# Patient Record
Sex: FEMALE | Race: WHITE | Employment: OTHER | URBAN - METROPOLITAN AREA
[De-identification: names, ages, dates, MRNs, and addresses within clinical notes are randomized per-mention and may not be internally consistent; named-entity substitution may affect disease eponyms.]

---

## 2017-01-10 ENCOUNTER — OFFICE VISIT (OUTPATIENT)
Dept: FAMILY MEDICINE CLINIC | Age: 82
End: 2017-01-10

## 2017-01-10 VITALS
WEIGHT: 152 LBS | SYSTOLIC BLOOD PRESSURE: 112 MMHG | DIASTOLIC BLOOD PRESSURE: 58 MMHG | BODY MASS INDEX: 28.7 KG/M2 | RESPIRATION RATE: 16 BRPM | HEIGHT: 61 IN | HEART RATE: 68 BPM

## 2017-01-10 DIAGNOSIS — E78.00 PURE HYPERCHOLESTEROLEMIA: ICD-10-CM

## 2017-01-10 DIAGNOSIS — I25.10 CORONARY ARTERY DISEASE INVOLVING NATIVE CORONARY ARTERY OF NATIVE HEART WITHOUT ANGINA PECTORIS: ICD-10-CM

## 2017-01-10 DIAGNOSIS — J01.20 ACUTE NON-RECURRENT ETHMOIDAL SINUSITIS: Primary | ICD-10-CM

## 2017-01-10 DIAGNOSIS — I10 ESSENTIAL HYPERTENSION: ICD-10-CM

## 2017-01-10 DIAGNOSIS — J44.9 CHRONIC OBSTRUCTIVE PULMONARY DISEASE, UNSPECIFIED COPD TYPE (HCC): ICD-10-CM

## 2017-01-10 PROCEDURE — 99213 OFFICE O/P EST LOW 20 MIN: CPT | Performed by: FAMILY MEDICINE

## 2017-01-10 RX ORDER — CEPHALEXIN 500 MG/1
500 CAPSULE ORAL 3 TIMES DAILY
Qty: 30 CAPSULE | Refills: 0 | Status: SHIPPED | OUTPATIENT
Start: 2017-01-10 | End: 2017-03-15

## 2017-01-10 RX ORDER — IPRATROPIUM BROMIDE 42 UG/1
2 SPRAY, METERED NASAL 3 TIMES DAILY
Qty: 1 BOTTLE | Refills: 3 | Status: SHIPPED | OUTPATIENT
Start: 2017-01-10 | End: 2017-09-20 | Stop reason: ALTCHOICE

## 2017-01-10 ASSESSMENT — ENCOUNTER SYMPTOMS
SHORTNESS OF BREATH: 0
ABDOMINAL PAIN: 0
SORE THROAT: 1
NAUSEA: 0
CHEST TIGHTNESS: 0
WHEEZING: 0
SINUS PRESSURE: 1
CONSTIPATION: 0
EYE PAIN: 0
COUGH: 1
HOARSE VOICE: 1

## 2017-01-13 RX ORDER — POTASSIUM CHLORIDE 20 MEQ/1
20 TABLET, EXTENDED RELEASE ORAL 3 TIMES DAILY
Qty: 270 TABLET | Refills: 1 | Status: SHIPPED | OUTPATIENT
Start: 2017-01-13 | End: 2017-07-18 | Stop reason: SDUPTHER

## 2017-01-18 DIAGNOSIS — I10 ESSENTIAL HYPERTENSION: ICD-10-CM

## 2017-01-18 DIAGNOSIS — E78.00 PURE HYPERCHOLESTEROLEMIA: ICD-10-CM

## 2017-01-18 DIAGNOSIS — M54.41 CHRONIC MIDLINE LOW BACK PAIN WITH RIGHT-SIDED SCIATICA: ICD-10-CM

## 2017-01-18 DIAGNOSIS — I25.10 CORONARY ARTERY DISEASE INVOLVING NATIVE CORONARY ARTERY OF NATIVE HEART WITHOUT ANGINA PECTORIS: ICD-10-CM

## 2017-01-18 DIAGNOSIS — G89.29 CHRONIC MIDLINE LOW BACK PAIN WITH RIGHT-SIDED SCIATICA: ICD-10-CM

## 2017-01-19 RX ORDER — SIMVASTATIN 40 MG
40 TABLET ORAL NIGHTLY
Qty: 90 TABLET | Refills: 1 | Status: SHIPPED | OUTPATIENT
Start: 2017-01-19 | End: 2017-07-18 | Stop reason: SDUPTHER

## 2017-01-19 RX ORDER — ISOSORBIDE MONONITRATE 30 MG/1
30 TABLET, EXTENDED RELEASE ORAL DAILY
Qty: 90 TABLET | Refills: 1 | Status: SHIPPED | OUTPATIENT
Start: 2017-01-19 | End: 2017-07-18 | Stop reason: SDUPTHER

## 2017-01-19 RX ORDER — NORTRIPTYLINE HYDROCHLORIDE 50 MG/1
50 CAPSULE ORAL NIGHTLY
Qty: 90 CAPSULE | Refills: 1 | Status: SHIPPED | OUTPATIENT
Start: 2017-01-19 | End: 2017-07-18 | Stop reason: SDUPTHER

## 2017-01-19 RX ORDER — HYDROCHLOROTHIAZIDE 25 MG/1
25 TABLET ORAL DAILY
Qty: 90 TABLET | Refills: 1 | Status: SHIPPED | OUTPATIENT
Start: 2017-01-19 | End: 2017-07-18 | Stop reason: SDUPTHER

## 2017-02-03 DIAGNOSIS — G89.29 CHRONIC BACK PAIN, UNSPECIFIED BACK LOCATION, UNSPECIFIED BACK PAIN LATERALITY: ICD-10-CM

## 2017-02-03 DIAGNOSIS — M54.9 CHRONIC BACK PAIN, UNSPECIFIED BACK LOCATION, UNSPECIFIED BACK PAIN LATERALITY: ICD-10-CM

## 2017-02-04 RX ORDER — ACETAMINOPHEN AND CODEINE PHOSPHATE 300; 30 MG/1; MG/1
TABLET ORAL
Qty: 120 TABLET | Refills: 1 | Status: SHIPPED | OUTPATIENT
Start: 2017-02-04 | End: 2017-03-27 | Stop reason: SDUPTHER

## 2017-03-15 ENCOUNTER — OFFICE VISIT (OUTPATIENT)
Dept: FAMILY MEDICINE CLINIC | Age: 82
End: 2017-03-15

## 2017-03-15 VITALS
HEIGHT: 61 IN | DIASTOLIC BLOOD PRESSURE: 74 MMHG | BODY MASS INDEX: 28.51 KG/M2 | SYSTOLIC BLOOD PRESSURE: 124 MMHG | HEART RATE: 74 BPM | WEIGHT: 151 LBS | RESPIRATION RATE: 14 BRPM

## 2017-03-15 DIAGNOSIS — G89.29 CHRONIC MIDLINE LOW BACK PAIN WITH RIGHT-SIDED SCIATICA: ICD-10-CM

## 2017-03-15 DIAGNOSIS — J44.9 CHRONIC OBSTRUCTIVE PULMONARY DISEASE, UNSPECIFIED COPD TYPE (HCC): ICD-10-CM

## 2017-03-15 DIAGNOSIS — K14.9 TONGUE IRRITATION: ICD-10-CM

## 2017-03-15 DIAGNOSIS — M15.9 PRIMARY OSTEOARTHRITIS INVOLVING MULTIPLE JOINTS: ICD-10-CM

## 2017-03-15 DIAGNOSIS — E78.00 PURE HYPERCHOLESTEROLEMIA: ICD-10-CM

## 2017-03-15 DIAGNOSIS — M54.41 CHRONIC MIDLINE LOW BACK PAIN WITH RIGHT-SIDED SCIATICA: ICD-10-CM

## 2017-03-15 DIAGNOSIS — I25.10 CORONARY ARTERY DISEASE INVOLVING NATIVE CORONARY ARTERY OF NATIVE HEART WITHOUT ANGINA PECTORIS: ICD-10-CM

## 2017-03-15 DIAGNOSIS — K21.9 GASTROESOPHAGEAL REFLUX DISEASE WITHOUT ESOPHAGITIS: ICD-10-CM

## 2017-03-15 DIAGNOSIS — I10 ESSENTIAL HYPERTENSION: Primary | ICD-10-CM

## 2017-03-15 PROCEDURE — 99213 OFFICE O/P EST LOW 20 MIN: CPT | Performed by: FAMILY MEDICINE

## 2017-03-15 RX ORDER — TIZANIDINE 4 MG/1
4 TABLET ORAL EVERY 6 HOURS PRN
Qty: 100 TABLET | Refills: 2 | Status: SHIPPED | OUTPATIENT
Start: 2017-03-15 | End: 2017-07-18 | Stop reason: SDUPTHER

## 2017-03-15 RX ORDER — TRIAMCINOLONE ACETONIDE 0.1 %
PASTE (GRAM) DENTAL
Qty: 1 TUBE | Refills: 1 | Status: SHIPPED | OUTPATIENT
Start: 2017-03-15 | End: 2017-03-22

## 2017-03-15 ASSESSMENT — PATIENT HEALTH QUESTIONNAIRE - PHQ9
SUM OF ALL RESPONSES TO PHQ QUESTIONS 1-9: 0
SUM OF ALL RESPONSES TO PHQ9 QUESTIONS 1 & 2: 0
1. LITTLE INTEREST OR PLEASURE IN DOING THINGS: 0
2. FEELING DOWN, DEPRESSED OR HOPELESS: 0

## 2017-03-15 ASSESSMENT — ENCOUNTER SYMPTOMS
COUGH: 0
SORE THROAT: 0
ABDOMINAL PAIN: 0
CONSTIPATION: 0
EYE PAIN: 0
NAUSEA: 0
SHORTNESS OF BREATH: 0
WHEEZING: 0
HOARSE VOICE: 0
ORTHOPNEA: 0
CHEST TIGHTNESS: 0

## 2017-03-15 ASSESSMENT — COPD QUESTIONNAIRES: COPD: 1

## 2017-03-27 DIAGNOSIS — M54.9 CHRONIC BACK PAIN, UNSPECIFIED BACK LOCATION, UNSPECIFIED BACK PAIN LATERALITY: ICD-10-CM

## 2017-03-27 DIAGNOSIS — G89.29 CHRONIC BACK PAIN, UNSPECIFIED BACK LOCATION, UNSPECIFIED BACK PAIN LATERALITY: ICD-10-CM

## 2017-03-28 RX ORDER — ACETAMINOPHEN AND CODEINE PHOSPHATE 300; 30 MG/1; MG/1
TABLET ORAL
Qty: 120 TABLET | Refills: 1 | Status: SHIPPED | OUTPATIENT
Start: 2017-03-28 | End: 2017-05-19 | Stop reason: SDUPTHER

## 2017-05-19 DIAGNOSIS — G89.29 CHRONIC BACK PAIN, UNSPECIFIED BACK LOCATION, UNSPECIFIED BACK PAIN LATERALITY: ICD-10-CM

## 2017-05-19 DIAGNOSIS — M54.9 CHRONIC BACK PAIN, UNSPECIFIED BACK LOCATION, UNSPECIFIED BACK PAIN LATERALITY: ICD-10-CM

## 2017-05-22 RX ORDER — ACETAMINOPHEN AND CODEINE PHOSPHATE 300; 30 MG/1; MG/1
TABLET ORAL
Qty: 120 TABLET | Refills: 1 | Status: SHIPPED | OUTPATIENT
Start: 2017-05-22 | End: 2017-07-18 | Stop reason: SDUPTHER

## 2017-06-21 ENCOUNTER — OFFICE VISIT (OUTPATIENT)
Dept: FAMILY MEDICINE CLINIC | Age: 82
End: 2017-06-21

## 2017-06-21 VITALS
SYSTOLIC BLOOD PRESSURE: 120 MMHG | RESPIRATION RATE: 12 BRPM | DIASTOLIC BLOOD PRESSURE: 66 MMHG | HEIGHT: 61 IN | BODY MASS INDEX: 28.75 KG/M2 | WEIGHT: 152.25 LBS | HEART RATE: 64 BPM

## 2017-06-21 DIAGNOSIS — E78.00 PURE HYPERCHOLESTEROLEMIA: ICD-10-CM

## 2017-06-21 DIAGNOSIS — I25.10 CORONARY ARTERY DISEASE INVOLVING NATIVE CORONARY ARTERY OF NATIVE HEART WITHOUT ANGINA PECTORIS: ICD-10-CM

## 2017-06-21 DIAGNOSIS — K21.9 GASTROESOPHAGEAL REFLUX DISEASE WITHOUT ESOPHAGITIS: ICD-10-CM

## 2017-06-21 DIAGNOSIS — G89.29 CHRONIC MIDLINE LOW BACK PAIN WITH RIGHT-SIDED SCIATICA: Primary | ICD-10-CM

## 2017-06-21 DIAGNOSIS — J44.9 CHRONIC OBSTRUCTIVE PULMONARY DISEASE, UNSPECIFIED COPD TYPE (HCC): ICD-10-CM

## 2017-06-21 DIAGNOSIS — I10 ESSENTIAL HYPERTENSION: ICD-10-CM

## 2017-06-21 DIAGNOSIS — M54.41 CHRONIC MIDLINE LOW BACK PAIN WITH RIGHT-SIDED SCIATICA: Primary | ICD-10-CM

## 2017-06-21 PROCEDURE — 99213 OFFICE O/P EST LOW 20 MIN: CPT | Performed by: FAMILY MEDICINE

## 2017-06-21 ASSESSMENT — ENCOUNTER SYMPTOMS
FREQUENT THROAT CLEARING: 0
COUGH: 0
CHEST TIGHTNESS: 0
WHEEZING: 0
ABDOMINAL PAIN: 0
NAUSEA: 0
ORTHOPNEA: 0
SHORTNESS OF BREATH: 0
HOARSE VOICE: 0
CONSTIPATION: 0
SORE THROAT: 0
BLURRED VISION: 0
EYE PAIN: 0

## 2017-06-21 ASSESSMENT — PATIENT HEALTH QUESTIONNAIRE - PHQ9
SUM OF ALL RESPONSES TO PHQ9 QUESTIONS 1 & 2: 0
1. LITTLE INTEREST OR PLEASURE IN DOING THINGS: 0
SUM OF ALL RESPONSES TO PHQ QUESTIONS 1-9: 0
2. FEELING DOWN, DEPRESSED OR HOPELESS: 0

## 2017-06-21 ASSESSMENT — COPD QUESTIONNAIRES: COPD: 1

## 2017-07-18 DIAGNOSIS — I25.10 CORONARY ARTERY DISEASE INVOLVING NATIVE CORONARY ARTERY OF NATIVE HEART WITHOUT ANGINA PECTORIS: ICD-10-CM

## 2017-07-18 DIAGNOSIS — G89.29 CHRONIC BACK PAIN, UNSPECIFIED BACK LOCATION, UNSPECIFIED BACK PAIN LATERALITY: ICD-10-CM

## 2017-07-18 DIAGNOSIS — I10 ESSENTIAL HYPERTENSION: ICD-10-CM

## 2017-07-18 DIAGNOSIS — E78.00 PURE HYPERCHOLESTEROLEMIA: ICD-10-CM

## 2017-07-18 DIAGNOSIS — M54.9 CHRONIC BACK PAIN, UNSPECIFIED BACK LOCATION, UNSPECIFIED BACK PAIN LATERALITY: ICD-10-CM

## 2017-07-18 DIAGNOSIS — M54.41 CHRONIC MIDLINE LOW BACK PAIN WITH RIGHT-SIDED SCIATICA: ICD-10-CM

## 2017-07-18 DIAGNOSIS — G89.29 CHRONIC MIDLINE LOW BACK PAIN WITH RIGHT-SIDED SCIATICA: ICD-10-CM

## 2017-07-19 RX ORDER — NORTRIPTYLINE HYDROCHLORIDE 50 MG/1
50 CAPSULE ORAL NIGHTLY
Qty: 90 CAPSULE | Refills: 1 | Status: SHIPPED | OUTPATIENT
Start: 2017-07-19 | End: 2018-01-15 | Stop reason: SDUPTHER

## 2017-07-19 RX ORDER — TIZANIDINE 4 MG/1
4 TABLET ORAL EVERY 6 HOURS PRN
Qty: 100 TABLET | Refills: 2 | Status: SHIPPED | OUTPATIENT
Start: 2017-07-19 | End: 2017-12-04 | Stop reason: SDUPTHER

## 2017-07-19 RX ORDER — SIMVASTATIN 40 MG
40 TABLET ORAL NIGHTLY
Qty: 90 TABLET | Refills: 1 | Status: SHIPPED | OUTPATIENT
Start: 2017-07-19 | End: 2018-01-15 | Stop reason: SDUPTHER

## 2017-07-19 RX ORDER — HYDROCHLOROTHIAZIDE 25 MG/1
25 TABLET ORAL DAILY
Qty: 90 TABLET | Refills: 1 | Status: SHIPPED | OUTPATIENT
Start: 2017-07-19 | End: 2018-01-15 | Stop reason: SDUPTHER

## 2017-07-19 RX ORDER — ISOSORBIDE MONONITRATE 30 MG/1
30 TABLET, EXTENDED RELEASE ORAL DAILY
Qty: 90 TABLET | Refills: 1 | Status: SHIPPED | OUTPATIENT
Start: 2017-07-19 | End: 2018-01-15 | Stop reason: SDUPTHER

## 2017-07-19 RX ORDER — ACETAMINOPHEN AND CODEINE PHOSPHATE 300; 30 MG/1; MG/1
TABLET ORAL
Qty: 120 TABLET | Refills: 1 | Status: SHIPPED | OUTPATIENT
Start: 2017-07-19 | End: 2017-09-20 | Stop reason: SDUPTHER

## 2017-07-19 RX ORDER — POTASSIUM CHLORIDE 20 MEQ/1
20 TABLET, EXTENDED RELEASE ORAL 3 TIMES DAILY
Qty: 270 TABLET | Refills: 1 | Status: SHIPPED | OUTPATIENT
Start: 2017-07-19 | End: 2018-01-15 | Stop reason: SDUPTHER

## 2017-08-02 ENCOUNTER — HOSPITAL ENCOUNTER (OUTPATIENT)
Age: 82
Discharge: HOME | End: 2017-08-02
Payer: MEDICARE

## 2017-08-02 ENCOUNTER — OFFICE VISIT (OUTPATIENT)
Dept: FAMILY MEDICINE CLINIC | Age: 82
End: 2017-08-02

## 2017-08-02 ENCOUNTER — HOSPITAL ENCOUNTER (OUTPATIENT)
Dept: GENERAL RADIOLOGY | Age: 82
Discharge: HOME | End: 2017-08-02
Payer: MEDICARE

## 2017-08-02 VITALS
RESPIRATION RATE: 10 BRPM | WEIGHT: 152.5 LBS | HEIGHT: 61 IN | SYSTOLIC BLOOD PRESSURE: 128 MMHG | HEART RATE: 76 BPM | DIASTOLIC BLOOD PRESSURE: 76 MMHG | BODY MASS INDEX: 28.79 KG/M2

## 2017-08-02 DIAGNOSIS — K21.9 GASTROESOPHAGEAL REFLUX DISEASE WITHOUT ESOPHAGITIS: ICD-10-CM

## 2017-08-02 DIAGNOSIS — J44.9 CHRONIC OBSTRUCTIVE PULMONARY DISEASE, UNSPECIFIED COPD TYPE (HCC): ICD-10-CM

## 2017-08-02 DIAGNOSIS — M54.42 ACUTE LEFT-SIDED LOW BACK PAIN WITH LEFT-SIDED SCIATICA: Primary | ICD-10-CM

## 2017-08-02 DIAGNOSIS — M15.9 PRIMARY OSTEOARTHRITIS INVOLVING MULTIPLE JOINTS: ICD-10-CM

## 2017-08-02 DIAGNOSIS — I25.10 CORONARY ARTERY DISEASE INVOLVING NATIVE CORONARY ARTERY OF NATIVE HEART WITHOUT ANGINA PECTORIS: ICD-10-CM

## 2017-08-02 DIAGNOSIS — M54.42 ACUTE LEFT-SIDED LOW BACK PAIN WITH LEFT-SIDED SCIATICA: ICD-10-CM

## 2017-08-02 DIAGNOSIS — I10 ESSENTIAL HYPERTENSION: ICD-10-CM

## 2017-08-02 PROCEDURE — 72100 X-RAY EXAM L-S SPINE 2/3 VWS: CPT

## 2017-08-02 PROCEDURE — 99213 OFFICE O/P EST LOW 20 MIN: CPT | Performed by: FAMILY MEDICINE

## 2017-08-02 RX ORDER — GABAPENTIN 300 MG/1
CAPSULE ORAL
Qty: 90 CAPSULE | Refills: 3 | Status: SHIPPED | OUTPATIENT
Start: 2017-08-02 | End: 2017-09-20 | Stop reason: ALTCHOICE

## 2017-08-02 RX ORDER — OMEPRAZOLE 20 MG/1
20 CAPSULE, DELAYED RELEASE ORAL 2 TIMES DAILY
Qty: 180 CAPSULE | Refills: 1 | Status: SHIPPED | OUTPATIENT
Start: 2017-08-02 | End: 2018-02-20 | Stop reason: SDUPTHER

## 2017-08-02 ASSESSMENT — ENCOUNTER SYMPTOMS
SHORTNESS OF BREATH: 0
ORTHOPNEA: 0
BACK PAIN: 1
EYE PAIN: 0
CONSTIPATION: 0
COUGH: 0
NAUSEA: 0
SORE THROAT: 0
WHEEZING: 0
ABDOMINAL PAIN: 0
CHEST TIGHTNESS: 0

## 2017-08-03 ENCOUNTER — TELEPHONE (OUTPATIENT)
Dept: FAMILY MEDICINE CLINIC | Age: 82
End: 2017-08-03

## 2017-08-24 ENCOUNTER — TELEPHONE (OUTPATIENT)
Dept: FAMILY MEDICINE CLINIC | Age: 82
End: 2017-08-24

## 2017-08-24 DIAGNOSIS — M54.41 CHRONIC MIDLINE LOW BACK PAIN WITH RIGHT-SIDED SCIATICA: Primary | ICD-10-CM

## 2017-08-24 DIAGNOSIS — G89.29 CHRONIC MIDLINE LOW BACK PAIN WITH RIGHT-SIDED SCIATICA: Primary | ICD-10-CM

## 2017-08-25 RX ORDER — PREGABALIN 50 MG/1
50 CAPSULE ORAL 2 TIMES DAILY
Qty: 60 CAPSULE | Refills: 3 | Status: SHIPPED | OUTPATIENT
Start: 2017-08-25 | End: 2017-09-20 | Stop reason: ALTCHOICE

## 2017-09-12 LAB
A/G RATIO: 0.9 (ref 1.5–2.5)
ABSOLUTE BASO #: 0 /CMM (ref 0–200)
ABSOLUTE EOS #: 200 /CMM (ref 0–500)
ABSOLUTE LYMPH #: 1500 /CMM (ref 1000–4800)
ABSOLUTE MONO #: 700 /CMM (ref 0–800)
ABSOLUTE NEUT #: 3300 /CMM (ref 1800–7700)
ALBUMIN SERPL-MCNC: 3.7 GM/DL (ref 3.5–5)
ALP BLD-CCNC: 99 IU/L (ref 39–118)
ALT SERPL-CCNC: 20 IU/L (ref 10–40)
ANION GAP SERPL CALCULATED.3IONS-SCNC: 7 MMOL/L (ref 4–12)
AST SERPL-CCNC: 28 IU/L (ref 15–41)
BASOPHILS RELATIVE PERCENT: 0.3 % (ref 0–2)
BILIRUB SERPL-MCNC: 0.8 MG/DL (ref 0.2–1)
BUN BLDV-MCNC: 30 MG/DL (ref 7–20)
CALCIUM SERPL-MCNC: 9.5 MG/DL (ref 8.8–10.5)
CHLORIDE BLD-SCNC: 105 MEQ/L (ref 101–111)
CHOLESTEROL/HDL RELATIVE RISK: 1.9 (ref 4–4.4)
CHOLESTEROL: 143 MG/DL
CO2: 19 MEQ/L (ref 21–32)
CREAT SERPL-MCNC: 1.46 MG/DL (ref 0.6–1.3)
CREATININE CLEARANCE: 34
DIRECT-LDL / HDL RISK: 0.8
EOSINOPHILS RELATIVE PERCENT: 2.9 % (ref 0–6)
GLUCOSE: 112 MG/DL (ref 70–110)
HCT VFR BLD CALC: 39.1 % (ref 35–44)
HDLC SERPL-MCNC: 74 MG/DL
HEMOGLOBIN: 12.8 GM/DL (ref 12–15)
LDL CHOLESTEROL DIRECT: 65 MG/DL
LYMPHOCYTES RELATIVE PERCENT: 27 % (ref 15–45)
MCH RBC QN AUTO: 30.4 PG (ref 27.5–33)
MCHC RBC AUTO-ENTMCNC: 32.6 GM/DL (ref 33–36)
MCV RBC AUTO: 93.2 CU MIC (ref 80–97)
MONOCYTES RELATIVE PERCENT: 11.7 % (ref 2–10)
NEUTROPHILS RELATIVE PERCENT: 58.1 % (ref 40–70)
NUCLEATED RBCS: 0.1 /100 WBC
PDW BLD-RTO: 14.2 % (ref 12–16)
PLATELET # BLD: 273 TH/CMM (ref 150–400)
POTASSIUM SERPL-SCNC: 4.3 MEQ/L (ref 3.6–5)
RBC # BLD: 4.2 MIL/CMM (ref 4–5.1)
SODIUM BLD-SCNC: 131 MEQ/L (ref 135–145)
TOTAL PROTEIN: 8 G/DL (ref 6.2–8)
TRIGL SERPL-MCNC: 136 MG/DL
TSH REFLEX: 1.47 MCIU/ML (ref 0.49–4.67)
VLDLC SERPL CALC-MCNC: 27 MG/DL
WBC # BLD: 5.7 TH/CMM (ref 4.4–10.5)

## 2017-09-13 ENCOUNTER — TELEPHONE (OUTPATIENT)
Dept: FAMILY MEDICINE CLINIC | Age: 82
End: 2017-09-13

## 2017-09-20 ENCOUNTER — OFFICE VISIT (OUTPATIENT)
Dept: FAMILY MEDICINE CLINIC | Age: 82
End: 2017-09-20

## 2017-09-20 VITALS
SYSTOLIC BLOOD PRESSURE: 122 MMHG | BODY MASS INDEX: 28.75 KG/M2 | HEART RATE: 76 BPM | WEIGHT: 152.25 LBS | HEIGHT: 61 IN | DIASTOLIC BLOOD PRESSURE: 66 MMHG | RESPIRATION RATE: 14 BRPM

## 2017-09-20 DIAGNOSIS — I10 ESSENTIAL HYPERTENSION: Primary | ICD-10-CM

## 2017-09-20 DIAGNOSIS — G89.29 CHRONIC BACK PAIN, UNSPECIFIED BACK LOCATION, UNSPECIFIED BACK PAIN LATERALITY: ICD-10-CM

## 2017-09-20 DIAGNOSIS — M15.9 PRIMARY OSTEOARTHRITIS INVOLVING MULTIPLE JOINTS: ICD-10-CM

## 2017-09-20 DIAGNOSIS — I25.10 CORONARY ARTERY DISEASE INVOLVING NATIVE CORONARY ARTERY OF NATIVE HEART WITHOUT ANGINA PECTORIS: ICD-10-CM

## 2017-09-20 DIAGNOSIS — M54.9 CHRONIC BACK PAIN, UNSPECIFIED BACK LOCATION, UNSPECIFIED BACK PAIN LATERALITY: ICD-10-CM

## 2017-09-20 DIAGNOSIS — K21.9 GASTROESOPHAGEAL REFLUX DISEASE WITHOUT ESOPHAGITIS: ICD-10-CM

## 2017-09-20 DIAGNOSIS — Z12.39 BREAST SCREENING: ICD-10-CM

## 2017-09-20 DIAGNOSIS — E78.00 PURE HYPERCHOLESTEROLEMIA: ICD-10-CM

## 2017-09-20 DIAGNOSIS — Z13.820 OSTEOPOROSIS SCREENING: ICD-10-CM

## 2017-09-20 DIAGNOSIS — J44.9 CHRONIC OBSTRUCTIVE PULMONARY DISEASE, UNSPECIFIED COPD TYPE (HCC): ICD-10-CM

## 2017-09-20 PROCEDURE — 99213 OFFICE O/P EST LOW 20 MIN: CPT | Performed by: FAMILY MEDICINE

## 2017-09-20 RX ORDER — ACETAMINOPHEN AND CODEINE PHOSPHATE 300; 30 MG/1; MG/1
TABLET ORAL
Qty: 120 TABLET | Refills: 0 | Status: SHIPPED | OUTPATIENT
Start: 2017-09-20 | End: 2017-09-25

## 2017-09-20 ASSESSMENT — ENCOUNTER SYMPTOMS
CHEST TIGHTNESS: 0
CONSTIPATION: 0
ABDOMINAL PAIN: 0
SORE THROAT: 0
EYE PAIN: 0
SHORTNESS OF BREATH: 0
HOARSE VOICE: 0
COUGH: 0
NAUSEA: 0
WHEEZING: 0
BOWEL INCONTINENCE: 0
BACK PAIN: 1

## 2017-09-25 ENCOUNTER — HOSPITAL ENCOUNTER (EMERGENCY)
Age: 82
Discharge: HOME | End: 2017-09-25
Attending: EMERGENCY MEDICINE
Payer: MEDICARE

## 2017-09-25 ENCOUNTER — APPOINTMENT (OUTPATIENT)
Dept: CT IMAGING | Age: 82
End: 2017-09-25
Payer: MEDICARE

## 2017-09-25 VITALS
BODY MASS INDEX: 28.72 KG/M2 | RESPIRATION RATE: 14 BRPM | OXYGEN SATURATION: 98 % | DIASTOLIC BLOOD PRESSURE: 64 MMHG | TEMPERATURE: 97.9 F | HEART RATE: 64 BPM | WEIGHT: 152 LBS | SYSTOLIC BLOOD PRESSURE: 114 MMHG

## 2017-09-25 DIAGNOSIS — R07.89 ATYPICAL CHEST PAIN: ICD-10-CM

## 2017-09-25 DIAGNOSIS — S22.000A COMPRESSION FRACTURE OF BODY OF THORACIC VERTEBRA (HCC): Primary | ICD-10-CM

## 2017-09-25 LAB
ALBUMIN SERPL-MCNC: 3.9 G/DL (ref 3.5–5.1)
ALP BLD-CCNC: 97 U/L (ref 38–126)
ALT SERPL-CCNC: 16 U/L (ref 11–66)
ANION GAP SERPL CALCULATED.3IONS-SCNC: 16 MEQ/L (ref 8–16)
ANISOCYTOSIS: ABNORMAL
AST SERPL-CCNC: 23 U/L (ref 5–40)
BASOPHILS # BLD: 0.4 %
BASOPHILS ABSOLUTE: 0 THOU/MM3 (ref 0–0.1)
BILIRUB SERPL-MCNC: 0.3 MG/DL (ref 0.3–1.2)
BUN BLDV-MCNC: 20 MG/DL (ref 7–22)
CALCIUM SERPL-MCNC: 9.2 MG/DL (ref 8.5–10.5)
CHLORIDE BLD-SCNC: 98 MEQ/L (ref 98–111)
CO2: 18 MEQ/L (ref 23–33)
CREAT SERPL-MCNC: 1.3 MG/DL (ref 0.4–1.2)
D-DIMER QUANTITATIVE: 927 NG/ML FEU (ref 0–500)
EOSINOPHIL # BLD: 2.2 %
EOSINOPHILS ABSOLUTE: 0.1 THOU/MM3 (ref 0–0.4)
GFR SERPL CREATININE-BSD FRML MDRD: 39 ML/MIN/1.73M2
GLUCOSE BLD-MCNC: 124 MG/DL (ref 70–108)
HCT VFR BLD CALC: 34.7 % (ref 37–47)
HEMOGLOBIN: 11.6 GM/DL (ref 12–16)
INR BLD: 0.97 (ref 0.85–1.13)
LIPASE: 19.2 U/L (ref 5.6–51.3)
LYMPHOCYTES # BLD: 22.7 %
LYMPHOCYTES ABSOLUTE: 1.2 THOU/MM3 (ref 1–4.8)
MCH RBC QN AUTO: 30.9 PG (ref 27–31)
MCHC RBC AUTO-ENTMCNC: 33.5 GM/DL (ref 33–37)
MCV RBC AUTO: 92.1 FL (ref 81–99)
MONOCYTES # BLD: 12.6 %
MONOCYTES ABSOLUTE: 0.7 THOU/MM3 (ref 0.4–1.3)
NUCLEATED RED BLOOD CELLS: 0 /100 WBC
OSMOLALITY CALCULATION: 268.6 MOSMOL/KG (ref 275–300)
PDW BLD-RTO: 15 % (ref 11.5–14.5)
PLATELET # BLD: 249 THOU/MM3 (ref 130–400)
PMV BLD AUTO: 7.9 MCM (ref 7.4–10.4)
POTASSIUM SERPL-SCNC: 4.3 MEQ/L (ref 3.5–5.2)
RBC # BLD: 3.77 MILL/MM3 (ref 4.2–5.4)
RBC # BLD: NORMAL 10*6/UL
SEG NEUTROPHILS: 62.1 %
SEGMENTED NEUTROPHILS ABSOLUTE COUNT: 3.4 THOU/MM3 (ref 1.8–7.7)
SODIUM BLD-SCNC: 132 MEQ/L (ref 135–145)
TOTAL PROTEIN: 7 G/DL (ref 6.1–8)
TROPONIN T: < 0.01 NG/ML
WBC # BLD: 5.4 THOU/MM3 (ref 4.8–10.8)

## 2017-09-25 PROCEDURE — 93005 ELECTROCARDIOGRAM TRACING: CPT | Performed by: EMERGENCY MEDICINE

## 2017-09-25 PROCEDURE — 80053 COMPREHEN METABOLIC PANEL: CPT

## 2017-09-25 PROCEDURE — 85610 PROTHROMBIN TIME: CPT

## 2017-09-25 PROCEDURE — 84484 ASSAY OF TROPONIN QUANT: CPT

## 2017-09-25 PROCEDURE — 36415 COLL VENOUS BLD VENIPUNCTURE: CPT

## 2017-09-25 PROCEDURE — 76376 3D RENDER W/INTRP POSTPROCES: CPT

## 2017-09-25 PROCEDURE — 85379 FIBRIN DEGRADATION QUANT: CPT

## 2017-09-25 PROCEDURE — 83690 ASSAY OF LIPASE: CPT

## 2017-09-25 PROCEDURE — 99285 EMERGENCY DEPT VISIT HI MDM: CPT

## 2017-09-25 PROCEDURE — 6360000004 HC RX CONTRAST MEDICATION: Performed by: EMERGENCY MEDICINE

## 2017-09-25 PROCEDURE — 71275 CT ANGIOGRAPHY CHEST: CPT

## 2017-09-25 PROCEDURE — 2580000003 HC RX 258: Performed by: EMERGENCY MEDICINE

## 2017-09-25 PROCEDURE — 85025 COMPLETE CBC W/AUTO DIFF WBC: CPT

## 2017-09-25 RX ORDER — SODIUM CHLORIDE 9 MG/ML
INJECTION, SOLUTION INTRAVENOUS ONCE
Status: COMPLETED | OUTPATIENT
Start: 2017-09-25 | End: 2017-09-25

## 2017-09-25 RX ORDER — HYDROCODONE BITARTRATE AND ACETAMINOPHEN 5; 325 MG/1; MG/1
1 TABLET ORAL EVERY 6 HOURS PRN
Qty: 12 TABLET | Refills: 0 | Status: SHIPPED | OUTPATIENT
Start: 2017-09-25 | End: 2017-10-02 | Stop reason: ALTCHOICE

## 2017-09-25 RX ADMIN — SODIUM CHLORIDE: 9 INJECTION, SOLUTION INTRAVENOUS at 12:14

## 2017-09-25 RX ADMIN — IOPAMIDOL 80 ML: 755 INJECTION, SOLUTION INTRAVENOUS at 12:01

## 2017-09-25 ASSESSMENT — PAIN DESCRIPTION - PAIN TYPE: TYPE: ACUTE PAIN

## 2017-09-25 ASSESSMENT — PAIN DESCRIPTION - FREQUENCY: FREQUENCY: CONTINUOUS

## 2017-09-25 ASSESSMENT — PAIN DESCRIPTION - ORIENTATION: ORIENTATION: MID

## 2017-09-25 ASSESSMENT — PAIN DESCRIPTION - ONSET: ONSET: ON-GOING

## 2017-09-25 ASSESSMENT — PAIN SCALES - GENERAL: PAINLEVEL_OUTOF10: 6

## 2017-09-25 ASSESSMENT — PAIN DESCRIPTION - DESCRIPTORS: DESCRIPTORS: STABBING

## 2017-09-25 ASSESSMENT — PAIN DESCRIPTION - LOCATION: LOCATION: CHEST

## 2017-09-25 NOTE — ED PROVIDER NOTES
eMERGENCY dEPARTMENT eNCOUnter      [address] [facility]    Chief Complaint   Patient presents with    Chest Pain       HPI    Gabi Pierre is a 81 y.o. female who presents  Above-noted complaint. Patient actually is more short of breath than anything else but does have some pain. She describes it from mid back through to her chest. States she had some type of stress test this past week. Has not had the results. Denies other symptoms such as nausea vomiting or diaphoresis. She does feel short of breath on exertion which is new for her. She saw her cardiologist this week. PAST MEDICAL HISTORY    Past Medical History:   Diagnosis Date    CAD (coronary artery disease)      cath  49    Cardiac valve prolapse     Chronic back pain     COPD [facility]ch[facility] obstructive [facility] disease) ([facility])     Ex-smoker     GERD (gastroesophageal reflux disease) 2/07    EGD    Glaucoma     H/O cardiac catheterization 2002    40-50% LAD   katharine    Hyperlipidemia     Hypertension     Osteoarthritis 1999    right shoulder and back    Pericarditis 1996       SURGICAL HISTORY    Past Surgical History:   Procedure Laterality Date    APPENDECTOMY      at age 13years   [address]  2004    right foot   [address]  2007??  &   2012? ?    normal results    COLONOSCOPY      last one 2000???    EYE SURGERY  2009??    right eye   [address]    still has ovaries    ROTATOR CUFF REPAIR  left    SPINE SURGERY  7/02    L5 cyst    UPPER GASTROINTESTINAL ENDOSCOPY  2007    sheik       CURRENT MEDICATIONS    Current Outpatient Rx   Medication Sig Dispense Refill    HYDROcodone-acetaminophen (NORCO) 5-325 MG per tablet Take 1 tablet by mouth every 6 hours as needed for Pain .  12 tablet 0    omeprazole (PRILOSEC) 20 MG delayed release capsule Take 1 capsule by mouth 2 times daily 180 capsule 1    hydrochlorothiazide (HYDRODIURIL) 25 MG tablet Take 1 tablet by mouth daily 90 tablet 1    metoprolol tartrate (LOPRESSOR) 25 MG tablet 1/2 tab po bid 90 tablet 1    simvastatin (ZOCOR) 40 MG tablet Take 1 tablet by mouth nightly 90 tablet 1    isosorbide mononitrate (IMDUR) 30 MG extended release tablet Take 1 tablet by mouth daily 90 tablet 1    nortriptyline (PAMELOR) 50 MG capsule Take 1 capsule by mouth nightly 90 capsule 1    potassium chloride (KLOR-CON M) 20 MEQ extended release tablet Take 1 tablet by mouth 3 times daily One tablet  daily 270 tablet 1    tiZANidine (ZANAFLEX) 4 MG tablet Take 1 tablet by mouth every 6 hours as needed (muscle spasm) 100 tablet 2    aspirin 81 MG tablet Take 81 mg by mouth daily      latanoprost (XALATAN) 0.005 % ophthalmic solution 1 drop nightly. ALLERGIES    No Known Allergies    FAMILY HISTORY    Family History   Problem Relation Age of Onset    Tuberculosis Mother     Tuberculosis Father        SOCIAL HISTORY    Social History     Social History    Marital status:      Spouse name: N/A    Number of children: N/A    Years of education: N/A     Social History Main Topics    Smoking status: Former Smoker     Years: 30.00     Types: Cigarettes     Quit date: 6/11/1989    Smokeless tobacco: Never Used    Alcohol use No    Drug use: No    Sexual activity: Not Currently     Other Topics Concern    Not on file     Social History Narrative       REVIEW OF SYSTEMS    Positive for chest pain. Some dyspnea on exertion. No nausea vomiting or diaphoresis. No abdominal pain. Chronic back pain. Also chest pain and back pain today. All systems negative except as marked.      PHYSICAL EXAM    VITAL SIGNS: /64  Pulse 64  Temp 97.9 °F (36.6 °C) (Oral)   Resp 14  Wt 152 lb (68.9 kg)  SpO2 98%  BMI 28.72 kg/m2   Constitutional:  Alert not toxtic or ill, pleasant and able to give coherent history  HENT:  Normocephalic, Atraumatic, Bilateral external ears normal, Oropharynx moist, No oral exudates, Nose normal.  Cervical Spine: Normal range of motion,  No stridor. No tenderness, Supple,  Eyes:  No discharge or  Swelling,Conjunctiva normal, PERRL, EOMI,  Respiratory: No respiratory distress, Normal breath sounds,  No wheezing, No chest tenderness. Cardiovascular:  Normal heart rate, Normal rhythm, No murmurs, No rubs, No gallops. GI:  No reproducible pain, Bowel sounds normal, Soft, No masses, No pulsatile masses. No tenderness  Musculoskeletal:  Intact distal pulses, No edema, No tenderness, No cyanosis, No clubbing. Good range of motion in all major joints. No tenderness to palpation or major deformities noted. Back:No tenderness. Integument:  Warm, Dry, No erythema, No rash (on exposed areas)   Lymphatic:  No lymphadenopathy noted. Neurologic:  Alert & oriented x 3, Normal motor function, Normal sensory function, No focal deficits noted. Psychiatric:  Affect normal, Judgment normal, Mood normal.     EKG    Rate of 66,  no ischemia or infarction                  RADIOLOGY    CT Thoracic Reconstruction WO Post Process   Final Result   1. Interval slight increase in the depression of the superior and posterior T8. This is age indeterminate. If clinically indicated MRI of the thoracic spinal be helpful. 2.  Neural foraminal stenosis on the right at the lower thoracic spine. **This report has been created using voice recognition software. It may contain minor errors which are inherent in voice recognition technology. **      Final report electronically signed by Dr. Shawna Guerra on 9/25/2017 12:37 PM      CTA Chest W WO Contrast   Final Result   1. No pulmonary emboli or pulmonary infiltrates. 2.  Slight further increase in amount of depression of the superior endplate of T8 for a total of 10 percent height loss. This is age-indeterminate. If clinically indicated MRI of the thoracic spine will evaluate the bone marrow of the thoracic spine for    fractures.                **This needed for Pain ., Disp-12 tablet, R-0Print                Jolanta Abrams MD  09/28/17 [facility] Edith Enamorado MD  09/28/17 [id number]

## 2017-09-25 NOTE — ED TRIAGE NOTES
Patient presents to the ED with chest pain over the last week but has come and goes over the last year. Patient states that she had a cardiac work up with Dr. China Stein last Thursday. EKG shows NSR. Patient denies SOB but was a little SOB when walking earlier. Patient is not diaphoretic.

## 2017-09-25 NOTE — ED AVS SNAPSHOT
your visit. Please take this sheet with you when you visit your doctor or other health care provider in the future. It will help determine the best possible medical care for you at that time. If you have any questions once you leave the hospital, please call the department phone number listed below. Diagnoses this visit     Your diagnoses were COMPRESSION FRACTURE OF BODY OF THORACIC VERTEBRA ([address] and ATYPICAL CHEST PAIN. Visit Information     Date of Visit Department Dept Phone    9/25/2017 [facility] EMERGENCY DEPT [phone]      You were seen by     You were seen by Talat Webb MD.       Follow-up Appointments    Below is a list of your follow-up and future appointments. This may not be a complete list as you may have made appointments directly with providers that we are not aware of or your providers may have made some for you. Please call your providers to confirm appointments. It is important to keep your appointments. Please bring your current insurance card, photo ID, co-pay, and all medication bottles to your appointment. If self-pay, payment is expected at the time of service. Follow-up Information     Call Elenita Schwartz MD.    Specialty:  Family Medicine    Why:  For evaluation    Contact information:    [address] [address]  [phone]        Future Appointments     12/20/2017 9:40 AM     Appointment with Elenita Schwartz MD at [facility] ([phone])   Please arrive 15 minutes prior to appointment, bring photo ID and insurance card. [address] [facility] [address]         Preventive Care        Date Due    Tetanus Combination Vaccine (1 - Tdap) 8/17/1955    Zoster Vaccine 8/17/1996    Osteoporosis screening or a bone density scan (Dexa) is recommended once at age 72. Based upon the results and risk factors for bone loss, your provider will recommend whether this needs to be repeated.  8/17/2001 Yearly Flu Vaccine (1) 9/1/2017                 Care Plan Once You Return Home    This section includes instructions you will need to follow once you leave the hospital.  Your care team will discuss these with you, so you and those caring for you know how to best care for your health needs at home. This section may also include educational information about certain health topics that may be of help to you. Important Information if you smoke or are exposed to smoking       SMOKING: QUIT SMOKING. THIS IS THE MOST IMPORTANT ACTION YOU CAN TAKE TO IMPROVE YOUR CURRENT AND FUTURE HEALTH. Call the [facility] at [facility] NOW ([phone])    Smoking harms nonsmokers. When nonsmokers are around people who smoke, they absorb nicotine, carbon monoxide, and other ingredients of tobacco smoke. DO NOT SMOKE AROUND CHILDREN     Children exposed to secondhand smoke are at an increased risk of:  Sudden Infant Death Syndrome (SIDS), acute respiratory infections, inflammation of the middle ear, and severe asthma. Over a longer time, it causes heart disease and lung cancer. There is no safe level of exposure to secondhand smoke. Important information for a smoker       SMOKING: QUIT SMOKING. THIS IS THE MOST IMPORTANT ACTION YOU CAN TAKE TO IMPROVE YOUR CURRENT AND FUTURE HEALTH. Call the [facility] NOW ([phone])    Smoking harms nonsmokers. When nonsmokers are around people who smoke, they absorb nicotine, carbon monoxide, and other ingredients of tobacco smoke. DO NOT SMOKE AROUND CHILDREN     Children exposed to secondhand smoke are at an increased risk of:  Sudden Infant Death Syndrome (SIDS), acute respiratory infections, inflammation of the middle ear, and severe asthma. Over a longer time, it causes heart disease and lung cancer. There is no safe level of exposure to secondhand smoke. MyChart Signup     Our records indicate that you have declined MyChart signup. View your information online  ? Review your current list of  medications, immunization, and allergies. ? Review your future test results online . ? Review your discharge instructions provided by your caregivers at discharge    Certain functionality such as prescription refills, scheduling appointments or sending messages to your provider are not activated if your provider does not use [employer] in his/her office    For questions regarding your MyChart account call [phone]. If you have a clinical question, please call your doctor's office. The information on all pages of the After Visit Summary has been reviewed with me, the patient and/or responsible adult, by my health care provider(s). I had the opportunity to ask questions regarding this information. I understand I should dispose of my armband safely at home to protect my health information. A complete copy of the After Visit Summary has been given to me, the patient and/or responsible adult. Patient Signature/Responsible Adult: ___________________________________    Nurse Signature: ___________________________________  Resident/MLP Signature: ___________________________________  Attending Signature: ___________________________________    Date:____________Time:____________              Discharge Instructions       Stop taking Tylenol with Codeine. Take Norco for severe pain. follow-up with primary care    More Information           Compression Fracture of the Spine: Care Instructions  Your Care Instructions    A compression fracture happens when the front part of a spinal bone breaks and collapses. A fall or other accident can cause it. A minor injury or moving the wrong way can cause a break if you have thin or brittle bones (osteoporosis). These types of breaks will heal in 8 to 10 weeks.  You will need rest and If you do not have an account, please click on the \"Sign Up Now\" link. Current as of: March 21, 2017  Content Version: 11.3  © 9368-9763 [employer]. Care instructions adapted under license by [facility] ([facility]). If you have questions about a medical condition or this instruction, always ask your healthcare professional. [address] any warranty or liability for your use of this information.

## 2017-09-26 LAB
EKG ATRIAL RATE: 66 BPM
EKG P AXIS: 55 DEGREES
EKG P-R INTERVAL: 180 MS
EKG Q-T INTERVAL: 394 MS
EKG QRS DURATION: 84 MS
EKG QTC CALCULATION (BAZETT): 413 MS
EKG R AXIS: 28 DEGREES
EKG T AXIS: 63 DEGREES
EKG VENTRICULAR RATE: 66 BPM

## 2017-09-27 ENCOUNTER — CARE COORDINATION (OUTPATIENT)
Dept: FAMILY MEDICINE CLINIC | Age: 82
End: 2017-09-27

## 2017-10-02 ENCOUNTER — OFFICE VISIT (OUTPATIENT)
Dept: FAMILY MEDICINE CLINIC | Age: 82
End: 2017-10-02

## 2017-10-02 VITALS
DIASTOLIC BLOOD PRESSURE: 66 MMHG | WEIGHT: 150.13 LBS | RESPIRATION RATE: 12 BRPM | HEART RATE: 76 BPM | HEIGHT: 61 IN | SYSTOLIC BLOOD PRESSURE: 108 MMHG | BODY MASS INDEX: 28.35 KG/M2

## 2017-10-02 DIAGNOSIS — M15.9 PRIMARY OSTEOARTHRITIS INVOLVING MULTIPLE JOINTS: ICD-10-CM

## 2017-10-02 DIAGNOSIS — I10 ESSENTIAL HYPERTENSION: ICD-10-CM

## 2017-10-02 DIAGNOSIS — S22.000S CLOSED COMPRESSION FRACTURE OF THORACIC VERTEBRA, SEQUELA: Primary | ICD-10-CM

## 2017-10-02 DIAGNOSIS — I25.10 CORONARY ARTERY DISEASE INVOLVING NATIVE CORONARY ARTERY OF NATIVE HEART WITHOUT ANGINA PECTORIS: ICD-10-CM

## 2017-10-02 PROCEDURE — 99213 OFFICE O/P EST LOW 20 MIN: CPT | Performed by: FAMILY MEDICINE

## 2017-10-02 RX ORDER — HYDROCODONE BITARTRATE AND ACETAMINOPHEN 5; 325 MG/1; MG/1
1 TABLET ORAL EVERY 4 HOURS PRN
Qty: 40 TABLET | Refills: 0 | Status: SHIPPED | OUTPATIENT
Start: 2017-10-02 | End: 2017-11-15 | Stop reason: ALTCHOICE

## 2017-10-02 ASSESSMENT — ENCOUNTER SYMPTOMS
CHEST TIGHTNESS: 0
EYE PAIN: 0
WHEEZING: 0
SHORTNESS OF BREATH: 0
NAUSEA: 0
COUGH: 0
ABDOMINAL PAIN: 0
CONSTIPATION: 0
SORE THROAT: 0
BACK PAIN: 1

## 2017-10-02 NOTE — PROGRESS NOTES
Subjective:      Patient ID: Agatha Arias is a 81 y.o. female. HPI Comments:   Compression  Of  fx  Of  t9  Areas    norco  1/2   Bid        ashd  Stable       htn  Stable       gered   Stable     Past Medical History:   Diagnosis Date    CAD (coronary artery disease)      cath  49    Cardiac valve prolapse     Chronic back pain     [facility]     Ex-smoker     GERD (gastroesophageal reflux disease) 2/07    EGD    Glaucoma     H/O cardiac catheterization 2002    40-50% LAD   katharine    Hyperlipidemia     Hypertension     Osteoarthritis 1999    right shoulder and back    Pericarditis 1996     Review of Systems   Constitutional: Negative for appetite change, fatigue and fever. HENT: Negative for congestion, ear pain, postnasal drip and sore throat. Eyes: Negative for pain and visual disturbance. Respiratory: Negative for cough, chest tightness, shortness of breath and wheezing. Cardiovascular: Negative for chest pain, palpitations and leg swelling. Gastrointestinal: Negative for abdominal pain, constipation and nausea. Genitourinary: Negative for dysuria and frequency. Musculoskeletal: Positive for back pain. Negative for arthralgias, joint swelling, neck pain and neck stiffness. Skin: Negative for rash. Neurological: Negative for dizziness, weakness, numbness and headaches. Hematological: Negative for adenopathy. Does not bruise/bleed easily. Psychiatric/Behavioral: Negative for behavioral problems and sleep disturbance. The patient is not nervous/anxious. /66 (Site: Right Arm, Position: Sitting, Cuff Size: Medium Adult)  Pulse 76  Resp 12  Ht 5' 1\" (1.549 m)  Wt 150 lb 2 oz (68.1 kg)  Breastfeeding? No  BMI 28.37 kg/m2  Objective:   Physical Exam   Constitutional: She is oriented to person, place, and time. She appears well-developed and well-nourished. HENT:   Head: Normocephalic and atraumatic.    Right Ear:

## 2017-10-02 NOTE — MR AVS SNAPSHOT
estimate of body fat, calculated from your height and weight. The higher your BMI, the greater your risk of heart disease, high blood pressure, type 2 diabetes, stroke, gallstones, arthritis, sleep apnea, and certain cancers. BMI is not perfect. It may overestimate body fat in athletes and people who are more muscular. If your body fat is high you can improve your BMI by decreasing your calorie intake and becoming more physically active. Learn more at: [email or web address]             Today's Medication Changes          These changes are accurate as of: 10/2/17  1:16 PM.  If you have any questions, ask your nurse or doctor. CHANGE how you take these medications           HYDROcodone-acetaminophen 5-325 MG per tablet   Commonly known as:  NORCO   Instructions: Take 1 tablet by mouth every 4 hours as needed for Pain . Quantity:  40 tablet   Refills:  0   What changed:  when to take this   Changed by:  Val Meyer MD            Where to Get Your Medications      These medications were sent to [address] [place] [address], [address] [facility]     Phone:  [phone]     HYDROcodone-acetaminophen 5-325 MG per tablet               Your Current Medications Are              HYDROcodone-acetaminophen (NORCO) 5-325 MG per tablet Take 1 tablet by mouth every 4 hours as needed for Pain .     omeprazole (PRILOSEC) 20 MG delayed release capsule Take 1 capsule by mouth 2 times daily    hydrochlorothiazide (HYDRODIURIL) 25 MG tablet Take 1 tablet by mouth daily    metoprolol tartrate (LOPRESSOR) 25 MG tablet 1/2 tab po bid    simvastatin (ZOCOR) 40 MG tablet Take 1 tablet by mouth nightly    isosorbide mononitrate (IMDUR) 30 MG extended release tablet Take 1 tablet by mouth daily    nortriptyline (PAMELOR) 50 MG capsule Take 1 capsule by mouth nightly potassium chloride (KLOR-CON M) 20 MEQ extended release tablet Take 1 tablet by mouth 3 times daily One tablet  daily    tiZANidine (ZANAFLEX) 4 MG tablet Take 1 tablet by mouth every 6 hours as needed (muscle spasm)    aspirin 81 MG tablet Take 81 mg by mouth daily    latanoprost (XALATAN) 0.005 % ophthalmic solution 1 drop nightly. Allergies           No Known Allergies         Additional Information        Basic Information     Date Of Birth Sex Race Ethnicity Preferred Language Preferred Written Language    1935 Female White Non-/Non  English English      Problem List as of 10/2/2017  Date Reviewed: 9/20/2017                Chronic back pain    CAD (coronary artery disease)    Hypertension    Hyperlipidemia    Osteoarthritis    GERD (gastroesophageal reflux disease)    COPD (chronic obstructive pulmonary disease) ([facility] [address]      Your Goals as of 10/2/2017 at 1:14 PM              Today    9/25/17 9/25/17       Blood Pressure    Blood Pressure < 140/90   108/66  114/64  124/63       Result Component    LDL CALC < 130             Immunizations as of 10/2/2017     Name Date    Pneumococcal 13-valent Conjugate ([id number]) 9/22/2015    Pneumococcal Conjugate 7-valent 1/1/2003    Pneumococcal Polysaccharide ([id number]) 3/21/2010      Preventive Care        Date Due    Tetanus Combination Vaccine (1 - Tdap) 8/17/1955    Zoster Vaccine 8/17/1996    Osteoporosis screening or a bone density scan (Dexa) is recommended once at age 72. Based upon the results and risk factors for bone loss, your provider will recommend whether this needs to be repeated. 8/17/2001    Yearly Flu Vaccine (1) 9/1/2017            MyChart Signup           Our records indicate that you have declined MyChart signup.

## 2017-10-31 ENCOUNTER — HOSPITAL ENCOUNTER (OUTPATIENT)
Dept: GENERAL RADIOLOGY | Age: 82
Discharge: HOME | End: 2017-10-31
Payer: MEDICARE

## 2017-10-31 ENCOUNTER — HOSPITAL ENCOUNTER (OUTPATIENT)
Age: 82
Discharge: HOME | End: 2017-10-31
Payer: MEDICARE

## 2017-10-31 DIAGNOSIS — S22.000S CLOSED COMPRESSION FRACTURE OF THORACIC VERTEBRA, SEQUELA: ICD-10-CM

## 2017-10-31 PROCEDURE — 72072 X-RAY EXAM THORAC SPINE 3VWS: CPT

## 2017-11-01 ENCOUNTER — TELEPHONE (OUTPATIENT)
Dept: FAMILY MEDICINE CLINIC | Age: 82
End: 2017-11-01

## 2017-11-07 ENCOUNTER — OFFICE VISIT (OUTPATIENT)
Dept: FAMILY MEDICINE CLINIC | Age: 82
End: 2017-11-07

## 2017-11-07 VITALS
SYSTOLIC BLOOD PRESSURE: 110 MMHG | RESPIRATION RATE: 14 BRPM | HEART RATE: 68 BPM | HEIGHT: 61 IN | DIASTOLIC BLOOD PRESSURE: 66 MMHG | BODY MASS INDEX: 28.89 KG/M2 | WEIGHT: 153 LBS

## 2017-11-07 DIAGNOSIS — I25.10 CORONARY ARTERY DISEASE INVOLVING NATIVE CORONARY ARTERY OF NATIVE HEART WITHOUT ANGINA PECTORIS: ICD-10-CM

## 2017-11-07 DIAGNOSIS — G89.29 CHRONIC MIDLINE LOW BACK PAIN WITH RIGHT-SIDED SCIATICA: ICD-10-CM

## 2017-11-07 DIAGNOSIS — M54.41 CHRONIC MIDLINE LOW BACK PAIN WITH RIGHT-SIDED SCIATICA: ICD-10-CM

## 2017-11-07 DIAGNOSIS — E78.00 PURE HYPERCHOLESTEROLEMIA: ICD-10-CM

## 2017-11-07 DIAGNOSIS — I10 ESSENTIAL HYPERTENSION: Primary | ICD-10-CM

## 2017-11-07 DIAGNOSIS — K21.9 GASTROESOPHAGEAL REFLUX DISEASE WITHOUT ESOPHAGITIS: ICD-10-CM

## 2017-11-07 PROCEDURE — 99213 OFFICE O/P EST LOW 20 MIN: CPT | Performed by: FAMILY MEDICINE

## 2017-11-07 ASSESSMENT — ENCOUNTER SYMPTOMS
CHEST TIGHTNESS: 0
NAUSEA: 0
CONSTIPATION: 0
ORTHOPNEA: 0
WHEEZING: 0
SHORTNESS OF BREATH: 0
EYE PAIN: 0
COUGH: 0
ABDOMINAL PAIN: 0
BACK PAIN: 1
SORE THROAT: 0

## 2017-11-07 ASSESSMENT — PATIENT HEALTH QUESTIONNAIRE - PHQ9
1. LITTLE INTEREST OR PLEASURE IN DOING THINGS: 0
2. FEELING DOWN, DEPRESSED OR HOPELESS: 0
SUM OF ALL RESPONSES TO PHQ QUESTIONS 1-9: 0
SUM OF ALL RESPONSES TO PHQ9 QUESTIONS 1 & 2: 0

## 2017-11-07 NOTE — PROGRESS NOTES
Genitourinary: Negative for dysuria and frequency. Musculoskeletal: Positive for back pain ( better). Negative for arthralgias, joint swelling, neck pain and neck stiffness. Skin: Negative for rash. Neurological: Negative for dizziness, focal weakness, weakness, numbness and headaches. Hematological: Negative for adenopathy. Does not bruise/bleed easily. Psychiatric/Behavioral: Negative for behavioral problems and sleep disturbance. The patient is not nervous/anxious. /66 (Site: Right Arm, Position: Sitting, Cuff Size: Medium Adult)   Pulse 68   Resp 14   Ht 5' 1\" (1.549 m)   Wt 153 lb (69.4 kg)   Breastfeeding? No   BMI 28.91 kg/m²   Objective:   Physical Exam   Constitutional: She is oriented to person, place, and time. She appears well-developed and well-nourished. HENT:   Head: Normocephalic and atraumatic. Right Ear: External ear normal.   Left Ear: External ear normal.   Nose: Nose normal.   Mouth/Throat: Oropharynx is clear and moist.   Eyes: Conjunctivae and EOM are normal. Pupils are equal, round, and reactive to light. No scleral icterus. Neck: Normal range of motion. Neck supple. No JVD present. No thyromegaly present. Cardiovascular: Normal rate, regular rhythm, normal heart sounds and intact distal pulses. Pulmonary/Chest: Effort normal and breath sounds normal. She has no wheezes. She has no rales. Abdominal: Soft. Bowel sounds are normal. She exhibits no distension and no mass. There is no tenderness. Musculoskeletal: Normal range of motion. She exhibits no tenderness. Thoracic back: She exhibits pain and spasm. Back:    Lymphadenopathy:     She has no cervical adenopathy. Neurological: She is alert and oriented to person, place, and time. She has normal reflexes. No cranial nerve deficit. Skin: Skin is warm and dry. No rash noted. Psychiatric: She has a normal mood and affect. Nursing note and vitals reviewed. Assessment:      1.

## 2017-11-13 DIAGNOSIS — G89.29 CHRONIC BACK PAIN, UNSPECIFIED BACK LOCATION, UNSPECIFIED BACK PAIN LATERALITY: ICD-10-CM

## 2017-11-13 DIAGNOSIS — M54.9 CHRONIC BACK PAIN, UNSPECIFIED BACK LOCATION, UNSPECIFIED BACK PAIN LATERALITY: ICD-10-CM

## 2017-11-13 DIAGNOSIS — S22.000S CLOSED COMPRESSION FRACTURE OF THORACIC VERTEBRA, SEQUELA: ICD-10-CM

## 2017-11-14 RX ORDER — HYDROCODONE BITARTRATE AND ACETAMINOPHEN 5; 325 MG/1; MG/1
1 TABLET ORAL EVERY 4 HOURS PRN
Qty: 40 TABLET | Refills: 0 | OUTPATIENT
Start: 2017-11-14

## 2017-11-14 NOTE — TELEPHONE ENCOUNTER
The pt told me Tylenol #3 but I did not see it on her med list so I thought she meant Norco.  Please fill the Tylenol #3.

## 2017-11-15 RX ORDER — ACETAMINOPHEN AND CODEINE PHOSPHATE 300; 30 MG/1; MG/1
TABLET ORAL
Qty: 120 TABLET | Refills: 0 | Status: SHIPPED | OUTPATIENT
Start: 2017-11-15 | End: 2017-12-18 | Stop reason: SDUPTHER

## 2017-12-04 DIAGNOSIS — G89.29 CHRONIC MIDLINE LOW BACK PAIN WITH RIGHT-SIDED SCIATICA: ICD-10-CM

## 2017-12-04 DIAGNOSIS — M54.41 CHRONIC MIDLINE LOW BACK PAIN WITH RIGHT-SIDED SCIATICA: ICD-10-CM

## 2017-12-05 RX ORDER — TIZANIDINE 4 MG/1
4 TABLET ORAL EVERY 6 HOURS PRN
Qty: 100 TABLET | Refills: 2 | Status: SHIPPED | OUTPATIENT
Start: 2017-12-05 | End: 2018-05-21 | Stop reason: SDUPTHER

## 2017-12-18 DIAGNOSIS — S22.000S CLOSED COMPRESSION FRACTURE OF THORACIC VERTEBRA, SEQUELA: ICD-10-CM

## 2017-12-18 DIAGNOSIS — G89.29 CHRONIC BACK PAIN, UNSPECIFIED BACK LOCATION, UNSPECIFIED BACK PAIN LATERALITY: ICD-10-CM

## 2017-12-18 DIAGNOSIS — M54.9 CHRONIC BACK PAIN, UNSPECIFIED BACK LOCATION, UNSPECIFIED BACK PAIN LATERALITY: ICD-10-CM

## 2017-12-19 RX ORDER — ACETAMINOPHEN AND CODEINE PHOSPHATE 300; 30 MG/1; MG/1
TABLET ORAL
Qty: 120 TABLET | Refills: 0 | Status: SHIPPED | OUTPATIENT
Start: 2017-12-19 | End: 2018-01-15 | Stop reason: SDUPTHER

## 2018-01-15 DIAGNOSIS — I10 ESSENTIAL HYPERTENSION: ICD-10-CM

## 2018-01-15 DIAGNOSIS — G89.29 CHRONIC MIDLINE LOW BACK PAIN WITH RIGHT-SIDED SCIATICA: ICD-10-CM

## 2018-01-15 DIAGNOSIS — E78.00 PURE HYPERCHOLESTEROLEMIA: ICD-10-CM

## 2018-01-15 DIAGNOSIS — M54.9 CHRONIC BACK PAIN, UNSPECIFIED BACK LOCATION, UNSPECIFIED BACK PAIN LATERALITY: ICD-10-CM

## 2018-01-15 DIAGNOSIS — I25.10 CORONARY ARTERY DISEASE INVOLVING NATIVE CORONARY ARTERY OF NATIVE HEART WITHOUT ANGINA PECTORIS: ICD-10-CM

## 2018-01-15 DIAGNOSIS — M54.41 CHRONIC MIDLINE LOW BACK PAIN WITH RIGHT-SIDED SCIATICA: ICD-10-CM

## 2018-01-15 DIAGNOSIS — S22.000S CLOSED COMPRESSION FRACTURE OF THORACIC VERTEBRA, SEQUELA: ICD-10-CM

## 2018-01-15 DIAGNOSIS — G89.29 CHRONIC BACK PAIN, UNSPECIFIED BACK LOCATION, UNSPECIFIED BACK PAIN LATERALITY: ICD-10-CM

## 2018-01-17 RX ORDER — NORTRIPTYLINE HYDROCHLORIDE 50 MG/1
50 CAPSULE ORAL NIGHTLY
Qty: 90 CAPSULE | Refills: 1 | Status: SHIPPED | OUTPATIENT
Start: 2018-01-17 | End: 2018-08-06 | Stop reason: SDUPTHER

## 2018-01-17 RX ORDER — ISOSORBIDE MONONITRATE 30 MG/1
30 TABLET, EXTENDED RELEASE ORAL DAILY
Qty: 90 TABLET | Refills: 1 | Status: SHIPPED | OUTPATIENT
Start: 2018-01-17 | End: 2018-08-15 | Stop reason: SDUPTHER

## 2018-01-17 RX ORDER — SIMVASTATIN 40 MG
40 TABLET ORAL NIGHTLY
Qty: 90 TABLET | Refills: 1 | Status: SHIPPED | OUTPATIENT
Start: 2018-01-17 | End: 2018-08-06 | Stop reason: SDUPTHER

## 2018-01-17 RX ORDER — HYDROCHLOROTHIAZIDE 25 MG/1
25 TABLET ORAL DAILY
Qty: 90 TABLET | Refills: 1 | Status: ON HOLD | OUTPATIENT
Start: 2018-01-17 | End: 2018-04-03 | Stop reason: HOSPADM

## 2018-01-17 RX ORDER — POTASSIUM CHLORIDE 20 MEQ/1
20 TABLET, EXTENDED RELEASE ORAL 3 TIMES DAILY
Qty: 270 TABLET | Refills: 1 | Status: ON HOLD | OUTPATIENT
Start: 2018-01-17 | End: 2018-04-03 | Stop reason: HOSPADM

## 2018-01-17 RX ORDER — ACETAMINOPHEN AND CODEINE PHOSPHATE 300; 30 MG/1; MG/1
TABLET ORAL
Qty: 120 TABLET | Refills: 0 | Status: SHIPPED | OUTPATIENT
Start: 2018-01-17 | End: 2018-02-22 | Stop reason: SDUPTHER

## 2018-02-01 ENCOUNTER — APPOINTMENT (OUTPATIENT)
Dept: CT IMAGING | Age: 83
End: 2018-02-01
Payer: MEDICARE

## 2018-02-01 ENCOUNTER — APPOINTMENT (OUTPATIENT)
Dept: MRI IMAGING | Age: 83
End: 2018-02-01
Payer: MEDICARE

## 2018-02-01 ENCOUNTER — HOSPITAL ENCOUNTER (EMERGENCY)
Age: 83
Discharge: HOME | End: 2018-02-01
Attending: EMERGENCY MEDICINE
Payer: MEDICARE

## 2018-02-01 VITALS
OXYGEN SATURATION: 97 % | TEMPERATURE: 98.2 F | DIASTOLIC BLOOD PRESSURE: 71 MMHG | SYSTOLIC BLOOD PRESSURE: 128 MMHG | WEIGHT: 150 LBS | BODY MASS INDEX: 27.6 KG/M2 | HEART RATE: 80 BPM | RESPIRATION RATE: 16 BRPM | HEIGHT: 62 IN

## 2018-02-01 DIAGNOSIS — M48.061 SPINAL STENOSIS AT L4-L5 LEVEL: Primary | ICD-10-CM

## 2018-02-01 PROCEDURE — 72148 MRI LUMBAR SPINE W/O DYE: CPT

## 2018-02-01 PROCEDURE — 99284 EMERGENCY DEPT VISIT MOD MDM: CPT

## 2018-02-01 PROCEDURE — 70450 CT HEAD/BRAIN W/O DYE: CPT

## 2018-02-01 ASSESSMENT — PAIN DESCRIPTION - FREQUENCY: FREQUENCY: CONTINUOUS

## 2018-02-01 ASSESSMENT — PAIN SCALES - GENERAL: PAINLEVEL_OUTOF10: 5

## 2018-02-01 ASSESSMENT — PAIN DESCRIPTION - LOCATION: LOCATION: LEG

## 2018-02-01 ASSESSMENT — PAIN DESCRIPTION - DESCRIPTORS: DESCRIPTORS: TINGLING;NUMBNESS

## 2018-02-01 ASSESSMENT — PAIN DESCRIPTION - ORIENTATION: ORIENTATION: RIGHT

## 2018-02-01 ASSESSMENT — PAIN DESCRIPTION - PAIN TYPE: TYPE: ACUTE PAIN

## 2018-02-01 NOTE — ED PROVIDER NOTES
[facility] EMERGENCY DEPT      CHIEF COMPLAINT       Chief Complaint   Patient presents with    Numbness       Nurses Notes reviewed and I agree except as noted in the HPI. HISTORY OF PRESENT ILLNESS    Bettye Lindsey is a 81 y.o. female who presents With complaint of numbness to right leg that got worse yesterday while at home. Patient stated that she can walk, but has decreased sensation to the right leg. She reports past history of back pain with stabilization surgery of lumbar spine. Patient denies any recent trauma, she has no numbness tingling, weakness on any other part of her body, she has no history of TIA/CVA. Onset: acute  Duration: chronic, more noticeable in the past month and half  Timing: constant  Location of Pain: no pain  Intesity/severity: moderate  Modifying Factors: none  Relieved by;  Previous Episodes; Tx Before arrival: none  REVIEW OF SYSTEMS      Review of Systems   Constitutional: Negative for fever, chills, diaphoresis and fatigue. HENT: Negative for congestion, drooling, facial swelling and sore throat. Eyes: Negative for photophobia, pain and discharge. Respiratory: Negative for cough, shortness of breath, wheezing and stridor. Cardiovascular: Negative for chest pain, palpitations and leg swelling. Gastrointestinal: Negative for abdominal pain, blood in stool and abdominal distention. Genitourinary: Negative for dysuria, urgency, hematuria and difficulty urinating. Musculoskeletal: Negative for gait problem, neck pain and neck stiffness. Skin; No rash, No itching  Neurological: Negative for seizures, weakness ; positive for numbness right leg. Psychiatric/Behavioral: Negative for hallucinations, confusion and agitation. PAST MEDICAL HISTORY    has a past medical history of CAD (coronary artery disease); Cardiac valve prolapse; Chronic back pain; COPD (chronic obstructive pulmonary disease) ([address]  Ex-smoker; GERD (gastroesophageal reflux disease); mother. SOCIAL HISTORY      reports that she quit smoking about 28 years ago. Her smoking use included Cigarettes. She quit after 30.00 years of use. She has never used smokeless tobacco. She reports that she does not drink alcohol or use drugs. PHYSICAL EXAM     INITIAL VITALS:  height is 5' 2\" (1.575 m) and weight is 150 lb (68 kg). Her oral temperature is 98.2 °F (36.8 °C). Her blood pressure is 128/71 and her pulse is 80. Her respiration is 16 and oxygen saturation is 97%. Physical Exam   Constitutional:  well-developed and well-nourished. HENT: Head: Normocephalic, atraumatic, Bilateral external ears normal, Oropharynx mosit, No oral exudates, Nose normal.   Eyes: PERRL, EOMI, Conjunctiva normal, No discharge. No scleral icterus  Neck: Normal range of motion, No tenderness, Supple  Lympatics: No lymphadenopathy. Cardiovascular: Normal rate, regular rhythm, S1 normal and S2 normal.  Exam reveals no gallop. Pulmonary/Chest: Effort normal and breath sounds normal. No accessory muscle usage or stridor. No respiratory distress. no wheezes. has no rales. exhibits no tenderness. Abdominal: Soft. Bowel sounds are normal.  exhibits no distension. There is no tenderness. There is no rebound and no guarding. Extremities: No edema, no tenderness, no cyanosis, no clubbing. Musculoskeletal: Good range of motion in major joints is observed. No major deformities noted. Neurological: Alert and oriented ×3, normal motor function, no focal deficits. GCS 15 . She has decreased sensation to right leg, pressure sensation is intact, light touch is mostly affected. Skin: Skin is warm, dry and intact. No rash noted. No erythema.    Psychiatric: Affect normal, judgment normal, mood normal.  DIFFERENTIAL DIAGNOSIS:       DIAGNOSTIC RESULTS     EKG: All EKG's are interpreted by the Emergency Department Physician who either signs or Co-signs this chart in the absence of a cardiologist.    RADIOLOGY: non-plain

## 2018-02-01 NOTE — ED NOTES
[facility]  Son driving home      [facility]  02/01/18 7045

## 2018-02-01 NOTE — ED NOTES
Patient reports chronic numbness to R leg for the past 6 weeks intermittently. States in the past, the leg numbness has always resolved, although states yesterday the numbness began again to R leg and L arm at approximately 10am without relief.       Alice Watson RN  02/01/18 1111

## 2018-02-02 ENCOUNTER — CARE COORDINATION (OUTPATIENT)
Dept: CASE MANAGEMENT | Age: 83
End: 2018-02-02

## 2018-02-05 ENCOUNTER — OFFICE VISIT (OUTPATIENT)
Dept: FAMILY MEDICINE CLINIC | Age: 83
End: 2018-02-05

## 2018-02-05 VITALS
BODY MASS INDEX: 27.65 KG/M2 | RESPIRATION RATE: 14 BRPM | HEART RATE: 68 BPM | SYSTOLIC BLOOD PRESSURE: 104 MMHG | DIASTOLIC BLOOD PRESSURE: 66 MMHG | WEIGHT: 150.25 LBS | HEIGHT: 62 IN

## 2018-02-05 DIAGNOSIS — M48.062 SPINAL STENOSIS OF LUMBAR REGION WITH NEUROGENIC CLAUDICATION: Primary | ICD-10-CM

## 2018-02-05 DIAGNOSIS — J44.9 CHRONIC OBSTRUCTIVE PULMONARY DISEASE, UNSPECIFIED COPD TYPE (HCC): ICD-10-CM

## 2018-02-05 DIAGNOSIS — I25.10 CORONARY ARTERY DISEASE INVOLVING NATIVE CORONARY ARTERY OF NATIVE HEART WITHOUT ANGINA PECTORIS: ICD-10-CM

## 2018-02-05 DIAGNOSIS — I10 ESSENTIAL HYPERTENSION: ICD-10-CM

## 2018-02-05 PROCEDURE — 99213 OFFICE O/P EST LOW 20 MIN: CPT | Performed by: FAMILY MEDICINE

## 2018-02-05 ASSESSMENT — ENCOUNTER SYMPTOMS
SORE THROAT: 0
CHEST TIGHTNESS: 0
NAUSEA: 0
WHEEZING: 0
ABDOMINAL PAIN: 0
EYE PAIN: 0
COUGH: 0
CONSTIPATION: 0
SHORTNESS OF BREATH: 0

## 2018-02-05 NOTE — PROGRESS NOTES
extended release tablet Take 1 tablet by mouth 3 times daily One tablet  daily 270 tablet 1    acetaminophen-codeine (TYLENOL #3) 300-30 MG per tablet Take 1-2 tabs q 6hrs prn. 120 tablet 0    tiZANidine (ZANAFLEX) 4 MG tablet Take 1 tablet by mouth every 6 hours as needed (muscle spasm) 100 tablet 2    omeprazole (PRILOSEC) 20 MG delayed release capsule Take 1 capsule by mouth 2 times daily 180 capsule 1    aspirin 81 MG tablet Take 81 mg by mouth daily      latanoprost (XALATAN) 0.005 % ophthalmic solution 1 drop nightly. No current facility-administered medications for this visit. Orders Placed This Encounter   Procedures   [address] of Talita Ortiz MD     Referral Priority:   Routine     Referral Type:   Consult for Advice and Opinion     Referral Reason:   Specialty Services Required     Referred to Provider:   Monica Jones MD     Requested Specialty:   Orthopedic Surgery     Number of Visits Requested:   1     No results found for this visit on 02/05/18.         See     Dr Jadon Machado and   evaluation

## 2018-02-15 ENCOUNTER — OFFICE VISIT (OUTPATIENT)
Dept: FAMILY MEDICINE CLINIC | Age: 83
End: 2018-02-15

## 2018-02-15 VITALS
HEART RATE: 68 BPM | BODY MASS INDEX: 27.88 KG/M2 | RESPIRATION RATE: 14 BRPM | SYSTOLIC BLOOD PRESSURE: 112 MMHG | DIASTOLIC BLOOD PRESSURE: 68 MMHG | HEIGHT: 62 IN | WEIGHT: 151.5 LBS

## 2018-02-15 DIAGNOSIS — I25.10 CORONARY ARTERY DISEASE INVOLVING NATIVE CORONARY ARTERY OF NATIVE HEART WITHOUT ANGINA PECTORIS: ICD-10-CM

## 2018-02-15 DIAGNOSIS — E78.00 PURE HYPERCHOLESTEROLEMIA: ICD-10-CM

## 2018-02-15 DIAGNOSIS — K04.7 TOOTH INFECTION: ICD-10-CM

## 2018-02-15 DIAGNOSIS — I10 ESSENTIAL HYPERTENSION: Primary | ICD-10-CM

## 2018-02-15 DIAGNOSIS — M54.10 BACK PAIN WITH RIGHT-SIDED RADICULOPATHY: ICD-10-CM

## 2018-02-15 DIAGNOSIS — J44.9 CHRONIC OBSTRUCTIVE PULMONARY DISEASE, UNSPECIFIED COPD TYPE (HCC): ICD-10-CM

## 2018-02-15 PROCEDURE — 99214 OFFICE O/P EST MOD 30 MIN: CPT | Performed by: FAMILY MEDICINE

## 2018-02-15 RX ORDER — PENICILLIN V POTASSIUM 500 MG/1
500 TABLET ORAL 4 TIMES DAILY
Qty: 40 TABLET | Refills: 0 | Status: SHIPPED | OUTPATIENT
Start: 2018-02-15 | End: 2018-02-25

## 2018-02-15 ASSESSMENT — ENCOUNTER SYMPTOMS
EYE PAIN: 0
CONSTIPATION: 0
ABDOMINAL PAIN: 0
COUGH: 0
WHEEZING: 0
SHORTNESS OF BREATH: 0
CHEST TIGHTNESS: 0
SORE THROAT: 0
NAUSEA: 0

## 2018-02-15 NOTE — PROGRESS NOTES
Subjective:      Patient ID: Jeannette Yepez is a 81 y.o. female. Back  Surgery   For  3-7-18  Right leg radiculopathy noted at times. Currently with no activity just has constant numbness down the leg,. Pain occurs with more activity       teeth  bad in   4 to 5  Days      mild  crf  stable        ashd    Stable      March 2014   Stress   test  wnl         Dental Injury    This is a new problem. The current episode started in the past 7 days. The problem occurs daily. The problem has been gradually worsening. The pain is mild. Pertinent negatives include no fever. The treatment provided mild relief. Past Medical History:   Diagnosis Date    CAD (coronary artery disease)      cath  48    Cardiac valve prolapse     Chronic back pain     COPD [facility]ch[facility] obstructive pulmonary disease) ([facility]     Ex-smoker     GERD (gastroesophageal reflux disease) 2/07    EGD    Glaucoma     H/O cardiac catheterization 2002    40-50% LAD   katharine    Hyperlipidemia     Hypertension     Osteoarthritis 1999    right shoulder and back    Pericarditis 1996     Past Surgical History:   Procedure Laterality Date    APPENDECTOMY      at age 13years   [address]  2004    right foot   [address]  2007??  &   2012? ?    normal results    COLONOSCOPY      last one 2000???    EYE SURGERY  2009??    right eye    HYSTERECTOMY  1980    still has ovaries    ROTATOR CUFF REPAIR  left    SPINE SURGERY  7/02    L5 cyst    UPPER GASTROINTESTINAL ENDOSCOPY  2007    [facility]     Social History     Social History    Marital status:      Spouse name: N/A    Number of children: N/A    Years of education: N/A     Occupational History    Not on file.      Social History Main Topics    Smoking status: Former Smoker     Years: 30.00     Types: Cigarettes     Quit date: 6/11/1989    Smokeless tobacco: Never Used    Alcohol use No    Drug use: No    Sexual activity: Not Currently Other Topics Concern    Not on file     Social History Narrative    No narrative on file     Family History   Problem Relation Age of Onset    Tuberculosis Mother     Tuberculosis Father      Review of Systems   Constitutional: Negative for appetite change, fatigue and fever. HENT: Negative for congestion, ear pain, postnasal drip and sore throat. Eyes: Negative for pain and visual disturbance. Respiratory: Negative for cough, chest tightness, shortness of breath and wheezing. Cardiovascular: Negative for chest pain, palpitations and leg swelling. Gastrointestinal: Negative for abdominal pain, constipation and nausea. Genitourinary: Negative for dysuria and frequency. Musculoskeletal: Negative for arthralgias, joint swelling, neck pain and neck stiffness. Skin: Negative for rash. Neurological: Negative for dizziness, weakness, numbness and headaches. Hematological: Negative for adenopathy. Does not bruise/bleed easily. Psychiatric/Behavioral: Negative for behavioral problems and sleep disturbance. The patient is not nervous/anxious. \/68 (Site: Right Arm, Position: Sitting, Cuff Size: Medium Adult)   Pulse 68   Resp 14   Ht 5' 2\" (1.575 m)   Wt 151 lb 8 oz (68.7 kg)   Breastfeeding? No   BMI 27.71 kg/m²   Objective:   Physical Exam   Constitutional: She is oriented to person, place, and time. She appears well-developed and well-nourished. HENT:   Head: Normocephalic and atraumatic. Right Ear: External ear normal.   Left Ear: External ear normal.   Nose: Nose normal.   Mouth/Throat: Oropharynx is clear and moist.   Left lower second bicuspid  Is  Where  some tooth infection present not significant gumline swelling   Eyes: Conjunctivae and EOM are normal. Pupils are equal, round, and reactive to light. No scleral icterus. Neck: Normal range of motion. Neck supple. No JVD present. No thyromegaly present.    Cardiovascular: Normal rate, regular rhythm, normal heart sounds and intact distal pulses. Pulmonary/Chest: Effort normal and breath sounds normal. She has no wheezes. She has no rales. Abdominal: Soft. Bowel sounds are normal. She exhibits no distension and no mass. There is no tenderness. Musculoskeletal: Normal range of motion. She exhibits no tenderness. Lymphadenopathy:     She has no cervical adenopathy. Neurological: She is alert and oriented to person, place, and time. She has normal reflexes. No cranial nerve deficit. Skin: Skin is warm and dry. No rash noted. Psychiatric: She has a normal mood and affect. Nursing note and vitals reviewed. Assessment:      1. Essential hypertension     2. Coronary artery disease involving native coronary artery of native heart without angina pectoris     3. Pure hypercholesterolemia     4. Chronic obstructive pulmonary disease, unspecified COPD type ([facility] [address]     5. Tooth infection  penicillin v potassium (VEETID) 500 MG tablet   6.  Back pain with right-sided radiculopathy           Plan:      Current Outpatient Prescriptions   Medication Sig Dispense Refill    penicillin v potassium (VEETID) 500 MG tablet Take 1 tablet by mouth 4 times daily for 10 days 40 tablet 0    isosorbide mononitrate (IMDUR) 30 MG extended release tablet Take 1 tablet by mouth daily 90 tablet 1    hydrochlorothiazide (HYDRODIURIL) 25 MG tablet Take 1 tablet by mouth daily 90 tablet 1    metoprolol tartrate (LOPRESSOR) 25 MG tablet 1/2 tab po bid 90 tablet 1    simvastatin (ZOCOR) 40 MG tablet Take 1 tablet by mouth nightly 90 tablet 1    nortriptyline (PAMELOR) 50 MG capsule Take 1 capsule by mouth nightly 90 capsule 1    potassium chloride (KLOR-CON M) 20 MEQ extended release tablet Take 1 tablet by mouth 3 times daily One tablet  daily 270 tablet 1    acetaminophen-codeine (TYLENOL #3) 300-30 MG per tablet Take 1-2 tabs q 6hrs prn. 120 tablet 0    tiZANidine (ZANAFLEX) 4 MG tablet Take 1 tablet by mouth every 6 hours as needed (muscle spasm) 100 tablet 2    omeprazole (PRILOSEC) 20 MG delayed release capsule Take 1 capsule by mouth 2 times daily 180 capsule 1    aspirin 81 MG tablet Take 81 mg by mouth daily      latanoprost (XALATAN) 0.005 % ophthalmic solution 1 drop nightly. No current facility-administered medications for this visit. No orders of the defined types were placed in this encounter. And needs clearance cardiac becker per Dr. Nasario Cockayne for back surgery. Tooth infection treat with penicillin. Otherwise medically minus the cardiac issues cleared for upcoming surgery.  One diagnosis was COPD but this is minimal and really needs to be cleared from cardiac standpoint  See in  2 mths

## 2018-02-20 DIAGNOSIS — S22.000S CLOSED COMPRESSION FRACTURE OF THORACIC VERTEBRA, SEQUELA: ICD-10-CM

## 2018-02-20 DIAGNOSIS — M54.9 CHRONIC BACK PAIN, UNSPECIFIED BACK LOCATION, UNSPECIFIED BACK PAIN LATERALITY: ICD-10-CM

## 2018-02-20 DIAGNOSIS — G89.29 CHRONIC BACK PAIN, UNSPECIFIED BACK LOCATION, UNSPECIFIED BACK PAIN LATERALITY: ICD-10-CM

## 2018-02-20 DIAGNOSIS — K21.9 GASTROESOPHAGEAL REFLUX DISEASE WITHOUT ESOPHAGITIS: ICD-10-CM

## 2018-02-20 NOTE — TELEPHONE ENCOUNTER
Pt called and req 90 day refill:    Tylenol 3  Omeprazole 20 mg I po bid      Please send to:    [facility]    Did not see Tylenol 3 in med list since last year.

## 2018-02-21 RX ORDER — OMEPRAZOLE 20 MG/1
20 CAPSULE, DELAYED RELEASE ORAL 2 TIMES DAILY
Qty: 180 CAPSULE | Refills: 1 | Status: SHIPPED | OUTPATIENT
Start: 2018-02-21 | End: 2018-09-26 | Stop reason: SDUPTHER

## 2018-02-22 RX ORDER — ACETAMINOPHEN AND CODEINE PHOSPHATE 300; 30 MG/1; MG/1
TABLET ORAL
Qty: 120 TABLET | Refills: 0 | Status: ON HOLD | OUTPATIENT
Start: 2018-02-22 | End: 2018-03-23

## 2018-03-05 ENCOUNTER — TELEPHONE (OUTPATIENT)
Dept: FAMILY MEDICINE CLINIC | Age: 83
End: 2018-03-05

## 2018-03-06 NOTE — H&P
[address] [place] [address]                         PREOPERATIVE HISTORY AND PHYSICAL    PATIENT NAME: Miky Delcid                 :        1935  MED REC NO:   [id number]                           ROOM:       014  ACCOUNT NO:   [de-identified]                           ADMIT DATE: 2018  PROVIDER:     Eddie Golden Pa-C    PLANNED DATE OF PROCEDURE: 2018    CHIEF COMPLAINT:  Back pain with right leg radiculopathy. HISTORY OF PRESENT ILLNESS:  The patient is 81years of age and presents  for history and physical before undergoing surgical management on  2018. The patient has a 3 week history of her right leg going to  sleep. She described this as going to sleep, feeling that to where she has  minimal sensation towards this leg. Giving her the inability to walk due  to the lack of healing and proprioception. She is not experiencing any  pain  throughout her back or lower extremities bilaterally. There is some,  as stated, tingling on the right-hand side. She reports this numbness  effects her entire right leg from the thigh down to the calf, ankle and  foot. She has a history of two X-Stop devices placed by Dr. Rebeka Wu 6 to 7  years ago. Again, her right leg is the only leg effected. She is able to  stand and walk for only 15 minute at a time. Standing and walking  aggravates her symptoms. She has found no relieving factors for her  symptoms. Her symptoms have remained the same since the time of onset. There has been no change in the quality of the symptoms. She does have a  cardiac history with what she described as a leaky valve. She also has a  history of hepatitis. The patient has failed conservative management and  is in search of surgical management.     The patient will be undergoing removal of X-Stop, L3-S1 laminectomy,  possible L4-L5 fusion with instrumentation if needed and she will light touch. She is unable to  discriminate any light touch sensation throughout this area. She begins to  regain feeling throughout the right anterior thigh. Left leg dermatomes  are intact and sensate. X-RAY IMAGING STUDIES:  X-ray of the lumbar spine shows the X-Stop device  that is placed at L3-L4 and L4-L5 with a grade 1 spondylolisthesis seen at  L4-L5 and L5-S1. The two views of the lumbar spine in flexion and  extension showed resisted anterolisthesis of 3 mm at L4 over L5 and L5 over  S1 not reduced in extension. MRI IMAGING STUDIES:  Dated 02/01/2018,  1. Post-surgical and multilevel degenerative changes are present within  the lumbar spine and further discussed level of the findings. Anterolisthesis is noted at L4 over L5 and L5 over S1, which measures less  than 1 cm. Degenerative changes are most significant at L4-L5 with  moderate-to-severe spinal canal narrowing and severe spinal canal narrowing  at L5-S1.  2.  There is fullness of the bilateral renal pelvis, which is only  partially visualized. This was also partially imaged with prior CTA of the  thoracic spine dated 09/25/2017. Exophytic hyperintense T2 structures are  also noted involving the bilateral kidneys and likely represents cyst;  however, based on that further evaluated on the addition of sequences. If  there is a clinical concern, further evaluation with dedicated renal  ultrasound and CT may be considered. CONCLUSION:  The patient will be undergoing surgery because she had failed  conservative therapy and she is in search of surgical management at this  time. She continues to have a back pain with radiculopathy into the right  leg. Presurgical testing and clearance were obtained from Dr. Tan Casanova  and Dr. Leonard Russell and we have reviewed this presurgical testing and clearance  that the patient has been cleared to undergo the surgical management.   The  informed consent was also discussed with the patient, which

## 2018-03-06 NOTE — H&P
includes risks  and benefits of surgery such as paralysis, hematoma formation, bleeding,  postoperative pain, and postoperative infection. All the questions that  the patient has have been answered and the patient is ready to proceed with  surgery. MRSA screen and RT-PCR is negative.       Dictated By:  Marilu Heard M.D.    D: 03/06/2018 9:03:25       T: 03/06/2018 11:53:35     [email or web address]  Job#: [id number]     Doc#: [id number]    CC:

## 2018-03-07 ENCOUNTER — ANESTHESIA (OUTPATIENT)
Dept: OPERATING ROOM | Age: 83
DRG: 519 | End: 2018-03-07
Payer: MEDICARE

## 2018-03-07 ENCOUNTER — APPOINTMENT (OUTPATIENT)
Dept: GENERAL RADIOLOGY | Age: 83
DRG: 519 | End: 2018-03-07
Attending: ORTHOPAEDIC SURGERY
Payer: MEDICARE

## 2018-03-07 ENCOUNTER — ANESTHESIA EVENT (OUTPATIENT)
Dept: OPERATING ROOM | Age: 83
DRG: 519 | End: 2018-03-07
Payer: MEDICARE

## 2018-03-07 ENCOUNTER — HOSPITAL ENCOUNTER (INPATIENT)
Age: 83
LOS: 15 days | Discharge: NURSING, REHAB OR LONG-TERM CARE | DRG: 519 | End: 2018-03-23
Attending: ORTHOPAEDIC SURGERY | Admitting: ORTHOPAEDIC SURGERY
Payer: MEDICARE

## 2018-03-07 VITALS
TEMPERATURE: 94.5 F | DIASTOLIC BLOOD PRESSURE: 61 MMHG | SYSTOLIC BLOOD PRESSURE: 117 MMHG | RESPIRATION RATE: 6 BRPM | OXYGEN SATURATION: 100 %

## 2018-03-07 PROBLEM — M48.062 SPINAL STENOSIS OF LUMBAR REGION WITH NEUROGENIC CLAUDICATION: Status: ACTIVE | Noted: 2018-03-07

## 2018-03-07 PROBLEM — M54.50 LUMBAR BACK PAIN: Status: ACTIVE | Noted: 2018-03-07

## 2018-03-07 LAB
ABO: NORMAL
ANTIBODY SCREEN: NORMAL
POTASSIUM SERPL-SCNC: 4.4 MEQ/L (ref 3.5–5.2)
RH FACTOR: NORMAL

## 2018-03-07 PROCEDURE — 3700000000 HC ANESTHESIA ATTENDED CARE: Performed by: ORTHOPAEDIC SURGERY

## 2018-03-07 PROCEDURE — 00QT0ZZ REPAIR SPINAL MENINGES, OPEN APPROACH: ICD-10-PCS | Performed by: ORTHOPAEDIC SURGERY

## 2018-03-07 PROCEDURE — 3600000004 HC SURGERY LEVEL 4 BASE: Performed by: ORTHOPAEDIC SURGERY

## 2018-03-07 PROCEDURE — 7100000000 HC PACU RECOVERY - FIRST 15 MIN: Performed by: ORTHOPAEDIC SURGERY

## 2018-03-07 PROCEDURE — 2500000003 HC RX 250 WO HCPCS: Performed by: NURSE ANESTHETIST, CERTIFIED REGISTERED

## 2018-03-07 PROCEDURE — 3700000001 HC ADD 15 MINUTES (ANESTHESIA): Performed by: ORTHOPAEDIC SURGERY

## 2018-03-07 PROCEDURE — 2580000003 HC RX 258: Performed by: ORTHOPAEDIC SURGERY

## 2018-03-07 PROCEDURE — 0SP004Z REMOVAL OF INTERNAL FIXATION DEVICE FROM LUMBAR VERTEBRAL JOINT, OPEN APPROACH: ICD-10-PCS | Performed by: ORTHOPAEDIC SURGERY

## 2018-03-07 PROCEDURE — 6360000002 HC RX W HCPCS: Performed by: ANESTHESIOLOGY

## 2018-03-07 PROCEDURE — 6370000000 HC RX 637 (ALT 250 FOR IP): Performed by: ORTHOPAEDIC SURGERY

## 2018-03-07 PROCEDURE — 86901 BLOOD TYPING SEROLOGIC RH(D): CPT

## 2018-03-07 PROCEDURE — 7100000001 HC PACU RECOVERY - ADDTL 15 MIN: Performed by: ORTHOPAEDIC SURGERY

## 2018-03-07 PROCEDURE — 2700000000 HC OXYGEN THERAPY PER DAY

## 2018-03-07 PROCEDURE — 00UT0JZ SUPPLEMENT SPINAL MENINGES WITH SYNTHETIC SUBSTITUTE, OPEN APPROACH: ICD-10-PCS | Performed by: ORTHOPAEDIC SURGERY

## 2018-03-07 PROCEDURE — 2580000003 HC RX 258: Performed by: PHYSICIAN ASSISTANT

## 2018-03-07 PROCEDURE — 6360000002 HC RX W HCPCS: Performed by: ORTHOPAEDIC SURGERY

## 2018-03-07 PROCEDURE — 84132 ASSAY OF SERUM POTASSIUM: CPT

## 2018-03-07 PROCEDURE — 6360000002 HC RX W HCPCS: Performed by: PHYSICIAN ASSISTANT

## 2018-03-07 PROCEDURE — 6360000002 HC RX W HCPCS: Performed by: NURSE ANESTHETIST, CERTIFIED REGISTERED

## 2018-03-07 PROCEDURE — 96374 THER/PROPH/DIAG INJ IV PUSH: CPT

## 2018-03-07 PROCEDURE — 96360 HYDRATION IV INFUSION INIT: CPT

## 2018-03-07 PROCEDURE — 72020 X-RAY EXAM OF SPINE 1 VIEW: CPT

## 2018-03-07 PROCEDURE — 86850 RBC ANTIBODY SCREEN: CPT

## 2018-03-07 PROCEDURE — 86923 COMPATIBILITY TEST ELECTRIC: CPT

## 2018-03-07 PROCEDURE — 2720000010 HC SURG SUPPLY STERILE: Performed by: ORTHOPAEDIC SURGERY

## 2018-03-07 PROCEDURE — 01NB0ZZ RELEASE LUMBAR NERVE, OPEN APPROACH: ICD-10-PCS | Performed by: ORTHOPAEDIC SURGERY

## 2018-03-07 PROCEDURE — 96375 TX/PRO/DX INJ NEW DRUG ADDON: CPT

## 2018-03-07 PROCEDURE — 2500000003 HC RX 250 WO HCPCS: Performed by: ORTHOPAEDIC SURGERY

## 2018-03-07 PROCEDURE — 86900 BLOOD TYPING SEROLOGIC ABO: CPT

## 2018-03-07 PROCEDURE — 96361 HYDRATE IV INFUSION ADD-ON: CPT

## 2018-03-07 PROCEDURE — 99232 SBSQ HOSP IP/OBS MODERATE 35: CPT | Performed by: FAMILY MEDICINE

## 2018-03-07 PROCEDURE — 3600000014 HC SURGERY LEVEL 4 ADDTL 15MIN: Performed by: ORTHOPAEDIC SURGERY

## 2018-03-07 PROCEDURE — 36415 COLL VENOUS BLD VENIPUNCTURE: CPT

## 2018-03-07 PROCEDURE — 96365 THER/PROPH/DIAG IV INF INIT: CPT

## 2018-03-07 DEVICE — IMPLANTABLE DEVICE: Type: IMPLANTABLE DEVICE | Site: SPINE LUMBAR | Status: FUNCTIONAL

## 2018-03-07 RX ORDER — LABETALOL HYDROCHLORIDE 5 MG/ML
10 INJECTION, SOLUTION INTRAVENOUS EVERY 10 MIN PRN
Status: DISCONTINUED | OUTPATIENT
Start: 2018-03-07 | End: 2018-03-07 | Stop reason: HOSPADM

## 2018-03-07 RX ORDER — ROCURONIUM BROMIDE 10 MG/ML
INJECTION, SOLUTION INTRAVENOUS PRN
Status: DISCONTINUED | OUTPATIENT
Start: 2018-03-07 | End: 2018-03-07 | Stop reason: SDUPTHER

## 2018-03-07 RX ORDER — FENTANYL CITRATE 50 UG/ML
25 INJECTION, SOLUTION INTRAMUSCULAR; INTRAVENOUS EVERY 5 MIN PRN
Status: DISCONTINUED | OUTPATIENT
Start: 2018-03-07 | End: 2018-03-07 | Stop reason: HOSPADM

## 2018-03-07 RX ORDER — ACETAMINOPHEN 325 MG/1
650 TABLET ORAL EVERY 4 HOURS PRN
Status: DISCONTINUED | OUTPATIENT
Start: 2018-03-07 | End: 2018-03-12

## 2018-03-07 RX ORDER — SIMVASTATIN 40 MG
40 TABLET ORAL NIGHTLY
Status: DISCONTINUED | OUTPATIENT
Start: 2018-03-07 | End: 2018-03-23 | Stop reason: HOSPADM

## 2018-03-07 RX ORDER — DOCUSATE SODIUM 100 MG/1
100 CAPSULE, LIQUID FILLED ORAL 2 TIMES DAILY
Status: DISCONTINUED | OUTPATIENT
Start: 2018-03-07 | End: 2018-03-12

## 2018-03-07 RX ORDER — SODIUM CHLORIDE 0.9 % (FLUSH) 0.9 %
10 SYRINGE (ML) INJECTION PRN
Status: DISCONTINUED | OUTPATIENT
Start: 2018-03-07 | End: 2018-03-12

## 2018-03-07 RX ORDER — HYDROCHLOROTHIAZIDE 25 MG/1
25 TABLET ORAL DAILY
Status: DISCONTINUED | OUTPATIENT
Start: 2018-03-08 | End: 2018-03-12

## 2018-03-07 RX ORDER — NORTRIPTYLINE HYDROCHLORIDE 25 MG/1
50 CAPSULE ORAL NIGHTLY
Status: DISCONTINUED | OUTPATIENT
Start: 2018-03-07 | End: 2018-03-23 | Stop reason: HOSPADM

## 2018-03-07 RX ORDER — SODIUM CHLORIDE 9 MG/ML
INJECTION, SOLUTION INTRAVENOUS CONTINUOUS
Status: DISCONTINUED | OUTPATIENT
Start: 2018-03-07 | End: 2018-03-07

## 2018-03-07 RX ORDER — ISOSORBIDE MONONITRATE 30 MG/1
30 TABLET, EXTENDED RELEASE ORAL DAILY
Status: DISCONTINUED | OUTPATIENT
Start: 2018-03-07 | End: 2018-03-23 | Stop reason: HOSPADM

## 2018-03-07 RX ORDER — NEOSTIGMINE METHYLSULFATE 1 MG/ML
INJECTION, SOLUTION INTRAVENOUS PRN
Status: DISCONTINUED | OUTPATIENT
Start: 2018-03-07 | End: 2018-03-07 | Stop reason: SDUPTHER

## 2018-03-07 RX ORDER — ONDANSETRON 2 MG/ML
4 INJECTION INTRAMUSCULAR; INTRAVENOUS EVERY 6 HOURS PRN
Status: DISCONTINUED | OUTPATIENT
Start: 2018-03-07 | End: 2018-03-12 | Stop reason: SDUPTHER

## 2018-03-07 RX ORDER — OMEPRAZOLE 20 MG/1
20 CAPSULE, DELAYED RELEASE ORAL 2 TIMES DAILY
Status: DISCONTINUED | OUTPATIENT
Start: 2018-03-07 | End: 2018-03-07 | Stop reason: CLARIF

## 2018-03-07 RX ORDER — CYCLOBENZAPRINE HCL 10 MG
10 TABLET ORAL 3 TIMES DAILY PRN
Status: DISCONTINUED | OUTPATIENT
Start: 2018-03-07 | End: 2018-03-12

## 2018-03-07 RX ORDER — PROMETHAZINE HYDROCHLORIDE 25 MG/ML
12.5 INJECTION, SOLUTION INTRAMUSCULAR; INTRAVENOUS
Status: DISCONTINUED | OUTPATIENT
Start: 2018-03-07 | End: 2018-03-07 | Stop reason: HOSPADM

## 2018-03-07 RX ORDER — ACETAMINOPHEN 650 MG/1
650 SUPPOSITORY RECTAL EVERY 4 HOURS PRN
Status: DISCONTINUED | OUTPATIENT
Start: 2018-03-07 | End: 2018-03-12

## 2018-03-07 RX ORDER — LIDOCAINE HYDROCHLORIDE 20 MG/ML
INJECTION, SOLUTION EPIDURAL; INFILTRATION; INTRACAUDAL; PERINEURAL PRN
Status: DISCONTINUED | OUTPATIENT
Start: 2018-03-07 | End: 2018-03-07 | Stop reason: SDUPTHER

## 2018-03-07 RX ORDER — POTASSIUM CHLORIDE 20 MEQ/1
20 TABLET, EXTENDED RELEASE ORAL 3 TIMES DAILY
Status: DISCONTINUED | OUTPATIENT
Start: 2018-03-07 | End: 2018-03-20

## 2018-03-07 RX ORDER — OXYCODONE HYDROCHLORIDE AND ACETAMINOPHEN 5; 325 MG/1; MG/1
1 TABLET ORAL EVERY 4 HOURS PRN
Status: DISCONTINUED | OUTPATIENT
Start: 2018-03-07 | End: 2018-03-12

## 2018-03-07 RX ORDER — ONDANSETRON 2 MG/ML
INJECTION INTRAMUSCULAR; INTRAVENOUS PRN
Status: DISCONTINUED | OUTPATIENT
Start: 2018-03-07 | End: 2018-03-07 | Stop reason: SDUPTHER

## 2018-03-07 RX ORDER — 0.9 % SODIUM CHLORIDE 0.9 %
250 INTRAVENOUS SOLUTION INTRAVENOUS ONCE
Status: DISCONTINUED | OUTPATIENT
Start: 2018-03-07 | End: 2018-03-07

## 2018-03-07 RX ORDER — GLYCOPYRROLATE 1 MG/5 ML
SYRINGE (ML) INTRAVENOUS PRN
Status: DISCONTINUED | OUTPATIENT
Start: 2018-03-07 | End: 2018-03-07 | Stop reason: SDUPTHER

## 2018-03-07 RX ORDER — ONDANSETRON 2 MG/ML
INJECTION INTRAMUSCULAR; INTRAVENOUS
Status: DISPENSED
Start: 2018-03-07 | End: 2018-03-08

## 2018-03-07 RX ORDER — FENTANYL CITRATE 50 UG/ML
INJECTION, SOLUTION INTRAMUSCULAR; INTRAVENOUS PRN
Status: DISCONTINUED | OUTPATIENT
Start: 2018-03-07 | End: 2018-03-07 | Stop reason: SDUPTHER

## 2018-03-07 RX ORDER — LATANOPROST 50 UG/ML
1 SOLUTION/ DROPS OPHTHALMIC NIGHTLY
Status: DISCONTINUED | OUTPATIENT
Start: 2018-03-07 | End: 2018-03-23 | Stop reason: HOSPADM

## 2018-03-07 RX ORDER — PROPOFOL 10 MG/ML
INJECTION, EMULSION INTRAVENOUS PRN
Status: DISCONTINUED | OUTPATIENT
Start: 2018-03-07 | End: 2018-03-07 | Stop reason: SDUPTHER

## 2018-03-07 RX ORDER — LIDOCAINE HYDROCHLORIDE AND EPINEPHRINE 10; 10 MG/ML; UG/ML
INJECTION, SOLUTION INFILTRATION; PERINEURAL PRN
Status: DISCONTINUED | OUTPATIENT
Start: 2018-03-07 | End: 2018-03-07 | Stop reason: HOSPADM

## 2018-03-07 RX ORDER — TIZANIDINE 4 MG/1
4 TABLET ORAL EVERY 6 HOURS PRN
Status: DISCONTINUED | OUTPATIENT
Start: 2018-03-07 | End: 2018-03-23 | Stop reason: HOSPADM

## 2018-03-07 RX ORDER — HYDROMORPHONE HCL 110MG/55ML
PATIENT CONTROLLED ANALGESIA SYRINGE INTRAVENOUS PRN
Status: DISCONTINUED | OUTPATIENT
Start: 2018-03-07 | End: 2018-03-07 | Stop reason: SDUPTHER

## 2018-03-07 RX ORDER — SODIUM CHLORIDE 9 MG/ML
INJECTION, SOLUTION INTRAVENOUS CONTINUOUS
Status: DISCONTINUED | OUTPATIENT
Start: 2018-03-07 | End: 2018-03-12

## 2018-03-07 RX ORDER — OXYCODONE HYDROCHLORIDE AND ACETAMINOPHEN 5; 325 MG/1; MG/1
2 TABLET ORAL EVERY 4 HOURS PRN
Status: DISCONTINUED | OUTPATIENT
Start: 2018-03-07 | End: 2018-03-12

## 2018-03-07 RX ORDER — PANTOPRAZOLE SODIUM 40 MG/1
40 TABLET, DELAYED RELEASE ORAL
Status: DISCONTINUED | OUTPATIENT
Start: 2018-03-08 | End: 2018-03-23 | Stop reason: HOSPADM

## 2018-03-07 RX ORDER — SODIUM CHLORIDE 0.9 % (FLUSH) 0.9 %
10 SYRINGE (ML) INJECTION EVERY 12 HOURS SCHEDULED
Status: DISCONTINUED | OUTPATIENT
Start: 2018-03-07 | End: 2018-03-12

## 2018-03-07 RX ADMIN — PHENYLEPHRINE HYDROCHLORIDE 100 MCG: 10 INJECTION INTRAMUSCULAR; INTRAVENOUS; SUBCUTANEOUS at 16:05

## 2018-03-07 RX ADMIN — HYDROMORPHONE HYDROCHLORIDE 0.5 MG: 2 INJECTION INTRAMUSCULAR; INTRAVENOUS; SUBCUTANEOUS at 14:40

## 2018-03-07 RX ADMIN — SIMVASTATIN 40 MG: 40 TABLET, FILM COATED ORAL at 21:41

## 2018-03-07 RX ADMIN — Medication 0.8 MG: at 16:28

## 2018-03-07 RX ADMIN — FENTANYL CITRATE 25 MCG: 50 INJECTION, SOLUTION INTRAMUSCULAR; INTRAVENOUS at 17:42

## 2018-03-07 RX ADMIN — METOPROLOL TARTRATE 12.5 MG: 25 TABLET ORAL at 21:41

## 2018-03-07 RX ADMIN — DOCUSATE SODIUM 100 MG: 100 CAPSULE ORAL at 21:37

## 2018-03-07 RX ADMIN — HYDROMORPHONE HYDROCHLORIDE 0.25 MG: 2 INJECTION INTRAMUSCULAR; INTRAVENOUS; SUBCUTANEOUS at 16:36

## 2018-03-07 RX ADMIN — Medication 50 MG: at 14:05

## 2018-03-07 RX ADMIN — CEFAZOLIN SODIUM 2 G: 2 SOLUTION INTRAVENOUS at 22:17

## 2018-03-07 RX ADMIN — PHENYLEPHRINE HYDROCHLORIDE 50 MCG: 10 INJECTION INTRAMUSCULAR; INTRAVENOUS; SUBCUTANEOUS at 15:36

## 2018-03-07 RX ADMIN — HYDROMORPHONE HYDROCHLORIDE 0.25 MG: 2 INJECTION INTRAMUSCULAR; INTRAVENOUS; SUBCUTANEOUS at 17:08

## 2018-03-07 RX ADMIN — SODIUM CHLORIDE: 9 INJECTION, SOLUTION INTRAVENOUS at 16:05

## 2018-03-07 RX ADMIN — Medication 10 MG: at 16:00

## 2018-03-07 RX ADMIN — NORTRIPTYLINE HYDROCHLORIDE 50 MG: 25 CAPSULE ORAL at 21:41

## 2018-03-07 RX ADMIN — SODIUM CHLORIDE: 9 INJECTION, SOLUTION INTRAVENOUS at 13:19

## 2018-03-07 RX ADMIN — NEOSTIGMINE METHYLSULFATE 4 MG: 1 INJECTION, SOLUTION INTRAVENOUS at 16:28

## 2018-03-07 RX ADMIN — LIDOCAINE HYDROCHLORIDE 100 MG: 20 INJECTION, SOLUTION EPIDURAL; INFILTRATION; INTRACAUDAL; PERINEURAL at 14:04

## 2018-03-07 RX ADMIN — ONDANSETRON 4 MG: 2 INJECTION INTRAMUSCULAR; INTRAVENOUS at 19:20

## 2018-03-07 RX ADMIN — CEFAZOLIN SODIUM 2 G: 2 SOLUTION INTRAVENOUS at 14:40

## 2018-03-07 RX ADMIN — HYDROMORPHONE HYDROCHLORIDE 0.5 MG: 2 INJECTION INTRAMUSCULAR; INTRAVENOUS; SUBCUTANEOUS at 14:54

## 2018-03-07 RX ADMIN — PHENYLEPHRINE HYDROCHLORIDE 100 MCG: 10 INJECTION INTRAMUSCULAR; INTRAVENOUS; SUBCUTANEOUS at 15:45

## 2018-03-07 RX ADMIN — FENTANYL CITRATE 25 MCG: 50 INJECTION, SOLUTION INTRAMUSCULAR; INTRAVENOUS at 18:02

## 2018-03-07 RX ADMIN — PROPOFOL 150 MG: 10 INJECTION, EMULSION INTRAVENOUS at 14:04

## 2018-03-07 RX ADMIN — HYDROMORPHONE HYDROCHLORIDE 0.5 MG: 1 INJECTION, SOLUTION INTRAMUSCULAR; INTRAVENOUS; SUBCUTANEOUS at 20:32

## 2018-03-07 RX ADMIN — PHENYLEPHRINE HYDROCHLORIDE 100 MCG: 10 INJECTION INTRAMUSCULAR; INTRAVENOUS; SUBCUTANEOUS at 15:57

## 2018-03-07 RX ADMIN — Medication 10 MG: at 15:11

## 2018-03-07 RX ADMIN — SODIUM CHLORIDE: 9 INJECTION, SOLUTION INTRAVENOUS at 20:34

## 2018-03-07 RX ADMIN — LATANOPROST 1 DROP: 50 SOLUTION OPHTHALMIC at 22:17

## 2018-03-07 RX ADMIN — FENTANYL CITRATE 100 MCG: 50 INJECTION INTRAMUSCULAR; INTRAVENOUS at 14:04

## 2018-03-07 RX ADMIN — PHENYLEPHRINE HYDROCHLORIDE 100 MCG: 10 INJECTION INTRAMUSCULAR; INTRAVENOUS; SUBCUTANEOUS at 16:10

## 2018-03-07 RX ADMIN — OXYCODONE HYDROCHLORIDE AND ACETAMINOPHEN 1 TABLET: 5; 325 TABLET ORAL at 22:24

## 2018-03-07 RX ADMIN — SODIUM CHLORIDE: 9 INJECTION, SOLUTION INTRAVENOUS at 14:52

## 2018-03-07 RX ADMIN — ONDANSETRON 4 MG: 2 INJECTION INTRAMUSCULAR; INTRAVENOUS at 16:22

## 2018-03-07 RX ADMIN — HYDROMORPHONE HYDROCHLORIDE 0.25 MG: 1 INJECTION, SOLUTION INTRAMUSCULAR; INTRAVENOUS; SUBCUTANEOUS at 23:37

## 2018-03-07 RX ADMIN — CYCLOBENZAPRINE 10 MG: 10 TABLET, FILM COATED ORAL at 17:39

## 2018-03-07 RX ADMIN — FENTANYL CITRATE 25 MCG: 50 INJECTION, SOLUTION INTRAMUSCULAR; INTRAVENOUS at 17:27

## 2018-03-07 ASSESSMENT — PULMONARY FUNCTION TESTS
PIF_VALUE: 14
PIF_VALUE: 16
PIF_VALUE: 16
PIF_VALUE: 3
PIF_VALUE: 14
PIF_VALUE: 19
PIF_VALUE: 18
PIF_VALUE: 14
PIF_VALUE: 17
PIF_VALUE: 16
PIF_VALUE: 18
PIF_VALUE: 17
PIF_VALUE: 32
PIF_VALUE: 18
PIF_VALUE: 17
PIF_VALUE: 14
PIF_VALUE: 14
PIF_VALUE: 20
PIF_VALUE: 14
PIF_VALUE: 18
PIF_VALUE: 1
PIF_VALUE: 18
PIF_VALUE: 19
PIF_VALUE: 17
PIF_VALUE: 20
PIF_VALUE: 18
PIF_VALUE: 18
PIF_VALUE: 2
PIF_VALUE: 15
PIF_VALUE: 18
PIF_VALUE: 12
PIF_VALUE: 7
PIF_VALUE: 18
PIF_VALUE: 14
PIF_VALUE: 19
PIF_VALUE: 17
PIF_VALUE: 25
PIF_VALUE: 14
PIF_VALUE: 18
PIF_VALUE: 18
PIF_VALUE: 16
PIF_VALUE: 17
PIF_VALUE: 26
PIF_VALUE: 19
PIF_VALUE: 20
PIF_VALUE: 16
PIF_VALUE: 20
PIF_VALUE: 18
PIF_VALUE: 19
PIF_VALUE: 18
PIF_VALUE: 18
PIF_VALUE: 17
PIF_VALUE: 21
PIF_VALUE: 14
PIF_VALUE: 14
PIF_VALUE: 20
PIF_VALUE: 1
PIF_VALUE: 17
PIF_VALUE: 15
PIF_VALUE: 17
PIF_VALUE: 14
PIF_VALUE: 18
PIF_VALUE: 17
PIF_VALUE: 19
PIF_VALUE: 12
PIF_VALUE: 5
PIF_VALUE: 17
PIF_VALUE: 20
PIF_VALUE: 16
PIF_VALUE: 20
PIF_VALUE: 17
PIF_VALUE: 18
PIF_VALUE: 17
PIF_VALUE: 17
PIF_VALUE: 14
PIF_VALUE: 17
PIF_VALUE: 20
PIF_VALUE: 20
PIF_VALUE: 18
PIF_VALUE: 18
PIF_VALUE: 20
PIF_VALUE: 18
PIF_VALUE: 18
PIF_VALUE: 25
PIF_VALUE: 18
PIF_VALUE: 14
PIF_VALUE: 17
PIF_VALUE: 14
PIF_VALUE: 8
PIF_VALUE: 15
PIF_VALUE: 19
PIF_VALUE: 16
PIF_VALUE: 14
PIF_VALUE: 20
PIF_VALUE: 19
PIF_VALUE: 18
PIF_VALUE: 14
PIF_VALUE: 17
PIF_VALUE: 18
PIF_VALUE: 19
PIF_VALUE: 2
PIF_VALUE: 16
PIF_VALUE: 1
PIF_VALUE: 18
PIF_VALUE: 14
PIF_VALUE: 1
PIF_VALUE: 18
PIF_VALUE: 0
PIF_VALUE: 1
PIF_VALUE: 17
PIF_VALUE: 29
PIF_VALUE: 10
PIF_VALUE: 19
PIF_VALUE: 19
PIF_VALUE: 20
PIF_VALUE: 20
PIF_VALUE: 1
PIF_VALUE: 20
PIF_VALUE: 14
PIF_VALUE: 14
PIF_VALUE: 17
PIF_VALUE: 16
PIF_VALUE: 17
PIF_VALUE: 6
PIF_VALUE: 17
PIF_VALUE: 0
PIF_VALUE: 14
PIF_VALUE: 18
PIF_VALUE: 18
PIF_VALUE: 16
PIF_VALUE: 18
PIF_VALUE: 18
PIF_VALUE: 20
PIF_VALUE: 17
PIF_VALUE: 15
PIF_VALUE: 17
PIF_VALUE: 18
PIF_VALUE: 20
PIF_VALUE: 0
PIF_VALUE: 1
PIF_VALUE: 15
PIF_VALUE: 18
PIF_VALUE: 15
PIF_VALUE: 19
PIF_VALUE: 15
PIF_VALUE: 21
PIF_VALUE: 15
PIF_VALUE: 20
PIF_VALUE: 14
PIF_VALUE: 17
PIF_VALUE: 14
PIF_VALUE: 19
PIF_VALUE: 17
PIF_VALUE: 20
PIF_VALUE: 17
PIF_VALUE: 14
PIF_VALUE: 17
PIF_VALUE: 1
PIF_VALUE: 14
PIF_VALUE: 18
PIF_VALUE: 18
PIF_VALUE: 20
PIF_VALUE: 16
PIF_VALUE: 2
PIF_VALUE: 18
PIF_VALUE: 14
PIF_VALUE: 18
PIF_VALUE: 16
PIF_VALUE: 14
PIF_VALUE: 14
PIF_VALUE: 17
PIF_VALUE: 14
PIF_VALUE: 19
PIF_VALUE: 1
PIF_VALUE: 19
PIF_VALUE: 14
PIF_VALUE: 1
PIF_VALUE: 20
PIF_VALUE: 18
PIF_VALUE: 17
PIF_VALUE: 14
PIF_VALUE: 23
PIF_VALUE: 14
PIF_VALUE: 17
PIF_VALUE: 15

## 2018-03-07 ASSESSMENT — PAIN DESCRIPTION - PROGRESSION
CLINICAL_PROGRESSION: GRADUALLY WORSENING
CLINICAL_PROGRESSION: GRADUALLY WORSENING

## 2018-03-07 ASSESSMENT — PAIN SCALES - GENERAL
PAINLEVEL_OUTOF10: 8
PAINLEVEL_OUTOF10: 8
PAINLEVEL_OUTOF10: 9
PAINLEVEL_OUTOF10: 6
PAINLEVEL_OUTOF10: 7
PAINLEVEL_OUTOF10: 6
PAINLEVEL_OUTOF10: 5

## 2018-03-07 ASSESSMENT — PAIN DESCRIPTION - DESCRIPTORS
DESCRIPTORS: ACHING

## 2018-03-07 ASSESSMENT — PAIN DESCRIPTION - PAIN TYPE
TYPE: SURGICAL PAIN

## 2018-03-07 ASSESSMENT — PAIN DESCRIPTION - FREQUENCY
FREQUENCY: CONTINUOUS
FREQUENCY: INTERMITTENT

## 2018-03-07 ASSESSMENT — PAIN DESCRIPTION - LOCATION
LOCATION: BACK

## 2018-03-07 ASSESSMENT — PAIN - FUNCTIONAL ASSESSMENT: PAIN_FUNCTIONAL_ASSESSMENT: 0-10

## 2018-03-07 ASSESSMENT — PAIN DESCRIPTION - ONSET
ONSET: ON-GOING
ONSET: ON-GOING

## 2018-03-07 ASSESSMENT — PAIN DESCRIPTION - ORIENTATION: ORIENTATION: LOWER

## 2018-03-07 NOTE — BRIEF OP NOTE
Brief Postoperative Note  ______________________________________________________________    Patient: Christine Townsend  YOB: 1935  MRN: [id number]  Date of Procedure: 3/7/2018    Pre-Op Diagnosis: SPINAL STENOSIS OF LUMBAR REGION WITH NEUROGENIC CLAUDICATION    Post-Op Diagnosis: Same       Procedure(s):  LUMBAR LAMINECTOMY L3-S1    Anesthesia: General    Surgeon(s):  Demetris Stoner MD    Staff:  First Assistant: Leanna Prieto MA  Scrub Person First: Tarik Shannon     Estimated Blood Loss: 750 (units unknown) mL    Complications: None    Specimens:   * No specimens in log *    Implants:    Implant Name Type Inv.  Item Serial No.  Lot No. LRB No. Used   DURAGEN Bone/Graft/Tissue GRAFT DURAGEN SUTURABLE [id number]   [employer] MCKAY [id number] N/A 1         Drains:   Urethral Catheter Straight-tip;Latex 16 fr (Active)       Findings: same    Demetris Stoner MD  Date: 3/7/2018  Time: 4:27 PM

## 2018-03-07 NOTE — ANESTHESIA PRE PROCEDURE
03/07/18 1319      ceFAZolin (ANCEF) 2 g in dextrose 3 % 50 mL IVPB (duplex)  2 g Intravenous On Call to 31 Kira Vega PA-C        0.9 % sodium chloride bolus  250 mL Intravenous Once Butch Rossi PA-C           Allergies:  No Known Allergies    Problem List:    Patient Active Problem List   Diagnosis Code    Hyperlipidemia E78.5    Osteoarthritis M19.90    GERD (gastroesophageal reflux disease) K21.9    COPD (chronic obstructive pulmonary disease) ([facility] [address] J44.9    Chronic back pain M54.9, G89.29    CAD (coronary artery disease) I25.10    Hypertension I10    Lumbar back pain M54.5       Past Medical History:        Diagnosis Date    CAD (coronary artery disease)      cath  48    Cardiac valve prolapse     Chronic back pain     COPD (chronic obstructive pulmonary disease) ([facility] [address]     Ex-smoker     GERD (gastroesophageal reflux disease) 2/07    EGD    Glaucoma     H/O cardiac catheterization 2002    40-50% LAD   katharine    Hyperlipidemia     Hypertension     Osteoarthritis 1999    right shoulder and back    Pericarditis 1996       Past Surgical History:        Procedure Laterality Date    APPENDECTOMY      at age 13years   [address]  2002    BUNIONECTOMY  2004    right foot    CARDIAC CATHETERIZATION  2007??  &   2012? ?    normal results    COLONOSCOPY      last one 2000???    EYE SURGERY  2009??    right eye    HYSTERECTOMY  1980    still has ovaries    ROTATOR CUFF REPAIR  left    SPINE SURGERY  7/02    L5 cyst    UPPER GASTROINTESTINAL ENDOSCOPY  2007    [facility]       Social History:    Social History   Substance Use Topics    Smoking status: Former Smoker     Years: 30.00     Types: Cigarettes     Quit date: 6/11/1989    Smokeless tobacco: Never Used    Alcohol use No                                Counseling given: Not Answered      Vital Signs (Current):   Vitals:    02/28/18 1532 03/07/18 1221   BP:  134/65   Pulse:  71   Resp:  16   Temp:  98.6 °F (37 °C)   TempSrc:  Temporal

## 2018-03-07 NOTE — PROGRESS NOTES
Patient arrived to 7K07 by bed from PACU. Respirations easy and unlabored, no signs of distress noted at this time. Patient oriented to room and call light. Bed alarm in place. Call light kept within reach. Family at bedside. VSS, please refer to flowsheets.

## 2018-03-08 LAB
ANISOCYTOSIS: ABNORMAL
BASOPHILS # BLD: 0.3 %
BASOPHILS ABSOLUTE: 0 THOU/MM3 (ref 0–0.1)
EOSINOPHIL # BLD: 0.6 %
EOSINOPHILS ABSOLUTE: 0 THOU/MM3 (ref 0–0.4)
HCT VFR BLD CALC: 26 % (ref 37–47)
HEMOGLOBIN: 8.5 GM/DL (ref 12–16)
LYMPHOCYTES # BLD: 17.8 %
LYMPHOCYTES ABSOLUTE: 1.3 THOU/MM3 (ref 1–4.8)
MCH RBC QN AUTO: 30.5 PG (ref 27–31)
MCHC RBC AUTO-ENTMCNC: 32.8 GM/DL (ref 33–37)
MCV RBC AUTO: 92.9 FL (ref 81–99)
MONOCYTES # BLD: 9.9 %
MONOCYTES ABSOLUTE: 0.7 THOU/MM3 (ref 0.4–1.3)
NUCLEATED RED BLOOD CELLS: 0 /100 WBC
PDW BLD-RTO: 16 % (ref 11.5–14.5)
PLATELET # BLD: 197 THOU/MM3 (ref 130–400)
PMV BLD AUTO: 7.4 FL (ref 7.4–10.4)
RBC # BLD: 2.8 MILL/MM3 (ref 4.2–5.4)
SEG NEUTROPHILS: 71.4 %
SEGMENTED NEUTROPHILS ABSOLUTE COUNT: 5.1 THOU/MM3 (ref 1.8–7.7)
WBC # BLD: 7.1 THOU/MM3 (ref 4.8–10.8)

## 2018-03-08 PROCEDURE — 85025 COMPLETE CBC W/AUTO DIFF WBC: CPT

## 2018-03-08 PROCEDURE — 6360000002 HC RX W HCPCS: Performed by: ORTHOPAEDIC SURGERY

## 2018-03-08 PROCEDURE — 96361 HYDRATE IV INFUSION ADD-ON: CPT

## 2018-03-08 PROCEDURE — 6370000000 HC RX 637 (ALT 250 FOR IP): Performed by: ORTHOPAEDIC SURGERY

## 2018-03-08 PROCEDURE — 1200000000 HC SEMI PRIVATE

## 2018-03-08 PROCEDURE — 96368 THER/DIAG CONCURRENT INF: CPT

## 2018-03-08 PROCEDURE — 36415 COLL VENOUS BLD VENIPUNCTURE: CPT

## 2018-03-08 PROCEDURE — 2580000003 HC RX 258: Performed by: ORTHOPAEDIC SURGERY

## 2018-03-08 RX ADMIN — OXYCODONE HYDROCHLORIDE AND ACETAMINOPHEN 2 TABLET: 5; 325 TABLET ORAL at 02:45

## 2018-03-08 RX ADMIN — POTASSIUM CHLORIDE 20 MEQ: 1500 TABLET, EXTENDED RELEASE ORAL at 19:54

## 2018-03-08 RX ADMIN — OXYCODONE HYDROCHLORIDE AND ACETAMINOPHEN 2 TABLET: 5; 325 TABLET ORAL at 16:45

## 2018-03-08 RX ADMIN — OXYCODONE HYDROCHLORIDE AND ACETAMINOPHEN 2 TABLET: 5; 325 TABLET ORAL at 20:52

## 2018-03-08 RX ADMIN — PANTOPRAZOLE SODIUM 40 MG: 40 TABLET, DELAYED RELEASE ORAL at 06:03

## 2018-03-08 RX ADMIN — SODIUM CHLORIDE: 9 INJECTION, SOLUTION INTRAVENOUS at 16:46

## 2018-03-08 RX ADMIN — LATANOPROST 1 DROP: 50 SOLUTION OPHTHALMIC at 19:56

## 2018-03-08 RX ADMIN — OXYCODONE HYDROCHLORIDE AND ACETAMINOPHEN 2 TABLET: 5; 325 TABLET ORAL at 11:06

## 2018-03-08 RX ADMIN — POTASSIUM CHLORIDE 20 MEQ: 1500 TABLET, EXTENDED RELEASE ORAL at 11:07

## 2018-03-08 RX ADMIN — TIZANIDINE 4 MG: 4 TABLET ORAL at 13:01

## 2018-03-08 RX ADMIN — DOCUSATE SODIUM 100 MG: 100 CAPSULE ORAL at 19:55

## 2018-03-08 RX ADMIN — METOPROLOL TARTRATE 12.5 MG: 25 TABLET ORAL at 19:54

## 2018-03-08 RX ADMIN — OXYCODONE HYDROCHLORIDE AND ACETAMINOPHEN 2 TABLET: 5; 325 TABLET ORAL at 06:46

## 2018-03-08 RX ADMIN — ONDANSETRON 4 MG: 2 INJECTION INTRAMUSCULAR; INTRAVENOUS at 16:56

## 2018-03-08 RX ADMIN — SIMVASTATIN 40 MG: 40 TABLET, FILM COATED ORAL at 19:54

## 2018-03-08 RX ADMIN — Medication 10 ML: at 19:55

## 2018-03-08 RX ADMIN — CEFAZOLIN SODIUM 2 G: 2 SOLUTION INTRAVENOUS at 06:03

## 2018-03-08 RX ADMIN — DOCUSATE SODIUM 100 MG: 100 CAPSULE ORAL at 11:07

## 2018-03-08 RX ADMIN — METOPROLOL TARTRATE 12.5 MG: 25 TABLET ORAL at 11:08

## 2018-03-08 RX ADMIN — SODIUM CHLORIDE: 9 INJECTION, SOLUTION INTRAVENOUS at 06:46

## 2018-03-08 RX ADMIN — NORTRIPTYLINE HYDROCHLORIDE 50 MG: 25 CAPSULE ORAL at 19:54

## 2018-03-08 RX ADMIN — POTASSIUM CHLORIDE 20 MEQ: 1500 TABLET, EXTENDED RELEASE ORAL at 16:45

## 2018-03-08 ASSESSMENT — PAIN DESCRIPTION - DESCRIPTORS
DESCRIPTORS: ACHING
DESCRIPTORS: ACHING

## 2018-03-08 ASSESSMENT — PAIN DESCRIPTION - PAIN TYPE
TYPE: SURGICAL PAIN
TYPE: SURGICAL PAIN

## 2018-03-08 ASSESSMENT — PAIN SCALES - GENERAL
PAINLEVEL_OUTOF10: 8
PAINLEVEL_OUTOF10: 10
PAINLEVEL_OUTOF10: 8
PAINLEVEL_OUTOF10: 7
PAINLEVEL_OUTOF10: 6
PAINLEVEL_OUTOF10: 8

## 2018-03-08 ASSESSMENT — PAIN DESCRIPTION - LOCATION
LOCATION: BACK
LOCATION: BACK

## 2018-03-08 ASSESSMENT — PAIN DESCRIPTION - ONSET: ONSET: ON-GOING

## 2018-03-08 ASSESSMENT — PAIN DESCRIPTION - FREQUENCY: FREQUENCY: INTERMITTENT

## 2018-03-08 ASSESSMENT — PAIN DESCRIPTION - PROGRESSION: CLINICAL_PROGRESSION: GRADUALLY WORSENING

## 2018-03-08 ASSESSMENT — PAIN DESCRIPTION - ORIENTATION
ORIENTATION: LOWER
ORIENTATION: LOWER

## 2018-03-08 NOTE — CONSULTS
Consult    Patient:  Todd Betts  YOB: 1935  Date of Service: 3/7/2018  MRN: [id number]   Acct:  [de-identified]   Primary Care Physician: Rosalina Shepherd MD      History of Present Illness:   History obtained from chart review and the patient. The patient is a 81 y.o. female with CAD, HTN, HLD, COPD whom I have been requested by Dr Keya Byrd to see for management of Hypertension. Patient is s/p lumbar laminectomy. She relates that pain control is adequate. She denies chest pain, shortness of breath, headache, nausea, vomiting or abdominal pain. She states she feels soreness in her throat post operatively but has been able to eat her jello without any difficulties. Past Medical History:        Diagnosis Date    CAD (coronary artery disease)      cath  48    Cardiac valve prolapse     Chronic back pain     [facility]     Ex-smoker     GERD (gastroesophageal reflux disease) 2/07    EGD    Glaucoma     H/O cardiac catheterization 2002    40-50% LAD   katharine    Hyperlipidemia     Hypertension     Osteoarthritis 1999    right shoulder and back    Pericarditis 1996       Past Surgical History:        Procedure Laterality Date    APPENDECTOMY      at age 13years   [address]  2004    right foot   [address]  2007??  &   2012? ?    normal results    COLONOSCOPY      last one 2000???    EYE SURGERY  2009??    right eye   [address]    still has ovaries    ROTATOR CUFF REPAIR  left    SPINE SURGERY  7/02    L5 cyst    UPPER GASTROINTESTINAL ENDOSCOPY  2007    [facility] Medications:   No current facility-administered medications on file prior to encounter.       Current Outpatient Prescriptions on File Prior to Encounter   Medication Sig Dispense Refill    isosorbide mononitrate (IMDUR) 30 MG extended release tablet Take 1 tablet by mouth daily 90 tablet 1    hydrochlorothiazide mild anterolisthesis of L4 on L5. Posterior surgical soft tissue disruption is present. Atherosclerotic vascular calcifications are noted. Surgical localization as detailed above. Final report electronically signed by Dr. Fouzia Dominguez on 3/7/2018 3:20 PM        EKG:     Assessment / Plan:    1. Spinal stenosis of lumbar region with neurogenic claudication, s/p Lumbar laminectomy- Neurosurg following  2. COPD (chronic obstructive pulmonary disease) ([facility] [address]- duonebs prn, pulse ox to keep 02 > 92%  3. CAD (coronary artery disease)- resume Imdur, BB, statin  4. Hypertension- resume home meds with parameters to hold  5.    Lumbar back pain, s/p lumbar laminectomy         Dispo: Patient seen and examined              Medications, Xrays and labs reviewed              Patient is stable              DVT / PE prophylaxis per Neurosurg     Thank you Dr Srinivas Morrow for allowing me to participate in this patient's care    DVT prophylaxis: [] Lovenox                                 [] SCDs                                 [] SQ Heparin                                 [] Encourage ambulation, low risk for DVT, no chemical or mechanical prophylaxis necessary              [] Already on Anticoagulation                Anticipated Disposition upon discharge: [] Home                                                                         [] Home with Home Health                                                                         [] [facility]                                                                         [] [address]          Electronically signed by Lachelle Nguyen DO on 3/7/2018 at 10:39 PM

## 2018-03-08 NOTE — OP NOTE
[address] [place] [address]                                 OPERATIVE REPORT    PATIENT NAME: Chang Moe                 :        1935  MED REC NO:   [id number]                           ROOM:       0007  ACCOUNT NO:   [de-identified]                           ADMIT DATE: 2018  PROVIDER:     Ramakrishna Fragoso M.D.    Alberto Canales OF PROCEDURE:  2018    PREOPERATIVE DIAGNOSES:    1. Lumbar spinal stenosis. 2.  Status post previous X-Stop placement at the levels of L3-L4 and L4-L5.  3.  Symptoms of neurogenic claudication and right leg radiculopathy. 4.  Grade 1 spondylolisthesis at the levels of L4-L5 and L5-S1. POSTOPERATIVE DIAGNOSES:    1. Lumbar spinal stenosis. 2.  Status post previous X-Stop placement at the levels of L3-L4 and L4-L5.  3.  Symptoms of neurogenic claudication and right leg radiculopathy. 4.  Grade 1 spondylolisthesis at the levels of L4-L5 and L5-S1. OPERATION PERFORMED:    1. Removal of the X-Stop device at the levels of L3-L4 and L4-L5.  2.  L3, L4, L5, and S1 laminectomy. SURGEON:  Ramakrishna Fragoso M.D.    [place] Monday:  Oswaldo Phan PA-C. ANESTHESIA:  General.    BLOOD LOSS:  750 mL with 150 mL of Cell Saver. DRAIN:  Hemovac x 1. COMPLICATIONS:    1. Durotomy of right side L5 nerve root sleeve. 2.  Dural blood at the levels of L3 without complete durotomy. INDICATIONS:  The patient presents, 81years of age with severe low back  and leg pain that is bilateral, having longstanding symptoms of back and  leg pain, unimproved, and now seeking definitive care. I have discussed  with the patient the risks and benefits of surgery. I have answered all of  her questions and she feels prepared to proceed, because the symptoms are  so significant. The patient understands the operation in detail.   In  preparation for this, Dr. Jessica Salazar has seen her as well for preoperative  medical

## 2018-03-08 NOTE — CONSULTS
present. She has had bunion surgery. She has had an  appendectomy in the past.  Prior EGD in 2007 had noted some gastritis  symptoms being present. HOSPITALIZATIONS:  Only for the above. No recent hospitalization being  present. ILLNESSESS:  She does have history of remote pericarditis back in 1996,  hypertension, hyperlipidemia, glaucoma, GERD, mild COPD, atherosclerotic  heart disease being present as stated. Last stress test in 2014, which was  noted to be normal.    FAMILY HISTORY:  Positive for underlying TB in mother and father. There is  some heart disease in the family. SOCIAL HISTORY:  Notes that she is a former smoker, stopping way back in  1989. She is . She does not use alcohol products. ALLERGIES:  She has no known drug allergies. REVIEW OF SYSTEMS:  CONSTITUTIONAL:  She has had no fevers or chills being  present at this time. HEENT:  She has no significant eye complaints. She  has had no ENT symptoms. She has had some increased amount of teeth pain  being present, which she has seen the dentist for this. Eyes without any  visual symptoms. RESPIRATORY:  Mild disease being present. She has some  mild COPD, but has not had any wheeze or significant shortness of breath. CARDIAC:  No chest pressure, discomfort, or palpitations. GI:  No  heartburn or indigestion present. RENAL:  Without voiding problems or  burning. NEUROLOGICAL:  No numbness or weakness present. HEMATOLOGICAL:   No abnormal bruising being present at this time. PHYSICAL EXAMINATION:  VITAL SIGNS:  Noted blood pressure 112/68, pulse 60, respiratory rate 14. HEENT:  Within normal limits. We should note the left lower second  bicuspid, there is some slight swelling being present along the gum line at  this time. No recent problems. This has improved. NECK:  Supple without adenopathy or increased thyroid. LUNGS:  Clear. No wheezes. No rhonchi. CARDIAC:  Regular rate and rhythm.   Normal S1 and S2 without murmur, gallop  or rub. No carotid bruits. Peripheral pulses 2+. ABDOMEN:  Soft. Positive bowel sounds. Nontender. No masses. EXTREMITIES:  Lower back area discomfort noted at this time. NEURO:  She is alert and oriented x3. Cranial nerves II through XII are  intact. Motor and sensory intact in addition at this time. LABORATORY DATA:  Notes that EKG had normal sinus rhythm, no abnormalities  being present. Normal ST and T-wave _segments and were_ stable. There are no NM  interval changes being present. QRS segments were noted to be within  normal limits in addition. Recent CT scan of the head was noted to be  normal in addition. Recent MRI of the lumbar spine noted severe canal  stenosis and change in L5, S1 area being present. Potassium was noted to  be normal at 4.4. Most recent labs have been stable in addition. IMPRESSION:  1. Right leg radiculopathy with recurrent lumbar disk pain. 2.  Hypertension. 3.  Hyperlipidemia. 4.  Atherosclerotic heart disease being cleared per Dr. Gertrudis Amos,  Cardiology. 5.  GERD. 6.  COPD. PLAN:  At this time, it was felt the patient is cleared for surgery. She  has been stable cardiac wise, but we are going to have the patient see Dr. Gertrudis Amos in addition. EKG is noted to be stable and completely clear. She  has had no chest pressure or discomfort, so we expect the patient to be at  moderate risk postop, where it can be followed on telemetry. As stated,  Dr. Gertrudis Amos will be clearing her for upcoming surgery at this time from a  cardiac standpoint. Otherwise, she is cleared for surgery from  hypertension standpoint and from COPD standpoint. Although her heart  disease is mild, we expect that she would do well at this time.         Elyssa Terry M.D.    D: 03/07/2018 18:34:58       T: 03/07/2018 22:10:20     [id number]  Job#: [id number]     Doc#: [id number]    CC:  Josefa Luis M.D.

## 2018-03-08 NOTE — CARE COORDINATION
3/8/18, 12:59 PM      Gus Pavon       Admitted from: PACU 3/7/2018/ [address] [place] [address] day: 0   Location: 7K-07/007-A Reason for admit: Lumbar back pain [M54.5]  Spinal stenosis of lumbar region with neurogenic claudication [M48.062]  Spinal stenosis of lumbar region with neurogenic claudication [M48.062] Status: outpatient in a bed to inpatient (Dural leak)  Admit order signed?: \yes  PMH:  has a past medical history of CAD (coronary artery disease); Cardiac valve prolapse; Chronic back pain; COPD (chronic obstructive pulmonary disease) ([address] Ex-smoker; GERD (gastroesophageal reflux disease); Glaucoma; H/O cardiac catheterization; Hyperlipidemia; Hypertension; Osteoarthritis; and Pericarditis. Procedure: 3/7/18 surgery by Dr Karl Dumont: 1. Removal of the X-Stop device at the levels of L3-L4 and L4-L5.  2.  L3, L4, L5, and S1 laminectomy  Pertinent abnormal Imaging:no  Medications:  Scheduled Meds:   latanoprost  1 drop Both Eyes Nightly    isosorbide mononitrate  30 mg Oral Daily    hydrochlorothiazide  25 mg Oral Daily    simvastatin  40 mg Oral Nightly    nortriptyline  50 mg Oral Nightly    potassium chloride  20 mEq Oral TID    sodium chloride flush  10 mL Intravenous 2 times per day    docusate sodium  100 mg Oral BID    pantoprazole  40 mg Oral QAM AC    metoprolol tartrate  12.5 mg Oral BID     Continuous Infusions:   sodium chloride 100 mL/hr at 03/08/18 0646      Pertinent Info/Orders/Treatment Plan: Bedrest today due to dural leak. PT/OT not ordered yet. N/V checks. Pain management. Ileus prevention measures. I&O. Daily weights. Diet: DIET CLEAR LIQUID;   DVT Prophylaxis: SCD's ordered and on  Smoking status:  reports that she quit smoking about 28 years ago. Her smoking use included Cigarettes. She quit after 30.00 years of use.  She has never used smokeless tobacco.   Influenza Vaccination Screening Completed: yes  Pneumonia Vaccination Screening Completed: yes  Core measures: vte  PCP:

## 2018-03-08 NOTE — ANESTHESIA POSTPROCEDURE EVALUATION
Department of Anesthesiology  Postprocedure Note    Patient: Jamari Estrada  MRN: [id number]  YOB: 1935  Date of evaluation: 3/7/2018  Time:  7:00 PM     Procedure Summary     Date:  03/07/18 Room / Location:  [address]    Anesthesia Start:  6153 Anesthesia Stop:  5057    Procedure:  LUMBAR LAMINECTOMY L3-S1 (N/A Spine Lumbar) Diagnosis:  (SPINAL STENOSIS OF LUMBAR REGION WITH NEUROGENIC CLAUDICATION)    Surgeon:  Benjamín Daily MD Responsible Provider:  Manjit Quiroga DO    Anesthesia Type:  general ASA Status:  3          Anesthesia Type: general    Gerardo Phase I: Gerardo Score: 9    Gerardo Phase II:      Last vitals: Reviewed and per EMR flowsheets.        Anesthesia Post Evaluation    Patient location during evaluation: PACU  Patient participation: complete - patient participated  Level of consciousness: awake and alert  Airway patency: patent  Nausea & Vomiting: no nausea  Complications: no  Cardiovascular status: blood pressure returned to baseline and hemodynamically stable  Respiratory status: acceptable and spontaneous ventilation  Hydration status: euvolemic

## 2018-03-08 NOTE — PLAN OF CARE
Problem: Pain:  Goal: Pain level will decrease  Pain level will decrease    Outcome: Ongoing  Pt report pain at 6 on scale. Pt states oral/iv/im medication helping to achieve pain goal of a 5 on scale. voices relief from pain     Problem: Falls - Risk of  Goal: Absence of falls  Outcome: Ongoing  Pt using call light appropriately to call for assistance with ambulation to the bathroom and to chair. Pt is also compliant with use of non-skid slippers. Pt reports understanding of fall prevention when discussed. no falls this shift, bedrest, bed alarm in place    Problem: Neurological  Goal: Maximum potential motor/sensory/cognitive function  Outcome: Ongoing  Hand grasps pedal push pull equal and moderate, pain controlled with pain meds, voices satisfaction see mar    Problem: GI  Goal: No bowel complications  Outcome: Ongoing  Active bowel sounds, passing flatus,     Problem:   Goal: Adequate urinary output  Outcome: Ongoing  Urine clear, alvarez in place, bedrest, need for accurate intake and output    Problem: DISCHARGE BARRIERS  Goal: Patient's continuum of care needs are met  Outcome: Ongoing  Patient and family voices understanding and acceptance in participation of discharge plans

## 2018-03-08 NOTE — CARE COORDINATION
DISCHARGE BARRIERS  3/8/18, 10:29 AM    Reason for Referral:  Discharge needs   Mental Status:  Alert and oriented   Decision Making:  Makes own decisions   Family/Social/Home Environment:  Maria Ines Mcneil lives at home with her sister. She has been able to manage her own care prior to admission. Her sister is not able to assist with care, but does share household tasks. Maria Ines Mcneil has a son in the area who is supportive. Current Services:  None   Current Equipment: none   Payment Source: medicare   Concerns or Barriers to Discharge:  Status is outpt in a bed, so does not have coverage at this time for ecf  Collabrative List of ECF/HH were provided: declined ecf list, states she plans to go home, may consider HH, list provided    Teach Back Method used with patient regarding care plan and discharge plan  Patient  verbalizes understanding of the plan of care and contributes to goal setting. Anticipated Needs/Discharge Plan:  Spoke with Maria Ines Mcneil about discharge plan. She states she plans home with her sister. She shares that she is not planning ecf and will go home at discharge. Discussed home health, and patient may agree to this. Discussed with RN. Will follow for changes in plan.      Electronically signed by JIM Roe on 3/8/2018 at 10:29 AM

## 2018-03-09 ENCOUNTER — APPOINTMENT (OUTPATIENT)
Dept: GENERAL RADIOLOGY | Age: 83
DRG: 519 | End: 2018-03-09
Attending: ORTHOPAEDIC SURGERY
Payer: MEDICARE

## 2018-03-09 LAB
ANION GAP SERPL CALCULATED.3IONS-SCNC: 10 MEQ/L (ref 8–16)
ANION GAP SERPL CALCULATED.3IONS-SCNC: 11 MEQ/L (ref 8–16)
ANISOCYTOSIS: ABNORMAL
BASOPHILS # BLD: 0.3 %
BASOPHILS ABSOLUTE: 0 THOU/MM3 (ref 0–0.1)
BUN BLDV-MCNC: 11 MG/DL (ref 7–22)
BUN BLDV-MCNC: 12 MG/DL (ref 7–22)
CALCIUM SERPL-MCNC: 7.8 MG/DL (ref 8.5–10.5)
CALCIUM SERPL-MCNC: 8 MG/DL (ref 8.5–10.5)
CHLORIDE BLD-SCNC: 110 MEQ/L (ref 98–111)
CHLORIDE BLD-SCNC: 111 MEQ/L (ref 98–111)
CO2: 16 MEQ/L (ref 23–33)
CO2: 18 MEQ/L (ref 23–33)
CREAT SERPL-MCNC: 0.9 MG/DL (ref 0.4–1.2)
CREAT SERPL-MCNC: 0.9 MG/DL (ref 0.4–1.2)
EOSINOPHIL # BLD: 1.1 %
EOSINOPHILS ABSOLUTE: 0.1 THOU/MM3 (ref 0–0.4)
GFR SERPL CREATININE-BSD FRML MDRD: 60 ML/MIN/1.73M2
GFR SERPL CREATININE-BSD FRML MDRD: 60 ML/MIN/1.73M2
GLUCOSE BLD-MCNC: 107 MG/DL (ref 70–108)
GLUCOSE BLD-MCNC: 120 MG/DL (ref 70–108)
HCT VFR BLD CALC: 23.7 % (ref 37–47)
HCT VFR BLD CALC: 26.9 % (ref 37–47)
HEMOGLOBIN: 7.8 GM/DL (ref 12–16)
HEMOGLOBIN: 8.8 GM/DL (ref 12–16)
LYMPHOCYTES # BLD: 15.1 %
LYMPHOCYTES ABSOLUTE: 1.1 THOU/MM3 (ref 1–4.8)
MCH RBC QN AUTO: 30.6 PG (ref 27–31)
MCH RBC QN AUTO: 30.8 PG (ref 27–31)
MCHC RBC AUTO-ENTMCNC: 32.7 GM/DL (ref 33–37)
MCHC RBC AUTO-ENTMCNC: 32.8 GM/DL (ref 33–37)
MCV RBC AUTO: 93.4 FL (ref 81–99)
MCV RBC AUTO: 94.4 FL (ref 81–99)
MONOCYTES # BLD: 14.1 %
MONOCYTES ABSOLUTE: 1 THOU/MM3 (ref 0.4–1.3)
NUCLEATED RED BLOOD CELLS: 0 /100 WBC
PDW BLD-RTO: 16.2 % (ref 11.5–14.5)
PDW BLD-RTO: 16.5 % (ref 11.5–14.5)
PLATELET # BLD: 164 THOU/MM3 (ref 130–400)
PLATELET # BLD: 169 THOU/MM3 (ref 130–400)
PMV BLD AUTO: 7.6 FL (ref 7.4–10.4)
PMV BLD AUTO: 7.7 FL (ref 7.4–10.4)
POTASSIUM SERPL-SCNC: 3.9 MEQ/L (ref 3.5–5.2)
POTASSIUM SERPL-SCNC: 4.2 MEQ/L (ref 3.5–5.2)
RBC # BLD: 2.54 MILL/MM3 (ref 4.2–5.4)
RBC # BLD: 2.85 MILL/MM3 (ref 4.2–5.4)
SEG NEUTROPHILS: 69.4 %
SEGMENTED NEUTROPHILS ABSOLUTE COUNT: 4.9 THOU/MM3 (ref 1.8–7.7)
SODIUM BLD-SCNC: 136 MEQ/L (ref 135–145)
SODIUM BLD-SCNC: 140 MEQ/L (ref 135–145)
TROPONIN T: < 0.01 NG/ML
WBC # BLD: 7.1 THOU/MM3 (ref 4.8–10.8)
WBC # BLD: 7.1 THOU/MM3 (ref 4.8–10.8)

## 2018-03-09 PROCEDURE — 84484 ASSAY OF TROPONIN QUANT: CPT

## 2018-03-09 PROCEDURE — 36415 COLL VENOUS BLD VENIPUNCTURE: CPT

## 2018-03-09 PROCEDURE — 6370000000 HC RX 637 (ALT 250 FOR IP): Performed by: ORTHOPAEDIC SURGERY

## 2018-03-09 PROCEDURE — 73564 X-RAY EXAM KNEE 4 OR MORE: CPT

## 2018-03-09 PROCEDURE — 2580000003 HC RX 258: Performed by: EMERGENCY MEDICINE

## 2018-03-09 PROCEDURE — 85027 COMPLETE CBC AUTOMATED: CPT

## 2018-03-09 PROCEDURE — 1200000003 HC TELEMETRY R&B

## 2018-03-09 PROCEDURE — 80048 BASIC METABOLIC PNL TOTAL CA: CPT

## 2018-03-09 PROCEDURE — 2580000003 HC RX 258: Performed by: ORTHOPAEDIC SURGERY

## 2018-03-09 PROCEDURE — 85025 COMPLETE CBC W/AUTO DIFF WBC: CPT

## 2018-03-09 PROCEDURE — 71045 X-RAY EXAM CHEST 1 VIEW: CPT

## 2018-03-09 RX ORDER — 0.9 % SODIUM CHLORIDE 0.9 %
250 INTRAVENOUS SOLUTION INTRAVENOUS ONCE
Status: COMPLETED | OUTPATIENT
Start: 2018-03-09 | End: 2018-03-09

## 2018-03-09 RX ADMIN — PANTOPRAZOLE SODIUM 40 MG: 40 TABLET, DELAYED RELEASE ORAL at 07:31

## 2018-03-09 RX ADMIN — LATANOPROST 1 DROP: 50 SOLUTION OPHTHALMIC at 21:00

## 2018-03-09 RX ADMIN — POTASSIUM CHLORIDE 20 MEQ: 1500 TABLET, EXTENDED RELEASE ORAL at 07:30

## 2018-03-09 RX ADMIN — TIZANIDINE 4 MG: 4 TABLET ORAL at 07:31

## 2018-03-09 RX ADMIN — ISOSORBIDE MONONITRATE 30 MG: 30 TABLET ORAL at 07:31

## 2018-03-09 RX ADMIN — OXYCODONE HYDROCHLORIDE AND ACETAMINOPHEN 1 TABLET: 5; 325 TABLET ORAL at 04:53

## 2018-03-09 RX ADMIN — CYCLOBENZAPRINE 10 MG: 10 TABLET, FILM COATED ORAL at 13:27

## 2018-03-09 RX ADMIN — SODIUM CHLORIDE 250 ML: 9 INJECTION, SOLUTION INTRAVENOUS at 21:07

## 2018-03-09 RX ADMIN — NORTRIPTYLINE HYDROCHLORIDE 50 MG: 25 CAPSULE ORAL at 20:59

## 2018-03-09 RX ADMIN — DOCUSATE SODIUM 100 MG: 100 CAPSULE ORAL at 20:59

## 2018-03-09 RX ADMIN — OXYCODONE HYDROCHLORIDE AND ACETAMINOPHEN 1 TABLET: 5; 325 TABLET ORAL at 00:51

## 2018-03-09 RX ADMIN — OXYCODONE HYDROCHLORIDE AND ACETAMINOPHEN 2 TABLET: 5; 325 TABLET ORAL at 13:27

## 2018-03-09 RX ADMIN — TIZANIDINE 4 MG: 4 TABLET ORAL at 17:23

## 2018-03-09 RX ADMIN — OXYCODONE HYDROCHLORIDE AND ACETAMINOPHEN 2 TABLET: 5; 325 TABLET ORAL at 08:52

## 2018-03-09 RX ADMIN — SODIUM CHLORIDE: 9 INJECTION, SOLUTION INTRAVENOUS at 04:03

## 2018-03-09 RX ADMIN — METOPROLOL TARTRATE 12.5 MG: 25 TABLET ORAL at 07:30

## 2018-03-09 RX ADMIN — POTASSIUM CHLORIDE 20 MEQ: 1500 TABLET, EXTENDED RELEASE ORAL at 20:59

## 2018-03-09 RX ADMIN — OXYCODONE HYDROCHLORIDE AND ACETAMINOPHEN 2 TABLET: 5; 325 TABLET ORAL at 17:27

## 2018-03-09 RX ADMIN — DOCUSATE SODIUM 100 MG: 100 CAPSULE ORAL at 07:30

## 2018-03-09 RX ADMIN — SIMVASTATIN 40 MG: 40 TABLET, FILM COATED ORAL at 20:59

## 2018-03-09 ASSESSMENT — PAIN SCALES - GENERAL
PAINLEVEL_OUTOF10: 9
PAINLEVEL_OUTOF10: 7
PAINLEVEL_OUTOF10: 8
PAINLEVEL_OUTOF10: 6
PAINLEVEL_OUTOF10: 8
PAINLEVEL_OUTOF10: 5

## 2018-03-09 ASSESSMENT — PAIN DESCRIPTION - DESCRIPTORS
DESCRIPTORS: ACHING
DESCRIPTORS: ACHING

## 2018-03-09 ASSESSMENT — PAIN DESCRIPTION - LOCATION
LOCATION: BACK
LOCATION: BACK

## 2018-03-09 ASSESSMENT — PAIN DESCRIPTION - ORIENTATION
ORIENTATION: LOWER
ORIENTATION: LOWER

## 2018-03-09 ASSESSMENT — PAIN DESCRIPTION - PAIN TYPE
TYPE: SURGICAL PAIN
TYPE: SURGICAL PAIN

## 2018-03-09 ASSESSMENT — PAIN DESCRIPTION - PROGRESSION
CLINICAL_PROGRESSION: GRADUALLY WORSENING
CLINICAL_PROGRESSION: GRADUALLY WORSENING

## 2018-03-09 NOTE — PROGRESS NOTES
Evan Jauregui is a 81 y.o. female patient.     Current Facility-Administered Medications   Medication Dose Route Frequency Provider Last Rate Last Dose    latanoprost (XALATAN) 0.005 % ophthalmic solution 1 drop  1 drop Both Eyes Nightly Eleni Mendez MD   1 drop at 03/08/18 1956    tiZANidine (ZANAFLEX) tablet 4 mg  4 mg Oral Q6H PRN Eleni Mendez MD   4 mg at 03/09/18 1723    isosorbide mononitrate (IMDUR) extended release tablet 30 mg  30 mg Oral Daily Eleni Mendez MD   30 mg at 03/09/18 0731    hydrochlorothiazide (HYDRODIURIL) tablet 25 mg  25 mg Oral Daily Eleni Mendez MD        simvastatin (ZOCOR) tablet 40 mg  40 mg Oral Nightly Eleni Mendez MD   40 mg at 03/08/18 1954    nortriptyline (PAMELOR) capsule 50 mg  50 mg Oral Nightly Eleni Mendez MD   50 mg at 03/08/18 1954    potassium chloride (KLOR-CON M) extended release tablet 20 mEq  20 mEq Oral TID Eleni Mendez MD   20 mEq at 03/09/18 0730    0.9 % sodium chloride infusion   Intravenous Continuous Eleni Mendez  mL/hr at 03/09/18 0403      sodium chloride flush 0.9 % injection 10 mL  10 mL Intravenous 2 times per day Eleni Mendez MD   10 mL at 03/08/18 1955    sodium chloride flush 0.9 % injection 10 mL  10 mL Intravenous PRN Eleni Mendez MD        acetaminophen (TYLENOL) tablet 650 mg  650 mg Oral Q4H PRN Eleni Mendez MD        acetaminophen (TYLENOL) suppository 650 mg  650 mg Rectal Q4H PRN Eleni Mendez MD        cyclobenzaprine (FLEXERIL) tablet 10 mg  10 mg Oral TID PRN Eleni Mendez MD   10 mg at 03/09/18 1327    docusate sodium (COLACE) capsule 100 mg  100 mg Oral BID Eleni Mendez MD   100 mg at 03/09/18 0730    magnesium hydroxide (MILK OF MAGNESIA) 400 MG/5ML suspension 30 mL  30 mL Oral Daily PRN Eleni Mendez MD        ondansetron [facility]) injection 4 mg  4 mg Intravenous Q6H PRN Eleni Mendez MD   4 mg at 03/08/18 0015    oxyCODONE-acetaminophen (PERCOCET) 5-325 MG per tablet

## 2018-03-09 NOTE — PLAN OF CARE
Problem: Pain:  Goal: Pain level will decrease  Pain level will decrease    Outcome: Ongoing  Pain goal is 4/10. Pain in back improved after pain medication given. Problem: Falls - Risk of  Goal: Absence of falls  Outcome: Ongoing  Patient is on bedrest, patient not attempting to get out of bed. Bed alarm on. Problem: Neurological  Goal: Maximum potential motor/sensory/cognitive function  Outcome: Ongoing  Alert and able to move all extremities. Problem: Cardiovascular  Goal: No DVT, peripheral vascular complications  Outcome: Ongoing  Denies chest pain, scds in place. Problem: GI  Goal: No bowel complications  Outcome: Ongoing  No bowel movement since surgery, denies nausea. Problem:   Goal: Adequate urinary output  Outcome: Ongoing  Garcia in place with adequate amount of output. Problem: Skin Integrity/Risk  Goal: Wound healing  Outcome: Ongoing  Back incision with surgical dressing in place. Problem: Musculor/Skeletal Functional Status  Goal: Highest potential functional level  Outcome: Ongoing  Patient is on bedrest at this time. Problem: Discharge Planning:  Goal: Discharged to appropriate level of care  Discharged to appropriate level of care   Outcome: Ongoing  Planning home at discharge, may need to go to rehab/ecf. Comments: Care plan reviewed with patient. Patient verbalize understanding of the plan of care and contribute to goal setting.

## 2018-03-09 NOTE — CARE COORDINATION
3/9/18, 12:08 PM      [facility] day: 1  Location: -07/007-A Reason for admit: Lumbar back pain [M54.5]  Spinal stenosis of lumbar region with neurogenic claudication [M48.062]  Spinal stenosis of lumbar region with neurogenic claudication [M48.062]   Procedure: 3/7/18 surgery by Dr Gricelda Chery: 1.  Removal of the X-Stop device at the levels of L3-L4 and L4-L5. 2.  L3, L4, L5, and S1 laminectomy  Dural repair  Treatment Plan of Care: Bedrest t  raj due to dural leak. PT/OT not ordered yet. N/V checks. Pain management. Ileus prevention measures. I&O. Daily weights.    Core Measures: vte  PCP: Frances Dahl MD  Discharge Plan: Thao Figueroa, TCU/Rehab admissions coordinator, will re-eval on Monday

## 2018-03-09 NOTE — PROGRESS NOTES
Jignesh Guo is a 81 y.o. female patient.     Current Facility-Administered Medications   Medication Dose Route Frequency Provider Last Rate Last Dose    latanoprost (XALATAN) 0.005 % ophthalmic solution 1 drop  1 drop Both Eyes Nightly Dayanna Cotter MD   1 drop at 03/08/18 1956    tiZANidine (ZANAFLEX) tablet 4 mg  4 mg Oral Q6H PRN Dayanna Cotter MD   4 mg at 03/08/18 1301    isosorbide mononitrate (IMDUR) extended release tablet 30 mg  30 mg Oral Daily Dayanna Cotter MD        hydrochlorothiazide (HYDRODIURIL) tablet 25 mg  25 mg Oral Daily Dayanna Cotter MD        simvastatin (ZOCOR) tablet 40 mg  40 mg Oral Nightly Dayanna Cotter MD   40 mg at 03/08/18 1954    nortriptyline (PAMELOR) capsule 50 mg  50 mg Oral Nightly Dayanna Cotter MD   50 mg at 03/08/18 1954    potassium chloride (KLOR-CON M) extended release tablet 20 mEq  20 mEq Oral TID Dayanna Cotter MD   20 mEq at 03/08/18 1954    0.9 % sodium chloride infusion   Intravenous Continuous Dayanna Cotter  mL/hr at 03/08/18 1646      sodium chloride flush 0.9 % injection 10 mL  10 mL Intravenous 2 times per day Dayanna Cotter MD   10 mL at 03/08/18 1955    sodium chloride flush 0.9 % injection 10 mL  10 mL Intravenous PRN Dayanna Cotter MD        acetaminophen (TYLENOL) tablet 650 mg  650 mg Oral Q4H PRN Dayanna Cotter MD        acetaminophen (TYLENOL) suppository 650 mg  650 mg Rectal Q4H PRN Dayanna Cotter MD        cyclobenzaprine (FLEXERIL) tablet 10 mg  10 mg Oral TID PRN Dayanna Cotter MD   10 mg at 03/07/18 1739    docusate sodium (COLACE) capsule 100 mg  100 mg Oral BID Dayanna Cotter MD   100 mg at 03/08/18 1955    magnesium hydroxide (MILK OF MAGNESIA) 400 MG/5ML suspension 30 mL  30 mL Oral Daily PRN Dayanna Cotter MD        ondansetron [facility]) injection 4 mg  4 mg Intravenous Q6H PRN Dayanna Cotter MD   4 mg at 03/08/18 8189    oxyCODONE-acetaminophen (PERCOCET) 5-325 MG per tablet 1 tablet  1 tablet muscle tone and normal coordination. Assessment:    Condition: In stable condition. Improving. (Ashd  Stable   But use telemetry     htn stable        gerd  Stable      lipid stable      lumbar  Stenosis  With  Right leg  Pain post  Op with dural leak doing  Well     Copd  Mild and stable         anemia post op  Bleeding  follow). Plan: Activity Plan: bedrest  with  dural leak. Consults: physical therapy and occupational therapy. Advance diet as tolerated. Administer medications as ordered. (  Stable        Repeat labs  In am     watch  hgb     telemetry      stable  Post  op    ).        Flori Miller MD  3/9/2018

## 2018-03-10 PROBLEM — D62 ACUTE BLOOD LOSS AS CAUSE OF POSTOPERATIVE ANEMIA: Status: ACTIVE | Noted: 2018-03-01

## 2018-03-10 LAB
ANISOCYTOSIS: ABNORMAL
BASOPHILS # BLD: 0.1 %
BASOPHILS ABSOLUTE: 0 THOU/MM3 (ref 0–0.1)
EOSINOPHIL # BLD: 0.2 %
EOSINOPHILS ABSOLUTE: 0 THOU/MM3 (ref 0–0.4)
HCT VFR BLD CALC: 27.6 % (ref 37–47)
HEMOGLOBIN: 7.8 GM/DL (ref 12–16)
HEMOGLOBIN: 8.9 GM/DL (ref 12–16)
HEMOGLOBIN: 9.2 GM/DL (ref 12–16)
LYMPHOCYTES # BLD: 7.6 %
LYMPHOCYTES ABSOLUTE: 0.6 THOU/MM3 (ref 1–4.8)
MCH RBC QN AUTO: 31.3 PG (ref 27–31)
MCHC RBC AUTO-ENTMCNC: 32.3 GM/DL (ref 33–37)
MCV RBC AUTO: 96.8 FL (ref 81–99)
MONOCYTES # BLD: 7 %
MONOCYTES ABSOLUTE: 0.6 THOU/MM3 (ref 0.4–1.3)
NUCLEATED RED BLOOD CELLS: 0 /100 WBC
PDW BLD-RTO: 15.4 % (ref 11.5–14.5)
PLATELET # BLD: 157 THOU/MM3 (ref 130–400)
PMV BLD AUTO: 8.1 FL (ref 7.4–10.4)
RBC # BLD: 2.85 MILL/MM3 (ref 4.2–5.4)
SEG NEUTROPHILS: 85.1 %
SEGMENTED NEUTROPHILS ABSOLUTE COUNT: 6.7 THOU/MM3 (ref 1.8–7.7)
WBC # BLD: 7.9 THOU/MM3 (ref 4.8–10.8)

## 2018-03-10 PROCEDURE — 85018 HEMOGLOBIN: CPT

## 2018-03-10 PROCEDURE — 6370000000 HC RX 637 (ALT 250 FOR IP): Performed by: ORTHOPAEDIC SURGERY

## 2018-03-10 PROCEDURE — 85025 COMPLETE CBC W/AUTO DIFF WBC: CPT

## 2018-03-10 PROCEDURE — 6370000000 HC RX 637 (ALT 250 FOR IP): Performed by: EMERGENCY MEDICINE

## 2018-03-10 PROCEDURE — 1200000003 HC TELEMETRY R&B

## 2018-03-10 PROCEDURE — 36415 COLL VENOUS BLD VENIPUNCTURE: CPT

## 2018-03-10 PROCEDURE — 2580000003 HC RX 258: Performed by: ORTHOPAEDIC SURGERY

## 2018-03-10 RX ADMIN — METOPROLOL TARTRATE 12.5 MG: 25 TABLET ORAL at 19:47

## 2018-03-10 RX ADMIN — NORTRIPTYLINE HYDROCHLORIDE 50 MG: 25 CAPSULE ORAL at 19:48

## 2018-03-10 RX ADMIN — DOCUSATE SODIUM 100 MG: 100 CAPSULE ORAL at 19:48

## 2018-03-10 RX ADMIN — POTASSIUM CHLORIDE 20 MEQ: 1500 TABLET, EXTENDED RELEASE ORAL at 08:46

## 2018-03-10 RX ADMIN — SODIUM CHLORIDE: 9 INJECTION, SOLUTION INTRAVENOUS at 08:57

## 2018-03-10 RX ADMIN — SIMVASTATIN 40 MG: 40 TABLET, FILM COATED ORAL at 19:48

## 2018-03-10 RX ADMIN — OXYCODONE HYDROCHLORIDE AND ACETAMINOPHEN 1 TABLET: 5; 325 TABLET ORAL at 21:49

## 2018-03-10 RX ADMIN — PANTOPRAZOLE SODIUM 40 MG: 40 TABLET, DELAYED RELEASE ORAL at 05:54

## 2018-03-10 RX ADMIN — OXYCODONE HYDROCHLORIDE AND ACETAMINOPHEN 2 TABLET: 5; 325 TABLET ORAL at 10:37

## 2018-03-10 RX ADMIN — OXYCODONE HYDROCHLORIDE AND ACETAMINOPHEN 1 TABLET: 5; 325 TABLET ORAL at 16:40

## 2018-03-10 RX ADMIN — CYCLOBENZAPRINE 10 MG: 10 TABLET, FILM COATED ORAL at 16:40

## 2018-03-10 RX ADMIN — DOCUSATE SODIUM 100 MG: 100 CAPSULE ORAL at 08:46

## 2018-03-10 RX ADMIN — LATANOPROST 1 DROP: 50 SOLUTION OPHTHALMIC at 19:47

## 2018-03-10 RX ADMIN — POTASSIUM CHLORIDE 20 MEQ: 1500 TABLET, EXTENDED RELEASE ORAL at 19:48

## 2018-03-10 RX ADMIN — HYDROCHLOROTHIAZIDE 25 MG: 25 TABLET ORAL at 08:47

## 2018-03-10 RX ADMIN — POTASSIUM CHLORIDE 20 MEQ: 1500 TABLET, EXTENDED RELEASE ORAL at 14:58

## 2018-03-10 RX ADMIN — OXYCODONE HYDROCHLORIDE AND ACETAMINOPHEN 2 TABLET: 5; 325 TABLET ORAL at 04:00

## 2018-03-10 ASSESSMENT — PAIN SCALES - GENERAL
PAINLEVEL_OUTOF10: 7
PAINLEVEL_OUTOF10: 5
PAINLEVEL_OUTOF10: 4
PAINLEVEL_OUTOF10: 5
PAINLEVEL_OUTOF10: 8
PAINLEVEL_OUTOF10: 6
PAINLEVEL_OUTOF10: 7
PAINLEVEL_OUTOF10: 5
PAINLEVEL_OUTOF10: 4

## 2018-03-10 ASSESSMENT — PAIN DESCRIPTION - ORIENTATION
ORIENTATION: RIGHT
ORIENTATION: LOWER

## 2018-03-10 ASSESSMENT — PAIN DESCRIPTION - FREQUENCY: FREQUENCY: INTERMITTENT

## 2018-03-10 ASSESSMENT — PAIN DESCRIPTION - PAIN TYPE
TYPE: ACUTE PAIN
TYPE: SURGICAL PAIN

## 2018-03-10 ASSESSMENT — PAIN DESCRIPTION - DESCRIPTORS: DESCRIPTORS: ACHING;DISCOMFORT

## 2018-03-10 ASSESSMENT — PAIN DESCRIPTION - LOCATION
LOCATION: KNEE
LOCATION: BACK

## 2018-03-10 NOTE — PROGRESS NOTES
Department of Orthopedic Surgery  Spine Service  Hitesh Carter PA-C Progress Note        Subjective:  Pt lying in bed resting. She has continued discomfort in the right knee with small suprapatellar effusion. She has been on strict bedrest s/p durotomy. Drains still with increased output. Continue bedrest. Will possibly drain knee if continually swelling and uncomfortable tomorrow. Vitals  VITALS:  /61   Pulse 93   Temp 98.5 °F (36.9 °C) (Oral)   Resp 16   Ht 5' 1\" (1.549 m)   Wt 146 lb (66.2 kg)   SpO2 98%   BMI 27.59 kg/m²   24HR INTAKE/OUTPUT:    Intake/Output Summary (Last 24 hours) at 03/10/18 0819  Last data filed at 03/10/18 0548   Gross per 24 hour   Intake          3680.41 ml   Output             2220 ml   Net          1460.41 ml     URINARY CATHETER OUTPUT (Garcia):  Urethral Catheter Straight-tip;Latex 16 fr-Output (mL): 800 mL  DRAIN/TUBE OUTPUT:  Closed/Suction Drain Midline Back Accordion 10 English-Output (ml): 150 ml      PHYSICAL EXAM:    Orientation:  alert and oriented to person, place and time    Incision:  dressing in place, clean, dry, intact    Lower Extremity Motor :  quadriceps, extensor hallucis longus, dorsiflexion, plantarflexion 5/5 bilaterally  Lower Extremity Sensory:  Intact L1-S1    Flatus:  positive    ABNORMAL EXAM FINDINGS:  Right knee swollen in suprapatellar region. Tender to palpation    LABS:    HgB:    Lab Results   Component Value Date    HGB 8.9 03/10/2018         ASSESSMENT AND PLAN:    Acute blood loss anemia in the setting of recent surgery being treated with continuous IVF, 150 ml returned per cell saver and H/H monitoring. Post operative day 3     1:  Monitor labs and drain output  2:  Activity Level:  Bedrest s/p durotomy and continued high drain output   3:  Pain Control:  Controlled. 4:  Discharge Planning:  Still awaiting to advance activity and drain decrease.    5:  Ice on R knee  6:  Possible arthrocentesis tomorrow if no improvement

## 2018-03-11 LAB
HCT VFR BLD CALC: 25.9 % (ref 37–47)
HEMOGLOBIN: 8.5 GM/DL (ref 12–16)

## 2018-03-11 PROCEDURE — 1200000003 HC TELEMETRY R&B

## 2018-03-11 PROCEDURE — 85018 HEMOGLOBIN: CPT

## 2018-03-11 PROCEDURE — 36415 COLL VENOUS BLD VENIPUNCTURE: CPT

## 2018-03-11 PROCEDURE — 2580000003 HC RX 258: Performed by: ORTHOPAEDIC SURGERY

## 2018-03-11 PROCEDURE — 85014 HEMATOCRIT: CPT

## 2018-03-11 PROCEDURE — 6370000000 HC RX 637 (ALT 250 FOR IP): Performed by: ORTHOPAEDIC SURGERY

## 2018-03-11 PROCEDURE — 6370000000 HC RX 637 (ALT 250 FOR IP): Performed by: EMERGENCY MEDICINE

## 2018-03-11 RX ADMIN — POTASSIUM CHLORIDE 20 MEQ: 1500 TABLET, EXTENDED RELEASE ORAL at 09:30

## 2018-03-11 RX ADMIN — DOCUSATE SODIUM 100 MG: 100 CAPSULE ORAL at 09:30

## 2018-03-11 RX ADMIN — SODIUM CHLORIDE: 9 INJECTION, SOLUTION INTRAVENOUS at 07:33

## 2018-03-11 RX ADMIN — OXYCODONE HYDROCHLORIDE AND ACETAMINOPHEN 2 TABLET: 5; 325 TABLET ORAL at 12:13

## 2018-03-11 RX ADMIN — POTASSIUM CHLORIDE 20 MEQ: 1500 TABLET, EXTENDED RELEASE ORAL at 15:52

## 2018-03-11 RX ADMIN — Medication 10 ML: at 09:30

## 2018-03-11 RX ADMIN — CYCLOBENZAPRINE 10 MG: 10 TABLET, FILM COATED ORAL at 04:17

## 2018-03-11 RX ADMIN — SIMVASTATIN 40 MG: 40 TABLET, FILM COATED ORAL at 22:28

## 2018-03-11 RX ADMIN — OXYCODONE HYDROCHLORIDE AND ACETAMINOPHEN 1 TABLET: 5; 325 TABLET ORAL at 04:17

## 2018-03-11 RX ADMIN — LATANOPROST 1 DROP: 50 SOLUTION OPHTHALMIC at 22:28

## 2018-03-11 RX ADMIN — POTASSIUM CHLORIDE 20 MEQ: 1500 TABLET, EXTENDED RELEASE ORAL at 22:28

## 2018-03-11 RX ADMIN — DOCUSATE SODIUM 100 MG: 100 CAPSULE ORAL at 22:28

## 2018-03-11 RX ADMIN — PANTOPRAZOLE SODIUM 40 MG: 40 TABLET, DELAYED RELEASE ORAL at 05:24

## 2018-03-11 RX ADMIN — NORTRIPTYLINE HYDROCHLORIDE 50 MG: 25 CAPSULE ORAL at 22:28

## 2018-03-11 RX ADMIN — METOPROLOL TARTRATE 12.5 MG: 25 TABLET ORAL at 22:28

## 2018-03-11 RX ADMIN — OXYCODONE HYDROCHLORIDE AND ACETAMINOPHEN 2 TABLET: 5; 325 TABLET ORAL at 20:14

## 2018-03-11 ASSESSMENT — PAIN SCALES - GENERAL
PAINLEVEL_OUTOF10: 8
PAINLEVEL_OUTOF10: 8
PAINLEVEL_OUTOF10: 5
PAINLEVEL_OUTOF10: 3
PAINLEVEL_OUTOF10: 9
PAINLEVEL_OUTOF10: 7

## 2018-03-11 ASSESSMENT — PAIN DESCRIPTION - ORIENTATION: ORIENTATION: LOWER

## 2018-03-11 ASSESSMENT — PAIN DESCRIPTION - PAIN TYPE: TYPE: CHRONIC PAIN

## 2018-03-11 ASSESSMENT — PAIN DESCRIPTION - LOCATION: LOCATION: BACK

## 2018-03-11 ASSESSMENT — PAIN DESCRIPTION - DESCRIPTORS: DESCRIPTORS: ACHING;DISCOMFORT

## 2018-03-11 NOTE — PLAN OF CARE
Problem: Pain:  Goal: Pain level will decrease  Pain level will decrease    Outcome: Ongoing  Patient states PRN pain meds are helping to reach her pain goal.     Problem: Falls - Risk of  Goal: Absence of falls  Outcome: Ongoing  PT USES CALL LIGHT APPROPRIATELY. PT ON BEDREST. NO FALLS THIS SHIFT.      Problem: Neurological  Goal: Maximum potential motor/sensory/cognitive function  Outcome: Ongoing  PT ON BEDREST. DENIES HAVING ANY NUMBNESS/TINGLING. ALERT AND ORIENTED X 4.       Problem: Cardiovascular  Goal: No DVT, peripheral vascular complications  Outcome: Ongoing  NO SIGNS/SYMPTOMS OF DVT THIS SHIFT. SCD'S ON.      Problem: GI  Goal: No bowel complications  Outcome: Ongoing  NO BM SO FARTHIS SHIFT. BOWEL SOUNDS ACTIVE. ABDOMEN SOFT. PT PASSING GAS.    Problem:   Goal: Adequate urinary output  Outcome: Ongoing  PT WITH WALDRON. URINARY OUTPUT ADEQUATE THIS SHIFT.      Problem: Skin Integrity/Risk  Goal: Wound healing  Outcome: Ongoing  DRESSING TO BACK IS CLEAN, DRY AND INTACT.      Problem: Musculor/Skeletal Functional Status  Goal: Highest potential functional level  Outcome: Ongoing  PT ON BEDREST.      Problem: Discharge Planning:  Goal: Discharged to appropriate level of care  Discharged to appropriate level of care   Outcome: Ongoing  PT PLANS  TCU/REHAB AT DISCHARGE.      Comments: Care plan reviewed with patient. Patient verbalizes understanding of the plan of care and contributes to goal setting.

## 2018-03-11 NOTE — PROGRESS NOTES
Meds:   latanoprost  1 drop Both Eyes Nightly    isosorbide mononitrate  30 mg Oral Daily    hydrochlorothiazide  25 mg Oral Daily    simvastatin  40 mg Oral Nightly    nortriptyline  50 mg Oral Nightly    potassium chloride  20 mEq Oral TID    sodium chloride flush  10 mL Intravenous 2 times per day    docusate sodium  100 mg Oral BID    pantoprazole  40 mg Oral QAM AC    metoprolol tartrate  12.5 mg Oral BID     Continuous Infusions:   sodium chloride 100 mL/hr at 03/11/18 0733     PRN Meds:tiZANidine, sodium chloride flush, acetaminophen, acetaminophen, cyclobenzaprine, magnesium hydroxide, ondansetron, oxyCODONE-acetaminophen **OR** oxyCODONE-acetaminophen    Imaging:    Lab Review :    Lab Results   Component Value Date    WBC 7.9 03/10/2018    HGB 8.5 (L) 03/11/2018    HCT 25.9 (L) 03/11/2018    MCV 96.8 03/10/2018     03/10/2018     Lab Results   Component Value Date    CREATININE 0.9 03/09/2018    BUN 12 03/09/2018     03/09/2018    K 4.2 03/09/2018     03/09/2018    CO2 16 (L) 03/09/2018     Lab Results   Component Value Date    TROPONINI <0.006 04/07/2013     Lab Results   Component Value Date    ALT 16 09/25/2017    AST 23 09/25/2017    ALKPHOS 97 09/25/2017    BILITOT 0.3 09/25/2017     Lab Results   Component Value Date    LIPASE 19.2 09/25/2017         Electronically signed by Del Ventura MD on 3/11/18 at 10:03 Gris Sagastume M.D.

## 2018-03-11 NOTE — PLAN OF CARE
Problem: Pain:  Goal: Pain level will decrease  Pain level will decrease    Outcome: Ongoing  PT COMPLAINING OF PAIN OF 4-7/10 THIS SHIFT. PRN PAIN MEDICATION GIVEN WHEN AVAILABLE. Problem: Falls - Risk of  Goal: Absence of falls  Outcome: Ongoing  PT USES CALL LIGHT APPROPRIATELY. PT ON BEDREST. NO FALLS THIS SHIFT. Problem: Neurological  Goal: Maximum potential motor/sensory/cognitive function  Outcome: Ongoing  PT ON BEDREST. DENIES HAVING ANY NUMBNESS/TINGLING. ALERT AND ORIENTED X 4. Problem: Cardiovascular  Goal: No DVT, peripheral vascular complications  Outcome: Ongoing  NO SIGNS/SYMPTOMS OF DVT THIS SHIFT. SCD'S ON. Problem: GI  Goal: No bowel complications  Outcome: Ongoing  NO BM THIS SHIFT. BOWEL SOUNDS ACTIVE. ABDOMEN SOFT. PT PASSING GAS. Problem:   Goal: Adequate urinary output  Outcome: Ongoing  PT WITH WALDRON. URINARY OUTPUT ADEQUATE THIS SHIFT. Problem: Skin Integrity/Risk  Goal: Wound healing  Outcome: Ongoing  DRESSING TO BACK IS CLEAN, DRY AND INTACT. Problem: Musculor/Skeletal Functional Status  Goal: Highest potential functional level  Outcome: Ongoing  PT ON BEDREST. Problem: Discharge Planning:  Goal: Discharged to appropriate level of care  Discharged to appropriate level of care   Outcome: Ongoing  PT [address]. Comments: Care plan reviewed with patient. Patient verbalizes understanding of the plan of care and contribute to goal setting.

## 2018-03-12 ENCOUNTER — ANESTHESIA EVENT (OUTPATIENT)
Dept: OPERATING ROOM | Age: 83
DRG: 519 | End: 2018-03-12
Payer: MEDICARE

## 2018-03-12 ENCOUNTER — ANESTHESIA (OUTPATIENT)
Dept: OPERATING ROOM | Age: 83
DRG: 519 | End: 2018-03-12
Payer: MEDICARE

## 2018-03-12 VITALS
SYSTOLIC BLOOD PRESSURE: 132 MMHG | OXYGEN SATURATION: 87 % | DIASTOLIC BLOOD PRESSURE: 63 MMHG | RESPIRATION RATE: 18 BRPM

## 2018-03-12 PROBLEM — G97.41 INADVERTENT DUROTOMY: Status: ACTIVE | Noted: 2018-03-12

## 2018-03-12 PROCEDURE — 2720000010 HC SURG SUPPLY STERILE: Performed by: ORTHOPAEDIC SURGERY

## 2018-03-12 PROCEDURE — 6360000002 HC RX W HCPCS: Performed by: ORTHOPAEDIC SURGERY

## 2018-03-12 PROCEDURE — 7100000001 HC PACU RECOVERY - ADDTL 15 MIN: Performed by: ORTHOPAEDIC SURGERY

## 2018-03-12 PROCEDURE — 009500Z DRAINAGE OF INTRACRANIAL SUBARACHNOID SPACE WITH DRAINAGE DEVICE, OPEN APPROACH: ICD-10-PCS | Performed by: ORTHOPAEDIC SURGERY

## 2018-03-12 PROCEDURE — 3600000004 HC SURGERY LEVEL 4 BASE: Performed by: ORTHOPAEDIC SURGERY

## 2018-03-12 PROCEDURE — 2580000003 HC RX 258: Performed by: ORTHOPAEDIC SURGERY

## 2018-03-12 PROCEDURE — 1200000003 HC TELEMETRY R&B

## 2018-03-12 PROCEDURE — 7100000000 HC PACU RECOVERY - FIRST 15 MIN: Performed by: ORTHOPAEDIC SURGERY

## 2018-03-12 PROCEDURE — 1200000000 HC SEMI PRIVATE

## 2018-03-12 PROCEDURE — 6370000000 HC RX 637 (ALT 250 FOR IP): Performed by: EMERGENCY MEDICINE

## 2018-03-12 PROCEDURE — 6360000002 HC RX W HCPCS: Performed by: ANESTHESIOLOGY

## 2018-03-12 PROCEDURE — 2580000003 HC RX 258: Performed by: ANESTHESIOLOGY

## 2018-03-12 PROCEDURE — 3700000000 HC ANESTHESIA ATTENDED CARE: Performed by: ORTHOPAEDIC SURGERY

## 2018-03-12 PROCEDURE — 6370000000 HC RX 637 (ALT 250 FOR IP): Performed by: ORTHOPAEDIC SURGERY

## 2018-03-12 PROCEDURE — 3600000014 HC SURGERY LEVEL 4 ADDTL 15MIN: Performed by: ORTHOPAEDIC SURGERY

## 2018-03-12 PROCEDURE — 3700000001 HC ADD 15 MINUTES (ANESTHESIA): Performed by: ORTHOPAEDIC SURGERY

## 2018-03-12 RX ORDER — ONDANSETRON 2 MG/ML
4 INJECTION INTRAMUSCULAR; INTRAVENOUS EVERY 6 HOURS PRN
Status: DISCONTINUED | OUTPATIENT
Start: 2018-03-12 | End: 2018-03-23 | Stop reason: HOSPADM

## 2018-03-12 RX ORDER — OXYCODONE HYDROCHLORIDE AND ACETAMINOPHEN 5; 325 MG/1; MG/1
2 TABLET ORAL EVERY 4 HOURS PRN
Status: DISCONTINUED | OUTPATIENT
Start: 2018-03-12 | End: 2018-03-23 | Stop reason: HOSPADM

## 2018-03-12 RX ORDER — ACETAMINOPHEN 325 MG/1
650 TABLET ORAL EVERY 4 HOURS PRN
Status: DISCONTINUED | OUTPATIENT
Start: 2018-03-12 | End: 2018-03-23 | Stop reason: HOSPADM

## 2018-03-12 RX ORDER — DOCUSATE SODIUM 100 MG/1
100 CAPSULE, LIQUID FILLED ORAL 2 TIMES DAILY
Status: DISCONTINUED | OUTPATIENT
Start: 2018-03-12 | End: 2018-03-23 | Stop reason: HOSPADM

## 2018-03-12 RX ORDER — FENTANYL CITRATE 50 UG/ML
INJECTION, SOLUTION INTRAMUSCULAR; INTRAVENOUS PRN
Status: DISCONTINUED | OUTPATIENT
Start: 2018-03-12 | End: 2018-03-12 | Stop reason: SDUPTHER

## 2018-03-12 RX ORDER — METOCLOPRAMIDE HYDROCHLORIDE 5 MG/ML
10 INJECTION INTRAMUSCULAR; INTRAVENOUS
Status: ACTIVE | OUTPATIENT
Start: 2018-03-12 | End: 2018-03-12

## 2018-03-12 RX ORDER — ACETAMINOPHEN 650 MG/1
650 SUPPOSITORY RECTAL EVERY 4 HOURS PRN
Status: DISCONTINUED | OUTPATIENT
Start: 2018-03-12 | End: 2018-03-23 | Stop reason: HOSPADM

## 2018-03-12 RX ORDER — SODIUM CHLORIDE 9 MG/ML
INJECTION, SOLUTION INTRAVENOUS CONTINUOUS
Status: DISCONTINUED | OUTPATIENT
Start: 2018-03-12 | End: 2018-03-20

## 2018-03-12 RX ORDER — FENTANYL CITRATE 50 UG/ML
50 INJECTION, SOLUTION INTRAMUSCULAR; INTRAVENOUS EVERY 5 MIN PRN
Status: DISCONTINUED | OUTPATIENT
Start: 2018-03-12 | End: 2018-03-12 | Stop reason: HOSPADM

## 2018-03-12 RX ORDER — LABETALOL HYDROCHLORIDE 5 MG/ML
5 INJECTION, SOLUTION INTRAVENOUS EVERY 10 MIN PRN
Status: DISCONTINUED | OUTPATIENT
Start: 2018-03-12 | End: 2018-03-23 | Stop reason: HOSPADM

## 2018-03-12 RX ORDER — ONDANSETRON 2 MG/ML
INJECTION INTRAMUSCULAR; INTRAVENOUS PRN
Status: DISCONTINUED | OUTPATIENT
Start: 2018-03-12 | End: 2018-03-12 | Stop reason: SDUPTHER

## 2018-03-12 RX ORDER — PROPOFOL 10 MG/ML
INJECTION, EMULSION INTRAVENOUS PRN
Status: DISCONTINUED | OUTPATIENT
Start: 2018-03-12 | End: 2018-03-12 | Stop reason: SDUPTHER

## 2018-03-12 RX ORDER — DEXAMETHASONE SODIUM PHOSPHATE 4 MG/ML
INJECTION, SOLUTION INTRA-ARTICULAR; INTRALESIONAL; INTRAMUSCULAR; INTRAVENOUS; SOFT TISSUE PRN
Status: DISCONTINUED | OUTPATIENT
Start: 2018-03-12 | End: 2018-03-12 | Stop reason: SDUPTHER

## 2018-03-12 RX ORDER — MEPERIDINE HYDROCHLORIDE 25 MG/ML
12.5 INJECTION INTRAMUSCULAR; INTRAVENOUS; SUBCUTANEOUS EVERY 5 MIN PRN
Status: DISCONTINUED | OUTPATIENT
Start: 2018-03-12 | End: 2018-03-23 | Stop reason: HOSPADM

## 2018-03-12 RX ORDER — SODIUM CHLORIDE 0.9 % (FLUSH) 0.9 %
10 SYRINGE (ML) INJECTION EVERY 12 HOURS SCHEDULED
Status: DISCONTINUED | OUTPATIENT
Start: 2018-03-12 | End: 2018-03-23 | Stop reason: HOSPADM

## 2018-03-12 RX ORDER — SODIUM CHLORIDE 9 MG/ML
INJECTION, SOLUTION INTRAVENOUS CONTINUOUS PRN
Status: DISCONTINUED | OUTPATIENT
Start: 2018-03-12 | End: 2018-03-12 | Stop reason: SDUPTHER

## 2018-03-12 RX ORDER — OXYCODONE HYDROCHLORIDE AND ACETAMINOPHEN 5; 325 MG/1; MG/1
1 TABLET ORAL EVERY 4 HOURS PRN
Status: DISCONTINUED | OUTPATIENT
Start: 2018-03-12 | End: 2018-03-23 | Stop reason: HOSPADM

## 2018-03-12 RX ORDER — PROMETHAZINE HYDROCHLORIDE 25 MG/ML
12.5 INJECTION, SOLUTION INTRAMUSCULAR; INTRAVENOUS
Status: DISPENSED | OUTPATIENT
Start: 2018-03-12 | End: 2018-03-12

## 2018-03-12 RX ORDER — SUCCINYLCHOLINE CHLORIDE 20 MG/ML
INJECTION INTRAMUSCULAR; INTRAVENOUS PRN
Status: DISCONTINUED | OUTPATIENT
Start: 2018-03-12 | End: 2018-03-12 | Stop reason: SDUPTHER

## 2018-03-12 RX ORDER — CYCLOBENZAPRINE HCL 10 MG
10 TABLET ORAL 3 TIMES DAILY PRN
Status: DISCONTINUED | OUTPATIENT
Start: 2018-03-12 | End: 2018-03-23 | Stop reason: HOSPADM

## 2018-03-12 RX ORDER — SODIUM CHLORIDE 0.9 % (FLUSH) 0.9 %
10 SYRINGE (ML) INJECTION PRN
Status: DISCONTINUED | OUTPATIENT
Start: 2018-03-12 | End: 2018-03-23 | Stop reason: HOSPADM

## 2018-03-12 RX ORDER — FENTANYL CITRATE 50 UG/ML
25 INJECTION, SOLUTION INTRAMUSCULAR; INTRAVENOUS EVERY 5 MIN PRN
Status: DISCONTINUED | OUTPATIENT
Start: 2018-03-12 | End: 2018-03-12 | Stop reason: HOSPADM

## 2018-03-12 RX ORDER — DIPHENHYDRAMINE HYDROCHLORIDE 50 MG/ML
12.5 INJECTION INTRAMUSCULAR; INTRAVENOUS
Status: DISCONTINUED | OUTPATIENT
Start: 2018-03-12 | End: 2018-03-12 | Stop reason: HOSPADM

## 2018-03-12 RX ADMIN — LIDOCAINE HYDROCHLORIDE 80 MG: 20 INJECTION, SOLUTION INTRAVENOUS at 10:07

## 2018-03-12 RX ADMIN — NORTRIPTYLINE HYDROCHLORIDE 50 MG: 25 CAPSULE ORAL at 20:59

## 2018-03-12 RX ADMIN — SODIUM CHLORIDE: 9 INJECTION, SOLUTION INTRAVENOUS at 10:02

## 2018-03-12 RX ADMIN — LATANOPROST 1 DROP: 50 SOLUTION OPHTHALMIC at 20:58

## 2018-03-12 RX ADMIN — HYDROMORPHONE HYDROCHLORIDE 0.5 MG: 1 INJECTION, SOLUTION INTRAMUSCULAR; INTRAVENOUS; SUBCUTANEOUS at 12:00

## 2018-03-12 RX ADMIN — SUCCINYLCHOLINE CHLORIDE 120 MG: 20 INJECTION, SOLUTION INTRAMUSCULAR; INTRAVENOUS at 10:07

## 2018-03-12 RX ADMIN — OXYCODONE HYDROCHLORIDE AND ACETAMINOPHEN 2 TABLET: 5; 325 TABLET ORAL at 17:32

## 2018-03-12 RX ADMIN — HYDROMORPHONE HYDROCHLORIDE 0.5 MG: 1 INJECTION, SOLUTION INTRAMUSCULAR; INTRAVENOUS; SUBCUTANEOUS at 12:10

## 2018-03-12 RX ADMIN — DOCUSATE SODIUM 100 MG: 100 CAPSULE ORAL at 20:58

## 2018-03-12 RX ADMIN — CEFAZOLIN SODIUM 2 G: 2 SOLUTION INTRAVENOUS at 15:55

## 2018-03-12 RX ADMIN — PANTOPRAZOLE SODIUM 40 MG: 40 TABLET, DELAYED RELEASE ORAL at 06:19

## 2018-03-12 RX ADMIN — OXYCODONE HYDROCHLORIDE AND ACETAMINOPHEN 2 TABLET: 5; 325 TABLET ORAL at 13:36

## 2018-03-12 RX ADMIN — POTASSIUM CHLORIDE 20 MEQ: 1500 TABLET, EXTENDED RELEASE ORAL at 20:58

## 2018-03-12 RX ADMIN — FENTANYL CITRATE 100 MCG: 50 INJECTION, SOLUTION INTRAMUSCULAR; INTRAVENOUS at 10:07

## 2018-03-12 RX ADMIN — HYDROMORPHONE HYDROCHLORIDE 0.5 MG: 1 INJECTION, SOLUTION INTRAMUSCULAR; INTRAVENOUS; SUBCUTANEOUS at 12:25

## 2018-03-12 RX ADMIN — SODIUM CHLORIDE: 9 INJECTION, SOLUTION INTRAVENOUS at 15:52

## 2018-03-12 RX ADMIN — FENTANYL CITRATE 50 MCG: 50 INJECTION, SOLUTION INTRAMUSCULAR; INTRAVENOUS at 10:25

## 2018-03-12 RX ADMIN — SIMVASTATIN 40 MG: 40 TABLET, FILM COATED ORAL at 20:59

## 2018-03-12 RX ADMIN — CEFAZOLIN SODIUM 2 G: 2 SOLUTION INTRAVENOUS at 23:05

## 2018-03-12 RX ADMIN — PROPOFOL 120 MG: 10 INJECTION, EMULSION INTRAVENOUS at 10:07

## 2018-03-12 RX ADMIN — METOPROLOL TARTRATE 12.5 MG: 25 TABLET ORAL at 20:59

## 2018-03-12 RX ADMIN — ONDANSETRON 4 MG: 2 INJECTION INTRAMUSCULAR; INTRAVENOUS at 11:05

## 2018-03-12 RX ADMIN — OXYCODONE HYDROCHLORIDE AND ACETAMINOPHEN 2 TABLET: 5; 325 TABLET ORAL at 06:20

## 2018-03-12 RX ADMIN — DEXAMETHASONE SODIUM PHOSPHATE 8 MG: 4 INJECTION, SOLUTION INTRAMUSCULAR; INTRAVENOUS at 10:07

## 2018-03-12 RX ADMIN — OXYCODONE HYDROCHLORIDE AND ACETAMINOPHEN 2 TABLET: 5; 325 TABLET ORAL at 21:32

## 2018-03-12 ASSESSMENT — PULMONARY FUNCTION TESTS
PIF_VALUE: 12
PIF_VALUE: 13
PIF_VALUE: 16
PIF_VALUE: 14
PIF_VALUE: 15
PIF_VALUE: 2
PIF_VALUE: 13
PIF_VALUE: 3
PIF_VALUE: 15
PIF_VALUE: 13
PIF_VALUE: 13
PIF_VALUE: 4
PIF_VALUE: 1
PIF_VALUE: 23
PIF_VALUE: 13
PIF_VALUE: 15
PIF_VALUE: 14
PIF_VALUE: 13
PIF_VALUE: 15
PIF_VALUE: 13
PIF_VALUE: 14
PIF_VALUE: 3
PIF_VALUE: 13
PIF_VALUE: 22
PIF_VALUE: 5
PIF_VALUE: 9
PIF_VALUE: 3
PIF_VALUE: 3
PIF_VALUE: 13
PIF_VALUE: 4
PIF_VALUE: 0
PIF_VALUE: 14
PIF_VALUE: 12
PIF_VALUE: 0
PIF_VALUE: 13
PIF_VALUE: 1
PIF_VALUE: 12
PIF_VALUE: 22
PIF_VALUE: 13
PIF_VALUE: 12
PIF_VALUE: 4
PIF_VALUE: 13
PIF_VALUE: 11
PIF_VALUE: 13
PIF_VALUE: 3
PIF_VALUE: 20
PIF_VALUE: 13
PIF_VALUE: 13
PIF_VALUE: 15
PIF_VALUE: 15
PIF_VALUE: 22
PIF_VALUE: 8
PIF_VALUE: 3
PIF_VALUE: 9
PIF_VALUE: 13
PIF_VALUE: 13
PIF_VALUE: 1
PIF_VALUE: 13
PIF_VALUE: 13
PIF_VALUE: 5
PIF_VALUE: 0
PIF_VALUE: 13
PIF_VALUE: 21
PIF_VALUE: 13
PIF_VALUE: 22
PIF_VALUE: 13
PIF_VALUE: 5
PIF_VALUE: 13
PIF_VALUE: 1
PIF_VALUE: 13
PIF_VALUE: 14
PIF_VALUE: 12
PIF_VALUE: 15
PIF_VALUE: 14
PIF_VALUE: 13
PIF_VALUE: 4
PIF_VALUE: 13
PIF_VALUE: 13
PIF_VALUE: 12
PIF_VALUE: 4
PIF_VALUE: 15
PIF_VALUE: 3
PIF_VALUE: 13
PIF_VALUE: 5
PIF_VALUE: 5
PIF_VALUE: 13
PIF_VALUE: 13
PIF_VALUE: 4
PIF_VALUE: 13
PIF_VALUE: 12
PIF_VALUE: 13
PIF_VALUE: 12
PIF_VALUE: 13
PIF_VALUE: 15

## 2018-03-12 ASSESSMENT — PAIN DESCRIPTION - PAIN TYPE
TYPE: SURGICAL PAIN
TYPE: SURGICAL PAIN

## 2018-03-12 ASSESSMENT — PAIN SCALES - GENERAL
PAINLEVEL_OUTOF10: 5
PAINLEVEL_OUTOF10: 8
PAINLEVEL_OUTOF10: 7
PAINLEVEL_OUTOF10: 8
PAINLEVEL_OUTOF10: 8
PAINLEVEL_OUTOF10: 0
PAINLEVEL_OUTOF10: 6
PAINLEVEL_OUTOF10: 7
PAINLEVEL_OUTOF10: 8
PAINLEVEL_OUTOF10: 5
PAINLEVEL_OUTOF10: 9
PAINLEVEL_OUTOF10: 5
PAINLEVEL_OUTOF10: 4

## 2018-03-12 ASSESSMENT — PAIN DESCRIPTION - LOCATION
LOCATION: BACK
LOCATION: BACK

## 2018-03-12 ASSESSMENT — PAIN DESCRIPTION - ORIENTATION: ORIENTATION: LOWER

## 2018-03-12 ASSESSMENT — PAIN DESCRIPTION - DESCRIPTORS: DESCRIPTORS: ACHING

## 2018-03-12 NOTE — BRIEF OP NOTE
Brief Postoperative Note  ______________________________________________________________    Patient: Jamari Estrada  YOB: 1935  MRN: [id number]  Date of Procedure: 3/12/2018    Pre-Op Diagnosis: SPINAL STENOSIS OF LUMBAR REGION    Post-Op Diagnosis: Same       Procedure(s):  REPAIR DUROTOMY PLACEMENT OF SUBARACHNOID DRAIN    Anesthesia: General    Surgeon(s):  Benjamín Daily MD    Staff:  First Assistant: Janell Avilez MA  Scrub Person First: Arlette Dacosta  Scrub Person Second: Cecilia Tom  Physician Assistant: Lois Garcia PA-C     Estimated Blood Loss: * No values recorded between 3/12/2018 10:03 AM and 3/12/2018 81:14 AM * mL    Complications: None    Specimens:   * No specimens in log *    Implants:    Implant Name Type Inv. Item Serial No.  Lot No. LRB No. Used   [facility] [id number] N/A 1         Drains:   Closed/Suction Drain Midline Back Accordion 10 Y[place] (Active)   Site Description Unable to view 3/12/2018  5:34 AM   Dressing Status Clean;Dry; Intact 3/12/2018  5:34 AM   Drainage Appearance Pink tinged 3/12/2018  5:34 AM   Status Compressed 3/12/2018  5:34 AM   Output (ml) 90 ml 3/12/2018  5:34 AM       Closed/Suction Drain Right Back Accordion 7 [place] (Active)       Closed/Suction Drain Right Back Other (Comment) (Active)       Urethral Catheter Straight-tip;Latex 16 fr (Active)   Site Assessment [place] 3/8/2018  1:19 PM   Urine Color Yellow 3/12/2018 11:05 AM   Urine Appearance Clear 3/12/2018  5:34 AM   Output (mL) 1400 mL 3/12/2018  5:34 AM       Findings: same    Benjamín Daily MD  Date: 3/12/2018  Time: 11:11 AM

## 2018-03-12 NOTE — PROGRESS NOTES
Patient back from par, alert oriented x3, skin warm and dry, sats 96% on room air, lungs clear, abdomen soft with active bowel sounds, back dressing dry and intact, hemovac drain compressed 50%, sub arachnoid drain has 20 ml of clear fluid. Hand grasps pedal push pull = strong, alvarez in place, scds on. IV infusing without difficulty. Patient visiting with family. See vitals documented, see mar for meds given. 1330 assessment unchanged.      1345 patient resting comfortably, denies needs

## 2018-03-12 NOTE — PLAN OF CARE
Problem: Pain:  Goal: Pain level will decrease  Pain level will decrease    Outcome: Ongoing  Patient able to identify pain and rate by number scale, able to ask for pain medications, educated on the use of opioid pain meds, able to reposition self to improve comfort, and voices relief from pain with pain meds    Problem: Falls - Risk of  Goal: Absence of falls  Outcome: Ongoing  Pt using call light appropriately to call for assistance with ambulation to the bathroom and to chair. Pt is also compliant with use of non-skid slippers. Pt reports understanding of fall prevention when discussed.  No falls    Problem: Neurological  Goal: Maximum potential motor/sensory/cognitive function  Outcome: Ongoing  Hand grasps pedal push and pull = moderate, no defecits    Problem: Cardiovascular  Goal: No DVT, peripheral vascular complications  Outcome: Ongoing  Vitals within normal limits, lungs clear t/o    Problem: GI  Goal: No bowel complications  Outcome: Ongoing  Active bowel sounds, passing flatus    Problem:   Goal: Adequate urinary output  Outcome: Ongoing  Garcia in place draining clear yellow urine    Problem: Discharge Planning:  Goal: Discharged to appropriate level of care  Discharged to appropriate level of care   Outcome: Ongoing  Patient and family voices understanding and acceptance in participation of discharge plans

## 2018-03-12 NOTE — PLAN OF CARE
Problem: Pain:  Goal: Pain level will decrease  Pain level will decrease    Outcome: Ongoing  Taking pain medications as needed for surgical pain. Pt able to rest after taking medications. Problem: Falls - Risk of  Goal: Absence of falls  Outcome: Ongoing  No falls this shift. Pt compliant with use of call light. Remains on bedrest.    Problem: Neurological  Goal: Maximum potential motor/sensory/cognitive function  Outcome: Ongoing  Alert and oriented x 4. Requires 1 assist to turn in the bed. Problem: Cardiovascular  Goal: No DVT, peripheral vascular complications  Outcome: Ongoing  No s/s of DVT. Pt wearing SCds while in bed. Problem: GI  Goal: No bowel complications  Outcome: Ongoing  No BM post op,taking bowel medications as ordered. States passing flatus    Problem:   Goal: Adequate urinary output  Outcome: Ongoing  Garcia cath in place,urine output adequate this shift. Problem: Skin Integrity/Risk  Goal: Wound healing  Outcome: Ongoing  Surgical incision to back with dry dressing in place. Problem: Musculor/Skeletal Functional Status  Goal: Highest potential functional level  Outcome: Ongoing  Pt remains on bedrest,1 assist to turn in the bed.     Problem: Discharge Planning:  Goal: Discharged to appropriate level of care  Discharged to appropriate level of care   Outcome: Ongoing

## 2018-03-12 NOTE — PROGRESS NOTES
Department of Orthopedic Surgery  Spine Service  Sheldon Morris PA-C Progress Note        Subjective:  Pt lying in bed continually frustrated about not being able to move. Drains are not cooperating draining increased clear fluids. No HA yesterday. Plan is for sub-arachnoid drain.      Vitals  VITALS:  /60   Pulse 86   Temp 97.7 °F (36.5 °C) (Oral)   Resp 20   Ht 5' 1\" (1.549 m)   Wt 146 lb (66.2 kg)   SpO2 97%   BMI 27.59 kg/m²   24HR INTAKE/OUTPUT:    Intake/Output Summary (Last 24 hours) at 03/12/18 2442  Last data filed at 03/12/18 0534   Gross per 24 hour   Intake             2907 ml   Output             2690 ml   Net              217 ml     URINARY CATHETER OUTPUT (Garcia):  Urethral Catheter Straight-tip;Latex 16 fr-Output (mL): 1400 mL  DRAIN/TUBE OUTPUT:  Closed/Suction Drain Midline Back Accordion 10 [place]-Output (ml): 90 ml      PHYSICAL EXAM:    Orientation:  alert and oriented to person, place and time    Incision:  dressing in place, clean, dry, intact    Lower Extremity Motor :  quadriceps, extensor hallucis longus, dorsiflexion, plantarflexion 5/5 bilaterally  Lower Extremity Sensory:  Intact L1-S1    Flatus:  positive    ABNORMAL EXAM FINDINGS:  none    LABS:    HgB:    Lab Results   Component Value Date    HGB 8.5 03/11/2018         ASSESSMENT AND PLAN:    Post operative day 5     1:  Monitor labs and drain output  2:  Activity Level:  Bed rest  3:  Pain Control:  Controlled with medication  4:  Discharge Planning:  Going to surgery today for sub-arachnoid drain  5:  NPO now

## 2018-03-12 NOTE — ANESTHESIA PRE PROCEDURE
Department of Anesthesiology  Preprocedure Note       Name:  Suri Ricci   Age:  81 y.o.  :  1935                                          MRN:  [id number]         Date:  3/12/2018      Surgeon: Ekaterina Browne):  Jacquie Cohen MD    Procedure: Procedure(s):  LUMBAR LAMINECTOMY L3-S1    Medications prior to admission:   Prior to Admission medications    Medication Sig Start Date End Date Taking? Authorizing Provider   acetaminophen-codeine (TYLENOL #3) 300-30 MG per tablet Take 1-2 tabs q 6hrs prn. 2/22/18 3/25/18  Luis Santillan MD   omeprazole (PRILOSEC) 20 MG delayed release capsule Take 1 capsule by mouth 2 times daily 18   Luis Santillan MD   isosorbide mononitrate (IMDUR) 30 MG extended release tablet Take 1 tablet by mouth daily 18   Luis Santillan MD   hydrochlorothiazide (HYDRODIURIL) 25 MG tablet Take 1 tablet by mouth daily 18   Luis Santillan MD   metoprolol tartrate (LOPRESSOR) 25 MG tablet 1/2 tab po bid 18   Luis Santillan MD   simvastatin (ZOCOR) 40 MG tablet Take 1 tablet by mouth nightly 18   Luis Santillan MD   nortriptyline (PAMELOR) 50 MG capsule Take 1 capsule by mouth nightly 18   Luis Santillan MD   potassium chloride (KLOR-CON M) 20 MEQ extended release tablet Take 1 tablet by mouth 3 times daily One tablet  daily 18   Luis Santillan MD   tiZANidine (ZANAFLEX) 4 MG tablet Take 1 tablet by mouth every 6 hours as needed (muscle spasm) 17   Luis Santillan MD   aspirin 81 MG tablet Take 81 mg by mouth daily    Historical Provider, MD   latanoprost (XALATAN) 0.005 % ophthalmic solution Place 1 drop into both eyes nightly     Historical Provider, MD       Current medications:    No current facility-administered medications for this visit. No current outpatient prescriptions on file.      Facility-Administered Medications Ordered in Other Visits   Medication Dose Route Frequency Provider Last Rate Last Dose    latanoprost (XALATAN) 0.005 % ophthalmic solution 1 drop  1 drop Both Eyes Nightly David Vogt MD   1 drop at 03/11/18 2228    tiZANidine (ZANAFLEX) tablet 4 mg  4 mg Oral Q6H PRN David Vogt MD   4 mg at 03/09/18 1723    isosorbide mononitrate (IMDUR) extended release tablet 30 mg  30 mg Oral Daily Alejandro Lux MD   30 mg at 03/09/18 0731    hydrochlorothiazide (HYDRODIURIL) tablet 25 mg  25 mg Oral Daily David Vogt MD   25 mg at 03/10/18 0847    simvastatin (ZOCOR) tablet 40 mg  40 mg Oral Nightly David Vogt MD   40 mg at 03/11/18 2228    nortriptyline (PAMELOR) capsule 50 mg  50 mg Oral Nightly David Vogt MD   50 mg at 03/11/18 2228    potassium chloride (KLOR-CON M) extended release tablet 20 mEq  20 mEq Oral TID David Vogt MD   20 mEq at 03/11/18 2228    0.9 % sodium chloride infusion   Intravenous Continuous David Vogt  mL/hr at 03/11/18 0733      sodium chloride flush 0.9 % injection 10 mL  10 mL Intravenous 2 times per day David Vogt MD   10 mL at 03/11/18 0930    sodium chloride flush 0.9 % injection 10 mL  10 mL Intravenous PRN David Vogt MD        acetaminophen (TYLENOL) tablet 650 mg  650 mg Oral Q4H PRN David Vogt MD        acetaminophen (TYLENOL) suppository 650 mg  650 mg Rectal Q4H PRN David Vogt MD        cyclobenzaprine (FLEXERIL) tablet 10 mg  10 mg Oral TID PRN David Vogt MD   10 mg at 03/11/18 0417    docusate sodium (COLACE) capsule 100 mg  100 mg Oral BID David Vogt MD   100 mg at 03/11/18 2228    magnesium hydroxide (MILK OF MAGNESIA) 400 MG/5ML suspension 30 mL  30 mL Oral Daily PRN David Vogt MD        ondansetron [facility]) injection 4 mg  4 mg Intravenous Q6H PRN David Vogt MD   4 mg at 03/08/18 1656    oxyCODONE-acetaminophen (PERCOCET) 5-325 MG per tablet 1 tablet  1 tablet Oral Q4H PRN David Vogt MD   1 tablet at 03/11/18 0417    Or    oxyCODONE-acetaminophen (PERCOCET) 5-325 MG per tablet 2 tablet  2 tablet Oral Q4H PRN Zeinab Diallo MD   2 tablet at 03/12/18 7820    pantoprazole (PROTONIX) tablet 40 mg  40 mg Oral QAM AC Zeinab Diallo MD   40 mg at 03/12/18 7141    metoprolol tartrate (LOPRESSOR) tablet 12.5 mg  12.5 mg Oral BID Cece Johnson MD   12.5 mg at 03/11/18 2228       Allergies:  No Known Allergies    Problem List:    Patient Active Problem List   Diagnosis Code    Hyperlipidemia E78.5    Osteoarthritis M19.90    GERD (gastroesophageal reflux disease) K21.9    COPD (chronic obstructive pulmonary disease) ([facility] [address] J44.9    Chronic back pain M54.9, G89.29    CAD (coronary artery disease) I25.10    Hypertension I10    Lumbar back pain M54.5    Spinal stenosis of lumbar region with neurogenic claudication M48.062    Acute blood loss as cause of postoperative anemia D62       Past Medical History:        Diagnosis Date    Acute blood loss as cause of postoperative anemia 03/2018    CAD (coronary artery disease)      cath  50    Cardiac valve prolapse     Chronic back pain     COPD (chronic obstructive pulmonary disease) ([facility] [address]     Ex-smoker     GERD (gastroesophageal reflux disease) 2/07    EGD    Glaucoma     H/O cardiac catheterization 2002    40-50% LAD   katharine    Hyperlipidemia     Hypertension     Osteoarthritis 1999    right shoulder and back    Pericarditis 1996       Past Surgical History:        Procedure Laterality Date    APPENDECTOMY      at age 13years   [address]  2002    BUNIONECTOMY  2004    right foot    CARDIAC CATHETERIZATION  2007??  &   2012? ?    normal results    COLONOSCOPY      last one 2000???    EYE SURGERY  2009??    right eye   [address]    still has ovaries    UT LAMINECTOMY,>2 SGMT,LUMBAR N/A 3/7/2018    LUMBAR LAMINECTOMY L3-S1 performed by Zeinab Diallo MD at [facility]  left    SPINE SURGERY  7/02    L5 cyst    UPPER GASTROINTESTINAL ENDOSCOPY  2007 distance: >3 FB   Neck ROM: full  Mouth opening: > = 3 FB Dental:          Pulmonary:   (+) COPD:                             Cardiovascular:    (+) hypertension:, CAD:,       ECG reviewed        Stress test reviewed  Cleared by cardiology              Neuro/Psych:               GI/Hepatic/Renal:   (+) GERD:,           Endo/Other:                     Abdominal:           Vascular:                                          Anesthesia Plan      general     ASA 3     (2nd PIV)  Induction: intravenous. MIPS: Postoperative opioids intended and Prophylactic antiemetics administered. Anesthetic plan and risks discussed with patient and child/children.                       Antoine Perry MD   3/12/2018

## 2018-03-12 NOTE — PROGRESS NOTES
Post op Note    Milagros Renee is lying in bed in the PACU resting comfortably. She has bairhugger on because she is feeling cold. She is neurologically in tact in all four extremities 5/5 pedal push/pull. Fully sensate in LE. No complaints at this time.

## 2018-03-13 LAB
ANION GAP SERPL CALCULATED.3IONS-SCNC: 12 MEQ/L (ref 8–16)
ANISOCYTOSIS: ABNORMAL
BASOPHILS # BLD: 0.3 %
BASOPHILS ABSOLUTE: 0 THOU/MM3 (ref 0–0.1)
BUN BLDV-MCNC: 13 MG/DL (ref 7–22)
CALCIUM SERPL-MCNC: 8.8 MG/DL (ref 8.5–10.5)
CHLORIDE BLD-SCNC: 107 MEQ/L (ref 98–111)
CO2: 19 MEQ/L (ref 23–33)
CREAT SERPL-MCNC: 0.8 MG/DL (ref 0.4–1.2)
EOSINOPHIL # BLD: 0 %
EOSINOPHILS ABSOLUTE: 0 THOU/MM3 (ref 0–0.4)
GFR SERPL CREATININE-BSD FRML MDRD: 69 ML/MIN/1.73M2
GLUCOSE BLD-MCNC: 146 MG/DL (ref 70–108)
HCT VFR BLD CALC: 26.1 % (ref 37–47)
HEMOGLOBIN: 8.3 GM/DL (ref 12–16)
LYMPHOCYTES # BLD: 12.4 %
LYMPHOCYTES ABSOLUTE: 0.7 THOU/MM3 (ref 1–4.8)
MCH RBC QN AUTO: 31 PG (ref 27–31)
MCHC RBC AUTO-ENTMCNC: 31.7 GM/DL (ref 33–37)
MCV RBC AUTO: 97.8 FL (ref 81–99)
MONOCYTES # BLD: 6.9 %
MONOCYTES ABSOLUTE: 0.4 THOU/MM3 (ref 0.4–1.3)
NUCLEATED RED BLOOD CELLS: 0 /100 WBC
PDW BLD-RTO: 15.1 % (ref 11.5–14.5)
PLATELET # BLD: 236 THOU/MM3 (ref 130–400)
PMV BLD AUTO: 7.1 FL (ref 7.4–10.4)
POTASSIUM SERPL-SCNC: 3.9 MEQ/L (ref 3.5–5.2)
RBC # BLD: 2.67 MILL/MM3 (ref 4.2–5.4)
SEG NEUTROPHILS: 80.4 %
SEGMENTED NEUTROPHILS ABSOLUTE COUNT: 4.5 THOU/MM3 (ref 1.8–7.7)
SODIUM BLD-SCNC: 138 MEQ/L (ref 135–145)
WBC # BLD: 5.6 THOU/MM3 (ref 4.8–10.8)

## 2018-03-13 PROCEDURE — 6370000000 HC RX 637 (ALT 250 FOR IP): Performed by: EMERGENCY MEDICINE

## 2018-03-13 PROCEDURE — 6370000000 HC RX 637 (ALT 250 FOR IP): Performed by: ORTHOPAEDIC SURGERY

## 2018-03-13 PROCEDURE — 1200000000 HC SEMI PRIVATE

## 2018-03-13 PROCEDURE — 36415 COLL VENOUS BLD VENIPUNCTURE: CPT

## 2018-03-13 PROCEDURE — 94761 N-INVAS EAR/PLS OXIMETRY MLT: CPT

## 2018-03-13 PROCEDURE — 80048 BASIC METABOLIC PNL TOTAL CA: CPT

## 2018-03-13 PROCEDURE — 85025 COMPLETE CBC W/AUTO DIFF WBC: CPT

## 2018-03-13 PROCEDURE — 2580000003 HC RX 258: Performed by: ORTHOPAEDIC SURGERY

## 2018-03-13 RX ADMIN — LATANOPROST 1 DROP: 50 SOLUTION OPHTHALMIC at 20:30

## 2018-03-13 RX ADMIN — PANTOPRAZOLE SODIUM 40 MG: 40 TABLET, DELAYED RELEASE ORAL at 06:26

## 2018-03-13 RX ADMIN — METOPROLOL TARTRATE 12.5 MG: 25 TABLET ORAL at 08:24

## 2018-03-13 RX ADMIN — OXYCODONE HYDROCHLORIDE AND ACETAMINOPHEN 2 TABLET: 5; 325 TABLET ORAL at 04:50

## 2018-03-13 RX ADMIN — Medication 10 ML: at 20:30

## 2018-03-13 RX ADMIN — OXYCODONE HYDROCHLORIDE AND ACETAMINOPHEN 2 TABLET: 5; 325 TABLET ORAL at 09:09

## 2018-03-13 RX ADMIN — POTASSIUM CHLORIDE 20 MEQ: 1500 TABLET, EXTENDED RELEASE ORAL at 20:30

## 2018-03-13 RX ADMIN — DOCUSATE SODIUM 100 MG: 100 CAPSULE ORAL at 08:23

## 2018-03-13 RX ADMIN — OXYCODONE HYDROCHLORIDE AND ACETAMINOPHEN 2 TABLET: 5; 325 TABLET ORAL at 17:05

## 2018-03-13 RX ADMIN — SIMVASTATIN 40 MG: 40 TABLET, FILM COATED ORAL at 20:30

## 2018-03-13 RX ADMIN — POTASSIUM CHLORIDE 20 MEQ: 1500 TABLET, EXTENDED RELEASE ORAL at 16:42

## 2018-03-13 RX ADMIN — NORTRIPTYLINE HYDROCHLORIDE 50 MG: 25 CAPSULE ORAL at 20:30

## 2018-03-13 RX ADMIN — SODIUM CHLORIDE: 9 INJECTION, SOLUTION INTRAVENOUS at 11:06

## 2018-03-13 RX ADMIN — OXYCODONE HYDROCHLORIDE AND ACETAMINOPHEN 2 TABLET: 5; 325 TABLET ORAL at 21:32

## 2018-03-13 RX ADMIN — POTASSIUM CHLORIDE 20 MEQ: 1500 TABLET, EXTENDED RELEASE ORAL at 08:25

## 2018-03-13 RX ADMIN — OXYCODONE HYDROCHLORIDE AND ACETAMINOPHEN 2 TABLET: 5; 325 TABLET ORAL at 12:47

## 2018-03-13 RX ADMIN — SODIUM CHLORIDE: 9 INJECTION, SOLUTION INTRAVENOUS at 02:31

## 2018-03-13 RX ADMIN — ISOSORBIDE MONONITRATE 30 MG: 30 TABLET ORAL at 08:25

## 2018-03-13 RX ADMIN — DOCUSATE SODIUM 100 MG: 100 CAPSULE ORAL at 20:30

## 2018-03-13 ASSESSMENT — PAIN SCALES - GENERAL
PAINLEVEL_OUTOF10: 9
PAINLEVEL_OUTOF10: 9
PAINLEVEL_OUTOF10: 8
PAINLEVEL_OUTOF10: 0
PAINLEVEL_OUTOF10: 8
PAINLEVEL_OUTOF10: 8
PAINLEVEL_OUTOF10: 0
PAINLEVEL_OUTOF10: 8

## 2018-03-13 ASSESSMENT — PAIN DESCRIPTION - LOCATION: LOCATION: BACK

## 2018-03-13 ASSESSMENT — PAIN DESCRIPTION - DESCRIPTORS: DESCRIPTORS: ACHING

## 2018-03-13 ASSESSMENT — PAIN DESCRIPTION - PAIN TYPE: TYPE: SURGICAL PAIN

## 2018-03-13 NOTE — PLAN OF CARE
Problem: Pain:  Goal: Pain level will decrease  Pain level will decrease    Outcome: Ongoing  Taking pain medications to reach pain goal of 4/10. Pt able to rest after taking medications. Problem: Falls - Risk of  Goal: Absence of falls  Outcome: Ongoing  Remains on bedrest this shift. Pt uses call light for needs. Pt verbalizes understanding of remaining flat in bed. Problem: Neurological  Goal: Maximum potential motor/sensory/cognitive function  Outcome: Ongoing  Pt on bedrest,requires 1 assist to reposition in bed. Alert and oriented x 4. Problem: Cardiovascular  Goal: No DVT, peripheral vascular complications  Outcome: Ongoing  No s/s of DVT. Pt wearing SCds . Problem: GI  Goal: No bowel complications  Outcome: Ongoing  Taking bowel medications as needed. States passing flatus. Problem:   Goal: Adequate urinary output  Outcome: Ongoing  Garcia cath in place. Urine output adequate this shift. Problem: Skin Integrity/Risk  Goal: Wound healing  Outcome: Ongoing  Surgical incision to back with dry dressing in place. Problem: Musculor/Skeletal Functional Status  Goal: Highest potential functional level  Outcome: Ongoing  Pt on bedrest,able to assist with turning in bed. Problem: Discharge Planning:  Goal: Discharged to appropriate level of care  Discharged to appropriate level of care   Outcome: Ongoing  Planning discharge to TCU/rehab when ready. Comments: Care plan reviewed with patient . Patient verbalizes understanding of the plan of care and contribute to goal setting.

## 2018-03-13 NOTE — CARE COORDINATION
3/13/18, 12:20 PM      [facility] day: 5  Location: --A Reason for admit: Lumbar back pain [M54.5]  Spinal stenosis of lumbar region with neurogenic claudication [M48.062]  Spinal stenosis of lumbar region with neurogenic claudication [M48.062]   Procedure:   3/12/18 surgery by Dr Del Valle Doin. Repair of durotomy. 2.  Placement of subarachnoid drain  3/7/18 surgery by Dr Del Valle Doin.  Removal of the X-Stop device at the levels of L3-L4 and L4-L5. 2.  L3, L4, L5, and S1 laminectomy  Durotomy repair  Treatment Plan of Care: Bedrest. Lying flat. Subarachnoid drain management. I&O. N/V checks. Pain management. Ileus prevention measures. Discontinue alvarez catheter. Monitor labs. Core Measures: vte  PCP: Ivelisse Miguel MD  Discharge Plan: She would like TCU/Rehab. Cassy admissions coordinator following.

## 2018-03-13 NOTE — OP NOTE
was  already in place, and she was turned prone to a Toni Nurse frame table. A  prep and drape of her lumbar spine would then be complete with the use of  soap scrub solutions, sterile toweling, and a Betadine. Sterile sheeting  was applied as well as Ioban. We removed the drain as well as the  Steri-Strips to open the wound and would expose the canal at the L3 to S1  levels. There was a Duragen graft in place as well as some dural sealant,  which were removed and deep to this found that the bleb of on the right  hand side of the L3 level had broke open and was the source of CSF leak. We evaluated the tear and performed a stitch to closure of this with the  use of 4-0 Nurolon suture. This was approximated well, but there was also  a contralateral left-hand side L3-L4 bleb as well that precluded a very  tight closure. For this reason, we then placed a subarachnoid drain going  into the thecal sac at the L3 level, going in an upward direction which  would be brought up through a separate opening on the right hand side of  the spinal soft tissue and skin and was sewn in as well. We then applied  the Duragen dural sealant with a DuraSeal and applied a Hemovac drain as  well. All drains are sewed in and with the drains in place and then  connected, they ran well and maintained good drain output from the CSF  drain. We then backed out the retractors and closed in layers with dry  sterile dressings and will take the patient back to recovery  post-extubation without complication or difficulty. I did attempt to the patient's son in the waiting area, but he was not  present for a review of the case. My first assistance was also JP Bach. Ana Mcwilliams M.D. JP Bach, assisted throughout the procedure with positioning,  draping, retraction, wound closure, dressing, and splint application.     D: 03/12/2018 11:32:04       T: 03/12/2018 13:01:11     [email or web address]  Job#: [id number]     Doc#: [id number]    CC:

## 2018-03-14 LAB
ABO: NORMAL
ANISOCYTOSIS: ABNORMAL
ANTIBODY SCREEN: NORMAL
BASOPHILS # BLD: 0.3 %
BASOPHILS ABSOLUTE: 0 THOU/MM3 (ref 0–0.1)
EOSINOPHIL # BLD: 3 %
EOSINOPHILS ABSOLUTE: 0.2 THOU/MM3 (ref 0–0.4)
HCT VFR BLD CALC: 22.5 % (ref 37–47)
HCT VFR BLD CALC: 30.8 % (ref 37–47)
HEMOGLOBIN: 10.2 GM/DL (ref 12–16)
HEMOGLOBIN: 7.3 GM/DL (ref 12–16)
LYMPHOCYTES # BLD: 28.4 %
LYMPHOCYTES ABSOLUTE: 1.6 THOU/MM3 (ref 1–4.8)
MCH RBC QN AUTO: 30.1 PG (ref 27–31)
MCHC RBC AUTO-ENTMCNC: 32.3 GM/DL (ref 33–37)
MCV RBC AUTO: 93.3 FL (ref 81–99)
MONOCYTES # BLD: 11.5 %
MONOCYTES ABSOLUTE: 0.6 THOU/MM3 (ref 0.4–1.3)
NUCLEATED RED BLOOD CELLS: 0 /100 WBC
PDW BLD-RTO: 15.3 % (ref 11.5–14.5)
PLATELET # BLD: 251 THOU/MM3 (ref 130–400)
PMV BLD AUTO: 7.2 FL (ref 7.4–10.4)
RBC # BLD: 2.41 MILL/MM3 (ref 4.2–5.4)
RH FACTOR: NORMAL
SEG NEUTROPHILS: 56.8 %
SEGMENTED NEUTROPHILS ABSOLUTE COUNT: 3.1 THOU/MM3 (ref 1.8–7.7)
WBC # BLD: 5.5 THOU/MM3 (ref 4.8–10.8)

## 2018-03-14 PROCEDURE — 36430 TRANSFUSION BLD/BLD COMPNT: CPT

## 2018-03-14 PROCEDURE — 6370000000 HC RX 637 (ALT 250 FOR IP): Performed by: EMERGENCY MEDICINE

## 2018-03-14 PROCEDURE — P9016 RBC LEUKOCYTES REDUCED: HCPCS

## 2018-03-14 PROCEDURE — 1200000000 HC SEMI PRIVATE

## 2018-03-14 PROCEDURE — 6370000000 HC RX 637 (ALT 250 FOR IP): Performed by: ORTHOPAEDIC SURGERY

## 2018-03-14 PROCEDURE — 2580000003 HC RX 258: Performed by: ORTHOPAEDIC SURGERY

## 2018-03-14 PROCEDURE — 2580000003 HC RX 258: Performed by: FAMILY MEDICINE

## 2018-03-14 PROCEDURE — 85014 HEMATOCRIT: CPT

## 2018-03-14 PROCEDURE — 85018 HEMOGLOBIN: CPT

## 2018-03-14 PROCEDURE — 86901 BLOOD TYPING SEROLOGIC RH(D): CPT

## 2018-03-14 PROCEDURE — 6360000002 HC RX W HCPCS: Performed by: PHYSICIAN ASSISTANT

## 2018-03-14 PROCEDURE — 36415 COLL VENOUS BLD VENIPUNCTURE: CPT

## 2018-03-14 PROCEDURE — 6360000002 HC RX W HCPCS: Performed by: FAMILY MEDICINE

## 2018-03-14 PROCEDURE — 85025 COMPLETE CBC W/AUTO DIFF WBC: CPT

## 2018-03-14 PROCEDURE — 86923 COMPATIBILITY TEST ELECTRIC: CPT

## 2018-03-14 PROCEDURE — 86850 RBC ANTIBODY SCREEN: CPT

## 2018-03-14 PROCEDURE — 86900 BLOOD TYPING SEROLOGIC ABO: CPT

## 2018-03-14 RX ORDER — 0.9 % SODIUM CHLORIDE 0.9 %
250 INTRAVENOUS SOLUTION INTRAVENOUS ONCE
Status: COMPLETED | OUTPATIENT
Start: 2018-03-14 | End: 2018-03-15

## 2018-03-14 RX ORDER — FUROSEMIDE 10 MG/ML
20 INJECTION INTRAMUSCULAR; INTRAVENOUS ONCE
Status: COMPLETED | OUTPATIENT
Start: 2018-03-14 | End: 2018-03-14

## 2018-03-14 RX ADMIN — ISOSORBIDE MONONITRATE 30 MG: 30 TABLET ORAL at 09:56

## 2018-03-14 RX ADMIN — METOPROLOL TARTRATE 12.5 MG: 25 TABLET ORAL at 20:50

## 2018-03-14 RX ADMIN — SIMVASTATIN 40 MG: 40 TABLET, FILM COATED ORAL at 20:50

## 2018-03-14 RX ADMIN — CEFAZOLIN SODIUM 1 G: 1 INJECTION, SOLUTION INTRAVENOUS at 15:59

## 2018-03-14 RX ADMIN — SODIUM CHLORIDE 250 ML: 9 INJECTION, SOLUTION INTRAVENOUS at 13:53

## 2018-03-14 RX ADMIN — SODIUM CHLORIDE: 9 INJECTION, SOLUTION INTRAVENOUS at 00:14

## 2018-03-14 RX ADMIN — DOCUSATE SODIUM 100 MG: 100 CAPSULE ORAL at 20:51

## 2018-03-14 RX ADMIN — OXYCODONE HYDROCHLORIDE AND ACETAMINOPHEN 2 TABLET: 5; 325 TABLET ORAL at 11:28

## 2018-03-14 RX ADMIN — OXYCODONE HYDROCHLORIDE AND ACETAMINOPHEN 2 TABLET: 5; 325 TABLET ORAL at 15:59

## 2018-03-14 RX ADMIN — CEFAZOLIN SODIUM 1 G: 1 INJECTION, SOLUTION INTRAVENOUS at 09:56

## 2018-03-14 RX ADMIN — POTASSIUM CHLORIDE 20 MEQ: 1500 TABLET, EXTENDED RELEASE ORAL at 20:50

## 2018-03-14 RX ADMIN — METOPROLOL TARTRATE 12.5 MG: 25 TABLET ORAL at 09:56

## 2018-03-14 RX ADMIN — FUROSEMIDE 20 MG: 10 INJECTION, SOLUTION INTRAMUSCULAR; INTRAVENOUS at 19:25

## 2018-03-14 RX ADMIN — OXYCODONE HYDROCHLORIDE AND ACETAMINOPHEN 2 TABLET: 5; 325 TABLET ORAL at 20:45

## 2018-03-14 RX ADMIN — NORTRIPTYLINE HYDROCHLORIDE 50 MG: 25 CAPSULE ORAL at 20:50

## 2018-03-14 RX ADMIN — POTASSIUM CHLORIDE 20 MEQ: 1500 TABLET, EXTENDED RELEASE ORAL at 09:56

## 2018-03-14 RX ADMIN — PANTOPRAZOLE SODIUM 40 MG: 40 TABLET, DELAYED RELEASE ORAL at 05:50

## 2018-03-14 RX ADMIN — POTASSIUM CHLORIDE 20 MEQ: 1500 TABLET, EXTENDED RELEASE ORAL at 15:01

## 2018-03-14 RX ADMIN — Medication 10 ML: at 20:52

## 2018-03-14 RX ADMIN — LATANOPROST 1 DROP: 50 SOLUTION OPHTHALMIC at 20:51

## 2018-03-14 RX ADMIN — SODIUM CHLORIDE: 9 INJECTION, SOLUTION INTRAVENOUS at 11:28

## 2018-03-14 RX ADMIN — DOCUSATE SODIUM 100 MG: 100 CAPSULE ORAL at 09:56

## 2018-03-14 RX ADMIN — OXYCODONE HYDROCHLORIDE AND ACETAMINOPHEN 2 TABLET: 5; 325 TABLET ORAL at 05:50

## 2018-03-14 ASSESSMENT — PAIN SCALES - GENERAL
PAINLEVEL_OUTOF10: 6
PAINLEVEL_OUTOF10: 0
PAINLEVEL_OUTOF10: 4
PAINLEVEL_OUTOF10: 0
PAINLEVEL_OUTOF10: 6
PAINLEVEL_OUTOF10: 4
PAINLEVEL_OUTOF10: 6
PAINLEVEL_OUTOF10: 8
PAINLEVEL_OUTOF10: 6
PAINLEVEL_OUTOF10: 8
PAINLEVEL_OUTOF10: 6
PAINLEVEL_OUTOF10: 4
PAINLEVEL_OUTOF10: 6
PAINLEVEL_OUTOF10: 5

## 2018-03-14 ASSESSMENT — PAIN DESCRIPTION - LOCATION
LOCATION: BACK
LOCATION: LEG
LOCATION: BACK
LOCATION: LEG
LOCATION: BACK

## 2018-03-14 ASSESSMENT — PAIN DESCRIPTION - PAIN TYPE
TYPE: SURGICAL PAIN
TYPE: ACUTE PAIN
TYPE: SURGICAL PAIN

## 2018-03-14 ASSESSMENT — PAIN DESCRIPTION - ORIENTATION
ORIENTATION: LOWER
ORIENTATION: LEFT
ORIENTATION: LEFT

## 2018-03-14 ASSESSMENT — PAIN DESCRIPTION - DESCRIPTORS
DESCRIPTORS: DISCOMFORT
DESCRIPTORS: DISCOMFORT

## 2018-03-14 NOTE — PROGRESS NOTES
Efren Parks is a 81 y.o. female patient.     Current Facility-Administered Medications   Medication Dose Route Frequency Provider Last Rate Last Dose    labetalol (NORMODYNE;TRANDATE) injection 5 mg  5 mg Intravenous Q10 Min PRN Rita Meneses MD        meperidine (DEMEROL) injection 12.5 mg  12.5 mg Intravenous Q5 Min PRN Rita Meneses MD        0.9 % sodium chloride infusion   Intravenous Continuous Chriss Stephenson  mL/hr at 03/14/18 0014      sodium chloride flush 0.9 % injection 10 mL  10 mL Intravenous 2 times per day Chriss Stephenson MD   10 mL at 03/13/18 2030    sodium chloride flush 0.9 % injection 10 mL  10 mL Intravenous PRN Chriss Stephenson MD        acetaminophen (TYLENOL) tablet 650 mg  650 mg Oral Q4H PRN Chriss Stephenson MD        acetaminophen (TYLENOL) suppository 650 mg  650 mg Rectal Q4H PRN Chriss Stephenson MD        cyclobenzaprine (FLEXERIL) tablet 10 mg  10 mg Oral TID PRN Chriss Stephenson MD        docusate sodium (COLACE) capsule 100 mg  100 mg Oral BID Chriss Stephenson MD   100 mg at 03/13/18 2030    magnesium hydroxide (MILK OF MAGNESIA) 400 MG/5ML suspension 30 mL  30 mL Oral Daily PRN Chriss Stephenson MD        ondansetron [facility]) injection 4 mg  4 mg Intravenous Q6H PRN Chriss Stephenson MD        oxyCODONE-acetaminophen (PERCOCET) 5-325 MG per tablet 1 tablet  1 tablet Oral Q4H PRN Chriss Stephenson MD        Or    oxyCODONE-acetaminophen (PERCOCET) 5-325 MG per tablet 2 tablet  2 tablet Oral Q4H PRN Chriss Stephenson MD   2 tablet at 03/13/18 2132    latanoprost (XALATAN) 0.005 % ophthalmic solution 1 drop  1 drop Both Eyes Nightly Chriss Stephenson MD   1 drop at 03/13/18 2030    tiZANidine (ZANAFLEX) tablet 4 mg  4 mg Oral Q6H PRN Chriss Stephenson MD   4 mg at 03/09/18 1723    isosorbide mononitrate (IMDUR) extended release tablet 30 mg  30 mg Oral Daily Makayla Desai MD   30 mg at 03/13/18 0825    simvastatin (ZOCOR) tablet 40 mg  40 mg Oral Nightly John Frame

## 2018-03-15 LAB
ANION GAP SERPL CALCULATED.3IONS-SCNC: 17 MEQ/L (ref 8–16)
ANISOCYTOSIS: ABNORMAL
BASOPHILS # BLD: 0.8 %
BASOPHILS ABSOLUTE: 0 THOU/MM3 (ref 0–0.1)
BUN BLDV-MCNC: 14 MG/DL (ref 7–22)
CALCIUM SERPL-MCNC: 8.7 MG/DL (ref 8.5–10.5)
CHLORIDE BLD-SCNC: 103 MEQ/L (ref 98–111)
CO2: 17 MEQ/L (ref 23–33)
CREAT SERPL-MCNC: 0.9 MG/DL (ref 0.4–1.2)
EOSINOPHIL # BLD: 5.5 %
EOSINOPHILS ABSOLUTE: 0.3 THOU/MM3 (ref 0–0.4)
GFR SERPL CREATININE-BSD FRML MDRD: 60 ML/MIN/1.73M2
GLUCOSE BLD-MCNC: 103 MG/DL (ref 70–108)
HCT VFR BLD CALC: 30.4 % (ref 37–47)
HEMOGLOBIN: 10 GM/DL (ref 12–16)
LYMPHOCYTES # BLD: 24.5 %
LYMPHOCYTES ABSOLUTE: 1.3 THOU/MM3 (ref 1–4.8)
MCH RBC QN AUTO: 29.7 PG (ref 27–31)
MCHC RBC AUTO-ENTMCNC: 32.9 GM/DL (ref 33–37)
MCV RBC AUTO: 90.4 FL (ref 81–99)
MONOCYTES # BLD: 13.1 %
MONOCYTES ABSOLUTE: 0.7 THOU/MM3 (ref 0.4–1.3)
NUCLEATED RED BLOOD CELLS: 0 /100 WBC
PDW BLD-RTO: 15.9 % (ref 11.5–14.5)
PLATELET # BLD: 254 THOU/MM3 (ref 130–400)
PMV BLD AUTO: 6.8 FL (ref 7.4–10.4)
POTASSIUM SERPL-SCNC: 4.2 MEQ/L (ref 3.5–5.2)
RBC # BLD: 3.37 MILL/MM3 (ref 4.2–5.4)
SEG NEUTROPHILS: 56.1 %
SEGMENTED NEUTROPHILS ABSOLUTE COUNT: 3 THOU/MM3 (ref 1.8–7.7)
SODIUM BLD-SCNC: 137 MEQ/L (ref 135–145)
WBC # BLD: 5.3 THOU/MM3 (ref 4.8–10.8)

## 2018-03-15 PROCEDURE — 1200000000 HC SEMI PRIVATE

## 2018-03-15 PROCEDURE — 36415 COLL VENOUS BLD VENIPUNCTURE: CPT

## 2018-03-15 PROCEDURE — 2580000003 HC RX 258: Performed by: FAMILY MEDICINE

## 2018-03-15 PROCEDURE — 6360000002 HC RX W HCPCS: Performed by: PHYSICIAN ASSISTANT

## 2018-03-15 PROCEDURE — 2580000003 HC RX 258: Performed by: ORTHOPAEDIC SURGERY

## 2018-03-15 PROCEDURE — 6370000000 HC RX 637 (ALT 250 FOR IP): Performed by: ORTHOPAEDIC SURGERY

## 2018-03-15 PROCEDURE — 80048 BASIC METABOLIC PNL TOTAL CA: CPT

## 2018-03-15 PROCEDURE — 6370000000 HC RX 637 (ALT 250 FOR IP): Performed by: EMERGENCY MEDICINE

## 2018-03-15 PROCEDURE — 85025 COMPLETE CBC W/AUTO DIFF WBC: CPT

## 2018-03-15 RX ADMIN — METOPROLOL TARTRATE 12.5 MG: 25 TABLET ORAL at 21:23

## 2018-03-15 RX ADMIN — METOPROLOL TARTRATE 12.5 MG: 25 TABLET ORAL at 08:13

## 2018-03-15 RX ADMIN — POTASSIUM CHLORIDE 20 MEQ: 1500 TABLET, EXTENDED RELEASE ORAL at 08:12

## 2018-03-15 RX ADMIN — OXYCODONE HYDROCHLORIDE AND ACETAMINOPHEN 2 TABLET: 5; 325 TABLET ORAL at 02:32

## 2018-03-15 RX ADMIN — SIMVASTATIN 40 MG: 40 TABLET, FILM COATED ORAL at 21:22

## 2018-03-15 RX ADMIN — OXYCODONE HYDROCHLORIDE AND ACETAMINOPHEN 2 TABLET: 5; 325 TABLET ORAL at 12:45

## 2018-03-15 RX ADMIN — PANTOPRAZOLE SODIUM 40 MG: 40 TABLET, DELAYED RELEASE ORAL at 08:12

## 2018-03-15 RX ADMIN — POTASSIUM CHLORIDE 20 MEQ: 1500 TABLET, EXTENDED RELEASE ORAL at 21:22

## 2018-03-15 RX ADMIN — SODIUM CHLORIDE: 9 INJECTION, SOLUTION INTRAVENOUS at 01:03

## 2018-03-15 RX ADMIN — OXYCODONE HYDROCHLORIDE AND ACETAMINOPHEN 2 TABLET: 5; 325 TABLET ORAL at 08:39

## 2018-03-15 RX ADMIN — OXYCODONE HYDROCHLORIDE AND ACETAMINOPHEN 2 TABLET: 5; 325 TABLET ORAL at 17:06

## 2018-03-15 RX ADMIN — LATANOPROST 1 DROP: 50 SOLUTION OPHTHALMIC at 21:24

## 2018-03-15 RX ADMIN — CEFAZOLIN SODIUM 1 G: 1 INJECTION, SOLUTION INTRAVENOUS at 00:45

## 2018-03-15 RX ADMIN — DOCUSATE SODIUM 100 MG: 100 CAPSULE ORAL at 21:23

## 2018-03-15 RX ADMIN — CEFAZOLIN SODIUM 1 G: 1 INJECTION, SOLUTION INTRAVENOUS at 15:48

## 2018-03-15 RX ADMIN — OXYCODONE HYDROCHLORIDE AND ACETAMINOPHEN 2 TABLET: 5; 325 TABLET ORAL at 21:23

## 2018-03-15 RX ADMIN — NORTRIPTYLINE HYDROCHLORIDE 50 MG: 25 CAPSULE ORAL at 21:22

## 2018-03-15 RX ADMIN — Medication 10 ML: at 21:29

## 2018-03-15 RX ADMIN — DOCUSATE SODIUM 100 MG: 100 CAPSULE ORAL at 08:13

## 2018-03-15 RX ADMIN — POTASSIUM CHLORIDE 20 MEQ: 1500 TABLET, EXTENDED RELEASE ORAL at 14:07

## 2018-03-15 RX ADMIN — SODIUM CHLORIDE: 9 INJECTION, SOLUTION INTRAVENOUS at 15:12

## 2018-03-15 RX ADMIN — ISOSORBIDE MONONITRATE 30 MG: 30 TABLET ORAL at 08:12

## 2018-03-15 RX ADMIN — CEFAZOLIN SODIUM 1 G: 1 INJECTION, SOLUTION INTRAVENOUS at 09:02

## 2018-03-15 ASSESSMENT — PAIN DESCRIPTION - LOCATION
LOCATION: BACK;HEAD
LOCATION: BACK
LOCATION: BACK;HEAD
LOCATION: BACK
LOCATION: BACK;HEAD
LOCATION: BACK
LOCATION: BACK;HEAD
LOCATION: BACK

## 2018-03-15 ASSESSMENT — PAIN DESCRIPTION - DESCRIPTORS
DESCRIPTORS: ACHING;STABBING
DESCRIPTORS: ACHING;STABBING
DESCRIPTORS: ACHING;BURNING
DESCRIPTORS: ACHING
DESCRIPTORS: STABBING
DESCRIPTORS: BURNING;CONSTANT
DESCRIPTORS: BURNING;DISCOMFORT
DESCRIPTORS: BURNING
DESCRIPTORS: STABBING;ACHING

## 2018-03-15 ASSESSMENT — PAIN DESCRIPTION - PAIN TYPE
TYPE: SURGICAL PAIN

## 2018-03-15 ASSESSMENT — PAIN SCALES - GENERAL
PAINLEVEL_OUTOF10: 5
PAINLEVEL_OUTOF10: 8
PAINLEVEL_OUTOF10: 5
PAINLEVEL_OUTOF10: 8
PAINLEVEL_OUTOF10: 7
PAINLEVEL_OUTOF10: 9
PAINLEVEL_OUTOF10: 8
PAINLEVEL_OUTOF10: 7
PAINLEVEL_OUTOF10: 7
PAINLEVEL_OUTOF10: 0
PAINLEVEL_OUTOF10: 8
PAINLEVEL_OUTOF10: 7
PAINLEVEL_OUTOF10: 9
PAINLEVEL_OUTOF10: 5
PAINLEVEL_OUTOF10: 6
PAINLEVEL_OUTOF10: 0

## 2018-03-15 ASSESSMENT — PAIN DESCRIPTION - PROGRESSION: CLINICAL_PROGRESSION: GRADUALLY IMPROVING

## 2018-03-15 NOTE — PROGRESS NOTES
Cyndee Robles is a 81 y.o. female patient.     Current Facility-Administered Medications   Medication Dose Route Frequency Provider Last Rate Last Dose    ceFAZolin (ANCEF) 1 g in dextrose 5 % 50 mL IVPB (premix)  1 g Intravenous Q8H Antolin Velarde PA-C   Stopped at 03/14/18 1630    0.9 % sodium chloride bolus  250 mL Intravenous Once Kai Waller MD 20 mL/hr at 03/14/18 1353 250 mL at 03/14/18 1353    labetalol (NORMODYNE;TRANDATE) injection 5 mg  5 mg Intravenous Q10 Min PRN Raphael Wu MD        meperidine (DEMEROL) injection 12.5 mg  12.5 mg Intravenous Q5 Min PRN Raphael Wu MD        0.9 % sodium chloride infusion   Intravenous Continuous Kai Waller MD 75 mL/hr at 03/14/18 1128      sodium chloride flush 0.9 % injection 10 mL  10 mL Intravenous 2 times per day Ana Latham MD   10 mL at 03/14/18 2052    sodium chloride flush 0.9 % injection 10 mL  10 mL Intravenous PRN Ana Latham MD        acetaminophen (TYLENOL) tablet 650 mg  650 mg Oral Q4H PRN Ana Latham MD        acetaminophen (TYLENOL) suppository 650 mg  650 mg Rectal Q4H PRN Ana Latham MD        cyclobenzaprine (FLEXERIL) tablet 10 mg  10 mg Oral TID PRN Ana Latham MD        docusate sodium (COLACE) capsule 100 mg  100 mg Oral BID Ana Latham MD   100 mg at 03/14/18 2051    magnesium hydroxide (MILK OF MAGNESIA) 400 MG/5ML suspension 30 mL  30 mL Oral Daily PRN Ana Latham MD        ondansetron [facility]) injection 4 mg  4 mg Intravenous Q6H PRN Ana Latham MD        oxyCODONE-acetaminophen (PERCOCET) 5-325 MG per tablet 1 tablet  1 tablet Oral Q4H PRN Ana Latham MD        Or    oxyCODONE-acetaminophen (PERCOCET) 5-325 MG per tablet 2 tablet  2 tablet Oral Q4H PRN Ana Latham MD   2 tablet at 03/14/18 2045    latanoprost (XALATAN) 0.005 % ophthalmic solution 1 drop  1 drop Both Eyes Nightly Ana Latham MD   1 drop at 03/14/18 2051    tiZANidine (ZANAFLEX) tablet 4 mg  4

## 2018-03-15 NOTE — PLAN OF CARE
Problem: Nutrition  Goal: Optimal nutrition therapy  Outcome: Ongoing  Nutrition Problem: Increased nutrient needs  Intervention: Food and/or Nutrient Delivery: Continue current diet, Start ONS  Nutritional Goals: Pt. to consume greater than 75% of meals during LOS

## 2018-03-15 NOTE — PROGRESS NOTES
Patient is awake and lying flat in bed. Patient given assistance with eating breakfast. Patient is alert and orientated x4. PERRL, Pupil size 4mm to 2mm. Upper extremities pink, warm and dry with sensation present. No numbness or tingling present. Capillary refill less than 3 seconds. Skin turgor less than 3 seconds. Right hand IV infusing normal saline. IV site is without redness, pain, edema, pallor, heat or coolness. Upper extremity movement present. Hand grasp is moderately strong and equal bilaterally. Patient displays a regular breathing pattern with slightly labored breaths. Lung sounds clear and equal bilaterally. Abdomen flat and without tenderness. Hypoactive bowel sounds heard every 10 seconds in all 4 quadrants. Patient's lower lumbar dressing is dry and intact. Hemovac is patent and draining sanguinous fluid. Subarachnoid drain is patent and draining yellow tinged, clear fluid. Garcia catheter is draining yellow clear urine. Patient is able to turn self to the right side but movement is restricted due to [facility] being ordered 30 degrees or lower. Patient repositioned to left side using pillow. Lower extremities pink, warm and dry with sensation present. No numbness or tingling reported. Edema not present. Pedal push weak but pedal pull moderately strong. Dorsalis pedis and posterior tibialis pulses present and equal bilaterally. Movement is restricted in lower extremities due to surgery and subarachnoid drain. Skin over bony prominences dry and intact. Patient's heels elevated with pillow and below the knee SCDs in use. Patient in bed, in lowest position, wheels locked and 2/4 side rails up. Bed alarm on. Patient's call light and belongings in reach. Ayan BARBOUR/VERONIKA.

## 2018-03-15 NOTE — PROGRESS NOTES
(g): 68-78 grams    Estimated Intake vs Estimated Needs: Intake Less Than Needs    Nutrition Risk Level: Moderate    Nutrition Interventions:   Continue current diet, Start ONS  Continued Inpatient Monitoring, Education Initiated (encouraged po at best effort and discussed ONS options for here and home and protein sources)    Nutrition Evaluation:   · Evaluation: Goals set   · Goals: Pt. to consume greater than 75% of meals during LOS    · Monitoring: Meal Intake, Supplement Intake, Diet Tolerance, Skin Integrity, Wound Healing, Weight, Pertinent Labs, Chewing/Swallowing, Constipation    See Adult Nutrition Doc Flowsheet for more detail.      Electronically signed by Vee Pierre RD, LD on 3/15/18 at 3:04 PM    Contact Number: [phone]

## 2018-03-16 PROCEDURE — 6370000000 HC RX 637 (ALT 250 FOR IP): Performed by: EMERGENCY MEDICINE

## 2018-03-16 PROCEDURE — 2580000003 HC RX 258: Performed by: FAMILY MEDICINE

## 2018-03-16 PROCEDURE — 1200000000 HC SEMI PRIVATE

## 2018-03-16 PROCEDURE — 6360000002 HC RX W HCPCS: Performed by: PHYSICIAN ASSISTANT

## 2018-03-16 PROCEDURE — 6370000000 HC RX 637 (ALT 250 FOR IP): Performed by: ORTHOPAEDIC SURGERY

## 2018-03-16 RX ADMIN — OXYCODONE HYDROCHLORIDE AND ACETAMINOPHEN 2 TABLET: 5; 325 TABLET ORAL at 17:28

## 2018-03-16 RX ADMIN — METOPROLOL TARTRATE 12.5 MG: 25 TABLET ORAL at 20:28

## 2018-03-16 RX ADMIN — CEFAZOLIN SODIUM 1 G: 1 INJECTION, SOLUTION INTRAVENOUS at 08:49

## 2018-03-16 RX ADMIN — POTASSIUM CHLORIDE 20 MEQ: 1500 TABLET, EXTENDED RELEASE ORAL at 08:34

## 2018-03-16 RX ADMIN — PANTOPRAZOLE SODIUM 40 MG: 40 TABLET, DELAYED RELEASE ORAL at 06:40

## 2018-03-16 RX ADMIN — NORTRIPTYLINE HYDROCHLORIDE 50 MG: 25 CAPSULE ORAL at 20:28

## 2018-03-16 RX ADMIN — OXYCODONE HYDROCHLORIDE AND ACETAMINOPHEN 2 TABLET: 5; 325 TABLET ORAL at 12:10

## 2018-03-16 RX ADMIN — POTASSIUM CHLORIDE 20 MEQ: 1500 TABLET, EXTENDED RELEASE ORAL at 12:12

## 2018-03-16 RX ADMIN — CEFAZOLIN SODIUM 1 G: 1 INJECTION, SOLUTION INTRAVENOUS at 15:43

## 2018-03-16 RX ADMIN — LATANOPROST 1 DROP: 50 SOLUTION OPHTHALMIC at 20:29

## 2018-03-16 RX ADMIN — OXYCODONE HYDROCHLORIDE AND ACETAMINOPHEN 2 TABLET: 5; 325 TABLET ORAL at 05:02

## 2018-03-16 RX ADMIN — CEFAZOLIN SODIUM 1 G: 1 INJECTION, SOLUTION INTRAVENOUS at 01:29

## 2018-03-16 RX ADMIN — SODIUM CHLORIDE: 9 INJECTION, SOLUTION INTRAVENOUS at 21:05

## 2018-03-16 RX ADMIN — DOCUSATE SODIUM 100 MG: 100 CAPSULE ORAL at 20:28

## 2018-03-16 RX ADMIN — DOCUSATE SODIUM 100 MG: 100 CAPSULE ORAL at 08:34

## 2018-03-16 RX ADMIN — OXYCODONE HYDROCHLORIDE AND ACETAMINOPHEN 2 TABLET: 5; 325 TABLET ORAL at 22:08

## 2018-03-16 RX ADMIN — POTASSIUM CHLORIDE 20 MEQ: 1500 TABLET, EXTENDED RELEASE ORAL at 20:29

## 2018-03-16 RX ADMIN — SIMVASTATIN 40 MG: 40 TABLET, FILM COATED ORAL at 20:28

## 2018-03-16 ASSESSMENT — PAIN DESCRIPTION - LOCATION
LOCATION: BACK

## 2018-03-16 ASSESSMENT — PAIN SCALES - GENERAL
PAINLEVEL_OUTOF10: 9
PAINLEVEL_OUTOF10: 9
PAINLEVEL_OUTOF10: 7
PAINLEVEL_OUTOF10: 9

## 2018-03-16 ASSESSMENT — PAIN DESCRIPTION - DESCRIPTORS
DESCRIPTORS: ACHING
DESCRIPTORS: ACHING
DESCRIPTORS: STABBING

## 2018-03-16 ASSESSMENT — PAIN DESCRIPTION - PAIN TYPE
TYPE: SURGICAL PAIN
TYPE: SURGICAL PAIN
TYPE: ACUTE PAIN;SURGICAL PAIN

## 2018-03-16 ASSESSMENT — PAIN - FUNCTIONAL ASSESSMENT: PAIN_FUNCTIONAL_ASSESSMENT: 0-10

## 2018-03-16 NOTE — PLAN OF CARE
Problem: Pain:  Goal: Pain level will decrease  Pain level will decrease    Outcome: Ongoing  Pt report pain at 3 on scale. Pt states oral medication helping to achieve pain goal of a 0 on scale. Problem: Falls - Risk of  Goal: Absence of falls  Outcome: Ongoing  Pt using call light appropriately to call for assistance with ambulation to the bathroom and to chair. Pt is also compliant with use of non-skid slippers. Pt reports understanding of fall prevention when discussed. Problem: Neurological  Goal: Maximum potential motor/sensory/cognitive function  Outcome: Ongoing  Pt remains with full sensation in BUE and BLE. Denies numbness and tingling. Problem: Cardiovascular  Goal: No DVT, peripheral vascular complications  Outcome: Ongoing  Ptwithout s/s of DVT. Pt able to take prescribed anticoagulants and SCD'S in place to help prevent development of DVT. Problem: GI  Goal: No bowel complications  Outcome: Ongoing  Pt with  bowel sounds, passing flatus, and without nausea. Taking prescribed medications to assist with BM. Problem:   Goal: Adequate urinary output  Outcome: Ongoing  Pt able to void adequate amounts of urine per alvarez catheter. Problem: Skin Integrity/Risk  Goal: Wound healing  Outcome: Ongoing  Pt has a incision on back from surgery, covered with clean, dry dressing. No issues noted with wound at this time. Problem: Musculor/Skeletal Functional Status  Goal: Highest potential functional level  Outcome: Ongoing  Pt remains bedfast due to surgery and drain. Problem: Discharge Planning:  Goal: Discharged to appropriate level of care  Discharged to appropriate level of care   Outcome: Ongoing  Pt planing home at discharge.  and  to help with discharge needs. Problem: Nutrition  Goal: Optimal nutrition therapy  Outcome: Ongoing  Pt needs assistance with feeding as pt is required not elevate head of bed >30 degree.  Pt tolerates well with assistance and

## 2018-03-16 NOTE — PLAN OF CARE
Problem: Pain:  Goal: Pain level will decrease  Pain level will decrease    Outcome: Ongoing  Patient complains of pain in the lower back with a rating of 7-8 on 0-10 scale. PRN pain medications given to help achieve patient's pain goal of 4. Problem: Falls - Risk of  Goal: Absence of falls  Outcome: Ongoing  Patient has remained free of falls during this shift. Fall prevention measures in place. Problem: Neurological  Goal: Maximum potential motor/sensory/cognitive function  Outcome: Ongoing  Patient is alert and oriented. Denies any numbness or tingling in the extremities. Currently on bedrest.     Problem: Cardiovascular  Goal: No DVT, peripheral vascular complications  Outcome: Ongoing  Patient has had no s/sx of DVT during this shift. SCD used for DVT prevention. Problem: GI  Goal: No bowel complications  Outcome: Ongoing  Bowel sounds active x 4. Patient states that she is passing gas. Bowel medications given as ordered. No BM during this shift. Problem:   Goal: Adequate urinary output  Outcome: Ongoing  Garcia catheter present draining adequate amounts of clear, yellow urine. Problem: Skin Integrity/Risk  Goal: Wound healing  Outcome: Ongoing  Patient has a surgical incision on the mid back. Dressing remains dry and intact. No other wounds noted during this shift. Problem: Musculor/Skeletal Functional Status  Goal: Highest potential functional level  Outcome: Ongoing  Patient currently on bedrest with HOB flat or up to 30 degrees as tolerated. Problem: Discharge Planning:  Goal: Discharged to appropriate level of care  Discharged to appropriate level of care   Outcome: Ongoing  Patient is planning to go to TCU/Rehab upon discharge. Problem: Nutrition  Goal: Optimal nutrition therapy  Outcome: Ongoing  Patient is currently on a dysphagia diet with advancement as tolerated. Comments: Care plan reviewed with patient.   Patient verbalize understanding of the plan of care and

## 2018-03-17 PROCEDURE — 6370000000 HC RX 637 (ALT 250 FOR IP): Performed by: EMERGENCY MEDICINE

## 2018-03-17 PROCEDURE — 1200000000 HC SEMI PRIVATE

## 2018-03-17 PROCEDURE — 2580000003 HC RX 258: Performed by: FAMILY MEDICINE

## 2018-03-17 PROCEDURE — 6370000000 HC RX 637 (ALT 250 FOR IP): Performed by: ORTHOPAEDIC SURGERY

## 2018-03-17 PROCEDURE — 6360000002 HC RX W HCPCS: Performed by: PHYSICIAN ASSISTANT

## 2018-03-17 RX ADMIN — OXYCODONE HYDROCHLORIDE AND ACETAMINOPHEN 2 TABLET: 5; 325 TABLET ORAL at 22:09

## 2018-03-17 RX ADMIN — SIMVASTATIN 40 MG: 40 TABLET, FILM COATED ORAL at 22:01

## 2018-03-17 RX ADMIN — NORTRIPTYLINE HYDROCHLORIDE 50 MG: 25 CAPSULE ORAL at 22:00

## 2018-03-17 RX ADMIN — OXYCODONE HYDROCHLORIDE AND ACETAMINOPHEN 2 TABLET: 5; 325 TABLET ORAL at 12:38

## 2018-03-17 RX ADMIN — CEFAZOLIN SODIUM 1 G: 1 INJECTION, SOLUTION INTRAVENOUS at 15:46

## 2018-03-17 RX ADMIN — CEFAZOLIN SODIUM 1 G: 1 INJECTION, SOLUTION INTRAVENOUS at 01:19

## 2018-03-17 RX ADMIN — POTASSIUM CHLORIDE 20 MEQ: 1500 TABLET, EXTENDED RELEASE ORAL at 08:14

## 2018-03-17 RX ADMIN — POTASSIUM CHLORIDE 20 MEQ: 1500 TABLET, EXTENDED RELEASE ORAL at 12:39

## 2018-03-17 RX ADMIN — METOPROLOL TARTRATE 12.5 MG: 25 TABLET ORAL at 08:14

## 2018-03-17 RX ADMIN — ENOXAPARIN SODIUM 30 MG: 30 INJECTION SUBCUTANEOUS at 22:47

## 2018-03-17 RX ADMIN — SODIUM CHLORIDE: 9 INJECTION, SOLUTION INTRAVENOUS at 11:24

## 2018-03-17 RX ADMIN — LATANOPROST 1 DROP: 50 SOLUTION OPHTHALMIC at 22:11

## 2018-03-17 RX ADMIN — CYCLOBENZAPRINE HYDROCHLORIDE 10 MG: 10 TABLET, FILM COATED ORAL at 10:58

## 2018-03-17 RX ADMIN — OXYCODONE HYDROCHLORIDE AND ACETAMINOPHEN 2 TABLET: 5; 325 TABLET ORAL at 03:04

## 2018-03-17 RX ADMIN — OXYCODONE HYDROCHLORIDE AND ACETAMINOPHEN 1 TABLET: 5; 325 TABLET ORAL at 08:15

## 2018-03-17 RX ADMIN — OXYCODONE HYDROCHLORIDE AND ACETAMINOPHEN 2 TABLET: 5; 325 TABLET ORAL at 16:43

## 2018-03-17 RX ADMIN — ENOXAPARIN SODIUM 30 MG: 30 INJECTION SUBCUTANEOUS at 11:23

## 2018-03-17 RX ADMIN — CEFAZOLIN SODIUM 1 G: 1 INJECTION, SOLUTION INTRAVENOUS at 23:46

## 2018-03-17 RX ADMIN — PANTOPRAZOLE SODIUM 40 MG: 40 TABLET, DELAYED RELEASE ORAL at 06:46

## 2018-03-17 RX ADMIN — POTASSIUM CHLORIDE 20 MEQ: 1500 TABLET, EXTENDED RELEASE ORAL at 22:00

## 2018-03-17 RX ADMIN — DOCUSATE SODIUM 100 MG: 100 CAPSULE ORAL at 08:15

## 2018-03-17 RX ADMIN — CEFAZOLIN SODIUM 1 G: 1 INJECTION, SOLUTION INTRAVENOUS at 08:15

## 2018-03-17 RX ADMIN — METOPROLOL TARTRATE 12.5 MG: 25 TABLET ORAL at 22:01

## 2018-03-17 RX ADMIN — ISOSORBIDE MONONITRATE 30 MG: 30 TABLET ORAL at 08:15

## 2018-03-17 ASSESSMENT — PAIN SCALES - GENERAL
PAINLEVEL_OUTOF10: 7
PAINLEVEL_OUTOF10: 5
PAINLEVEL_OUTOF10: 7
PAINLEVEL_OUTOF10: 6
PAINLEVEL_OUTOF10: 5
PAINLEVEL_OUTOF10: 7
PAINLEVEL_OUTOF10: 5
PAINLEVEL_OUTOF10: 6
PAINLEVEL_OUTOF10: 9
PAINLEVEL_OUTOF10: 7
PAINLEVEL_OUTOF10: 6
PAINLEVEL_OUTOF10: 7
PAINLEVEL_OUTOF10: 6

## 2018-03-17 ASSESSMENT — PAIN DESCRIPTION - LOCATION
LOCATION: BACK
LOCATION: BACK

## 2018-03-17 ASSESSMENT — PAIN DESCRIPTION - PAIN TYPE
TYPE: SURGICAL PAIN
TYPE: SURGICAL PAIN

## 2018-03-18 PROCEDURE — 6370000000 HC RX 637 (ALT 250 FOR IP): Performed by: EMERGENCY MEDICINE

## 2018-03-18 PROCEDURE — 1200000000 HC SEMI PRIVATE

## 2018-03-18 PROCEDURE — 2580000003 HC RX 258: Performed by: ORTHOPAEDIC SURGERY

## 2018-03-18 PROCEDURE — 6360000002 HC RX W HCPCS: Performed by: PHYSICIAN ASSISTANT

## 2018-03-18 PROCEDURE — 6370000000 HC RX 637 (ALT 250 FOR IP): Performed by: ORTHOPAEDIC SURGERY

## 2018-03-18 PROCEDURE — 2580000003 HC RX 258: Performed by: FAMILY MEDICINE

## 2018-03-18 RX ADMIN — CYCLOBENZAPRINE HYDROCHLORIDE 10 MG: 10 TABLET, FILM COATED ORAL at 13:04

## 2018-03-18 RX ADMIN — ENOXAPARIN SODIUM 30 MG: 30 INJECTION SUBCUTANEOUS at 13:04

## 2018-03-18 RX ADMIN — OXYCODONE HYDROCHLORIDE AND ACETAMINOPHEN 2 TABLET: 5; 325 TABLET ORAL at 23:27

## 2018-03-18 RX ADMIN — SODIUM CHLORIDE: 9 INJECTION, SOLUTION INTRAVENOUS at 16:01

## 2018-03-18 RX ADMIN — METOPROLOL TARTRATE 12.5 MG: 25 TABLET ORAL at 21:48

## 2018-03-18 RX ADMIN — METOPROLOL TARTRATE 12.5 MG: 25 TABLET ORAL at 09:19

## 2018-03-18 RX ADMIN — OXYCODONE HYDROCHLORIDE AND ACETAMINOPHEN 2 TABLET: 5; 325 TABLET ORAL at 13:00

## 2018-03-18 RX ADMIN — LATANOPROST 1 DROP: 50 SOLUTION OPHTHALMIC at 21:47

## 2018-03-18 RX ADMIN — OXYCODONE HYDROCHLORIDE AND ACETAMINOPHEN 2 TABLET: 5; 325 TABLET ORAL at 04:04

## 2018-03-18 RX ADMIN — CEFAZOLIN SODIUM 1 G: 1 INJECTION, SOLUTION INTRAVENOUS at 23:27

## 2018-03-18 RX ADMIN — SIMVASTATIN 40 MG: 40 TABLET, FILM COATED ORAL at 21:48

## 2018-03-18 RX ADMIN — CEFAZOLIN SODIUM 1 G: 1 INJECTION, SOLUTION INTRAVENOUS at 09:25

## 2018-03-18 RX ADMIN — DOCUSATE SODIUM 100 MG: 100 CAPSULE ORAL at 09:20

## 2018-03-18 RX ADMIN — OXYCODONE HYDROCHLORIDE AND ACETAMINOPHEN 2 TABLET: 5; 325 TABLET ORAL at 17:37

## 2018-03-18 RX ADMIN — ENOXAPARIN SODIUM 30 MG: 30 INJECTION SUBCUTANEOUS at 23:27

## 2018-03-18 RX ADMIN — POTASSIUM CHLORIDE 20 MEQ: 1500 TABLET, EXTENDED RELEASE ORAL at 16:01

## 2018-03-18 RX ADMIN — POTASSIUM CHLORIDE 20 MEQ: 1500 TABLET, EXTENDED RELEASE ORAL at 21:47

## 2018-03-18 RX ADMIN — PANTOPRAZOLE SODIUM 40 MG: 40 TABLET, DELAYED RELEASE ORAL at 05:04

## 2018-03-18 RX ADMIN — DOCUSATE SODIUM 100 MG: 100 CAPSULE ORAL at 21:47

## 2018-03-18 RX ADMIN — NORTRIPTYLINE HYDROCHLORIDE 50 MG: 25 CAPSULE ORAL at 21:47

## 2018-03-18 RX ADMIN — Medication 10 ML: at 09:21

## 2018-03-18 RX ADMIN — CEFAZOLIN SODIUM 1 G: 1 INJECTION, SOLUTION INTRAVENOUS at 16:01

## 2018-03-18 RX ADMIN — POTASSIUM CHLORIDE 20 MEQ: 1500 TABLET, EXTENDED RELEASE ORAL at 09:20

## 2018-03-18 RX ADMIN — ISOSORBIDE MONONITRATE 30 MG: 30 TABLET ORAL at 09:21

## 2018-03-18 RX ADMIN — OXYCODONE HYDROCHLORIDE AND ACETAMINOPHEN 2 TABLET: 5; 325 TABLET ORAL at 09:18

## 2018-03-18 RX ADMIN — SODIUM CHLORIDE: 9 INJECTION, SOLUTION INTRAVENOUS at 01:47

## 2018-03-18 ASSESSMENT — PAIN SCALES - GENERAL
PAINLEVEL_OUTOF10: 7
PAINLEVEL_OUTOF10: 8
PAINLEVEL_OUTOF10: 7
PAINLEVEL_OUTOF10: 6
PAINLEVEL_OUTOF10: 8
PAINLEVEL_OUTOF10: 5
PAINLEVEL_OUTOF10: 7
PAINLEVEL_OUTOF10: 5
PAINLEVEL_OUTOF10: 7

## 2018-03-18 ASSESSMENT — PAIN DESCRIPTION - LOCATION: LOCATION: BACK

## 2018-03-18 NOTE — PLAN OF CARE
Problem: Pain:  Goal: Pain level will decrease  Pain level will decrease    Outcome: Ongoing  Patient able to identify pain and rate by number scale, able to ask for pain medications, educated on the use of opioid pain meds, able to reposition self to improve comfort, and voices relief from pain with pain meds    Problem: Neurological  Goal: Maximum potential motor/sensory/cognitive function  Outcome: Ongoing  Alert oriented x3 hand grasps pedal pull = strong, denies headache, able to reposition self.      Problem: Cardiovascular  Goal: No DVT, peripheral vascular complications  Outcome: Ongoing  Vitals within normal limits, negative for dvt, patient on lovenox    Problem: GI  Goal: No bowel complications  Outcome: Ongoing  Tolerates diet well, active bowel sounds, passing flatus, denies nausea    Problem:   Goal: Adequate urinary output  Outcome: Ongoing  Garcia catheter intact draining clear yellow urine    Problem: Discharge Planning:  Goal: Discharged to appropriate level of care  Discharged to appropriate level of care   Outcome: Ongoing  Patient and family voices understanding and acceptance in participation of discharge plans

## 2018-03-18 NOTE — PROGRESS NOTES
Department of Orthopedic Surgery  Spine Service  Alfreda AJ Rosen Progress Note        Subjective:  Pt lying in bed feeling well. She has had another good day. No acute changes overnight. Will remove SA drain today and leave hemovac in place to assess how much fluid leaks. Will remain on bedrest.     Vitals  VITALS:  /65   Pulse 83   Temp 98.2 °F (36.8 °C) (Oral)   Resp 20   Ht 5' 1\" (1.549 m)   Wt 146 lb (66.2 kg)   SpO2 96%   BMI 27.59 kg/m²   24HR INTAKE/OUTPUT:    Intake/Output Summary (Last 24 hours) at 03/18/18 1008  Last data filed at 03/18/18 0700   Gross per 24 hour   Intake          2865.44 ml   Output             2120 ml   Net           745.44 ml     URINARY CATHETER OUTPUT (Garcia):  Urethral Catheter Straight-tip;Latex 16 fr-Output (mL): 900 mL  DRAIN/TUBE OUTPUT:  Closed/Suction Drain Right Back Accordion 7 Sami-Output (ml): 2 ml  Closed/Suction Drain Right Back Other (Comment)-Output (ml): 12 ml  [REMOVED] Closed/Suction Drain Midline Back Accordion 10 Sami-Output (ml): 90 ml      PHYSICAL EXAM:    Orientation:  alert and oriented to person, place and time    Incision:  dressing in place, clean, dry, intact    Lower Extremity Motor :  quadriceps, extensor hallucis longus, dorsiflexion, plantarflexion 5/5 bilaterally  Lower Extremity Sensory:  Intact L1-S1    Flatus:  positive    ABNORMAL EXAM FINDINGS:  none    LABS:    HgB:    Lab Results   Component Value Date    HGB 10.0 03/15/2018         ASSESSMENT AND PLAN:    Post operative day 6     1:  Monitor labs and drain output. Remove SA drain. 2:  Activity Level:  Lying bedrest.   3:  Pain Control:  Controlled. 4:  Discharge Planning: Will plan to advance activity tomorrow.

## 2018-03-18 NOTE — PROGRESS NOTES
Patient: Radha Brown  Unit/Bed: 7K-07/007-A  YOB: 1935  MRN: [id number] Acct: [de-identified]   Admitting Diagnosis: Lumbar back pain [M54.5]  Spinal stenosis of lumbar region with neurogenic claudication [M48.062]  Spinal stenosis of lumbar region with neurogenic claudication [M48.062]  Admit Date:  3/7/2018  Hospital Day: 10    Assessment:     Principal Problem:    Spinal stenosis of lumbar region with neurogenic claudication  Active Problems:    GERD (gastroesophageal reflux disease)    COPD (chronic obstructive pulmonary disease) (HCC)    Chronic back pain    CAD (coronary artery disease)    Hypertension    Lumbar back pain    Acute blood loss as cause of postoperative anemia    Inadvertent durotomy  Resolved Problems:    * No resolved hospital problems. *      Plan:     Continue to follow medically        Subjective:     Patient has no complaint of CP or SOB. .   Medication side effects: none    Scheduled Meds:   enoxaparin  30 mg Subcutaneous Q12H    ceFAZolin (ANCEF) IVPB  1 g Intravenous Q8H    sodium chloride flush  10 mL Intravenous 2 times per day    docusate sodium  100 mg Oral BID    latanoprost  1 drop Both Eyes Nightly    isosorbide mononitrate  30 mg Oral Daily    simvastatin  40 mg Oral Nightly    nortriptyline  50 mg Oral Nightly    potassium chloride  20 mEq Oral TID    pantoprazole  40 mg Oral QAM AC    metoprolol tartrate  12.5 mg Oral BID     Continuous Infusions:   sodium chloride 75 mL/hr at 03/18/18 0147     PRN Meds:labetalol, meperidine, sodium chloride flush, acetaminophen, acetaminophen, cyclobenzaprine, magnesium hydroxide, ondansetron, oxyCODONE-acetaminophen **OR** oxyCODONE-acetaminophen, tiZANidine    Review of Systems  Pertinent items are noted in HPI. Objective:     Patient Vitals for the past 8 hrs:   BP Temp Temp src Pulse Resp SpO2   03/18/18 0413 136/65 98.2 °F (36.8 °C) Oral 83 20 96 %     I/O last 3 completed shifts:   In: 2865.4 [P.O.:1220; I.V.:1645.4]  Out: 2156 [Urine:1850; Drains:306]  No intake/output data recorded.     /65   Pulse 83   Temp 98.2 °F (36.8 °C) (Oral)   Resp 20   Ht 5' 1\" (1.549 m)   Wt 146 lb (66.2 kg)   SpO2 96%   BMI 27.59 kg/m²     /65   Pulse 83   Temp 98.2 °F (36.8 °C) (Oral)   Resp 20   Ht 5' 1\" (1.549 m)   Wt 146 lb (66.2 kg)   SpO2 96%   BMI 27.59 kg/m²   General appearance: alert, appears stated age and cooperative  Lungs: clear to auscultation bilaterally  Heart: regular rate and rhythm, S1, S2 normal, no murmur, click, rub or gallop  Abdomen: soft, non-tender; bowel sounds normal; no masses,  no organomegaly  Extremities: extremities normal, atraumatic, no cyanosis or edema  Skin: Skin color, texture, turgor normal. No rashes or lesions  Neurologic: Grossly normal        Electronically signed by Estefani Torres MD on 3/18/2018 at 11:52 AM

## 2018-03-19 PROCEDURE — 2580000003 HC RX 258: Performed by: FAMILY MEDICINE

## 2018-03-19 PROCEDURE — 6360000002 HC RX W HCPCS: Performed by: PHYSICIAN ASSISTANT

## 2018-03-19 PROCEDURE — 6370000000 HC RX 637 (ALT 250 FOR IP): Performed by: ORTHOPAEDIC SURGERY

## 2018-03-19 PROCEDURE — 6370000000 HC RX 637 (ALT 250 FOR IP): Performed by: EMERGENCY MEDICINE

## 2018-03-19 PROCEDURE — 1200000000 HC SEMI PRIVATE

## 2018-03-19 PROCEDURE — 6360000002 HC RX W HCPCS: Performed by: ORTHOPAEDIC SURGERY

## 2018-03-19 RX ADMIN — SODIUM CHLORIDE: 9 INJECTION, SOLUTION INTRAVENOUS at 06:17

## 2018-03-19 RX ADMIN — SODIUM CHLORIDE: 9 INJECTION, SOLUTION INTRAVENOUS at 20:38

## 2018-03-19 RX ADMIN — OXYCODONE HYDROCHLORIDE AND ACETAMINOPHEN 1 TABLET: 5; 325 TABLET ORAL at 12:39

## 2018-03-19 RX ADMIN — NORTRIPTYLINE HYDROCHLORIDE 50 MG: 25 CAPSULE ORAL at 20:40

## 2018-03-19 RX ADMIN — POTASSIUM CHLORIDE 20 MEQ: 1500 TABLET, EXTENDED RELEASE ORAL at 12:38

## 2018-03-19 RX ADMIN — OXYCODONE HYDROCHLORIDE AND ACETAMINOPHEN 2 TABLET: 5; 325 TABLET ORAL at 07:32

## 2018-03-19 RX ADMIN — OXYCODONE HYDROCHLORIDE AND ACETAMINOPHEN 2 TABLET: 5; 325 TABLET ORAL at 17:04

## 2018-03-19 RX ADMIN — ONDANSETRON 4 MG: 2 INJECTION INTRAMUSCULAR; INTRAVENOUS at 06:17

## 2018-03-19 RX ADMIN — METOPROLOL TARTRATE 12.5 MG: 25 TABLET ORAL at 20:40

## 2018-03-19 RX ADMIN — POTASSIUM CHLORIDE 20 MEQ: 1500 TABLET, EXTENDED RELEASE ORAL at 20:40

## 2018-03-19 RX ADMIN — SIMVASTATIN 40 MG: 40 TABLET, FILM COATED ORAL at 20:40

## 2018-03-19 RX ADMIN — LATANOPROST 1 DROP: 50 SOLUTION OPHTHALMIC at 20:40

## 2018-03-19 RX ADMIN — ISOSORBIDE MONONITRATE 30 MG: 30 TABLET ORAL at 07:34

## 2018-03-19 RX ADMIN — OXYCODONE HYDROCHLORIDE AND ACETAMINOPHEN 2 TABLET: 5; 325 TABLET ORAL at 21:00

## 2018-03-19 RX ADMIN — DOCUSATE SODIUM 100 MG: 100 CAPSULE ORAL at 07:34

## 2018-03-19 RX ADMIN — POTASSIUM CHLORIDE 20 MEQ: 1500 TABLET, EXTENDED RELEASE ORAL at 07:35

## 2018-03-19 RX ADMIN — PANTOPRAZOLE SODIUM 40 MG: 40 TABLET, DELAYED RELEASE ORAL at 07:33

## 2018-03-19 RX ADMIN — METOPROLOL TARTRATE 12.5 MG: 25 TABLET ORAL at 07:34

## 2018-03-19 RX ADMIN — ENOXAPARIN SODIUM 30 MG: 30 INJECTION SUBCUTANEOUS at 10:49

## 2018-03-19 ASSESSMENT — PAIN SCALES - GENERAL
PAINLEVEL_OUTOF10: 7
PAINLEVEL_OUTOF10: 4
PAINLEVEL_OUTOF10: 6
PAINLEVEL_OUTOF10: 8

## 2018-03-19 ASSESSMENT — PAIN DESCRIPTION - LOCATION: LOCATION: BACK

## 2018-03-19 ASSESSMENT — PAIN DESCRIPTION - ORIENTATION: ORIENTATION: LOWER

## 2018-03-19 NOTE — CARE COORDINATION
3/19/18, 12:42 PM      [facility] day: 11  Location: -07/007-A Reason for admit: Lumbar back pain [M54.5]  Spinal stenosis of lumbar region with neurogenic claudication [M48.062]  Spinal stenosis of lumbar region with neurogenic claudication [M48.062]   Procedure:   3/12/18 surgery by Dr Karla Simpson:   Kavon Rivas of durotomy. 2.  Placement of subarachnoid drain  3/7/18 surgery by Dr Karla Simpson: 1.  Removal of the X-Stop device at the levels of L3-L4 and L4-L5. 2.  L3, L4, L5, and S1 laminectomy  Durotomy repair  Treatment Plan of Care: Has been on bedrest with bed flat. Raise HOB as tolerated today. Monitor for headache. N/V checks. Pain management. I&O. IS. Telemetry stopped. Core Measures: vte  PCP: Azalia Pierre MD  Discharge Plan: Planning TCU/Rehab. Still on bedrest. Cassy admissions coordinator, following. Physiatry will eval when PT/OT evals completed.

## 2018-03-19 NOTE — PROGRESS NOTES
Shift Summary:     0730 Attempted to raise hob of bed for patient for breakfast to 30 degrees patient unable to tolerate. Develop a headache and slight nausea. 1130 hob up 30 degrees, patient tolerated well, denies headache and nausea. 1530 Patient sitting up in bed 60 degrees and tolerated well, able to eat supper, denied headache or nausea.

## 2018-03-20 LAB
ANION GAP SERPL CALCULATED.3IONS-SCNC: 12 MEQ/L (ref 8–16)
ANISOCYTOSIS: ABNORMAL
BASOPHILS # BLD: 0.6 %
BASOPHILS ABSOLUTE: 0 THOU/MM3 (ref 0–0.1)
BUN BLDV-MCNC: 11 MG/DL (ref 7–22)
CALCIUM SERPL-MCNC: 8.7 MG/DL (ref 8.5–10.5)
CHLORIDE BLD-SCNC: 104 MEQ/L (ref 98–111)
CO2: 19 MEQ/L (ref 23–33)
CREAT SERPL-MCNC: 0.8 MG/DL (ref 0.4–1.2)
EOSINOPHIL # BLD: 6.1 %
EOSINOPHILS ABSOLUTE: 0.4 THOU/MM3 (ref 0–0.4)
GFR SERPL CREATININE-BSD FRML MDRD: 69 ML/MIN/1.73M2
GLUCOSE BLD-MCNC: 103 MG/DL (ref 70–108)
HCT VFR BLD CALC: 29.8 % (ref 37–47)
HEMOGLOBIN: 9.8 GM/DL (ref 12–16)
LYMPHOCYTES # BLD: 16.9 %
LYMPHOCYTES ABSOLUTE: 1 THOU/MM3 (ref 1–4.8)
MCH RBC QN AUTO: 30.2 PG (ref 27–31)
MCHC RBC AUTO-ENTMCNC: 32.8 GM/DL (ref 33–37)
MCV RBC AUTO: 92 FL (ref 81–99)
MONOCYTES # BLD: 16.7 %
MONOCYTES ABSOLUTE: 1 THOU/MM3 (ref 0.4–1.3)
NUCLEATED RED BLOOD CELLS: 0 /100 WBC
PDW BLD-RTO: 16 % (ref 11.5–14.5)
PLATELET # BLD: 306 THOU/MM3 (ref 130–400)
PMV BLD AUTO: 7.4 FL (ref 7.4–10.4)
POTASSIUM SERPL-SCNC: 5 MEQ/L (ref 3.5–5.2)
RBC # BLD: 3.24 MILL/MM3 (ref 4.2–5.4)
SEG NEUTROPHILS: 59.7 %
SEGMENTED NEUTROPHILS ABSOLUTE COUNT: 3.7 THOU/MM3 (ref 1.8–7.7)
SODIUM BLD-SCNC: 135 MEQ/L (ref 135–145)
WBC # BLD: 6.2 THOU/MM3 (ref 4.8–10.8)

## 2018-03-20 PROCEDURE — 6370000000 HC RX 637 (ALT 250 FOR IP): Performed by: FAMILY MEDICINE

## 2018-03-20 PROCEDURE — 2580000003 HC RX 258: Performed by: ORTHOPAEDIC SURGERY

## 2018-03-20 PROCEDURE — 80048 BASIC METABOLIC PNL TOTAL CA: CPT

## 2018-03-20 PROCEDURE — 36415 COLL VENOUS BLD VENIPUNCTURE: CPT

## 2018-03-20 PROCEDURE — 1200000000 HC SEMI PRIVATE

## 2018-03-20 PROCEDURE — 6370000000 HC RX 637 (ALT 250 FOR IP): Performed by: EMERGENCY MEDICINE

## 2018-03-20 PROCEDURE — 6360000002 HC RX W HCPCS: Performed by: PHYSICIAN ASSISTANT

## 2018-03-20 PROCEDURE — 6370000000 HC RX 637 (ALT 250 FOR IP): Performed by: ORTHOPAEDIC SURGERY

## 2018-03-20 PROCEDURE — 85025 COMPLETE CBC W/AUTO DIFF WBC: CPT

## 2018-03-20 RX ORDER — 0.9 % SODIUM CHLORIDE 0.9 %
500 INTRAVENOUS SOLUTION INTRAVENOUS ONCE
Status: COMPLETED | OUTPATIENT
Start: 2018-03-20 | End: 2018-03-20

## 2018-03-20 RX ORDER — POTASSIUM CHLORIDE 20 MEQ/1
20 TABLET, EXTENDED RELEASE ORAL
Status: DISCONTINUED | OUTPATIENT
Start: 2018-03-20 | End: 2018-03-23 | Stop reason: HOSPADM

## 2018-03-20 RX ORDER — SODIUM CHLORIDE 9 MG/ML
INJECTION, SOLUTION INTRAVENOUS CONTINUOUS
Status: DISCONTINUED | OUTPATIENT
Start: 2018-03-20 | End: 2018-03-23 | Stop reason: HOSPADM

## 2018-03-20 RX ADMIN — METOPROLOL TARTRATE 12.5 MG: 25 TABLET ORAL at 07:33

## 2018-03-20 RX ADMIN — ISOSORBIDE MONONITRATE 30 MG: 30 TABLET ORAL at 14:09

## 2018-03-20 RX ADMIN — OXYCODONE HYDROCHLORIDE AND ACETAMINOPHEN 2 TABLET: 5; 325 TABLET ORAL at 14:10

## 2018-03-20 RX ADMIN — SODIUM CHLORIDE: 9 INJECTION, SOLUTION INTRAVENOUS at 18:46

## 2018-03-20 RX ADMIN — POTASSIUM CHLORIDE 20 MEQ: 1500 TABLET, EXTENDED RELEASE ORAL at 07:33

## 2018-03-20 RX ADMIN — PANTOPRAZOLE SODIUM 40 MG: 40 TABLET, DELAYED RELEASE ORAL at 05:06

## 2018-03-20 RX ADMIN — ACETAMINOPHEN 650 MG: 325 TABLET ORAL at 20:28

## 2018-03-20 RX ADMIN — NORTRIPTYLINE HYDROCHLORIDE 50 MG: 25 CAPSULE ORAL at 20:29

## 2018-03-20 RX ADMIN — Medication 10 ML: at 09:11

## 2018-03-20 RX ADMIN — OXYCODONE HYDROCHLORIDE AND ACETAMINOPHEN 2 TABLET: 5; 325 TABLET ORAL at 05:06

## 2018-03-20 RX ADMIN — SIMVASTATIN 40 MG: 40 TABLET, FILM COATED ORAL at 20:28

## 2018-03-20 RX ADMIN — LATANOPROST 1 DROP: 50 SOLUTION OPHTHALMIC at 20:29

## 2018-03-20 RX ADMIN — ENOXAPARIN SODIUM 30 MG: 30 INJECTION SUBCUTANEOUS at 05:06

## 2018-03-20 RX ADMIN — ENOXAPARIN SODIUM 30 MG: 30 INJECTION SUBCUTANEOUS at 17:36

## 2018-03-20 RX ADMIN — SODIUM CHLORIDE 500 ML: 9 INJECTION, SOLUTION INTRAVENOUS at 18:40

## 2018-03-20 RX ADMIN — DOCUSATE SODIUM 100 MG: 100 CAPSULE ORAL at 20:28

## 2018-03-20 RX ADMIN — OXYCODONE HYDROCHLORIDE AND ACETAMINOPHEN 2 TABLET: 5; 325 TABLET ORAL at 09:08

## 2018-03-20 ASSESSMENT — PAIN DESCRIPTION - LOCATION: LOCATION: HEAD

## 2018-03-20 ASSESSMENT — PAIN SCALES - GENERAL
PAINLEVEL_OUTOF10: 8
PAINLEVEL_OUTOF10: 3
PAINLEVEL_OUTOF10: 8
PAINLEVEL_OUTOF10: 7
PAINLEVEL_OUTOF10: 7

## 2018-03-20 ASSESSMENT — PAIN DESCRIPTION - PAIN TYPE: TYPE: ACUTE PAIN

## 2018-03-20 ASSESSMENT — PAIN DESCRIPTION - DESCRIPTORS: DESCRIPTORS: DULL;ACHING

## 2018-03-20 ASSESSMENT — PAIN DESCRIPTION - FREQUENCY: FREQUENCY: INTERMITTENT

## 2018-03-20 NOTE — PLAN OF CARE
Problem: Discharge Planning:  Goal: Discharged to appropriate level of care  Discharged to appropriate level of care   Outcome: Ongoing  Patient to go to rehab at discharge for therapy after being bedrest for 12 days     Comments: Care plan reviewed with patient. Patient verbalize understanding of the plan of care and contribute to goal setting.

## 2018-03-20 NOTE — PLAN OF CARE
Problem: Pain:  Goal: Pain level will decrease  Pain level will decrease    Outcome: Ongoing  Patient taking oral pain medication to help her reach her pain goal of 5/10, patient verbalizes adequate pain relief after pain meds given    Problem: Falls - Risk of  Goal: Absence of falls  Outcome: Met This Shift  Bed low,call light in reach, patient still bedrest, patient calls out for assistance appropriately and does not try to ambulate her self     Problem: Neurological  Goal: Maximum potential motor/sensory/cognitive function  Outcome: Ongoing  Patient is alert and oriented, patient denies numbness or tingling, patient able to move all 4 extremities     Problem: Cardiovascular  Goal: No DVT, peripheral vascular complications  Outcome: Met This Shift  Patient receiving lovenox for dvt prophylaxis, negative homans sign bilaterally    Problem: GI  Goal: No bowel complications  Outcome: Met This Shift  No bowel complications, bowel movements have been regular     Problem:   Goal: Adequate urinary output  Outcome: Ongoing  Urinary output has been adequate per alvarez catheter     Problem: Skin Integrity/Risk  Goal: Wound healing  Outcome: Ongoing  Surgical dressing dry and intact, patient has been afebrile, patient

## 2018-03-20 NOTE — PROGRESS NOTES
1204 Attempted to have dangle at the bedside. Patient able to help herself out of bed with no assistance, but became light headed, denied nausea or headache, able to sit at bedside for a total of 1 minute then assisted patient back to bed. Patient tolerated this fair, but stated, \" if I sat up any longer, Im sure would think that I might pass out. \" Once patient back to bed those sensations and lightheadedness subsided. Patient denies headache or nausea and no increase in pain. 1210 Removed surgical dressing and removed hemovac drain, ends intact, large amount of sanguinous yellow thin drainage from dressing over hemovac site. Incision intact, no gaps, no drainage. 2 blisters next to right lower back 2 inches from incision, one intact, one opened and drained, skin is read. Cleaned and dried and epc applied with 2 4x4s folded over this area. Redressed the midline incision and wound drain site, covered occlusively with 2 inch tape. Patient tolerated well. Patient visiting with her son now. 1400 HOB up 45 degrees, tolerates well. Voices that she is feeling \"tired\". Denies other complaints of nausea and headache. 1630 patient does not have an appetite at this time. Denies nausea or headache. 1745 patient complains of feeling lightheaded, now complains of nausea, states \" I feel shaky\", BP checked by machine, 99/50, manual 89/56, pulse regular, 88, resp 16, patient states \"I feel sweaty\", forehead warm and moist, temp 98.6. Changed back dressing again, small spot of light yellow/light brown drainage noted over stab site for drain, incision intact, no gaps. Area where blisters were, now healing and less red, appear less irritated, EPC cream applied and 4x4 dressing applied. Patient repositioned on right side. Patient now complains of slight headache. Placing a call to Dr Denzel Tomlinson to update on patient condition.

## 2018-03-21 LAB
ANISOCYTOSIS: ABNORMAL
BASOPHILS # BLD: 0.6 %
BASOPHILS ABSOLUTE: 0 THOU/MM3 (ref 0–0.1)
EOSINOPHIL # BLD: 5.2 %
EOSINOPHILS ABSOLUTE: 0.3 THOU/MM3 (ref 0–0.4)
HCT VFR BLD CALC: 28.5 % (ref 37–47)
HEMOGLOBIN: 9.5 GM/DL (ref 12–16)
LYMPHOCYTES # BLD: 17.6 %
LYMPHOCYTES ABSOLUTE: 0.9 THOU/MM3 (ref 1–4.8)
MCH RBC QN AUTO: 30.4 PG (ref 27–31)
MCHC RBC AUTO-ENTMCNC: 33.3 GM/DL (ref 33–37)
MCV RBC AUTO: 91.3 FL (ref 81–99)
MONOCYTES # BLD: 9.9 %
MONOCYTES ABSOLUTE: 0.5 THOU/MM3 (ref 0.4–1.3)
NUCLEATED RED BLOOD CELLS: 0 /100 WBC
PDW BLD-RTO: 15.9 % (ref 11.5–14.5)
PLATELET # BLD: 336 THOU/MM3 (ref 130–400)
PMV BLD AUTO: 7 FL (ref 7.4–10.4)
RBC # BLD: 3.12 MILL/MM3 (ref 4.2–5.4)
SEG NEUTROPHILS: 66.7 %
SEGMENTED NEUTROPHILS ABSOLUTE COUNT: 3.5 THOU/MM3 (ref 1.8–7.7)
WBC # BLD: 5.2 THOU/MM3 (ref 4.8–10.8)

## 2018-03-21 PROCEDURE — 36415 COLL VENOUS BLD VENIPUNCTURE: CPT

## 2018-03-21 PROCEDURE — 1200000000 HC SEMI PRIVATE

## 2018-03-21 PROCEDURE — 85025 COMPLETE CBC W/AUTO DIFF WBC: CPT

## 2018-03-21 PROCEDURE — 97530 THERAPEUTIC ACTIVITIES: CPT

## 2018-03-21 PROCEDURE — 97166 OT EVAL MOD COMPLEX 45 MIN: CPT

## 2018-03-21 PROCEDURE — 97163 PT EVAL HIGH COMPLEX 45 MIN: CPT

## 2018-03-21 PROCEDURE — 6370000000 HC RX 637 (ALT 250 FOR IP): Performed by: FAMILY MEDICINE

## 2018-03-21 PROCEDURE — 97110 THERAPEUTIC EXERCISES: CPT

## 2018-03-21 PROCEDURE — G8979 MOBILITY GOAL STATUS: HCPCS

## 2018-03-21 PROCEDURE — G8978 MOBILITY CURRENT STATUS: HCPCS

## 2018-03-21 PROCEDURE — 2580000003 HC RX 258: Performed by: ORTHOPAEDIC SURGERY

## 2018-03-21 PROCEDURE — G8988 SELF CARE GOAL STATUS: HCPCS

## 2018-03-21 PROCEDURE — 6360000002 HC RX W HCPCS: Performed by: PHYSICIAN ASSISTANT

## 2018-03-21 PROCEDURE — G8987 SELF CARE CURRENT STATUS: HCPCS

## 2018-03-21 PROCEDURE — 97535 SELF CARE MNGMENT TRAINING: CPT

## 2018-03-21 PROCEDURE — 6370000000 HC RX 637 (ALT 250 FOR IP): Performed by: ORTHOPAEDIC SURGERY

## 2018-03-21 PROCEDURE — 6370000000 HC RX 637 (ALT 250 FOR IP): Performed by: EMERGENCY MEDICINE

## 2018-03-21 RX ADMIN — OXYCODONE HYDROCHLORIDE AND ACETAMINOPHEN 2 TABLET: 5; 325 TABLET ORAL at 17:59

## 2018-03-21 RX ADMIN — METOPROLOL TARTRATE 12.5 MG: 25 TABLET ORAL at 08:42

## 2018-03-21 RX ADMIN — ENOXAPARIN SODIUM 30 MG: 30 INJECTION SUBCUTANEOUS at 18:02

## 2018-03-21 RX ADMIN — DOCUSATE SODIUM 100 MG: 100 CAPSULE ORAL at 08:42

## 2018-03-21 RX ADMIN — ENOXAPARIN SODIUM 30 MG: 30 INJECTION SUBCUTANEOUS at 04:52

## 2018-03-21 RX ADMIN — POTASSIUM CHLORIDE 20 MEQ: 1500 TABLET, EXTENDED RELEASE ORAL at 08:42

## 2018-03-21 RX ADMIN — NORTRIPTYLINE HYDROCHLORIDE 50 MG: 25 CAPSULE ORAL at 21:04

## 2018-03-21 RX ADMIN — SODIUM CHLORIDE: 9 INJECTION, SOLUTION INTRAVENOUS at 10:49

## 2018-03-21 RX ADMIN — OXYCODONE HYDROCHLORIDE AND ACETAMINOPHEN 2 TABLET: 5; 325 TABLET ORAL at 04:53

## 2018-03-21 RX ADMIN — METOPROLOL TARTRATE 12.5 MG: 25 TABLET ORAL at 21:04

## 2018-03-21 RX ADMIN — ISOSORBIDE MONONITRATE 30 MG: 30 TABLET ORAL at 08:42

## 2018-03-21 RX ADMIN — OXYCODONE HYDROCHLORIDE AND ACETAMINOPHEN 1 TABLET: 5; 325 TABLET ORAL at 00:06

## 2018-03-21 RX ADMIN — OXYCODONE HYDROCHLORIDE AND ACETAMINOPHEN 2 TABLET: 5; 325 TABLET ORAL at 13:22

## 2018-03-21 RX ADMIN — SIMVASTATIN 40 MG: 40 TABLET, FILM COATED ORAL at 21:04

## 2018-03-21 RX ADMIN — OXYCODONE HYDROCHLORIDE AND ACETAMINOPHEN 2 TABLET: 5; 325 TABLET ORAL at 22:21

## 2018-03-21 RX ADMIN — PANTOPRAZOLE SODIUM 40 MG: 40 TABLET, DELAYED RELEASE ORAL at 08:42

## 2018-03-21 RX ADMIN — LATANOPROST 1 DROP: 50 SOLUTION OPHTHALMIC at 21:05

## 2018-03-21 RX ADMIN — DOCUSATE SODIUM 100 MG: 100 CAPSULE ORAL at 21:04

## 2018-03-21 ASSESSMENT — PAIN SCALES - GENERAL
PAINLEVEL_OUTOF10: 5
PAINLEVEL_OUTOF10: 8
PAINLEVEL_OUTOF10: 7
PAINLEVEL_OUTOF10: 8
PAINLEVEL_OUTOF10: 6
PAINLEVEL_OUTOF10: 7
PAINLEVEL_OUTOF10: 5
PAINLEVEL_OUTOF10: 7
PAINLEVEL_OUTOF10: 8
PAINLEVEL_OUTOF10: 1

## 2018-03-21 ASSESSMENT — PAIN DESCRIPTION - DESCRIPTORS
DESCRIPTORS: ACHING

## 2018-03-21 ASSESSMENT — PAIN DESCRIPTION - LOCATION
LOCATION: BACK;HEAD
LOCATION: BACK
LOCATION: BACK
LOCATION: BACK;HEAD

## 2018-03-21 ASSESSMENT — PAIN DESCRIPTION - FREQUENCY
FREQUENCY: INTERMITTENT

## 2018-03-21 ASSESSMENT — PAIN DESCRIPTION - ORIENTATION: ORIENTATION: LOWER

## 2018-03-21 ASSESSMENT — PAIN DESCRIPTION - PAIN TYPE
TYPE: ACUTE PAIN
TYPE: SURGICAL PAIN
TYPE: ACUTE PAIN;SURGICAL PAIN
TYPE: ACUTE PAIN

## 2018-03-21 ASSESSMENT — PAIN DESCRIPTION - PROGRESSION: CLINICAL_PROGRESSION: NOT CHANGED

## 2018-03-21 NOTE — PROGRESS NOTES
Nutrition Assessment    Type and Reason for Visit: Reassess (PO & supplement monitor)    Nutrition Recommendations:  Recommend regular wt checks. Diet per    Malnutrition Assessment:  · Malnutrition Status: At risk for malnutrition  Nutrition Diagnosis:   · Problem: Increased nutrient needs  · Etiology: related to Increased demand for energy/nutrients due to     Signs and symptoms:  as evidenced by Presence of wounds (surgeries)    Nutrition Assessment:  · Subjective Assessment: Pt s/p lumbar kohler 3/7 & then return to OR 3/12 for durotomy repair seen sleeping soundly. Did not wake pt. Pt with varied po %. Will continue Ehsure Enlive& nursing is encouraging. Pt + bm today. meds include bm meds. · Nutrition-Focused Physical Findings:    · Wound Type: Surgical Wound (3/7 lumbar laminectomy; 3/12 durotomy repair with subarachnoid drain)  · Current Nutrition Therapies:  · Oral Diet Orders: Dysphagia 3   · Oral Diet intake: 1-25%, 26-50%, 51-75%, % (varied)  · Oral Nutrition Supplement (ONS) Orders: Standard High Calorie Oral Supplement (Ensure Enlive TID (350 kcals, 20 grams protein/serving))  · ONS intake:  (will continue as varied po. Nursing encouraging)  · Anthropometric Measures:  · Ht: 5' 1\" (154.9 cm)   · Current Body Wt: 146 lb (66.2 kg) (3/7)  · Admission Body Wt: 146 lb (66.2 kg) (3/7)  · Usual Body Wt: 153 lb (69.4 kg) (11/7/17 per EMR)  · % Weight Change: 7# or 4.5%,  4 months  · Ideal Body Wt: 105 lb (47.6 kg), % Ideal Body 139%  · Adjusted Body Wt: 115 lb (52.2 kg),   · BMI Classification: BMI 25.0 - 29.9 Overweight (27.6)  · Comparative Standards (Estimated Nutrition Needs):  · Estimated Daily Total Kcal: 0250-3089  · Estimated Daily Protein (g): 68-78 grams    Estimated Intake vs Estimated Needs: Intake Less Than Needs    Nutrition Risk Level:  Moderate    Nutrition Interventions:   Continue current ONS  Continued Inpatient Monitoring, Education Initiated (encouraged po at best effort and discussed ONS options for here and home and protein sources)    Nutrition Evaluation:   · Evaluation: Goals set   · Goals: Pt. to consume greater than 75% of meals during LOS    · Monitoring: Meal Intake, Supplement Intake, Diet Tolerance, Skin Integrity, Wound Healing, Weight, Pertinent Labs, Chewing/Swallowing, Constipation    See Adult Nutrition Doc Flowsheet for more detail.      Electronically signed by Vignesh Painting RD, LD on 3/21/18 at 2:34 PM    Contact Number: [phone]

## 2018-03-21 NOTE — PROGRESS NOTES
in place, Nurse notified, Gait belt  Restraints  Initially in place: No    Plan:  Times per week: 7 X O  Times per day: Daily  Specific instructions for Next Treatment: ther ex, mobility, balance, gait, endurance   Current Treatment Recommendations: Strengthening, Balance Training, Functional Mobility Training, Transfer Training, Stair training, Gait Training, Endurance Training, Home Exercise Program    Goals:  Patient goals : Go to rehab/TCU  Short term goals  Time Frame for Short term goals: 1 week  Short term goal 1: supine to sit and return with SBA to get in and out of bed   Short term goal 2: sit to stand with CGA to egt on and off various surfaces  Short term goal 3: ambulate with RW 50 feet with CGA to walk household distances  Short term goal 4: ascend/descend 2 platform steps with AD and CGA to enter home  Long term goals  Time Frame for Long term goals : NA due to short ELOS    Evaluation Complexity: Based on the findings of patient history, examination, clinical presentation, and decision making during this evaluation, the evaluation of Jigar Daniel  is of high complexity. PT G-Codes  Functional Limitation: Mobility: Walking and moving around  Mobility: Walking and Moving Around Current Status (): At least 40 percent but less than 60 percent impaired, limited or restricted  Mobility: Walking and Moving Around Goal Status ():  At least 20 percent but less than 40 percent impaired, limited or restricted       AM-PAC Inpatient Mobility without Stair Climbing Raw Score : 14  AM-PAC Inpatient without Stair Climbing T-Scale Score : 40.85  Mobility Inpatient CMS 0-100% Score: 53.33  Mobility Inpatient without Stair CMS G-Code Modifier : CK

## 2018-03-21 NOTE — PROGRESS NOTES
Jignesh Guo is a 81 y.o. female patient.     Current Facility-Administered Medications   Medication Dose Route Frequency Provider Last Rate Last Dose    potassium chloride (KLOR-CON M) extended release tablet 20 mEq  20 mEq Oral Daily with breakfast Mick Abreu MD   20 mEq at 03/20/18 0733    0.9 % sodium chloride infusion   Intravenous Continuous Dayanna Cotter  mL/hr at 03/20/18 1846      enoxaparin (LOVENOX) injection 30 mg  30 mg Subcutaneous Q12H Varsha Burt PA-C   30 mg at 03/20/18 1736    labetalol (NORMODYNE;TRANDATE) injection 5 mg  5 mg Intravenous Q10 Min PRN Rowdy Ramírez MD        meperidine (DEMEROL) injection 12.5 mg  12.5 mg Intravenous Q5 Min PRN Rowdy Ramírez MD        sodium chloride flush 0.9 % injection 10 mL  10 mL Intravenous 2 times per day Dayanna Cotter MD   10 mL at 03/20/18 0911    sodium chloride flush 0.9 % injection 10 mL  10 mL Intravenous PRN Dayanna Cotter MD        acetaminophen (TYLENOL) tablet 650 mg  650 mg Oral Q4H PRN Dayanna Cotter MD   650 mg at 03/20/18 2028    acetaminophen (TYLENOL) suppository 650 mg  650 mg Rectal Q4H PRN Dayanna Cotter MD        cyclobenzaprine (FLEXERIL) tablet 10 mg  10 mg Oral TID PRN Dayanna Cotter MD   10 mg at 03/18/18 1304    docusate sodium (COLACE) capsule 100 mg  100 mg Oral BID Dayanna Cotter MD   100 mg at 03/20/18 2028    magnesium hydroxide (MILK OF MAGNESIA) 400 MG/5ML suspension 30 mL  30 mL Oral Daily PRN Dayanna Cotter MD        ondansetron [facility]) injection 4 mg  4 mg Intravenous Q6H PRN Dayanna Cotter MD   4 mg at 03/19/18 0617    oxyCODONE-acetaminophen (PERCOCET) 5-325 MG per tablet 1 tablet  1 tablet Oral Q4H PRN Dayanna Cotter MD   1 tablet at 03/19/18 1239    Or    oxyCODONE-acetaminophen (PERCOCET) 5-325 MG per tablet 2 tablet  2 tablet Oral Q4H PRN Dayanna Cotter MD   2 tablet at 03/20/18 1410    latanoprost (XALATAN) 0.005 % ophthalmic solution 1 drop  1 drop Both Eyes Nightly

## 2018-03-21 NOTE — PROGRESS NOTES
Carlos Manuelparrishajay Caraballo 61  INPATIENT OCCUPATIONAL THERAPY  [facility] ORTHOPEDICS 7K  EVALUATION    Time:  Time In: 3536  Time Out: 1010  Timed Code Treatment Minutes: 23 Minutes  Minutes: 38          Date: 3/21/2018  Patient Name: Jignesh Guo,   Gender: female      MRN: [id number]  : 1935  (81 y.o.)  Referring Practitioner: Varsha Burt PA-C  Diagnosis: lumbar back pain  Additional Pertinent Hx: Pt admitted 3-8 for back sx. Pt had sx 3-7 for removal  xstop and lumbar kohler.Pt had a dural leak, had been on bedrest.Had sx then again on 3-12 for repair durotomy and placement of subarachnoid drain. Restrictions/Precautions:  Fall Risk, Surgical Protocols, General Precautions                    Spinal Precautions: No Bending, No Lifting, No Twisting  Other position/activity restrictions: up with assist and brace,activity as tolerated ; dangle and advance as tolerated if no Headache. Spinal: Lumbar Corset    Past Medical History:   Diagnosis Date    Acute blood loss as cause of postoperative anemia 2018    CAD (coronary artery disease)      cath  50    Cardiac valve prolapse     Chronic back pain     [facility] ([facility])     Ex-smoker     GERD (gastroesophageal reflux disease)     EGD    Glaucoma     H/O cardiac catheterization     40-50% LAD   katharine    Hyperlipidemia     Hypertension     Osteoarthritis     right shoulder and back    Pericarditis      Past Surgical History:   Procedure Laterality Date    APPENDECTOMY      at age 13years   [address]      right foot   [address]  ??  &   2012? ?    normal results    COLONOSCOPY      last one ???    EYE SURGERY  ??    right eye   [address]    still has ovaries    [place] LAMINECTOMY,>2 SGMT,LUMBAR N/A 3/7/2018    LUMBAR LAMINECTOMY L3-S1 performed by Dayanna Cotter MD at [address] OFFICE/OUTPT VISIT,PROCEDURE ONLY N/A

## 2018-03-21 NOTE — PLAN OF CARE
Problem: Nutrition  Goal: Optimal nutrition therapy  Outcome: Ongoing  Nutrition Problem: Increased nutrient needs  Intervention: Food and/or Nutrient Delivery: Continue current ONS  Nutritional Goals: Pt. to consume greater than 75% of meals during LOS

## 2018-03-22 PROCEDURE — 6370000000 HC RX 637 (ALT 250 FOR IP): Performed by: FAMILY MEDICINE

## 2018-03-22 PROCEDURE — 97530 THERAPEUTIC ACTIVITIES: CPT

## 2018-03-22 PROCEDURE — 97110 THERAPEUTIC EXERCISES: CPT

## 2018-03-22 PROCEDURE — 6370000000 HC RX 637 (ALT 250 FOR IP): Performed by: ORTHOPAEDIC SURGERY

## 2018-03-22 PROCEDURE — 6370000000 HC RX 637 (ALT 250 FOR IP): Performed by: EMERGENCY MEDICINE

## 2018-03-22 PROCEDURE — 1200000000 HC SEMI PRIVATE

## 2018-03-22 PROCEDURE — 97535 SELF CARE MNGMENT TRAINING: CPT

## 2018-03-22 PROCEDURE — 2580000003 HC RX 258: Performed by: ORTHOPAEDIC SURGERY

## 2018-03-22 PROCEDURE — 6360000002 HC RX W HCPCS: Performed by: PHYSICIAN ASSISTANT

## 2018-03-22 RX ADMIN — Medication 10 ML: at 20:27

## 2018-03-22 RX ADMIN — OXYCODONE HYDROCHLORIDE AND ACETAMINOPHEN 2 TABLET: 5; 325 TABLET ORAL at 14:54

## 2018-03-22 RX ADMIN — POTASSIUM CHLORIDE 20 MEQ: 1500 TABLET, EXTENDED RELEASE ORAL at 08:38

## 2018-03-22 RX ADMIN — DOCUSATE SODIUM 100 MG: 100 CAPSULE ORAL at 20:27

## 2018-03-22 RX ADMIN — ENOXAPARIN SODIUM 30 MG: 30 INJECTION SUBCUTANEOUS at 18:01

## 2018-03-22 RX ADMIN — LATANOPROST 1 DROP: 50 SOLUTION OPHTHALMIC at 20:27

## 2018-03-22 RX ADMIN — Medication 10 ML: at 08:39

## 2018-03-22 RX ADMIN — PANTOPRAZOLE SODIUM 40 MG: 40 TABLET, DELAYED RELEASE ORAL at 06:42

## 2018-03-22 RX ADMIN — SIMVASTATIN 40 MG: 40 TABLET, FILM COATED ORAL at 20:27

## 2018-03-22 RX ADMIN — ISOSORBIDE MONONITRATE 30 MG: 30 TABLET ORAL at 08:39

## 2018-03-22 RX ADMIN — METOPROLOL TARTRATE 12.5 MG: 25 TABLET ORAL at 08:39

## 2018-03-22 RX ADMIN — NORTRIPTYLINE HYDROCHLORIDE 50 MG: 25 CAPSULE ORAL at 20:27

## 2018-03-22 RX ADMIN — DOCUSATE SODIUM 100 MG: 100 CAPSULE ORAL at 08:39

## 2018-03-22 RX ADMIN — ENOXAPARIN SODIUM 30 MG: 30 INJECTION SUBCUTANEOUS at 06:42

## 2018-03-22 RX ADMIN — OXYCODONE HYDROCHLORIDE AND ACETAMINOPHEN 2 TABLET: 5; 325 TABLET ORAL at 04:47

## 2018-03-22 ASSESSMENT — PAIN SCALES - GENERAL
PAINLEVEL_OUTOF10: 7
PAINLEVEL_OUTOF10: 5
PAINLEVEL_OUTOF10: 7
PAINLEVEL_OUTOF10: 5
PAINLEVEL_OUTOF10: 7

## 2018-03-22 ASSESSMENT — PAIN DESCRIPTION - LOCATION
LOCATION: BACK
LOCATION: HEAD

## 2018-03-22 ASSESSMENT — PAIN DESCRIPTION - PAIN TYPE
TYPE: ACUTE PAIN
TYPE: SURGICAL PAIN

## 2018-03-22 ASSESSMENT — PAIN DESCRIPTION - DESCRIPTORS: DESCRIPTORS: ACHING

## 2018-03-22 ASSESSMENT — PAIN DESCRIPTION - ONSET: ONSET: GRADUAL

## 2018-03-22 NOTE — PROGRESS NOTES
Physical Therapy   [address] [address]  INPATIENT PHYSICAL THERAPY  DAILY NOTE  [facility] ORTHOPEDICS 7K - 7K-07/007-A    Time In: 8928  Time Out: 1130  Timed Code Treatment Minutes: 25 Minutes  Minutes: 25          Date: 3/22/2018  Patient Name: Bettye Lindsey,  Gender:  female        MRN: [id number]  : 1935  (81 y.o.)     Referring Practitioner: Bernarda Hastings PA-C  Diagnosis: Lumbar back pain  Additional Pertinent Hx: Pt admitted 3-8 for back sx. Pt had sx 3- for removal  xstop and lumbar kohler.Pt had a dural leak, had been on bedrest.Had sx then again on 3-12 for repair durotomy and placement of subarachnoid drain. Orders received today to dangle and advance as tolerated if no Headache. Past Medical History:   Diagnosis Date    Acute blood loss as cause of postoperative anemia 2018    CAD (coronary artery disease)      cath  50    Cardiac valve prolapse     Chronic back pain     COPD [facility]chronic obstructive pulmonary disease) ([facility])     Ex-smoker     GERD (gastroesophageal reflux disease)     EGD    Glaucoma     H/O cardiac catheterization     40-50% LAD   katharine    Hyperlipidemia     Hypertension     Osteoarthritis     right shoulder and back    Pericarditis      Past Surgical History:   Procedure Laterality Date    APPENDECTOMY      at age 13years   [address]      right foot   [address]  ??  &   2012? ?    normal results    COLONOSCOPY      last one ???    EYE SURGERY  2009??    right eye   [address]    still has ovaries    [place] LAMINECTOMY,>2 SGMT,LUMBAR N/A 3/7/2018    LUMBAR LAMINECTOMY L3-S1 performed by Sincere Cantu MD at [facility] OFFICE/OUTPT [facility] N/A 3/12/2018    REPAIR DUROTOMY PLACEMENT OF SUBARACHNOID DRAIN performed by Sincere Cantu MD at [facility]  left    SPINE SURGERY      L5 cyst    UPPER GASTROINTESTINAL ENDOSCOPY       Restrictions/Precautions:  Fall Risk, Surgical Protocols, General Precautions        Spinal Precautions: No Bending, No Lifting, No Twisting  Other position/activity restrictions: up with assist and brace,activity as tolerated ; dangle and advance as tolerated if no Headache. Spinal: Lumbar Corset    Subjective:  Chart Reviewed: Yes  Response To Previous Treatment: Patient reporting fatigue but able to participate. Family / Caregiver Present: No  Subjective: RN approved session. Pt was resting in chair, agreeable to PT, however stated she did not feel up to walking or standing this date. Pain:  Yes. Pain Assessment  Pain Assessment: 0-10  Pain Level: 7  Pain Type: Surgical pain  Pain Location: Back  Pain Onset: Gradual  Pain Intervention(s): Rest;Repositioned       Social/Functional:  Lives With:  (sister)  Type of Home: House  Home Layout: One level  Home Access: Stairs to enter without rails  Entrance Stairs - Number of Steps: 2 platform steps   Home Equipment:  (none)     Objective:  Bridging: Unable to assess  Rolling to Left: Unable to assess  Rolling to Right: Unable to assess  Supine to Sit: Unable to assess  Sit to Supine: Unable to assess  Scooting: Unable to assess    Transfers  Sit to Stand: Unable to assess  Stand to sit: Unable to assess        Exercises:  Exercises  Straight Leg Raise: x10 KAYLYNN  Quad Sets: x10 KAYLYNN  Heelslides: x10 KAYLYNN  Gluteal Sets: x10   Hip Flexion: seated marches x10 KAYLYNN  Hip Abduction: x10 KAYLYNN  Knee Long Arc Quad: x10 KAYLYNN  Ankle Pumps: x20  Comments: Pt performed seated exs this date x10-20 reps ea, KAYLYNN. Activity Tolerance:  Activity Tolerance: Patient Tolerated treatment well;Patient limited by pain; Patient limited by fatigue  Activity Tolerance: Pt tolerated seated exs well, refused to stand or gait train. Assessment:   Body structures, Functions, Activity limitations: Decreased endurance, Decreased functional mobility , Decreased strength  Assessment: Pt

## 2018-03-22 NOTE — FLOWSHEET NOTE
03/21/18 2130   Encounter Summary   Services provided to: Patient   Referral/Consult From: [facility]   Continue Visiting Yes  (3/21 continue support)   Complexity of Encounter Low   Length of Encounter 15 minutes   Spiritual/Religion   Type Spiritual support   Assessment Approachable   Intervention Nurtured hope;Prayer   Outcome Coping     Provided words of encouragement. Patient was informed chaplains are available to address spiritual needs. Prayer with pt for comfort and strength. Care Plan:  Provide spiritual care support.

## 2018-03-23 ENCOUNTER — HOSPITAL ENCOUNTER (INPATIENT)
Age: 83
LOS: 11 days | Discharge: HOME HEALTH | DRG: 560 | End: 2018-04-03
Attending: PHYSICAL MEDICINE & REHABILITATION | Admitting: PHYSICAL MEDICINE & REHABILITATION
Payer: MEDICARE

## 2018-03-23 VITALS
RESPIRATION RATE: 16 BRPM | HEART RATE: 91 BPM | BODY MASS INDEX: 27.56 KG/M2 | TEMPERATURE: 98 F | HEIGHT: 61 IN | DIASTOLIC BLOOD PRESSURE: 58 MMHG | OXYGEN SATURATION: 98 % | SYSTOLIC BLOOD PRESSURE: 112 MMHG | WEIGHT: 146 LBS

## 2018-03-23 DIAGNOSIS — M54.50 ACUTE LOW BACK PAIN WITHOUT SCIATICA, UNSPECIFIED BACK PAIN LATERALITY: ICD-10-CM

## 2018-03-23 DIAGNOSIS — Z98.890 STATUS POST LUMBAR SURGERY: ICD-10-CM

## 2018-03-23 DIAGNOSIS — M48.062 SPINAL STENOSIS OF LUMBAR REGION WITH NEUROGENIC CLAUDICATION: Primary | ICD-10-CM

## 2018-03-23 PROCEDURE — 6370000000 HC RX 637 (ALT 250 FOR IP): Performed by: ORTHOPAEDIC SURGERY

## 2018-03-23 PROCEDURE — 6360000002 HC RX W HCPCS: Performed by: PHYSICIAN ASSISTANT

## 2018-03-23 PROCEDURE — 6370000000 HC RX 637 (ALT 250 FOR IP): Performed by: FAMILY MEDICINE

## 2018-03-23 PROCEDURE — 2580000003 HC RX 258: Performed by: ORTHOPAEDIC SURGERY

## 2018-03-23 PROCEDURE — 97116 GAIT TRAINING THERAPY: CPT

## 2018-03-23 PROCEDURE — 6370000000 HC RX 637 (ALT 250 FOR IP): Performed by: NURSE PRACTITIONER

## 2018-03-23 PROCEDURE — 97535 SELF CARE MNGMENT TRAINING: CPT

## 2018-03-23 PROCEDURE — 97530 THERAPEUTIC ACTIVITIES: CPT

## 2018-03-23 PROCEDURE — 99223 1ST HOSP IP/OBS HIGH 75: CPT | Performed by: PHYSICAL MEDICINE & REHABILITATION

## 2018-03-23 PROCEDURE — 6360000002 HC RX W HCPCS: Performed by: NURSE PRACTITIONER

## 2018-03-23 PROCEDURE — 1180000000 HC REHAB R&B

## 2018-03-23 RX ORDER — OXYCODONE HYDROCHLORIDE AND ACETAMINOPHEN 5; 325 MG/1; MG/1
2 TABLET ORAL EVERY 4 HOURS PRN
Status: CANCELLED | OUTPATIENT
Start: 2018-03-23

## 2018-03-23 RX ORDER — DOCUSATE SODIUM 100 MG/1
100 CAPSULE, LIQUID FILLED ORAL 2 TIMES DAILY
Status: DISCONTINUED | OUTPATIENT
Start: 2018-03-23 | End: 2018-04-03 | Stop reason: HOSPADM

## 2018-03-23 RX ORDER — SODIUM CHLORIDE 0.9 % (FLUSH) 0.9 %
10 SYRINGE (ML) INJECTION EVERY 12 HOURS SCHEDULED
Status: CANCELLED | OUTPATIENT
Start: 2018-03-23

## 2018-03-23 RX ORDER — NORTRIPTYLINE HYDROCHLORIDE 25 MG/1
50 CAPSULE ORAL NIGHTLY
Status: DISCONTINUED | OUTPATIENT
Start: 2018-03-23 | End: 2018-04-03 | Stop reason: HOSPADM

## 2018-03-23 RX ORDER — TIZANIDINE 4 MG/1
4 TABLET ORAL EVERY 6 HOURS PRN
Status: DISCONTINUED | OUTPATIENT
Start: 2018-03-23 | End: 2018-03-23

## 2018-03-23 RX ORDER — OXYCODONE HYDROCHLORIDE AND ACETAMINOPHEN 5; 325 MG/1; MG/1
1 TABLET ORAL EVERY 4 HOURS PRN
Status: CANCELLED | OUTPATIENT
Start: 2018-03-23

## 2018-03-23 RX ORDER — PANTOPRAZOLE SODIUM 40 MG/1
40 TABLET, DELAYED RELEASE ORAL
Status: DISCONTINUED | OUTPATIENT
Start: 2018-03-24 | End: 2018-04-03 | Stop reason: HOSPADM

## 2018-03-23 RX ORDER — ISOSORBIDE MONONITRATE 30 MG/1
30 TABLET, EXTENDED RELEASE ORAL DAILY
Status: DISCONTINUED | OUTPATIENT
Start: 2018-03-24 | End: 2018-03-27

## 2018-03-23 RX ORDER — ONDANSETRON 4 MG/1
4 TABLET, FILM COATED ORAL EVERY 8 HOURS PRN
Status: DISCONTINUED | OUTPATIENT
Start: 2018-03-23 | End: 2018-04-03 | Stop reason: HOSPADM

## 2018-03-23 RX ORDER — NORTRIPTYLINE HYDROCHLORIDE 25 MG/1
50 CAPSULE ORAL NIGHTLY
Status: CANCELLED | OUTPATIENT
Start: 2018-03-23

## 2018-03-23 RX ORDER — CYCLOBENZAPRINE HCL 10 MG
10 TABLET ORAL 3 TIMES DAILY PRN
Status: DISCONTINUED | OUTPATIENT
Start: 2018-03-23 | End: 2018-04-03 | Stop reason: HOSPADM

## 2018-03-23 RX ORDER — OXYCODONE HYDROCHLORIDE AND ACETAMINOPHEN 5; 325 MG/1; MG/1
2 TABLET ORAL EVERY 4 HOURS PRN
Status: DISCONTINUED | OUTPATIENT
Start: 2018-03-23 | End: 2018-04-03 | Stop reason: HOSPADM

## 2018-03-23 RX ORDER — SODIUM CHLORIDE 0.9 % (FLUSH) 0.9 %
10 SYRINGE (ML) INJECTION PRN
Status: CANCELLED | OUTPATIENT
Start: 2018-03-23

## 2018-03-23 RX ORDER — SIMVASTATIN 40 MG
40 TABLET ORAL NIGHTLY
Status: DISCONTINUED | OUTPATIENT
Start: 2018-03-23 | End: 2018-04-03 | Stop reason: HOSPADM

## 2018-03-23 RX ORDER — CYCLOBENZAPRINE HCL 10 MG
10 TABLET ORAL 3 TIMES DAILY PRN
Status: CANCELLED | OUTPATIENT
Start: 2018-03-23

## 2018-03-23 RX ORDER — LATANOPROST 50 UG/ML
1 SOLUTION/ DROPS OPHTHALMIC NIGHTLY
Status: CANCELLED | OUTPATIENT
Start: 2018-03-23

## 2018-03-23 RX ORDER — SIMVASTATIN 40 MG
40 TABLET ORAL NIGHTLY
Status: CANCELLED | OUTPATIENT
Start: 2018-03-23

## 2018-03-23 RX ORDER — SODIUM CHLORIDE 0.9 % (FLUSH) 0.9 %
10 SYRINGE (ML) INJECTION PRN
Status: DISCONTINUED | OUTPATIENT
Start: 2018-03-23 | End: 2018-03-23

## 2018-03-23 RX ORDER — PANTOPRAZOLE SODIUM 40 MG/1
40 TABLET, DELAYED RELEASE ORAL
Status: CANCELLED | OUTPATIENT
Start: 2018-03-24

## 2018-03-23 RX ORDER — SODIUM CHLORIDE 0.9 % (FLUSH) 0.9 %
10 SYRINGE (ML) INJECTION EVERY 12 HOURS SCHEDULED
Status: DISCONTINUED | OUTPATIENT
Start: 2018-03-23 | End: 2018-03-23

## 2018-03-23 RX ORDER — OXYCODONE HYDROCHLORIDE AND ACETAMINOPHEN 5; 325 MG/1; MG/1
1 TABLET ORAL EVERY 4 HOURS PRN
Status: DISCONTINUED | OUTPATIENT
Start: 2018-03-23 | End: 2018-04-03 | Stop reason: HOSPADM

## 2018-03-23 RX ORDER — POTASSIUM CHLORIDE 20 MEQ/1
20 TABLET, EXTENDED RELEASE ORAL
Status: CANCELLED | OUTPATIENT
Start: 2018-03-24

## 2018-03-23 RX ORDER — ONDANSETRON 4 MG/1
4 TABLET, FILM COATED ORAL EVERY 8 HOURS PRN
Status: CANCELLED | OUTPATIENT
Start: 2018-03-23

## 2018-03-23 RX ORDER — LATANOPROST 50 UG/ML
1 SOLUTION/ DROPS OPHTHALMIC NIGHTLY
Status: DISCONTINUED | OUTPATIENT
Start: 2018-03-23 | End: 2018-04-03 | Stop reason: HOSPADM

## 2018-03-23 RX ORDER — ACETAMINOPHEN 325 MG/1
650 TABLET ORAL EVERY 4 HOURS PRN
Status: DISCONTINUED | OUTPATIENT
Start: 2018-03-23 | End: 2018-04-03 | Stop reason: HOSPADM

## 2018-03-23 RX ORDER — DOCUSATE SODIUM 100 MG/1
100 CAPSULE, LIQUID FILLED ORAL 2 TIMES DAILY
Status: CANCELLED | OUTPATIENT
Start: 2018-03-23

## 2018-03-23 RX ORDER — ACETAMINOPHEN 325 MG/1
650 TABLET ORAL EVERY 4 HOURS PRN
Status: CANCELLED | OUTPATIENT
Start: 2018-03-23

## 2018-03-23 RX ORDER — ISOSORBIDE MONONITRATE 30 MG/1
30 TABLET, EXTENDED RELEASE ORAL DAILY
Status: CANCELLED | OUTPATIENT
Start: 2018-03-24

## 2018-03-23 RX ORDER — TIZANIDINE 4 MG/1
4 TABLET ORAL EVERY 6 HOURS PRN
Status: CANCELLED | OUTPATIENT
Start: 2018-03-23

## 2018-03-23 RX ORDER — POTASSIUM CHLORIDE 20 MEQ/1
20 TABLET, EXTENDED RELEASE ORAL
Status: DISCONTINUED | OUTPATIENT
Start: 2018-03-24 | End: 2018-03-26

## 2018-03-23 RX ADMIN — ENOXAPARIN SODIUM 30 MG: 30 INJECTION SUBCUTANEOUS at 18:08

## 2018-03-23 RX ADMIN — NORTRIPTYLINE HYDROCHLORIDE 50 MG: 25 CAPSULE ORAL at 19:40

## 2018-03-23 RX ADMIN — OXYCODONE HYDROCHLORIDE AND ACETAMINOPHEN 2 TABLET: 5; 325 TABLET ORAL at 18:08

## 2018-03-23 RX ADMIN — LATANOPROST 1 DROP: 50 SOLUTION OPHTHALMIC at 21:41

## 2018-03-23 RX ADMIN — CYCLOBENZAPRINE 10 MG: 10 TABLET, FILM COATED ORAL at 21:40

## 2018-03-23 RX ADMIN — ENOXAPARIN SODIUM 30 MG: 30 INJECTION SUBCUTANEOUS at 05:52

## 2018-03-23 RX ADMIN — OXYCODONE HYDROCHLORIDE AND ACETAMINOPHEN 1 TABLET: 5; 325 TABLET ORAL at 22:45

## 2018-03-23 RX ADMIN — OXYCODONE HYDROCHLORIDE AND ACETAMINOPHEN 2 TABLET: 5; 325 TABLET ORAL at 08:44

## 2018-03-23 RX ADMIN — OXYCODONE HYDROCHLORIDE AND ACETAMINOPHEN 2 TABLET: 5; 325 TABLET ORAL at 02:14

## 2018-03-23 RX ADMIN — PANTOPRAZOLE SODIUM 40 MG: 40 TABLET, DELAYED RELEASE ORAL at 05:52

## 2018-03-23 RX ADMIN — DOCUSATE SODIUM 100 MG: 100 CAPSULE ORAL at 08:44

## 2018-03-23 RX ADMIN — Medication 10 ML: at 08:45

## 2018-03-23 RX ADMIN — POTASSIUM CHLORIDE 20 MEQ: 1500 TABLET, EXTENDED RELEASE ORAL at 08:44

## 2018-03-23 RX ADMIN — DOCUSATE SODIUM 100 MG: 100 CAPSULE ORAL at 19:41

## 2018-03-23 RX ADMIN — OXYCODONE HYDROCHLORIDE AND ACETAMINOPHEN 2 TABLET: 5; 325 TABLET ORAL at 12:53

## 2018-03-23 RX ADMIN — METOPROLOL TARTRATE 12.5 MG: 25 TABLET ORAL at 19:40

## 2018-03-23 RX ADMIN — SIMVASTATIN 40 MG: 40 TABLET, FILM COATED ORAL at 19:41

## 2018-03-23 ASSESSMENT — PAIN SCALES - GENERAL
PAINLEVEL_OUTOF10: 7
PAINLEVEL_OUTOF10: 5
PAINLEVEL_OUTOF10: 9
PAINLEVEL_OUTOF10: 5
PAINLEVEL_OUTOF10: 8
PAINLEVEL_OUTOF10: 8
PAINLEVEL_OUTOF10: 4
PAINLEVEL_OUTOF10: 5
PAINLEVEL_OUTOF10: 7

## 2018-03-23 ASSESSMENT — PAIN DESCRIPTION - PAIN TYPE
TYPE: SURGICAL PAIN

## 2018-03-23 ASSESSMENT — PAIN DESCRIPTION - LOCATION
LOCATION: BACK

## 2018-03-23 ASSESSMENT — PAIN DESCRIPTION - ORIENTATION
ORIENTATION: LOWER

## 2018-03-23 ASSESSMENT — PAIN DESCRIPTION - ONSET: ONSET: ON-GOING

## 2018-03-23 ASSESSMENT — PAIN DESCRIPTION - FREQUENCY: FREQUENCY: INTERMITTENT

## 2018-03-23 ASSESSMENT — PAIN DESCRIPTION - DESCRIPTORS
DESCRIPTORS: ACHING
DESCRIPTORS: ACHING
DESCRIPTORS: ACHING;SORE
DESCRIPTORS: SORE

## 2018-03-23 ASSESSMENT — PAIN DESCRIPTION - PROGRESSION: CLINICAL_PROGRESSION: NOT CHANGED

## 2018-03-23 NOTE — PROGRESS NOTES
Malcolm Gotti is a 81 y.o. female patient. No current facility-administered medications for this encounter. No current outpatient prescriptions on file.      Facility-Administered Medications Ordered in Other Encounters   Medication Dose Route Frequency Provider Last Rate Last Dose    acetaminophen (TYLENOL) tablet 650 mg  650 mg Oral Q4H PRN Tabatha Mendieta NP        cyclobenzaprine (FLEXERIL) tablet 10 mg  10 mg Oral TID PRN Tabatha Mendieta NP        docusate sodium (COLACE) capsule 100 mg  100 mg Oral BID Tabatha Mendieta NP        magnesium hydroxide (MILK OF MAGNESIA) 400 MG/5ML suspension 30 mL  30 mL Oral Daily PRN Tabatha Mendieta NP        oxyCODONE-acetaminophen (PERCOCET) 5-325 MG per tablet 1 tablet  1 tablet Oral Q4H PRN Tabatha Mendieta NP        Or    oxyCODONE-acetaminophen (PERCOCET) 5-325 MG per tablet 2 tablet  2 tablet Oral Q4H PRN Tabatha Mendieta NP   2 tablet at 03/23/18 1808    sodium chloride flush 0.9 % injection 10 mL  10 mL Intravenous 2 times per day Tabatha Mendieta NP        sodium chloride flush 0.9 % injection 10 mL  10 mL Intravenous PRN Tabatha Mendieta NP        enoxaparin (LOVENOX) injection 30 mg  30 mg Subcutaneous Q12H Tabatha Mendieta NP   30 mg at 03/23/18 1808    [START ON 3/24/2018] isosorbide mononitrate (IMDUR) extended release tablet 30 mg  30 mg Oral Daily Tabatha Mendieta NP        latanoprost (XALATAN) 0.005 % ophthalmic solution 1 drop  1 drop Both Eyes Nightly Tabatha Mendieta NP        metoprolol tartrate (LOPRESSOR) tablet 12.5 mg  12.5 mg Oral BID Tabatha Mendieta NP        nortriptyline (PAMELOR) capsule 50 mg  50 mg Oral Nightly Tabatha Mendieta NP        ondansetron [facility]) tablet 4 mg  4 mg Oral Q8H PRN Tabatha Mendieta NP        [START ON 3/24/2018] pantoprazole (PROTONIX) tablet 40 mg  40 mg Oral QAM AC Tabatha Mendieta NP        [START ON 3/24/2018]

## 2018-03-23 NOTE — PROGRESS NOTES
status, Decreased strength, Decreased endurance, Decreased balance, Decreased high-level IADLs  Prognosis: Good  Discharge Recommendations: IP Rehab    Patient Education:  Patient Education: Role of OT, transfer training, self care skills, IP rehab processes    Equipment Recommendations:  Equipment Needed: No  Other: defer to next level of care    Safety:  Safety Devices in place: Yes  Type of devices: All fall risk precautions in place, Call light within reach, Chair alarm in place, Gait belt, Left in chair    Plan:  Times per week: 6x  Current Treatment Recommendations: Strengthening, Balance Training, Functional Mobility Training, Endurance Training, Safety Education & Training, Patient/Caregiver Education & Training, Equipment Evaluation, Education, & procurement, Self-Care / ADL, Home Management Training    Goals:  Patient goals : \"go home\"    Short term goals  Time Frame for Short term goals: 2 weeks  Short term goal 1: Pt will demo functional mobility to/from bathroom with SBA to increase independence with toileting/grooming tasks. Short term goal 2: Pt will complete all functional transfers with SBA and 0 cues for safety to increase ease with ADL tasks. Short term goal 3: Pt will complete LB ADL tasks with moderate assistance, using LHAE prn. Short term goal 4: Pt will tolerate dynamic standing activity X4 minutes with 1-2 hand release and SBA to increase stability needed for clothing mgmt and sinkside grooming tasks. Short term goal 5: Pt will complete BUE light resistive exercises X10-15 reps to increase overall strength/endurance needed for ADL and transfers.    Long term goals  Time Frame for Long term goals : not set due to ELOS

## 2018-03-23 NOTE — PLAN OF CARE
Problem: Pain:  Goal: Pain level will decrease  Pain level will decrease    Outcome: Ongoing  Patient has complained of pain in the lower back with a rating of 7-8 on 0-10 scale. PRN pain medications given when requested to help achieve patient's stated pain goal of 5. Problem: Falls - Risk of  Goal: Absence of falls  Outcome: Ongoing  Patient has remained free of falls during this shift. Fall prevention measures in place. Problem: Neurological  Goal: Maximum potential motor/sensory/cognitive function  Outcome: Ongoing  Patient is alert and oriented. Denies any numbness or tingling in the extremities. Problem: Cardiovascular  Goal: No DVT, peripheral vascular complications  Outcome: Ongoing  Patient has had no s/sx of DVT during this shift. Lovenox given for prevention. Problem: GI  Goal: No bowel complications  Outcome: Ongoing  Bowel sounds active x 4. Patient is passing gas. Bowel medications given as ordered. 1 small BM during this shift. Problem:   Goal: Adequate urinary output  Outcome: Ongoing  Patient has been voiding adequate amounts of clear, yellow urine. Problem: Skin Integrity/Risk  Goal: Wound healing  Outcome: Ongoing  Patient has a surgical incision on the mid back. Dressing remains dry and intact. Two small open blisters on the right lower back. Dressing reapplied. Problem: Musculor/Skeletal Functional Status  Goal: Highest potential functional level  Outcome: Ongoing  Patient is up to [facility] with 1 assist using walker and gait belt. Gait is steady. Problem: Discharge Planning:  Goal: Discharged to appropriate level of care  Discharged to appropriate level of care   Outcome: Ongoing  Patient is planning to go to TCU/Rehab upon discharge. Case management and  assisting with discharge needs. Comments: Care plan reviewed with patient. Patient verbalize understanding of the plan of care and contribute to goal setting.

## 2018-03-23 NOTE — PROGRESS NOTES
Physical Therapy   [facility] - 7K-07/007-A    Time In: 0740  Time Out: 0804  Timed Code Treatment Minutes: 24 Minutes  Minutes: 24          Date: 3/23/2018  Patient Name: Todd Betts,  Gender:  female        MRN: [id number]  : 1935  (81 y.o.)     Referring Practitioner: Nick Neal PA-C  Diagnosis: Lumbar back pain  Additional Pertinent Hx: Pt admitted 3-8 for back sx. Pt had sx 3-7 for removal  xstop and lumbar kohler.Pt had a dural leak, had been on bedrest.Had sx then again on 3-12 for repair durotomy and placement of subarachnoid drain. Orders received today to dangle and advance as tolerated if no Headache. Past Medical History:   Diagnosis Date    Acute blood loss as cause of postoperative anemia 2018    CAD (coronary artery disease)      cath  50    Cardiac valve prolapse     Chronic back pain     [facility])     Ex-smoker     GERD (gastroesophageal reflux disease)     EGD    Glaucoma     H/O cardiac catheterization     40-50% LAD   katharine    Hyperlipidemia     Hypertension     Osteoarthritis     right shoulder and back    Pericarditis      Past Surgical History:   Procedure Laterality Date    APPENDECTOMY      at age 13years   [address]      right foot   [address]  ??  &   2012? ?    normal results    COLONOSCOPY      last one ???    EYE SURGERY  ??    right eye   [address]    still has ovaries    [place] LAMINECTOMY,>2 SGMT,LUMBAR N/A 3/7/2018    LUMBAR LAMINECTOMY L3-S1 performed by Evans Guevara MD at [facility] OFFICE/OUTPT [facility] N/A 3/12/2018    REPAIR DUROTOMY PLACEMENT OF SUBARACHNOID DRAIN performed by Evans Guevara MD at [facility]  left    SPINE SURGERY      L5 cyst    UPPER GASTROINTESTINAL ENDOSCOPY

## 2018-03-23 NOTE — PROGRESS NOTES
Carroll Alex is a 81 y.o. female patient.     Current Facility-Administered Medications   Medication Dose Route Frequency Provider Last Rate Last Dose    potassium chloride (KLOR-CON M) extended release tablet 20 mEq  20 mEq Oral Daily with breakfast Shivam Cagle MD   20 mEq at 03/22/18 0838    0.9 % sodium chloride infusion   Intravenous Continuous Ashkan Fragoso  mL/hr at 03/21/18 1049      enoxaparin (LOVENOX) injection 30 mg  30 mg Subcutaneous Q12H Curt Ramírez PA-C   30 mg at 03/22/18 1801    labetalol (NORMODYNE;TRANDATE) injection 5 mg  5 mg Intravenous Q10 Min PRN Agneli Lazar MD        meperidine (DEMEROL) injection 12.5 mg  12.5 mg Intravenous Q5 Min PRN Angeli Lazar MD        sodium chloride flush 0.9 % injection 10 mL  10 mL Intravenous 2 times per day Ashkan Fragoso MD   10 mL at 03/22/18 2027    sodium chloride flush 0.9 % injection 10 mL  10 mL Intravenous PRN Ashkan Fragoso MD        acetaminophen (TYLENOL) tablet 650 mg  650 mg Oral Q4H PRN Ashkan Fragoso MD   650 mg at 03/20/18 2028    acetaminophen (TYLENOL) suppository 650 mg  650 mg Rectal Q4H PRN Ashkan Fragoso MD        cyclobenzaprine (FLEXERIL) tablet 10 mg  10 mg Oral TID PRN Ashkan Fragoso MD   10 mg at 03/18/18 1304    docusate sodium (COLACE) capsule 100 mg  100 mg Oral BID Ashkan Fragoso MD   100 mg at 03/22/18 2027    magnesium hydroxide (MILK OF MAGNESIA) 400 MG/5ML suspension 30 mL  30 mL Oral Daily PRN Ashkan Fragoso MD        ondansetron [facility]) injection 4 mg  4 mg Intravenous Q6H PRN Ashkan Fragoso MD   4 mg at 03/19/18 0617    oxyCODONE-acetaminophen (PERCOCET) 5-325 MG per tablet 1 tablet  1 tablet Oral Q4H PRN Ashkan Fragoso MD   1 tablet at 03/21/18 0006    Or    oxyCODONE-acetaminophen (PERCOCET) 5-325 MG per tablet 2 tablet  2 tablet Oral Q4H PRN Ashkan Fragoso MD   2 tablet at 03/23/18 0214    latanoprost (XALATAN) 0.005 % ophthalmic solution 1 drop  1 drop Both Eyes Nightly Theron Vaca MD   1 drop at 03/22/18 2027    tiZANidine (ZANAFLEX) tablet 4 mg  4 mg Oral Q6H PRN Theron Vaca MD   4 mg at 03/09/18 1723    isosorbide mononitrate (IMDUR) extended release tablet 30 mg  30 mg Oral Daily Cristhian Solomon MD   30 mg at 03/22/18 4792    simvastatin (ZOCOR) tablet 40 mg  40 mg Oral Nightly Theron Vaca MD   40 mg at 03/22/18 2027    nortriptyline (PAMELOR) capsule 50 mg  50 mg Oral Nightly Theron Vaca MD   50 mg at 03/22/18 2027    pantoprazole (PROTONIX) tablet 40 mg  40 mg Oral QAM AC Theron Vaca MD   40 mg at 03/22/18 5691    metoprolol tartrate (LOPRESSOR) tablet 12.5 mg  12.5 mg Oral BID Cristhian Solomon MD   12.5 mg at 03/22/18 0448     No Known Allergies  Principal Problem:    Spinal stenosis of lumbar region with neurogenic claudication  Active Problems:    GERD (gastroesophageal reflux disease)    COPD (chronic obstructive pulmonary disease) (HCC)    Chronic back pain    CAD (coronary artery disease)    Hypertension    Lumbar back pain    Acute blood loss as cause of postoperative anemia    Inadvertent durotomy  Resolved Problems:    * No resolved hospital problems. *    Blood pressure (!) 113/56, pulse 92, temperature 98.9 °F (37.2 °C), temperature source Oral, resp. rate 16, height 5' 1\" (1.549 m), weight 146 lb (66.2 kg), SpO2 95 %, not currently breastfeeding. Subjective:  Symptoms:  Stable. Diet:  Adequate intake. Activity level: Returning to normal.    Pain:  She reports no pain. Objective:  General Appearance:  Comfortable. Vital signs: (most recent): Blood pressure (!) 113/56, pulse 92, temperature 98.9 °F (37.2 °C), temperature source Oral, resp. rate 16, height 5' 1\" (1.549 m), weight 146 lb (66.2 kg), SpO2 95 %, not currently breastfeeding. Vital signs are normal.    Output: Producing urine. HEENT: Normal HEENT exam.    Lungs:  Normal effort and normal respiratory rate. Breath sounds clear to auscultation.     Heart:

## 2018-03-23 NOTE — CONSULTS
decline in overall activity tolerance s/p back surgery and prolonged bedrest impacting ability to complete ADL/IADL, transfers, and mobility at OF. Pt will continue to benefit from OT services to increase independence with these tasks to facilitate return to [facility]. Speech therapy: FIMS:        Past Medical History:        Diagnosis Date    Acute blood loss as cause of postoperative anemia 03/2018    CAD (coronary artery disease)      cath  50    Cardiac valve prolapse     Chronic back pain     COPD [facility]chronic obstructive pulmonary [facility]) [facility])     Ex-smoker     GERD (gastroesophageal reflux disease) 2/07    EGD    Glaucoma     H/O cardiac catheterization 2002    40-50% LAD   katharine    Hyperlipidemia     Hypertension     Osteoarthritis 1999    right shoulder and back    Pericarditis 1996       Past Surgical History:        Procedure Laterality Date    APPENDECTOMY      at age 13years   [address]  2004    right foot   [address]  2007??  &   2012? ?    normal results    COLONOSCOPY      last one 2000???    EYE SURGERY  2009??    right eye   [address]    still has ovaries    [place] LAMINECTOMY,>2 SGMT,LUMBAR N/A 3/7/2018    LUMBAR LAMINECTOMY L3-S1 performed by Zeinab Diallo MD at [address] [place] [address] OFFICE/OUTPT [facility] N/A 3/12/2018    REPAIR DUROTOMY PLACEMENT OF SUBARACHNOID DRAIN performed by Zeinab Diallo MD at [facility]  left    SPINE SURGERY  7/02    L5 cyst    UPPER GASTROINTESTINAL ENDOSCOPY  2007    [facility]       Allergies:    No Known Allergies     Current Medications:   Current Facility-Administered Medications: potassium chloride (KLOR-CON M) extended release tablet 20 mEq, 20 mEq, Oral, Daily with breakfast  0.9 % sodium chloride infusion, , Intravenous, Continuous  enoxaparin (LOVENOX) injection 30 mg, 30 mg, Subcutaneous, Q12H  labetalol (NORMODYNE;TRANDATE) injection 5 mg, 5 mg, Intravenous, Q10

## 2018-03-23 NOTE — PROGRESS NOTES
Department of Orthopedic Surgery  Spine Service  Indu Solis PA-C Progress Note        Subjective:  Pt lying in bed. Just worked with therapy. Felt a little dizzy and is now lying down resting. She felt well yesterday with no HA after sitting in the recliner throughout the day. Will continue to advance with activity. Consulting TCU for rehab.      Vitals  VITALS:  BP (!) 124/51   Pulse 92   Temp 98.6 °F (37 °C) (Oral)   Resp 16   Ht 5' 1\" (1.549 m)   Wt 146 lb (66.2 kg)   SpO2 97%   BMI 27.59 kg/m²   24HR INTAKE/OUTPUT:    Intake/Output Summary (Last 24 hours) at 03/23/18 5209  Last data filed at 03/22/18 9742   Gross per 24 hour   Intake              300 ml   Output              300 ml   Net                0 ml     URINARY CATHETER OUTPUT (Garcia):  [REMOVED] Urethral Catheter Straight-tip;Latex 16 fr-Output (mL): 1000 mL  DRAIN/TUBE OUTPUT:  Closed/Suction Drain Right Back Accordion 7 Kazakh-Output (ml): 2 ml  [REMOVED] Closed/Suction Drain Right Back Other (Comment)-Output (ml): 0 ml  [REMOVED] Closed/Suction Drain Midline Back Accordion 10 Kazakh-Output (ml): 90 ml      PHYSICAL EXAM:    Orientation:  alert and oriented to person, place and time    Incision:  dressing in place, clean, dry, intact    Lower Extremity Motor :  quadriceps, extensor hallucis longus, dorsiflexion, plantarflexion 5/5 bilaterally  Lower Extremity Sensory:  Intact L1-S1    Flatus:  positive    ABNORMAL EXAM FINDINGS:  none    LABS:    HgB:    Lab Results   Component Value Date    HGB 9.5 03/21/2018         ASSESSMENT AND PLAN:    Post operative day 11      2:  Activity Level:  Advancing as tolerated to sitting and walking if tolerated  3:  Pain Control:  Controlled with medication  4:  Discharge Planning:  Planning on TCU

## 2018-03-24 LAB
ALBUMIN SERPL-MCNC: 2.9 G/DL (ref 3.5–5.1)
ALP BLD-CCNC: 174 U/L (ref 38–126)
ALT SERPL-CCNC: 16 U/L (ref 11–66)
ANION GAP SERPL CALCULATED.3IONS-SCNC: 13 MEQ/L (ref 8–16)
ANISOCYTOSIS: ABNORMAL
AST SERPL-CCNC: 24 U/L (ref 5–40)
BASOPHILS # BLD: 0.5 %
BASOPHILS ABSOLUTE: 0 THOU/MM3 (ref 0–0.1)
BILIRUB SERPL-MCNC: < 0.2 MG/DL (ref 0.3–1.2)
BUN BLDV-MCNC: 18 MG/DL (ref 7–22)
CALCIUM SERPL-MCNC: 9.4 MG/DL (ref 8.5–10.5)
CHLORIDE BLD-SCNC: 101 MEQ/L (ref 98–111)
CO2: 22 MEQ/L (ref 23–33)
CREAT SERPL-MCNC: 0.8 MG/DL (ref 0.4–1.2)
EOSINOPHIL # BLD: 4.8 %
EOSINOPHILS ABSOLUTE: 0.4 THOU/MM3 (ref 0–0.4)
GFR SERPL CREATININE-BSD FRML MDRD: 69 ML/MIN/1.73M2
GLUCOSE BLD-MCNC: 96 MG/DL (ref 70–108)
HCT VFR BLD CALC: 30.4 % (ref 37–47)
HEMOGLOBIN: 9.9 GM/DL (ref 12–16)
LYMPHOCYTES # BLD: 19.5 %
LYMPHOCYTES ABSOLUTE: 1.7 THOU/MM3 (ref 1–4.8)
MCH RBC QN AUTO: 29.8 PG (ref 27–31)
MCHC RBC AUTO-ENTMCNC: 32.5 GM/DL (ref 33–37)
MCV RBC AUTO: 91.9 FL (ref 81–99)
MONOCYTES # BLD: 8.4 %
MONOCYTES ABSOLUTE: 0.7 THOU/MM3 (ref 0.4–1.3)
NUCLEATED RED BLOOD CELLS: 0 /100 WBC
PDW BLD-RTO: 17 % (ref 11.5–14.5)
PLATELET # BLD: 414 THOU/MM3 (ref 130–400)
PMV BLD AUTO: 7.2 FL (ref 7.4–10.4)
POTASSIUM REFLEX MAGNESIUM: 4.7 MEQ/L (ref 3.5–5.2)
RBC # BLD: 3.31 MILL/MM3 (ref 4.2–5.4)
SEG NEUTROPHILS: 66.8 %
SEGMENTED NEUTROPHILS ABSOLUTE COUNT: 5.9 THOU/MM3 (ref 1.8–7.7)
SODIUM BLD-SCNC: 136 MEQ/L (ref 135–145)
TOTAL PROTEIN: 6.4 G/DL (ref 6.1–8)
WBC # BLD: 8.9 THOU/MM3 (ref 4.8–10.8)

## 2018-03-24 PROCEDURE — 97162 PT EVAL MOD COMPLEX 30 MIN: CPT

## 2018-03-24 PROCEDURE — 80053 COMPREHEN METABOLIC PANEL: CPT

## 2018-03-24 PROCEDURE — 36415 COLL VENOUS BLD VENIPUNCTURE: CPT

## 2018-03-24 PROCEDURE — 1180000000 HC REHAB R&B

## 2018-03-24 PROCEDURE — 97530 THERAPEUTIC ACTIVITIES: CPT

## 2018-03-24 PROCEDURE — 97535 SELF CARE MNGMENT TRAINING: CPT

## 2018-03-24 PROCEDURE — 97166 OT EVAL MOD COMPLEX 45 MIN: CPT

## 2018-03-24 PROCEDURE — 85025 COMPLETE CBC W/AUTO DIFF WBC: CPT

## 2018-03-24 PROCEDURE — 6370000000 HC RX 637 (ALT 250 FOR IP): Performed by: NURSE PRACTITIONER

## 2018-03-24 PROCEDURE — 99223 1ST HOSP IP/OBS HIGH 75: CPT | Performed by: PHYSICAL MEDICINE & REHABILITATION

## 2018-03-24 PROCEDURE — 97110 THERAPEUTIC EXERCISES: CPT

## 2018-03-24 PROCEDURE — 97116 GAIT TRAINING THERAPY: CPT

## 2018-03-24 PROCEDURE — 51798 US URINE CAPACITY MEASURE: CPT

## 2018-03-24 PROCEDURE — 6360000002 HC RX W HCPCS: Performed by: NURSE PRACTITIONER

## 2018-03-24 RX ADMIN — POTASSIUM CHLORIDE 20 MEQ: 1500 TABLET, EXTENDED RELEASE ORAL at 09:04

## 2018-03-24 RX ADMIN — SIMVASTATIN 40 MG: 40 TABLET, FILM COATED ORAL at 19:52

## 2018-03-24 RX ADMIN — LATANOPROST 1 DROP: 50 SOLUTION OPHTHALMIC at 19:53

## 2018-03-24 RX ADMIN — OXYCODONE HYDROCHLORIDE AND ACETAMINOPHEN 1 TABLET: 5; 325 TABLET ORAL at 19:53

## 2018-03-24 RX ADMIN — NORTRIPTYLINE HYDROCHLORIDE 50 MG: 25 CAPSULE ORAL at 19:52

## 2018-03-24 RX ADMIN — OXYCODONE HYDROCHLORIDE AND ACETAMINOPHEN 2 TABLET: 5; 325 TABLET ORAL at 09:04

## 2018-03-24 RX ADMIN — ENOXAPARIN SODIUM 30 MG: 30 INJECTION SUBCUTANEOUS at 16:49

## 2018-03-24 RX ADMIN — METOPROLOL TARTRATE 12.5 MG: 25 TABLET ORAL at 19:52

## 2018-03-24 RX ADMIN — ENOXAPARIN SODIUM 30 MG: 30 INJECTION SUBCUTANEOUS at 04:12

## 2018-03-24 RX ADMIN — OXYCODONE HYDROCHLORIDE AND ACETAMINOPHEN 1 TABLET: 5; 325 TABLET ORAL at 04:11

## 2018-03-24 RX ADMIN — OXYCODONE HYDROCHLORIDE AND ACETAMINOPHEN 2 TABLET: 5; 325 TABLET ORAL at 14:04

## 2018-03-24 RX ADMIN — PANTOPRAZOLE SODIUM 40 MG: 40 TABLET, DELAYED RELEASE ORAL at 06:38

## 2018-03-24 RX ADMIN — DOCUSATE SODIUM 100 MG: 100 CAPSULE ORAL at 09:04

## 2018-03-24 RX ADMIN — ISOSORBIDE MONONITRATE 30 MG: 30 TABLET ORAL at 09:04

## 2018-03-24 ASSESSMENT — PAIN DESCRIPTION - ONSET
ONSET: ON-GOING
ONSET: ON-GOING

## 2018-03-24 ASSESSMENT — PAIN DESCRIPTION - LOCATION
LOCATION: BACK

## 2018-03-24 ASSESSMENT — PAIN SCALES - GENERAL
PAINLEVEL_OUTOF10: 5
PAINLEVEL_OUTOF10: 8
PAINLEVEL_OUTOF10: 8
PAINLEVEL_OUTOF10: 5
PAINLEVEL_OUTOF10: 8
PAINLEVEL_OUTOF10: 6
PAINLEVEL_OUTOF10: 8
PAINLEVEL_OUTOF10: 8
PAINLEVEL_OUTOF10: 7
PAINLEVEL_OUTOF10: 8

## 2018-03-24 ASSESSMENT — PAIN DESCRIPTION - DESCRIPTORS
DESCRIPTORS: ACHING

## 2018-03-24 ASSESSMENT — PAIN DESCRIPTION - FREQUENCY
FREQUENCY: INTERMITTENT

## 2018-03-24 ASSESSMENT — PAIN DESCRIPTION - ORIENTATION
ORIENTATION: LOWER
ORIENTATION: LOWER

## 2018-03-24 ASSESSMENT — PAIN DESCRIPTION - PAIN TYPE
TYPE: SURGICAL PAIN
TYPE: SURGICAL PAIN

## 2018-03-24 ASSESSMENT — PAIN DESCRIPTION - PROGRESSION
CLINICAL_PROGRESSION: NOT CHANGED
CLINICAL_PROGRESSION: NOT CHANGED

## 2018-03-25 PROCEDURE — 51701 INSERT BLADDER CATHETER: CPT

## 2018-03-25 PROCEDURE — 6370000000 HC RX 637 (ALT 250 FOR IP): Performed by: NURSE PRACTITIONER

## 2018-03-25 PROCEDURE — 6360000002 HC RX W HCPCS: Performed by: NURSE PRACTITIONER

## 2018-03-25 PROCEDURE — 51798 US URINE CAPACITY MEASURE: CPT

## 2018-03-25 PROCEDURE — 1180000000 HC REHAB R&B

## 2018-03-25 RX ADMIN — CYCLOBENZAPRINE 10 MG: 10 TABLET, FILM COATED ORAL at 02:11

## 2018-03-25 RX ADMIN — ENOXAPARIN SODIUM 30 MG: 30 INJECTION SUBCUTANEOUS at 05:42

## 2018-03-25 RX ADMIN — CYCLOBENZAPRINE 10 MG: 10 TABLET, FILM COATED ORAL at 21:24

## 2018-03-25 RX ADMIN — PANTOPRAZOLE SODIUM 40 MG: 40 TABLET, DELAYED RELEASE ORAL at 05:38

## 2018-03-25 RX ADMIN — ENOXAPARIN SODIUM 30 MG: 30 INJECTION SUBCUTANEOUS at 16:26

## 2018-03-25 RX ADMIN — SIMVASTATIN 40 MG: 40 TABLET, FILM COATED ORAL at 20:01

## 2018-03-25 RX ADMIN — NORTRIPTYLINE HYDROCHLORIDE 50 MG: 25 CAPSULE ORAL at 20:01

## 2018-03-25 RX ADMIN — ISOSORBIDE MONONITRATE 30 MG: 30 TABLET ORAL at 07:58

## 2018-03-25 RX ADMIN — METOPROLOL TARTRATE 12.5 MG: 25 TABLET ORAL at 07:58

## 2018-03-25 RX ADMIN — METOPROLOL TARTRATE 12.5 MG: 25 TABLET ORAL at 20:01

## 2018-03-25 RX ADMIN — OXYCODONE HYDROCHLORIDE AND ACETAMINOPHEN 2 TABLET: 5; 325 TABLET ORAL at 18:45

## 2018-03-25 RX ADMIN — OXYCODONE HYDROCHLORIDE AND ACETAMINOPHEN 1 TABLET: 5; 325 TABLET ORAL at 12:14

## 2018-03-25 RX ADMIN — DOCUSATE SODIUM 100 MG: 100 CAPSULE ORAL at 07:59

## 2018-03-25 RX ADMIN — DOCUSATE SODIUM 100 MG: 100 CAPSULE ORAL at 20:01

## 2018-03-25 RX ADMIN — LATANOPROST 1 DROP: 50 SOLUTION OPHTHALMIC at 20:01

## 2018-03-25 RX ADMIN — POTASSIUM CHLORIDE 20 MEQ: 1500 TABLET, EXTENDED RELEASE ORAL at 07:58

## 2018-03-25 RX ADMIN — OXYCODONE HYDROCHLORIDE AND ACETAMINOPHEN 2 TABLET: 5; 325 TABLET ORAL at 05:38

## 2018-03-25 ASSESSMENT — PAIN SCALES - GENERAL
PAINLEVEL_OUTOF10: 7
PAINLEVEL_OUTOF10: 5
PAINLEVEL_OUTOF10: 8
PAINLEVEL_OUTOF10: 8

## 2018-03-25 ASSESSMENT — PAIN DESCRIPTION - PAIN TYPE: TYPE: SURGICAL PAIN

## 2018-03-25 ASSESSMENT — PAIN DESCRIPTION - ONSET: ONSET: ON-GOING

## 2018-03-25 ASSESSMENT — PAIN DESCRIPTION - DESCRIPTORS: DESCRIPTORS: ACHING

## 2018-03-25 ASSESSMENT — PAIN DESCRIPTION - LOCATION: LOCATION: BACK

## 2018-03-26 PROCEDURE — 97110 THERAPEUTIC EXERCISES: CPT

## 2018-03-26 PROCEDURE — 97116 GAIT TRAINING THERAPY: CPT

## 2018-03-26 PROCEDURE — 51798 US URINE CAPACITY MEASURE: CPT

## 2018-03-26 PROCEDURE — 97530 THERAPEUTIC ACTIVITIES: CPT

## 2018-03-26 PROCEDURE — 6370000000 HC RX 637 (ALT 250 FOR IP): Performed by: NURSE PRACTITIONER

## 2018-03-26 PROCEDURE — 51701 INSERT BLADDER CATHETER: CPT

## 2018-03-26 PROCEDURE — 97535 SELF CARE MNGMENT TRAINING: CPT

## 2018-03-26 PROCEDURE — 99232 SBSQ HOSP IP/OBS MODERATE 35: CPT | Performed by: PHYSICAL MEDICINE & REHABILITATION

## 2018-03-26 PROCEDURE — 1180000000 HC REHAB R&B

## 2018-03-26 PROCEDURE — 6360000002 HC RX W HCPCS: Performed by: NURSE PRACTITIONER

## 2018-03-26 RX ORDER — POTASSIUM CHLORIDE 20 MEQ/1
10 TABLET, EXTENDED RELEASE ORAL
Status: DISCONTINUED | OUTPATIENT
Start: 2018-03-27 | End: 2018-04-03 | Stop reason: HOSPADM

## 2018-03-26 RX ADMIN — ENOXAPARIN SODIUM 30 MG: 30 INJECTION SUBCUTANEOUS at 18:05

## 2018-03-26 RX ADMIN — PANTOPRAZOLE SODIUM 40 MG: 40 TABLET, DELAYED RELEASE ORAL at 06:14

## 2018-03-26 RX ADMIN — METOPROLOL TARTRATE 12.5 MG: 25 TABLET ORAL at 20:22

## 2018-03-26 RX ADMIN — ISOSORBIDE MONONITRATE 30 MG: 30 TABLET ORAL at 07:56

## 2018-03-26 RX ADMIN — OXYCODONE HYDROCHLORIDE AND ACETAMINOPHEN 2 TABLET: 5; 325 TABLET ORAL at 00:02

## 2018-03-26 RX ADMIN — LATANOPROST 1 DROP: 50 SOLUTION OPHTHALMIC at 20:22

## 2018-03-26 RX ADMIN — DOCUSATE SODIUM 100 MG: 100 CAPSULE ORAL at 07:56

## 2018-03-26 RX ADMIN — SIMVASTATIN 40 MG: 40 TABLET, FILM COATED ORAL at 20:23

## 2018-03-26 RX ADMIN — METOPROLOL TARTRATE 12.5 MG: 25 TABLET ORAL at 07:56

## 2018-03-26 RX ADMIN — OXYCODONE HYDROCHLORIDE AND ACETAMINOPHEN 2 TABLET: 5; 325 TABLET ORAL at 22:45

## 2018-03-26 RX ADMIN — DOCUSATE SODIUM 100 MG: 100 CAPSULE ORAL at 20:22

## 2018-03-26 RX ADMIN — ENOXAPARIN SODIUM 30 MG: 30 INJECTION SUBCUTANEOUS at 06:14

## 2018-03-26 RX ADMIN — OXYCODONE HYDROCHLORIDE AND ACETAMINOPHEN 2 TABLET: 5; 325 TABLET ORAL at 18:09

## 2018-03-26 RX ADMIN — POTASSIUM CHLORIDE 20 MEQ: 1500 TABLET, EXTENDED RELEASE ORAL at 07:56

## 2018-03-26 RX ADMIN — OXYCODONE HYDROCHLORIDE AND ACETAMINOPHEN 1 TABLET: 5; 325 TABLET ORAL at 07:56

## 2018-03-26 RX ADMIN — OXYCODONE HYDROCHLORIDE AND ACETAMINOPHEN 2 TABLET: 5; 325 TABLET ORAL at 12:04

## 2018-03-26 RX ADMIN — NORTRIPTYLINE HYDROCHLORIDE 50 MG: 25 CAPSULE ORAL at 20:23

## 2018-03-26 ASSESSMENT — PAIN SCALES - GENERAL
PAINLEVEL_OUTOF10: 5
PAINLEVEL_OUTOF10: 0
PAINLEVEL_OUTOF10: 6
PAINLEVEL_OUTOF10: 9
PAINLEVEL_OUTOF10: 9
PAINLEVEL_OUTOF10: 8
PAINLEVEL_OUTOF10: 3
PAINLEVEL_OUTOF10: 8
PAINLEVEL_OUTOF10: 8
PAINLEVEL_OUTOF10: 6
PAINLEVEL_OUTOF10: 0
PAINLEVEL_OUTOF10: 7
PAINLEVEL_OUTOF10: 6

## 2018-03-26 ASSESSMENT — PAIN DESCRIPTION - LOCATION
LOCATION: BACK

## 2018-03-26 ASSESSMENT — PAIN DESCRIPTION - PAIN TYPE
TYPE: ACUTE PAIN
TYPE: SURGICAL PAIN
TYPE: ACUTE PAIN
TYPE: ACUTE PAIN
TYPE: SURGICAL PAIN

## 2018-03-26 ASSESSMENT — PAIN DESCRIPTION - DESCRIPTORS
DESCRIPTORS: ACHING

## 2018-03-26 ASSESSMENT — PAIN DESCRIPTION - FREQUENCY: FREQUENCY: INTERMITTENT

## 2018-03-26 ASSESSMENT — PAIN DESCRIPTION - PROGRESSION
CLINICAL_PROGRESSION: NOT CHANGED

## 2018-03-26 ASSESSMENT — PAIN DESCRIPTION - ONSET: ONSET: ON-GOING

## 2018-03-26 ASSESSMENT — PAIN DESCRIPTION - ORIENTATION: ORIENTATION: LOWER

## 2018-03-27 LAB
ANION GAP SERPL CALCULATED.3IONS-SCNC: 14 MEQ/L (ref 8–16)
ANISOCYTOSIS: ABNORMAL
BASOPHILS # BLD: 0.5 %
BASOPHILS ABSOLUTE: 0 THOU/MM3 (ref 0–0.1)
BUN BLDV-MCNC: 24 MG/DL (ref 7–22)
CALCIUM SERPL-MCNC: 9.2 MG/DL (ref 8.5–10.5)
CHLORIDE BLD-SCNC: 99 MEQ/L (ref 98–111)
CO2: 24 MEQ/L (ref 23–33)
CREAT SERPL-MCNC: 0.9 MG/DL (ref 0.4–1.2)
EOSINOPHIL # BLD: 4.1 %
EOSINOPHILS ABSOLUTE: 0.3 THOU/MM3 (ref 0–0.4)
GFR SERPL CREATININE-BSD FRML MDRD: 60 ML/MIN/1.73M2
GLUCOSE BLD-MCNC: 144 MG/DL (ref 70–108)
HCT VFR BLD CALC: 31.6 % (ref 37–47)
HEMOGLOBIN: 10.1 GM/DL (ref 12–16)
LYMPHOCYTES # BLD: 17.6 %
LYMPHOCYTES ABSOLUTE: 1.4 THOU/MM3 (ref 1–4.8)
MCH RBC QN AUTO: 29.3 PG (ref 27–31)
MCHC RBC AUTO-ENTMCNC: 32 GM/DL (ref 33–37)
MCV RBC AUTO: 91.4 FL (ref 81–99)
MONOCYTES # BLD: 9.4 %
MONOCYTES ABSOLUTE: 0.8 THOU/MM3 (ref 0.4–1.3)
NUCLEATED RED BLOOD CELLS: 0 /100 WBC
PDW BLD-RTO: 17.1 % (ref 11.5–14.5)
PLATELET # BLD: 467 THOU/MM3 (ref 130–400)
PMV BLD AUTO: 7 FL (ref 7.4–10.4)
POTASSIUM SERPL-SCNC: 4.5 MEQ/L (ref 3.5–5.2)
RBC # BLD: 3.46 MILL/MM3 (ref 4.2–5.4)
SEG NEUTROPHILS: 68.4 %
SEGMENTED NEUTROPHILS ABSOLUTE COUNT: 5.6 THOU/MM3 (ref 1.8–7.7)
SODIUM BLD-SCNC: 137 MEQ/L (ref 135–145)
WBC # BLD: 8.2 THOU/MM3 (ref 4.8–10.8)

## 2018-03-27 PROCEDURE — 80048 BASIC METABOLIC PNL TOTAL CA: CPT

## 2018-03-27 PROCEDURE — 51798 US URINE CAPACITY MEASURE: CPT

## 2018-03-27 PROCEDURE — 6370000000 HC RX 637 (ALT 250 FOR IP): Performed by: PHYSICAL MEDICINE & REHABILITATION

## 2018-03-27 PROCEDURE — 1180000000 HC REHAB R&B

## 2018-03-27 PROCEDURE — 6370000000 HC RX 637 (ALT 250 FOR IP): Performed by: NURSE PRACTITIONER

## 2018-03-27 PROCEDURE — 97530 THERAPEUTIC ACTIVITIES: CPT

## 2018-03-27 PROCEDURE — 97116 GAIT TRAINING THERAPY: CPT

## 2018-03-27 PROCEDURE — 99232 SBSQ HOSP IP/OBS MODERATE 35: CPT | Performed by: PHYSICAL MEDICINE & REHABILITATION

## 2018-03-27 PROCEDURE — 97110 THERAPEUTIC EXERCISES: CPT

## 2018-03-27 PROCEDURE — 85025 COMPLETE CBC W/AUTO DIFF WBC: CPT

## 2018-03-27 PROCEDURE — 6360000002 HC RX W HCPCS: Performed by: NURSE PRACTITIONER

## 2018-03-27 PROCEDURE — 97535 SELF CARE MNGMENT TRAINING: CPT

## 2018-03-27 PROCEDURE — 36415 COLL VENOUS BLD VENIPUNCTURE: CPT

## 2018-03-27 PROCEDURE — 51701 INSERT BLADDER CATHETER: CPT

## 2018-03-27 RX ORDER — ISOSORBIDE MONONITRATE 30 MG/1
15 TABLET, EXTENDED RELEASE ORAL DAILY
Status: DISCONTINUED | OUTPATIENT
Start: 2018-03-28 | End: 2018-03-29

## 2018-03-27 RX ADMIN — SIMVASTATIN 40 MG: 40 TABLET, FILM COATED ORAL at 21:03

## 2018-03-27 RX ADMIN — METOPROLOL TARTRATE 12.5 MG: 25 TABLET ORAL at 21:03

## 2018-03-27 RX ADMIN — POTASSIUM CHLORIDE 10 MEQ: 1500 TABLET, EXTENDED RELEASE ORAL at 08:08

## 2018-03-27 RX ADMIN — DOCUSATE SODIUM 100 MG: 100 CAPSULE ORAL at 08:08

## 2018-03-27 RX ADMIN — OXYCODONE HYDROCHLORIDE AND ACETAMINOPHEN 1 TABLET: 5; 325 TABLET ORAL at 18:30

## 2018-03-27 RX ADMIN — NORTRIPTYLINE HYDROCHLORIDE 50 MG: 25 CAPSULE ORAL at 21:03

## 2018-03-27 RX ADMIN — OXYCODONE HYDROCHLORIDE AND ACETAMINOPHEN 1 TABLET: 5; 325 TABLET ORAL at 23:47

## 2018-03-27 RX ADMIN — ISOSORBIDE MONONITRATE 30 MG: 30 TABLET ORAL at 08:09

## 2018-03-27 RX ADMIN — CYCLOBENZAPRINE 10 MG: 10 TABLET, FILM COATED ORAL at 21:03

## 2018-03-27 RX ADMIN — ENOXAPARIN SODIUM 30 MG: 30 INJECTION SUBCUTANEOUS at 05:16

## 2018-03-27 RX ADMIN — PANTOPRAZOLE SODIUM 40 MG: 40 TABLET, DELAYED RELEASE ORAL at 05:16

## 2018-03-27 RX ADMIN — LATANOPROST 1 DROP: 50 SOLUTION OPHTHALMIC at 21:03

## 2018-03-27 RX ADMIN — OXYCODONE HYDROCHLORIDE AND ACETAMINOPHEN 2 TABLET: 5; 325 TABLET ORAL at 13:12

## 2018-03-27 RX ADMIN — ENOXAPARIN SODIUM 30 MG: 30 INJECTION SUBCUTANEOUS at 17:25

## 2018-03-27 RX ADMIN — OXYCODONE HYDROCHLORIDE AND ACETAMINOPHEN 2 TABLET: 5; 325 TABLET ORAL at 05:16

## 2018-03-27 RX ADMIN — DOCUSATE SODIUM 100 MG: 100 CAPSULE ORAL at 21:03

## 2018-03-27 ASSESSMENT — PAIN SCALES - GENERAL
PAINLEVEL_OUTOF10: 5
PAINLEVEL_OUTOF10: 0
PAINLEVEL_OUTOF10: 7
PAINLEVEL_OUTOF10: 8
PAINLEVEL_OUTOF10: 5
PAINLEVEL_OUTOF10: 5
PAINLEVEL_OUTOF10: 7
PAINLEVEL_OUTOF10: 9
PAINLEVEL_OUTOF10: 4

## 2018-03-27 ASSESSMENT — PAIN DESCRIPTION - FREQUENCY: FREQUENCY: INTERMITTENT

## 2018-03-27 ASSESSMENT — PAIN DESCRIPTION - PAIN TYPE
TYPE: SURGICAL PAIN
TYPE: ACUTE PAIN
TYPE: SURGICAL PAIN

## 2018-03-27 ASSESSMENT — PAIN DESCRIPTION - DESCRIPTORS: DESCRIPTORS: ACHING

## 2018-03-27 ASSESSMENT — PAIN DESCRIPTION - ORIENTATION
ORIENTATION: LOWER
ORIENTATION: LOWER

## 2018-03-27 ASSESSMENT — PAIN DESCRIPTION - ONSET: ONSET: ON-GOING

## 2018-03-27 ASSESSMENT — PAIN DESCRIPTION - LOCATION
LOCATION: BACK

## 2018-03-28 LAB
AMORPHOUS: ABNORMAL
BACTERIA: ABNORMAL /HPF
BILIRUBIN URINE: NEGATIVE
BLOOD, URINE: ABNORMAL
CASTS 2: ABNORMAL /LPF
CASTS UA: ABNORMAL /LPF
CHARACTER, URINE: ABNORMAL
COLOR: YELLOW
CRYSTALS, UA: ABNORMAL
EPITHELIAL CELLS, UA: ABNORMAL /HPF
GLUCOSE URINE: NEGATIVE MG/DL
KETONES, URINE: NEGATIVE
LEUKOCYTE ESTERASE, URINE: ABNORMAL
MISCELLANEOUS 2: ABNORMAL
NITRITE, URINE: POSITIVE
PH UA: 6.5
PROTEIN UA: 100
RBC URINE: ABNORMAL /HPF
RENAL EPITHELIAL, UA: ABNORMAL
SPECIFIC GRAVITY, URINE: 1.01 (ref 1–1.03)
UROBILINOGEN, URINE: 0.2 EU/DL
WBC UA: > 200 /HPF
YEAST: ABNORMAL

## 2018-03-28 PROCEDURE — 97530 THERAPEUTIC ACTIVITIES: CPT

## 2018-03-28 PROCEDURE — 99232 SBSQ HOSP IP/OBS MODERATE 35: CPT | Performed by: PHYSICAL MEDICINE & REHABILITATION

## 2018-03-28 PROCEDURE — 51798 US URINE CAPACITY MEASURE: CPT

## 2018-03-28 PROCEDURE — 97535 SELF CARE MNGMENT TRAINING: CPT

## 2018-03-28 PROCEDURE — 6360000002 HC RX W HCPCS: Performed by: NURSE PRACTITIONER

## 2018-03-28 PROCEDURE — 97110 THERAPEUTIC EXERCISES: CPT

## 2018-03-28 PROCEDURE — 87086 URINE CULTURE/COLONY COUNT: CPT

## 2018-03-28 PROCEDURE — 6370000000 HC RX 637 (ALT 250 FOR IP): Performed by: FAMILY MEDICINE

## 2018-03-28 PROCEDURE — 87186 SC STD MICRODIL/AGAR DIL: CPT

## 2018-03-28 PROCEDURE — 6370000000 HC RX 637 (ALT 250 FOR IP): Performed by: NURSE PRACTITIONER

## 2018-03-28 PROCEDURE — 81001 URINALYSIS AUTO W/SCOPE: CPT

## 2018-03-28 PROCEDURE — 1180000000 HC REHAB R&B

## 2018-03-28 PROCEDURE — 97116 GAIT TRAINING THERAPY: CPT

## 2018-03-28 PROCEDURE — 6370000000 HC RX 637 (ALT 250 FOR IP): Performed by: PHYSICAL MEDICINE & REHABILITATION

## 2018-03-28 RX ORDER — SULFAMETHOXAZOLE AND TRIMETHOPRIM 800; 160 MG/1; MG/1
1 TABLET ORAL EVERY 12 HOURS SCHEDULED
Status: DISCONTINUED | OUTPATIENT
Start: 2018-03-28 | End: 2018-03-29

## 2018-03-28 RX ADMIN — METOPROLOL TARTRATE 12.5 MG: 25 TABLET ORAL at 08:22

## 2018-03-28 RX ADMIN — SIMVASTATIN 40 MG: 40 TABLET, FILM COATED ORAL at 20:57

## 2018-03-28 RX ADMIN — METOPROLOL TARTRATE 12.5 MG: 25 TABLET ORAL at 20:57

## 2018-03-28 RX ADMIN — SULFAMETHOXAZOLE AND TRIMETHOPRIM 1 TABLET: 800; 160 TABLET ORAL at 22:19

## 2018-03-28 RX ADMIN — OXYCODONE HYDROCHLORIDE AND ACETAMINOPHEN 2 TABLET: 5; 325 TABLET ORAL at 08:21

## 2018-03-28 RX ADMIN — ISOSORBIDE MONONITRATE 15 MG: 30 TABLET ORAL at 08:21

## 2018-03-28 RX ADMIN — OXYCODONE HYDROCHLORIDE AND ACETAMINOPHEN 2 TABLET: 5; 325 TABLET ORAL at 18:19

## 2018-03-28 RX ADMIN — DOCUSATE SODIUM 100 MG: 100 CAPSULE ORAL at 20:56

## 2018-03-28 RX ADMIN — DOCUSATE SODIUM 100 MG: 100 CAPSULE ORAL at 08:22

## 2018-03-28 RX ADMIN — PANTOPRAZOLE SODIUM 40 MG: 40 TABLET, DELAYED RELEASE ORAL at 05:45

## 2018-03-28 RX ADMIN — LATANOPROST 1 DROP: 50 SOLUTION OPHTHALMIC at 20:58

## 2018-03-28 RX ADMIN — ENOXAPARIN SODIUM 30 MG: 30 INJECTION SUBCUTANEOUS at 17:37

## 2018-03-28 RX ADMIN — NORTRIPTYLINE HYDROCHLORIDE 50 MG: 25 CAPSULE ORAL at 20:57

## 2018-03-28 RX ADMIN — OXYCODONE HYDROCHLORIDE AND ACETAMINOPHEN 2 TABLET: 5; 325 TABLET ORAL at 13:17

## 2018-03-28 RX ADMIN — POTASSIUM CHLORIDE 10 MEQ: 1500 TABLET, EXTENDED RELEASE ORAL at 08:22

## 2018-03-28 RX ADMIN — ENOXAPARIN SODIUM 30 MG: 30 INJECTION SUBCUTANEOUS at 05:45

## 2018-03-28 RX ADMIN — CYCLOBENZAPRINE 10 MG: 10 TABLET, FILM COATED ORAL at 22:19

## 2018-03-28 ASSESSMENT — PAIN DESCRIPTION - LOCATION
LOCATION: BACK

## 2018-03-28 ASSESSMENT — PAIN SCALES - GENERAL
PAINLEVEL_OUTOF10: 9
PAINLEVEL_OUTOF10: 7
PAINLEVEL_OUTOF10: 6
PAINLEVEL_OUTOF10: 5
PAINLEVEL_OUTOF10: 9
PAINLEVEL_OUTOF10: 0
PAINLEVEL_OUTOF10: 0
PAINLEVEL_OUTOF10: 6
PAINLEVEL_OUTOF10: 9

## 2018-03-28 ASSESSMENT — PAIN DESCRIPTION - PAIN TYPE
TYPE: SURGICAL PAIN
TYPE: SURGICAL PAIN

## 2018-03-28 ASSESSMENT — PAIN DESCRIPTION - DESCRIPTORS: DESCRIPTORS: ACHING

## 2018-03-28 ASSESSMENT — PAIN DESCRIPTION - ONSET: ONSET: ON-GOING

## 2018-03-28 ASSESSMENT — PAIN DESCRIPTION - ORIENTATION
ORIENTATION: LOWER

## 2018-03-28 ASSESSMENT — PAIN DESCRIPTION - PROGRESSION: CLINICAL_PROGRESSION: NOT CHANGED

## 2018-03-28 ASSESSMENT — PAIN DESCRIPTION - FREQUENCY: FREQUENCY: INTERMITTENT

## 2018-03-29 PROCEDURE — 97116 GAIT TRAINING THERAPY: CPT

## 2018-03-29 PROCEDURE — 6370000000 HC RX 637 (ALT 250 FOR IP): Performed by: FAMILY MEDICINE

## 2018-03-29 PROCEDURE — 6360000002 HC RX W HCPCS: Performed by: NURSE PRACTITIONER

## 2018-03-29 PROCEDURE — 51798 US URINE CAPACITY MEASURE: CPT

## 2018-03-29 PROCEDURE — 6370000000 HC RX 637 (ALT 250 FOR IP): Performed by: NURSE PRACTITIONER

## 2018-03-29 PROCEDURE — 97530 THERAPEUTIC ACTIVITIES: CPT

## 2018-03-29 PROCEDURE — 97110 THERAPEUTIC EXERCISES: CPT

## 2018-03-29 PROCEDURE — 51701 INSERT BLADDER CATHETER: CPT

## 2018-03-29 PROCEDURE — 97535 SELF CARE MNGMENT TRAINING: CPT

## 2018-03-29 PROCEDURE — 99232 SBSQ HOSP IP/OBS MODERATE 35: CPT | Performed by: PHYSICAL MEDICINE & REHABILITATION

## 2018-03-29 PROCEDURE — 1180000000 HC REHAB R&B

## 2018-03-29 PROCEDURE — 6370000000 HC RX 637 (ALT 250 FOR IP): Performed by: PHYSICAL MEDICINE & REHABILITATION

## 2018-03-29 RX ORDER — CIPROFLOXACIN 500 MG/1
500 TABLET, FILM COATED ORAL EVERY 12 HOURS SCHEDULED
Status: DISCONTINUED | OUTPATIENT
Start: 2018-03-29 | End: 2018-04-03 | Stop reason: HOSPADM

## 2018-03-29 RX ORDER — ISOSORBIDE MONONITRATE 30 MG/1
30 TABLET, EXTENDED RELEASE ORAL DAILY
Status: DISCONTINUED | OUTPATIENT
Start: 2018-03-30 | End: 2018-04-03 | Stop reason: HOSPADM

## 2018-03-29 RX ADMIN — OXYCODONE HYDROCHLORIDE AND ACETAMINOPHEN 1 TABLET: 5; 325 TABLET ORAL at 01:20

## 2018-03-29 RX ADMIN — ENOXAPARIN SODIUM 30 MG: 30 INJECTION SUBCUTANEOUS at 05:39

## 2018-03-29 RX ADMIN — LATANOPROST 1 DROP: 50 SOLUTION OPHTHALMIC at 22:15

## 2018-03-29 RX ADMIN — NORTRIPTYLINE HYDROCHLORIDE 50 MG: 25 CAPSULE ORAL at 22:14

## 2018-03-29 RX ADMIN — CIPROFLOXACIN 500 MG: 500 TABLET, FILM COATED ORAL at 22:14

## 2018-03-29 RX ADMIN — ISOSORBIDE MONONITRATE 15 MG: 30 TABLET ORAL at 07:59

## 2018-03-29 RX ADMIN — ENOXAPARIN SODIUM 30 MG: 30 INJECTION SUBCUTANEOUS at 16:31

## 2018-03-29 RX ADMIN — METOPROLOL TARTRATE 12.5 MG: 25 TABLET ORAL at 22:14

## 2018-03-29 RX ADMIN — SIMVASTATIN 40 MG: 40 TABLET, FILM COATED ORAL at 22:14

## 2018-03-29 RX ADMIN — DOCUSATE SODIUM 100 MG: 100 CAPSULE ORAL at 22:14

## 2018-03-29 RX ADMIN — SULFAMETHOXAZOLE AND TRIMETHOPRIM 1 TABLET: 800; 160 TABLET ORAL at 07:59

## 2018-03-29 RX ADMIN — CIPROFLOXACIN 500 MG: 500 TABLET, FILM COATED ORAL at 13:04

## 2018-03-29 RX ADMIN — OXYCODONE HYDROCHLORIDE AND ACETAMINOPHEN 1 TABLET: 5; 325 TABLET ORAL at 07:13

## 2018-03-29 RX ADMIN — PANTOPRAZOLE SODIUM 40 MG: 40 TABLET, DELAYED RELEASE ORAL at 05:39

## 2018-03-29 RX ADMIN — OXYCODONE HYDROCHLORIDE AND ACETAMINOPHEN 2 TABLET: 5; 325 TABLET ORAL at 22:22

## 2018-03-29 RX ADMIN — OXYCODONE HYDROCHLORIDE AND ACETAMINOPHEN 1 TABLET: 5; 325 TABLET ORAL at 13:52

## 2018-03-29 RX ADMIN — POTASSIUM CHLORIDE 10 MEQ: 1500 TABLET, EXTENDED RELEASE ORAL at 07:58

## 2018-03-29 RX ADMIN — DOCUSATE SODIUM 100 MG: 100 CAPSULE ORAL at 07:58

## 2018-03-29 RX ADMIN — METOPROLOL TARTRATE 12.5 MG: 25 TABLET ORAL at 07:59

## 2018-03-29 ASSESSMENT — PAIN SCALES - GENERAL
PAINLEVEL_OUTOF10: 5
PAINLEVEL_OUTOF10: 4
PAINLEVEL_OUTOF10: 0
PAINLEVEL_OUTOF10: 6
PAINLEVEL_OUTOF10: 6
PAINLEVEL_OUTOF10: 5
PAINLEVEL_OUTOF10: 0
PAINLEVEL_OUTOF10: 6
PAINLEVEL_OUTOF10: 7
PAINLEVEL_OUTOF10: 5
PAINLEVEL_OUTOF10: 8

## 2018-03-29 ASSESSMENT — PAIN DESCRIPTION - FREQUENCY: FREQUENCY: INTERMITTENT

## 2018-03-29 ASSESSMENT — PAIN DESCRIPTION - DESCRIPTORS
DESCRIPTORS: ACHING
DESCRIPTORS: ACHING

## 2018-03-29 ASSESSMENT — PAIN DESCRIPTION - LOCATION
LOCATION: BACK

## 2018-03-29 ASSESSMENT — PAIN DESCRIPTION - ORIENTATION
ORIENTATION: LOWER

## 2018-03-29 ASSESSMENT — ENCOUNTER SYMPTOMS: SHORTNESS OF BREATH: 0

## 2018-03-29 ASSESSMENT — PAIN DESCRIPTION - ONSET: ONSET: ON-GOING

## 2018-03-29 ASSESSMENT — PAIN DESCRIPTION - PAIN TYPE
TYPE: SURGICAL PAIN
TYPE: ACUTE PAIN

## 2018-03-29 ASSESSMENT — PAIN DESCRIPTION - PROGRESSION: CLINICAL_PROGRESSION: NOT CHANGED

## 2018-03-29 NOTE — DISCHARGE SUMMARY
[address] [place] [address]                                 DISCHARGE SUMMARY    PATIENT NAME: Balbir Patterson                 :        1935  MED REC NO:   [id number]                           ROOM:       0007  ACCOUNT NO:   [de-identified]                           ADMIT DATE: 2018  PROVIDER:     Jason Mandel Pa-C                    [facility] DATE: 2018    DISCHARGE DIAGNOSES:  1. Lumbar spinal stenosis, status post previous X-Stop placement at levels  of L3-L4 and L4-L5. 2.  Symptoms of neurogenic claudication and right leg radiculopathy and  grade 1 spondylolisthesis at the levels of L4-L5 and L5-S1.  3.  Status post her first operation, she experienced persistent  cerebrospinal fluid leak, status post lumbar spine laminectomy. OPERATIONS PERFORMED:  On 2018, removal of X-Stop device at the  levels L3-L4 and L4-L5 with an L4 to S1 laminectomy was performed and then  on 2018, repair of durotomy and placement of subarachnoid drain was  performed. HOSPITAL COURSE:  The patient, 81years of age, presented to our office  with severe low back and leg pain that was bilateral, having longstanding  symptoms of leg and back pain unimproved. She was now seeking definitive  care. She underwent surgery at [address] [place] [address] on 2018 to  include an L3 to S1 laminectomy with removal of the X-stop devices of L3-L4  and L4-L5. She was then admitted to floor 7K for care following the  operation. A durotomy was noted during her procedure due to significant  adherence and adhesions to the thecal sac during surgery. Postoperatively day #1, the patient was lying flat in bed reporting some  achiness in her low back and legs.   She had been placed on lying flat bed  rest until we were able to determine if the durotomy has time to heal.  On  postoperative day #1, she denied any headache or light sensitivity. Postoperative day #2, the patient developed some right knee pain that began  on her first day postoperatively and had been constant. She has still not  been out of bed. There have been no falls or injuries. Her right knee was  tender; however, her low back soreness was resolving. An x-ray of the  right knee was ordered with no significant findings on the x-ray. By postoperative day #3, she continued having right knee pain, still  currently on bed rest due to the durotomy, her drains showing increased  output and becoming more clear giving us the impression that the durotomy  was still leaking. By postoperative day #4, the patient did note some  frustration with her inability to be up. Her drain outputs were decreasing  over a 24-hour period, but were not consistent and she was also  experiencing increased headaches throughout the day on postoperative day  #3. At this point, we did discuss with the patient that there would be a  possibility of a future surgery for placement of subarachnoid drain if her  drain outputs were not decreasing adequately and then by postoperative day  #5, the drains were not cooperating, still having an increased output of  fluid that was clear in nature. At this point, the decision was made for  her to go back to surgery with placement of the subarachnoid drain to help  manage the healing of the durotomy. She underwent that surgery on  03/12/2018. Postoperatively after her second surgery on day #1, she was continuing to  feel well with no acute changes. She denied any headache. Subarachnoid  was in place and working well. Throughout the next few days, the patient  was kept on a lying flat bed rest and was slowly advanced to a 30-degree  angle which she did tolerate well. She was given 1 unit of blood per Dr. Trung Lieberman due to declining hemoglobin levels and her significant cardiac  history.   She tolerated being elevated to 30 degrees somewhat; however, she  did begin to have a headache at certain points. They resolved when she was  returned to lying flat. We continued to advance the elevation of the bed  as tolerated. By postoperative day #6, the subarachnoid drain was removed leaving the  Hemovac drain in place to assess for any continued spinal fluid leak. She  was still kept lying flat. On postoperative day #7, she had continued to  feel well. The plan was for her to sit up in bed which she did tolerate  with mild headaches. Postoperative day #8, she was up, seated at the bedside, feeling  continually well letting her legs dangle throughout this. She was  advancing her activity as tolerated. When she felt dizzy or having  headaches, she would return to a lying flat position to rest.  By  postoperative day #10, Garcia catheter was removed. She was able to  tolerate sitting up well and felt encouraged with her progress. She was  able to be up, sitting in the chair in the room at this point. By postoperative day #11, she had continued to be up, able to sit and  tolerate in the chair. Plan at this point was for her to go to the TCU for  continued rehab. Throughout this process, her family doctor, Dr. Toan Ly, was consistent in seeing and assisting with the patient's care,  and overall by postoperative day #11, after her second surgery, she was  medically ready to be able to be transported to the [facility]. All drains had  been removed, and at this point, she was cleared to continue with further  physical therapy.         Ignacio Craven [place]    D: 03/28/2018 8:41:06       T: 03/28/2018 17:48:51     [email or web address]  Job#: [id number]     Doc#: [id number]    CC:

## 2018-03-30 LAB
ORGANISM: ABNORMAL
URINE CULTURE REFLEX: ABNORMAL

## 2018-03-30 PROCEDURE — 51798 US URINE CAPACITY MEASURE: CPT

## 2018-03-30 PROCEDURE — 51701 INSERT BLADDER CATHETER: CPT

## 2018-03-30 PROCEDURE — 97110 THERAPEUTIC EXERCISES: CPT

## 2018-03-30 PROCEDURE — 97530 THERAPEUTIC ACTIVITIES: CPT

## 2018-03-30 PROCEDURE — 1180000000 HC REHAB R&B

## 2018-03-30 PROCEDURE — 99232 SBSQ HOSP IP/OBS MODERATE 35: CPT | Performed by: PHYSICAL MEDICINE & REHABILITATION

## 2018-03-30 PROCEDURE — 6370000000 HC RX 637 (ALT 250 FOR IP): Performed by: PHYSICAL MEDICINE & REHABILITATION

## 2018-03-30 PROCEDURE — 97116 GAIT TRAINING THERAPY: CPT

## 2018-03-30 PROCEDURE — 6360000002 HC RX W HCPCS: Performed by: NURSE PRACTITIONER

## 2018-03-30 PROCEDURE — 6360000002 HC RX W HCPCS: Performed by: FAMILY MEDICINE

## 2018-03-30 PROCEDURE — 97535 SELF CARE MNGMENT TRAINING: CPT

## 2018-03-30 PROCEDURE — 97112 NEUROMUSCULAR REEDUCATION: CPT

## 2018-03-30 PROCEDURE — 6370000000 HC RX 637 (ALT 250 FOR IP): Performed by: NURSE PRACTITIONER

## 2018-03-30 PROCEDURE — 2580000003 HC RX 258: Performed by: FAMILY MEDICINE

## 2018-03-30 RX ORDER — SODIUM CHLORIDE 0.9 % (FLUSH) 0.9 %
10 SYRINGE (ML) INJECTION 2 TIMES DAILY
Status: DISCONTINUED | OUTPATIENT
Start: 2018-03-30 | End: 2018-04-02

## 2018-03-30 RX ADMIN — POTASSIUM CHLORIDE 10 MEQ: 1500 TABLET, EXTENDED RELEASE ORAL at 08:15

## 2018-03-30 RX ADMIN — METOPROLOL TARTRATE 6.25 MG: 25 TABLET ORAL at 22:25

## 2018-03-30 RX ADMIN — LATANOPROST 1 DROP: 50 SOLUTION OPHTHALMIC at 20:39

## 2018-03-30 RX ADMIN — DOCUSATE SODIUM 100 MG: 100 CAPSULE ORAL at 08:15

## 2018-03-30 RX ADMIN — CEFEPIME HYDROCHLORIDE 1 G: 1 INJECTION, POWDER, FOR SOLUTION INTRAMUSCULAR; INTRAVENOUS at 01:21

## 2018-03-30 RX ADMIN — SIMVASTATIN 40 MG: 40 TABLET, FILM COATED ORAL at 20:38

## 2018-03-30 RX ADMIN — NORTRIPTYLINE HYDROCHLORIDE 50 MG: 25 CAPSULE ORAL at 20:38

## 2018-03-30 RX ADMIN — OXYCODONE HYDROCHLORIDE AND ACETAMINOPHEN 1 TABLET: 5; 325 TABLET ORAL at 08:30

## 2018-03-30 RX ADMIN — DOCUSATE SODIUM 100 MG: 100 CAPSULE ORAL at 20:38

## 2018-03-30 RX ADMIN — OXYCODONE HYDROCHLORIDE AND ACETAMINOPHEN 1 TABLET: 5; 325 TABLET ORAL at 14:11

## 2018-03-30 RX ADMIN — ENOXAPARIN SODIUM 30 MG: 30 INJECTION SUBCUTANEOUS at 17:03

## 2018-03-30 RX ADMIN — OXYCODONE HYDROCHLORIDE AND ACETAMINOPHEN 1 TABLET: 5; 325 TABLET ORAL at 20:37

## 2018-03-30 RX ADMIN — CIPROFLOXACIN 500 MG: 500 TABLET, FILM COATED ORAL at 08:14

## 2018-03-30 RX ADMIN — CYCLOBENZAPRINE 10 MG: 10 TABLET, FILM COATED ORAL at 22:24

## 2018-03-30 RX ADMIN — PANTOPRAZOLE SODIUM 40 MG: 40 TABLET, DELAYED RELEASE ORAL at 05:00

## 2018-03-30 RX ADMIN — CIPROFLOXACIN 500 MG: 500 TABLET, FILM COATED ORAL at 20:38

## 2018-03-30 RX ADMIN — ENOXAPARIN SODIUM 30 MG: 30 INJECTION SUBCUTANEOUS at 05:00

## 2018-03-30 RX ADMIN — Medication 10 ML: at 08:30

## 2018-03-30 RX ADMIN — Medication 10 ML: at 22:29

## 2018-03-30 RX ADMIN — CYCLOBENZAPRINE 5 MG: 10 TABLET, FILM COATED ORAL at 04:52

## 2018-03-30 ASSESSMENT — PAIN DESCRIPTION - DESCRIPTORS
DESCRIPTORS: ACHING
DESCRIPTORS: ACHING

## 2018-03-30 ASSESSMENT — PAIN DESCRIPTION - LOCATION
LOCATION: BACK
LOCATION: HEAD
LOCATION: BACK

## 2018-03-30 ASSESSMENT — PAIN DESCRIPTION - PAIN TYPE
TYPE: SURGICAL PAIN

## 2018-03-30 ASSESSMENT — PAIN SCALES - GENERAL
PAINLEVEL_OUTOF10: 5
PAINLEVEL_OUTOF10: 6
PAINLEVEL_OUTOF10: 7
PAINLEVEL_OUTOF10: 0
PAINLEVEL_OUTOF10: 3
PAINLEVEL_OUTOF10: 5
PAINLEVEL_OUTOF10: 0
PAINLEVEL_OUTOF10: 0
PAINLEVEL_OUTOF10: 1
PAINLEVEL_OUTOF10: 5

## 2018-03-30 ASSESSMENT — PAIN DESCRIPTION - ORIENTATION
ORIENTATION: LOWER;MID
ORIENTATION: LOWER;MID
ORIENTATION: LOWER
ORIENTATION: LOWER;MID

## 2018-03-30 ASSESSMENT — ENCOUNTER SYMPTOMS: SHORTNESS OF BREATH: 0

## 2018-03-30 ASSESSMENT — PAIN DESCRIPTION - FREQUENCY: FREQUENCY: INTERMITTENT

## 2018-03-31 PROCEDURE — 2580000003 HC RX 258: Performed by: FAMILY MEDICINE

## 2018-03-31 PROCEDURE — 6370000000 HC RX 637 (ALT 250 FOR IP): Performed by: FAMILY MEDICINE

## 2018-03-31 PROCEDURE — 6370000000 HC RX 637 (ALT 250 FOR IP): Performed by: NURSE PRACTITIONER

## 2018-03-31 PROCEDURE — 97110 THERAPEUTIC EXERCISES: CPT

## 2018-03-31 PROCEDURE — 1180000000 HC REHAB R&B

## 2018-03-31 PROCEDURE — 97530 THERAPEUTIC ACTIVITIES: CPT

## 2018-03-31 PROCEDURE — 97116 GAIT TRAINING THERAPY: CPT

## 2018-03-31 PROCEDURE — 51798 US URINE CAPACITY MEASURE: CPT

## 2018-03-31 PROCEDURE — 6360000002 HC RX W HCPCS: Performed by: NURSE PRACTITIONER

## 2018-03-31 PROCEDURE — 6370000000 HC RX 637 (ALT 250 FOR IP): Performed by: PHYSICAL MEDICINE & REHABILITATION

## 2018-03-31 PROCEDURE — 51701 INSERT BLADDER CATHETER: CPT

## 2018-03-31 RX ADMIN — METOPROLOL TARTRATE 6.25 MG: 25 TABLET ORAL at 08:40

## 2018-03-31 RX ADMIN — DOCUSATE SODIUM 100 MG: 100 CAPSULE ORAL at 20:49

## 2018-03-31 RX ADMIN — ENOXAPARIN SODIUM 30 MG: 30 INJECTION SUBCUTANEOUS at 06:14

## 2018-03-31 RX ADMIN — METOPROLOL TARTRATE 6.25 MG: 25 TABLET ORAL at 20:49

## 2018-03-31 RX ADMIN — DOCUSATE SODIUM 100 MG: 100 CAPSULE ORAL at 08:40

## 2018-03-31 RX ADMIN — SIMVASTATIN 40 MG: 40 TABLET, FILM COATED ORAL at 20:49

## 2018-03-31 RX ADMIN — CYCLOBENZAPRINE 10 MG: 10 TABLET, FILM COATED ORAL at 22:30

## 2018-03-31 RX ADMIN — LATANOPROST 1 DROP: 50 SOLUTION OPHTHALMIC at 20:51

## 2018-03-31 RX ADMIN — ISOSORBIDE MONONITRATE 30 MG: 30 TABLET ORAL at 08:40

## 2018-03-31 RX ADMIN — NORTRIPTYLINE HYDROCHLORIDE 50 MG: 25 CAPSULE ORAL at 20:49

## 2018-03-31 RX ADMIN — PANTOPRAZOLE SODIUM 40 MG: 40 TABLET, DELAYED RELEASE ORAL at 06:14

## 2018-03-31 RX ADMIN — Medication 10 ML: at 22:32

## 2018-03-31 RX ADMIN — OXYCODONE HYDROCHLORIDE AND ACETAMINOPHEN 2 TABLET: 5; 325 TABLET ORAL at 13:54

## 2018-03-31 RX ADMIN — Medication 10 ML: at 08:41

## 2018-03-31 RX ADMIN — POTASSIUM CHLORIDE 10 MEQ: 1500 TABLET, EXTENDED RELEASE ORAL at 08:40

## 2018-03-31 RX ADMIN — CIPROFLOXACIN 500 MG: 500 TABLET, FILM COATED ORAL at 20:49

## 2018-03-31 RX ADMIN — OXYCODONE HYDROCHLORIDE AND ACETAMINOPHEN 2 TABLET: 5; 325 TABLET ORAL at 18:08

## 2018-03-31 RX ADMIN — OXYCODONE HYDROCHLORIDE AND ACETAMINOPHEN 2 TABLET: 5; 325 TABLET ORAL at 07:29

## 2018-03-31 RX ADMIN — CIPROFLOXACIN 500 MG: 500 TABLET, FILM COATED ORAL at 08:40

## 2018-03-31 RX ADMIN — ENOXAPARIN SODIUM 30 MG: 30 INJECTION SUBCUTANEOUS at 17:04

## 2018-03-31 ASSESSMENT — PAIN DESCRIPTION - LOCATION: LOCATION: BACK

## 2018-03-31 ASSESSMENT — PAIN SCALES - GENERAL
PAINLEVEL_OUTOF10: 0
PAINLEVEL_OUTOF10: 9
PAINLEVEL_OUTOF10: 6
PAINLEVEL_OUTOF10: 9
PAINLEVEL_OUTOF10: 7
PAINLEVEL_OUTOF10: 7

## 2018-03-31 ASSESSMENT — PAIN DESCRIPTION - PAIN TYPE: TYPE: SURGICAL PAIN

## 2018-03-31 ASSESSMENT — PAIN DESCRIPTION - ORIENTATION: ORIENTATION: LOWER

## 2018-04-01 PROCEDURE — 99232 SBSQ HOSP IP/OBS MODERATE 35: CPT | Performed by: PHYSICAL MEDICINE & REHABILITATION

## 2018-04-01 PROCEDURE — 6370000000 HC RX 637 (ALT 250 FOR IP): Performed by: PHYSICAL MEDICINE & REHABILITATION

## 2018-04-01 PROCEDURE — 1180000000 HC REHAB R&B

## 2018-04-01 PROCEDURE — 6370000000 HC RX 637 (ALT 250 FOR IP): Performed by: FAMILY MEDICINE

## 2018-04-01 PROCEDURE — 2580000003 HC RX 258: Performed by: FAMILY MEDICINE

## 2018-04-01 PROCEDURE — 51701 INSERT BLADDER CATHETER: CPT

## 2018-04-01 PROCEDURE — 6370000000 HC RX 637 (ALT 250 FOR IP): Performed by: NURSE PRACTITIONER

## 2018-04-01 PROCEDURE — 51798 US URINE CAPACITY MEASURE: CPT

## 2018-04-01 PROCEDURE — 6360000002 HC RX W HCPCS: Performed by: NURSE PRACTITIONER

## 2018-04-01 RX ADMIN — NORTRIPTYLINE HYDROCHLORIDE 50 MG: 25 CAPSULE ORAL at 19:50

## 2018-04-01 RX ADMIN — OXYCODONE HYDROCHLORIDE AND ACETAMINOPHEN 2 TABLET: 5; 325 TABLET ORAL at 13:49

## 2018-04-01 RX ADMIN — DOCUSATE SODIUM 100 MG: 100 CAPSULE ORAL at 19:50

## 2018-04-01 RX ADMIN — SIMVASTATIN 40 MG: 40 TABLET, FILM COATED ORAL at 19:50

## 2018-04-01 RX ADMIN — CYCLOBENZAPRINE 10 MG: 10 TABLET, FILM COATED ORAL at 21:20

## 2018-04-01 RX ADMIN — OXYCODONE HYDROCHLORIDE AND ACETAMINOPHEN 1 TABLET: 5; 325 TABLET ORAL at 03:17

## 2018-04-01 RX ADMIN — METOPROLOL TARTRATE 6.25 MG: 25 TABLET ORAL at 09:15

## 2018-04-01 RX ADMIN — DOCUSATE SODIUM 100 MG: 100 CAPSULE ORAL at 09:14

## 2018-04-01 RX ADMIN — ISOSORBIDE MONONITRATE 30 MG: 30 TABLET ORAL at 09:15

## 2018-04-01 RX ADMIN — CIPROFLOXACIN 500 MG: 500 TABLET, FILM COATED ORAL at 09:27

## 2018-04-01 RX ADMIN — LATANOPROST 1 DROP: 50 SOLUTION OPHTHALMIC at 19:54

## 2018-04-01 RX ADMIN — ENOXAPARIN SODIUM 30 MG: 30 INJECTION SUBCUTANEOUS at 17:02

## 2018-04-01 RX ADMIN — POTASSIUM CHLORIDE 10 MEQ: 1500 TABLET, EXTENDED RELEASE ORAL at 09:14

## 2018-04-01 RX ADMIN — CIPROFLOXACIN 500 MG: 500 TABLET, FILM COATED ORAL at 19:50

## 2018-04-01 RX ADMIN — Medication 10 ML: at 09:19

## 2018-04-01 RX ADMIN — OXYCODONE HYDROCHLORIDE AND ACETAMINOPHEN 1 TABLET: 5; 325 TABLET ORAL at 21:20

## 2018-04-01 RX ADMIN — ENOXAPARIN SODIUM 30 MG: 30 INJECTION SUBCUTANEOUS at 05:40

## 2018-04-01 RX ADMIN — METOPROLOL TARTRATE 6.25 MG: 25 TABLET ORAL at 19:50

## 2018-04-01 RX ADMIN — Medication 10 ML: at 19:58

## 2018-04-01 RX ADMIN — PANTOPRAZOLE SODIUM 40 MG: 40 TABLET, DELAYED RELEASE ORAL at 05:40

## 2018-04-01 RX ADMIN — OXYCODONE HYDROCHLORIDE AND ACETAMINOPHEN 2 TABLET: 5; 325 TABLET ORAL at 09:16

## 2018-04-01 ASSESSMENT — PAIN DESCRIPTION - DESCRIPTORS
DESCRIPTORS: ACHING
DESCRIPTORS: ACHING

## 2018-04-01 ASSESSMENT — PAIN SCALES - GENERAL
PAINLEVEL_OUTOF10: 0
PAINLEVEL_OUTOF10: 8
PAINLEVEL_OUTOF10: 6
PAINLEVEL_OUTOF10: 7
PAINLEVEL_OUTOF10: 8
PAINLEVEL_OUTOF10: 0
PAINLEVEL_OUTOF10: 7

## 2018-04-01 ASSESSMENT — PAIN DESCRIPTION - PROGRESSION: CLINICAL_PROGRESSION: GRADUALLY IMPROVING

## 2018-04-01 ASSESSMENT — PAIN DESCRIPTION - LOCATION
LOCATION: BACK
LOCATION: BACK

## 2018-04-01 ASSESSMENT — PAIN DESCRIPTION - ORIENTATION
ORIENTATION: LOWER
ORIENTATION: LOWER

## 2018-04-01 ASSESSMENT — PAIN DESCRIPTION - FREQUENCY
FREQUENCY: INTERMITTENT
FREQUENCY: INTERMITTENT

## 2018-04-01 ASSESSMENT — PAIN DESCRIPTION - PAIN TYPE
TYPE: SURGICAL PAIN
TYPE: SURGICAL PAIN

## 2018-04-01 ASSESSMENT — PAIN DESCRIPTION - ONSET: ONSET: ON-GOING

## 2018-04-02 LAB
ANION GAP SERPL CALCULATED.3IONS-SCNC: 14 MEQ/L (ref 8–16)
ANISOCYTOSIS: ABNORMAL
BASOPHILS # BLD: 1.5 %
BASOPHILS ABSOLUTE: 0.1 THOU/MM3 (ref 0–0.1)
BUN BLDV-MCNC: 22 MG/DL (ref 7–22)
CALCIUM SERPL-MCNC: 8.8 MG/DL (ref 8.5–10.5)
CHLORIDE BLD-SCNC: 100 MEQ/L (ref 98–111)
CO2: 20 MEQ/L (ref 23–33)
CREAT SERPL-MCNC: 0.9 MG/DL (ref 0.4–1.2)
EOSINOPHIL # BLD: 7 %
EOSINOPHILS ABSOLUTE: 0.4 THOU/MM3 (ref 0–0.4)
GFR SERPL CREATININE-BSD FRML MDRD: 60 ML/MIN/1.73M2
GLUCOSE BLD-MCNC: 99 MG/DL (ref 70–108)
HCT VFR BLD CALC: 29.7 % (ref 37–47)
HEMOGLOBIN: 9.8 GM/DL (ref 12–16)
LYMPHOCYTES # BLD: 20.2 %
LYMPHOCYTES ABSOLUTE: 1.2 THOU/MM3 (ref 1–4.8)
MCH RBC QN AUTO: 30 PG (ref 27–31)
MCHC RBC AUTO-ENTMCNC: 32.9 GM/DL (ref 33–37)
MCV RBC AUTO: 91.1 FL (ref 81–99)
MONOCYTES # BLD: 14.7 %
MONOCYTES ABSOLUTE: 0.9 THOU/MM3 (ref 0.4–1.3)
NUCLEATED RED BLOOD CELLS: 0 /100 WBC
PDW BLD-RTO: 15.8 % (ref 11.5–14.5)
PLATELET # BLD: 344 THOU/MM3 (ref 130–400)
PMV BLD AUTO: 7.2 FL (ref 7.4–10.4)
POTASSIUM SERPL-SCNC: 4.3 MEQ/L (ref 3.5–5.2)
RBC # BLD: 3.26 MILL/MM3 (ref 4.2–5.4)
SEG NEUTROPHILS: 56.6 %
SEGMENTED NEUTROPHILS ABSOLUTE COUNT: 3.3 THOU/MM3 (ref 1.8–7.7)
SODIUM BLD-SCNC: 134 MEQ/L (ref 135–145)
WBC # BLD: 5.8 THOU/MM3 (ref 4.8–10.8)

## 2018-04-02 PROCEDURE — 6370000000 HC RX 637 (ALT 250 FOR IP): Performed by: FAMILY MEDICINE

## 2018-04-02 PROCEDURE — 36415 COLL VENOUS BLD VENIPUNCTURE: CPT

## 2018-04-02 PROCEDURE — 6360000002 HC RX W HCPCS: Performed by: NURSE PRACTITIONER

## 2018-04-02 PROCEDURE — 97110 THERAPEUTIC EXERCISES: CPT

## 2018-04-02 PROCEDURE — 51701 INSERT BLADDER CATHETER: CPT

## 2018-04-02 PROCEDURE — 6370000000 HC RX 637 (ALT 250 FOR IP): Performed by: PHYSICAL MEDICINE & REHABILITATION

## 2018-04-02 PROCEDURE — 1180000000 HC REHAB R&B

## 2018-04-02 PROCEDURE — 80048 BASIC METABOLIC PNL TOTAL CA: CPT

## 2018-04-02 PROCEDURE — 97530 THERAPEUTIC ACTIVITIES: CPT

## 2018-04-02 PROCEDURE — 6370000000 HC RX 637 (ALT 250 FOR IP): Performed by: NURSE PRACTITIONER

## 2018-04-02 PROCEDURE — 97116 GAIT TRAINING THERAPY: CPT

## 2018-04-02 PROCEDURE — 51798 US URINE CAPACITY MEASURE: CPT

## 2018-04-02 PROCEDURE — 97535 SELF CARE MNGMENT TRAINING: CPT

## 2018-04-02 PROCEDURE — 85025 COMPLETE CBC W/AUTO DIFF WBC: CPT

## 2018-04-02 PROCEDURE — 99232 SBSQ HOSP IP/OBS MODERATE 35: CPT | Performed by: PHYSICAL MEDICINE & REHABILITATION

## 2018-04-02 RX ADMIN — OXYCODONE HYDROCHLORIDE AND ACETAMINOPHEN 1 TABLET: 5; 325 TABLET ORAL at 09:21

## 2018-04-02 RX ADMIN — PANTOPRAZOLE SODIUM 40 MG: 40 TABLET, DELAYED RELEASE ORAL at 06:03

## 2018-04-02 RX ADMIN — DOCUSATE SODIUM 100 MG: 100 CAPSULE ORAL at 08:02

## 2018-04-02 RX ADMIN — LATANOPROST 1 DROP: 50 SOLUTION OPHTHALMIC at 20:35

## 2018-04-02 RX ADMIN — DOCUSATE SODIUM 100 MG: 100 CAPSULE ORAL at 20:35

## 2018-04-02 RX ADMIN — ISOSORBIDE MONONITRATE 30 MG: 30 TABLET ORAL at 08:02

## 2018-04-02 RX ADMIN — OXYCODONE HYDROCHLORIDE AND ACETAMINOPHEN 1 TABLET: 5; 325 TABLET ORAL at 13:50

## 2018-04-02 RX ADMIN — CIPROFLOXACIN 500 MG: 500 TABLET, FILM COATED ORAL at 20:35

## 2018-04-02 RX ADMIN — CYCLOBENZAPRINE 10 MG: 10 TABLET, FILM COATED ORAL at 21:26

## 2018-04-02 RX ADMIN — METOPROLOL TARTRATE 6.25 MG: 25 TABLET ORAL at 20:35

## 2018-04-02 RX ADMIN — ENOXAPARIN SODIUM 30 MG: 30 INJECTION SUBCUTANEOUS at 06:03

## 2018-04-02 RX ADMIN — CIPROFLOXACIN 500 MG: 500 TABLET, FILM COATED ORAL at 08:02

## 2018-04-02 RX ADMIN — ENOXAPARIN SODIUM 30 MG: 30 INJECTION SUBCUTANEOUS at 16:20

## 2018-04-02 RX ADMIN — POTASSIUM CHLORIDE: 1500 TABLET, EXTENDED RELEASE ORAL at 08:01

## 2018-04-02 RX ADMIN — METOPROLOL TARTRATE: 25 TABLET ORAL at 08:02

## 2018-04-02 RX ADMIN — OXYCODONE HYDROCHLORIDE AND ACETAMINOPHEN 1 TABLET: 5; 325 TABLET ORAL at 20:41

## 2018-04-02 RX ADMIN — NORTRIPTYLINE HYDROCHLORIDE 50 MG: 25 CAPSULE ORAL at 20:35

## 2018-04-02 RX ADMIN — SIMVASTATIN 40 MG: 40 TABLET, FILM COATED ORAL at 20:35

## 2018-04-02 ASSESSMENT — PAIN DESCRIPTION - DESCRIPTORS
DESCRIPTORS: ACHING

## 2018-04-02 ASSESSMENT — PAIN SCALES - GENERAL
PAINLEVEL_OUTOF10: 6
PAINLEVEL_OUTOF10: 3
PAINLEVEL_OUTOF10: 0
PAINLEVEL_OUTOF10: 0
PAINLEVEL_OUTOF10: 7
PAINLEVEL_OUTOF10: 6
PAINLEVEL_OUTOF10: 2
PAINLEVEL_OUTOF10: 6
PAINLEVEL_OUTOF10: 2

## 2018-04-02 ASSESSMENT — PAIN DESCRIPTION - PAIN TYPE
TYPE: ACUTE PAIN

## 2018-04-02 ASSESSMENT — PAIN DESCRIPTION - LOCATION
LOCATION: BACK

## 2018-04-02 ASSESSMENT — PAIN DESCRIPTION - ONSET: ONSET: ON-GOING

## 2018-04-02 ASSESSMENT — PAIN DESCRIPTION - FREQUENCY: FREQUENCY: INTERMITTENT

## 2018-04-02 ASSESSMENT — PAIN DESCRIPTION - ORIENTATION: ORIENTATION: LOWER

## 2018-04-02 ASSESSMENT — PAIN DESCRIPTION - PROGRESSION: CLINICAL_PROGRESSION: GRADUALLY IMPROVING

## 2018-04-03 VITALS
SYSTOLIC BLOOD PRESSURE: 122 MMHG | RESPIRATION RATE: 16 BRPM | DIASTOLIC BLOOD PRESSURE: 60 MMHG | HEART RATE: 88 BPM | TEMPERATURE: 96.2 F | BODY MASS INDEX: 27.58 KG/M2 | OXYGEN SATURATION: 95 % | WEIGHT: 146.1 LBS | HEIGHT: 61 IN

## 2018-04-03 PROCEDURE — 6370000000 HC RX 637 (ALT 250 FOR IP): Performed by: FAMILY MEDICINE

## 2018-04-03 PROCEDURE — 99239 HOSP IP/OBS DSCHRG MGMT >30: CPT | Performed by: PHYSICAL MEDICINE & REHABILITATION

## 2018-04-03 PROCEDURE — 97530 THERAPEUTIC ACTIVITIES: CPT

## 2018-04-03 PROCEDURE — 6360000002 HC RX W HCPCS: Performed by: NURSE PRACTITIONER

## 2018-04-03 PROCEDURE — 6370000000 HC RX 637 (ALT 250 FOR IP): Performed by: NURSE PRACTITIONER

## 2018-04-03 PROCEDURE — 97116 GAIT TRAINING THERAPY: CPT

## 2018-04-03 PROCEDURE — 6370000000 HC RX 637 (ALT 250 FOR IP): Performed by: PHYSICAL MEDICINE & REHABILITATION

## 2018-04-03 RX ORDER — CIPROFLOXACIN 500 MG/1
500 TABLET, FILM COATED ORAL EVERY 12 HOURS SCHEDULED
Qty: 5 TABLET | Refills: 0 | Status: SHIPPED | OUTPATIENT
Start: 2018-04-03 | End: 2018-04-06

## 2018-04-03 RX ORDER — POTASSIUM CHLORIDE 750 MG/1
10 TABLET, EXTENDED RELEASE ORAL
Qty: 30 TABLET | Refills: 0 | Status: SHIPPED | OUTPATIENT
Start: 2018-04-04 | End: 2018-11-26 | Stop reason: ALTCHOICE

## 2018-04-03 RX ORDER — OXYCODONE HYDROCHLORIDE AND ACETAMINOPHEN 5; 325 MG/1; MG/1
1 TABLET ORAL EVERY 6 HOURS PRN
Qty: 28 TABLET | Refills: 0 | Status: SHIPPED | OUTPATIENT
Start: 2018-04-03 | End: 2018-04-10

## 2018-04-03 RX ORDER — ACETAMINOPHEN 325 MG/1
650 TABLET ORAL EVERY 4 HOURS PRN
COMMUNITY
Start: 2018-04-03

## 2018-04-03 RX ORDER — PSEUDOEPHEDRINE HCL 30 MG
100 TABLET ORAL 2 TIMES DAILY PRN
COMMUNITY
Start: 2018-04-03 | End: 2018-04-10 | Stop reason: ALTCHOICE

## 2018-04-03 RX ADMIN — METOPROLOL TARTRATE 6.25 MG: 25 TABLET ORAL at 08:02

## 2018-04-03 RX ADMIN — POTASSIUM CHLORIDE 10 MEQ: 1500 TABLET, EXTENDED RELEASE ORAL at 08:02

## 2018-04-03 RX ADMIN — CIPROFLOXACIN 500 MG: 500 TABLET, FILM COATED ORAL at 08:02

## 2018-04-03 RX ADMIN — OXYCODONE HYDROCHLORIDE AND ACETAMINOPHEN 1 TABLET: 5; 325 TABLET ORAL at 08:05

## 2018-04-03 RX ADMIN — PANTOPRAZOLE SODIUM 40 MG: 40 TABLET, DELAYED RELEASE ORAL at 06:13

## 2018-04-03 RX ADMIN — ISOSORBIDE MONONITRATE 30 MG: 30 TABLET ORAL at 08:03

## 2018-04-03 RX ADMIN — ENOXAPARIN SODIUM 30 MG: 30 INJECTION SUBCUTANEOUS at 06:13

## 2018-04-03 RX ADMIN — DOCUSATE SODIUM 100 MG: 100 CAPSULE ORAL at 08:02

## 2018-04-03 ASSESSMENT — PAIN DESCRIPTION - PAIN TYPE: TYPE: ACUTE PAIN

## 2018-04-03 ASSESSMENT — PAIN DESCRIPTION - LOCATION
LOCATION: BACK
LOCATION: BACK

## 2018-04-03 ASSESSMENT — PAIN SCALES - GENERAL
PAINLEVEL_OUTOF10: 2
PAINLEVEL_OUTOF10: 0
PAINLEVEL_OUTOF10: 5
PAINLEVEL_OUTOF10: 0

## 2018-04-03 ASSESSMENT — PAIN DESCRIPTION - ORIENTATION: ORIENTATION: LOWER

## 2018-04-03 ASSESSMENT — PAIN DESCRIPTION - DESCRIPTORS: DESCRIPTORS: ACHING

## 2018-04-04 ENCOUNTER — CARE COORDINATION (OUTPATIENT)
Dept: CASE MANAGEMENT | Age: 83
End: 2018-04-04

## 2018-04-04 DIAGNOSIS — M48.062 SPINAL STENOSIS OF LUMBAR REGION WITH NEUROGENIC CLAUDICATION: Primary | ICD-10-CM

## 2018-04-04 PROCEDURE — 1111F DSCHRG MED/CURRENT MED MERGE: CPT | Performed by: FAMILY MEDICINE

## 2018-04-09 ENCOUNTER — CARE COORDINATION (OUTPATIENT)
Dept: CASE MANAGEMENT | Age: 83
End: 2018-04-09

## 2018-04-10 ENCOUNTER — OFFICE VISIT (OUTPATIENT)
Dept: FAMILY MEDICINE CLINIC | Age: 83
End: 2018-04-10

## 2018-04-10 VITALS
DIASTOLIC BLOOD PRESSURE: 60 MMHG | WEIGHT: 144.38 LBS | HEART RATE: 76 BPM | RESPIRATION RATE: 12 BRPM | HEIGHT: 61 IN | SYSTOLIC BLOOD PRESSURE: 114 MMHG | BODY MASS INDEX: 27.26 KG/M2

## 2018-04-10 DIAGNOSIS — T83.511S URINARY TRACT INFECTION ASSOCIATED WITH CATHETERIZATION OF URINARY TRACT, UNSPECIFIED INDWELLING URINARY CATHETER TYPE, SEQUELA: ICD-10-CM

## 2018-04-10 DIAGNOSIS — I10 ESSENTIAL HYPERTENSION: Primary | ICD-10-CM

## 2018-04-10 DIAGNOSIS — D62 ACUTE BLOOD LOSS AS CAUSE OF POSTOPERATIVE ANEMIA: ICD-10-CM

## 2018-04-10 DIAGNOSIS — I25.10 CORONARY ARTERY DISEASE INVOLVING NATIVE CORONARY ARTERY OF NATIVE HEART WITHOUT ANGINA PECTORIS: ICD-10-CM

## 2018-04-10 DIAGNOSIS — M48.062 SPINAL STENOSIS OF LUMBAR REGION WITH NEUROGENIC CLAUDICATION: ICD-10-CM

## 2018-04-10 DIAGNOSIS — N39.0 URINARY TRACT INFECTION ASSOCIATED WITH CATHETERIZATION OF URINARY TRACT, UNSPECIFIED INDWELLING URINARY CATHETER TYPE, SEQUELA: ICD-10-CM

## 2018-04-10 DIAGNOSIS — K21.9 GASTROESOPHAGEAL REFLUX DISEASE WITHOUT ESOPHAGITIS: ICD-10-CM

## 2018-04-10 DIAGNOSIS — J44.9 CHRONIC OBSTRUCTIVE PULMONARY DISEASE, UNSPECIFIED COPD TYPE (HCC): ICD-10-CM

## 2018-04-10 DIAGNOSIS — R33.9 URINARY RETENTION: ICD-10-CM

## 2018-04-10 PROCEDURE — 99496 TRANSJ CARE MGMT HIGH F2F 7D: CPT | Performed by: FAMILY MEDICINE

## 2018-04-10 RX ORDER — OXYCODONE HYDROCHLORIDE AND ACETAMINOPHEN 5; 325 MG/1; MG/1
1 TABLET ORAL EVERY 4 HOURS PRN
Qty: 40 TABLET | Refills: 0 | Status: SHIPPED | OUTPATIENT
Start: 2018-04-10 | End: 2018-04-24 | Stop reason: SDUPTHER

## 2018-04-10 ASSESSMENT — ENCOUNTER SYMPTOMS
ABDOMINAL PAIN: 0
CHEST TIGHTNESS: 0
NAUSEA: 0
COUGH: 0
CONSTIPATION: 0
EYE PAIN: 0
WHEEZING: 0
SORE THROAT: 0
SHORTNESS OF BREATH: 0
BACK PAIN: 1

## 2018-04-12 ENCOUNTER — TELEPHONE (OUTPATIENT)
Dept: FAMILY MEDICINE CLINIC | Age: 83
End: 2018-04-12

## 2018-04-12 ENCOUNTER — CARE COORDINATION (OUTPATIENT)
Dept: CASE MANAGEMENT | Age: 83
End: 2018-04-12

## 2018-04-16 ENCOUNTER — CARE COORDINATION (OUTPATIENT)
Dept: CASE MANAGEMENT | Age: 83
End: 2018-04-16

## 2018-04-23 ENCOUNTER — APPOINTMENT (OUTPATIENT)
Dept: GENERAL RADIOLOGY | Age: 83
End: 2018-04-23
Payer: MEDICARE

## 2018-04-23 ENCOUNTER — HOSPITAL ENCOUNTER (EMERGENCY)
Age: 83
Discharge: HOME | End: 2018-04-23
Attending: FAMILY MEDICINE
Payer: MEDICARE

## 2018-04-23 VITALS
WEIGHT: 144 LBS | HEIGHT: 61 IN | HEART RATE: 79 BPM | BODY MASS INDEX: 27.19 KG/M2 | DIASTOLIC BLOOD PRESSURE: 81 MMHG | RESPIRATION RATE: 20 BRPM | TEMPERATURE: 98.5 F | SYSTOLIC BLOOD PRESSURE: 125 MMHG | OXYGEN SATURATION: 99 %
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
## 2018-04-23 DIAGNOSIS — R93.89 ABNORMAL X-RAY: ICD-10-CM

## 2018-04-23 PROCEDURE — 71101 X-RAY EXAM UNILAT RIBS/CHEST: CPT

## 2018-04-23 PROCEDURE — 73000 X-RAY EXAM OF COLLAR BONE: CPT

## 2018-04-23 PROCEDURE — 73502 X-RAY EXAM HIP UNI 2-3 VIEWS: CPT

## 2018-04-23 PROCEDURE — 73030 X-RAY EXAM OF SHOULDER: CPT

## 2018-04-23 PROCEDURE — 99283 EMERGENCY DEPT VISIT LOW MDM: CPT

## 2018-04-23 ASSESSMENT — ENCOUNTER SYMPTOMS
EYE PAIN: 0
WHEEZING: 0
COUGH: 0
SHORTNESS OF BREATH: 0
DIARRHEA: 0
EYE DISCHARGE: 0
SORE THROAT: 0
RHINORRHEA: 0
NAUSEA: 0
VOMITING: 0
BACK PAIN: 0
ABDOMINAL PAIN: 0

## 2018-04-23 ASSESSMENT — PAIN DESCRIPTION - PAIN TYPE: TYPE: ACUTE PAIN

## 2018-04-23 ASSESSMENT — PAIN SCALES - GENERAL: PAINLEVEL_OUTOF10: 3

## 2018-04-23 ASSESSMENT — PAIN DESCRIPTION - LOCATION: LOCATION: SHOULDER

## 2018-04-23 ASSESSMENT — PAIN DESCRIPTION - ORIENTATION: ORIENTATION: RIGHT

## 2018-04-24 ENCOUNTER — OFFICE VISIT (OUTPATIENT)
Dept: FAMILY MEDICINE CLINIC | Age: 83
End: 2018-04-24

## 2018-04-24 VITALS
WEIGHT: 144.13 LBS | BODY MASS INDEX: 27.21 KG/M2 | RESPIRATION RATE: 14 BRPM | DIASTOLIC BLOOD PRESSURE: 66 MMHG | SYSTOLIC BLOOD PRESSURE: 112 MMHG | HEIGHT: 61 IN | HEART RATE: 76 BPM

## 2018-04-24 DIAGNOSIS — J44.9 CHRONIC OBSTRUCTIVE PULMONARY DISEASE, UNSPECIFIED COPD TYPE (HCC): ICD-10-CM

## 2018-04-24 DIAGNOSIS — M48.062 SPINAL STENOSIS OF LUMBAR REGION WITH NEUROGENIC CLAUDICATION: ICD-10-CM

## 2018-04-24 DIAGNOSIS — D50.0 ANEMIA, BLOOD LOSS: ICD-10-CM

## 2018-04-24 DIAGNOSIS — I10 ESSENTIAL HYPERTENSION: Primary | ICD-10-CM

## 2018-04-24 DIAGNOSIS — R55 SYNCOPE, UNSPECIFIED SYNCOPE TYPE: ICD-10-CM

## 2018-04-24 LAB — HGB, POC: 9.8

## 2018-04-24 PROCEDURE — 85018 HEMOGLOBIN: CPT | Performed by: FAMILY MEDICINE

## 2018-04-24 PROCEDURE — 99213 OFFICE O/P EST LOW 20 MIN: CPT | Performed by: FAMILY MEDICINE

## 2018-04-24 RX ORDER — OXYCODONE HYDROCHLORIDE AND ACETAMINOPHEN 5; 325 MG/1; MG/1
1 TABLET ORAL EVERY 4 HOURS PRN
Qty: 40 TABLET | Refills: 0 | Status: SHIPPED | OUTPATIENT
Start: 2018-04-24 | End: 2018-05-01

## 2018-04-24 ASSESSMENT — ENCOUNTER SYMPTOMS
COUGH: 0
SORE THROAT: 0
EYE PAIN: 0
CONSTIPATION: 0
CHEST TIGHTNESS: 0
WHEEZING: 0
SHORTNESS OF BREATH: 0
ABDOMINAL PAIN: 0
NAUSEA: 0

## 2018-04-25 ENCOUNTER — TELEPHONE (OUTPATIENT)
Dept: FAMILY MEDICINE CLINIC | Age: 83
End: 2018-04-25

## 2018-04-26 ENCOUNTER — CARE COORDINATION (OUTPATIENT)
Dept: CASE MANAGEMENT | Age: 83
End: 2018-04-26

## 2018-05-01 ENCOUNTER — TELEPHONE (OUTPATIENT)
Dept: FAMILY MEDICINE CLINIC | Age: 83
End: 2018-05-01

## 2018-05-02 ENCOUNTER — TELEPHONE (OUTPATIENT)
Dept: FAMILY MEDICINE CLINIC | Age: 83
End: 2018-05-02

## 2018-05-15 ENCOUNTER — OFFICE VISIT (OUTPATIENT)
Dept: FAMILY MEDICINE CLINIC | Age: 83
End: 2018-05-15

## 2018-05-15 VITALS
DIASTOLIC BLOOD PRESSURE: 66 MMHG | WEIGHT: 145.38 LBS | RESPIRATION RATE: 14 BRPM | HEIGHT: 61 IN | BODY MASS INDEX: 27.45 KG/M2 | HEART RATE: 76 BPM | SYSTOLIC BLOOD PRESSURE: 110 MMHG

## 2018-05-15 DIAGNOSIS — E78.00 PURE HYPERCHOLESTEROLEMIA: ICD-10-CM

## 2018-05-15 DIAGNOSIS — D50.0 ANEMIA, BLOOD LOSS: ICD-10-CM

## 2018-05-15 DIAGNOSIS — M54.9 CHRONIC BACK PAIN, UNSPECIFIED BACK LOCATION, UNSPECIFIED BACK PAIN LATERALITY: ICD-10-CM

## 2018-05-15 DIAGNOSIS — S22.000S CLOSED COMPRESSION FRACTURE OF THORACIC VERTEBRA, SEQUELA: ICD-10-CM

## 2018-05-15 DIAGNOSIS — K21.9 GASTROESOPHAGEAL REFLUX DISEASE WITHOUT ESOPHAGITIS: ICD-10-CM

## 2018-05-15 DIAGNOSIS — M48.062 SPINAL STENOSIS OF LUMBAR REGION WITH NEUROGENIC CLAUDICATION: Primary | ICD-10-CM

## 2018-05-15 DIAGNOSIS — G89.29 CHRONIC BACK PAIN, UNSPECIFIED BACK LOCATION, UNSPECIFIED BACK PAIN LATERALITY: ICD-10-CM

## 2018-05-15 LAB — HGB, POC: 10.7

## 2018-05-15 PROCEDURE — 85018 HEMOGLOBIN: CPT | Performed by: FAMILY MEDICINE

## 2018-05-15 PROCEDURE — 99213 OFFICE O/P EST LOW 20 MIN: CPT | Performed by: FAMILY MEDICINE

## 2018-05-15 RX ORDER — PREDNISONE 20 MG/1
TABLET ORAL
Qty: 10 TABLET | Refills: 0 | Status: SHIPPED | OUTPATIENT
Start: 2018-05-15 | End: 2018-06-15 | Stop reason: ALTCHOICE

## 2018-05-15 RX ORDER — ACETAMINOPHEN AND CODEINE PHOSPHATE 300; 30 MG/1; MG/1
TABLET ORAL
Qty: 120 TABLET | Refills: 0 | Status: SHIPPED | OUTPATIENT
Start: 2018-05-15 | End: 2018-05-29 | Stop reason: SDUPTHER

## 2018-05-15 ASSESSMENT — ENCOUNTER SYMPTOMS
SORE THROAT: 0
SHORTNESS OF BREATH: 0
CONSTIPATION: 0
WHEEZING: 0
COUGH: 0
CHEST TIGHTNESS: 0
NAUSEA: 0
EYE PAIN: 0
ABDOMINAL PAIN: 0

## 2018-05-21 DIAGNOSIS — M54.41 CHRONIC MIDLINE LOW BACK PAIN WITH RIGHT-SIDED SCIATICA: ICD-10-CM

## 2018-05-21 DIAGNOSIS — G89.29 CHRONIC MIDLINE LOW BACK PAIN WITH RIGHT-SIDED SCIATICA: ICD-10-CM

## 2018-05-22 RX ORDER — TIZANIDINE 4 MG/1
4 TABLET ORAL EVERY 6 HOURS PRN
Qty: 100 TABLET | Refills: 2 | Status: SHIPPED | OUTPATIENT
Start: 2018-05-22 | End: 2018-10-15 | Stop reason: SDUPTHER

## 2018-05-29 DIAGNOSIS — M48.062 SPINAL STENOSIS OF LUMBAR REGION WITH NEUROGENIC CLAUDICATION: ICD-10-CM

## 2018-05-29 DIAGNOSIS — M54.9 CHRONIC BACK PAIN, UNSPECIFIED BACK LOCATION, UNSPECIFIED BACK PAIN LATERALITY: ICD-10-CM

## 2018-05-29 DIAGNOSIS — S22.000S CLOSED COMPRESSION FRACTURE OF THORACIC VERTEBRA, SEQUELA: ICD-10-CM

## 2018-05-29 DIAGNOSIS — G89.29 CHRONIC BACK PAIN, UNSPECIFIED BACK LOCATION, UNSPECIFIED BACK PAIN LATERALITY: ICD-10-CM

## 2018-06-01 RX ORDER — ACETAMINOPHEN AND CODEINE PHOSPHATE 300; 30 MG/1; MG/1
TABLET ORAL
Qty: 180 TABLET | Refills: 0 | Status: SHIPPED | OUTPATIENT
Start: 2018-06-01 | End: 2018-06-30

## 2018-06-06 ENCOUNTER — TELEPHONE (OUTPATIENT)
Dept: FAMILY MEDICINE CLINIC | Age: 83
End: 2018-06-06

## 2018-06-15 ENCOUNTER — OFFICE VISIT (OUTPATIENT)
Dept: FAMILY MEDICINE CLINIC | Age: 83
End: 2018-06-15

## 2018-06-15 VITALS
BODY MASS INDEX: 27.42 KG/M2 | SYSTOLIC BLOOD PRESSURE: 120 MMHG | WEIGHT: 145.25 LBS | RESPIRATION RATE: 14 BRPM | DIASTOLIC BLOOD PRESSURE: 66 MMHG | HEART RATE: 76 BPM | HEIGHT: 61 IN

## 2018-06-15 DIAGNOSIS — E78.00 PURE HYPERCHOLESTEROLEMIA: ICD-10-CM

## 2018-06-15 DIAGNOSIS — J44.9 CHRONIC OBSTRUCTIVE PULMONARY DISEASE, UNSPECIFIED COPD TYPE (HCC): Primary | ICD-10-CM

## 2018-06-15 DIAGNOSIS — I10 ESSENTIAL HYPERTENSION: ICD-10-CM

## 2018-06-15 DIAGNOSIS — M48.062 SPINAL STENOSIS OF LUMBAR REGION WITH NEUROGENIC CLAUDICATION: ICD-10-CM

## 2018-06-15 DIAGNOSIS — I25.10 CORONARY ARTERY DISEASE INVOLVING NATIVE CORONARY ARTERY OF NATIVE HEART WITHOUT ANGINA PECTORIS: ICD-10-CM

## 2018-06-15 DIAGNOSIS — K21.9 GASTROESOPHAGEAL REFLUX DISEASE WITHOUT ESOPHAGITIS: ICD-10-CM

## 2018-06-15 PROBLEM — D62 ACUTE BLOOD LOSS AS CAUSE OF POSTOPERATIVE ANEMIA: Status: RESOLVED | Noted: 2018-03-01 | Resolved: 2018-06-15

## 2018-06-15 PROCEDURE — 99213 OFFICE O/P EST LOW 20 MIN: CPT | Performed by: FAMILY MEDICINE

## 2018-06-15 RX ORDER — HYDROCODONE BITARTRATE AND ACETAMINOPHEN 5; 325 MG/1; MG/1
1 TABLET ORAL EVERY 6 HOURS PRN
COMMUNITY
Start: 2018-06-07 | End: 2018-09-27 | Stop reason: SDUPTHER

## 2018-06-15 ASSESSMENT — ENCOUNTER SYMPTOMS
SORE THROAT: 0
SHORTNESS OF BREATH: 0
ORTHOPNEA: 0
CHEST TIGHTNESS: 0
COUGH: 0
ABDOMINAL PAIN: 0
EYE PAIN: 0
CONSTIPATION: 0
WHEEZING: 0
NAUSEA: 0

## 2018-08-06 DIAGNOSIS — E78.00 PURE HYPERCHOLESTEROLEMIA: ICD-10-CM

## 2018-08-06 DIAGNOSIS — M54.41 CHRONIC MIDLINE LOW BACK PAIN WITH RIGHT-SIDED SCIATICA: ICD-10-CM

## 2018-08-06 DIAGNOSIS — I10 ESSENTIAL HYPERTENSION: ICD-10-CM

## 2018-08-06 DIAGNOSIS — G89.29 CHRONIC MIDLINE LOW BACK PAIN WITH RIGHT-SIDED SCIATICA: ICD-10-CM

## 2018-08-07 RX ORDER — NORTRIPTYLINE HYDROCHLORIDE 50 MG/1
50 CAPSULE ORAL NIGHTLY
Qty: 90 CAPSULE | Refills: 1 | Status: SHIPPED | OUTPATIENT
Start: 2018-08-07 | End: 2019-02-01 | Stop reason: SDUPTHER

## 2018-08-07 RX ORDER — SIMVASTATIN 40 MG
40 TABLET ORAL NIGHTLY
Qty: 90 TABLET | Refills: 1 | Status: SHIPPED | OUTPATIENT
Start: 2018-08-07 | End: 2019-02-01 | Stop reason: SDUPTHER

## 2018-08-07 RX ORDER — HYDROCHLOROTHIAZIDE 25 MG/1
25 TABLET ORAL DAILY
Qty: 90 TABLET | Refills: 1 | Status: SHIPPED | OUTPATIENT
Start: 2018-08-07 | End: 2018-09-26 | Stop reason: ALTCHOICE

## 2018-08-15 ENCOUNTER — OFFICE VISIT (OUTPATIENT)
Dept: FAMILY MEDICINE CLINIC | Age: 83
End: 2018-08-15

## 2018-08-15 VITALS
HEIGHT: 61 IN | HEART RATE: 72 BPM | SYSTOLIC BLOOD PRESSURE: 132 MMHG | DIASTOLIC BLOOD PRESSURE: 62 MMHG | WEIGHT: 146.38 LBS | BODY MASS INDEX: 27.64 KG/M2 | RESPIRATION RATE: 14 BRPM

## 2018-08-15 DIAGNOSIS — J44.9 CHRONIC OBSTRUCTIVE PULMONARY DISEASE, UNSPECIFIED COPD TYPE (HCC): Primary | ICD-10-CM

## 2018-08-15 DIAGNOSIS — M54.50 LUMBAR BACK PAIN: ICD-10-CM

## 2018-08-15 DIAGNOSIS — I25.10 CORONARY ARTERY DISEASE INVOLVING NATIVE CORONARY ARTERY OF NATIVE HEART WITHOUT ANGINA PECTORIS: ICD-10-CM

## 2018-08-15 DIAGNOSIS — M80.00XG AGE-RELATED OSTEOPOROSIS WITH CURRENT PATHOLOGICAL FRACTURE WITH DELAYED HEALING, SUBSEQUENT ENCOUNTER: ICD-10-CM

## 2018-08-15 DIAGNOSIS — K21.9 GASTROESOPHAGEAL REFLUX DISEASE WITHOUT ESOPHAGITIS: ICD-10-CM

## 2018-08-15 DIAGNOSIS — M48.062 SPINAL STENOSIS OF LUMBAR REGION WITH NEUROGENIC CLAUDICATION: ICD-10-CM

## 2018-08-15 DIAGNOSIS — I10 ESSENTIAL HYPERTENSION: ICD-10-CM

## 2018-08-15 PROCEDURE — 1101F PT FALLS ASSESS-DOCD LE1/YR: CPT | Performed by: FAMILY MEDICINE

## 2018-08-15 PROCEDURE — 99213 OFFICE O/P EST LOW 20 MIN: CPT | Performed by: FAMILY MEDICINE

## 2018-08-15 RX ORDER — ALENDRONATE SODIUM 70 MG/1
70 TABLET ORAL
Qty: 4 TABLET | Refills: 3 | Status: ON HOLD | OUTPATIENT
Start: 2018-08-15 | End: 2020-02-13

## 2018-08-15 RX ORDER — ISOSORBIDE MONONITRATE 30 MG/1
30 TABLET, EXTENDED RELEASE ORAL DAILY
Qty: 90 TABLET | Refills: 1 | Status: SHIPPED | OUTPATIENT
Start: 2018-08-15 | End: 2018-09-26 | Stop reason: ALTCHOICE

## 2018-08-15 ASSESSMENT — ENCOUNTER SYMPTOMS
NAUSEA: 0
ORTHOPNEA: 0
EYE PAIN: 0
BACK PAIN: 1
CHEST TIGHTNESS: 0
SORE THROAT: 0
ABDOMINAL PAIN: 0
WHEEZING: 0
CONSTIPATION: 0
SHORTNESS OF BREATH: 0
COUGH: 0

## 2018-08-15 NOTE — PROGRESS NOTES
constipation and nausea. Genitourinary: Negative for dysuria and frequency. Musculoskeletal: Positive for back pain. Negative for arthralgias, joint swelling, neck pain and neck stiffness. Skin: Negative for rash. Neurological: Negative for dizziness, weakness, numbness and headaches. Hematological: Negative for adenopathy. Does not bruise/bleed easily. Psychiatric/Behavioral: Negative for behavioral problems and sleep disturbance. The patient is not nervous/anxious. /62 (Site: Left Arm, Position: Sitting, Cuff Size: Medium Adult)   Pulse 72   Resp 14   Ht 5' 1\" (1.549 m)   Wt 146 lb 6 oz (66.4 kg)   Breastfeeding? No   BMI 27.66 kg/m²   Objective:   Physical Exam   Constitutional: She is oriented to person, place, and time. She appears well-developed and well-nourished. HENT:   Head: Normocephalic and atraumatic. Right Ear: External ear normal.   Left Ear: External ear normal.   Nose: Nose normal.   Mouth/Throat: Oropharynx is clear and moist.   Eyes: Pupils are equal, round, and reactive to light. Conjunctivae and EOM are normal. No scleral icterus. Neck: Normal range of motion. Neck supple. No JVD present. No thyromegaly present. Cardiovascular: Normal rate, regular rhythm, normal heart sounds and intact distal pulses. Pulmonary/Chest: Effort normal and breath sounds normal. She has no wheezes. She has no rales. Abdominal: Soft. Bowel sounds are normal. She exhibits no distension and no mass. There is no tenderness. Musculoskeletal: Normal range of motion. She exhibits no tenderness. Mid  Back pain   Lymphadenopathy:     She has no cervical adenopathy. Neurological: She is alert and oriented to person, place, and time. She has normal reflexes. No cranial nerve deficit. Skin: Skin is warm and dry. No rash noted. Psychiatric: She has a normal mood and affect. Nursing note and vitals reviewed. Assessment:       Diagnosis Orders   1.  Chronic obstructive pulmonary disease, unspecified COPD type ([facility]     2. Coronary artery disease involving native coronary artery of native heart without angina pectoris  isosorbide mononitrate (IMDUR) 30 MG extended release tablet   3. Spinal stenosis of lumbar region with neurogenic claudication     4. Lumbar back pain     5. Essential hypertension     6. Gastroesophageal reflux disease without esophagitis     7. Age-related osteoporosis with current pathological fracture with delayed healing, subsequent encounter           Plan:          Current Outpatient Prescriptions   Medication Sig Dispense Refill    isosorbide mononitrate (IMDUR) 30 MG extended release tablet Take 1 tablet by mouth daily 90 tablet 1    metoprolol tartrate (LOPRESSOR) 25 MG tablet Take 0.5 tablets by mouth 2 times daily 90 tablet 0    alendronate (FOSAMAX) 70 MG tablet Take 1 tablet by mouth every 7 days Stay  upright and  Do  Not   Eat  For  30 minutes 4 tablet 3    simvastatin (ZOCOR) 40 MG tablet Take 1 tablet by mouth nightly 90 tablet 1    nortriptyline (PAMELOR) 50 MG capsule Take 1 capsule by mouth nightly 90 capsule 1    hydrochlorothiazide (HYDRODIURIL) 25 MG tablet Take 1 tablet by mouth daily 90 tablet 1    HYDROcodone-acetaminophen (NORCO) 5-325 MG per tablet Take 1 tablet by mouth every 6 hours as needed. Stephany Marie tiZANidine (ZANAFLEX) 4 MG tablet Take 1 tablet by mouth every 6 hours as needed (muscle spasm) 100 tablet 2    acetaminophen (TYLENOL) 325 MG tablet Take 2 tablets by mouth every 4 hours as needed for Pain Maximum dose of acetaminophen is 4000 mg from all sources in 24 hours.       potassium chloride (KLOR-CON M) 10 MEQ extended release tablet Take 1 tablet by mouth daily (with breakfast) 30 tablet 0    omeprazole (PRILOSEC) 20 MG delayed release capsule Take 1 capsule by mouth 2 times daily 180 capsule 1    aspirin 81 MG tablet Take 81 mg by mouth daily      latanoprost (XALATAN) 0.005 % ophthalmic solution Place 1 drop into both eyes nightly        No current facility-administered medications for this visit. Does have spontaneous compression fracture of spine and better at this time partially healing so adding Fosamax. Otherwise other meds remain the same see back 3 months.  In general spars pain meds still use the Norco for [pain     see in  6 weeks  Haile Waters MD

## 2018-08-23 ENCOUNTER — TELEPHONE (OUTPATIENT)
Dept: FAMILY MEDICINE CLINIC | Age: 83
End: 2018-08-23

## 2018-08-23 DIAGNOSIS — M48.062 SPINAL STENOSIS OF LUMBAR REGION WITH NEUROGENIC CLAUDICATION: ICD-10-CM

## 2018-08-23 DIAGNOSIS — G89.29 CHRONIC MIDLINE LOW BACK PAIN WITH RIGHT-SIDED SCIATICA: Primary | ICD-10-CM

## 2018-08-23 DIAGNOSIS — M54.41 CHRONIC MIDLINE LOW BACK PAIN WITH RIGHT-SIDED SCIATICA: Primary | ICD-10-CM

## 2018-08-23 NOTE — TELEPHONE ENCOUNTER
Pt called said that she had spoke with you about giving her something not as strong as the Norco that she will be out of tomorrow. Said she spoke to you about this last time she was here.     Isabelle ([facility] [place])

## 2018-08-24 NOTE — TELEPHONE ENCOUNTER
She had tylenol #3 before and then to norco with the compression fracture so ?  Take 1/2 tab of the norco ?

## 2018-08-27 RX ORDER — HYDROCODONE BITARTRATE AND ACETAMINOPHEN 5; 325 MG/1; MG/1
TABLET ORAL
Qty: 60 TABLET | Refills: 0 | Status: SHIPPED | OUTPATIENT
Start: 2018-08-27 | End: 2018-09-17

## 2018-09-26 ENCOUNTER — OFFICE VISIT (OUTPATIENT)
Dept: FAMILY MEDICINE CLINIC | Age: 83
End: 2018-09-26

## 2018-09-26 VITALS
DIASTOLIC BLOOD PRESSURE: 64 MMHG | HEIGHT: 61 IN | RESPIRATION RATE: 12 BRPM | BODY MASS INDEX: 28.13 KG/M2 | SYSTOLIC BLOOD PRESSURE: 118 MMHG | HEART RATE: 68 BPM | WEIGHT: 149 LBS

## 2018-09-26 DIAGNOSIS — M54.41 CHRONIC MIDLINE LOW BACK PAIN WITH RIGHT-SIDED SCIATICA: ICD-10-CM

## 2018-09-26 DIAGNOSIS — J44.9 CHRONIC OBSTRUCTIVE PULMONARY DISEASE, UNSPECIFIED COPD TYPE (HCC): ICD-10-CM

## 2018-09-26 DIAGNOSIS — E78.00 PURE HYPERCHOLESTEROLEMIA: ICD-10-CM

## 2018-09-26 DIAGNOSIS — K21.9 GASTROESOPHAGEAL REFLUX DISEASE WITHOUT ESOPHAGITIS: ICD-10-CM

## 2018-09-26 DIAGNOSIS — M48.062 SPINAL STENOSIS OF LUMBAR REGION WITH NEUROGENIC CLAUDICATION: ICD-10-CM

## 2018-09-26 DIAGNOSIS — I10 ESSENTIAL HYPERTENSION: Primary | ICD-10-CM

## 2018-09-26 DIAGNOSIS — G89.29 CHRONIC MIDLINE LOW BACK PAIN WITH RIGHT-SIDED SCIATICA: ICD-10-CM

## 2018-09-26 DIAGNOSIS — I25.10 CORONARY ARTERY DISEASE INVOLVING NATIVE CORONARY ARTERY OF NATIVE HEART WITHOUT ANGINA PECTORIS: ICD-10-CM

## 2018-09-26 PROCEDURE — 99213 OFFICE O/P EST LOW 20 MIN: CPT | Performed by: FAMILY MEDICINE

## 2018-09-26 PROCEDURE — 1101F PT FALLS ASSESS-DOCD LE1/YR: CPT | Performed by: FAMILY MEDICINE

## 2018-09-26 RX ORDER — OMEPRAZOLE 20 MG/1
20 CAPSULE, DELAYED RELEASE ORAL 2 TIMES DAILY
Qty: 180 CAPSULE | Refills: 1 | Status: SHIPPED | OUTPATIENT
Start: 2018-09-26 | End: 2019-03-27 | Stop reason: SDUPTHER

## 2018-09-26 ASSESSMENT — ENCOUNTER SYMPTOMS
BACK PAIN: 1
ABDOMINAL PAIN: 0
EYE PAIN: 0
CHEST TIGHTNESS: 0
SHORTNESS OF BREATH: 0
SORE THROAT: 0
COUGH: 0
WHEEZING: 0
CONSTIPATION: 0
NAUSEA: 0

## 2018-09-26 ASSESSMENT — PATIENT HEALTH QUESTIONNAIRE - PHQ9
1. LITTLE INTEREST OR PLEASURE IN DOING THINGS: 0
SUM OF ALL RESPONSES TO PHQ QUESTIONS 1-9: 0
SUM OF ALL RESPONSES TO PHQ9 QUESTIONS 1 & 2: 0
2. FEELING DOWN, DEPRESSED OR HOPELESS: 0
SUM OF ALL RESPONSES TO PHQ QUESTIONS 1-9: 0

## 2018-09-27 DIAGNOSIS — M48.062 SPINAL STENOSIS OF LUMBAR REGION WITH NEUROGENIC CLAUDICATION: Primary | ICD-10-CM

## 2018-09-27 DIAGNOSIS — G89.29 CHRONIC BACK PAIN, UNSPECIFIED BACK LOCATION, UNSPECIFIED BACK PAIN LATERALITY: ICD-10-CM

## 2018-09-27 DIAGNOSIS — M54.9 CHRONIC BACK PAIN, UNSPECIFIED BACK LOCATION, UNSPECIFIED BACK PAIN LATERALITY: ICD-10-CM

## 2018-09-27 RX ORDER — HYDROCODONE BITARTRATE AND ACETAMINOPHEN 5; 325 MG/1; MG/1
1 TABLET ORAL EVERY 6 HOURS PRN
Qty: 30 TABLET | Refills: 0 | Status: SHIPPED | OUTPATIENT
Start: 2018-09-27 | End: 2018-10-15 | Stop reason: SDUPTHER

## 2018-09-29 ENCOUNTER — CARE COORDINATION (OUTPATIENT)
Dept: CASE MANAGEMENT | Age: 83
End: 2018-09-29

## 2018-10-15 DIAGNOSIS — M48.062 SPINAL STENOSIS OF LUMBAR REGION WITH NEUROGENIC CLAUDICATION: ICD-10-CM

## 2018-10-15 DIAGNOSIS — M54.41 CHRONIC MIDLINE LOW BACK PAIN WITH RIGHT-SIDED SCIATICA: ICD-10-CM

## 2018-10-15 DIAGNOSIS — G89.29 CHRONIC MIDLINE LOW BACK PAIN WITH RIGHT-SIDED SCIATICA: ICD-10-CM

## 2018-10-15 DIAGNOSIS — M54.9 CHRONIC BACK PAIN, UNSPECIFIED BACK LOCATION, UNSPECIFIED BACK PAIN LATERALITY: ICD-10-CM

## 2018-10-15 DIAGNOSIS — G89.29 CHRONIC BACK PAIN, UNSPECIFIED BACK LOCATION, UNSPECIFIED BACK PAIN LATERALITY: ICD-10-CM

## 2018-10-16 RX ORDER — TIZANIDINE 4 MG/1
4 TABLET ORAL EVERY 6 HOURS PRN
Qty: 100 TABLET | Refills: 2 | Status: SHIPPED | OUTPATIENT
Start: 2018-10-16 | End: 2019-03-12 | Stop reason: SDUPTHER

## 2018-10-16 RX ORDER — HYDROCODONE BITARTRATE AND ACETAMINOPHEN 5; 325 MG/1; MG/1
1 TABLET ORAL EVERY 6 HOURS PRN
Qty: 60 TABLET | Refills: 0 | Status: SHIPPED | OUTPATIENT
Start: 2018-10-16 | End: 2018-11-13 | Stop reason: SDUPTHER

## 2018-11-01 DIAGNOSIS — I10 ESSENTIAL HYPERTENSION: ICD-10-CM

## 2018-11-13 DIAGNOSIS — M54.9 CHRONIC BACK PAIN, UNSPECIFIED BACK LOCATION, UNSPECIFIED BACK PAIN LATERALITY: ICD-10-CM

## 2018-11-13 DIAGNOSIS — M48.062 SPINAL STENOSIS OF LUMBAR REGION WITH NEUROGENIC CLAUDICATION: ICD-10-CM

## 2018-11-13 DIAGNOSIS — G89.29 CHRONIC BACK PAIN, UNSPECIFIED BACK LOCATION, UNSPECIFIED BACK PAIN LATERALITY: ICD-10-CM

## 2018-11-15 RX ORDER — HYDROCODONE BITARTRATE AND ACETAMINOPHEN 5; 325 MG/1; MG/1
1 TABLET ORAL EVERY 6 HOURS PRN
Qty: 60 TABLET | Refills: 0 | Status: SHIPPED | OUTPATIENT
Start: 2018-11-15 | End: 2018-12-17 | Stop reason: SDUPTHER

## 2018-11-26 ENCOUNTER — OFFICE VISIT (OUTPATIENT)
Dept: FAMILY MEDICINE CLINIC | Age: 83
End: 2018-11-26

## 2018-11-26 VITALS
DIASTOLIC BLOOD PRESSURE: 60 MMHG | SYSTOLIC BLOOD PRESSURE: 118 MMHG | BODY MASS INDEX: 28.35 KG/M2 | HEART RATE: 80 BPM | WEIGHT: 150.13 LBS | HEIGHT: 61 IN | RESPIRATION RATE: 13 BRPM

## 2018-11-26 DIAGNOSIS — E78.00 PURE HYPERCHOLESTEROLEMIA: ICD-10-CM

## 2018-11-26 DIAGNOSIS — M48.062 SPINAL STENOSIS OF LUMBAR REGION WITH NEUROGENIC CLAUDICATION: Primary | ICD-10-CM

## 2018-11-26 DIAGNOSIS — I25.10 CORONARY ARTERY DISEASE INVOLVING NATIVE CORONARY ARTERY OF NATIVE HEART WITHOUT ANGINA PECTORIS: ICD-10-CM

## 2018-11-26 DIAGNOSIS — I10 ESSENTIAL HYPERTENSION: ICD-10-CM

## 2018-11-26 DIAGNOSIS — J44.9 CHRONIC OBSTRUCTIVE PULMONARY DISEASE, UNSPECIFIED COPD TYPE (HCC): ICD-10-CM

## 2018-11-26 PROCEDURE — 99213 OFFICE O/P EST LOW 20 MIN: CPT | Performed by: FAMILY MEDICINE

## 2018-11-26 PROCEDURE — 1101F PT FALLS ASSESS-DOCD LE1/YR: CPT | Performed by: FAMILY MEDICINE

## 2018-11-26 RX ORDER — POTASSIUM CHLORIDE 20 MEQ/1
20 TABLET, EXTENDED RELEASE ORAL 2 TIMES DAILY
Qty: 60 TABLET | Refills: 0
Start: 2018-11-26 | End: 2019-03-19 | Stop reason: SDUPTHER

## 2018-11-26 RX ORDER — HYDROCHLOROTHIAZIDE 25 MG/1
25 TABLET ORAL DAILY
Qty: 90 TABLET | Refills: 0
Start: 2018-11-26 | End: 2019-01-16 | Stop reason: SDUPTHER

## 2018-11-26 RX ORDER — ISOSORBIDE MONONITRATE 30 MG/1
30 TABLET, EXTENDED RELEASE ORAL DAILY
Qty: 90 TABLET | Refills: 0
Start: 2018-11-26 | End: 2019-02-26 | Stop reason: SDUPTHER

## 2018-11-26 ASSESSMENT — ENCOUNTER SYMPTOMS
ABDOMINAL PAIN: 0
CHEST TIGHTNESS: 0
WHEEZING: 0
SORE THROAT: 0
COUGH: 0
EYE PAIN: 0
NAUSEA: 0
CONSTIPATION: 0
BACK PAIN: 1
SHORTNESS OF BREATH: 0

## 2018-12-05 ENCOUNTER — CARE COORDINATION (OUTPATIENT)
Dept: CARE COORDINATION | Age: 83
End: 2018-12-05

## 2018-12-17 DIAGNOSIS — G89.29 CHRONIC BACK PAIN, UNSPECIFIED BACK LOCATION, UNSPECIFIED BACK PAIN LATERALITY: ICD-10-CM

## 2018-12-17 DIAGNOSIS — M54.9 CHRONIC BACK PAIN, UNSPECIFIED BACK LOCATION, UNSPECIFIED BACK PAIN LATERALITY: ICD-10-CM

## 2018-12-17 DIAGNOSIS — M48.062 SPINAL STENOSIS OF LUMBAR REGION WITH NEUROGENIC CLAUDICATION: ICD-10-CM

## 2018-12-19 RX ORDER — HYDROCODONE BITARTRATE AND ACETAMINOPHEN 5; 325 MG/1; MG/1
1 TABLET ORAL EVERY 6 HOURS PRN
Qty: 60 TABLET | Refills: 0 | Status: SHIPPED | OUTPATIENT
Start: 2018-12-19 | End: 2019-01-24 | Stop reason: SDUPTHER

## 2019-01-16 DIAGNOSIS — I10 ESSENTIAL HYPERTENSION: ICD-10-CM

## 2019-01-17 RX ORDER — HYDROCHLOROTHIAZIDE 25 MG/1
25 TABLET ORAL DAILY
Qty: 90 TABLET | Refills: 1 | Status: SHIPPED | OUTPATIENT
Start: 2019-01-17 | End: 2019-08-28 | Stop reason: SDUPTHER

## 2019-01-24 DIAGNOSIS — G89.29 CHRONIC BACK PAIN, UNSPECIFIED BACK LOCATION, UNSPECIFIED BACK PAIN LATERALITY: ICD-10-CM

## 2019-01-24 DIAGNOSIS — M48.062 SPINAL STENOSIS OF LUMBAR REGION WITH NEUROGENIC CLAUDICATION: ICD-10-CM

## 2019-01-24 DIAGNOSIS — M54.9 CHRONIC BACK PAIN, UNSPECIFIED BACK LOCATION, UNSPECIFIED BACK PAIN LATERALITY: ICD-10-CM

## 2019-01-25 RX ORDER — HYDROCODONE BITARTRATE AND ACETAMINOPHEN 5; 325 MG/1; MG/1
1 TABLET ORAL EVERY 6 HOURS PRN
Qty: 60 TABLET | Refills: 0 | Status: SHIPPED | OUTPATIENT
Start: 2019-01-25 | End: 2019-02-26 | Stop reason: SDUPTHER

## 2019-02-01 DIAGNOSIS — G89.29 CHRONIC MIDLINE LOW BACK PAIN WITH RIGHT-SIDED SCIATICA: ICD-10-CM

## 2019-02-01 DIAGNOSIS — M54.41 CHRONIC MIDLINE LOW BACK PAIN WITH RIGHT-SIDED SCIATICA: ICD-10-CM

## 2019-02-01 DIAGNOSIS — E78.00 PURE HYPERCHOLESTEROLEMIA: ICD-10-CM

## 2019-02-02 RX ORDER — SIMVASTATIN 40 MG
40 TABLET ORAL NIGHTLY
Qty: 90 TABLET | Refills: 1 | Status: SHIPPED | OUTPATIENT
Start: 2019-02-02 | End: 2019-08-01 | Stop reason: SDUPTHER

## 2019-02-02 RX ORDER — NORTRIPTYLINE HYDROCHLORIDE 50 MG/1
50 CAPSULE ORAL NIGHTLY
Qty: 90 CAPSULE | Refills: 1 | Status: SHIPPED | OUTPATIENT
Start: 2019-02-02 | End: 2019-08-01 | Stop reason: SDUPTHER

## 2019-02-19 DIAGNOSIS — I10 ESSENTIAL HYPERTENSION: ICD-10-CM

## 2019-02-26 ENCOUNTER — OFFICE VISIT (OUTPATIENT)
Dept: FAMILY MEDICINE CLINIC | Age: 84
End: 2019-02-26

## 2019-02-26 VITALS
BODY MASS INDEX: 28.96 KG/M2 | SYSTOLIC BLOOD PRESSURE: 100 MMHG | HEART RATE: 72 BPM | DIASTOLIC BLOOD PRESSURE: 64 MMHG | RESPIRATION RATE: 12 BRPM | WEIGHT: 153.38 LBS | HEIGHT: 61 IN

## 2019-02-26 DIAGNOSIS — E78.00 PURE HYPERCHOLESTEROLEMIA: ICD-10-CM

## 2019-02-26 DIAGNOSIS — M54.9 CHRONIC BACK PAIN, UNSPECIFIED BACK LOCATION, UNSPECIFIED BACK PAIN LATERALITY: ICD-10-CM

## 2019-02-26 DIAGNOSIS — J44.9 CHRONIC OBSTRUCTIVE PULMONARY DISEASE, UNSPECIFIED COPD TYPE (HCC): Primary | ICD-10-CM

## 2019-02-26 DIAGNOSIS — I10 ESSENTIAL HYPERTENSION: ICD-10-CM

## 2019-02-26 DIAGNOSIS — G89.29 CHRONIC BACK PAIN, UNSPECIFIED BACK LOCATION, UNSPECIFIED BACK PAIN LATERALITY: ICD-10-CM

## 2019-02-26 DIAGNOSIS — M48.062 SPINAL STENOSIS OF LUMBAR REGION WITH NEUROGENIC CLAUDICATION: ICD-10-CM

## 2019-02-26 DIAGNOSIS — K21.9 GASTROESOPHAGEAL REFLUX DISEASE WITHOUT ESOPHAGITIS: ICD-10-CM

## 2019-02-26 DIAGNOSIS — I25.10 CORONARY ARTERY DISEASE INVOLVING NATIVE CORONARY ARTERY OF NATIVE HEART WITHOUT ANGINA PECTORIS: ICD-10-CM

## 2019-02-26 PROCEDURE — 1090F PRES/ABSN URINE INCON ASSESS: CPT | Performed by: FAMILY MEDICINE

## 2019-02-26 PROCEDURE — G8598 ASA/ANTIPLAT THER USED: HCPCS | Performed by: FAMILY MEDICINE

## 2019-02-26 PROCEDURE — G8400 PT W/DXA NO RESULTS DOC: HCPCS | Performed by: FAMILY MEDICINE

## 2019-02-26 PROCEDURE — 1101F PT FALLS ASSESS-DOCD LE1/YR: CPT | Performed by: FAMILY MEDICINE

## 2019-02-26 PROCEDURE — 1036F TOBACCO NON-USER: CPT | Performed by: FAMILY MEDICINE

## 2019-02-26 PROCEDURE — 4040F PNEUMOC VAC/ADMIN/RCVD: CPT | Performed by: FAMILY MEDICINE

## 2019-02-26 PROCEDURE — G8419 CALC BMI OUT NRM PARAM NOF/U: HCPCS | Performed by: FAMILY MEDICINE

## 2019-02-26 PROCEDURE — 1123F ACP DISCUSS/DSCN MKR DOCD: CPT | Performed by: FAMILY MEDICINE

## 2019-02-26 PROCEDURE — G8427 DOCREV CUR MEDS BY ELIG CLIN: HCPCS | Performed by: FAMILY MEDICINE

## 2019-02-26 PROCEDURE — 99213 OFFICE O/P EST LOW 20 MIN: CPT | Performed by: FAMILY MEDICINE

## 2019-02-26 RX ORDER — ISOSORBIDE MONONITRATE 30 MG/1
30 TABLET, EXTENDED RELEASE ORAL DAILY
Qty: 90 TABLET | Refills: 1 | Status: SHIPPED | OUTPATIENT
Start: 2019-02-26 | End: 2019-08-28 | Stop reason: SDUPTHER

## 2019-02-26 RX ORDER — HYDROCODONE BITARTRATE AND ACETAMINOPHEN 5; 325 MG/1; MG/1
1 TABLET ORAL EVERY 6 HOURS PRN
Qty: 60 TABLET | Refills: 0 | Status: SHIPPED | OUTPATIENT
Start: 2019-02-26 | End: 2019-03-27 | Stop reason: SDUPTHER

## 2019-02-26 ASSESSMENT — ENCOUNTER SYMPTOMS
ORTHOPNEA: 0
NAUSEA: 0
SHORTNESS OF BREATH: 0
CHEST TIGHTNESS: 0
EYE PAIN: 0
BACK PAIN: 1
WHEEZING: 0
CONSTIPATION: 0
SORE THROAT: 0
ABDOMINAL PAIN: 0
COUGH: 0

## 2019-02-26 ASSESSMENT — PATIENT HEALTH QUESTIONNAIRE - PHQ9
SUM OF ALL RESPONSES TO PHQ QUESTIONS 1-9: 0
2. FEELING DOWN, DEPRESSED OR HOPELESS: 0
SUM OF ALL RESPONSES TO PHQ9 QUESTIONS 1 & 2: 0
1. LITTLE INTEREST OR PLEASURE IN DOING THINGS: 0
SUM OF ALL RESPONSES TO PHQ QUESTIONS 1-9: 0

## 2019-03-12 ENCOUNTER — TELEPHONE (OUTPATIENT)
Dept: FAMILY MEDICINE CLINIC | Age: 84
End: 2019-03-12

## 2019-03-12 DIAGNOSIS — M54.41 CHRONIC MIDLINE LOW BACK PAIN WITH RIGHT-SIDED SCIATICA: ICD-10-CM

## 2019-03-12 DIAGNOSIS — G89.29 CHRONIC MIDLINE LOW BACK PAIN WITH RIGHT-SIDED SCIATICA: ICD-10-CM

## 2019-03-13 RX ORDER — TIZANIDINE 4 MG/1
TABLET ORAL
Qty: 100 TABLET | Refills: 2 | Status: SHIPPED | OUTPATIENT
Start: 2019-03-13 | End: 2019-08-08 | Stop reason: SDUPTHER

## 2019-03-18 ENCOUNTER — TELEPHONE (OUTPATIENT)
Dept: FAMILY MEDICINE CLINIC | Age: 84
End: 2019-03-18

## 2019-03-18 DIAGNOSIS — G89.29 CHRONIC MIDLINE LOW BACK PAIN WITH RIGHT-SIDED SCIATICA: ICD-10-CM

## 2019-03-18 DIAGNOSIS — M54.41 CHRONIC MIDLINE LOW BACK PAIN WITH RIGHT-SIDED SCIATICA: ICD-10-CM

## 2019-03-18 DIAGNOSIS — I10 ESSENTIAL HYPERTENSION: ICD-10-CM

## 2019-03-19 RX ORDER — POTASSIUM CHLORIDE 20 MEQ/1
20 TABLET, EXTENDED RELEASE ORAL 2 TIMES DAILY
Qty: 60 TABLET | Refills: 3 | Status: SHIPPED | OUTPATIENT
Start: 2019-03-19 | End: 2019-08-01 | Stop reason: SDUPTHER

## 2019-03-21 DIAGNOSIS — I10 ESSENTIAL HYPERTENSION: ICD-10-CM

## 2019-03-25 ENCOUNTER — TELEPHONE (OUTPATIENT)
Dept: FAMILY MEDICINE CLINIC | Age: 84
End: 2019-03-25

## 2019-03-25 DIAGNOSIS — G89.29 CHRONIC BACK PAIN, UNSPECIFIED BACK LOCATION, UNSPECIFIED BACK PAIN LATERALITY: ICD-10-CM

## 2019-03-25 DIAGNOSIS — K21.9 GASTROESOPHAGEAL REFLUX DISEASE WITHOUT ESOPHAGITIS: ICD-10-CM

## 2019-03-25 DIAGNOSIS — M54.9 CHRONIC BACK PAIN, UNSPECIFIED BACK LOCATION, UNSPECIFIED BACK PAIN LATERALITY: ICD-10-CM

## 2019-03-25 DIAGNOSIS — M48.062 SPINAL STENOSIS OF LUMBAR REGION WITH NEUROGENIC CLAUDICATION: ICD-10-CM

## 2019-03-27 RX ORDER — OMEPRAZOLE 20 MG/1
20 CAPSULE, DELAYED RELEASE ORAL 2 TIMES DAILY
Qty: 180 CAPSULE | Refills: 1 | Status: SHIPPED | OUTPATIENT
Start: 2019-03-27 | End: 2019-09-16 | Stop reason: SDUPTHER

## 2019-03-27 RX ORDER — HYDROCODONE BITARTRATE AND ACETAMINOPHEN 5; 325 MG/1; MG/1
1 TABLET ORAL EVERY 6 HOURS PRN
Qty: 60 TABLET | Refills: 0 | Status: SHIPPED | OUTPATIENT
Start: 2019-03-27 | End: 2019-04-24 | Stop reason: SDUPTHER

## 2019-04-22 DIAGNOSIS — M48.062 SPINAL STENOSIS OF LUMBAR REGION WITH NEUROGENIC CLAUDICATION: ICD-10-CM

## 2019-04-22 DIAGNOSIS — G89.29 CHRONIC BACK PAIN, UNSPECIFIED BACK LOCATION, UNSPECIFIED BACK PAIN LATERALITY: ICD-10-CM

## 2019-04-22 DIAGNOSIS — M54.9 CHRONIC BACK PAIN, UNSPECIFIED BACK LOCATION, UNSPECIFIED BACK PAIN LATERALITY: ICD-10-CM

## 2019-04-24 RX ORDER — HYDROCODONE BITARTRATE AND ACETAMINOPHEN 5; 325 MG/1; MG/1
1 TABLET ORAL EVERY 6 HOURS PRN
Qty: 60 TABLET | Refills: 0 | Status: SHIPPED | OUTPATIENT
Start: 2019-04-25 | End: 2019-05-28 | Stop reason: SDUPTHER

## 2019-04-25 RX ORDER — HYDROCODONE BITARTRATE AND ACETAMINOPHEN 5; 325 MG/1; MG/1
1 TABLET ORAL EVERY 6 HOURS PRN
Qty: 60 TABLET | Refills: 0 | OUTPATIENT
Start: 2019-04-25 | End: 2019-05-25

## 2019-05-28 ENCOUNTER — OFFICE VISIT (OUTPATIENT)
Dept: FAMILY MEDICINE CLINIC | Age: 84
End: 2019-05-28

## 2019-05-28 VITALS
DIASTOLIC BLOOD PRESSURE: 64 MMHG | BODY MASS INDEX: 28.77 KG/M2 | SYSTOLIC BLOOD PRESSURE: 110 MMHG | WEIGHT: 152.38 LBS | HEIGHT: 61 IN | RESPIRATION RATE: 12 BRPM | HEART RATE: 64 BPM

## 2019-05-28 DIAGNOSIS — M54.9 CHRONIC BACK PAIN, UNSPECIFIED BACK LOCATION, UNSPECIFIED BACK PAIN LATERALITY: ICD-10-CM

## 2019-05-28 DIAGNOSIS — M48.062 SPINAL STENOSIS OF LUMBAR REGION WITH NEUROGENIC CLAUDICATION: ICD-10-CM

## 2019-05-28 DIAGNOSIS — G89.29 CHRONIC BACK PAIN, UNSPECIFIED BACK LOCATION, UNSPECIFIED BACK PAIN LATERALITY: ICD-10-CM

## 2019-05-28 DIAGNOSIS — I25.10 CORONARY ARTERY DISEASE INVOLVING NATIVE CORONARY ARTERY OF NATIVE HEART WITHOUT ANGINA PECTORIS: ICD-10-CM

## 2019-05-28 DIAGNOSIS — E78.00 PURE HYPERCHOLESTEROLEMIA: ICD-10-CM

## 2019-05-28 DIAGNOSIS — I10 ESSENTIAL HYPERTENSION: Primary | ICD-10-CM

## 2019-05-28 DIAGNOSIS — H61.21 HEARING LOSS OF RIGHT EAR DUE TO CERUMEN IMPACTION: ICD-10-CM

## 2019-05-28 DIAGNOSIS — M15.9 PRIMARY OSTEOARTHRITIS INVOLVING MULTIPLE JOINTS: ICD-10-CM

## 2019-05-28 DIAGNOSIS — K21.9 GASTROESOPHAGEAL REFLUX DISEASE WITHOUT ESOPHAGITIS: ICD-10-CM

## 2019-05-28 DIAGNOSIS — H60.541 ACUTE ECZEMATOID OTITIS EXTERNA OF RIGHT EAR: ICD-10-CM

## 2019-05-28 PROBLEM — H61.20 HEARING LOSS SECONDARY TO CERUMEN IMPACTION: Status: ACTIVE | Noted: 2019-05-28

## 2019-05-28 PROCEDURE — 1123F ACP DISCUSS/DSCN MKR DOCD: CPT | Performed by: FAMILY MEDICINE

## 2019-05-28 PROCEDURE — G8419 CALC BMI OUT NRM PARAM NOF/U: HCPCS | Performed by: FAMILY MEDICINE

## 2019-05-28 PROCEDURE — 69210 REMOVE IMPACTED EAR WAX UNI: CPT | Performed by: FAMILY MEDICINE

## 2019-05-28 PROCEDURE — G8400 PT W/DXA NO RESULTS DOC: HCPCS | Performed by: FAMILY MEDICINE

## 2019-05-28 PROCEDURE — G8598 ASA/ANTIPLAT THER USED: HCPCS | Performed by: FAMILY MEDICINE

## 2019-05-28 PROCEDURE — 99213 OFFICE O/P EST LOW 20 MIN: CPT | Performed by: FAMILY MEDICINE

## 2019-05-28 PROCEDURE — 1036F TOBACCO NON-USER: CPT | Performed by: FAMILY MEDICINE

## 2019-05-28 PROCEDURE — 4040F PNEUMOC VAC/ADMIN/RCVD: CPT | Performed by: FAMILY MEDICINE

## 2019-05-28 PROCEDURE — G8427 DOCREV CUR MEDS BY ELIG CLIN: HCPCS | Performed by: FAMILY MEDICINE

## 2019-05-28 PROCEDURE — 1090F PRES/ABSN URINE INCON ASSESS: CPT | Performed by: FAMILY MEDICINE

## 2019-05-28 RX ORDER — NEOMYCIN SULFATE, POLYMYXIN B SULFATE AND HYDROCORTISONE 10; 3.5; 1 MG/ML; MG/ML; [USP'U]/ML
3 SUSPENSION/ DROPS AURICULAR (OTIC) 4 TIMES DAILY
Qty: 1 BOTTLE | Refills: 0 | Status: SHIPPED | OUTPATIENT
Start: 2019-05-28 | End: 2019-06-02

## 2019-05-28 RX ORDER — HYDROCODONE BITARTRATE AND ACETAMINOPHEN 5; 325 MG/1; MG/1
1 TABLET ORAL EVERY 6 HOURS PRN
Qty: 60 TABLET | Refills: 0 | Status: SHIPPED | OUTPATIENT
Start: 2019-05-28 | End: 2019-06-28 | Stop reason: SDUPTHER

## 2019-05-28 ASSESSMENT — ENCOUNTER SYMPTOMS
SORE THROAT: 1
CHEST TIGHTNESS: 0
CONSTIPATION: 0
WHEEZING: 0
COUGH: 0
HOARSE VOICE: 0
NAUSEA: 0
EYE PAIN: 0
SHORTNESS OF BREATH: 0
ABDOMINAL PAIN: 0
SINUS PRESSURE: 1

## 2019-05-28 ASSESSMENT — COPD QUESTIONNAIRES: COPD: 1

## 2019-05-28 NOTE — PROGRESS NOTES
Subjective:      Patient ID: Leobardo Morrow is a 81 y.o. female. Back pain  Noted and  Chronic and  Neck pain as lumbar  stenosis      ashd   stable       Copd  Stable     COPD   There is no chest tightness, cough, hoarse voice, shortness of breath or wheezing. This is a chronic problem. The problem has been resolved. Associated symptoms include a sore throat. Pertinent negatives include no appetite change, chest pain, ear pain, fever, headaches or postnasal drip. Her symptoms are aggravated by nothing. She reports complete improvement on treatment. Hypertension   This is a chronic problem. The current episode started more than 1 year ago. The problem has been resolved since onset. The problem is controlled. Pertinent negatives include no chest pain, headaches, neck pain, palpitations or shortness of breath. The current treatment provides significant improvement. There are no compliance problems. Sinusitis   This is a new problem. The current episode started yesterday. There has been no fever. The fever has been present for 1 to 2 days. Her pain is at a severity of 2/10. The pain is mild. Associated symptoms include sinus pressure and a sore throat. Pertinent negatives include no congestion, coughing, ear pain, headaches, hoarse voice, neck pain or shortness of breath. (  Right  Ear  pain) The treatment provided mild relief.      Past Medical History:   Diagnosis Date    Acute blood loss as cause of postoperative anemia 03/2018    CAD (coronary artery disease)      cath  50    Cardiac valve prolapse     Chronic back pain     COPD (chronic obstructive pulmonary disease) (HCC)     Ex-smoker     GERD (gastroesophageal reflux disease) 2/07    EGD    Glaucoma     H/O cardiac catheterization 2002    40-50% LAD   katharine    Hyperlipidemia     Hypertension     Osteoarthritis 1999    right shoulder and back    Pericarditis 1996     Review of Systems   Constitutional: Negative for appetite change, fatigue and fever. HENT: Positive for sinus pressure and sore throat. Negative for congestion, ear pain, hoarse voice and postnasal drip. Eyes: Negative for pain and visual disturbance. Respiratory: Negative for cough, chest tightness, shortness of breath and wheezing. Cardiovascular: Negative for chest pain, palpitations and leg swelling. Gastrointestinal: Negative for abdominal pain, constipation and nausea. Genitourinary: Negative for dysuria and frequency. Musculoskeletal: Negative for arthralgias, joint swelling, neck pain and neck stiffness. Skin: Negative for rash. Neurological: Negative for dizziness, weakness, numbness and headaches. Hematological: Negative for adenopathy. Does not bruise/bleed easily. Psychiatric/Behavioral: Negative for behavioral problems and sleep disturbance. The patient is not nervous/anxious. /64 (Site: Right Upper Arm, Position: Sitting, Cuff Size: Medium Adult)   Pulse 64   Resp 12   Ht 5' 1\" (1.549 m)   Wt 152 lb 6 oz (69.1 kg)   BMI 28.79 kg/m²   Objective:   Physical Exam   Constitutional: She is oriented to person, place, and time. She appears well-developed and well-nourished. HENT:   Head: Normocephalic and atraumatic. Right Ear: External ear normal.   Left Ear: External ear normal.   Nose: Nose normal.   Mouth/Throat: Oropharynx is clear and moist.     right  Ear  Cerumen  Impacted    Eyes: Pupils are equal, round, and reactive to light. Conjunctivae and EOM are normal. No scleral icterus. Neck: Normal range of motion. Neck supple. No JVD present. No thyromegaly present. Cardiovascular: Normal rate, regular rhythm, normal heart sounds and intact distal pulses. Pulmonary/Chest: Effort normal and breath sounds normal. She has no wheezes. She has no rales. Abdominal: Soft. Bowel sounds are normal. She exhibits no distension and no mass. There is no tenderness. Musculoskeletal: Normal range of motion.  She exhibits no tenderness. Lymphadenopathy:     She has no cervical adenopathy. Neurological: She is alert and oriented to person, place, and time. She has normal reflexes. No cranial nerve deficit. Skin: Skin is warm and dry. No rash noted. Psychiatric: She has a normal mood and affect. Nursing note and vitals reviewed. irrigation and  Cerumen  Spoon  To  Remove  wax  Right ear  And with  Slight  irritation of    Ear  Canal and   Drum  wnl    Assessment:       Diagnosis Orders   1. Essential hypertension     2. Spinal stenosis of lumbar region with neurogenic claudication  HYDROcodone-acetaminophen (NORCO) 5-325 MG per tablet   3. Chronic back pain, unspecified back location, unspecified back pain laterality  HYDROcodone-acetaminophen (NORCO) 5-325 MG per tablet   4. Pure hypercholesterolemia     5. Primary osteoarthritis involving multiple joints     6. Gastroesophageal reflux disease without esophagitis     7. Coronary artery disease involving native coronary artery of native heart without angina pectoris     8. Hearing loss of right ear due to cerumen impaction  ME REMOVAL IMPACTED CERUMEN INSTRUMENTATION UNILAT   9. Acute eczematoid otitis externa of right ear  neomycin-polymyxin-hydrocortisone (CORTISPORIN) 3.5-09254-0 otic suspension        Plan:      Current Outpatient Medications   Medication Sig Dispense Refill    HYDROcodone-acetaminophen (NORCO) 5-325 MG per tablet Take 1 tablet by mouth every 6 hours as needed for Pain for up to 30 days.  60 tablet 0    neomycin-polymyxin-hydrocortisone (CORTISPORIN) 3.5-37679-1 otic suspension Place 3 drops into the right ear 4 times daily for 5 days 1 Bottle 0    omeprazole (PRILOSEC) 20 MG delayed release capsule Take 1 capsule by mouth 2 times daily 180 capsule 1    potassium chloride (KLOR-CON M) 20 MEQ extended release tablet Take 1 tablet by mouth 2 times daily 60 tablet 3    tiZANidine (ZANAFLEX) 4 MG tablet TAKE 1 TABLET BY MOUTH EVERY 6 HOURS AS NEEDED FOR MUSCLE SPASM 100 tablet 2    isosorbide mononitrate (IMDUR) 30 MG extended release tablet Take 1 tablet by mouth daily 90 tablet 1    metoprolol tartrate (LOPRESSOR) 25 MG tablet Take 0.5 tablets by mouth 2 times daily 90 tablet 1    nortriptyline (PAMELOR) 50 MG capsule Take 1 capsule by mouth nightly 90 capsule 1    simvastatin (ZOCOR) 40 MG tablet Take 1 tablet by mouth nightly 90 tablet 1    hydrochlorothiazide (HYDRODIURIL) 25 MG tablet Take 1 tablet by mouth daily 90 tablet 1    alendronate (FOSAMAX) 70 MG tablet Take 1 tablet by mouth every 7 days Stay  upright and  Do  Not   Eat  For  30 minutes 4 tablet 3    acetaminophen (TYLENOL) 325 MG tablet Take 2 tablets by mouth every 4 hours as needed for Pain Maximum dose of acetaminophen is 4000 mg from all sources in 24 hours.  aspirin 81 MG tablet Take 81 mg by mouth daily      latanoprost (XALATAN) 0.005 % ophthalmic solution Place 1 drop into both eyes nightly        No current facility-administered medications for this visit. Orders Placed This Encounter   Procedures    [place] REMOVAL IMPACTED CERUMEN INSTRUMENTATION UNILAT     irrigation and  cerumen spoon to remove  Wax  From ear   No results found for this visit on 05/28/19.        Otocort  For  Ear  A s  imflamation  See in  3  mths   And  For  back  pain  Melinda Fam MD

## 2019-06-27 DIAGNOSIS — M48.062 SPINAL STENOSIS OF LUMBAR REGION WITH NEUROGENIC CLAUDICATION: ICD-10-CM

## 2019-06-27 DIAGNOSIS — G89.29 CHRONIC BACK PAIN, UNSPECIFIED BACK LOCATION, UNSPECIFIED BACK PAIN LATERALITY: ICD-10-CM

## 2019-06-27 DIAGNOSIS — M54.9 CHRONIC BACK PAIN, UNSPECIFIED BACK LOCATION, UNSPECIFIED BACK PAIN LATERALITY: ICD-10-CM

## 2019-06-27 NOTE — TELEPHONE ENCOUNTER
Date of last visit:  5/28/2019  Date of next visit:  8/28/2019    Requested Prescriptions     Pending Prescriptions Disp Refills    HYDROcodone-acetaminophen (NORCO) 5-325 MG per tablet 60 tablet 0     Sig: Take 1 tablet by mouth every 6 hours as needed for Pain for up to 30 days.

## 2019-06-28 RX ORDER — HYDROCODONE BITARTRATE AND ACETAMINOPHEN 5; 325 MG/1; MG/1
1 TABLET ORAL EVERY 6 HOURS PRN
Qty: 60 TABLET | Refills: 0 | Status: SHIPPED | OUTPATIENT
Start: 2019-06-28 | End: 2019-08-01 | Stop reason: SDUPTHER

## 2019-07-31 DIAGNOSIS — E78.00 PURE HYPERCHOLESTEROLEMIA: ICD-10-CM

## 2019-07-31 DIAGNOSIS — I10 ESSENTIAL HYPERTENSION: ICD-10-CM

## 2019-07-31 DIAGNOSIS — M54.41 CHRONIC MIDLINE LOW BACK PAIN WITH RIGHT-SIDED SCIATICA: ICD-10-CM

## 2019-07-31 DIAGNOSIS — M48.062 SPINAL STENOSIS OF LUMBAR REGION WITH NEUROGENIC CLAUDICATION: ICD-10-CM

## 2019-07-31 DIAGNOSIS — G89.29 CHRONIC MIDLINE LOW BACK PAIN WITH RIGHT-SIDED SCIATICA: ICD-10-CM

## 2019-07-31 DIAGNOSIS — G89.29 CHRONIC BACK PAIN, UNSPECIFIED BACK LOCATION, UNSPECIFIED BACK PAIN LATERALITY: ICD-10-CM

## 2019-07-31 DIAGNOSIS — M54.9 CHRONIC BACK PAIN, UNSPECIFIED BACK LOCATION, UNSPECIFIED BACK PAIN LATERALITY: ICD-10-CM

## 2019-08-01 RX ORDER — HYDROCODONE BITARTRATE AND ACETAMINOPHEN 5; 325 MG/1; MG/1
1 TABLET ORAL EVERY 6 HOURS PRN
Qty: 60 TABLET | Refills: 0 | Status: SHIPPED | OUTPATIENT
Start: 2019-08-01 | End: 2019-08-31

## 2019-08-01 RX ORDER — NORTRIPTYLINE HYDROCHLORIDE 50 MG/1
50 CAPSULE ORAL NIGHTLY
Qty: 90 CAPSULE | Refills: 1 | Status: SHIPPED | OUTPATIENT
Start: 2019-08-01 | End: 2020-01-28 | Stop reason: SDUPTHER

## 2019-08-01 RX ORDER — POTASSIUM CHLORIDE 20 MEQ/1
20 TABLET, EXTENDED RELEASE ORAL 2 TIMES DAILY
Qty: 60 TABLET | Refills: 3 | Status: SHIPPED | OUTPATIENT
Start: 2019-08-01 | End: 2020-02-20 | Stop reason: SDUPTHER

## 2019-08-01 RX ORDER — SIMVASTATIN 40 MG
40 TABLET ORAL NIGHTLY
Qty: 90 TABLET | Refills: 1 | Status: SHIPPED | OUTPATIENT
Start: 2019-08-01 | End: 2020-01-28 | Stop reason: SDUPTHER

## 2019-08-08 DIAGNOSIS — M54.41 CHRONIC MIDLINE LOW BACK PAIN WITH RIGHT-SIDED SCIATICA: ICD-10-CM

## 2019-08-08 DIAGNOSIS — I10 ESSENTIAL HYPERTENSION: ICD-10-CM

## 2019-08-08 DIAGNOSIS — G89.29 CHRONIC MIDLINE LOW BACK PAIN WITH RIGHT-SIDED SCIATICA: ICD-10-CM

## 2019-08-09 RX ORDER — TIZANIDINE 4 MG/1
TABLET ORAL
Qty: 100 TABLET | Refills: 2 | Status: SHIPPED | OUTPATIENT
Start: 2019-08-09 | End: 2020-01-08 | Stop reason: SDUPTHER

## 2019-08-19 ENCOUNTER — APPOINTMENT (OUTPATIENT)
Dept: CT IMAGING | Age: 84
End: 2019-08-19
Payer: MEDICARE

## 2019-08-19 ENCOUNTER — APPOINTMENT (OUTPATIENT)
Dept: GENERAL RADIOLOGY | Age: 84
End: 2019-08-19
Payer: MEDICARE

## 2019-08-19 ENCOUNTER — HOSPITAL ENCOUNTER (EMERGENCY)
Age: 84
Discharge: HOME | End: 2019-08-19
Payer: MEDICARE

## 2019-08-19 VITALS
BODY MASS INDEX: 28.32 KG/M2 | RESPIRATION RATE: 20 BRPM | OXYGEN SATURATION: 100 % | WEIGHT: 150 LBS | HEIGHT: 61 IN | TEMPERATURE: 98 F | DIASTOLIC BLOOD PRESSURE: 59 MMHG | HEART RATE: 75 BPM | SYSTOLIC BLOOD PRESSURE: 124 MMHG

## 2019-08-19 DIAGNOSIS — M54.6 ACUTE MIDLINE THORACIC BACK PAIN: Primary | ICD-10-CM

## 2019-08-19 LAB
ANION GAP SERPL CALCULATED.3IONS-SCNC: 13 MEQ/L (ref 8–16)
BACTERIA: ABNORMAL /HPF
BASOPHILS # BLD: 0.3 %
BASOPHILS ABSOLUTE: 0 THOU/MM3 (ref 0–0.1)
BILIRUBIN URINE: NEGATIVE
BLOOD, URINE: NEGATIVE
BUN BLDV-MCNC: 32 MG/DL (ref 7–22)
CALCIUM SERPL-MCNC: 9.4 MG/DL (ref 8.5–10.5)
CASTS 2: ABNORMAL /LPF
CASTS UA: ABNORMAL /LPF
CHARACTER, URINE: CLEAR
CHLORIDE BLD-SCNC: 105 MEQ/L (ref 98–111)
CO2: 16 MEQ/L (ref 23–33)
COLOR: YELLOW
CREAT SERPL-MCNC: 1.2 MG/DL (ref 0.4–1.2)
CRYSTALS, UA: ABNORMAL
D-DIMER QUANTITATIVE: 1163 NG/ML FEU (ref 0–500)
EKG ATRIAL RATE: 77 BPM
EKG P AXIS: 47 DEGREES
EKG P-R INTERVAL: 184 MS
EKG Q-T INTERVAL: 378 MS
EKG QRS DURATION: 82 MS
EKG QTC CALCULATION (BAZETT): 427 MS
EKG R AXIS: 31 DEGREES
EKG T AXIS: 62 DEGREES
EKG VENTRICULAR RATE: 77 BPM
EOSINOPHIL # BLD: 1.4 %
EOSINOPHILS ABSOLUTE: 0.1 THOU/MM3 (ref 0–0.4)
EPITHELIAL CELLS, UA: ABNORMAL /HPF
ERYTHROCYTE [DISTWIDTH] IN BLOOD BY AUTOMATED COUNT: 13.3 % (ref 11.5–14.5)
ERYTHROCYTE [DISTWIDTH] IN BLOOD BY AUTOMATED COUNT: 48.3 FL (ref 35–45)
GFR SERPL CREATININE-BSD FRML MDRD: 43 ML/MIN/1.73M2
GLUCOSE BLD-MCNC: 153 MG/DL (ref 70–108)
GLUCOSE URINE: NEGATIVE MG/DL
HCT VFR BLD CALC: 37 % (ref 37–47)
HEMOGLOBIN: 11.8 GM/DL (ref 12–16)
IMMATURE GRANS (ABS): 0.02 THOU/MM3 (ref 0–0.07)
IMMATURE GRANULOCYTES: 0 %
KETONES, URINE: NEGATIVE
LEUKOCYTE ESTERASE, URINE: ABNORMAL
LYMPHOCYTES # BLD: 12.3 %
LYMPHOCYTES ABSOLUTE: 0.8 THOU/MM3 (ref 1–4.8)
MCH RBC QN AUTO: 31.5 PG (ref 26–33)
MCHC RBC AUTO-ENTMCNC: 31.9 GM/DL (ref 32.2–35.5)
MCV RBC AUTO: 98.7 FL (ref 81–99)
MISCELLANEOUS 2: ABNORMAL
MONOCYTES # BLD: 11.6 %
MONOCYTES ABSOLUTE: 0.7 THOU/MM3 (ref 0.4–1.3)
NITRITE, URINE: NEGATIVE
NUCLEATED RED BLOOD CELLS: 0 /100 WBC
OSMOLALITY CALCULATION: 278.2 MOSMOL/KG (ref 275–300)
PH UA: 6 (ref 5–9)
PLATELET # BLD: 248 THOU/MM3 (ref 130–400)
PMV BLD AUTO: 9.3 FL (ref 9.4–12.4)
POTASSIUM SERPL-SCNC: 4.4 MEQ/L (ref 3.5–5.2)
PROTEIN UA: NEGATIVE
RBC # BLD: 3.75 MILL/MM3 (ref 4.2–5.4)
RBC URINE: ABNORMAL /HPF
RENAL EPITHELIAL, UA: ABNORMAL
SEG NEUTROPHILS: 74.1 %
SEGMENTED NEUTROPHILS ABSOLUTE COUNT: 4.7 THOU/MM3 (ref 1.8–7.7)
SODIUM BLD-SCNC: 134 MEQ/L (ref 135–145)
SPECIFIC GRAVITY, URINE: 1.03 (ref 1–1.03)
TROPONIN T: < 0.01 NG/ML
UROBILINOGEN, URINE: 0.2 EU/DL (ref 0–1)
WBC # BLD: 6.3 THOU/MM3 (ref 4.8–10.8)
WBC UA: ABNORMAL /HPF
YEAST: ABNORMAL

## 2019-08-19 PROCEDURE — 6360000004 HC RX CONTRAST MEDICATION: Performed by: STUDENT IN AN ORGANIZED HEALTH CARE EDUCATION/TRAINING PROGRAM

## 2019-08-19 PROCEDURE — 71046 X-RAY EXAM CHEST 2 VIEWS: CPT

## 2019-08-19 PROCEDURE — 81001 URINALYSIS AUTO W/SCOPE: CPT

## 2019-08-19 PROCEDURE — 80048 BASIC METABOLIC PNL TOTAL CA: CPT

## 2019-08-19 PROCEDURE — 71275 CT ANGIOGRAPHY CHEST: CPT

## 2019-08-19 PROCEDURE — 84484 ASSAY OF TROPONIN QUANT: CPT

## 2019-08-19 PROCEDURE — 76376 3D RENDER W/INTRP POSTPROCES: CPT

## 2019-08-19 PROCEDURE — 99284 EMERGENCY DEPT VISIT MOD MDM: CPT

## 2019-08-19 PROCEDURE — 6370000000 HC RX 637 (ALT 250 FOR IP): Performed by: STUDENT IN AN ORGANIZED HEALTH CARE EDUCATION/TRAINING PROGRAM

## 2019-08-19 PROCEDURE — 93005 ELECTROCARDIOGRAM TRACING: CPT | Performed by: STUDENT IN AN ORGANIZED HEALTH CARE EDUCATION/TRAINING PROGRAM

## 2019-08-19 PROCEDURE — 93010 ELECTROCARDIOGRAM REPORT: CPT | Performed by: INTERNAL MEDICINE

## 2019-08-19 PROCEDURE — 36415 COLL VENOUS BLD VENIPUNCTURE: CPT

## 2019-08-19 PROCEDURE — 72072 X-RAY EXAM THORAC SPINE 3VWS: CPT

## 2019-08-19 PROCEDURE — 85379 FIBRIN DEGRADATION QUANT: CPT

## 2019-08-19 PROCEDURE — 85025 COMPLETE CBC W/AUTO DIFF WBC: CPT

## 2019-08-19 RX ORDER — HYDROCODONE BITARTRATE AND ACETAMINOPHEN 5; 325 MG/1; MG/1
1 TABLET ORAL ONCE
Status: COMPLETED | OUTPATIENT
Start: 2019-08-19 | End: 2019-08-19

## 2019-08-19 RX ORDER — LIDOCAINE 4 G/G
1 PATCH TOPICAL ONCE
Status: DISCONTINUED | OUTPATIENT
Start: 2019-08-19 | End: 2019-08-19 | Stop reason: HOSPADM

## 2019-08-19 RX ADMIN — IOPAMIDOL 80 ML: 755 INJECTION, SOLUTION INTRAVENOUS at 14:10

## 2019-08-19 RX ADMIN — HYDROCODONE BITARTRATE AND ACETAMINOPHEN 1 TABLET: 5; 325 TABLET ORAL at 14:35

## 2019-08-19 ASSESSMENT — ENCOUNTER SYMPTOMS
RHINORRHEA: 0
DIARRHEA: 0
SHORTNESS OF BREATH: 1
NAUSEA: 0
COUGH: 0
EYE PAIN: 0
ABDOMINAL PAIN: 0
EYE DISCHARGE: 0
VOMITING: 0
WHEEZING: 0
SORE THROAT: 0
BACK PAIN: 1

## 2019-08-19 ASSESSMENT — PAIN SCALES - GENERAL
PAINLEVEL_OUTOF10: 6
PAINLEVEL_OUTOF10: 9
PAINLEVEL_OUTOF10: 6
PAINLEVEL_OUTOF10: 6

## 2019-08-19 ASSESSMENT — PAIN DESCRIPTION - PAIN TYPE: TYPE: ACUTE PAIN

## 2019-08-19 ASSESSMENT — PAIN DESCRIPTION - DESCRIPTORS: DESCRIPTORS: ACHING

## 2019-08-19 ASSESSMENT — PAIN DESCRIPTION - LOCATION: LOCATION: BACK

## 2019-08-19 NOTE — ED NOTES
Patient laying in bed reading a book. No distress. States she is having pain in her upper back. No Chest pain. No difficulty with breathing at this time. Stable condition. Encouraged to use call light if necessary for wants or needs.   SR up x2/ call light in reach of the patient     Carolina Mkceon RN  08/19/19 2145

## 2019-08-19 NOTE — ED PROVIDER NOTES
R-1Normal      tiZANidine (ZANAFLEX) 4 MG tablet TAKE 1 TABLET BY MOUTH EVERY 6 HOURS AS NEEDED FOR MUSCLE SPASM, Disp-100 tablet, R-2Please consider 90 day supplies to promote better adherenceNormal      nortriptyline (PAMELOR) 50 MG capsule Take 1 capsule by mouth nightly, Disp-90 capsule, R-1Normal      simvastatin (ZOCOR) 40 MG tablet Take 1 tablet by mouth nightly, Disp-90 tablet, R-1Normal      potassium chloride (KLOR-CON M) 20 MEQ extended release tablet Take 1 tablet by mouth 2 times daily, Disp-60 tablet, R-3Normal      HYDROcodone-acetaminophen (NORCO) 5-325 MG per tablet Take 1 tablet by mouth every 6 hours as needed for Pain for up to 30 days. , Disp-60 tablet, R-0Normal      omeprazole (PRILOSEC) 20 MG delayed release capsule Take 1 capsule by mouth 2 times daily, Disp-180 capsule, R-1Normal      isosorbide mononitrate (IMDUR) 30 MG extended release tablet Take 1 tablet by mouth daily, Disp-90 tablet, R-1Normal      hydrochlorothiazide (HYDRODIURIL) 25 MG tablet Take 1 tablet by mouth daily, Disp-90 tablet, R-1Normal      alendronate (FOSAMAX) 70 MG tablet Take 1 tablet by mouth every 7 days Stay  upright and  Do  Not   Eat  For  30 minutes, Disp-4 tablet, R-3Normal      acetaminophen (TYLENOL) 325 MG tablet Take 2 tablets by mouth every 4 hours as needed for Pain Maximum dose of acetaminophen is 4000 mg from all sources in 24 hours. OTC      aspirin 81 MG tablet Take 81 mg by mouth daily      latanoprost (XALATAN) 0.005 % ophthalmic solution Place 1 drop into both eyes nightly Historical Med             ALLERGIES     has No Known Allergies. FAMILY HISTORY     She indicated that her mother is . She indicated that her father is . family history includes Tuberculosis in her father and mother. SOCIAL HISTORY      reports that she quit smoking about 30 years ago. Her smoking use included cigarettes. She quit after 30.00 years of use.  She has never used smokeless tobacco. She reports

## 2019-08-21 ENCOUNTER — CARE COORDINATION (OUTPATIENT)
Dept: CASE MANAGEMENT | Age: 84
End: 2019-08-21

## 2019-08-28 ENCOUNTER — OFFICE VISIT (OUTPATIENT)
Dept: FAMILY MEDICINE CLINIC | Age: 84
End: 2019-08-28

## 2019-08-28 VITALS
HEART RATE: 72 BPM | HEIGHT: 61 IN | BODY MASS INDEX: 29.45 KG/M2 | RESPIRATION RATE: 10 BRPM | DIASTOLIC BLOOD PRESSURE: 72 MMHG | WEIGHT: 156 LBS | SYSTOLIC BLOOD PRESSURE: 118 MMHG

## 2019-08-28 DIAGNOSIS — I25.10 CORONARY ARTERY DISEASE INVOLVING NATIVE CORONARY ARTERY OF NATIVE HEART WITHOUT ANGINA PECTORIS: ICD-10-CM

## 2019-08-28 DIAGNOSIS — I10 ESSENTIAL HYPERTENSION: ICD-10-CM

## 2019-08-28 DIAGNOSIS — M48.062 SPINAL STENOSIS OF LUMBAR REGION WITH NEUROGENIC CLAUDICATION: ICD-10-CM

## 2019-08-28 DIAGNOSIS — G89.29 CHRONIC BACK PAIN, UNSPECIFIED BACK LOCATION, UNSPECIFIED BACK PAIN LATERALITY: ICD-10-CM

## 2019-08-28 DIAGNOSIS — M54.9 CHRONIC BACK PAIN, UNSPECIFIED BACK LOCATION, UNSPECIFIED BACK PAIN LATERALITY: ICD-10-CM

## 2019-08-28 PROCEDURE — G8598 ASA/ANTIPLAT THER USED: HCPCS | Performed by: FAMILY MEDICINE

## 2019-08-28 PROCEDURE — 1036F TOBACCO NON-USER: CPT | Performed by: FAMILY MEDICINE

## 2019-08-28 PROCEDURE — 1123F ACP DISCUSS/DSCN MKR DOCD: CPT | Performed by: FAMILY MEDICINE

## 2019-08-28 PROCEDURE — 4040F PNEUMOC VAC/ADMIN/RCVD: CPT | Performed by: FAMILY MEDICINE

## 2019-08-28 PROCEDURE — G8400 PT W/DXA NO RESULTS DOC: HCPCS | Performed by: FAMILY MEDICINE

## 2019-08-28 PROCEDURE — 99213 OFFICE O/P EST LOW 20 MIN: CPT | Performed by: FAMILY MEDICINE

## 2019-08-28 PROCEDURE — G8419 CALC BMI OUT NRM PARAM NOF/U: HCPCS | Performed by: FAMILY MEDICINE

## 2019-08-28 PROCEDURE — 1090F PRES/ABSN URINE INCON ASSESS: CPT | Performed by: FAMILY MEDICINE

## 2019-08-28 PROCEDURE — G8427 DOCREV CUR MEDS BY ELIG CLIN: HCPCS | Performed by: FAMILY MEDICINE

## 2019-08-28 RX ORDER — ISOSORBIDE MONONITRATE 30 MG/1
30 TABLET, EXTENDED RELEASE ORAL DAILY
Qty: 90 TABLET | Refills: 1 | Status: SHIPPED | OUTPATIENT
Start: 2019-08-28 | End: 2020-03-03 | Stop reason: SDUPTHER

## 2019-08-28 RX ORDER — HYDROCODONE BITARTRATE AND ACETAMINOPHEN 5; 325 MG/1; MG/1
1 TABLET ORAL EVERY 6 HOURS PRN
Qty: 60 TABLET | Refills: 0 | Status: SHIPPED | OUTPATIENT
Start: 2019-08-28 | End: 2019-10-03 | Stop reason: SDUPTHER

## 2019-08-28 RX ORDER — HYDROCHLOROTHIAZIDE 25 MG/1
25 TABLET ORAL DAILY
Qty: 90 TABLET | Refills: 1 | Status: SHIPPED | OUTPATIENT
Start: 2019-08-28 | End: 2020-03-03 | Stop reason: SDUPTHER

## 2019-08-28 ASSESSMENT — ENCOUNTER SYMPTOMS
NAUSEA: 0
CHEST TIGHTNESS: 0
EYE PAIN: 0
SHORTNESS OF BREATH: 0
ABDOMINAL PAIN: 0
SORE THROAT: 0
ORTHOPNEA: 0
WHEEZING: 0
COUGH: 0
CONSTIPATION: 0
BACK PAIN: 1

## 2019-08-28 NOTE — PROGRESS NOTES
She has no cervical adenopathy. Neurological: She is alert and oriented to person, place, and time. She has normal reflexes. No cranial nerve deficit. Skin: Skin is warm and dry. No rash noted. Psychiatric: She has a normal mood and affect. Nursing note and vitals reviewed. Assessment:       Diagnosis Orders   1. Spinal stenosis of lumbar region with neurogenic claudication     2. Chronic back pain, unspecified back location, unspecified back pain laterality     3. Coronary artery disease involving native coronary artery of native heart without angina pectoris  isosorbide mononitrate (IMDUR) 30 MG extended release tablet   4. Essential hypertension  hydrochlorothiazide (HYDRODIURIL) 25 MG tablet         Plan:      Current Outpatient Medications   Medication Sig Dispense Refill    isosorbide mononitrate (IMDUR) 30 MG extended release tablet Take 1 tablet by mouth daily 90 tablet 1    hydrochlorothiazide (HYDRODIURIL) 25 MG tablet Take 1 tablet by mouth daily 90 tablet 1    HYDROcodone-acetaminophen (NORCO) 5-325 MG per tablet Take 1 tablet by mouth every 6 hours as needed for Pain for up to 30 days. 60 tablet 0    metoprolol tartrate (LOPRESSOR) 25 MG tablet Take 0.5 tablets by mouth 2 times daily 90 tablet 1    tiZANidine (ZANAFLEX) 4 MG tablet TAKE 1 TABLET BY MOUTH EVERY 6 HOURS AS NEEDED FOR MUSCLE SPASM 100 tablet 2    nortriptyline (PAMELOR) 50 MG capsule Take 1 capsule by mouth nightly 90 capsule 1    simvastatin (ZOCOR) 40 MG tablet Take 1 tablet by mouth nightly 90 tablet 1    potassium chloride (KLOR-CON M) 20 MEQ extended release tablet Take 1 tablet by mouth 2 times daily 60 tablet 3    HYDROcodone-acetaminophen (NORCO) 5-325 MG per tablet Take 1 tablet by mouth every 6 hours as needed for Pain for up to 30 days.  60 tablet 0    omeprazole (PRILOSEC) 20 MG delayed release capsule Take 1 capsule by mouth 2 times daily 180 capsule 1    alendronate (FOSAMAX) 70 MG tablet Take 1 tablet

## 2019-09-16 ENCOUNTER — OFFICE VISIT (OUTPATIENT)
Dept: FAMILY MEDICINE CLINIC | Age: 84
End: 2019-09-16

## 2019-09-16 VITALS
HEIGHT: 61 IN | HEART RATE: 72 BPM | RESPIRATION RATE: 10 BRPM | SYSTOLIC BLOOD PRESSURE: 120 MMHG | BODY MASS INDEX: 29.34 KG/M2 | WEIGHT: 155.38 LBS | DIASTOLIC BLOOD PRESSURE: 66 MMHG

## 2019-09-16 DIAGNOSIS — G89.29 CHRONIC BACK PAIN, UNSPECIFIED BACK LOCATION, UNSPECIFIED BACK PAIN LATERALITY: ICD-10-CM

## 2019-09-16 DIAGNOSIS — M54.6 CHRONIC MIDLINE THORACIC BACK PAIN: ICD-10-CM

## 2019-09-16 DIAGNOSIS — M15.9 PRIMARY OSTEOARTHRITIS INVOLVING MULTIPLE JOINTS: ICD-10-CM

## 2019-09-16 DIAGNOSIS — J44.9 CHRONIC OBSTRUCTIVE PULMONARY DISEASE, UNSPECIFIED COPD TYPE (HCC): ICD-10-CM

## 2019-09-16 DIAGNOSIS — M54.50 LUMBAR BACK PAIN: ICD-10-CM

## 2019-09-16 DIAGNOSIS — E78.00 PURE HYPERCHOLESTEROLEMIA: ICD-10-CM

## 2019-09-16 DIAGNOSIS — M54.9 CHRONIC BACK PAIN, UNSPECIFIED BACK LOCATION, UNSPECIFIED BACK PAIN LATERALITY: ICD-10-CM

## 2019-09-16 DIAGNOSIS — R26.9 NEUROLOGIC GAIT DYSFUNCTION: ICD-10-CM

## 2019-09-16 DIAGNOSIS — M48.062 SPINAL STENOSIS OF LUMBAR REGION WITH NEUROGENIC CLAUDICATION: ICD-10-CM

## 2019-09-16 DIAGNOSIS — I10 ESSENTIAL HYPERTENSION: Primary | ICD-10-CM

## 2019-09-16 DIAGNOSIS — I25.10 CORONARY ARTERY DISEASE INVOLVING NATIVE CORONARY ARTERY OF NATIVE HEART WITHOUT ANGINA PECTORIS: ICD-10-CM

## 2019-09-16 DIAGNOSIS — G89.29 CHRONIC MIDLINE THORACIC BACK PAIN: ICD-10-CM

## 2019-09-16 DIAGNOSIS — K21.9 GASTROESOPHAGEAL REFLUX DISEASE WITHOUT ESOPHAGITIS: ICD-10-CM

## 2019-09-16 PROCEDURE — 1036F TOBACCO NON-USER: CPT | Performed by: FAMILY MEDICINE

## 2019-09-16 PROCEDURE — 99213 OFFICE O/P EST LOW 20 MIN: CPT | Performed by: FAMILY MEDICINE

## 2019-09-16 PROCEDURE — G8427 DOCREV CUR MEDS BY ELIG CLIN: HCPCS | Performed by: FAMILY MEDICINE

## 2019-09-16 PROCEDURE — G8419 CALC BMI OUT NRM PARAM NOF/U: HCPCS | Performed by: FAMILY MEDICINE

## 2019-09-16 PROCEDURE — 1090F PRES/ABSN URINE INCON ASSESS: CPT | Performed by: FAMILY MEDICINE

## 2019-09-16 PROCEDURE — 4040F PNEUMOC VAC/ADMIN/RCVD: CPT | Performed by: FAMILY MEDICINE

## 2019-09-16 PROCEDURE — G8598 ASA/ANTIPLAT THER USED: HCPCS | Performed by: FAMILY MEDICINE

## 2019-09-16 PROCEDURE — G8400 PT W/DXA NO RESULTS DOC: HCPCS | Performed by: FAMILY MEDICINE

## 2019-09-16 PROCEDURE — 1123F ACP DISCUSS/DSCN MKR DOCD: CPT | Performed by: FAMILY MEDICINE

## 2019-09-16 RX ORDER — OMEPRAZOLE 20 MG/1
20 CAPSULE, DELAYED RELEASE ORAL 2 TIMES DAILY
Qty: 180 CAPSULE | Refills: 1 | Status: ON HOLD
Start: 2019-09-16 | End: 2020-02-14 | Stop reason: HOSPADM

## 2019-09-16 ASSESSMENT — ENCOUNTER SYMPTOMS
EYE PAIN: 0
WHEEZING: 0
SORE THROAT: 0
COUGH: 0
CONSTIPATION: 0
ABDOMINAL PAIN: 0
NAUSEA: 0
BACK PAIN: 1
CHEST TIGHTNESS: 0
SHORTNESS OF BREATH: 0

## 2019-09-16 NOTE — PROGRESS NOTES
Subjective:      Patient ID: Deepak Carlson is a 81 y.o. female. Mid  Back pain  And  Better  With   Rest and  Worse  With  Activity and  In the  Middle  Areas   And  Worse  With  Activity and not  Food  Related     Back Pain   This is a recurrent problem. The current episode started 1 to 4 weeks ago (one  month ). The pain is present in the thoracic spine. The quality of the pain is described as aching. The pain is at a severity of 3/10. The pain is mild. The symptoms are aggravated by bending, twisting and position. Pertinent negatives include no abdominal pain, chest pain, dysuria, fever, headaches, numbness or weakness. She has tried ice, muscle relaxant and analgesics for the symptoms. The treatment provided mild relief. Past Medical History:   Diagnosis Date    Acute blood loss as cause of postoperative anemia 03/2018    CAD (coronary artery disease)      cath  50    Cardiac valve prolapse     Chronic back pain     COPD [facility]chronic obstructive pulmonary disease) ([facility])     Ex-smoker     GERD (gastroesophageal reflux disease) 2/07    EGD    Glaucoma     H/O cardiac catheterization 2002    40-50% LAD   katharine    Hyperlipidemia     Hypertension     Osteoarthritis 1999    right shoulder and back    Pericarditis 1996     Review of Systems   Constitutional: Negative for appetite change, fatigue and fever. HENT: Negative for congestion, ear pain, postnasal drip and sore throat. Eyes: Negative for pain and visual disturbance. Respiratory: Negative for cough, chest tightness, shortness of breath and wheezing. Cardiovascular: Negative for chest pain, palpitations and leg swelling. Gastrointestinal: Negative for abdominal pain, constipation and nausea. Genitourinary: Negative for dysuria and frequency. Musculoskeletal: Positive for back pain. Negative for arthralgias, joint swelling, neck pain and neck stiffness. Skin: Negative for rash.    Neurological: Negative for dizziness, No current facility-administered medications for this visit. No results found for this visit on 09/16/19. Orders Placed This Encounter   Procedures    Lipase     Standing Status:   Future     Standing Expiration Date:   9/16/2020    Amylase     Standing Status:   Future     Standing Expiration Date:   9/15/2020    Hepatic Function Panel     Standing Status:   Future     Standing Expiration Date:   9/15/2020    CBC Auto Differential     Standing Status:   Future     Standing Expiration Date:   9/16/2020     We will obtain some blood work make sure the liver functions are stable. Questionable previous ultrasound the gallbladder as one scan suggestive of the thickening but no acute food or other problems. Still have the back pain.  Check liver panel first. Reconstruction of the thoracic spine from before was stable  See I  2mts     Eduard Moreno MD

## 2019-09-17 DIAGNOSIS — G89.29 CHRONIC MIDLINE THORACIC BACK PAIN: ICD-10-CM

## 2019-09-17 DIAGNOSIS — M54.6 CHRONIC MIDLINE THORACIC BACK PAIN: ICD-10-CM

## 2019-09-20 ENCOUNTER — TELEPHONE (OUTPATIENT)
Dept: FAMILY MEDICINE CLINIC | Age: 84
End: 2019-09-20

## 2019-10-01 DIAGNOSIS — G89.29 CHRONIC BACK PAIN, UNSPECIFIED BACK LOCATION, UNSPECIFIED BACK PAIN LATERALITY: ICD-10-CM

## 2019-10-01 DIAGNOSIS — M54.9 CHRONIC BACK PAIN, UNSPECIFIED BACK LOCATION, UNSPECIFIED BACK PAIN LATERALITY: ICD-10-CM

## 2019-10-01 DIAGNOSIS — M48.062 SPINAL STENOSIS OF LUMBAR REGION WITH NEUROGENIC CLAUDICATION: ICD-10-CM

## 2019-10-03 RX ORDER — HYDROCODONE BITARTRATE AND ACETAMINOPHEN 5; 325 MG/1; MG/1
1 TABLET ORAL EVERY 6 HOURS PRN
Qty: 60 TABLET | Refills: 0 | Status: SHIPPED | OUTPATIENT
Start: 2019-10-03 | End: 2019-11-08 | Stop reason: SDUPTHER

## 2019-11-08 ENCOUNTER — OFFICE VISIT (OUTPATIENT)
Dept: FAMILY MEDICINE CLINIC | Age: 84
End: 2019-11-08

## 2019-11-08 VITALS
WEIGHT: 154 LBS | DIASTOLIC BLOOD PRESSURE: 68 MMHG | HEART RATE: 76 BPM | RESPIRATION RATE: 16 BRPM | SYSTOLIC BLOOD PRESSURE: 122 MMHG | BODY MASS INDEX: 29.07 KG/M2 | HEIGHT: 61 IN

## 2019-11-08 DIAGNOSIS — M54.9 CHRONIC BACK PAIN, UNSPECIFIED BACK LOCATION, UNSPECIFIED BACK PAIN LATERALITY: ICD-10-CM

## 2019-11-08 DIAGNOSIS — E78.00 PURE HYPERCHOLESTEROLEMIA: ICD-10-CM

## 2019-11-08 DIAGNOSIS — K21.9 GASTROESOPHAGEAL REFLUX DISEASE WITHOUT ESOPHAGITIS: ICD-10-CM

## 2019-11-08 DIAGNOSIS — G89.29 CHRONIC BACK PAIN, UNSPECIFIED BACK LOCATION, UNSPECIFIED BACK PAIN LATERALITY: ICD-10-CM

## 2019-11-08 DIAGNOSIS — J20.9 ACUTE BRONCHITIS, UNSPECIFIED ORGANISM: Primary | ICD-10-CM

## 2019-11-08 DIAGNOSIS — M48.062 SPINAL STENOSIS OF LUMBAR REGION WITH NEUROGENIC CLAUDICATION: ICD-10-CM

## 2019-11-08 DIAGNOSIS — J44.9 CHRONIC OBSTRUCTIVE PULMONARY DISEASE, UNSPECIFIED COPD TYPE (HCC): ICD-10-CM

## 2019-11-08 PROCEDURE — G8598 ASA/ANTIPLAT THER USED: HCPCS | Performed by: FAMILY MEDICINE

## 2019-11-08 PROCEDURE — 1036F TOBACCO NON-USER: CPT | Performed by: FAMILY MEDICINE

## 2019-11-08 PROCEDURE — 99213 OFFICE O/P EST LOW 20 MIN: CPT | Performed by: FAMILY MEDICINE

## 2019-11-08 PROCEDURE — G8427 DOCREV CUR MEDS BY ELIG CLIN: HCPCS | Performed by: FAMILY MEDICINE

## 2019-11-08 PROCEDURE — 1090F PRES/ABSN URINE INCON ASSESS: CPT | Performed by: FAMILY MEDICINE

## 2019-11-08 PROCEDURE — 1123F ACP DISCUSS/DSCN MKR DOCD: CPT | Performed by: FAMILY MEDICINE

## 2019-11-08 PROCEDURE — G8417 CALC BMI ABV UP PARAM F/U: HCPCS | Performed by: FAMILY MEDICINE

## 2019-11-08 PROCEDURE — 4040F PNEUMOC VAC/ADMIN/RCVD: CPT | Performed by: FAMILY MEDICINE

## 2019-11-08 PROCEDURE — G8400 PT W/DXA NO RESULTS DOC: HCPCS | Performed by: FAMILY MEDICINE

## 2019-11-08 RX ORDER — CEFDINIR 300 MG/1
300 CAPSULE ORAL 2 TIMES DAILY
Qty: 20 CAPSULE | Refills: 0 | Status: SHIPPED | OUTPATIENT
Start: 2019-11-08 | End: 2019-11-18

## 2019-11-08 RX ORDER — BENZONATATE 200 MG/1
200 CAPSULE ORAL 3 TIMES DAILY PRN
Qty: 30 CAPSULE | Refills: 0 | Status: SHIPPED | OUTPATIENT
Start: 2019-11-08 | End: 2019-11-15

## 2019-11-08 RX ORDER — HYDROCODONE BITARTRATE AND ACETAMINOPHEN 5; 325 MG/1; MG/1
1 TABLET ORAL EVERY 6 HOURS PRN
Qty: 60 TABLET | Refills: 0 | Status: SHIPPED | OUTPATIENT
Start: 2019-11-08 | End: 2019-12-06 | Stop reason: SDUPTHER

## 2019-11-08 ASSESSMENT — ENCOUNTER SYMPTOMS
HOARSE VOICE: 1
SHORTNESS OF BREATH: 0
ABDOMINAL PAIN: 0
SINUS COMPLAINT: 1
CONSTIPATION: 0
CHEST TIGHTNESS: 0
WHEEZING: 0
EYE PAIN: 0
NAUSEA: 0
COUGH: 1
SORE THROAT: 0

## 2019-12-05 DIAGNOSIS — G89.29 CHRONIC BACK PAIN, UNSPECIFIED BACK LOCATION, UNSPECIFIED BACK PAIN LATERALITY: ICD-10-CM

## 2019-12-05 DIAGNOSIS — M48.062 SPINAL STENOSIS OF LUMBAR REGION WITH NEUROGENIC CLAUDICATION: ICD-10-CM

## 2019-12-05 DIAGNOSIS — M54.9 CHRONIC BACK PAIN, UNSPECIFIED BACK LOCATION, UNSPECIFIED BACK PAIN LATERALITY: ICD-10-CM

## 2019-12-06 RX ORDER — HYDROCODONE BITARTRATE AND ACETAMINOPHEN 5; 325 MG/1; MG/1
1 TABLET ORAL EVERY 6 HOURS PRN
Qty: 60 TABLET | Refills: 0 | Status: SHIPPED | OUTPATIENT
Start: 2019-12-06 | End: 2020-01-08 | Stop reason: SDUPTHER

## 2020-01-08 ENCOUNTER — OFFICE VISIT (OUTPATIENT)
Dept: FAMILY MEDICINE CLINIC | Age: 85
End: 2020-01-08

## 2020-01-08 VITALS
BODY MASS INDEX: 28.72 KG/M2 | RESPIRATION RATE: 14 BRPM | SYSTOLIC BLOOD PRESSURE: 124 MMHG | WEIGHT: 152.13 LBS | HEIGHT: 61 IN | HEART RATE: 76 BPM | DIASTOLIC BLOOD PRESSURE: 64 MMHG

## 2020-01-08 PROCEDURE — 4040F PNEUMOC VAC/ADMIN/RCVD: CPT | Performed by: FAMILY MEDICINE

## 2020-01-08 PROCEDURE — G8417 CALC BMI ABV UP PARAM F/U: HCPCS | Performed by: FAMILY MEDICINE

## 2020-01-08 PROCEDURE — G8427 DOCREV CUR MEDS BY ELIG CLIN: HCPCS | Performed by: FAMILY MEDICINE

## 2020-01-08 PROCEDURE — G8400 PT W/DXA NO RESULTS DOC: HCPCS | Performed by: FAMILY MEDICINE

## 2020-01-08 PROCEDURE — 1090F PRES/ABSN URINE INCON ASSESS: CPT | Performed by: FAMILY MEDICINE

## 2020-01-08 PROCEDURE — 1036F TOBACCO NON-USER: CPT | Performed by: FAMILY MEDICINE

## 2020-01-08 PROCEDURE — 1123F ACP DISCUSS/DSCN MKR DOCD: CPT | Performed by: FAMILY MEDICINE

## 2020-01-08 PROCEDURE — 99213 OFFICE O/P EST LOW 20 MIN: CPT | Performed by: FAMILY MEDICINE

## 2020-01-08 RX ORDER — HYDROCODONE BITARTRATE AND ACETAMINOPHEN 5; 325 MG/1; MG/1
1 TABLET ORAL EVERY 6 HOURS PRN
Qty: 60 TABLET | Refills: 0 | Status: SHIPPED | OUTPATIENT
Start: 2020-01-08 | End: 2020-02-18 | Stop reason: SDUPTHER

## 2020-01-08 RX ORDER — TIZANIDINE 4 MG/1
TABLET ORAL
Qty: 100 TABLET | Refills: 2 | Status: ON HOLD | OUTPATIENT
Start: 2020-01-08 | End: 2020-05-19 | Stop reason: HOSPADM

## 2020-01-08 ASSESSMENT — ENCOUNTER SYMPTOMS
WHEEZING: 0
SORE THROAT: 0
NAUSEA: 0
CONSTIPATION: 0
SHORTNESS OF BREATH: 0
EYE PAIN: 0
COUGH: 0
ABDOMINAL PAIN: 0
CHEST TIGHTNESS: 0

## 2020-01-08 ASSESSMENT — PATIENT HEALTH QUESTIONNAIRE - PHQ9
2. FEELING DOWN, DEPRESSED OR HOPELESS: 0
SUM OF ALL RESPONSES TO PHQ QUESTIONS 1-9: 0
SUM OF ALL RESPONSES TO PHQ9 QUESTIONS 1 & 2: 0
1. LITTLE INTEREST OR PLEASURE IN DOING THINGS: 0
SUM OF ALL RESPONSES TO PHQ QUESTIONS 1-9: 0

## 2020-01-08 NOTE — PROGRESS NOTES
swelling, neck pain and neck stiffness. Skin: Negative for rash. Neurological: Negative for dizziness, weakness, numbness and headaches. Hematological: Negative for adenopathy. Does not bruise/bleed easily. Psychiatric/Behavioral: Negative for behavioral problems and sleep disturbance. The patient is not nervous/anxious. /64 (Site: Right Upper Arm, Position: Sitting, Cuff Size: Medium Adult)   Pulse 76   Resp 14   Ht 5' 1\" (1.549 m)   Wt 152 lb 2 oz (69 kg)   BMI 28.74 kg/m²   Objective:   Physical Exam  Vitals signs and nursing note reviewed. Constitutional:       Appearance: She is well-developed. HENT:      Head: Normocephalic and atraumatic. Right Ear: External ear normal.      Left Ear: External ear normal.      Nose: Nose normal.   Eyes:      General: No scleral icterus. Conjunctiva/sclera: Conjunctivae normal.      Pupils: Pupils are equal, round, and reactive to light. Neck:      Musculoskeletal: Normal range of motion and neck supple. Thyroid: No thyromegaly. Vascular: No JVD. Cardiovascular:      Rate and Rhythm: Normal rate and regular rhythm. Heart sounds: Normal heart sounds. Pulmonary:      Effort: Pulmonary effort is normal.      Breath sounds: Normal breath sounds. No wheezing or rales. Abdominal:      General: Bowel sounds are normal. There is no distension. Palpations: Abdomen is soft. There is no mass. Tenderness: There is no tenderness. Musculoskeletal: Normal range of motion. General: No tenderness. Lymphadenopathy:      Cervical: No cervical adenopathy. Skin:     General: Skin is warm and dry. Findings: No rash. Neurological:      Mental Status: She is alert and oriented to person, place, and time. Cranial Nerves: No cranial nerve deficit. Deep Tendon Reflexes: Reflexes are normal and symmetric. Assessment:       Diagnosis Orders   1.  Spinal stenosis of lumbar region with neurogenic claudication  HYDROcodone-acetaminophen (NORCO) 5-325 MG per tablet   2. Chronic midline low back pain with right-sided sciatica  tiZANidine (ZANAFLEX) 4 MG tablet   3. Chronic back pain, unspecified back location, unspecified back pain laterality  HYDROcodone-acetaminophen (NORCO) 5-325 MG per tablet   4. Coronary artery disease involving native coronary artery of native heart without angina pectoris     5. Chronic obstructive pulmonary disease, unspecified COPD type ([facility] [address]     6. Essential hypertension     7. Pure hypercholesterolemia     8. Gastroesophageal reflux disease without esophagitis     9. Neurologic gait dysfunction           Plan:      Current Outpatient Medications   Medication Sig Dispense Refill    omeprazole (PRILOSEC) 20 MG delayed release capsule Take 1 capsule by mouth 2 times daily 180 capsule 1    isosorbide mononitrate (IMDUR) 30 MG extended release tablet Take 1 tablet by mouth daily 90 tablet 1    hydrochlorothiazide (HYDRODIURIL) 25 MG tablet Take 1 tablet by mouth daily 90 tablet 1    metoprolol tartrate (LOPRESSOR) 25 MG tablet Take 0.5 tablets by mouth 2 times daily 90 tablet 1    tiZANidine (ZANAFLEX) 4 MG tablet TAKE 1 TABLET BY MOUTH EVERY 6 HOURS AS NEEDED FOR MUSCLE SPASM 100 tablet 2    nortriptyline (PAMELOR) 50 MG capsule Take 1 capsule by mouth nightly 90 capsule 1    simvastatin (ZOCOR) 40 MG tablet Take 1 tablet by mouth nightly 90 tablet 1    potassium chloride (KLOR-CON M) 20 MEQ extended release tablet Take 1 tablet by mouth 2 times daily 60 tablet 3    alendronate (FOSAMAX) 70 MG tablet Take 1 tablet by mouth every 7 days Stay  upright and  Do  Not   Eat  For  30 minutes 4 tablet 3    acetaminophen (TYLENOL) 325 MG tablet Take 2 tablets by mouth every 4 hours as needed for Pain Maximum dose of acetaminophen is 4000 mg from all sources in 24 hours.       aspirin 81 MG tablet Take 81 mg by mouth daily      latanoprost (XALATAN) 0.005 % ophthalmic solution Place 1 drop into both eyes nightly        No current facility-administered medications for this visit. No orders of the defined types were placed in this encounter.     See in  3 mths and  Use    The  Pain  meds     Willim Osler, MD

## 2020-01-27 NOTE — TELEPHONE ENCOUNTER
Date of last visit:  1/8/2020  Date of next visit:  4/15/2020    Requested Prescriptions     Pending Prescriptions Disp Refills    simvastatin (ZOCOR) 40 MG tablet 90 tablet 1     Sig: Take 1 tablet by mouth nightly    nortriptyline (PAMELOR) 50 MG capsule 90 capsule 1     Sig: Take 1 capsule by mouth nightly

## 2020-01-27 NOTE — TELEPHONE ENCOUNTER
Pt called requesting a 90 day Rx for the following:    Simvastatin 40 mg one tablet nightly    Nortriptyline 50 mg one tablet nightly    [facility] ([place], [place])

## 2020-01-28 RX ORDER — SIMVASTATIN 40 MG
40 TABLET ORAL NIGHTLY
Qty: 90 TABLET | Refills: 1 | Status: SHIPPED | OUTPATIENT
Start: 2020-01-28 | End: 2020-07-30 | Stop reason: SDUPTHER

## 2020-01-28 RX ORDER — NORTRIPTYLINE HYDROCHLORIDE 50 MG/1
50 CAPSULE ORAL NIGHTLY
Qty: 90 CAPSULE | Refills: 1 | Status: SHIPPED | OUTPATIENT
Start: 2020-01-28 | End: 2020-07-30 | Stop reason: SDUPTHER

## 2020-02-03 ENCOUNTER — OFFICE VISIT (OUTPATIENT)
Dept: FAMILY MEDICINE CLINIC | Age: 85
End: 2020-02-03

## 2020-02-03 VITALS
HEIGHT: 61 IN | SYSTOLIC BLOOD PRESSURE: 112 MMHG | HEART RATE: 72 BPM | BODY MASS INDEX: 28.89 KG/M2 | WEIGHT: 153 LBS | RESPIRATION RATE: 14 BRPM | DIASTOLIC BLOOD PRESSURE: 66 MMHG

## 2020-02-03 PROCEDURE — 1090F PRES/ABSN URINE INCON ASSESS: CPT | Performed by: FAMILY MEDICINE

## 2020-02-03 PROCEDURE — 99213 OFFICE O/P EST LOW 20 MIN: CPT | Performed by: FAMILY MEDICINE

## 2020-02-03 PROCEDURE — G8427 DOCREV CUR MEDS BY ELIG CLIN: HCPCS | Performed by: FAMILY MEDICINE

## 2020-02-03 PROCEDURE — 1123F ACP DISCUSS/DSCN MKR DOCD: CPT | Performed by: FAMILY MEDICINE

## 2020-02-03 PROCEDURE — 4040F PNEUMOC VAC/ADMIN/RCVD: CPT | Performed by: FAMILY MEDICINE

## 2020-02-03 PROCEDURE — 1036F TOBACCO NON-USER: CPT | Performed by: FAMILY MEDICINE

## 2020-02-03 PROCEDURE — G8417 CALC BMI ABV UP PARAM F/U: HCPCS | Performed by: FAMILY MEDICINE

## 2020-02-03 PROCEDURE — G8400 PT W/DXA NO RESULTS DOC: HCPCS | Performed by: FAMILY MEDICINE

## 2020-02-03 RX ORDER — METRONIDAZOLE 500 MG/1
500 TABLET ORAL 3 TIMES DAILY
Qty: 30 TABLET | Refills: 0 | Status: ON HOLD | OUTPATIENT
Start: 2020-02-03 | End: 2020-02-14 | Stop reason: HOSPADM

## 2020-02-03 ASSESSMENT — ENCOUNTER SYMPTOMS
DIARRHEA: 1
FLATUS: 1
CONSTIPATION: 0
SHORTNESS OF BREATH: 0
BLOATING: 1
SORE THROAT: 0
NAUSEA: 0
EYE PAIN: 0
COUGH: 0
CHEST TIGHTNESS: 0
VOMITING: 0
WHEEZING: 0
ABDOMINAL PAIN: 0

## 2020-02-03 NOTE — PROGRESS NOTES
Subjective:      Patient ID: Matthew Rodrigues is a 81 y.o. female. Antibiotic  In November  For  Teeth   For  10  Days  And  Stool  At  Night  But  No blood    Diarrhea    This is a new problem. Episode onset:   2 weeks  The problem has been gradually worsening. The stool consistency is described as watery. The patient states that diarrhea awakens her from sleep. Associated symptoms include bloating and increased flatus. Pertinent negatives include no abdominal pain, arthralgias, chills, coughing, fever, headaches, vomiting or weight loss. Nothing aggravates the symptoms. She has tried anti-motility drug for the symptoms. Past Medical History:   Diagnosis Date    Acute blood loss as cause of postoperative anemia 03/2018    CAD (coronary artery disease)      cath  50    Cardiac valve prolapse     Chronic back pain     [facility]ch[facility] obstructive pulmonary [facility] ([facility])     Ex-smoker     GERD (gastroesophageal reflux disease) 2/07    EGD    Glaucoma     H/O cardiac catheterization 2002    40-50% LAD   katharine    Hyperlipidemia     Hypertension     Osteoarthritis 1999    right shoulder and back    Pericarditis 1996     Review of Systems   Constitutional: Negative for appetite change, chills, fatigue, fever and weight loss. HENT: Negative for congestion, ear pain, postnasal drip and sore throat. Eyes: Negative for pain and visual disturbance. Respiratory: Negative for cough, chest tightness, shortness of breath and wheezing. Cardiovascular: Negative for chest pain, palpitations and leg swelling. Gastrointestinal: Positive for bloating, diarrhea and flatus. Negative for abdominal pain, constipation, nausea and vomiting. At  Night and  No  Blood  And  Loose    Genitourinary: Negative for dysuria and frequency. Musculoskeletal: Negative for arthralgias, joint swelling, neck pain and neck stiffness. Skin: Negative for rash.    Neurological: Negative for dizziness, weakness, Sig Dispense Refill    simvastatin (ZOCOR) 40 MG tablet Take 1 tablet by mouth nightly 90 tablet 1    nortriptyline (PAMELOR) 50 MG capsule Take 1 capsule by mouth nightly 90 capsule 1    tiZANidine (ZANAFLEX) 4 MG tablet TAKE 1 TABLET BY MOUTH EVERY 6 HOURS AS NEEDED FOR MUSCLE SPASM 100 tablet 2    HYDROcodone-acetaminophen (NORCO) 5-325 MG per tablet Take 1 tablet by mouth every 6 hours as needed for Pain for up to 30 days. 60 tablet 0    omeprazole (PRILOSEC) 20 MG delayed release capsule Take 1 capsule by mouth 2 times daily 180 capsule 1    isosorbide mononitrate (IMDUR) 30 MG extended release tablet Take 1 tablet by mouth daily 90 tablet 1    hydrochlorothiazide (HYDRODIURIL) 25 MG tablet Take 1 tablet by mouth daily 90 tablet 1    metoprolol tartrate (LOPRESSOR) 25 MG tablet Take 0.5 tablets by mouth 2 times daily 90 tablet 1    potassium chloride (KLOR-CON M) 20 MEQ extended release tablet Take 1 tablet by mouth 2 times daily 60 tablet 3    alendronate (FOSAMAX) 70 MG tablet Take 1 tablet by mouth every 7 days Stay  upright and  Do  Not   Eat  For  30 minutes 4 tablet 3    acetaminophen (TYLENOL) 325 MG tablet Take 2 tablets by mouth every 4 hours as needed for Pain Maximum dose of acetaminophen is 4000 mg from all sources in 24 hours.  aspirin 81 MG tablet Take 81 mg by mouth daily      latanoprost (XALATAN) 0.005 % ophthalmic solution Place 1 drop into both eyes nightly        No current facility-administered medications for this visit.               Boost   One    A  Day and hold  instant  Breakfast  and   Flagyl    500 mg  Tid   For  10 days  bnd   See in  2 weeks     Josefa Carrillo MD

## 2020-02-05 ENCOUNTER — TELEPHONE (OUTPATIENT)
Dept: FAMILY MEDICINE CLINIC | Age: 85
End: 2020-02-05

## 2020-02-05 NOTE — TELEPHONE ENCOUNTER
Per verbal order from Dr Jenelle Smith: get stool culture- needs to try and continue on the Flagyl and take 2 Imodium nightly until the diarrhea clears. Tay Husbands informed by Phone. Lab order faxed to [facility] in [place].

## 2020-02-05 NOTE — TELEPHONE ENCOUNTER
Pt called stating that since starting the antibiotic for the diarrhea on Monday, the diarrhea has gotten worst. She is asking if there is anything that can be done or if she should go to the ER.      Deepa Walter may be reached at [phone]

## 2020-02-13 ENCOUNTER — HOSPITAL ENCOUNTER (OUTPATIENT)
Age: 85
Setting detail: OBSERVATION
Discharge: HOME | End: 2020-02-14
Attending: EMERGENCY MEDICINE | Admitting: FAMILY MEDICINE
Payer: MEDICARE

## 2020-02-13 PROBLEM — E86.0 DEHYDRATION: Status: ACTIVE | Noted: 2020-02-13

## 2020-02-13 LAB
ALBUMIN SERPL-MCNC: 3.7 G/DL (ref 3.5–5.1)
ALP BLD-CCNC: 81 U/L (ref 38–126)
ALT SERPL-CCNC: 25 U/L (ref 11–66)
ANION GAP SERPL CALCULATED.3IONS-SCNC: 15 MEQ/L (ref 8–16)
AST SERPL-CCNC: 32 U/L (ref 5–40)
BACTERIA: NORMAL
BASOPHILS # BLD: 0.4 %
BASOPHILS ABSOLUTE: 0 THOU/MM3 (ref 0–0.1)
BILIRUB SERPL-MCNC: 0.2 MG/DL (ref 0.3–1.2)
BILIRUBIN URINE: NEGATIVE
BLOOD, URINE: NEGATIVE
BUN BLDV-MCNC: 34 MG/DL (ref 7–22)
CALCIUM SERPL-MCNC: 8.5 MG/DL (ref 8.5–10.5)
CASTS: NORMAL /LPF
CASTS: NORMAL /LPF
CHARACTER, URINE: CLEAR
CHLORIDE BLD-SCNC: 101 MEQ/L (ref 98–111)
CO2: 17 MEQ/L (ref 23–33)
COLOR: YELLOW
CREAT SERPL-MCNC: 1.4 MG/DL (ref 0.4–1.2)
CRYSTALS: NORMAL
EKG ATRIAL RATE: 75 BPM
EKG P AXIS: 58 DEGREES
EKG P-R INTERVAL: 182 MS
EKG Q-T INTERVAL: 410 MS
EKG QRS DURATION: 86 MS
EKG QTC CALCULATION (BAZETT): 457 MS
EKG R AXIS: 25 DEGREES
EKG T AXIS: 66 DEGREES
EKG VENTRICULAR RATE: 75 BPM
EOSINOPHIL # BLD: 1.2 %
EOSINOPHILS ABSOLUTE: 0.1 THOU/MM3 (ref 0–0.4)
EPITHELIAL CELLS, UA: NORMAL /HPF
ERYTHROCYTE [DISTWIDTH] IN BLOOD BY AUTOMATED COUNT: 14.1 % (ref 11.5–14.5)
ERYTHROCYTE [DISTWIDTH] IN BLOOD BY AUTOMATED COUNT: 50.9 FL (ref 35–45)
GFR SERPL CREATININE-BSD FRML MDRD: 36 ML/MIN/1.73M2
GLUCOSE BLD-MCNC: 240 MG/DL (ref 70–108)
GLUCOSE, URINE: NEGATIVE MG/DL
HCT VFR BLD CALC: 37.8 % (ref 37–47)
HEMOGLOBIN: 12.2 GM/DL (ref 12–16)
IMMATURE GRANS (ABS): 0.01 THOU/MM3 (ref 0–0.07)
IMMATURE GRANULOCYTES: 0.2 %
KETONES, URINE: NEGATIVE
LEUKOCYTE ESTERASE, URINE: NEGATIVE
LYMPHOCYTES # BLD: 18.4 %
LYMPHOCYTES ABSOLUTE: 0.9 THOU/MM3 (ref 1–4.8)
MAGNESIUM: 1.8 MG/DL (ref 1.6–2.4)
MCH RBC QN AUTO: 31.8 PG (ref 26–33)
MCHC RBC AUTO-ENTMCNC: 32.3 GM/DL (ref 32.2–35.5)
MCV RBC AUTO: 98.4 FL (ref 81–99)
MISCELLANEOUS LAB TEST RESULT: NORMAL
MONOCYTES # BLD: 9.8 %
MONOCYTES ABSOLUTE: 0.5 THOU/MM3 (ref 0.4–1.3)
NITRITE, URINE: NEGATIVE
NUCLEATED RED BLOOD CELLS: 0 /100 WBC
OSMOLALITY CALCULATION: 281.9 MOSMOL/KG (ref 275–300)
PH UA: 6 (ref 5–9)
PLATELET # BLD: 247 THOU/MM3 (ref 130–400)
PMV BLD AUTO: 9.5 FL (ref 9.4–12.4)
POTASSIUM SERPL-SCNC: 3.6 MEQ/L (ref 3.5–5.2)
PROTEIN UA: NEGATIVE MG/DL
RBC # BLD: 3.84 MILL/MM3 (ref 4.2–5.4)
RBC URINE: NORMAL /HPF
RENAL EPITHELIAL, UA: NORMAL
SEG NEUTROPHILS: 70 %
SEGMENTED NEUTROPHILS ABSOLUTE COUNT: 3.4 THOU/MM3 (ref 1.8–7.7)
SODIUM BLD-SCNC: 133 MEQ/L (ref 135–145)
SPECIFIC GRAVITY UA: 1.01 (ref 1–1.03)
TOTAL PROTEIN: 6.6 G/DL (ref 6.1–8)
UROBILINOGEN, URINE: 0.2 EU/DL (ref 0–1)
WBC # BLD: 4.9 THOU/MM3 (ref 4.8–10.8)
WBC UA: NORMAL /HPF
YEAST: NORMAL

## 2020-02-13 PROCEDURE — G0378 HOSPITAL OBSERVATION PER HR: HCPCS

## 2020-02-13 PROCEDURE — 81001 URINALYSIS AUTO W/SCOPE: CPT

## 2020-02-13 PROCEDURE — 2580000003 HC RX 258: Performed by: NURSE PRACTITIONER

## 2020-02-13 PROCEDURE — 94760 N-INVAS EAR/PLS OXIMETRY 1: CPT

## 2020-02-13 PROCEDURE — 96360 HYDRATION IV INFUSION INIT: CPT

## 2020-02-13 PROCEDURE — 80053 COMPREHEN METABOLIC PANEL: CPT

## 2020-02-13 PROCEDURE — 85025 COMPLETE CBC W/AUTO DIFF WBC: CPT

## 2020-02-13 PROCEDURE — 99283 EMERGENCY DEPT VISIT LOW MDM: CPT

## 2020-02-13 PROCEDURE — 6360000002 HC RX W HCPCS: Performed by: FAMILY MEDICINE

## 2020-02-13 PROCEDURE — 6370000000 HC RX 637 (ALT 250 FOR IP): Performed by: FAMILY MEDICINE

## 2020-02-13 PROCEDURE — 83735 ASSAY OF MAGNESIUM: CPT

## 2020-02-13 PROCEDURE — 2580000003 HC RX 258: Performed by: FAMILY MEDICINE

## 2020-02-13 PROCEDURE — 93005 ELECTROCARDIOGRAM TRACING: CPT | Performed by: NURSE PRACTITIONER

## 2020-02-13 PROCEDURE — 96372 THER/PROPH/DIAG INJ SC/IM: CPT

## 2020-02-13 PROCEDURE — 36415 COLL VENOUS BLD VENIPUNCTURE: CPT

## 2020-02-13 RX ORDER — ISOSORBIDE MONONITRATE 30 MG/1
30 TABLET, EXTENDED RELEASE ORAL DAILY
Status: DISCONTINUED | OUTPATIENT
Start: 2020-02-14 | End: 2020-02-14 | Stop reason: HOSPADM

## 2020-02-13 RX ORDER — ACETAMINOPHEN 325 MG/1
650 TABLET ORAL EVERY 4 HOURS PRN
Status: DISCONTINUED | OUTPATIENT
Start: 2020-02-13 | End: 2020-02-13 | Stop reason: SDUPTHER

## 2020-02-13 RX ORDER — ONDANSETRON 2 MG/ML
4 INJECTION INTRAMUSCULAR; INTRAVENOUS EVERY 6 HOURS PRN
Status: DISCONTINUED | OUTPATIENT
Start: 2020-02-13 | End: 2020-02-14 | Stop reason: HOSPADM

## 2020-02-13 RX ORDER — TIZANIDINE 4 MG/1
4 TABLET ORAL EVERY 8 HOURS PRN
Status: DISCONTINUED | OUTPATIENT
Start: 2020-02-13 | End: 2020-02-14 | Stop reason: HOSPADM

## 2020-02-13 RX ORDER — SODIUM CHLORIDE 9 MG/ML
INJECTION, SOLUTION INTRAVENOUS CONTINUOUS
Status: DISCONTINUED | OUTPATIENT
Start: 2020-02-13 | End: 2020-02-14 | Stop reason: HOSPADM

## 2020-02-13 RX ORDER — LATANOPROST 50 UG/ML
1 SOLUTION/ DROPS OPHTHALMIC NIGHTLY
Status: DISCONTINUED | OUTPATIENT
Start: 2020-02-13 | End: 2020-02-14 | Stop reason: HOSPADM

## 2020-02-13 RX ORDER — POTASSIUM CHLORIDE 7.45 MG/ML
10 INJECTION INTRAVENOUS PRN
Status: DISCONTINUED | OUTPATIENT
Start: 2020-02-13 | End: 2020-02-14 | Stop reason: HOSPADM

## 2020-02-13 RX ORDER — SODIUM CHLORIDE 0.9 % (FLUSH) 0.9 %
10 SYRINGE (ML) INJECTION PRN
Status: DISCONTINUED | OUTPATIENT
Start: 2020-02-13 | End: 2020-02-14 | Stop reason: HOSPADM

## 2020-02-13 RX ORDER — POTASSIUM CHLORIDE 20 MEQ/1
40 TABLET, EXTENDED RELEASE ORAL PRN
Status: DISCONTINUED | OUTPATIENT
Start: 2020-02-13 | End: 2020-02-14 | Stop reason: HOSPADM

## 2020-02-13 RX ORDER — POTASSIUM CHLORIDE 20 MEQ/1
20 TABLET, EXTENDED RELEASE ORAL 2 TIMES DAILY WITH MEALS
Status: DISCONTINUED | OUTPATIENT
Start: 2020-02-13 | End: 2020-02-14 | Stop reason: HOSPADM

## 2020-02-13 RX ORDER — ACETAMINOPHEN 325 MG/1
650 TABLET ORAL EVERY 4 HOURS PRN
Status: DISCONTINUED | OUTPATIENT
Start: 2020-02-13 | End: 2020-02-14 | Stop reason: HOSPADM

## 2020-02-13 RX ORDER — SODIUM CHLORIDE 0.9 % (FLUSH) 0.9 %
10 SYRINGE (ML) INJECTION EVERY 12 HOURS SCHEDULED
Status: DISCONTINUED | OUTPATIENT
Start: 2020-02-13 | End: 2020-02-14 | Stop reason: HOSPADM

## 2020-02-13 RX ORDER — NORTRIPTYLINE HYDROCHLORIDE 25 MG/1
50 CAPSULE ORAL NIGHTLY
Status: DISCONTINUED | OUTPATIENT
Start: 2020-02-13 | End: 2020-02-14 | Stop reason: HOSPADM

## 2020-02-13 RX ORDER — FAMOTIDINE 20 MG/1
20 TABLET, FILM COATED ORAL DAILY
Status: DISCONTINUED | OUTPATIENT
Start: 2020-02-13 | End: 2020-02-14 | Stop reason: HOSPADM

## 2020-02-13 RX ORDER — 0.9 % SODIUM CHLORIDE 0.9 %
1000 INTRAVENOUS SOLUTION INTRAVENOUS ONCE
Status: COMPLETED | OUTPATIENT
Start: 2020-02-13 | End: 2020-02-13

## 2020-02-13 RX ORDER — ASPIRIN 81 MG/1
81 TABLET, CHEWABLE ORAL DAILY
Status: DISCONTINUED | OUTPATIENT
Start: 2020-02-14 | End: 2020-02-14 | Stop reason: HOSPADM

## 2020-02-13 RX ADMIN — SODIUM CHLORIDE 1000 ML: 9 INJECTION, SOLUTION INTRAVENOUS at 11:30

## 2020-02-13 RX ADMIN — ENOXAPARIN SODIUM 30 MG: 30 INJECTION SUBCUTANEOUS at 15:53

## 2020-02-13 RX ADMIN — Medication 12.5 MG: at 22:26

## 2020-02-13 RX ADMIN — FAMOTIDINE 20 MG: 20 TABLET ORAL at 15:53

## 2020-02-13 RX ADMIN — TIZANIDINE 4 MG: 4 TABLET ORAL at 22:28

## 2020-02-13 RX ADMIN — SODIUM CHLORIDE: 9 INJECTION, SOLUTION INTRAVENOUS at 22:40

## 2020-02-13 RX ADMIN — POTASSIUM CHLORIDE 20 MEQ: 20 TABLET, EXTENDED RELEASE ORAL at 15:53

## 2020-02-13 RX ADMIN — NORTRIPTYLINE HYDROCHLORIDE 50 MG: 25 CAPSULE ORAL at 22:26

## 2020-02-13 ASSESSMENT — ENCOUNTER SYMPTOMS
RHINORRHEA: 0
SHORTNESS OF BREATH: 0
CONSTIPATION: 0
VOMITING: 0
DIARRHEA: 1
VOICE CHANGE: 0
EYE REDNESS: 0
CHEST TIGHTNESS: 0
PHOTOPHOBIA: 0
NAUSEA: 0
BACK PAIN: 0
COLOR CHANGE: 0
COUGH: 0
BLOOD IN STOOL: 0
SINUS PRESSURE: 0
ABDOMINAL PAIN: 0
WHEEZING: 0
ABDOMINAL DISTENTION: 0
SORE THROAT: 0

## 2020-02-13 ASSESSMENT — PAIN SCALES - GENERAL
PAINLEVEL_OUTOF10: 0
PAINLEVEL_OUTOF10: 0

## 2020-02-13 NOTE — ED NOTES
Pt resting in bed. VSS. Urine sample collected and sent to lab. Updated on POC. Will continue to monitor.         Cleo Hidalgo RN  02/13/20 0915

## 2020-02-13 NOTE — PROGRESS NOTES
Pharmacy Enoxaparin Consult    Darius Bella is a 81 y.o. female. Pharmacy has been consulted to renally adjust enoxaparin. Recent Labs     02/13/20  1107   BUN 34*       Recent Labs     02/13/20  1107   CREATININE 1.4*       Estimated Creatinine Clearance: 27 mL/min (A) (based on SCr of 1.4 mg/dL (H)). Lab Results   Component Value Date    HGB 12.2 02/13/2020    HCT 37.8 02/13/2020     02/13/2020       Height:   Ht Readings from Last 1 Encounters:   02/13/20 5' 1\" (1.549 m)     Weight:  Wt Readings from Last 1 Encounters:   02/13/20 151 lb 14.4 oz (68.9 kg)       BMI: Body mass index is 28.7 kg/m².     Enoxaparin Indication: DVT Prophylaxis    Plan: Based upon renal function, the enoxaparin dose should be adjusted to 30 mg daily

## 2020-02-13 NOTE — ED PROVIDER NOTES
[facility] [address] [place] [address]       Chief Complaint   Patient presents with    Diarrhea    Fatigue       Nurses Notes reviewed and I agree except as noted in the HPI. HISTORY OF PRESENT ILLNESS    Jocelyn Paula analia 81 y.o. female who presents to the ED for evaluation of diarrhea, fatigue, and generalized weakness starting 3 weeks ago, now worsening . The patient has a history of CAD, HTN, HLD, COPD, GERD, and OA. The patient states stools have been very watery without presence of blood. Patient reports 5 episodes of diarrhea since 0100 this morning. She denies nausea, vomiting, abdominal pain, fever, or chills. She reports intermittent lightheadedness without fall or syncope. Patient had an office visit with her PCP on  in regards to her symptoms. She was diagnosed with acute gastroenteritis. She was prescribed Flagyl TID for 10 days. Patient denies chest pain or SOB. She does not appear to be in any distress at this time. There are no other complaints, symptoms, or concerns at this time. Onset: 3 weeks  Location: diarrhea  Duration: intermittent  Character: watery  Timin episodes since 0100 this morning  Severity: moderate    Experienced previously: N/A    HPI was provided by the patient. REVIEW OF SYSTEMS     Review of Systems   Constitutional: Positive for fatigue. Negative for appetite change, chills, diaphoresis, fever and unexpected weight change. HENT: Negative for congestion, hearing loss, postnasal drip, rhinorrhea, sinus pressure, sore throat and voice change. Eyes: Negative for photophobia, redness and visual disturbance. Respiratory: Negative for cough, chest tightness, shortness of breath and wheezing. Cardiovascular: Negative for chest pain and palpitations. Gastrointestinal: Positive for diarrhea. Negative for abdominal distention, abdominal pain, blood in stool, constipation, nausea and vomiting.    Endocrine: Negative for cold intolerance, heat intolerance, polydipsia, polyphagia and polyuria. Genitourinary: Negative for difficulty urinating, dysuria, flank pain, frequency and vaginal pain. Musculoskeletal: Negative for arthralgias, back pain, gait problem, joint swelling, neck pain and neck stiffness. Skin: Negative for color change and rash. Allergic/Immunologic: Negative for immunocompromised state. Neurological: Positive for weakness (generalized) and light-headedness. Negative for dizziness, tremors, numbness and headaches. Hematological: Does not bruise/bleed easily. Psychiatric/Behavioral: Negative for behavioral problems, confusion, decreased concentration, hallucinations, self-injury and suicidal ideas. The patient is not nervous/anxious. PAST MEDICAL HISTORY     Past Medical History:   Diagnosis Date    Acute blood loss as cause of postoperative anemia 03/2018    CAD (coronary artery disease)      cath  50    Cardiac valve prolapse     Chronic back pain     COPD (chronic obstructive pulmonary disease) ([facility])     Ex-smoker     GERD (gastroesophageal reflux disease) 2/07    EGD    Glaucoma     H/O cardiac catheterization 2002    40-50% LAD   katharine    Hyperlipidemia     Hypertension     Liver disease     history of hepatitis at age 22    Osteoarthritis 1999    right shoulder and back    Pericarditis 1996       SURGICALHISTORY      has a past surgical history that includes Hysterectomy (1980); Spine surgery (7/02); Bunionectomy (2004); Rotator cuff repair (left); Upper gastrointestinal endoscopy (2007); Cardiac catheterization (2007??  &   2012? ? ); back surgery (2002); Appendectomy; Colonoscopy; eye surgery (2009??); pr laminectomy,>2 sgmt,lumbar (N/A, 3/7/2018); pr office/outpt visit,procedure only (N/A, 3/12/2018); and Endoscopy, colon, diagnostic.     CURRENT MEDICATIONS       Current Discharge Medication List      CONTINUE these medications which have NOT CHANGED    Details   metoprolol

## 2020-02-13 NOTE — PROGRESS NOTES
Pharmacy Renal Adjustment    Mitra Diaz is a 81 y.o. female. Pharmacy renally adjust the following medications per [employer] approved policy: famotidine, lovenox      Recent Labs     02/13/20  1107   BUN 34*       Recent Labs     02/13/20  1107   CREATININE 1.4*       Estimated Creatinine Clearance: 27 mL/min (A) (based on SCr of 1.4 mg/dL (H)).   Calculated CrCl:    Height:   Ht Readings from Last 1 Encounters:   02/13/20 5' 1\" (1.549 m)     Weight:  Wt Readings from Last 1 Encounters:   02/13/20 151 lb 14.4 oz (68.9 kg)       CKD stage: 3         Baseline SCr: 1.2    Plan: Adjustments based on renal function:          Decrease famotidine to 20mg daily          Decrease lovenox to 30 mg daily

## 2020-02-13 NOTE — ED NOTES
ED to inpatient nurses report    Chief Complaint   Patient presents with    Diarrhea    Fatigue      Present to ED from home  LOC: alert and orientated to name, place, date  Vital signs   Vitals:    02/13/20 1058 02/13/20 1200   BP: (!) 146/103 117/81   Pulse: 77 72   Resp: 20 19   Temp: 97.8 °F (36.6 °C)    TempSrc: Oral    SpO2: 99% 98%   Weight: 155 lb (70.3 kg)    Height: 5' 1\" (1.549 m)       Oxygen Baseline room air    Current needs required N/A  LDAs:   Peripheral IV 02/13/20 Right Forearm (Active)   Site Assessment Clean;Dry; Intact 2/13/2020 12:01 PM   Line Status Flushed;Specimen collected; Infusing 2/13/2020 12:01 PM   Dressing Status Clean;Dry; Intact 2/13/2020 12:01 PM   Dressing Intervention New 2/13/2020 12:01 PM     Mobility: Requires assistance * 1  Pending ED orders: stool sample  Present condition: A/O.  No acute distress    Electronically signed by Leon Diaz RN on 2/13/2020 at 12:06 PM       Leon Diaz RN  02/13/20 1210

## 2020-02-14 VITALS
HEART RATE: 82 BPM | OXYGEN SATURATION: 98 % | RESPIRATION RATE: 16 BRPM | DIASTOLIC BLOOD PRESSURE: 63 MMHG | SYSTOLIC BLOOD PRESSURE: 102 MMHG | WEIGHT: 151.9 LBS | HEIGHT: 61 IN | TEMPERATURE: 98.4 F | BODY MASS INDEX: 28.68 KG/M2

## 2020-02-14 LAB
ADENOVIRUS F 40 41 PCR: NOT DETECTED
ALBUMIN SERPL-MCNC: 3.1 G/DL (ref 3.5–5.1)
ALP BLD-CCNC: 72 U/L (ref 38–126)
ALT SERPL-CCNC: 21 U/L (ref 11–66)
ANION GAP SERPL CALCULATED.3IONS-SCNC: 12 MEQ/L (ref 8–16)
AST SERPL-CCNC: 29 U/L (ref 5–40)
ASTROVIRUS PCR: NOT DETECTED
BASOPHILS # BLD: 0.5 %
BASOPHILS ABSOLUTE: 0 THOU/MM3 (ref 0–0.1)
BILIRUB SERPL-MCNC: 0.2 MG/DL (ref 0.3–1.2)
BILIRUBIN DIRECT: < 0.2 MG/DL (ref 0–0.3)
BUN BLDV-MCNC: 22 MG/DL (ref 7–22)
CALCIUM SERPL-MCNC: 8.4 MG/DL (ref 8.5–10.5)
CAMPYLOBACTER PCR: NOT DETECTED
CHLORIDE BLD-SCNC: 112 MEQ/L (ref 98–111)
CLOSTRIDIUM DIFFICILE, PCR: NOT DETECTED
CO2: 15 MEQ/L (ref 23–33)
CREAT SERPL-MCNC: 0.9 MG/DL (ref 0.4–1.2)
CRYPTOSPORIDIUM PCR: NOT DETECTED
CYCLOSPORA CAYETANENSIS PCR: NOT DETECTED
E COLI 0157 PCR: NORMAL
E COLI ENTEROAGGREGATIVE PCR: NOT DETECTED
E COLI ENTEROPATHOGENIC PCR: NOT DETECTED
E COLI ENTEROTOXIGENIC PCR: NOT DETECTED
E COLI SHIGA LIKE TOXIN PCR: NOT DETECTED
E COLI SHIGELLA/ENTEROINVASIVE PCR: NOT DETECTED
E HISTOLYTICA GI FILM ARRAY: NOT DETECTED
EOSINOPHIL # BLD: 4.1 %
EOSINOPHILS ABSOLUTE: 0.2 THOU/MM3 (ref 0–0.4)
ERYTHROCYTE [DISTWIDTH] IN BLOOD BY AUTOMATED COUNT: 14.1 % (ref 11.5–14.5)
ERYTHROCYTE [DISTWIDTH] IN BLOOD BY AUTOMATED COUNT: 50.5 FL (ref 35–45)
FOLATE: > 20 NG/ML (ref 4.8–24.2)
GFR SERPL CREATININE-BSD FRML MDRD: 60 ML/MIN/1.73M2
GIARDIA LAMBLIA PCR: NOT DETECTED
GLUCOSE BLD-MCNC: 145 MG/DL (ref 70–108)
HCT VFR BLD CALC: 35.7 % (ref 37–47)
HEMOGLOBIN: 11.4 GM/DL (ref 12–16)
IMMATURE GRANS (ABS): 0.01 THOU/MM3 (ref 0–0.07)
IMMATURE GRANULOCYTES: 0.3 %
IRON: 66 UG/DL (ref 50–170)
LYMPHOCYTES # BLD: 16.8 %
LYMPHOCYTES ABSOLUTE: 0.6 THOU/MM3 (ref 1–4.8)
MAGNESIUM: 1.8 MG/DL (ref 1.6–2.4)
MCH RBC QN AUTO: 31 PG (ref 26–33)
MCHC RBC AUTO-ENTMCNC: 31.9 GM/DL (ref 32.2–35.5)
MCV RBC AUTO: 97 FL (ref 81–99)
MONOCYTES # BLD: 18.5 %
MONOCYTES ABSOLUTE: 0.7 THOU/MM3 (ref 0.4–1.3)
NOROVIRUS GI GII PCR: NOT DETECTED
NUCLEATED RED BLOOD CELLS: 0 /100 WBC
PLATELET # BLD: 225 THOU/MM3 (ref 130–400)
PLESIOMONAS SHIGELLOIDES PCR: NOT DETECTED
PMV BLD AUTO: 9.4 FL (ref 9.4–12.4)
POTASSIUM REFLEX MAGNESIUM: 3.5 MEQ/L (ref 3.5–5.2)
RBC # BLD: 3.68 MILL/MM3 (ref 4.2–5.4)
ROTAVIRUS A PCR: NOT DETECTED
SALMONELLA PCR: NOT DETECTED
SAPOVIRUS PCR: NOT DETECTED
SEG NEUTROPHILS: 59.8 %
SEGMENTED NEUTROPHILS ABSOLUTE COUNT: 2.2 THOU/MM3 (ref 1.8–7.7)
SODIUM BLD-SCNC: 139 MEQ/L (ref 135–145)
TOTAL IRON BINDING CAPACITY: 196 UG/DL (ref 171–450)
TOTAL PROTEIN: 5.9 G/DL (ref 6.1–8)
VIBRIO CHOLERAE PCR: NOT DETECTED
VIBRIO PCR: NOT DETECTED
VITAMIN B-12: 560 PG/ML (ref 211–911)
WBC # BLD: 3.7 THOU/MM3 (ref 4.8–10.8)
YERSINIA ENTEROCOLITICA PCR: NOT DETECTED

## 2020-02-14 PROCEDURE — 6360000002 HC RX W HCPCS: Performed by: FAMILY MEDICINE

## 2020-02-14 PROCEDURE — 2580000003 HC RX 258: Performed by: FAMILY MEDICINE

## 2020-02-14 PROCEDURE — 36415 COLL VENOUS BLD VENIPUNCTURE: CPT

## 2020-02-14 PROCEDURE — 83540 ASSAY OF IRON: CPT

## 2020-02-14 PROCEDURE — 85025 COMPLETE CBC W/AUTO DIFF WBC: CPT

## 2020-02-14 PROCEDURE — 93010 ELECTROCARDIOGRAM REPORT: CPT | Performed by: INTERNAL MEDICINE

## 2020-02-14 PROCEDURE — 0097U HC GI PTHGN MULT REV TRANS & AMP PRB TECH 22 TRGT: CPT

## 2020-02-14 PROCEDURE — 83550 IRON BINDING TEST: CPT

## 2020-02-14 PROCEDURE — 82607 VITAMIN B-12: CPT

## 2020-02-14 PROCEDURE — 82746 ASSAY OF FOLIC ACID SERUM: CPT

## 2020-02-14 PROCEDURE — G0378 HOSPITAL OBSERVATION PER HR: HCPCS

## 2020-02-14 PROCEDURE — 96372 THER/PROPH/DIAG INJ SC/IM: CPT

## 2020-02-14 PROCEDURE — 80048 BASIC METABOLIC PNL TOTAL CA: CPT

## 2020-02-14 PROCEDURE — 83735 ASSAY OF MAGNESIUM: CPT

## 2020-02-14 PROCEDURE — 80076 HEPATIC FUNCTION PANEL: CPT

## 2020-02-14 RX ORDER — FAMOTIDINE 20 MG/1
20 TABLET, FILM COATED ORAL 2 TIMES DAILY
Qty: 60 TABLET | Refills: 3 | Status: ON HOLD | OUTPATIENT
Start: 2020-02-14 | End: 2020-05-19 | Stop reason: HOSPADM

## 2020-02-14 RX ADMIN — ASPIRIN 81 MG: 81 TABLET, CHEWABLE ORAL at 09:34

## 2020-02-14 RX ADMIN — SODIUM CHLORIDE, PRESERVATIVE FREE 10 ML: 5 INJECTION INTRAVENOUS at 09:34

## 2020-02-14 RX ADMIN — ISOSORBIDE MONONITRATE 30 MG: 30 TABLET, EXTENDED RELEASE ORAL at 09:34

## 2020-02-14 RX ADMIN — Medication 12.5 MG: at 09:34

## 2020-02-14 RX ADMIN — POTASSIUM CHLORIDE 20 MEQ: 20 TABLET, EXTENDED RELEASE ORAL at 09:34

## 2020-02-14 RX ADMIN — POTASSIUM CHLORIDE 20 MEQ: 20 TABLET, EXTENDED RELEASE ORAL at 16:41

## 2020-02-14 RX ADMIN — ENOXAPARIN SODIUM 30 MG: 30 INJECTION SUBCUTANEOUS at 09:37

## 2020-02-14 ASSESSMENT — PAIN DESCRIPTION - PAIN TYPE: TYPE: ACUTE PAIN

## 2020-02-14 ASSESSMENT — PAIN SCALES - GENERAL
PAINLEVEL_OUTOF10: 0
PAINLEVEL_OUTOF10: 0
PAINLEVEL_OUTOF10: 4

## 2020-02-14 ASSESSMENT — PAIN DESCRIPTION - ORIENTATION: ORIENTATION: ANTERIOR

## 2020-02-14 ASSESSMENT — PAIN DESCRIPTION - FREQUENCY: FREQUENCY: CONTINUOUS

## 2020-02-14 ASSESSMENT — PAIN DESCRIPTION - PROGRESSION: CLINICAL_PROGRESSION: GRADUALLY WORSENING

## 2020-02-14 ASSESSMENT — PAIN DESCRIPTION - ONSET: ONSET: ON-GOING

## 2020-02-14 ASSESSMENT — PAIN - FUNCTIONAL ASSESSMENT: PAIN_FUNCTIONAL_ASSESSMENT: ACTIVITIES ARE NOT PREVENTED

## 2020-02-14 ASSESSMENT — PAIN DESCRIPTION - LOCATION: LOCATION: HEAD

## 2020-02-14 ASSESSMENT — PAIN DESCRIPTION - DESCRIPTORS: DESCRIPTORS: HEADACHE

## 2020-02-14 NOTE — CONSULTS
Pt Name: Jaun Ortiz  MRN: [id number]  [id number]  YOB: 1935  Admit Date: 2/13/2020 10:54 AM  Date of evaluation: 2/14/2020  Primary Care Physician: Filiberto Syed MD   [id number]     Requesting Provider:  Dr Britni Douglas Consult    Indication:  Diarrhea    History:  The patient is a 81 y.o.  female with COPD, CAD, GERD, and back pain admitted 2/13/20 for diarrhea and weakness. Diarrhea began ~3 weeks ago. As she was on abx the first 2 weeks of Dec 2019, she was started on empiric flagyl 2/3/20 for possible Cdiff colitis with no change in her symptoms. She began avoiding dairy and fiber. She has had a single BM since admission. She is eating well. Location:  diarrhea  Duration:  ~3 weeks  Radiation:  none  Associated:  weakness  Mitigating factors:  none  Exacerbating factors:  none    Last EGD:  remote  Last Colonoscopy:  remote    Past Medical History:        Diagnosis Date    Acute blood loss as cause of postoperative anemia 03/2018    CAD (coronary artery disease)      cath  50    Cardiac valve prolapse     Chronic back pain     COPD (chronic obstructive pulmonary disease) (HCC)     Ex-smoker     GERD (gastroesophageal reflux disease) 2/07    EGD    Glaucoma     H/O cardiac catheterization 2002    40-50% LAD   katharine    Hyperlipidemia     Hypertension     Liver disease     history of hepatitis at age 22    Osteoarthritis 1999    right shoulder and back    Pericarditis 1996     Past Surgical History:        Procedure Laterality Date    APPENDECTOMY      at age 13years   [address]  2004    right foot   [address]  2007??  &   2012? ?    normal results    COLONOSCOPY      last one 2000???    ENDOSCOPY, COLON, DIAGNOSTIC      EYE SURGERY  2009??    right eye   [address]    still has ovaries    NM LAMINECTOMY,>2 SGMT,LUMBAR N/A 3/7/2018    LUMBAR LAMINECTOMY L3-S1 performed by Tino Benjamin MD sodium chloride flush 0.9 % injection 10 mL  10 mL Intravenous 2 times per day Hieu Sharp MD   10 mL at 02/14/20 0934    sodium chloride flush 0.9 % injection 10 mL  10 mL Intravenous PRN Hieu Sharp MD        acetaminophen (TYLENOL) tablet 650 mg  650 mg Oral Q4H PRN Hieu Sharp MD        aspirin chewable tablet 81 mg  81 mg Oral Daily Hieu Sharp MD   81 mg at 02/14/20 0934    isosorbide mononitrate (IMDUR) extended release tablet 30 mg  30 mg Oral Daily Hieu Sharp MD   30 mg at 02/14/20 0934    latanoprost (XALATAN) 0.005 % ophthalmic solution 1 drop  1 drop Both Eyes Nightly Hieu Sharp MD        metoprolol tartrate (LOPRESSOR) tablet 12.5 mg  12.5 mg Oral BID Hieu Sharp MD   12.5 mg at 02/14/20 0934    nortriptyline (PAMELOR) capsule 50 mg  50 mg Oral Nightly Hieu Sharp MD   50 mg at 02/13/20 2226    potassium chloride (KLOR-CON M) extended release tablet 20 mEq  20 mEq Oral BID WC Hieu Sharp MD   20 mEq at 02/14/20 0934    tiZANidine (ZANAFLEX) tablet 4 mg  4 mg Oral Q8H PRN Hieu Sharp MD   4 mg at 02/13/20 2228    sodium chloride flush 0.9 % injection 10 mL  10 mL Intravenous 2 times per day Hieu Sharp MD   10 mL at 02/14/20 0934    sodium chloride flush 0.9 % injection 10 mL  10 mL Intravenous PRN Hieu Sharp MD        magnesium hydroxide (MILK OF MAGNESIA) 400 MG/5ML suspension 30 mL  30 mL Oral Daily PRN Hieu Sharp MD        ondansetron [facility]) injection 4 mg  4 mg Intravenous Q6H PRN Hieu Sharp MD        enoxaparin (LOVENOX) injection 30 mg  30 mg Subcutaneous Daily Hieu Sharp MD   30 mg at 02/14/20 3707    0.9 % sodium chloride infusion   Intravenous Continuous Hieu Sharp  mL/hr at 02/13/20 2240      potassium chloride (KLOR-CON M) extended release tablet 40 mEq  40 mEq Oral PRN Hieu Sharp MD        Or    potassium bicarb-citric acid (EFFER-K) effervescent tablet 40 mEq  40 mEq Oral PRN Familia Spence MD        Or    potassium chloride 10 mEq/100 mL IVPB (Peripheral Line)  10 mEq Intravenous PRN Familia Spence MD        magnesium sulfate 1 g in dextrose 5 % 100 mL IVPB  1 g Intravenous PRN Familia Spence MD        famotidine (PEPCID) tablet 20 mg  20 mg Oral Daily Familia Spence MD   20 mg at 20 1553     Social History:   Social History     Socioeconomic History    Marital status:      Spouse name: Not on file    Number of children: 2    Years of education: Not on file    Highest education level: Not on file   Occupational History    Not on file   Social Needs    Financial resource strain: Not on file    Food insecurity:     Worry: Not on file     Inability: Not on file    Transportation needs:     Medical: Not on file     Non-medical: Not on file   Tobacco Use    Smoking status: Former Smoker     Years: 30.00     Types: Cigarettes     Last attempt to quit: 1989     Years since quittin.6    Smokeless tobacco: Never Used   Substance and Sexual Activity    Alcohol use: No    Drug use: No    Sexual activity: Not Currently   Lifestyle    Physical activity:     Days per week: Not on file     Minutes per session: Not on file    Stress: Not on file   Relationships    Social connections:     Talks on phone: Not on file     Gets together: Not on file     Attends [employer] service: Not on file     Active member of club or organization: Not on file     Attends meetings of clubs or organizations: Not on file     Relationship status: Not on file    Intimate partner violence:     Fear of current or ex partner: Not on file     Emotionally abused: Not on file     Physically abused: Not on file     Forced sexual activity: Not on file   Other Topics Concern    Not on file   Social History Narrative    Not on file     Family History:   No GI malignancies     ROS:  GENERAL: No fever or chills. NEURO: No headache or seizure. EYES: No diplopia or vision loss. CARDIOVASCULAR: No chest pain or palpitations. RESPIRATORY: No dyspnea or cough. GI: NO melena. NO hematochezia   : No dysuria or hematuria. GYN: No discharge or abnormal bleeding. MUSCULOSKELETAL: No new arthralgias or myalgias  DERM: No rash or jaundice. ENDOCRINE: No polyuria or polydipsia. PSYCH: No anxiety or depression. Physical Exam:  VITALS: BP (!) 143/99   Pulse 84   Temp 97.8 °F (36.6 °C) (Oral)   Resp 16   Ht 5' 1\" (1.549 m)   Wt 151 lb 14.4 oz (68.9 kg)   SpO2 99%   BMI 28.70 kg/m²   The patient is a 81 y.o.  female with Body mass index is 28.7 kg/m². in no acute distress. HEAD: Normal cephalic/atraumatic. Extra-occular motions intact bilaterally. NECK: No lymphadenopathy or bruits. CHEST: Rises equally on inspiration. Clear to auscultation bilaterally. CARDIOVASCULAR: Regular rate and rhythm without murmurs, rubs or gallops. ABDOMEN: Soft and nondistended with normal bowel sounds. No Hepatosplemomegaly. Tender:  non  UPPER EXTREMITIES: no cyanosis, clubbing, or edema. DERM: no rash or jaundice. LOWER EXTREMITIES: no cyanosis, clubbing, or edema. NEURO: Alert and oriented times four. Patient moves all extremities and has gross sensation in all extremities.     Assessment:    Chronic diarrhea  dehydration  abx exposure  Anemia, normo  Hyponatremia   Hypoalbuminemia     Plan:  Anemia labs  Await stool PCR  Treat symptomatically if PCR normal    Randell Guidry MD  11:20 AM 2/14/2020        Stool PCR normal  Iron, B12, folate normal    Randell Guidry MD  4:00 PM 2/14/2020

## 2020-02-14 NOTE — H&P
[address] [place] [address]                              HISTORY AND PHYSICAL    PATIENT NAME: Jorge Velazquez                 :        1935  MED REC NO:   [id number]                           ROOM:       0026  ACCOUNT NO:   [de-identified]                           ADMIT DATE: 2020  PROVIDER:     Lia Smith M.D.      [address]:  Diarrhea and weakness. HISTORY OF PRESENT ILLNESS:  This is an 56-year-old female, underlying  history of atherosclerotic heart disease, hypertension, COPD,  hyperlipidemia, GERD, and diffuse arthritis, especially to the lower  back area, complaining of persistent diarrhea, fatigue, and weakness  being present. She states the diarrhea has been ongoing for at least  three weeks times. Actually, she was having some diarrhea before this. It was just intermittent. She has now developed more persistence where  she states she has up to five to eight stools per day. She has tried to  limit that. She also states she has diarrhea at night in addition. She  was seen in the office about seven to eight days earlier. Review of her  medications did not note anything that would cause diarrhea. She is not  an excessive amount drinker. It was noted about two months ago late  November and early December that she had been on antibiotics and this  lasted until about mid December for some tooth infection. Thus when she  was seen with above symptoms. She had appropriate lab work, and then,  we proceeded with placing her on Flagyl in case this was underlying case  of C. difficile colitis since she was on antibiotics approximately two  and half months earlier. The patient states that she has had persistent  symptoms of diarrhea. She has cut out multiple products and continues  with the problem.   Consequently, when she presented to the emergency, it  was noted that she had definite weakness having symptoms at night in addition,  unsure of cause. She did have antibiotics about two-and-half months  ago. 2.  Mild dehydration. 3.  Atherosclerotic heart disease. 4.  Hypertension. 5.  COPD. 6.  GERD. 7.  Chronic back pain. PLAN:  At this time, we will admit. Obtain some stool studies. Continue IV fluids. Asked GI to see in light of the stools also  occurring at night which is of some concern. No other changes. We will  place the patient on telemetry in addition. Hopefully with adequate  fluid, has improvement. Recheck labs in the morning. The patient was  seen on 02/13 at approximately 08:15 p.m. at night. She will still  continue adjustment of medications. No other changes being present and  consider appropriate workup and GI evaluation because of persistent  loose stool being present at this time. The patient thus admitted for  increased gastroenteritis, for further evaluation.         Lachelle Stein M.D.    D: 02/14/2020 0:23:02       T: 02/14/2020 3:44:29     [email or web address]  Job#: [id number]     Doc#: [id number]    CC:

## 2020-02-14 NOTE — CONSULTS
CATHETERIZATION  2007??  &   2012? ?    normal results    COLONOSCOPY      last one 2000???    ENDOSCOPY, COLON, DIAGNOSTIC      EYE SURGERY  2009??    right eye    HYSTERECTOMY  1980    still has ovaries    IA LAMINECTOMY,>2 SGMT,LUMBAR N/A 3/7/2018    LUMBAR LAMINECTOMY L3-S1 performed by Shilpa Garcia MD at [address] [facility] OFFICE/OUTPT [address] N/A 3/12/2018    REPAIR DUROTOMY PLACEMENT OF SUBARACHNOID DRAIN performed by Shilpa Garcia MD at [address] [facility]  left    SPINE SURGERY  7/02    L5 cyst    UPPER GASTROINTESTINAL ENDOSCOPY  2007    sheik       Family History:  Family History   Problem Relation Age of Onset    Tuberculosis Mother     Tuberculosis Father        Past GI History:  GERD    Allergies:  Patient has no known allergies. Social History:   TOBACCO:   reports that she quit smoking about 30 years ago. Her smoking use included cigarettes. She quit after 30.00 years of use. She has never used smokeless tobacco.  ETOH:   reports no history of alcohol use. Review Of Systems  GENERAL: No fever, chills or weight loss. EYES:  No blurred vision, double vision   CARDIOVASCULAR: No chest pain or palpitations. RESPIRATORY:  No dyspnea or cough. GI:  See HPI  MUSCULOSKELETAL: No new painful or swollen joints or myalgias. :   No dysuria or hematuria. SKIN:  No rashes or jaundice. NEUROLOGIC:  No headaches or seizures, numbness or tingling of arms, or legs. PSYCH:  No anxiety or depression. ENDOCRINE:  No polyuria or polydipsia. BLOOD:  No anemia, bleeding disorder, blood or blood product transfusion. PHYSICAL EXAM:  BP (!) 143/99   Pulse 84   Temp 97.8 °F (36.6 °C) (Oral)   Resp 16   Ht 5' 1\" (1.549 m)   Wt 151 lb 14.4 oz (68.9 kg)   SpO2 99%   BMI 28.70 kg/m²     General appearance: No apparent distress, appears stated age and cooperative. HEENT: Normal cephalic, atraumatic without obvious deformity.  Pupils equal, round, and

## 2020-02-14 NOTE — PROGRESS NOTES
Discharge teaching and instructions for diagnosis/procedure of diarrhea completed with patient using teachback method. AVS reviewed. Printed prescriptions given to patient. Patient voiced understanding regarding prescriptions, follow up appointments, and care of self at home.  Discharged in a wheelchair to  home with support per family

## 2020-02-15 NOTE — PROGRESS NOTES
Matthew Rodrigues is a 81 y.o. female patient. No current facility-administered medications for this encounter. Current Outpatient Medications   Medication Sig Dispense Refill    famotidine (PEPCID) 20 MG tablet Take 1 tablet by mouth 2 times daily 60 tablet 3    metoprolol tartrate (LOPRESSOR) 25 MG tablet Take 0.5 tablets by mouth 2 times daily 90 tablet 1    simvastatin (ZOCOR) 40 MG tablet Take 1 tablet by mouth nightly 90 tablet 1    nortriptyline (PAMELOR) 50 MG capsule Take 1 capsule by mouth nightly 90 capsule 1    tiZANidine (ZANAFLEX) 4 MG tablet TAKE 1 TABLET BY MOUTH EVERY 6 HOURS AS NEEDED FOR MUSCLE SPASM 100 tablet 2    isosorbide mononitrate (IMDUR) 30 MG extended release tablet Take 1 tablet by mouth daily 90 tablet 1    hydrochlorothiazide (HYDRODIURIL) 25 MG tablet Take 1 tablet by mouth daily 90 tablet 1    potassium chloride (KLOR-CON M) 20 MEQ extended release tablet Take 1 tablet by mouth 2 times daily 60 tablet 3    acetaminophen (TYLENOL) 325 MG tablet Take 2 tablets by mouth every 4 hours as needed for Pain Maximum dose of acetaminophen is 4000 mg from all sources in 24 hours.  aspirin 81 MG tablet Take 81 mg by mouth daily       No Known Allergies  Active Problems:    Dehydration  Resolved Problems:    * No resolved hospital problems. *    Blood pressure 102/63, pulse 82, temperature 98.4 °F (36.9 °C), temperature source Oral, resp. rate 16, height 5' 1\" (1.549 m), weight 151 lb 14.4 oz (68.9 kg), SpO2 98 %, not currently breastfeeding. Subjective:  Symptoms:  Stable. Diet:  Adequate intake. Activity level: Returning to normal.    Pain:  She reports no pain. Objective:  General Appearance:  Comfortable. Vital signs: (most recent): Blood pressure 102/63, pulse 82, temperature 98.4 °F (36.9 °C), temperature source Oral, resp. rate 16, height 5' 1\" (1.549 m), weight 151 lb 14.4 oz (68.9 kg), SpO2 98 %, not currently breastfeeding.   Vital signs are normal.    Output: Producing urine and producing stool. HEENT: Normal HEENT exam.    Lungs:  Normal effort and normal respiratory rate. Breath sounds clear to auscultation. Heart: Normal rate. Regular rhythm. S1 normal and S2 normal.  No murmur. Abdomen: Abdomen is soft and non-distended. Bowel sounds are normal.   There is no abdominal tenderness. Extremities: Normal range of motion. ( Diffuse arthritis  Of knees and low  Back area )  Neurological: Patient is alert and oriented to person, place and time. Patient has normal reflexes and normal muscle tone. Assessment:    Condition: In stable condition. Improving. (Gastroenteritis now  Better and cultures  Negative  As diet  Advanced and tolerating and better with hydration     dehydration  Now  Much better      ashd stable     Diffuse arthrtis  Stable     htn stable     copd stable       ). Plan:   Discharge home. Encourage ambulation. Consults: gastroenterology. Advance diet as tolerated. Administer medications as ordered. ( Home  Today      soft  Gi  Diet  Low  Fiber      see in one week      discussed  With gi and if further  Diarrhea  Refer  back to gi for colonoscopy    Overall better and improved  With hydration. Chart reviewed and medications     Reviewed and spent  35 minutes  On discharge plans and summary and preparation for patients discharge ).        Chris Olivera MD  2/15/2020

## 2020-02-17 NOTE — TELEPHONE ENCOUNTER
Patient called to schedule Hosp F/U for Thursday. She also req ref of Magnet to [facility] in [address].

## 2020-02-18 RX ORDER — HYDROCODONE BITARTRATE AND ACETAMINOPHEN 5; 325 MG/1; MG/1
1 TABLET ORAL EVERY 6 HOURS PRN
Qty: 60 TABLET | Refills: 0 | Status: SHIPPED | OUTPATIENT
Start: 2020-02-18 | End: 2020-03-26 | Stop reason: SDUPTHER

## 2020-02-20 ENCOUNTER — OFFICE VISIT (OUTPATIENT)
Dept: FAMILY MEDICINE CLINIC | Age: 85
End: 2020-02-20

## 2020-02-20 VITALS
WEIGHT: 156.38 LBS | DIASTOLIC BLOOD PRESSURE: 64 MMHG | RESPIRATION RATE: 14 BRPM | SYSTOLIC BLOOD PRESSURE: 122 MMHG | BODY MASS INDEX: 29.52 KG/M2 | HEIGHT: 61 IN | HEART RATE: 76 BPM

## 2020-02-20 PROBLEM — G97.41 INADVERTENT DUROTOMY: Status: RESOLVED | Noted: 2018-03-12 | Resolved: 2020-02-20

## 2020-02-20 PROBLEM — H61.20 HEARING LOSS SECONDARY TO CERUMEN IMPACTION: Status: RESOLVED | Noted: 2019-05-28 | Resolved: 2020-02-20

## 2020-02-20 PROCEDURE — 99214 OFFICE O/P EST MOD 30 MIN: CPT | Performed by: FAMILY MEDICINE

## 2020-02-20 PROCEDURE — 1036F TOBACCO NON-USER: CPT | Performed by: FAMILY MEDICINE

## 2020-02-20 PROCEDURE — G8400 PT W/DXA NO RESULTS DOC: HCPCS | Performed by: FAMILY MEDICINE

## 2020-02-20 PROCEDURE — 4040F PNEUMOC VAC/ADMIN/RCVD: CPT | Performed by: FAMILY MEDICINE

## 2020-02-20 PROCEDURE — 1111F DSCHRG MED/CURRENT MED MERGE: CPT | Performed by: FAMILY MEDICINE

## 2020-02-20 PROCEDURE — 1123F ACP DISCUSS/DSCN MKR DOCD: CPT | Performed by: FAMILY MEDICINE

## 2020-02-20 PROCEDURE — G8417 CALC BMI ABV UP PARAM F/U: HCPCS | Performed by: FAMILY MEDICINE

## 2020-02-20 PROCEDURE — 1090F PRES/ABSN URINE INCON ASSESS: CPT | Performed by: FAMILY MEDICINE

## 2020-02-20 PROCEDURE — G8427 DOCREV CUR MEDS BY ELIG CLIN: HCPCS | Performed by: FAMILY MEDICINE

## 2020-02-20 RX ORDER — POTASSIUM CHLORIDE 20 MEQ/1
20 TABLET, EXTENDED RELEASE ORAL DAILY
Qty: 30 TABLET | Refills: 5 | Status: SHIPPED | OUTPATIENT
Start: 2020-02-20 | End: 2020-08-31 | Stop reason: SDUPTHER

## 2020-02-20 ASSESSMENT — ENCOUNTER SYMPTOMS
DIARRHEA: 1
SHORTNESS OF BREATH: 0
NAUSEA: 0
COUGH: 0
CHEST TIGHTNESS: 0
WHEEZING: 0
CONSTIPATION: 0
EYE PAIN: 0
SORE THROAT: 0
ABDOMINAL PAIN: 0

## 2020-02-20 NOTE — PROGRESS NOTES
taking differently: Take 20 mEq by mouth daily ) 60 tablet 3    acetaminophen (TYLENOL) 325 MG tablet Take 2 tablets by mouth every 4 hours as needed for Pain Maximum dose of acetaminophen is 4000 mg from all sources in 24 hours.  aspirin 81 MG tablet Take 81 mg by mouth daily          Medications patient taking as of now reconciled against medications ordered at time of hospital discharge: Yes    Chief Complaint   Patient presents with   [address] from Hospital       HPI    Inpatient course: Discharge summary reviewed- see chart. Interval history/Current status:   Much  better  With  Hydration  And m,uch better       stools  Less and ? Prevacid          Review of Systems   Constitutional: Negative for appetite change, fatigue and fever. HENT: Negative for congestion, ear pain, postnasal drip and sore throat. Eyes: Negative for pain and visual disturbance. Respiratory: Negative for cough, chest tightness, shortness of breath and wheezing. Cardiovascular: Negative for chest pain, palpitations and leg swelling. Gastrointestinal: Positive for diarrhea. Negative for abdominal pain, constipation and nausea. gerd  Stable    Genitourinary: Negative for dysuria and frequency. Musculoskeletal: Negative for arthralgias, joint swelling, neck pain and neck stiffness. Skin: Negative for rash. Neurological: Negative for dizziness, weakness, numbness and headaches. Hematological: Negative for adenopathy. Does not bruise/bleed easily. Psychiatric/Behavioral: Negative for behavioral problems and sleep disturbance. The patient is not nervous/anxious. Vitals:    02/20/20 1026   BP: 122/64   Site: Right Upper Arm   Position: Sitting   Cuff Size: Medium Adult   Pulse: 76   Resp: 14   Weight: 156 lb 6 oz (70.9 kg)   Height: 5' 1\" (1.549 m)     Body mass index is 29.55 kg/m².    Wt Readings from Last 3 Encounters:   02/20/20 156 lb 6 oz (70.9 kg)   02/13/20 151 lb 14.4 oz (68.9 kg) 02/03/20 153 lb (69.4 kg)     BP Readings from Last 3 Encounters:   02/20/20 122/64   02/14/20 102/63   02/03/20 112/66       Physical Exam  Vitals signs and nursing note reviewed. Constitutional:       Appearance: She is well-developed. HENT:      Head: Normocephalic and atraumatic. Right Ear: External ear normal.      Left Ear: External ear normal.      Nose: Nose normal.   Eyes:      General: No scleral icterus. Conjunctiva/sclera: Conjunctivae normal.      Pupils: Pupils are equal, round, and reactive to light. Neck:      Musculoskeletal: Normal range of motion and neck supple. Thyroid: No thyromegaly. Vascular: No JVD. Cardiovascular:      Rate and Rhythm: Normal rate and regular rhythm. Heart sounds: Normal heart sounds. Pulmonary:      Effort: Pulmonary effort is normal.      Breath sounds: Normal breath sounds. No wheezing or rales. Abdominal:      General: Bowel sounds are normal. There is no distension. Palpations: Abdomen is soft. There is no mass. Tenderness: There is no abdominal tenderness. Musculoskeletal: Normal range of motion. General: No tenderness. Lymphadenopathy:      Cervical: No cervical adenopathy. Skin:     General: Skin is warm and dry. Findings: No rash. Neurological:      Mental Status: She is alert and oriented to person, place, and time. Cranial Nerves: No cranial nerve deficit. Deep Tendon Reflexes: Reflexes are normal and symmetric. Assessment/Plan:       Diagnosis Orders   1. Acute gastroenteritis     2. Essential hypertension  potassium chloride (KLOR-CON M) 20 MEQ extended release tablet   3. Spinal stenosis of lumbar region with neurogenic claudication     4. Primary osteoarthritis involving multiple joints     5. Gastroesophageal reflux disease without esophagitis     6. Chronic obstructive pulmonary disease, unspecified COPD type ([facility] [address]     7.  Coronary artery disease involving native

## 2020-03-03 RX ORDER — HYDROCHLOROTHIAZIDE 25 MG/1
25 TABLET ORAL DAILY
Qty: 90 TABLET | Refills: 1 | Status: ON HOLD | OUTPATIENT
Start: 2020-03-03 | End: 2020-05-19 | Stop reason: HOSPADM

## 2020-03-03 RX ORDER — ISOSORBIDE MONONITRATE 30 MG/1
30 TABLET, EXTENDED RELEASE ORAL DAILY
Qty: 90 TABLET | Refills: 1 | Status: SHIPPED | OUTPATIENT
Start: 2020-03-03 | End: 2020-08-31 | Stop reason: SDUPTHER

## 2020-03-03 NOTE — TELEPHONE ENCOUNTER
Travon Vincent called requesting a refill of the below medication which has been pended for you:     Requested Prescriptions     Pending Prescriptions Disp Refills    hydroCHLOROthiazide (HYDRODIURIL) 25 MG tablet 90 tablet 1     Sig: Take 1 tablet by mouth daily    isosorbide mononitrate (IMDUR) 30 MG extended release tablet 90 tablet 1     Sig: Take 1 tablet by mouth daily       Last Appointment Date: 2/20/2020  Next Appointment Date: 4/15/2020    No Known Allergies     Please send to:    [facility]

## 2020-03-10 ENCOUNTER — APPOINTMENT (OUTPATIENT)
Dept: GENERAL RADIOLOGY | Age: 85
End: 2020-03-10
Payer: MEDICARE

## 2020-03-10 ENCOUNTER — HOSPITAL ENCOUNTER (EMERGENCY)
Age: 85
Discharge: HOME | End: 2020-03-10
Payer: MEDICARE

## 2020-03-10 ENCOUNTER — APPOINTMENT (OUTPATIENT)
Dept: CT IMAGING | Age: 85
End: 2020-03-10
Payer: MEDICARE

## 2020-03-10 VITALS
WEIGHT: 153 LBS | OXYGEN SATURATION: 97 % | BODY MASS INDEX: 28.89 KG/M2 | HEIGHT: 61 IN | RESPIRATION RATE: 16 BRPM | SYSTOLIC BLOOD PRESSURE: 103 MMHG | TEMPERATURE: 98.3 F | DIASTOLIC BLOOD PRESSURE: 54 MMHG | HEART RATE: 71 BPM

## 2020-03-10 PROCEDURE — 99285 EMERGENCY DEPT VISIT HI MDM: CPT

## 2020-03-10 PROCEDURE — 72125 CT NECK SPINE W/O DYE: CPT

## 2020-03-10 PROCEDURE — 72072 X-RAY EXAM THORAC SPINE 3VWS: CPT

## 2020-03-10 PROCEDURE — 72100 X-RAY EXAM L-S SPINE 2/3 VWS: CPT

## 2020-03-10 PROCEDURE — 70450 CT HEAD/BRAIN W/O DYE: CPT

## 2020-03-10 PROCEDURE — 6370000000 HC RX 637 (ALT 250 FOR IP): Performed by: PHYSICIAN ASSISTANT

## 2020-03-10 RX ORDER — LIDOCAINE 50 MG/G
1 PATCH TOPICAL DAILY
Qty: 30 PATCH | Refills: 0 | Status: ON HOLD | OUTPATIENT
Start: 2020-03-10 | End: 2020-05-15

## 2020-03-10 RX ORDER — OXYCODONE HYDROCHLORIDE AND ACETAMINOPHEN 5; 325 MG/1; MG/1
1 TABLET ORAL ONCE
Status: COMPLETED | OUTPATIENT
Start: 2020-03-10 | End: 2020-03-10

## 2020-03-10 RX ORDER — LIDOCAINE 4 G/G
1 PATCH TOPICAL DAILY
Status: DISCONTINUED | OUTPATIENT
Start: 2020-03-10 | End: 2020-03-10 | Stop reason: HOSPADM

## 2020-03-10 RX ADMIN — OXYCODONE HYDROCHLORIDE AND ACETAMINOPHEN 1 TABLET: 5; 325 TABLET ORAL at 16:47

## 2020-03-10 ASSESSMENT — ENCOUNTER SYMPTOMS
EYE PAIN: 0
NAUSEA: 0
BACK PAIN: 1
SORE THROAT: 0
VOMITING: 0
SHORTNESS OF BREATH: 0
EYE ITCHING: 0
WHEEZING: 0
COUGH: 0
COLOR CHANGE: 0
ABDOMINAL PAIN: 0
RHINORRHEA: 0
EYE DISCHARGE: 0
DIARRHEA: 0

## 2020-03-10 ASSESSMENT — PAIN SCALES - GENERAL
PAINLEVEL_OUTOF10: 3
PAINLEVEL_OUTOF10: 8
PAINLEVEL_OUTOF10: 8

## 2020-03-10 NOTE — ED TRIAGE NOTES
Pt arrives with complaints of fall last Friday. Pt states she is unsure how she fell but son says there is a stair that she may have missed. She reports the use of no devices. Pt reports she has pain all on the leg side. She states back, shoulder, and leg are bothering her. She states her pain is a 8/10 and she has tried to take tylenol for her pain with no relief. Pt was able to walk from wheel chair to bed with assistance. Pt resting in bed with son at bedside. Denies any further needs at this time. Will continue to monitor.

## 2020-03-10 NOTE — ED PROVIDER NOTES
mouth every 4 hours as needed for Pain Maximum dose of acetaminophen is 4000 mg from all sources in 24 hours. OTC      aspirin 81 MG tablet Take 81 mg by mouth daily             ALLERGIES     has No Known Allergies. HISTORY     She indicated that her mother is . She indicated that her father is . family history includes Tuberculosis in her father and mother. SOCIALHISTORY      reports that she quit smoking about 30 years ago. Her smoking use included cigarettes. She quit after 30.00 years of use. She has never used smokeless tobacco. She reports that she does not drink alcohol or use drugs. PHYSICAL EXAM     INITIAL VITALS:  height is 5' 1\" (1.549 m) and weight is 153 lb (69.4 kg). Her temperature is 98.3 °F (36.8 °C). Her blood pressure is 103/54 (abnormal) and her pulse is 71. Her respiration is 16 and oxygen saturation is 97%. Physical Exam  Vitals signs and nursing note reviewed. Constitutional:       Appearance: She is well-developed. HENT:      Head: Normocephalic and atraumatic. Right Ear: Tympanic membrane normal.      Left Ear: Tympanic membrane normal.   Eyes:      Pupils: Pupils are equal, round, and reactive to light. Neck:      Musculoskeletal: Normal range of motion and neck supple. Cardiovascular:      Rate and Rhythm: Normal rate and regular rhythm. Pulmonary:      Effort: Pulmonary effort is normal. No respiratory distress. Breath sounds: Normal breath sounds. Abdominal:      General: There is no distension. Palpations: Abdomen is soft. Tenderness: There is no abdominal tenderness. Musculoskeletal: Normal range of motion. Comments: Tender thoracic spine. Tender lumbar spine. Good strength and sensation upper lower extremities. Tender cervical paraspinous muscles with good strength and sensation. Skin:     General: Skin is warm and dry. Neurological:      Mental Status: She is alert and oriented to person, place, and time. recognition technology. **    Final report electronically signed by Dr. Michelle Diaz on 3/10/2020 4:32 PM            Narrative:    PROCEDURE: XR THORACIC SPINE (3 VIEWS), XR LUMBAR SPINE (2-3 VIEWS)    CLINICAL INFORMATION: back pain. Trauma by fall 4 days ago. COMPARISON: August 19, 2019 thoracic spine; 3/7/2018, lumbar spine    TECHNIQUE: 3 standard views of the thoracic spine and 2 standard views of the lumbar spine were obtained. FINDINGS:     THORACIC SPINE: There is a slight cervical/thoracic levoscoliosis, mild mid thoracic dextro scoliosis, and mild thoracolumbar levoscoliosis. Cannot exclude muscle spasm. The bones are diffusely demineralized, consistent with osteoporosis. Mild   degenerative spurring is scattered in thoracic spine and there is evidence for mild multifocal disc space narrowing lower thoracic spine. No acute fracture is seen. Paravertebral soft tissues are unremarkable. LUMBAR SPINE: There is moderate disc space narrowing and degenerative disc disease L4-5 level as well as mild antegrade spondylolisthesis L4 upon L5, 5 mm, unchanged from prior study. There is a mild superior endplate depression fracture of L2, not   present on prior study and of questionable acuity. Moderately severe multilevel degenerative facet arthropathy is seen in the lumbar spine involving at least the lower 3 lumbar levels.                    XR LUMBAR SPINE (2-3 VIEWS) (Final result)   Result time 03/10/20 16:32:20   Final result by Michelle Diaz MD (03/10/20 16:32:20)                Impression:    1. Scoliotic curvatures in the thoracic spine. Cannot exclude muscle spasm. Moderate multifocal degenerative facet arthropathy lower lumbar spine. 2. Mild superior endplate depression fracture L2, questionable acuity. If patient has pain referable to this region, nonemergent MRI lumbar spine would be recommended. Patient may be a good candidate for vertebroplasty.         **This report has been created using voice recognition software.  It may contain minor errors which are inherent in voice recognition technology. **    Final report electronically signed by Dr. Beatris Silva on 3/10/2020 4:32 PM            Narrative:    PROCEDURE: XR THORACIC SPINE (3 VIEWS), XR LUMBAR SPINE (2-3 VIEWS)    CLINICAL INFORMATION: back pain. Trauma by fall 4 days ago. COMPARISON: August 19, 2019 thoracic spine; 3/7/2018, lumbar spine    TECHNIQUE: 3 standard views of the thoracic spine and 2 standard views of the lumbar spine were obtained. FINDINGS:     THORACIC SPINE: There is a slight cervical/thoracic levoscoliosis, mild mid thoracic dextro scoliosis, and mild thoracolumbar levoscoliosis. Cannot exclude muscle spasm. The bones are diffusely demineralized, consistent with osteoporosis. Mild   degenerative spurring is scattered in thoracic spine and there is evidence for mild multifocal disc space narrowing lower thoracic spine. No acute fracture is seen. Paravertebral soft tissues are unremarkable. LUMBAR SPINE: There is moderate disc space narrowing and degenerative disc disease L4-5 level as well as mild antegrade spondylolisthesis L4 upon L5, 5 mm, unchanged from prior study. There is a mild superior endplate depression fracture of L2, not   present on prior study and of questionable acuity. Moderately severe multilevel degenerative facet arthropathy is seen in the lumbar spine involving at least the lower 3 lumbar levels.                      LABS:   Labs Reviewed - No data to display    EMERGENCY DEPARTMENT COURSE:   :    Vitals:    03/10/20 1519 03/10/20 1649 03/10/20 1820   BP: 123/68 (!) 112/98 (!) 103/54   Pulse: 78 75 71   Resp: 18 16 16   Temp: 98.3 °F (36.8 °C)     SpO2: 97% 96% 97%   Weight: 153 lb (69.4 kg)     Height: 5' 1\" (1.549 m)       Patient was seen history physical exam was performed. She does have an age indeterminate fracture at L2. She is tender there. Could have been on Friday.   I did discuss the case with Dr. Raul Lewis. The patient was given Percocet and Lidoderm patch here. She was able to ambulate out to the gordon without difficulty. She lives with her sister. She is comfortable at home. Will discharge. see disposition below    CRITICAL CARE:  None    CONSULTS:  None    PROCEDURES:  None    FINAL IMPRESSION      1. Fall, initial encounter    2.  Compression fracture of L2 vertebra, initial encounter [facility])          DISPOSITION/PLAN   Discharge    PATIENT REFERRED TO:  Lanre Pan MD  [place] [address] [address] [facility]    In 2 days      Nicholas Sparks MD  [address]  [phone]    In 2 days        DISCHARGE MEDICATIONS:  Discharge Medication List as of 3/10/2020  6:27 PM      START taking these medications    Details   lidocaine (LIDODERM) 5 % Place 1 patch onto the skin daily 12 hours on, 12 hours off., Disp-30 patch, R-0Print             (Please note that portions of this note were completed with a voice recognitionprogram.  Efforts were made to edit the dictations but occasionally words are mis-transcribed.)    Anaya Sutton, [address]  03/10/20 8047

## 2020-03-10 NOTE — ED NOTES
Pt ambulated to gordon and back for provider. Pt states shes ready to go home. Pt getting dressed awaiting discharge.       Antwan Copeland RN  03/10/20 3533

## 2020-03-10 NOTE — ED NOTES
Pt resting in bed watching TV.bretahing easy and unlabored. States pain is much better. Rates pain 3/10 at this time. Denies any further needs at this time. Will continue to monitor.      Karen Vickers RN  03/10/20 1898

## 2020-03-10 NOTE — ED NOTES
Pt resting in bed. Medicated per MAR. Denies any needs at this time. Breathing easy and unlabored. Will continue to monitor.       Taniya Ludwig RN  03/10/20 8448

## 2020-03-12 ENCOUNTER — OFFICE VISIT (OUTPATIENT)
Dept: FAMILY MEDICINE CLINIC | Age: 85
End: 2020-03-12

## 2020-03-12 VITALS
HEART RATE: 76 BPM | HEIGHT: 61 IN | RESPIRATION RATE: 14 BRPM | SYSTOLIC BLOOD PRESSURE: 112 MMHG | WEIGHT: 154.13 LBS | BODY MASS INDEX: 29.1 KG/M2 | DIASTOLIC BLOOD PRESSURE: 64 MMHG

## 2020-03-12 PROCEDURE — 1036F TOBACCO NON-USER: CPT | Performed by: FAMILY MEDICINE

## 2020-03-12 PROCEDURE — G8417 CALC BMI ABV UP PARAM F/U: HCPCS | Performed by: FAMILY MEDICINE

## 2020-03-12 PROCEDURE — 1123F ACP DISCUSS/DSCN MKR DOCD: CPT | Performed by: FAMILY MEDICINE

## 2020-03-12 PROCEDURE — G8427 DOCREV CUR MEDS BY ELIG CLIN: HCPCS | Performed by: FAMILY MEDICINE

## 2020-03-12 PROCEDURE — 99213 OFFICE O/P EST LOW 20 MIN: CPT | Performed by: FAMILY MEDICINE

## 2020-03-12 PROCEDURE — 4040F PNEUMOC VAC/ADMIN/RCVD: CPT | Performed by: FAMILY MEDICINE

## 2020-03-12 PROCEDURE — 1090F PRES/ABSN URINE INCON ASSESS: CPT | Performed by: FAMILY MEDICINE

## 2020-03-12 PROCEDURE — G8400 PT W/DXA NO RESULTS DOC: HCPCS | Performed by: FAMILY MEDICINE

## 2020-03-12 ASSESSMENT — ENCOUNTER SYMPTOMS
SHORTNESS OF BREATH: 0
CHEST TIGHTNESS: 0
WHEEZING: 0
COUGH: 0
BACK PAIN: 1
CONSTIPATION: 0
BOWEL INCONTINENCE: 0
VISUAL CHANGE: 0
SORE THROAT: 0
ABDOMINAL PAIN: 0
NAUSEA: 0
EYE PAIN: 0

## 2020-03-12 NOTE — PROGRESS NOTES
constipation and nausea. Genitourinary: Negative for dysuria and frequency. Musculoskeletal: Positive for back pain. Negative for arthralgias, joint swelling, neck pain and neck stiffness. Skin: Negative for rash. Neurological: Negative for dizziness, loss of consciousness, weakness, numbness and headaches. Hematological: Negative for adenopathy. Does not bruise/bleed easily. Psychiatric/Behavioral: Negative for behavioral problems and sleep disturbance. The patient is not nervous/anxious. /64 (Site: Right Upper Arm, Position: Sitting, Cuff Size: Medium Adult)   Pulse 76   Resp 14   Ht 5' 1\" (1.549 m)   Wt 154 lb 2 oz (69.9 kg)   BMI 29.12 kg/m²   Objective:   Physical Exam  Vitals signs and nursing note reviewed. Constitutional:       Appearance: She is well-developed. HENT:      Head: Normocephalic and atraumatic. Right Ear: External ear normal.      Left Ear: External ear normal.      Nose: Nose normal.   Eyes:      General: No scleral icterus. Conjunctiva/sclera: Conjunctivae normal.      Pupils: Pupils are equal, round, and reactive to light. Neck:      Musculoskeletal: Normal range of motion and neck supple. Thyroid: No thyromegaly. Vascular: No JVD. Cardiovascular:      Rate and Rhythm: Normal rate and regular rhythm. Heart sounds: Normal heart sounds. Pulmonary:      Effort: Pulmonary effort is normal.      Breath sounds: Normal breath sounds. No wheezing or rales. Abdominal:      General: Bowel sounds are normal. There is no distension. Palpations: Abdomen is soft. There is no mass. Tenderness: There is no abdominal tenderness. Musculoskeletal: Normal range of motion. General: No tenderness. Comments:   Mid  Back pain  At  L2 to  L4  Area    Lymphadenopathy:      Cervical: No cervical adenopathy. Skin:     General: Skin is warm and dry. Findings: No rash.    Neurological:      Mental Status: She is alert and oriented to person, place, and time. Cranial Nerves: No cranial nerve deficit. Deep Tendon Reflexes: Reflexes are normal and symmetric. Assessment:       Diagnosis Orders   1. Acute midline low back pain without sciatica     2. Gastroesophageal reflux disease without esophagitis     3. Chronic back pain, unspecified back location, unspecified back pain laterality           Plan:      Current Outpatient Medications   Medication Sig Dispense Refill    lidocaine (LIDODERM) 5 % Place 1 patch onto the skin daily 12 hours on, 12 hours off. 30 patch 0    hydroCHLOROthiazide (HYDRODIURIL) 25 MG tablet Take 1 tablet by mouth daily 90 tablet 1    isosorbide mononitrate (IMDUR) 30 MG extended release tablet Take 1 tablet by mouth daily 90 tablet 1    potassium chloride (KLOR-CON M) 20 MEQ extended release tablet Take 1 tablet by mouth daily 30 tablet 5    HYDROcodone-acetaminophen (NORCO) 5-325 MG per tablet Take 1 tablet by mouth every 6 hours as needed for Pain for up to 30 days. 60 tablet 0    famotidine (PEPCID) 20 MG tablet Take 1 tablet by mouth 2 times daily 60 tablet 3    metoprolol tartrate (LOPRESSOR) 25 MG tablet Take 0.5 tablets by mouth 2 times daily 90 tablet 1    simvastatin (ZOCOR) 40 MG tablet Take 1 tablet by mouth nightly 90 tablet 1    nortriptyline (PAMELOR) 50 MG capsule Take 1 capsule by mouth nightly 90 capsule 1    tiZANidine (ZANAFLEX) 4 MG tablet TAKE 1 TABLET BY MOUTH EVERY 6 HOURS AS NEEDED FOR MUSCLE SPASM 100 tablet 2    acetaminophen (TYLENOL) 325 MG tablet Take 2 tablets by mouth every 4 hours as needed for Pain Maximum dose of acetaminophen is 4000 mg from all sources in 24 hours.  aspirin 81 MG tablet Take 81 mg by mouth daily       No current facility-administered medications for this visit. No orders of the defined types were placed in this encounter. questionable area L2.  Overall doing better would  Use pain meds increased the Zanaflex to 3 times a day and try Aspercreaam patches to back  4 %     Taylor Jones MD

## 2020-03-12 NOTE — PROGRESS NOTES
Subjective:      Patient ID: Carroll Jacobson is a 81 y.o. female.     HPI    Review of Systems    Objective:   Physical Exam    Assessment:      ***      Plan:      ***        Brock Miranda MD

## 2020-03-14 PROBLEM — E86.0 DEHYDRATION: Status: RESOLVED | Noted: 2020-02-13 | Resolved: 2020-03-14

## 2020-03-26 RX ORDER — HYDROCODONE BITARTRATE AND ACETAMINOPHEN 5; 325 MG/1; MG/1
1 TABLET ORAL EVERY 6 HOURS PRN
Qty: 60 TABLET | Refills: 0 | Status: SHIPPED | OUTPATIENT
Start: 2020-03-26 | End: 2020-04-30 | Stop reason: SDUPTHER

## 2020-04-09 RX ORDER — OMEPRAZOLE 20 MG/1
20 CAPSULE, DELAYED RELEASE ORAL 2 TIMES DAILY
Qty: 180 CAPSULE | Refills: 1 | OUTPATIENT
Start: 2020-04-09

## 2020-04-09 NOTE — TELEPHONE ENCOUNTER
Patient stated she tried the Pepcid and her symptoms got worse so she switched back to Omeprazole. She only stopped taking the Omeprazole because of Diarrhea but that was not what was causing it.

## 2020-04-30 RX ORDER — HYDROCODONE BITARTRATE AND ACETAMINOPHEN 5; 325 MG/1; MG/1
1 TABLET ORAL EVERY 6 HOURS PRN
Qty: 60 TABLET | Refills: 0 | Status: SHIPPED | OUTPATIENT
Start: 2020-04-30 | End: 2020-06-07 | Stop reason: SDUPTHER

## 2020-05-15 ENCOUNTER — APPOINTMENT (OUTPATIENT)
Dept: CT IMAGING | Age: 85
DRG: 392 | End: 2020-05-15
Payer: MEDICARE

## 2020-05-15 ENCOUNTER — APPOINTMENT (OUTPATIENT)
Dept: GENERAL RADIOLOGY | Age: 85
DRG: 392 | End: 2020-05-15
Payer: MEDICARE

## 2020-05-15 ENCOUNTER — HOSPITAL ENCOUNTER (INPATIENT)
Age: 85
LOS: 3 days | Discharge: HOME | DRG: 392 | End: 2020-05-19
Attending: EMERGENCY MEDICINE | Admitting: INTERNAL MEDICINE
Payer: MEDICARE

## 2020-05-15 PROBLEM — R07.9 CHEST PAIN: Status: ACTIVE | Noted: 2020-05-15

## 2020-05-15 LAB
ALBUMIN SERPL-MCNC: 4.1 G/DL (ref 3.5–5.1)
ALP BLD-CCNC: 115 U/L (ref 38–126)
ALT SERPL-CCNC: 45 U/L (ref 11–66)
ANION GAP SERPL CALCULATED.3IONS-SCNC: 14 MEQ/L (ref 8–16)
AST SERPL-CCNC: 47 U/L (ref 5–40)
BASOPHILS # BLD: 0.3 %
BASOPHILS ABSOLUTE: 0 THOU/MM3 (ref 0–0.1)
BILIRUB SERPL-MCNC: 0.3 MG/DL (ref 0.3–1.2)
BILIRUBIN DIRECT: < 0.2 MG/DL (ref 0–0.3)
BUN BLDV-MCNC: 35 MG/DL (ref 7–22)
CALCIUM SERPL-MCNC: 10 MG/DL (ref 8.5–10.5)
CHLORIDE BLD-SCNC: 97 MEQ/L (ref 98–111)
CO2: 20 MEQ/L (ref 23–33)
CREAT SERPL-MCNC: 1.6 MG/DL (ref 0.4–1.2)
D-DIMER QUANTITATIVE: 1143 NG/ML FEU (ref 0–500)
EKG ATRIAL RATE: 107 BPM
EKG P AXIS: 64 DEGREES
EKG P-R INTERVAL: 148 MS
EKG Q-T INTERVAL: 326 MS
EKG QRS DURATION: 76 MS
EKG QTC CALCULATION (BAZETT): 435 MS
EKG R AXIS: 42 DEGREES
EKG T AXIS: 76 DEGREES
EKG VENTRICULAR RATE: 107 BPM
EOSINOPHIL # BLD: 1.2 %
EOSINOPHILS ABSOLUTE: 0.1 THOU/MM3 (ref 0–0.4)
ERYTHROCYTE [DISTWIDTH] IN BLOOD BY AUTOMATED COUNT: 13.6 % (ref 11.5–14.5)
ERYTHROCYTE [DISTWIDTH] IN BLOOD BY AUTOMATED COUNT: 13.8 % (ref 11.5–14.5)
ERYTHROCYTE [DISTWIDTH] IN BLOOD BY AUTOMATED COUNT: 47.8 FL (ref 35–45)
ERYTHROCYTE [DISTWIDTH] IN BLOOD BY AUTOMATED COUNT: 52.9 FL (ref 35–45)
GFR SERPL CREATININE-BSD FRML MDRD: 31 ML/MIN/1.73M2
GLUCOSE BLD-MCNC: 131 MG/DL (ref 70–108)
HCT VFR BLD CALC: 38.2 % (ref 37–47)
HCT VFR BLD CALC: 41.3 % (ref 37–47)
HEMOGLOBIN: 12.5 GM/DL (ref 12–16)
HEMOGLOBIN: 12.7 GM/DL (ref 12–16)
IMMATURE GRANS (ABS): 0.03 THOU/MM3 (ref 0–0.07)
IMMATURE GRANULOCYTES: 0.3 %
LYMPHOCYTES # BLD: 13.5 %
LYMPHOCYTES ABSOLUTE: 1.3 THOU/MM3 (ref 1–4.8)
MCH RBC QN AUTO: 31.2 PG (ref 26–33)
MCH RBC QN AUTO: 31.4 PG (ref 26–33)
MCHC RBC AUTO-ENTMCNC: 30.8 GM/DL (ref 32.2–35.5)
MCHC RBC AUTO-ENTMCNC: 32.7 GM/DL (ref 32.2–35.5)
MCV RBC AUTO: 102.2 FL (ref 81–99)
MCV RBC AUTO: 95.3 FL (ref 81–99)
MONOCYTES # BLD: 15.3 %
MONOCYTES ABSOLUTE: 1.4 THOU/MM3 (ref 0.4–1.3)
NUCLEATED RED BLOOD CELLS: 0 /100 WBC
OSMOLALITY CALCULATION: 272.4 MOSMOL/KG (ref 275–300)
PLATELET # BLD: 233 THOU/MM3 (ref 130–400)
PLATELET # BLD: 237 THOU/MM3 (ref 130–400)
PMV BLD AUTO: 9.3 FL (ref 9.4–12.4)
PMV BLD AUTO: 9.4 FL (ref 9.4–12.4)
POTASSIUM SERPL-SCNC: 3.2 MEQ/L (ref 3.5–5.2)
PRO-BNP: 467.2 PG/ML (ref 0–1800)
RBC # BLD: 4.01 MILL/MM3 (ref 4.2–5.4)
RBC # BLD: 4.04 MILL/MM3 (ref 4.2–5.4)
REASON FOR REJECTION: NORMAL
REJECTED TEST: NORMAL
SARS-COV-2, NAAT: NOT DETECTED
SEG NEUTROPHILS: 69.4 %
SEGMENTED NEUTROPHILS ABSOLUTE COUNT: 6.5 THOU/MM3 (ref 1.8–7.7)
SODIUM BLD-SCNC: 131 MEQ/L (ref 135–145)
TOTAL PROTEIN: 7.7 G/DL (ref 6.1–8)
TROPONIN T: < 0.01 NG/ML
TROPONIN T: < 0.01 NG/ML
WBC # BLD: 8.3 THOU/MM3 (ref 4.8–10.8)
WBC # BLD: 9.4 THOU/MM3 (ref 4.8–10.8)

## 2020-05-15 PROCEDURE — 6370000000 HC RX 637 (ALT 250 FOR IP): Performed by: EMERGENCY MEDICINE

## 2020-05-15 PROCEDURE — 96366 THER/PROPH/DIAG IV INF ADDON: CPT

## 2020-05-15 PROCEDURE — 2580000003 HC RX 258: Performed by: INTERNAL MEDICINE

## 2020-05-15 PROCEDURE — 93010 ELECTROCARDIOGRAM REPORT: CPT | Performed by: INTERNAL MEDICINE

## 2020-05-15 PROCEDURE — 85027 COMPLETE CBC AUTOMATED: CPT

## 2020-05-15 PROCEDURE — 6360000002 HC RX W HCPCS: Performed by: EMERGENCY MEDICINE

## 2020-05-15 PROCEDURE — 6360000002 HC RX W HCPCS: Performed by: INTERNAL MEDICINE

## 2020-05-15 PROCEDURE — G0378 HOSPITAL OBSERVATION PER HR: HCPCS

## 2020-05-15 PROCEDURE — 93005 ELECTROCARDIOGRAM TRACING: CPT | Performed by: INTERNAL MEDICINE

## 2020-05-15 PROCEDURE — 6360000004 HC RX CONTRAST MEDICATION: Performed by: EMERGENCY MEDICINE

## 2020-05-15 PROCEDURE — 96374 THER/PROPH/DIAG INJ IV PUSH: CPT

## 2020-05-15 PROCEDURE — 6370000000 HC RX 637 (ALT 250 FOR IP): Performed by: INTERNAL MEDICINE

## 2020-05-15 PROCEDURE — 71046 X-RAY EXAM CHEST 2 VIEWS: CPT

## 2020-05-15 PROCEDURE — 36415 COLL VENOUS BLD VENIPUNCTURE: CPT

## 2020-05-15 PROCEDURE — U0002 COVID-19 LAB TEST NON-CDC: HCPCS

## 2020-05-15 PROCEDURE — 84484 ASSAY OF TROPONIN QUANT: CPT

## 2020-05-15 PROCEDURE — 71275 CT ANGIOGRAPHY CHEST: CPT

## 2020-05-15 PROCEDURE — 85379 FIBRIN DEGRADATION QUANT: CPT

## 2020-05-15 PROCEDURE — 93005 ELECTROCARDIOGRAM TRACING: CPT | Performed by: FAMILY MEDICINE

## 2020-05-15 PROCEDURE — 96375 TX/PRO/DX INJ NEW DRUG ADDON: CPT

## 2020-05-15 PROCEDURE — 80053 COMPREHEN METABOLIC PANEL: CPT

## 2020-05-15 PROCEDURE — 82248 BILIRUBIN DIRECT: CPT

## 2020-05-15 PROCEDURE — 99285 EMERGENCY DEPT VISIT HI MDM: CPT

## 2020-05-15 PROCEDURE — 96368 THER/DIAG CONCURRENT INF: CPT

## 2020-05-15 PROCEDURE — 2500000003 HC RX 250 WO HCPCS: Performed by: INTERNAL MEDICINE

## 2020-05-15 PROCEDURE — U0003 INFECTIOUS AGENT DETECTION BY NUCLEIC ACID (DNA OR RNA); SEVERE ACUTE RESPIRATORY SYNDROME CORONAVIRUS 2 (SARS-COV-2) (CORONAVIRUS DISEASE [COVID-19]), AMPLIFIED PROBE TECHNIQUE, MAKING USE OF HIGH THROUGHPUT TECHNOLOGIES AS DESCRIBED BY CMS-2020-01-R: HCPCS

## 2020-05-15 PROCEDURE — 96365 THER/PROPH/DIAG IV INF INIT: CPT

## 2020-05-15 PROCEDURE — 85025 COMPLETE CBC W/AUTO DIFF WBC: CPT

## 2020-05-15 PROCEDURE — 83880 ASSAY OF NATRIURETIC PEPTIDE: CPT

## 2020-05-15 RX ORDER — HEPARIN SODIUM 10000 [USP'U]/100ML
12 INJECTION, SOLUTION INTRAVENOUS CONTINUOUS
Status: DISCONTINUED | OUTPATIENT
Start: 2020-05-15 | End: 2020-05-15

## 2020-05-15 RX ORDER — HEPARIN SODIUM 10000 [USP'U]/100ML
18 INJECTION, SOLUTION INTRAVENOUS CONTINUOUS
Status: DISCONTINUED | OUTPATIENT
Start: 2020-05-15 | End: 2020-05-15

## 2020-05-15 RX ORDER — HYDROCODONE BITARTRATE AND ACETAMINOPHEN 5; 325 MG/1; MG/1
1 TABLET ORAL EVERY 6 HOURS PRN
Status: DISCONTINUED | OUTPATIENT
Start: 2020-05-15 | End: 2020-05-19 | Stop reason: HOSPADM

## 2020-05-15 RX ORDER — LIDOCAINE 4 G/G
1 PATCH TOPICAL DAILY
Status: DISCONTINUED | OUTPATIENT
Start: 2020-05-16 | End: 2020-05-19 | Stop reason: HOSPADM

## 2020-05-15 RX ORDER — POTASSIUM CHLORIDE 20 MEQ/1
20 TABLET, EXTENDED RELEASE ORAL DAILY
Status: DISCONTINUED | OUTPATIENT
Start: 2020-05-16 | End: 2020-05-19 | Stop reason: HOSPADM

## 2020-05-15 RX ORDER — NITROGLYCERIN 20 MG/100ML
5 INJECTION INTRAVENOUS CONTINUOUS
Status: DISCONTINUED | OUTPATIENT
Start: 2020-05-15 | End: 2020-05-16

## 2020-05-15 RX ORDER — ACETAMINOPHEN 325 MG/1
650 TABLET ORAL EVERY 4 HOURS PRN
Status: DISCONTINUED | OUTPATIENT
Start: 2020-05-15 | End: 2020-05-19 | Stop reason: HOSPADM

## 2020-05-15 RX ORDER — HEPARIN SODIUM 1000 [USP'U]/ML
4000 INJECTION, SOLUTION INTRAVENOUS; SUBCUTANEOUS PRN
Status: DISCONTINUED | OUTPATIENT
Start: 2020-05-15 | End: 2020-05-18 | Stop reason: ALTCHOICE

## 2020-05-15 RX ORDER — NORTRIPTYLINE HYDROCHLORIDE 25 MG/1
50 CAPSULE ORAL NIGHTLY
Status: DISCONTINUED | OUTPATIENT
Start: 2020-05-15 | End: 2020-05-19 | Stop reason: HOSPADM

## 2020-05-15 RX ORDER — FAMOTIDINE 20 MG/1
20 TABLET, FILM COATED ORAL DAILY
Status: DISCONTINUED | OUTPATIENT
Start: 2020-05-16 | End: 2020-05-16

## 2020-05-15 RX ORDER — ATORVASTATIN CALCIUM 20 MG/1
20 TABLET, FILM COATED ORAL NIGHTLY
Status: DISCONTINUED | OUTPATIENT
Start: 2020-05-15 | End: 2020-05-19 | Stop reason: HOSPADM

## 2020-05-15 RX ORDER — SODIUM CHLORIDE 9 MG/ML
INJECTION, SOLUTION INTRAVENOUS CONTINUOUS
Status: DISCONTINUED | OUTPATIENT
Start: 2020-05-15 | End: 2020-05-17

## 2020-05-15 RX ORDER — ASPIRIN 81 MG/1
324 TABLET, CHEWABLE ORAL ONCE
Status: COMPLETED | OUTPATIENT
Start: 2020-05-15 | End: 2020-05-15

## 2020-05-15 RX ORDER — MORPHINE SULFATE 4 MG/ML
4 INJECTION, SOLUTION INTRAMUSCULAR; INTRAVENOUS ONCE
Status: COMPLETED | OUTPATIENT
Start: 2020-05-15 | End: 2020-05-15

## 2020-05-15 RX ORDER — HEPARIN SODIUM 1000 [USP'U]/ML
2000 INJECTION, SOLUTION INTRAVENOUS; SUBCUTANEOUS PRN
Status: DISCONTINUED | OUTPATIENT
Start: 2020-05-15 | End: 2020-05-18 | Stop reason: ALTCHOICE

## 2020-05-15 RX ORDER — HEPARIN SODIUM 1000 [USP'U]/ML
4000 INJECTION, SOLUTION INTRAVENOUS; SUBCUTANEOUS ONCE
Status: COMPLETED | OUTPATIENT
Start: 2020-05-15 | End: 2020-05-15

## 2020-05-15 RX ORDER — ASPIRIN 81 MG/1
81 TABLET, CHEWABLE ORAL DAILY
Status: DISCONTINUED | OUTPATIENT
Start: 2020-05-16 | End: 2020-05-19 | Stop reason: HOSPADM

## 2020-05-15 RX ORDER — PANTOPRAZOLE SODIUM 40 MG/1
40 TABLET, DELAYED RELEASE ORAL
Status: DISCONTINUED | OUTPATIENT
Start: 2020-05-15 | End: 2020-05-16

## 2020-05-15 RX ORDER — POTASSIUM CHLORIDE 20 MEQ/1
20 TABLET, EXTENDED RELEASE ORAL ONCE
Status: COMPLETED | OUTPATIENT
Start: 2020-05-15 | End: 2020-05-15

## 2020-05-15 RX ORDER — NITROGLYCERIN 0.4 MG/1
0.4 TABLET SUBLINGUAL EVERY 5 MIN PRN
Status: DISCONTINUED | OUTPATIENT
Start: 2020-05-15 | End: 2020-05-19 | Stop reason: HOSPADM

## 2020-05-15 RX ADMIN — SODIUM CHLORIDE: 9 INJECTION, SOLUTION INTRAVENOUS at 22:00

## 2020-05-15 RX ADMIN — PANTOPRAZOLE SODIUM 40 MG: 40 TABLET, DELAYED RELEASE ORAL at 23:20

## 2020-05-15 RX ADMIN — NITROGLYCERIN 5 MCG/MIN: 20 INJECTION INTRAVENOUS at 21:52

## 2020-05-15 RX ADMIN — HEPARIN SODIUM 12 UNITS/KG/HR: 10000 INJECTION, SOLUTION INTRAVENOUS at 22:33

## 2020-05-15 RX ADMIN — ASPIRIN 81 MG 324 MG: 81 TABLET ORAL at 18:18

## 2020-05-15 RX ADMIN — ATORVASTATIN CALCIUM 20 MG: 20 TABLET, FILM COATED ORAL at 22:30

## 2020-05-15 RX ADMIN — NORTRIPTYLINE HYDROCHLORIDE 50 MG: 25 CAPSULE ORAL at 22:30

## 2020-05-15 RX ADMIN — MORPHINE SULFATE 4 MG: 4 INJECTION, SOLUTION INTRAMUSCULAR; INTRAVENOUS at 19:23

## 2020-05-15 RX ADMIN — POTASSIUM CHLORIDE 20 MEQ: 1500 TABLET, EXTENDED RELEASE ORAL at 22:30

## 2020-05-15 RX ADMIN — IOPAMIDOL 80 ML: 755 INJECTION, SOLUTION INTRAVENOUS at 21:19

## 2020-05-15 RX ADMIN — HEPARIN SODIUM 4000 UNITS: 1000 INJECTION, SOLUTION INTRAVENOUS; SUBCUTANEOUS at 22:33

## 2020-05-15 RX ADMIN — METOPROLOL TARTRATE 12.5 MG: 25 TABLET, FILM COATED ORAL at 23:18

## 2020-05-15 ASSESSMENT — ENCOUNTER SYMPTOMS
COUGH: 0
CONSTIPATION: 0
SHORTNESS OF BREATH: 0
SINUS PAIN: 0
CHEST TIGHTNESS: 1
BLOOD IN STOOL: 0
NAUSEA: 0
DIARRHEA: 0
EYE PAIN: 0
SINUS PRESSURE: 0
BACK PAIN: 0
ABDOMINAL PAIN: 0
RECTAL PAIN: 0

## 2020-05-15 ASSESSMENT — PAIN DESCRIPTION - FREQUENCY
FREQUENCY: CONTINUOUS

## 2020-05-15 ASSESSMENT — PAIN DESCRIPTION - ORIENTATION
ORIENTATION: MID
ORIENTATION: LOWER
ORIENTATION: MID

## 2020-05-15 ASSESSMENT — PAIN DESCRIPTION - PAIN TYPE
TYPE: ACUTE PAIN

## 2020-05-15 ASSESSMENT — PAIN DESCRIPTION - LOCATION
LOCATION: CHEST

## 2020-05-15 ASSESSMENT — PAIN DESCRIPTION - PROGRESSION
CLINICAL_PROGRESSION: GRADUALLY IMPROVING
CLINICAL_PROGRESSION: NOT CHANGED

## 2020-05-15 ASSESSMENT — PAIN DESCRIPTION - DESCRIPTORS
DESCRIPTORS: PRESSURE
DESCRIPTORS: DULL
DESCRIPTORS: PRESSURE
DESCRIPTORS: ACHING
DESCRIPTORS: ACHING

## 2020-05-15 ASSESSMENT — PAIN - FUNCTIONAL ASSESSMENT
PAIN_FUNCTIONAL_ASSESSMENT: ACTIVITIES ARE NOT PREVENTED
PAIN_FUNCTIONAL_ASSESSMENT: ACTIVITIES ARE NOT PREVENTED

## 2020-05-15 ASSESSMENT — PAIN SCALES - GENERAL
PAINLEVEL_OUTOF10: 7
PAINLEVEL_OUTOF10: 5
PAINLEVEL_OUTOF10: 5
PAINLEVEL_OUTOF10: 3

## 2020-05-15 ASSESSMENT — PAIN DESCRIPTION - ONSET
ONSET: ON-GOING
ONSET: ON-GOING

## 2020-05-15 NOTE — ED PROVIDER NOTES
Take 1 tablet by mouth daily, Disp: 90 tablet, Rfl: 1    potassium chloride (KLOR-CON M) 20 MEQ extended release tablet, Take 1 tablet by mouth daily, Disp: 30 tablet, Rfl: 5    famotidine (PEPCID) 20 MG tablet, Take 1 tablet by mouth 2 times daily, Disp: 60 tablet, Rfl: 3    metoprolol tartrate (LOPRESSOR) 25 MG tablet, Take 0.5 tablets by mouth 2 times daily, Disp: 90 tablet, Rfl: 1    simvastatin (ZOCOR) 40 MG tablet, Take 1 tablet by mouth nightly, Disp: 90 tablet, Rfl: 1    nortriptyline (PAMELOR) 50 MG capsule, Take 1 capsule by mouth nightly, Disp: 90 capsule, Rfl: 1    tiZANidine (ZANAFLEX) 4 MG tablet, TAKE 1 TABLET BY MOUTH EVERY 6 HOURS AS NEEDED FOR MUSCLE SPASM, Disp: 100 tablet, Rfl: 2    aspirin 81 MG tablet, Take 81 mg by mouth daily, Disp: , Rfl:     lidocaine (LIDODERM) 5 %, Place 1 patch onto the skin daily 12 hours on, 12 hours off., Disp: 30 patch, Rfl: 0    acetaminophen (TYLENOL) 325 MG tablet, Take 2 tablets by mouth every 4 hours as needed for Pain Maximum dose of acetaminophen is 4000 mg from all sources in 24 hours. , Disp: , Rfl:       SOCIAL HISTORY     Social History     Social History Narrative    Not on file     Social History     Tobacco Use    Smoking status: Former Smoker     Years: 30.00     Types: Cigarettes     Last attempt to quit: 1989     Years since quittin.9    Smokeless tobacco: Never Used   Substance Use Topics    Alcohol use: No    Drug use: No         ALLERGIES   No Known Allergies      FAMILY HISTORY     Family History   Problem Relation Age of Onset    Tuberculosis Mother     Tuberculosis Father          PREVIOUS RECORDS   Previous records reviewed: Last seen within the healthcare system in the emergency department on March 10, 2020 after a fall with a compression fracture of T12, seen 2 days later at her PCPs office for low back pain.         PHYSICAL EXAM     ED Triage Vitals [05/15/20 1625]   BP Temp Temp Source Pulse Resp SpO2 Height Weight 115/66 98.1 °F (36.7 °C) Oral 109 20 98 % 5' 1\" (1.549 m) 150 lb (68 kg)     Initial vital signs and nursing assessment reviewed and normal. Pulsoximetry is normal per my interpretation. Additional Vital Signs:  Vitals:    05/15/20 2024   BP: (!) 100/58   Pulse: 96   Resp: 17   Temp:    SpO2: 98%       Physical Exam  Vitals signs and nursing note reviewed. Constitutional:       General: She is not in acute distress. Appearance: Normal appearance. She is well-developed. HENT:      Head: Normocephalic and atraumatic. Right Ear: External ear normal.      Left Ear: External ear normal.      Nose: Nose normal.      Mouth/Throat:      Mouth: Mucous membranes are moist.      Pharynx: Posterior oropharyngeal erythema present. No oropharyngeal exudate. Eyes:      Conjunctiva/sclera: Conjunctivae normal.      Pupils: Pupils are equal, round, and reactive to light. Neck:      Musculoskeletal: Neck supple. Vascular: No JVD. Cardiovascular:      Rate and Rhythm: Normal rate and regular rhythm. Heart sounds: Normal heart sounds. No murmur. No friction rub. No gallop. Pulmonary:      Effort: Pulmonary effort is normal.      Breath sounds: Normal breath sounds. No stridor. No wheezing or rales. Skin:     General: Skin is warm and dry. Neurological:      Mental Status: She is alert and oriented to person, place, and time. Psychiatric:         Behavior: Behavior normal.             MEDICAL DECISION MAKING   Initial Assessment: Undifferentiated chest pain, possible URI, given the current pandemic there is a small possibility of this being COVID19 as well, will test.  Plan: I V line, labs, EKG, chest x-ray, pulse oximetry activity, observation.         ED RESULTS   Laboratory results:  Labs Reviewed   BASIC METABOLIC PANEL - Abnormal; Notable for the following components:       Result Value    Sodium 131 (*)     Potassium 3.2 (*)     Chloride 97 (*)     CO2 20 (*)     Glucose 131 (*)     BUN 35 spontaneously. HEART score 6. Will admit. [DT]   1859 Patient patient's PCP for admission, physician on call today is Dr. Arjun García. Will await callback. [DT]   1930 No reply yet to previous page. Paged Dr. Arjun García, on call physician form Dr. Jm Edmondson' practice for admission. Will await call back. [DT]   46 Spoke with Dr. Arjun García, who recommends this patient to be admitted by internal medicine specialties. Will page them. [DT]   7774 Paged IM specialties group for admission. [DT]      ED Course User Index  [DT] Cindy Hager MD         MEDICATION CHANGES     New Prescriptions    No medications on file         FINAL DISPOSITION     Final diagnoses:   Hypokalemia   Chest pain, unspecified type     Condition: condition: fair  Dispo: Admit to med/surg floor      This transcription was electronically signed. It was dictated by use of voice recognition software and electronically transcribed. The transcription may contain errors not detected in proofreading.        Cindy Hager MD  05/15/20 2037

## 2020-05-15 NOTE — ED NOTES
ED nurse-to-nurse bedside report    Chief Complaint   Patient presents with    Chest Pain     x4 days    Fatigue      LOC: alert and orientated to name, place, date  Vital signs   Vitals:    05/15/20 1625 05/15/20 1711 05/15/20 1817   BP: 115/66 98/67 107/66   Pulse: 109 97 98   Resp: 20 20 20   Temp: 98.1 °F (36.7 °C)     TempSrc: Oral     SpO2: 98% 97% 98%   Weight: 150 lb (68 kg)     Height: 5' 1\" (1.549 m)        Pain:  7/10  Pain Interventions:  mg oral   Pain Goal: 2/10  Oxygen: No    Current needs required- Document pulse ox with ambulation    Telemetry: Yes  LDAs:   Peripheral IV 05/15/20 Right Antecubital (Active)   Site Assessment Clean;Dry; Intact 5/15/2020  7:10 PM   Line Status Blood return noted;Brisk blood return;Flushed;Capped;Normal saline locked 5/15/2020  7:10 PM   Dressing Status Clean;Dry; Intact 5/15/2020  7:10 PM   Dressing Intervention New 5/15/2020  7:10 PM     Continuous Infusions:   Mobility: Independent with standby/steadying assist   Chowdhury Fall Risk Score:    Fall Risk 3/12/2020 2/26/2019 9/26/2018 4/4/2018 11/7/2017 6/21/2017 3/15/2017   2 or more falls in past year? no no no no no no no   Fall with injury in past year? no no no no no no no     Fall Interventions: N/A  Report given to: ERICK Billingsley RN  05/15/20 1913

## 2020-05-16 LAB
ALBUMIN SERPL-MCNC: 3.1 G/DL (ref 3.5–5.1)
ALP BLD-CCNC: 102 U/L (ref 38–126)
ALT SERPL-CCNC: 35 U/L (ref 11–66)
ANION GAP SERPL CALCULATED.3IONS-SCNC: 13 MEQ/L (ref 8–16)
AST SERPL-CCNC: 34 U/L (ref 5–40)
BASOPHILS # BLD: 0.3 %
BASOPHILS ABSOLUTE: 0 THOU/MM3 (ref 0–0.1)
BILIRUB SERPL-MCNC: 0.4 MG/DL (ref 0.3–1.2)
BUN BLDV-MCNC: 29 MG/DL (ref 7–22)
CALCIUM SERPL-MCNC: 9.4 MG/DL (ref 8.5–10.5)
CHLORIDE BLD-SCNC: 101 MEQ/L (ref 98–111)
CHOLESTEROL, TOTAL: 124 MG/DL (ref 100–199)
CO2: 18 MEQ/L (ref 23–33)
CREAT SERPL-MCNC: 1.3 MG/DL (ref 0.4–1.2)
EKG ATRIAL RATE: 133 BPM
EKG ATRIAL RATE: 90 BPM
EKG P AXIS: 54 DEGREES
EKG P-R INTERVAL: 158 MS
EKG Q-T INTERVAL: 332 MS
EKG Q-T INTERVAL: 378 MS
EKG QRS DURATION: 74 MS
EKG QRS DURATION: 74 MS
EKG QTC CALCULATION (BAZETT): 462 MS
EKG QTC CALCULATION (BAZETT): 494 MS
EKG R AXIS: 23 DEGREES
EKG R AXIS: 32 DEGREES
EKG T AXIS: 65 DEGREES
EKG T AXIS: 70 DEGREES
EKG VENTRICULAR RATE: 133 BPM
EKG VENTRICULAR RATE: 90 BPM
EOSINOPHIL # BLD: 2.9 %
EOSINOPHILS ABSOLUTE: 0.2 THOU/MM3 (ref 0–0.4)
ERYTHROCYTE [DISTWIDTH] IN BLOOD BY AUTOMATED COUNT: 13.7 % (ref 11.5–14.5)
ERYTHROCYTE [DISTWIDTH] IN BLOOD BY AUTOMATED COUNT: 48.1 FL (ref 35–45)
GFR SERPL CREATININE-BSD FRML MDRD: 39 ML/MIN/1.73M2
GLUCOSE BLD-MCNC: 118 MG/DL (ref 70–108)
HCT VFR BLD CALC: 35.8 % (ref 37–47)
HDLC SERPL-MCNC: 63 MG/DL
HEMOGLOBIN: 11.6 GM/DL (ref 12–16)
IMMATURE GRANS (ABS): 0.03 THOU/MM3 (ref 0–0.07)
IMMATURE GRANULOCYTES: 0.4 %
LDL CHOLESTEROL CALCULATED: 44 MG/DL
LV EF: 53 %
LVEF MODALITY: NORMAL
LYMPHOCYTES # BLD: 14.5 %
LYMPHOCYTES ABSOLUTE: 1 THOU/MM3 (ref 1–4.8)
MAGNESIUM: 1.9 MG/DL (ref 1.6–2.4)
MCH RBC QN AUTO: 30.9 PG (ref 26–33)
MCHC RBC AUTO-ENTMCNC: 32.4 GM/DL (ref 32.2–35.5)
MCV RBC AUTO: 95.2 FL (ref 81–99)
MONOCYTES # BLD: 13.1 %
MONOCYTES ABSOLUTE: 0.9 THOU/MM3 (ref 0.4–1.3)
NUCLEATED RED BLOOD CELLS: 0 /100 WBC
PLATELET # BLD: 230 THOU/MM3 (ref 130–400)
PMV BLD AUTO: 9.4 FL (ref 9.4–12.4)
POTASSIUM SERPL-SCNC: 3.5 MEQ/L (ref 3.5–5.2)
RBC # BLD: 3.76 MILL/MM3 (ref 4.2–5.4)
REASON FOR REJECTION: NORMAL
REJECTED TEST: NORMAL
SEDIMENTATION RATE, ERYTHROCYTE: 91 MM/HR (ref 0–20)
SEG NEUTROPHILS: 68.8 %
SEGMENTED NEUTROPHILS ABSOLUTE COUNT: 4.7 THOU/MM3 (ref 1.8–7.7)
SODIUM BLD-SCNC: 132 MEQ/L (ref 135–145)
TOTAL PROTEIN: 6.6 G/DL (ref 6.1–8)
TRIGL SERPL-MCNC: 83 MG/DL (ref 0–199)
TROPONIN T: < 0.01 NG/ML
TROPONIN T: < 0.01 NG/ML
TSH SERPL DL<=0.05 MIU/L-ACNC: 1.43 UIU/ML (ref 0.4–4.2)
WBC # BLD: 6.9 THOU/MM3 (ref 4.8–10.8)

## 2020-05-16 PROCEDURE — 6370000000 HC RX 637 (ALT 250 FOR IP): Performed by: INTERNAL MEDICINE

## 2020-05-16 PROCEDURE — 96366 THER/PROPH/DIAG IV INF ADDON: CPT

## 2020-05-16 PROCEDURE — 93005 ELECTROCARDIOGRAM TRACING: CPT | Performed by: INTERNAL MEDICINE

## 2020-05-16 PROCEDURE — 2580000003 HC RX 258: Performed by: INTERNAL MEDICINE

## 2020-05-16 PROCEDURE — 93306 TTE W/DOPPLER COMPLETE: CPT

## 2020-05-16 PROCEDURE — 85025 COMPLETE CBC W/AUTO DIFF WBC: CPT

## 2020-05-16 PROCEDURE — 84443 ASSAY THYROID STIM HORMONE: CPT

## 2020-05-16 PROCEDURE — 93010 ELECTROCARDIOGRAM REPORT: CPT | Performed by: INTERNAL MEDICINE

## 2020-05-16 PROCEDURE — 1200000003 HC TELEMETRY R&B

## 2020-05-16 PROCEDURE — 2500000003 HC RX 250 WO HCPCS: Performed by: INTERNAL MEDICINE

## 2020-05-16 PROCEDURE — 80061 LIPID PANEL: CPT

## 2020-05-16 PROCEDURE — 36415 COLL VENOUS BLD VENIPUNCTURE: CPT

## 2020-05-16 PROCEDURE — 83735 ASSAY OF MAGNESIUM: CPT

## 2020-05-16 PROCEDURE — 80053 COMPREHEN METABOLIC PANEL: CPT

## 2020-05-16 PROCEDURE — 84484 ASSAY OF TROPONIN QUANT: CPT

## 2020-05-16 PROCEDURE — 85651 RBC SED RATE NONAUTOMATED: CPT

## 2020-05-16 RX ORDER — PANTOPRAZOLE SODIUM 40 MG/1
40 TABLET, DELAYED RELEASE ORAL
Status: DISCONTINUED | OUTPATIENT
Start: 2020-05-16 | End: 2020-05-19 | Stop reason: HOSPADM

## 2020-05-16 RX ORDER — DILTIAZEM HYDROCHLORIDE 120 MG/1
120 CAPSULE, COATED, EXTENDED RELEASE ORAL DAILY
Status: DISCONTINUED | OUTPATIENT
Start: 2020-05-16 | End: 2020-05-19 | Stop reason: HOSPADM

## 2020-05-16 RX ORDER — PANTOPRAZOLE SODIUM 40 MG/1
40 TABLET, DELAYED RELEASE ORAL
Status: DISCONTINUED | OUTPATIENT
Start: 2020-05-16 | End: 2020-05-16

## 2020-05-16 RX ADMIN — ASPIRIN 81 MG 81 MG: 81 TABLET ORAL at 12:32

## 2020-05-16 RX ADMIN — NITROGLYCERIN 5 MCG/MIN: 20 INJECTION INTRAVENOUS at 07:00

## 2020-05-16 RX ADMIN — DILTIAZEM HYDROCHLORIDE 120 MG: 120 CAPSULE, EXTENDED RELEASE ORAL at 15:10

## 2020-05-16 RX ADMIN — METOPROLOL TARTRATE 12.5 MG: 25 TABLET, FILM COATED ORAL at 20:23

## 2020-05-16 RX ADMIN — PANTOPRAZOLE SODIUM 40 MG: 40 TABLET, DELAYED RELEASE ORAL at 15:11

## 2020-05-16 RX ADMIN — POTASSIUM CHLORIDE 20 MEQ: 1500 TABLET, EXTENDED RELEASE ORAL at 12:32

## 2020-05-16 RX ADMIN — ATORVASTATIN CALCIUM 20 MG: 20 TABLET, FILM COATED ORAL at 20:23

## 2020-05-16 RX ADMIN — DILTIAZEM HYDROCHLORIDE 5 MG/HR: 5 INJECTION INTRAVENOUS at 08:01

## 2020-05-16 RX ADMIN — METOPROLOL TARTRATE 12.5 MG: 25 TABLET, FILM COATED ORAL at 07:26

## 2020-05-16 RX ADMIN — NORTRIPTYLINE HYDROCHLORIDE 50 MG: 25 CAPSULE ORAL at 20:23

## 2020-05-16 RX ADMIN — SODIUM CHLORIDE: 9 INJECTION, SOLUTION INTRAVENOUS at 17:22

## 2020-05-16 RX ADMIN — PANTOPRAZOLE SODIUM 40 MG: 40 TABLET, DELAYED RELEASE ORAL at 06:01

## 2020-05-16 ASSESSMENT — PAIN DESCRIPTION - PAIN TYPE
TYPE: ACUTE PAIN

## 2020-05-16 ASSESSMENT — PAIN DESCRIPTION - ORIENTATION
ORIENTATION: MID

## 2020-05-16 ASSESSMENT — PAIN DESCRIPTION - ONSET
ONSET: ON-GOING
ONSET: GRADUAL

## 2020-05-16 ASSESSMENT — PAIN DESCRIPTION - LOCATION
LOCATION: ABDOMEN
LOCATION: CHEST

## 2020-05-16 ASSESSMENT — PAIN DESCRIPTION - DESCRIPTORS
DESCRIPTORS: ACHING
DESCRIPTORS: ACHING
DESCRIPTORS: ACHING;BURNING
DESCRIPTORS: ACHING

## 2020-05-16 ASSESSMENT — PAIN SCALES - GENERAL
PAINLEVEL_OUTOF10: 3
PAINLEVEL_OUTOF10: 3
PAINLEVEL_OUTOF10: 5
PAINLEVEL_OUTOF10: 5

## 2020-05-16 ASSESSMENT — PAIN DESCRIPTION - PROGRESSION
CLINICAL_PROGRESSION: NOT CHANGED
CLINICAL_PROGRESSION: GRADUALLY IMPROVING
CLINICAL_PROGRESSION: GRADUALLY IMPROVING
CLINICAL_PROGRESSION: NOT CHANGED

## 2020-05-16 ASSESSMENT — PAIN - FUNCTIONAL ASSESSMENT
PAIN_FUNCTIONAL_ASSESSMENT: ACTIVITIES ARE NOT PREVENTED

## 2020-05-16 ASSESSMENT — PAIN DESCRIPTION - FREQUENCY
FREQUENCY: CONTINUOUS
FREQUENCY: CONTINUOUS
FREQUENCY: INTERMITTENT
FREQUENCY: CONTINUOUS

## 2020-05-16 NOTE — PLAN OF CARE
report pain at 3/10 on scale. Pt states oral/iv/im medication helping to achieve pain goal of a 0/10 on scale. Problem: Discharge Planning:  Goal: Discharged to appropriate level of care  Description: Discharged to appropriate level of care  5/16/2020 1448 by Octavio Stewart RN  Outcome: Ongoing  5/16/2020 0315 by Josefa Soares RN  Outcome: Ongoing  Note: Pt plans home at discharge. Care manager and social working helping with discharge needs. Problem: Neurological  Goal: Maximum potential motor/sensory/cognitive function  5/16/2020 1448 by Octavio Stewart RN  Outcome: Ongoing  5/16/2020 0315 by Josefa Soares RN  Outcome: Ongoing  Note: Patient oriented x4. Denies numbness and tingling. Problem: Cardiovascular  Goal: No DVT, peripheral vascular complications  4/35/3345 1448 by Octavio Stewart RN  Outcome: Ongoing  5/16/2020 0315 by Josefa Soares RN  Outcome: Ongoing  Note: Vitals stable. Peripheral vascular within normal limits. Denies calf pain and tenderness. Patient states chest pain 3/10 mid-sternal.      Problem: Respiratory  Goal: No pulmonary complications  7/08/0394 1448 by Octavio Stewart RN  Outcome: Ongoing  5/16/2020 0315 by Josefa Soares RN  Outcome: Ongoing  Note: Lung sounds clear throughout. Oxygen saturation above 90% on room air. Encouraged to cough and deep breathe. Problem: GI  Goal: No bowel complications  2/79/5210 1448 by Octavio Stewart RN  Outcome: Ongoing  5/16/2020 0315 by Josefa Soares RN  Outcome: Ongoing  Note: Bowel sounds active. Abdomen soft and non-tender. Passing gas      Problem:   Goal: Adequate urinary output  5/16/2020 1448 by Octavio Stewart RN  Outcome: Ongoing  5/16/2020 0315 by Josefa Soares RN  Outcome: Ongoing  Note: Adequate urine output. Urine is yellow and clear.       Problem: Nutrition  Goal: Optimal nutrition therapy  5/16/2020 1448 by Octavio Stewart RN  Outcome: Ongoing  5/16/2020 0315 by Josefa Soares RN  Outcome: Ongoing  Note: Patient on cardiac diet. Denies nausea. Problem: Skin Integrity/Risk  Goal: No skin breakdown during hospitalization  5/16/2020 1448 by Georgina Mccarthy RN  Outcome: Ongoing  5/16/2020 0315 by Ronna Turner RN  Outcome: Ongoing  Note: Redness to bilateral ankles. Patient able to turn and reposition. Patient encouraged to turn and reposition every 2 hours to prevent skin breakdown. Problem: Musculor/Skeletal Functional Status  Goal: Highest potential functional level  5/16/2020 1448 by Georgina Mccarthy RN  Outcome: Ongoing  5/16/2020 0315 by Ronna Turner RN  Outcome: Ongoing  Note: Patient has moderate hand  and pedal push and pull bilaterally. Patient up with stand-by assist.      5/16/2020 0315 by Ronna Turner RN  Outcome: Ongoing  Note: Pt report pain at 3/10 on scale. Pt states oral/iv/im medication helping to achieve pain goal of a 0/10 on scale. Care plan reviewed with patient. Patient verbalizes understanding of the plan of care and contributes to goal setting.

## 2020-05-16 NOTE — PROGRESS NOTES
Pt admitted to  8B23. Complaints:midsternal chest pain 5/10     INT to right AC. IV site free of s/s of infection or infiltration. Vital signs obtained. Assessment and data collection initiated. Two nurse skin assessment performed by Casie Schumacher and Canon beny SUAREZ. Oriented to room. Explained patients right to have family, representative or physician notified of their admission. Patient has declined for physician to be notified. Patient has declined for family/representative to be notified. The patient is interested in [facility]. [facility] meds to beds program?: no  Policies and procedures for  explained. All questions answered with no further questions at this time. Fall prevention and safety brochure discussed with patient. Bed alarm on. Call light in reach.

## 2020-05-16 NOTE — H&P
[address] [place] [address]                              HISTORY AND PHYSICAL    PATIENT NAME: Sydnee Perez                 :        1935  MED REC NO:   [id number]                           ROOM:       0023  ACCOUNT NO:   [de-identified]                           ADMIT DATE: 05/15/2020  PROVIDER:     Sherald Curling, M.D.    CHIEF COMPLAINT:  Chest pain. HISTORY OF PRESENT ILLNESS:  50-year-old woman with past medical history  of atrial fibrillation, but not on any anticoagulation at this time. Denies having any coronary artery disease, but has seen Dr. Francisco Ching,  Cardiology, as outpatient. Has been having ongoing, constant epigastric  sharp, stabbing pain for the last four days. Prior to that, she would  have pain intermittently. There were no aggravating or relieving  factors. No associated shortness of breath, diaphoresis, nausea,  vomiting. The patient was given aspirin, nitroglycerin, without much  relief of her chest pain. She was even started on heparin and  nitroglycerin drip, but still the pain did not resolve. Her pain has  been constantly around 6 or 7. This morning, the patient did have an  increase in her heart rate in the 130s to 140s. EKG showed rate of 133. P waves are seen and ST changes are noted in the anterolateral leads  with depression. She also has a small component of reproducible pain. The patient also had a CTA of the chest, down in the ER, which did not  reveal any pulmonary embolism, but a small hiatal hernia was noted. No  thoracic aneurysm or dissection was noted per CTA. Some cirrhosis is  also suggested, and per records, the patient does have history of liver  disease, but the patient could not confirm that either. She denies any  fever. No chills. No cough. No weight loss. No unusual bleeding. No  blood in the stools.     PAST MEDICAL HISTORY:  Significant for history of atrial fibrillation,  but not on any anticoagulation; coronary artery disease also mentioned  in her previous medical history; COPD; hypertension; hyperlipidemia;  there is mention about heart cath in 2002 showing 40% to 50% issue of  her LAD; acid reflux; chronic back pain; liver disease; hepatitis at age  of 22; osteoarthritis mentioned; and pericarditis also mentioned. PAST SURGICAL HISTORY:  Includes spine surgery, EGD, rotator cuff  repair, repair of durotomy after her lumbar laminectomy surgery,  hysterectomy, cataract surgery, colonoscopies, bunionectomy, and  appendectomy. ALLERGIES:  No known drug allergies. HOME MEDICATIONS:  List had Norco, hydrochlorothiazide, Imdur, Klor-Con,  Pepcid, Lopressor, Zocor, Pamelor, Zanaflex, aspirin, and Tylenol. FAMILY HISTORY:  Significant for coronary artery disease. She did  mention about TB in her extended family. SOCIAL HISTORY:  She is . Has two living children. She quit  smoking 40 years back. Used to smoke less than half a pack a day. No  alcohol or illicit drug use. She is now living with her sister. REVIEW OF OTHER SYSTEMS:  No headache. No blurred vision. Positive for  chest pain as described in history of present illness. No abdominal  pain. No nausea or vomiting. No blood in her stools. No weight  changes. PHYSICAL EXAMINATION:  VITAL SIGNS:  Blood pressure is 116/57; pulse on admission was 105, now  pulse is 135; respirations of 16, and temperature 98. 3. HEENT:  Pupils are reactive. Tongue is moist.  Buccal mucosa is moist.  NECK:  Supple. HEART:  S1 and S2 heard. Irregular and tachycardic. LUNGS:  Air exchange is appreciated bilaterally. ABDOMEN:  Soft. Obese. Bowel sounds heard. No guarding. No  tenderness. EXTREMITIES:  Warm. NEUROLOGIC:  She is oriented to person, place, and time. She is able to  move her extremities.     LABORATORY DATA:  On admission, EKG showed sinus rhythm with minimal ST  depression in anterolateral leads. Now, she has EKG with a rate of 133,  that is SVT. WBC was 8.3, hemoglobin of 12.7, hematocrit of 41.3,  platelets of 112. Sodium was 131, improved to 130 with a potassium of  3.2, chloride of 97, CO2 was 20, BUN of 35, and creatinine of 1.6,  calcium was 10. Troponin was 0.010. Cholesterol was 124. HDL was 63,  LDL was 44, triglycerides was 83. Chest x-ray:  No acute process. CT  of the chest:  As dictated above. IMPRESSION:  1. Persistent chest pain. Cardiac enzymes are negative so far. 2.  SVT with previous history of paroxysmal AFib. 3.  Acute kidney injury, clinically improved. 4.  Hypokalemia. 5.  History of hypertension. 6.  History of hyperlipidemia. 7. Intermittent back pain. 8.  History of acid reflux. 9.  History of liver disease. Now, cirrhosis noted on her CAT scan. 10  COPD mentioned per previous records. RECOMMENDATIONS:  1. As the patient's heart rate has now increased, she has been started  on Cardizem per Cardiology. We will back off on her nitroglycerin for  allowing room for Cardizem to be titrated as her blood pressure is in  the low range and her pain has not improved any at all with the  nitroglycerin. 2.  Continue with antiplatelet agents with aspirin. 3.  Heparin being addressed by Cardiology. We will place her on SCDs in  the meantime. 4.  In view of her pericarditis history mentioned in the previous  records, we will check ESR and echocardiogram.  5.  Replace electrolytes. Follow up with her mag. 6.  Continue careful IV hydration. Avoid any nephrotoxins. 7.  We will switch her to Protonix and increase the dose. 8.  Once cardiac workup completed, we will have GI consulted if her pain  still persists. 9.  Continue with statins. 10.  Discussed code status, agrees to full resuscitation.         Jt Traore M.D.    D: 05/16/2020 8:20:41       T: 05/16/2020 9:29:03     CATARINO/ILIANA  Job#: [id number]     Doc#: [id number]    CC:

## 2020-05-16 NOTE — PLAN OF CARE
Problem: Falls - Risk of:  Goal: Will remain free from falls  Description: Will remain free from falls  Outcome: Ongoing  Note: Pt using call light appropriately to call for assistance with ambulation to the bathroom and to chair. Pt is also compliant with use of non-skid slippers. Pt reports understanding of fall prevention when discussed. Problem: Falls - Risk of:  Goal: Absence of physical injury  Description: Absence of physical injury  Outcome: Ongoing  Note: Pt using call light appropriately to call for assistance with ambulation to the bathroom and to chair. Pt is also compliant with use of non-skid slippers. Pt reports understanding of fall prevention when discussed. Problem: Pain:  Goal: Pain level will decrease  Description: Pain level will decrease  Outcome: Ongoing  Note: Pt report pain at 3/10 on scale. Pt states oral/iv/im medication helping to achieve pain goal of a 0/10 on scale. Problem: Pain:  Goal: Control of acute pain  Description: Control of acute pain  Outcome: Ongoing  Note: Pt report pain at 3/10 on scale. Pt states oral/iv/im medication helping to achieve pain goal of a 0/10 on scale. Problem: Pain:  Goal: Control of chronic pain  Description: Control of chronic pain  Outcome: Ongoing  Note: Pt report pain at 3/10 on scale. Pt states oral/iv/im medication helping to achieve pain goal of a 0/10 on scale. Problem: Discharge Planning:  Goal: Discharged to appropriate level of care  Description: Discharged to appropriate level of care  Outcome: Ongoing  Note: Pt plans home at discharge. Care manager and social working helping with discharge needs. Problem: Neurological  Goal: Maximum potential motor/sensory/cognitive function  Outcome: Ongoing  Note: Patient oriented x4. Denies numbness and tingling. Problem: Cardiovascular  Goal: No DVT, peripheral vascular complications  Outcome: Ongoing  Note: Vitals stable. Peripheral vascular within normal limits. Denies calf pain and tenderness. Patient states chest pain 3/10 mid-sternal.      Problem: Respiratory  Goal: No pulmonary complications  Outcome: Ongoing  Note: Lung sounds clear throughout. Oxygen saturation above 90% on room air. Encouraged to cough and deep breathe. Problem: GI  Goal: No bowel complications  Outcome: Ongoing  Note: Bowel sounds active. Abdomen soft and non-tender. Passing gas      Problem:   Goal: Adequate urinary output  Outcome: Ongoing  Note: Adequate urine output. Urine is yellow and clear. Problem: Nutrition  Goal: Optimal nutrition therapy  Outcome: Ongoing  Note: Patient on cardiac diet. Denies nausea. Problem: Skin Integrity/Risk  Goal: No skin breakdown during hospitalization  Outcome: Ongoing  Note: Redness to bilateral ankles. Patient able to turn and reposition. Patient encouraged to turn and reposition every 2 hours to prevent skin breakdown. Problem: Musculor/Skeletal Functional Status  Goal: Highest potential functional level  Outcome: Ongoing  Note: Patient has moderate hand  and pedal push and pull bilaterally. Patient up with stand-by assist.    Care plan reviewed with patient. Patient verbalize understanding of the plan of care and contribute to goal setting.

## 2020-05-17 LAB
ANION GAP SERPL CALCULATED.3IONS-SCNC: 10 MEQ/L (ref 8–16)
BASOPHILS # BLD: 0.3 %
BASOPHILS ABSOLUTE: 0 THOU/MM3 (ref 0–0.1)
BUN BLDV-MCNC: 21 MG/DL (ref 7–22)
CALCIUM SERPL-MCNC: 9 MG/DL (ref 8.5–10.5)
CHLORIDE BLD-SCNC: 105 MEQ/L (ref 98–111)
CO2: 20 MEQ/L (ref 23–33)
CREAT SERPL-MCNC: 0.9 MG/DL (ref 0.4–1.2)
EOSINOPHIL # BLD: 4.3 %
EOSINOPHILS ABSOLUTE: 0.2 THOU/MM3 (ref 0–0.4)
ERYTHROCYTE [DISTWIDTH] IN BLOOD BY AUTOMATED COUNT: 13.6 % (ref 11.5–14.5)
ERYTHROCYTE [DISTWIDTH] IN BLOOD BY AUTOMATED COUNT: 47.8 FL (ref 35–45)
GFR SERPL CREATININE-BSD FRML MDRD: 60 ML/MIN/1.73M2
GLUCOSE BLD-MCNC: 111 MG/DL (ref 70–108)
HCT VFR BLD CALC: 35.2 % (ref 37–47)
HEMOGLOBIN: 11.5 GM/DL (ref 12–16)
IMMATURE GRANS (ABS): 0.02 THOU/MM3 (ref 0–0.07)
IMMATURE GRANULOCYTES: 0.3 %
LYMPHOCYTES # BLD: 13.5 %
LYMPHOCYTES ABSOLUTE: 0.8 THOU/MM3 (ref 1–4.8)
MCH RBC QN AUTO: 31.1 PG (ref 26–33)
MCHC RBC AUTO-ENTMCNC: 32.7 GM/DL (ref 32.2–35.5)
MCV RBC AUTO: 95.1 FL (ref 81–99)
MONOCYTES # BLD: 15.4 %
MONOCYTES ABSOLUTE: 0.9 THOU/MM3 (ref 0.4–1.3)
NUCLEATED RED BLOOD CELLS: 0 /100 WBC
PLATELET # BLD: 226 THOU/MM3 (ref 130–400)
PMV BLD AUTO: 9.1 FL (ref 9.4–12.4)
POTASSIUM SERPL-SCNC: 3.6 MEQ/L (ref 3.5–5.2)
RBC # BLD: 3.7 MILL/MM3 (ref 4.2–5.4)
SEG NEUTROPHILS: 66.2 %
SEGMENTED NEUTROPHILS ABSOLUTE COUNT: 3.8 THOU/MM3 (ref 1.8–7.7)
SODIUM BLD-SCNC: 135 MEQ/L (ref 135–145)
WBC # BLD: 5.8 THOU/MM3 (ref 4.8–10.8)

## 2020-05-17 PROCEDURE — 1200000003 HC TELEMETRY R&B

## 2020-05-17 PROCEDURE — 36415 COLL VENOUS BLD VENIPUNCTURE: CPT

## 2020-05-17 PROCEDURE — 6370000000 HC RX 637 (ALT 250 FOR IP): Performed by: INTERNAL MEDICINE

## 2020-05-17 PROCEDURE — 6360000002 HC RX W HCPCS: Performed by: INTERNAL MEDICINE

## 2020-05-17 PROCEDURE — 80048 BASIC METABOLIC PNL TOTAL CA: CPT

## 2020-05-17 PROCEDURE — 85025 COMPLETE CBC W/AUTO DIFF WBC: CPT

## 2020-05-17 RX ADMIN — PANTOPRAZOLE SODIUM 40 MG: 40 TABLET, DELAYED RELEASE ORAL at 05:46

## 2020-05-17 RX ADMIN — ACETAMINOPHEN 650 MG: 325 TABLET ORAL at 20:38

## 2020-05-17 RX ADMIN — POTASSIUM CHLORIDE 20 MEQ: 1500 TABLET, EXTENDED RELEASE ORAL at 08:50

## 2020-05-17 RX ADMIN — PANTOPRAZOLE SODIUM 40 MG: 40 TABLET, DELAYED RELEASE ORAL at 17:27

## 2020-05-17 RX ADMIN — METOPROLOL TARTRATE 12.5 MG: 25 TABLET, FILM COATED ORAL at 08:50

## 2020-05-17 RX ADMIN — ENOXAPARIN SODIUM 40 MG: 40 INJECTION SUBCUTANEOUS at 10:40

## 2020-05-17 RX ADMIN — NORTRIPTYLINE HYDROCHLORIDE 50 MG: 25 CAPSULE ORAL at 20:37

## 2020-05-17 RX ADMIN — DILTIAZEM HYDROCHLORIDE 120 MG: 120 CAPSULE, EXTENDED RELEASE ORAL at 08:50

## 2020-05-17 RX ADMIN — ATORVASTATIN CALCIUM 20 MG: 20 TABLET, FILM COATED ORAL at 20:37

## 2020-05-17 RX ADMIN — ASPIRIN 81 MG 81 MG: 81 TABLET ORAL at 08:50

## 2020-05-17 RX ADMIN — HYDROCODONE BITARTRATE AND ACETAMINOPHEN 1 TABLET: 5; 325 TABLET ORAL at 08:50

## 2020-05-17 ASSESSMENT — PAIN DESCRIPTION - PROGRESSION
CLINICAL_PROGRESSION: NOT CHANGED
CLINICAL_PROGRESSION: NOT CHANGED

## 2020-05-17 ASSESSMENT — PAIN DESCRIPTION - DESCRIPTORS
DESCRIPTORS: ACHING;DISCOMFORT
DESCRIPTORS: ACHING

## 2020-05-17 ASSESSMENT — PAIN DESCRIPTION - LOCATION: LOCATION: ABDOMEN;BACK

## 2020-05-17 ASSESSMENT — PAIN DESCRIPTION - FREQUENCY
FREQUENCY: CONTINUOUS
FREQUENCY: INTERMITTENT

## 2020-05-17 ASSESSMENT — PAIN SCALES - GENERAL
PAINLEVEL_OUTOF10: 7
PAINLEVEL_OUTOF10: 4
PAINLEVEL_OUTOF10: 7
PAINLEVEL_OUTOF10: 3

## 2020-05-17 ASSESSMENT — PAIN DESCRIPTION - ORIENTATION: ORIENTATION: MID

## 2020-05-17 ASSESSMENT — PAIN DESCRIPTION - ONSET
ONSET: GRADUAL
ONSET: ON-GOING

## 2020-05-17 ASSESSMENT — PAIN DESCRIPTION - PAIN TYPE
TYPE: ACUTE PAIN
TYPE: ACUTE PAIN

## 2020-05-17 ASSESSMENT — PAIN DESCRIPTION - DIRECTION: RADIATING_TOWARDS: BACK

## 2020-05-17 NOTE — PLAN OF CARE
Problem: Falls - Risk of:  Goal: Will remain free from falls  Description: Will remain free from falls  5/16/2020 2222 by Gregory Mendoza RN  Outcome: Ongoing  Note: Falling star placed outside of patient's room. 2/4 bed rails up. Non-skid socks provided. Call light and personal items with in reach. Bed alarm on. Problem: Pain:  Goal: Pain level will decrease  Description: Pain level will decrease  5/16/2020 2222 by Gregory Mendoza RN  Outcome: Ongoing  Note: Patient rates pain on 0-10 pain scale. States pain is a 5 on 0-10 scale. States goal is 5. Repositioned for comfort. Patient satisfied. Will continue to reassess. Problem: Discharge Planning:  Goal: Discharged to appropriate level of care  Description: Discharged to appropriate level of care  5/16/2020 2222 by Gregory Mendoza RN  Outcome: Ongoing  Note: Patient plans to discharge home with family. Problem: Neurological  Goal: Maximum potential motor/sensory/cognitive function  5/16/2020 2222 by Gregory Mendoza RN  Outcome: Ongoing  Note: Patient at baseline cognitive function. Will continue to reassess. Problem: Respiratory  Goal: No pulmonary complications  9/11/7065 2222 by Gregory Mendoza RN  Outcome: Ongoing  Note: Oxygen saturation >90% on room air. Denies SOB. Will continue to reassess. Problem:   Goal: Adequate urinary output  5/16/2020 2222 by Gregory Mendoza RN  Outcome: Ongoing  Note: I/O last 3 completed shifts: In: 9190 [P.O.:1100; I.V.:321]  Out: -   I/O this shift:  In: 1443.8 [P.O.:100; I.V.:1343.8]  Out: 0     Monitoring intake and output. Problem: Nutrition  Goal: Optimal nutrition therapy  5/16/2020 2222 by Gregory Mendoza RN  Outcome: Ongoing  Note: Patient on cardiac diet. Will continue to reassess. Problem: Skin Integrity/Risk  Goal: No skin breakdown during hospitalization  5/16/2020 2222 by Gregory Mendoza RN  Outcome: Ongoing  Note: No new skin breakdown noted at this time.  Will

## 2020-05-18 ENCOUNTER — APPOINTMENT (OUTPATIENT)
Dept: ULTRASOUND IMAGING | Age: 85
DRG: 392 | End: 2020-05-18
Payer: MEDICARE

## 2020-05-18 LAB
HAV IGM SER IA-ACNC: NEGATIVE
HBV SURFACE AB TITR SER: NEGATIVE {TITER}
HEPATITIS B SURFACE ANTIGEN: NEGATIVE
HEPATITIS C ANTIBODY: NEGATIVE
LIPASE: 22.6 U/L (ref 5.6–51.3)
SARS-COV-2: NOT DETECTED

## 2020-05-18 PROCEDURE — 6370000000 HC RX 637 (ALT 250 FOR IP): Performed by: INTERNAL MEDICINE

## 2020-05-18 PROCEDURE — 86708 HEPATITIS A ANTIBODY: CPT

## 2020-05-18 PROCEDURE — 83690 ASSAY OF LIPASE: CPT

## 2020-05-18 PROCEDURE — 86706 HEP B SURFACE ANTIBODY: CPT

## 2020-05-18 PROCEDURE — 93975 VASCULAR STUDY: CPT

## 2020-05-18 PROCEDURE — 86038 ANTINUCLEAR ANTIBODIES: CPT

## 2020-05-18 PROCEDURE — 86803 HEPATITIS C AB TEST: CPT

## 2020-05-18 PROCEDURE — 6360000002 HC RX W HCPCS: Performed by: INTERNAL MEDICINE

## 2020-05-18 PROCEDURE — 76705 ECHO EXAM OF ABDOMEN: CPT

## 2020-05-18 PROCEDURE — 87340 HEPATITIS B SURFACE AG IA: CPT

## 2020-05-18 PROCEDURE — 86709 HEPATITIS A IGM ANTIBODY: CPT

## 2020-05-18 PROCEDURE — 36415 COLL VENOUS BLD VENIPUNCTURE: CPT

## 2020-05-18 PROCEDURE — 83516 IMMUNOASSAY NONANTIBODY: CPT

## 2020-05-18 PROCEDURE — 1200000003 HC TELEMETRY R&B

## 2020-05-18 RX ADMIN — ASPIRIN 81 MG 81 MG: 81 TABLET ORAL at 07:47

## 2020-05-18 RX ADMIN — ENOXAPARIN SODIUM 40 MG: 40 INJECTION SUBCUTANEOUS at 07:47

## 2020-05-18 RX ADMIN — METOPROLOL TARTRATE 12.5 MG: 25 TABLET, FILM COATED ORAL at 07:46

## 2020-05-18 RX ADMIN — PANTOPRAZOLE SODIUM 40 MG: 40 TABLET, DELAYED RELEASE ORAL at 16:40

## 2020-05-18 RX ADMIN — POTASSIUM CHLORIDE 20 MEQ: 1500 TABLET, EXTENDED RELEASE ORAL at 07:47

## 2020-05-18 RX ADMIN — ATORVASTATIN CALCIUM 20 MG: 20 TABLET, FILM COATED ORAL at 19:36

## 2020-05-18 RX ADMIN — PANTOPRAZOLE SODIUM 40 MG: 40 TABLET, DELAYED RELEASE ORAL at 05:01

## 2020-05-18 RX ADMIN — DILTIAZEM HYDROCHLORIDE 120 MG: 120 CAPSULE, EXTENDED RELEASE ORAL at 07:47

## 2020-05-18 RX ADMIN — NORTRIPTYLINE HYDROCHLORIDE 50 MG: 25 CAPSULE ORAL at 19:36

## 2020-05-18 ASSESSMENT — PAIN SCALES - GENERAL: PAINLEVEL_OUTOF10: 0

## 2020-05-18 NOTE — CONSULTS
[address] 5-    Lizbet Miller  [id number]  1935  female      Requesting provider: Dr Dhara Dickens   Indications CC: Atypical chest pain   Dictating for Dr Deepika Leyva     HPI: This is an 81year old female seen as a new consult for atypical chest pain. She was admitted 5- with chest pain and fatigue. She reported epigastric abdominal pain 4 days prior to admission. CTA chest negative for PE. Probable hepatic cirrhosis. Cardiology did not feel cardiac origin. Consulted GI. During consult today, she states epigastric/atypical chest pain has not improved since admission. Pain goes from back to front. She also reports history of hepatitis 60 years ago while she was pregnant but does not recall much details about it. Hilary Barrientos was seen 2- as a GI consult which hospitalized for diarrhea and weakness. She had been on antibiotics in Dec 2019 then started empiric flagyl 2-3-2020 for possible C did with no change in symptoms. EGD and colonoscopy were completed in the remote past. Anemia labs and stool panel ordered.  Treat symptomatically in PCR normal.     Patient Active Problem List   Diagnosis    Hyperlipidemia    Osteoarthritis    GERD (gastroesophageal reflux disease)    COPD (chronic obstructive pulmonary disease) (HCC)    Chronic back pain    CAD (coronary artery disease)    Hypertension    Lumbar back pain    Spinal stenosis of lumbar region with neurogenic claudication    Neurologic gait dysfunction    Chest pain     Past Medical History:   Diagnosis Date    Acute blood loss as cause of postoperative anemia 03/2018    CAD (coronary artery disease)      cath  48    Cardiac valve prolapse     Chronic back pain     COPD (chronic obstructive pulmonary disease) ([facility] [address]     Ex-smoker     GERD (gastroesophageal reflux disease) 2/07    EGD    Glaucoma     H/O cardiac catheterization 2002    40-50% LAD   katharine    Hearing loss secondary to cerumen impaction 5/28/2019    Hyperlipidemia     Hypertension     Inadvertent durotomy 3/12/2018    Liver disease     history of hepatitis at age 22   [address]    right shoulder and back    Pericarditis 1996       Past Surgical History:   Procedure Laterality Date    APPENDECTOMY      at age 13years   [address]  2002    BUNIONECTOMY  2004    right foot   [address]  2007??  &   2012? ?    normal results    COLONOSCOPY      last one 2000???    ENDOSCOPY, COLON, DIAGNOSTIC      EYE SURGERY  2009??    right eye   [address]    still has ovaries    AR LAMINECTOMY,>2 SGMT,LUMBAR N/A 3/7/2018    LUMBAR LAMINECTOMY L3-S1 performed by Colt South MD at [address] OFFICE/OUTPT [address] N/A 3/12/2018    REPAIR DUROTOMY PLACEMENT OF SUBARACHNOID DRAIN performed by Colt South MD at [facility]  left    SPINE SURGERY  7/02    L5 cyst    UPPER GASTROINTESTINAL ENDOSCOPY  2007    sheik       Prior to Admission medications    Medication Sig Start Date End Date Taking? Authorizing Provider   HYDROcodone-acetaminophen (NORCO) 5-325 MG per tablet Take 1 tablet by mouth every 6 hours as needed for Pain for up to 30 days.  4/30/20 5/30/20 Yes Adithya Navas MD   hydroCHLOROthiazide (HYDRODIURIL) 25 MG tablet Take 1 tablet by mouth daily 3/3/20  Yes Adithya Navas MD   isosorbide mononitrate (IMDUR) 30 MG extended release tablet Take 1 tablet by mouth daily 3/3/20  Yes Adithya Navas MD   potassium chloride (KLOR-CON M) 20 MEQ extended release tablet Take 1 tablet by mouth daily 2/20/20  Yes Adithya Navas MD   famotidine (PEPCID) 20 MG tablet Take 1 tablet by mouth 2 times daily 2/14/20  Yes Adithya Navas MD   metoprolol tartrate (LOPRESSOR) 25 MG tablet Take 0.5 tablets by mouth 2 times daily 2/3/20  Yes Adithya Navas MD   simvastatin (ZOCOR) 40 MG tablet Take 1 tablet by mouth nightly 1/28/20  Yes Adithya Navas MD   nortriptyline mg Oral Nightly Wilber Funez MD   20 mg at 20    heparin (porcine) injection 4,000 Units  4,000 Units Intravenous PRN Wilber Funez MD        heparin (porcine) injection 2,000 Units  2,000 Units Intravenous PRN Wilber Funez MD         No Known Allergies    Social History     Socioeconomic History    Marital status:      Spouse name: None    Number of children: 2    Years of education: None    Highest education level: None   Occupational History    None   Social Needs    Financial resource strain: None    Food insecurity     Worry: None     Inability: None    Transportation needs     Medical: None     Non-medical: None   Tobacco Use    Smoking status: Former Smoker     Years: 30.00     Types: Cigarettes     Last attempt to quit: 1989     Years since quittin.9    Smokeless tobacco: Never Used   Substance and Sexual Activity    Alcohol use: No    Drug use: No    Sexual activity: Not Currently   Lifestyle    Physical activity     Days per week: None     Minutes per session: None    Stress: None   Relationships    Social connections     Talks on phone: None     Gets together: None     Attends Orthodox service: None     Active member of club or organization: None     Attends meetings of clubs or organizations: None     Relationship status: None    Intimate partner violence     Fear of current or ex partner: None     Emotionally abused: None     Physically abused: None     Forced sexual activity: None   Other Topics Concern    None   Social History Narrative    None     Family History   Problem Relation Age of Onset    Tuberculosis Mother     Tuberculosis Father         Review of systems:   General : no fever chills or weight loss   Eyes: No cataract, glaucoma or loss of vision  ENT: No sinus infection, or vocal hoarseness   Cardiovascular: Atypical chest pain.  SVT, Paroxysmal A fib, hyperlipidemia, hypertension   Respiratory: COPD   GI: GERD,

## 2020-05-18 NOTE — PLAN OF CARE
Problem: Falls - Risk of:  Goal: Will remain free from falls  Description: Will remain free from falls  Outcome: Ongoing  Note: Patient ambulates stand by assist. Call light within reach. Side rails up x2. Bed alarm on. Non skid slippers available. Problem: Pain:  Goal: Pain level will decrease  Description: Pain level will decrease  Outcome: Ongoing  Note: Patient free from pain this shift. Pain rated on 0-10 pain rating scale. Will continue to reassess. Problem: Discharge Planning:  Goal: Discharged to appropriate level of care  Description: Discharged to appropriate level of care  Outcome: Ongoing  Note: Patient plans to be discharged home with sister when medically stable. Problem: GI  Goal: No bowel complications  Outcome: Ongoing  Note: Bowel sounds active in all 4 quadrants, last BM 5/17/2020, EDG schedule for 5/19/2020 at 13:00, will continue to monitor. Problem: Skin Integrity/Risk  Goal: No skin breakdown during hospitalization  Outcome: Ongoing  Note: No new skin issues, will continue to monitor. Care plan reviewed with patient. Patient verbalize understanding of the plan of care and contribute to goal setting.

## 2020-05-18 NOTE — CARE COORDINATION
She has never used smokeless tobacco.   PCP: Shiraz Chris MD  Readmission 30 days or less: no  Readmission Risk Score: 14%    Discharge Planning Evaluation  Current Residence:  Private Residence  Living Arrangements:  Alone   Support Systems:  Children, Family Members  Current Services PTA:     Potential Assistance Needed:  N/A  Potential Assistance Purchasing Medications:  No  Does patient want to participate in local refill/ meds to beds program?  Yes  Type of Home Care Services:  None  Patient expects to be discharged to:  home  Expected Discharge date:  05/18/20  Follow Up Appointment: Best Day/ Time: Wednesday PM    Patient Goals/Plan/Treatment Preferences: I spoke with Justice Cam. She plans to return home at discharge. She and her sister live together. They are able to assist each other. She had no services prior to admission. [facility] other [facility]. Transportation/Food Security/Housekeeping Addressed:  No issues identified.     Evaluation: no

## 2020-05-19 ENCOUNTER — ANESTHESIA EVENT (OUTPATIENT)
Dept: ENDOSCOPY | Age: 85
DRG: 392 | End: 2020-05-19
Payer: MEDICARE

## 2020-05-19 ENCOUNTER — ANESTHESIA (OUTPATIENT)
Dept: ENDOSCOPY | Age: 85
DRG: 392 | End: 2020-05-19
Payer: MEDICARE

## 2020-05-19 VITALS
OXYGEN SATURATION: 96 % | SYSTOLIC BLOOD PRESSURE: 122 MMHG | DIASTOLIC BLOOD PRESSURE: 81 MMHG | RESPIRATION RATE: 18 BRPM

## 2020-05-19 VITALS
SYSTOLIC BLOOD PRESSURE: 130 MMHG | BODY MASS INDEX: 28.96 KG/M2 | HEART RATE: 83 BPM | TEMPERATURE: 98.5 F | DIASTOLIC BLOOD PRESSURE: 69 MMHG | RESPIRATION RATE: 16 BRPM | OXYGEN SATURATION: 95 % | HEIGHT: 61 IN | WEIGHT: 153.4 LBS

## 2020-05-19 LAB
ANA SCREEN: NORMAL
HAV AB SERPL IA-ACNC: POSITIVE
TISSUE TRANSGLUTAMINASE IGA: < 2 U/ML (ref 0–3)

## 2020-05-19 PROCEDURE — 2709999900 HC NON-CHARGEABLE SUPPLY: Performed by: INTERNAL MEDICINE

## 2020-05-19 PROCEDURE — 88305 TISSUE EXAM BY PATHOLOGIST: CPT

## 2020-05-19 PROCEDURE — 7100000000 HC PACU RECOVERY - FIRST 15 MIN: Performed by: INTERNAL MEDICINE

## 2020-05-19 PROCEDURE — 2500000003 HC RX 250 WO HCPCS: Performed by: REGISTERED NURSE

## 2020-05-19 PROCEDURE — 3700000001 HC ADD 15 MINUTES (ANESTHESIA): Performed by: INTERNAL MEDICINE

## 2020-05-19 PROCEDURE — 6370000000 HC RX 637 (ALT 250 FOR IP): Performed by: INTERNAL MEDICINE

## 2020-05-19 PROCEDURE — 6360000002 HC RX W HCPCS: Performed by: REGISTERED NURSE

## 2020-05-19 PROCEDURE — 3700000000 HC ANESTHESIA ATTENDED CARE: Performed by: INTERNAL MEDICINE

## 2020-05-19 PROCEDURE — 7100000001 HC PACU RECOVERY - ADDTL 15 MIN: Performed by: INTERNAL MEDICINE

## 2020-05-19 PROCEDURE — 3609012400 HC EGD TRANSORAL BIOPSY SINGLE/MULTIPLE: Performed by: INTERNAL MEDICINE

## 2020-05-19 PROCEDURE — 2580000003 HC RX 258: Performed by: REGISTERED NURSE

## 2020-05-19 PROCEDURE — 0DB98ZX EXCISION OF DUODENUM, VIA NATURAL OR ARTIFICIAL OPENING ENDOSCOPIC, DIAGNOSTIC: ICD-10-PCS | Performed by: INTERNAL MEDICINE

## 2020-05-19 RX ORDER — SODIUM CHLORIDE 9 MG/ML
INJECTION, SOLUTION INTRAVENOUS CONTINUOUS PRN
Status: DISCONTINUED | OUTPATIENT
Start: 2020-05-19 | End: 2020-05-19 | Stop reason: SDUPTHER

## 2020-05-19 RX ORDER — SUCRALFATE 1 G/1
1 TABLET ORAL
Status: DISCONTINUED | OUTPATIENT
Start: 2020-05-19 | End: 2020-05-19 | Stop reason: HOSPADM

## 2020-05-19 RX ORDER — NITROGLYCERIN 0.4 MG/1
TABLET SUBLINGUAL
Qty: 30 TABLET | Refills: 0 | Status: ON HOLD | OUTPATIENT
Start: 2020-05-19 | End: 2021-04-21 | Stop reason: HOSPADM

## 2020-05-19 RX ORDER — DILTIAZEM HYDROCHLORIDE 120 MG/1
120 CAPSULE, COATED, EXTENDED RELEASE ORAL DAILY
Qty: 30 CAPSULE | Refills: 0 | Status: SHIPPED | OUTPATIENT
Start: 2020-05-20 | End: 2020-06-29 | Stop reason: ALTCHOICE

## 2020-05-19 RX ORDER — SUCRALFATE 1 G/1
1 TABLET ORAL
Qty: 120 TABLET | Refills: 0 | Status: SHIPPED | OUTPATIENT
Start: 2020-05-19 | End: 2021-01-13 | Stop reason: SDUPTHER

## 2020-05-19 RX ORDER — PROPOFOL 10 MG/ML
INJECTION, EMULSION INTRAVENOUS PRN
Status: DISCONTINUED | OUTPATIENT
Start: 2020-05-19 | End: 2020-05-19 | Stop reason: SDUPTHER

## 2020-05-19 RX ORDER — PANTOPRAZOLE SODIUM 40 MG/1
40 TABLET, DELAYED RELEASE ORAL
Qty: 60 TABLET | Refills: 0 | Status: SHIPPED | OUTPATIENT
Start: 2020-05-19 | End: 2020-12-16 | Stop reason: SDUPTHER

## 2020-05-19 RX ORDER — LIDOCAINE HYDROCHLORIDE 20 MG/ML
INJECTION, SOLUTION EPIDURAL; INFILTRATION; INTRACAUDAL; PERINEURAL PRN
Status: DISCONTINUED | OUTPATIENT
Start: 2020-05-19 | End: 2020-05-19 | Stop reason: SDUPTHER

## 2020-05-19 RX ADMIN — PROPOFOL 40 MG: 10 INJECTION, EMULSION INTRAVENOUS at 13:55

## 2020-05-19 RX ADMIN — PANTOPRAZOLE SODIUM 40 MG: 40 TABLET, DELAYED RELEASE ORAL at 16:31

## 2020-05-19 RX ADMIN — METOPROLOL TARTRATE 12.5 MG: 25 TABLET, FILM COATED ORAL at 08:52

## 2020-05-19 RX ADMIN — LIDOCAINE HYDROCHLORIDE 100 MG: 20 INJECTION, SOLUTION EPIDURAL; INFILTRATION; INTRACAUDAL; PERINEURAL at 13:55

## 2020-05-19 RX ADMIN — ASPIRIN 81 MG 81 MG: 81 TABLET ORAL at 08:52

## 2020-05-19 RX ADMIN — PROPOFOL 20 MG: 10 INJECTION, EMULSION INTRAVENOUS at 13:57

## 2020-05-19 RX ADMIN — PANTOPRAZOLE SODIUM 40 MG: 40 TABLET, DELAYED RELEASE ORAL at 05:29

## 2020-05-19 RX ADMIN — DILTIAZEM HYDROCHLORIDE 120 MG: 120 CAPSULE, EXTENDED RELEASE ORAL at 08:53

## 2020-05-19 RX ADMIN — SODIUM CHLORIDE: 9 INJECTION, SOLUTION INTRAVENOUS at 13:53

## 2020-05-19 RX ADMIN — POTASSIUM CHLORIDE 20 MEQ: 1500 TABLET, EXTENDED RELEASE ORAL at 08:52

## 2020-05-19 RX ADMIN — SUCRALFATE 1 G: 1 TABLET ORAL at 16:31

## 2020-05-19 ASSESSMENT — PAIN SCALES - GENERAL
PAINLEVEL_OUTOF10: 5
PAINLEVEL_OUTOF10: 0
PAINLEVEL_OUTOF10: 0

## 2020-05-19 ASSESSMENT — PAIN - FUNCTIONAL ASSESSMENT
PAIN_FUNCTIONAL_ASSESSMENT: ACTIVITIES ARE NOT PREVENTED
PAIN_FUNCTIONAL_ASSESSMENT: 0-10

## 2020-05-19 ASSESSMENT — PAIN DESCRIPTION - PROGRESSION: CLINICAL_PROGRESSION: NOT CHANGED

## 2020-05-19 ASSESSMENT — PAIN DESCRIPTION - DIRECTION: RADIATING_TOWARDS: ABDOMEN

## 2020-05-19 ASSESSMENT — PAIN DESCRIPTION - ONSET: ONSET: ON-GOING

## 2020-05-19 ASSESSMENT — PAIN DESCRIPTION - DESCRIPTORS: DESCRIPTORS: ACHING

## 2020-05-19 ASSESSMENT — PAIN DESCRIPTION - PAIN TYPE: TYPE: ACUTE PAIN

## 2020-05-19 ASSESSMENT — PAIN DESCRIPTION - FREQUENCY: FREQUENCY: INTERMITTENT

## 2020-05-19 ASSESSMENT — PAIN DESCRIPTION - LOCATION: LOCATION: BACK

## 2020-05-19 ASSESSMENT — PAIN DESCRIPTION - ORIENTATION: ORIENTATION: MID

## 2020-05-19 NOTE — ANESTHESIA PRE PROCEDURE
Provider Last Rate Last Dose    enoxaparin (LOVENOX) injection 40 mg  40 mg Subcutaneous Daily Rob Mcgowan, APRN - CNP   Stopped at 05/19/20 1134    pantoprazole (PROTONIX) tablet 40 mg  40 mg Oral BID AC Kellee Vale MD   40 mg at 05/19/20 0529    dilTIAZem (CARDIZEM CD) extended release capsule 120 mg  120 mg Oral Daily Ana Huber MD   120 mg at 05/19/20 0853    nitroGLYCERIN (NITROSTAT) SL tablet 0.4 mg  0.4 mg Sublingual Q5 Min PRN Claudette Chime, MD        acetaminophen (TYLENOL) tablet 650 mg  650 mg Oral Q4H PRN Kellee Vale MD   650 mg at 05/17/20 2038    aspirin chewable tablet 81 mg  81 mg Oral Daily Kellee Vale MD   81 mg at 05/19/20 0852    HYDROcodone-acetaminophen (NORCO) 5-325 MG per tablet 1 tablet  1 tablet Oral Q6H PRN Kellee Vale MD   1 tablet at 05/17/20 0850    lidocaine 4 % external patch 1 patch  1 patch Transdermal Daily Kellee Vale MD        metoprolol tartrate (LOPRESSOR) tablet 12.5 mg  12.5 mg Oral BID Kellee Vale MD   12.5 mg at 05/19/20 0852    nortriptyline (PAMELOR) capsule 50 mg  50 mg Oral Nightly Kellee Vale MD   50 mg at 05/1935    potassium chloride (KLOR-CON M) extended release tablet 20 mEq  20 mEq Oral Daily Kellee Vale MD   20 mEq at 05/19/20 9196    atorvastatin (LIPITOR) tablet 20 mg  20 mg Oral Nightly Kellee Vale MD   20 mg at 05/1935       Allergies:  No Known Allergies    Problem List:    Patient Active Problem List   Diagnosis Code    Hyperlipidemia E78.5    Osteoarthritis M19.90    GERD (gastroesophageal reflux disease) K21.9    COPD (chronic obstructive pulmonary disease) ([facility] [address] J44.9    Chronic back pain M54.9, G89.29    CAD (coronary artery disease) I25.10    Hypertension I10    Lumbar back pain M54.5    Spinal stenosis of lumbar region with neurogenic claudication M48.062    Neurologic gait dysfunction R26.9    Chest pain R07.9 16   Temp: 36.8 °C (98.3 °F) 36.9 °C (98.4 °F) 37.1 °C (98.8 °F)    TempSrc: Oral Oral Oral    SpO2: 96% 95% 95% 97%   Weight: 153 lb 6.4 oz (69.6 kg)      Height:                                                  BP Readings from Last 3 Encounters:   05/19/20 127/65   03/12/20 112/64   03/10/20 (!) 103/54       NPO Status: Time of last liquid consumption: 0730                        Time of last solid consumption: 1900                        Date of last liquid consumption: 05/19/20                        Date of last solid food consumption: 05/18/20    BMI:   Wt Readings from Last 3 Encounters:   05/19/20 153 lb 6.4 oz (69.6 kg)   03/12/20 154 lb 2 oz (69.9 kg)   03/10/20 153 lb (69.4 kg)     Body mass index is 28.98 kg/m². CBC:   Lab Results   Component Value Date    WBC 5.8 05/17/2020    RBC 3.70 05/17/2020    HGB 11.5 05/17/2020    HCT 35.2 05/17/2020    MCV 95.1 05/17/2020    RDW 14.5 09/21/2018     05/17/2020       CMP:   Lab Results   Component Value Date     05/17/2020    K 3.6 05/17/2020    K 3.5 02/14/2020     05/17/2020    CO2 20 05/17/2020    BUN 21 05/17/2020    CREATININE 0.9 05/17/2020    AGRATIO 0.9 09/12/2017    LABGLOM 60 05/17/2020    GLUCOSE 111 05/17/2020    GLUCOSE 112 09/12/2017    PROT 6.6 05/16/2020    CALCIUM 9.0 05/17/2020    BILITOT 0.4 05/16/2020    ALKPHOS 102 05/16/2020    AST 34 05/16/2020    ALT 35 05/16/2020       POC Tests: No results for input(s): POCGLU, POCNA, POCK, POCCL, POCBUN, POCHEMO, POCHCT in the last 72 hours.     Coags:   Lab Results   Component Value Date    INR 0.97 09/25/2017       HCG (If Applicable): No results found for: PREGTESTUR, PREGSERUM, HCG, HCGQUANT     ABGs: No results found for: PHART, PO2ART, UPE1PBC, ZCT8SVY, BEART, Q4TOHBXS     Type & Screen (If Applicable):  Lab Results   Component Value Date    LABRH POS 03/14/2018       Drug/Infectious Status (If Applicable):  Lab Results   Component Value Date    HEPCAB Negative 05/18/2020

## 2020-05-19 NOTE — PROGRESS NOTES
IM Progress Note  Dr. Ashlyn Phillips  5/19/2020 12:01 PM      Patient name Harry Oshea  [id number]  PCP: Aniyah Peraza MD  Admit Date: 5/15/2020  Acct No. [de-identified]    Subjective: Interval History: For EGD today  Pain still the same    Diet: Diet NPO, After Midnight    I/O last 3 completed shifts: In: 1020 [P.O.:1020]  Out: 650 [Urine:650]  No intake/output data recorded. Admission weight: 150 lb (68 kg) as of 5/15/2020  4:13 PM  Wt Readings from Last 3 Encounters:   05/19/20 153 lb 6.4 oz (69.6 kg)   03/12/20 154 lb 2 oz (69.9 kg)   03/10/20 153 lb (69.4 kg)     Body mass index is 28.98 kg/m². ROS   CVS;  +cp or palpitation  Resp: no SOB or cough  Neuro:  No numbness or weakness or dizziness  Abd: no nausea or vomiting or abd pain      Medications:   Scheduled Meds:   enoxaparin  40 mg Subcutaneous Daily    pantoprazole  40 mg Oral BID AC    dilTIAZem  120 mg Oral Daily    aspirin  81 mg Oral Daily    lidocaine  1 patch Transdermal Daily    metoprolol tartrate  12.5 mg Oral BID    nortriptyline  50 mg Oral Nightly    potassium chloride  20 mEq Oral Daily    atorvastatin  20 mg Oral Nightly     Continuous Infusions:      Labs :     CBC:   Recent Labs     05/17/20  0611   WBC 5.8   HGB 11.5*        BMP:    Recent Labs     05/17/20  0611      K 3.6      CO2 20*   BUN 21   CREATININE 0.9   GLUCOSE 111*     Hepatic:   No results for input(s): AST, ALT, ALB, BILITOT, ALKPHOS in the last 72 hours. Troponin: No results for input(s): TROPONINI in the last 72 hours. BNP: No results for input(s): BNP in the last 72 hours. Lipids:   No results for input(s): CHOL, HDL in the last 72 hours. Invalid input(s): LDLCALCU  INR: No results for input(s): INR in the last 72 hours.     Radiology    Objective:   Vitals: BP (!) 119/56   Pulse 79   Temp 98.8 °F (37.1 °C) (Oral)   Resp 16   Ht 5' 1\" (1.549 m)   Wt 153 lb 6.4 oz (69.6 kg)   SpO2 95%   BMI 28.98 kg/m²   HEENT:

## 2020-05-19 NOTE — FLOWSHEET NOTE
05/19/20 1545   Encounter Summary   Services provided to: Patient   Referral/Consult From: Nikolas   Continue Visiting Yes  (5/19/2020 P  NR)   Complexity of Encounter Low   Length of Encounter 15 minutes   Advance Care Planning Yes   Routine   Type Initial   Assessment Sleeping; Unable to respond   Intervention Prayer   Advance Directives (For Healthcare)   Healthcare Directive Yes, patient has an advance directive for healthcare treatment   During my encounter with the 81 yr old patient, they were unable to respond/ resting and no family members was present at the time. The pt has a Advance Directive and coping with Chest pains. I said a prayer of peace, comfort and healing for the pt. I also placed a card in the pts room in the event that the pt or family needed additional spiritual care and support.

## 2020-05-19 NOTE — PLAN OF CARE
Problem: Falls - Risk of:  Goal: Will remain free from falls  Description: Will remain free from falls  5/18/2020 2332 by Ashley Hwang RN  Outcome: Ongoing  Note: Falling star placed outside of patient's room. 2/4 bed rails up. Non-skid socks provided. Call light and personal items with in reach. Bed alarm on. Problem: Pain:  Goal: Pain level will decrease  Description: Pain level will decrease  5/18/2020 2332 by Ashley Hwang RN  Outcome: Ongoing  Note: Patient rates pain on 0-10 pain scale. States pain is a 0 on 0-10 scale. States goal is 0. Repositioned for comfort. Will continue to reassess. Problem: Discharge Planning:  Goal: Discharged to appropriate level of care  Description: Discharged to appropriate level of care  5/18/2020 2332 by Ashley Hwang RN  Outcome: Ongoing  Note: Patient plans to discharge home with sister when medically stable. Problem: Neurological  Goal: Maximum potential motor/sensory/cognitive function  Outcome: Ongoing  Note: Patient at baseline cognitive function. Will continue to reassess. Problem: Respiratory  Goal: No pulmonary complications  Outcome: Ongoing  Note: Oxygen saturation >90% on room air. Will continue to reassess. Problem:   Goal: Adequate urinary output  Outcome: Ongoing  Note: I/O last 3 completed shifts: In: 5067 [P.O.:1420]  Out: 1000 [Urine:1000]  No intake/output data recorded. Monitoring intake and output. Will continue to reassess. Problem: Nutrition  Goal: Optimal nutrition therapy  Outcome: Ongoing  Note: Patient NPO at midnight. Problem: Skin Integrity/Risk  Goal: No skin breakdown during hospitalization  5/18/2020 2332 by Ashley Hwang RN  Outcome: Ongoing  Note: No new skin breakdown noted at this time. Will continue to reassess.  Patient turns and repositions self in bed frequent       Problem: Musculor/Skeletal Functional Status  Goal: Highest potential functional level  Outcome: Ongoing  Note:

## 2020-05-20 ENCOUNTER — CARE COORDINATION (OUTPATIENT)
Dept: CASE MANAGEMENT | Age: 85
End: 2020-05-20

## 2020-05-20 LAB
F-ACTIN AB IGG: 12 UNITS (ref 0–19)
MITOCHONDRIAL ANTIBODY: 13.3 UNITS (ref 0–20)

## 2020-05-21 ENCOUNTER — CARE COORDINATION (OUTPATIENT)
Dept: CASE MANAGEMENT | Age: 85
End: 2020-05-21

## 2020-05-21 NOTE — CARE COORDINATION
[facility] Transitions Initial Follow Up Call    Call within 2 business days of discharge: Yes    Patient: Abhinav Riggs Patient : 1935   MRN: [id number]  Reason for Admission: Chest pain, unspecified type   Discharge Date: 20 RARS: Readmission Risk Score: 15      Last Discharge [address] [address]       Complaint Diagnosis Description Type Department Provider    5/15/20 Chest Pain; Fatigue Chest pain, unspecified type . .. ED to Hosp-Admission (Discharged) (ADMITTED) Lorena Rubio MD; Alejandro Siu... Second attempt for 24 hour call. VM is not set up. Not able to leave a message.     Follow Up  Future Appointments   Date Time Provider Mary Carmen Orellana   2020 10:20 AM James Carlton MD [facility]   6/10/2020 10:40 AM MD Nilo Frausto RN

## 2020-05-21 NOTE — DISCHARGE SUMMARY
[address] [place] [address]                               DISCHARGE SUMMARY    PATIENT NAME: Dov Rodgers                 :        1935  MED REC NO:   [id number]                           ROOM:       7249  ACCOUNT NO:   [de-identified]                           ADMIT DATE: 05/15/2020  PROVIDER:     LESLEY Castañeda Broody: 2020    CLINICAL SUMMARY    HOSPITAL COURSE:  This is an 81-year-old woman who initially presented  with chest pain. Her cardiologist was consulted. She was initially on  IV nitroglycerin and heparin, but her pain was not resolved nor did it  improve, and hence both were discontinued. She did go into  SVT/paroxysmal AFib and hence she was placed on Cardizem. Heart rate  did improve. As pain was persistent, GI was consulted. She did have  EGD on 2020. It showed a large 5-cm hiatal hernia with normal  stomach and duodenum and no gastric varices. Carafate was added. The  patient did have an ultrasound of the gallbladder done, which showed  questionable acalculous cholecystitis, but the patient did not have any  right upper quadrant pain. White count was normal and hence we did  check with GI. The patient did tolerate diet and she is to go home and  follow up as outpatient. FINAL DIAGNOSES:  1. Chest pain with large hiatal hernia, negative for any MI.  2.  Paroxysmal atrial fibrillation, back in sinus rhythm. 3.  Acute kidney injury, clinically stable. 4.  Hypertension. 5.  Hyperlipidemia. 6.  Intermittent back pain. 7.  Acid reflux. 8.  Hypokalemia. 9.  History of liver disease. 10.  Questionable acalculous cholecystitis, but no right upper quadrant  pain or elevated white count. 11.  COPD per old records. MEDICATIONS:  To continue as addressed in the discharge sheet. DISPOSITION:  Home. DIET:  Low-sodium diet.     ACTIVITY:  Avoid exertion. FOLLOWUP:  Follow up with cardiologist, gastroenterologist, and family  doctor. CONDITION ON DISCHARGE:  Stable.         Isaias Diaz M.D.    D: 05/20/2020 12:55:51       T: 05/21/2020 1:04:24     [email or web address]  Job#: [id number]     Doc#: [id number]    CC:

## 2020-05-22 ENCOUNTER — CARE COORDINATION (OUTPATIENT)
Dept: CASE MANAGEMENT | Age: 85
End: 2020-05-22

## 2020-05-26 ENCOUNTER — OFFICE VISIT (OUTPATIENT)
Dept: FAMILY MEDICINE CLINIC | Age: 85
End: 2020-05-26

## 2020-05-26 VITALS
BODY MASS INDEX: 29.86 KG/M2 | RESPIRATION RATE: 14 BRPM | TEMPERATURE: 97.4 F | HEIGHT: 61 IN | WEIGHT: 158.13 LBS | HEART RATE: 72 BPM | DIASTOLIC BLOOD PRESSURE: 76 MMHG | SYSTOLIC BLOOD PRESSURE: 126 MMHG

## 2020-05-26 PROBLEM — S32.020A COMPRESSION FRACTURE OF L2 (HCC): Status: ACTIVE | Noted: 2020-05-26

## 2020-05-26 PROCEDURE — 99496 TRANSJ CARE MGMT HIGH F2F 7D: CPT | Performed by: FAMILY MEDICINE

## 2020-05-26 PROCEDURE — 1111F DSCHRG MED/CURRENT MED MERGE: CPT | Performed by: FAMILY MEDICINE

## 2020-05-26 RX ORDER — TIZANIDINE 4 MG/1
4 TABLET ORAL EVERY 6 HOURS PRN
COMMUNITY
End: 2020-06-11 | Stop reason: SDUPTHER

## 2020-05-26 RX ORDER — LATANOPROST 50 UG/ML
1 SOLUTION/ DROPS OPHTHALMIC NIGHTLY
COMMUNITY

## 2020-05-26 RX ORDER — M-VIT,TX,IRON,MINS/CALC/FOLIC 27MG-0.4MG
1 TABLET ORAL DAILY
Status: ON HOLD | COMMUNITY
End: 2021-05-13 | Stop reason: HOSPADM

## 2020-05-26 ASSESSMENT — ENCOUNTER SYMPTOMS
NAUSEA: 0
SHORTNESS OF BREATH: 0
CHEST TIGHTNESS: 0
CONSTIPATION: 0
WHEEZING: 0
ABDOMINAL PAIN: 0
SORE THROAT: 0
COUGH: 0
EYE PAIN: 0

## 2020-05-26 NOTE — PROGRESS NOTES
tablet  Take 0.5 tablets by mouth 2 times daily             Multiple Vitamins-Minerals (THERAPEUTIC MULTIVITAMIN-MINERALS) tablet  Take 1 tablet by mouth daily             nitroGLYCERIN (NITROSTAT) 0.4 MG SL tablet  up to max of 3 total doses. If no relief after 1 dose, call 911.             nortriptyline (PAMELOR) 50 MG capsule  Take 1 capsule by mouth nightly             pantoprazole (PROTONIX) 40 MG tablet  Take 1 tablet by mouth 2 times daily (before meals)             potassium chloride (KLOR-CON M) 20 MEQ extended release tablet  Take 1 tablet by mouth daily             simvastatin (ZOCOR) 40 MG tablet  Take 1 tablet by mouth nightly             sucralfate (CARAFATE) 1 GM tablet  Take 1 tablet by mouth 4 times daily (before meals and nightly)             tiZANidine (ZANAFLEX) 4 MG tablet  Take 4 mg by mouth every 6 hours as needed                   Medications marked \"taking\" at this time  Outpatient Medications Marked as Taking for the 5/26/20 encounter (Office Visit) with Twila Huber MD   Medication Sig Dispense Refill    tiZANidine (ZANAFLEX) 4 MG tablet Take 4 mg by mouth every 6 hours as needed      Multiple Vitamins-Minerals (THERAPEUTIC MULTIVITAMIN-MINERALS) tablet Take 1 tablet by mouth daily      latanoprost (XALATAN) 0.005 % ophthalmic solution Place 1 drop into both eyes nightly      dilTIAZem (CARDIZEM CD) 120 MG extended release capsule Take 1 capsule by mouth daily 30 capsule 0    nitroGLYCERIN (NITROSTAT) 0.4 MG SL tablet up to max of 3 total doses. If no relief after 1 dose, call 911. 30 tablet 0    pantoprazole (PROTONIX) 40 MG tablet Take 1 tablet by mouth 2 times daily (before meals) 60 tablet 0    sucralfate (CARAFATE) 1 GM tablet Take 1 tablet by mouth 4 times daily (before meals and nightly) 120 tablet 0    HYDROcodone-acetaminophen (NORCO) 5-325 MG per tablet Take 1 tablet by mouth every 6 hours as needed for Pain for up to 30 days.  60 tablet 0    isosorbide mononitrate The patient is not nervous/anxious. Vitals:    05/26/20 1053   BP: 126/76   Site: Right Upper Arm   Position: Sitting   Cuff Size: Medium Adult   Pulse: 72   Resp: 14   Temp: 97.4 °F (36.3 °C)   TempSrc: Oral   Weight: 158 lb 2 oz (71.7 kg)   Height: 5' 1\" (1.549 m)     Body mass index is 29.88 kg/m². Wt Readings from Last 3 Encounters:   05/26/20 158 lb 2 oz (71.7 kg)   05/19/20 153 lb 6.4 oz (69.6 kg)   03/12/20 154 lb 2 oz (69.9 kg)     BP Readings from Last 3 Encounters:   05/26/20 126/76   05/19/20 130/69   05/19/20 122/81       Physical Exam  Vitals signs and nursing note reviewed. Constitutional:       Appearance: She is well-developed. HENT:      Head: Normocephalic and atraumatic. Right Ear: External ear normal.      Left Ear: External ear normal.      Nose: Nose normal.   Eyes:      General: No scleral icterus. Conjunctiva/sclera: Conjunctivae normal.      Pupils: Pupils are equal, round, and reactive to light. Neck:      Musculoskeletal: Normal range of motion and neck supple. Thyroid: No thyromegaly. Vascular: No JVD. Cardiovascular:      Rate and Rhythm: Normal rate and regular rhythm. Heart sounds: Normal heart sounds. Pulmonary:      Effort: Pulmonary effort is normal.      Breath sounds: Normal breath sounds. No wheezing or rales. Abdominal:      General: Bowel sounds are normal. There is no distension. Palpations: Abdomen is soft. There is no mass. Tenderness: There is no abdominal tenderness. Comments:  Mild epigastric pain   Musculoskeletal: Normal range of motion. General: No tenderness. Lymphadenopathy:      Cervical: No cervical adenopathy. Skin:     General: Skin is warm and dry. Findings: No rash. Neurological:      Mental Status: She is alert and oriented to person, place, and time. Cranial Nerves: No cranial nerve deficit. Deep Tendon Reflexes: Reflexes are normal and symmetric.

## 2020-06-07 RX ORDER — HYDROCODONE BITARTRATE AND ACETAMINOPHEN 5; 325 MG/1; MG/1
1 TABLET ORAL EVERY 6 HOURS PRN
Qty: 60 TABLET | Refills: 0 | Status: SHIPPED | OUTPATIENT
Start: 2020-06-07 | End: 2020-07-21 | Stop reason: SDUPTHER

## 2020-06-12 RX ORDER — TIZANIDINE 4 MG/1
4 TABLET ORAL EVERY 8 HOURS PRN
Qty: 100 TABLET | Refills: 2 | Status: SHIPPED | OUTPATIENT
Start: 2020-06-12 | End: 2020-11-11 | Stop reason: SDUPTHER

## 2020-06-25 ENCOUNTER — HOSPITAL ENCOUNTER (OUTPATIENT)
Dept: NUCLEAR MEDICINE | Age: 85
Discharge: HOME | End: 2020-06-25
Payer: MEDICARE

## 2020-06-25 VITALS — WEIGHT: 152 LBS | HEIGHT: 61 IN | BODY MASS INDEX: 28.7 KG/M2

## 2020-06-25 PROCEDURE — 2580000003 HC RX 258: Performed by: RADIOLOGY

## 2020-06-25 PROCEDURE — 6360000002 HC RX W HCPCS: Performed by: RADIOLOGY

## 2020-06-25 PROCEDURE — A9537 TC99M MEBROFENIN: HCPCS | Performed by: NURSE PRACTITIONER

## 2020-06-25 PROCEDURE — 78227 HEPATOBIL SYST IMAGE W/DRUG: CPT

## 2020-06-25 PROCEDURE — 3430000000 HC RX DIAGNOSTIC RADIOPHARMACEUTICAL: Performed by: NURSE PRACTITIONER

## 2020-06-25 RX ADMIN — SODIUM CHLORIDE 1.38 MCG: 9 INJECTION, SOLUTION INTRAVENOUS at 11:57

## 2020-06-25 RX ADMIN — Medication 8 MILLICURIE: at 10:45

## 2020-06-29 ENCOUNTER — OFFICE VISIT (OUTPATIENT)
Dept: FAMILY MEDICINE CLINIC | Age: 85
End: 2020-06-29

## 2020-06-29 VITALS
BODY MASS INDEX: 29.5 KG/M2 | RESPIRATION RATE: 14 BRPM | TEMPERATURE: 96.9 F | HEIGHT: 61 IN | DIASTOLIC BLOOD PRESSURE: 66 MMHG | WEIGHT: 156.25 LBS | SYSTOLIC BLOOD PRESSURE: 106 MMHG | HEART RATE: 76 BPM

## 2020-06-29 PROCEDURE — 1036F TOBACCO NON-USER: CPT | Performed by: FAMILY MEDICINE

## 2020-06-29 PROCEDURE — 1090F PRES/ABSN URINE INCON ASSESS: CPT | Performed by: FAMILY MEDICINE

## 2020-06-29 PROCEDURE — 1123F ACP DISCUSS/DSCN MKR DOCD: CPT | Performed by: FAMILY MEDICINE

## 2020-06-29 PROCEDURE — 4040F PNEUMOC VAC/ADMIN/RCVD: CPT | Performed by: FAMILY MEDICINE

## 2020-06-29 PROCEDURE — G8417 CALC BMI ABV UP PARAM F/U: HCPCS | Performed by: FAMILY MEDICINE

## 2020-06-29 PROCEDURE — G8427 DOCREV CUR MEDS BY ELIG CLIN: HCPCS | Performed by: FAMILY MEDICINE

## 2020-06-29 PROCEDURE — G8400 PT W/DXA NO RESULTS DOC: HCPCS | Performed by: FAMILY MEDICINE

## 2020-06-29 PROCEDURE — 99213 OFFICE O/P EST LOW 20 MIN: CPT | Performed by: FAMILY MEDICINE

## 2020-06-29 ASSESSMENT — ENCOUNTER SYMPTOMS
ABDOMINAL PAIN: 1
SORE THROAT: 0
NAUSEA: 0
WHEEZING: 0
COUGH: 0
CONSTIPATION: 0
HEARTBURN: 0
SHORTNESS OF BREATH: 0
BELCHING: 0
CHEST TIGHTNESS: 0
EYE PAIN: 0

## 2020-06-29 NOTE — PROGRESS NOTES
Subjective:      Patient ID: Stephanie Castro is a 81 y.o. female.     hida  Scan   Normal    Gastroesophageal Reflux   She complains of abdominal pain. She reports no belching, no chest pain, no coughing, no heartburn, no nausea, no sore throat or no wheezing. This is a chronic problem. The current episode started more than 1 month ago. Pertinent negatives include no fatigue. She has tried a PPI (  carafate    helps ) for the symptoms. The treatment provided significant relief. Past Medical History:   Diagnosis Date    Acute blood loss as cause of postoperative anemia 03/2018    CAD (coronary artery disease)      cath  48    Cardiac valve prolapse     Chronic back pain     COPD (chronic obstructive pulmonary disease) (HCC)     Ex-smoker     GERD (gastroesophageal reflux disease) 2/07    EGD    Glaucoma     H/O cardiac catheterization 2002    40-50% LAD   katharine    Hearing loss secondary to cerumen impaction 5/28/2019    Hyperlipidemia     Hypertension     Inadvertent durotomy 3/12/2018    Liver disease     history of hepatitis at age 22    Osteoarthritis 1999    right shoulder and back    Pericarditis 1996       Review of Systems   Constitutional: Negative for appetite change, fatigue and fever. HENT: Negative for congestion, ear pain, postnasal drip and sore throat. Eyes: Negative for pain and visual disturbance. Respiratory: Negative for cough, chest tightness, shortness of breath and wheezing. Cardiovascular: Negative for chest pain, palpitations and leg swelling. Gastrointestinal: Positive for abdominal pain. Negative for constipation, heartburn and nausea. Genitourinary: Negative for dysuria and frequency. Musculoskeletal: Negative for arthralgias, joint swelling, neck pain and neck stiffness. Skin: Negative for rash. Neurological: Negative for dizziness, weakness, numbness and headaches. Hematological: Negative for adenopathy. Does not bruise/bleed easily. Psychiatric/Behavioral: Negative for behavioral problems and sleep disturbance. The patient is not nervous/anxious. /66 (Site: Right Upper Arm, Position: Sitting, Cuff Size: Medium Adult)   Pulse 76   Temp 96.9 °F (36.1 °C) (Oral)   Resp 14   Ht 5' 1\" (1.549 m)   Wt 156 lb 4 oz (70.9 kg)   BMI 29.52 kg/m²   Objective:   Physical Exam  Vitals signs and nursing note reviewed. Constitutional:       Appearance: She is well-developed. HENT:      Head: Normocephalic and atraumatic. Right Ear: External ear normal.      Left Ear: External ear normal.      Nose: Nose normal.   Eyes:      General: No scleral icterus. Conjunctiva/sclera: Conjunctivae normal.      Pupils: Pupils are equal, round, and reactive to light. Neck:      Musculoskeletal: Normal range of motion and neck supple. Thyroid: No thyromegaly. Vascular: No JVD. Cardiovascular:      Rate and Rhythm: Normal rate and regular rhythm. Heart sounds: Normal heart sounds. Pulmonary:      Effort: Pulmonary effort is normal.      Breath sounds: Normal breath sounds. No wheezing or rales. Abdominal:      General: Bowel sounds are normal. There is no distension. Palpations: Abdomen is soft. There is no mass. Tenderness: There is no abdominal tenderness. Comments:   No pain with  liver    Musculoskeletal: Normal range of motion. General: No tenderness. Lymphadenopathy:      Cervical: No cervical adenopathy. Skin:     General: Skin is warm and dry. Findings: No rash. Neurological:      Mental Status: She is alert and oriented to person, place, and time. Cranial Nerves: No cranial nerve deficit. Deep Tendon Reflexes: Reflexes are normal and symmetric. Assessment:       Diagnosis Orders   1. Gastroesophageal reflux disease without esophagitis     2. Other chest pain     3.  Coronary artery disease involving native coronary artery of native heart without angina pectoris 4. Essential hypertension     5. Spinal stenosis of lumbar region with neurogenic claudication           Plan:      Current Outpatient Medications   Medication Sig Dispense Refill    tiZANidine (ZANAFLEX) 4 MG tablet Take 1 tablet by mouth every 8 hours as needed (muscle spasm) 100 tablet 2    HYDROcodone-acetaminophen (NORCO) 5-325 MG per tablet Take 1 tablet by mouth every 6 hours as needed for Pain for up to 30 days. 60 tablet 0    Multiple Vitamins-Minerals (THERAPEUTIC MULTIVITAMIN-MINERALS) tablet Take 1 tablet by mouth daily      latanoprost (XALATAN) 0.005 % ophthalmic solution Place 1 drop into both eyes nightly      nitroGLYCERIN (NITROSTAT) 0.4 MG SL tablet up to max of 3 total doses. If no relief after 1 dose, call 911. 30 tablet 0    pantoprazole (PROTONIX) 40 MG tablet Take 1 tablet by mouth 2 times daily (before meals) 60 tablet 0    sucralfate (CARAFATE) 1 GM tablet Take 1 tablet by mouth 4 times daily (before meals and nightly) 120 tablet 0    isosorbide mononitrate (IMDUR) 30 MG extended release tablet Take 1 tablet by mouth daily 90 tablet 1    potassium chloride (KLOR-CON M) 20 MEQ extended release tablet Take 1 tablet by mouth daily 30 tablet 5    metoprolol tartrate (LOPRESSOR) 25 MG tablet Take 0.5 tablets by mouth 2 times daily 90 tablet 1    simvastatin (ZOCOR) 40 MG tablet Take 1 tablet by mouth nightly 90 tablet 1    nortriptyline (PAMELOR) 50 MG capsule Take 1 capsule by mouth nightly 90 capsule 1    acetaminophen (TYLENOL) 325 MG tablet Take 2 tablets by mouth every 4 hours as needed for Pain Maximum dose of acetaminophen is 4000 mg from all sources in 24 hours.  aspirin 81 MG tablet Take 81 mg by mouth daily       No current facility-administered medications for this visit. No orders of the defined types were placed in this encounter.         See  Gi  As  Overall better     Blossom Gonzales MD

## 2020-07-20 NOTE — TELEPHONE ENCOUNTER
Patient called req ref of [place] to [facility] in [place].   Next appt 8/31/20     last appt 6-29-20

## 2020-07-21 RX ORDER — HYDROCODONE BITARTRATE AND ACETAMINOPHEN 5; 325 MG/1; MG/1
1 TABLET ORAL EVERY 6 HOURS PRN
Qty: 60 TABLET | Refills: 0 | Status: SHIPPED | OUTPATIENT
Start: 2020-07-21 | End: 2020-08-26 | Stop reason: SDUPTHER

## 2020-07-29 NOTE — TELEPHONE ENCOUNTER
Date of last visit:  6/29/2020  Date of next visit:  8/31/2020    Requested Prescriptions     Pending Prescriptions Disp Refills    nortriptyline (PAMELOR) 50 MG capsule 90 capsule 1     Sig: Take 1 capsule by mouth nightly    simvastatin (ZOCOR) 40 MG tablet 90 tablet 1     Sig: Take 1 tablet by mouth nightly

## 2020-07-29 NOTE — TELEPHONE ENCOUNTER
Pt called requesting 90 day Rx of the following:    Nortriptyline 50 mg    Simvastatin 40 mg    [place], [place]

## 2020-07-30 RX ORDER — NORTRIPTYLINE HYDROCHLORIDE 50 MG/1
50 CAPSULE ORAL NIGHTLY
Qty: 90 CAPSULE | Refills: 1 | Status: SHIPPED | OUTPATIENT
Start: 2020-07-30 | End: 2021-01-28 | Stop reason: SDUPTHER

## 2020-07-30 RX ORDER — SIMVASTATIN 40 MG
40 TABLET ORAL NIGHTLY
Qty: 90 TABLET | Refills: 1 | Status: ON HOLD | OUTPATIENT
Start: 2020-07-30 | End: 2021-05-13 | Stop reason: HOSPADM

## 2020-08-10 NOTE — TELEPHONE ENCOUNTER
Pt called requesting Rx for Metoprolol 25 mg 0.5 tablet twice daily, 90 day supply.     Reminded pt of appt on 8/31/20    [place], [place]

## 2020-08-10 NOTE — TELEPHONE ENCOUNTER
Tristan Holley called requesting a refill of the below medication which has been pended for you:     Requested Prescriptions     Pending Prescriptions Disp Refills    metoprolol tartrate (LOPRESSOR) 25 MG tablet 90 tablet 1     Sig: Take 0.5 tablets by mouth 2 times daily       Last Appointment Date: 6/29/2020  Next Appointment Date: 8/31/2020    No Known Allergies

## 2020-08-24 NOTE — TELEPHONE ENCOUNTER
Patient called [place] ref of Norco to [facility].     Last appt        6/29/20    Next appt        8/31/20

## 2020-08-26 RX ORDER — HYDROCODONE BITARTRATE AND ACETAMINOPHEN 5; 325 MG/1; MG/1
1 TABLET ORAL EVERY 6 HOURS PRN
Qty: 60 TABLET | Refills: 0 | Status: SHIPPED | OUTPATIENT
Start: 2020-08-26 | End: 2020-10-02 | Stop reason: SDUPTHER

## 2020-08-31 ENCOUNTER — OFFICE VISIT (OUTPATIENT)
Dept: FAMILY MEDICINE CLINIC | Age: 85
End: 2020-08-31

## 2020-08-31 VITALS
HEIGHT: 61 IN | SYSTOLIC BLOOD PRESSURE: 130 MMHG | HEART RATE: 76 BPM | RESPIRATION RATE: 16 BRPM | DIASTOLIC BLOOD PRESSURE: 80 MMHG | TEMPERATURE: 97.7 F | WEIGHT: 157.2 LBS | BODY MASS INDEX: 29.68 KG/M2

## 2020-08-31 PROBLEM — S32.020A COMPRESSION FRACTURE OF L2 (HCC): Status: RESOLVED | Noted: 2020-05-26 | Resolved: 2020-08-31

## 2020-08-31 PROBLEM — R07.9 CHEST PAIN: Status: RESOLVED | Noted: 2020-05-15 | Resolved: 2020-08-31

## 2020-08-31 PROCEDURE — 1123F ACP DISCUSS/DSCN MKR DOCD: CPT | Performed by: FAMILY MEDICINE

## 2020-08-31 PROCEDURE — G0438 PPPS, INITIAL VISIT: HCPCS | Performed by: FAMILY MEDICINE

## 2020-08-31 PROCEDURE — 99213 OFFICE O/P EST LOW 20 MIN: CPT | Performed by: FAMILY MEDICINE

## 2020-08-31 PROCEDURE — 4040F PNEUMOC VAC/ADMIN/RCVD: CPT | Performed by: FAMILY MEDICINE

## 2020-08-31 RX ORDER — POTASSIUM CHLORIDE 20 MEQ/1
20 TABLET, EXTENDED RELEASE ORAL DAILY
Qty: 90 TABLET | Refills: 1 | Status: SHIPPED | OUTPATIENT
Start: 2020-08-31 | End: 2021-01-28 | Stop reason: SDUPTHER

## 2020-08-31 RX ORDER — ISOSORBIDE MONONITRATE 30 MG/1
30 TABLET, EXTENDED RELEASE ORAL DAILY
Qty: 90 TABLET | Refills: 1 | Status: SHIPPED | OUTPATIENT
Start: 2020-08-31 | End: 2021-03-03 | Stop reason: SDUPTHER

## 2020-08-31 ASSESSMENT — ENCOUNTER SYMPTOMS
CONSTIPATION: 0
SORE THROAT: 0
SHORTNESS OF BREATH: 0
WHEEZING: 0
ABDOMINAL PAIN: 0
BACK PAIN: 1
NAUSEA: 0
COUGH: 0
EYE PAIN: 0
CHEST TIGHTNESS: 0

## 2020-08-31 ASSESSMENT — PATIENT HEALTH QUESTIONNAIRE - PHQ9
SUM OF ALL RESPONSES TO PHQ QUESTIONS 1-9: 0
SUM OF ALL RESPONSES TO PHQ QUESTIONS 1-9: 0

## 2020-08-31 ASSESSMENT — LIFESTYLE VARIABLES: HOW OFTEN DO YOU HAVE A DRINK CONTAINING ALCOHOL: 0

## 2020-08-31 NOTE — PROGRESS NOTES
Medicare Annual Wellness Visit  Name: Jose Raul Meeks Date: 2020   MRN: [id number] Sex: Female   Age: 81 y.o. Ethnicity: Non-/Non    : 1935 Race: Teena Rhodes is here for Medicare AWV    Screenings for behavioral, psychosocial and functional/safety risks, and cognitive dysfunction are all negative except as indicated below. These results, as well as other patient data from the [address] form, are documented in Flowsheets linked to this Encounter. No Known Allergies    Prior to Visit Medications    Medication Sig Taking? Authorizing Provider   HYDROcodone-acetaminophen (NORCO) 5-325 MG per tablet Take 1 tablet by mouth every 6 hours as needed for Pain for up to 30 days. Yes Aren Villalab MD   metoprolol tartrate (LOPRESSOR) 25 MG tablet Take 0.5 tablets by mouth 2 times daily Yes Aren Villalba MD   nortriptyline (PAMELOR) 50 MG capsule Take 1 capsule by mouth nightly Yes Aren Villalba MD   simvastatin (ZOCOR) 40 MG tablet Take 1 tablet by mouth nightly Yes Aren Villalba MD   tiZANidine (ZANAFLEX) 4 MG tablet Take 1 tablet by mouth every 8 hours as needed (muscle spasm) Yes Aren Villalba MD   Multiple Vitamins-Minerals (THERAPEUTIC MULTIVITAMIN-MINERALS) tablet Take 1 tablet by mouth daily Yes Historical Provider, MD   latanoprost (XALATAN) 0.005 % ophthalmic solution Place 1 drop into both eyes nightly Yes Historical Provider, MD   nitroGLYCERIN (NITROSTAT) 0.4 MG SL tablet up to max of 3 total doses. If no relief after 1 dose, call 911.  Yes Roula Doherty MD   pantoprazole (PROTONIX) 40 MG tablet Take 1 tablet by mouth 2 times daily (before meals) Yes Roula Doherty MD   sucralfate (CARAFATE) 1 GM tablet Take 1 tablet by mouth 4 times daily (before meals and nightly) Yes Roula Doherty MD   isosorbide mononitrate (IMDUR) 30 MG extended release tablet Take 1 tablet by mouth daily Yes Buster Ely MD Sarah   potassium chloride (KLOR-CON M) 20 MEQ extended release tablet Take 1 tablet by mouth daily Yes Mulu Meier MD   acetaminophen (TYLENOL) 325 MG tablet Take 2 tablets by mouth every 4 hours as needed for Pain Maximum dose of acetaminophen is 4000 mg from all sources in 24 hours. Yes YASMINE Bains - CNP   aspirin 81 MG tablet Take 81 mg by mouth daily Yes Historical Provider, MD       Past Medical History:   Diagnosis Date    Acute blood loss as cause of postoperative anemia 03/2018    CAD (coronary artery disease)      cath  50    Cardiac valve prolapse     Chronic back pain     COPD (chronic obstructive pulmonary disease) ([facility] [address]     Ex-smoker     GERD (gastroesophageal reflux disease) 2/07    EGD    Glaucoma     H/O cardiac catheterization 2002    40-50% LAD   katharine    Hearing loss secondary to cerumen impaction 5/28/2019    Hyperlipidemia     Hypertension     Inadvertent durotomy 3/12/2018    Liver disease     history of hepatitis at age 22   [address]    right shoulder and back    Pericarditis 1996       Past Surgical History:   Procedure Laterality Date    APPENDECTOMY      at age 13years   [address]  2004    right foot   [address]  2007??  &   2012? ?    normal results    COLONOSCOPY      last one 2000???    ENDOSCOPY, COLON, DIAGNOSTIC      EYE SURGERY  2009??    right eye   [address]    still has ovaries    MD LAMINECTOMY,>2 SGMT,LUMBAR N/A 3/7/2018    LUMBAR LAMINECTOMY L3-S1 performed by Oscar Lang MD at [address] OFFICE/OUTPT [facility] N/A 3/12/2018    REPAIR DUROTOMY PLACEMENT OF SUBARACHNOID DRAIN performed by Oscar Lang MD at [facility] [address]  left    SPINE SURGERY  7/02    L5 cyst    UPPER GASTROINTESTINAL ENDOSCOPY  2007    [facility]    UPPER GASTROINTESTINAL ENDOSCOPY Left 5/19/2020    EGD BIOPSY performed by Dustin Jasso MD at [address] [facility]       Family History   Problem Relation Age of Onset    Tuberculosis Mother     Tuberculosis Father        CareTeam (Including outside providers/suppliers regularly involved in providing care):   Patient Care Team:  Daniele Garcia MD as PCP - General (Family Medicine)  Daniele Garcia MD as PCP - [facility] Empaneled Provider    Wt Readings from Last 3 Encounters:   08/31/20 157 lb 3.2 oz (71.3 kg)   06/29/20 156 lb 4 oz (70.9 kg)   06/25/20 152 lb (68.9 kg)     Vitals:    08/31/20 1052   BP: 130/80   Site: Right Upper Arm   Position: Sitting   Cuff Size: Medium Adult   Pulse: 76   Resp: 16   Temp: 97.7 °F (36.5 °C)   Weight: 157 lb 3.2 oz (71.3 kg)   Height: 5' 1\" (1.549 m)     Body mass index is 29.7 kg/m². Based upon direct observation of the patient, evaluation of cognition reveals recent and remote memory intact. Patient's complete Health Risk Assessment and screening values have been reviewed and are found in Flowsheets. The following problems were reviewed today and where indicated follow up appointments were made and/or referrals ordered. Positive Risk Factor Screenings with Interventions:     General Health:  General  In general, how would you say your health is?: Good  In the past 7 days, have you experienced any of the following? New or Increased Pain, New or Increased Fatigue, Loneliness, Social Isolation, Stress or Anger?: None of These  Do you get the social and emotional support that you need?: Yes  Do you have a Living Will?: (!) No  General Health Risk Interventions:  ·    no  living  will and  durable power  health    Health Habits/Nutrition:  Health Habits/Nutrition  Do you exercise for at least 20 minutes 2-3 times per week?: Yes  Have you lost any weight without trying in the past 3 months?: No  Do you eat fewer than 2 meals per day?: No  Have you seen a dentist within the past year?: (!) No  Body mass index is 29.7 kg/m².   Health Habits/Nutrition Interventions:  · Dental exam overdue:  patient encouraged to make appointment with his/her dentist    Safety:  Safety  Do you have working smoke detectors?: Yes  Have all throw rugs been removed or fastened?: Yes  Do you have non-slip mats or surfaces in all bathtubs/showers?: Yes  Do all of your stairways have a railing or banister?: Yes  Are your doorways, halls and stairs free of clutter?: Yes  Do you always fasten your seatbelt when you are in a car?: (!) No  Safety Interventions:  · Home safety tips provided    Personalized Preventive Plan   Current Health Maintenance Status  Immunization History   Administered Date(s) Administered    Pneumococcal Conjugate 13-valent ([id number]) 2015    Pneumococcal Conjugate 7-valent (Prevnar7) 2003    Pneumococcal Polysaccharide ([id number]) 2010        Health Maintenance   Topic Date Due    DTaP/Tdap/Td vaccine (1 - Tdap) 1955    Shingles Vaccine (1 of 2) 1986    DEXA (modify frequency per FRAX score)  1991    Annual Wellness Visit (AWV)  2019    Flu vaccine (1) 2020    Lipid screen  2021    Pneumococcal 65+ years Vaccine  Completed    Hepatitis A vaccine  Aged Out    Hepatitis B vaccine  Aged Out    Hib vaccine  Aged Out    Meningococcal (ACWY) vaccine  Aged Out     Recommendations for [employer] Due: see orders and patient instructions/AVS.  . Recommended screening schedule for the next 5-10 years is provided to the patient in written form: see Patient Maite Herzog was seen today for medicare awv. Diagnoses and all orders for this visit:    Essential hypertension    Coronary artery disease involving native coronary artery of native heart without angina pectoris          .        2020     Naomi Ross (:  1935) is a 81 y.o. female, here for evaluation of the following medical concerns:    HPI       ashd  stable       Back  Pain  Stable   Spinal stenosis      crf   stable       Copd    Stable      htn  Stable     lipid  Stable          Review of Systems   Constitutional: Negative for appetite change, fatigue and fever. HENT: Negative for congestion, ear pain, postnasal drip and sore throat. Eyes: Negative for pain and visual disturbance. Respiratory: Negative for cough, chest tightness, shortness of breath and wheezing. Cardiovascular: Negative for chest pain, palpitations and leg swelling. Gastrointestinal: Negative for abdominal pain, constipation and nausea. Genitourinary: Negative for dysuria and frequency. Musculoskeletal: Positive for back pain. Negative for arthralgias, joint swelling, neck pain and neck stiffness. Skin: Negative for rash. Neurological: Negative for dizziness, weakness, numbness and headaches. Hematological: Negative for adenopathy. Does not bruise/bleed easily. Psychiatric/Behavioral: Negative for behavioral problems and sleep disturbance. The patient is not nervous/anxious. Prior to Visit Medications    Medication Sig Taking? Authorizing Provider   HYDROcodone-acetaminophen (NORCO) 5-325 MG per tablet Take 1 tablet by mouth every 6 hours as needed for Pain for up to 30 days.  Yes Kelsi Bishop MD   metoprolol tartrate (LOPRESSOR) 25 MG tablet Take 0.5 tablets by mouth 2 times daily Yes Kelsi Bishop MD   nortriptyline (PAMELOR) 50 MG capsule Take 1 capsule by mouth nightly Yes Kelsi Bishop MD   simvastatin (ZOCOR) 40 MG tablet Take 1 tablet by mouth nightly Yes Kelsi Bishop MD   tiZANidine (ZANAFLEX) 4 MG tablet Take 1 tablet by mouth every 8 hours as needed (muscle spasm) Yes Kelsi Bishop MD   Multiple Vitamins-Minerals (THERAPEUTIC MULTIVITAMIN-MINERALS) tablet Take 1 tablet by mouth daily Yes Historical Provider, MD   latanoprost (XALATAN) 0.005 % ophthalmic solution Place 1 drop into both eyes nightly Yes Historical Provider, MD   nitroGLYCERIN (NITROSTAT) 0.4 MG SL tablet up to max of 3 total doses. If no relief after 1 dose, call 911. Yes Yann Tong MD   pantoprazole (PROTONIX) 40 MG tablet Take 1 tablet by mouth 2 times daily (before meals) Yes Yann Tong MD   sucralfate (CARAFATE) 1 GM tablet Take 1 tablet by mouth 4 times daily (before meals and nightly) Yes Yann Tong MD   isosorbide mononitrate (IMDUR) 30 MG extended release tablet Take 1 tablet by mouth daily Yes Winnie Closs, MD   potassium chloride (KLOR-CON M) 20 MEQ extended release tablet Take 1 tablet by mouth daily Yes Winnie Closs, MD   acetaminophen (TYLENOL) 325 MG tablet Take 2 tablets by mouth every 4 hours as needed for Pain Maximum dose of acetaminophen is 4000 mg from all sources in 24 hours. Yes YASMINE Bains - CNP   aspirin 81 MG tablet Take 81 mg by mouth daily Yes Historical Provider, MD        Social History     Tobacco Use    Smoking status: Former Smoker     Years: 30.00     Types: Cigarettes     Last attempt to quit: 1989     Years since quittin.2    Smokeless tobacco: Never Used   Substance Use Topics    Alcohol use: No        Vitals:    20 1052   BP: 130/80   Site: Right Upper Arm   Position: Sitting   Cuff Size: Medium Adult   Pulse: 76   Resp: 16   Temp: 97.7 °F (36.5 °C)   Weight: 157 lb 3.2 oz (71.3 kg)   Height: 5' 1\" (1.549 m)     Estimated body mass index is 29.7 kg/m² as calculated from the following:    Height as of this encounter: 5' 1\" (1.549 m). Weight as of this encounter: 157 lb 3.2 oz (71.3 kg). Physical Exam  Vitals signs and nursing note reviewed. Constitutional:       Appearance: She is well-developed. HENT:      Head: Normocephalic and atraumatic. Right Ear: External ear normal.      Left Ear: External ear normal.      Nose: Nose normal.   Eyes:      General: No scleral icterus. Conjunctiva/sclera: Conjunctivae normal.      Pupils: Pupils are equal, round, and reactive to light. Neck:      Musculoskeletal: Normal range of motion and neck supple. Thyroid: No thyromegaly. Vascular: No JVD. Cardiovascular:      Rate and Rhythm: Normal rate and regular rhythm. Heart sounds: Normal heart sounds. Pulmonary:      Effort: Pulmonary effort is normal.      Breath sounds: Normal breath sounds. No wheezing or rales. Abdominal:      General: Bowel sounds are normal. There is no distension. Palpations: Abdomen is soft. There is no mass. Tenderness: There is no abdominal tenderness. Musculoskeletal: Normal range of motion. General: No tenderness. Comments:  Low  Back pain  Noted    Lymphadenopathy:      Cervical: No cervical adenopathy. Skin:     General: Skin is warm and dry. Findings: No rash. Neurological:      Mental Status: She is alert and oriented to person, place, and time. Cranial Nerves: No cranial nerve deficit. Deep Tendon Reflexes: Reflexes are normal and symmetric. ASSESSMENT/PLAN:     Diagnosis Orders   1. Essential hypertension  potassium chloride (KLOR-CON M) 20 MEQ extended release tablet   2. Coronary artery disease involving native coronary artery of native heart without angina pectoris  isosorbide mononitrate (IMDUR) 30 MG extended release tablet   3. Spinal stenosis of lumbar region with neurogenic claudication     4. Chronic back pain, unspecified back location, unspecified back pain laterality     5. Chronic obstructive pulmonary disease, unspecified COPD type ([facility] [address]     6. Gastroesophageal reflux disease without esophagitis     7.  Pure hypercholesterolemia         PLAN    Current Outpatient Medications   Medication Sig Dispense Refill    potassium chloride (KLOR-CON M) 20 MEQ extended release tablet Take 1 tablet by mouth daily 90 tablet 1    isosorbide mononitrate (IMDUR) 30 MG extended release tablet Take 1 tablet by mouth daily 90 tablet 1    HYDROcodone-acetaminophen (NORCO) 5-325 MG per tablet Take 1 tablet by mouth every 6 hours as needed for Pain for up to 30 days. 60 tablet 0    metoprolol tartrate (LOPRESSOR) 25 MG tablet Take 0.5 tablets by mouth 2 times daily 90 tablet 1    nortriptyline (PAMELOR) 50 MG capsule Take 1 capsule by mouth nightly 90 capsule 1    simvastatin (ZOCOR) 40 MG tablet Take 1 tablet by mouth nightly 90 tablet 1    tiZANidine (ZANAFLEX) 4 MG tablet Take 1 tablet by mouth every 8 hours as needed (muscle spasm) 100 tablet 2    Multiple Vitamins-Minerals (THERAPEUTIC MULTIVITAMIN-MINERALS) tablet Take 1 tablet by mouth daily      latanoprost (XALATAN) 0.005 % ophthalmic solution Place 1 drop into both eyes nightly      nitroGLYCERIN (NITROSTAT) 0.4 MG SL tablet up to max of 3 total doses. If no relief after 1 dose, call 911. 30 tablet 0    pantoprazole (PROTONIX) 40 MG tablet Take 1 tablet by mouth 2 times daily (before meals) 60 tablet 0    sucralfate (CARAFATE) 1 GM tablet Take 1 tablet by mouth 4 times daily (before meals and nightly) 120 tablet 0    acetaminophen (TYLENOL) 325 MG tablet Take 2 tablets by mouth every 4 hours as needed for Pain Maximum dose of acetaminophen is 4000 mg from all sources in 24 hours.  aspirin 81 MG tablet Take 81 mg by mouth daily       No current facility-administered medications for this visit. No orders of the defined types were placed in this encounter. see in  3 mths  Labs  Ok and  Stable   No follow-ups on file. An electronic signature was used to authenticate this note.     --Derek White MD on 8/31/2020 at 11:28 AM

## 2020-08-31 NOTE — PATIENT INSTRUCTIONS
Personalized Preventive Plan for Marlene Woods - 8/31/2020  Medicare offers a range of preventive health benefits. Some of the tests and screenings are paid in full while other may be subject to a deductible, co-insurance, and/or copay. Some of these benefits include a comprehensive review of your medical history including lifestyle, illnesses that may run in your family, and various assessments and screenings as appropriate. After reviewing your medical record and screening and assessments performed today your provider may have ordered immunizations, labs, imaging, and/or referrals for you. A list of these orders (if applicable) as well as your Preventive Care list are included within your After Visit Summary for your review. Other Preventive Recommendations:    · A preventive eye exam performed by an eye specialist is recommended every 1-2 years to screen for glaucoma; cataracts, macular degeneration, and other eye disorders. · A preventive dental visit is recommended every 6 months. · Try to get at least 150 minutes of exercise per week or 10,000 steps per day on a pedometer . · Order or download the FREE \"Exercise & Physical Activity: Your Everyday Guide\" from [employer]. Call 2-658.829.2562 or search [employer] online. · You need 7177-6496 mg of calcium and 9873-7367 IU of vitamin D per day. It is possible to meet your calcium requirement with diet alone, but a vitamin D supplement is usually necessary to meet this goal.  · When exposed to the sun, use a sunscreen that protects against both UVA and UVB radiation with an SPF of 30 or greater. Reapply every 2 to 3 hours or after sweating, drying off with a towel, or swimming. · Always wear a seat belt when traveling in a car. Always wear a helmet when riding a bicycle or motorcycle.

## 2020-09-28 ENCOUNTER — TELEPHONE (OUTPATIENT)
Dept: FAMILY MEDICINE CLINIC | Age: 85
End: 2020-09-28

## 2020-09-28 RX ORDER — CEPHALEXIN 500 MG/1
500 CAPSULE ORAL 3 TIMES DAILY
Qty: 30 CAPSULE | Refills: 0 | Status: SHIPPED | OUTPATIENT
Start: 2020-09-28 | End: 2020-10-08

## 2020-09-28 NOTE — TELEPHONE ENCOUNTER
Date of last visit:  8/31/2020  Date of next visit:  12/1/2020    Requested Prescriptions     Signed Prescriptions Disp Refills    cephALEXin (KEFLEX) 500 MG capsule 30 capsule 0     Sig: Take 1 capsule by mouth 3 times daily for 10 days     Authorizing Provider: Manav Hdz     Ordering User: Arabella Yepez

## 2020-10-01 NOTE — TELEPHONE ENCOUNTER
Date of last visit:  8/31/2020  Date of next visit:  12/1/2020    Requested Prescriptions     Pending Prescriptions Disp Refills    HYDROcodone-acetaminophen (NORCO) 5-325 MG per tablet 60 tablet 0     Sig: Take 1 tablet by mouth every 6 hours as needed for Pain for up to 30 days.

## 2020-10-02 RX ORDER — HYDROCODONE BITARTRATE AND ACETAMINOPHEN 5; 325 MG/1; MG/1
1 TABLET ORAL EVERY 6 HOURS PRN
Qty: 60 TABLET | Refills: 0 | Status: SHIPPED | OUTPATIENT
Start: 2020-10-02 | End: 2020-11-11 | Stop reason: SDUPTHER

## 2020-11-10 NOTE — TELEPHONE ENCOUNTER
Date of last visit:  8/31/2020  Date of next visit:  12/1/2020    Requested Prescriptions     Pending Prescriptions Disp Refills    tiZANidine (ZANAFLEX) 4 MG tablet 100 tablet 2     Sig: Take 1 tablet by mouth every 8 hours as needed (muscle spasm)    HYDROcodone-acetaminophen (NORCO) 5-325 MG per tablet 60 tablet 0     Sig: Take 1 tablet by mouth every 6 hours as needed for Pain for up to 30 days.

## 2020-11-11 RX ORDER — TIZANIDINE 4 MG/1
4 TABLET ORAL EVERY 8 HOURS PRN
Qty: 100 TABLET | Refills: 2 | Status: ON HOLD | OUTPATIENT
Start: 2020-11-11 | End: 2021-04-09 | Stop reason: SDUPTHER

## 2020-11-11 RX ORDER — HYDROCODONE BITARTRATE AND ACETAMINOPHEN 5; 325 MG/1; MG/1
1 TABLET ORAL EVERY 6 HOURS PRN
Qty: 60 TABLET | Refills: 0 | Status: SHIPPED | OUTPATIENT
Start: 2020-11-11 | End: 2020-12-16 | Stop reason: SDUPTHER

## 2020-12-01 ENCOUNTER — OFFICE VISIT (OUTPATIENT)
Dept: FAMILY MEDICINE CLINIC | Age: 85
End: 2020-12-01

## 2020-12-01 ENCOUNTER — NURSE ONLY (OUTPATIENT)
Dept: LAB | Age: 85
End: 2020-12-01

## 2020-12-01 VITALS
HEIGHT: 61 IN | DIASTOLIC BLOOD PRESSURE: 72 MMHG | RESPIRATION RATE: 14 BRPM | WEIGHT: 155.13 LBS | SYSTOLIC BLOOD PRESSURE: 130 MMHG | BODY MASS INDEX: 29.29 KG/M2 | TEMPERATURE: 96.7 F | HEART RATE: 68 BPM

## 2020-12-01 PROBLEM — M54.50 LUMBAR BACK PAIN: Status: RESOLVED | Noted: 2018-03-07 | Resolved: 2020-12-01

## 2020-12-01 LAB
ANION GAP SERPL CALCULATED.3IONS-SCNC: 10 MEQ/L (ref 8–16)
BASOPHILS # BLD: 0.5 %
BASOPHILS ABSOLUTE: 0 THOU/MM3 (ref 0–0.1)
BUN BLDV-MCNC: 26 MG/DL (ref 7–22)
CALCIUM SERPL-MCNC: 9.3 MG/DL (ref 8.5–10.5)
CHLORIDE BLD-SCNC: 103 MEQ/L (ref 98–111)
CO2: 22 MEQ/L (ref 23–33)
CREAT SERPL-MCNC: 1.2 MG/DL (ref 0.4–1.2)
EOSINOPHIL # BLD: 2.4 %
EOSINOPHILS ABSOLUTE: 0.2 THOU/MM3 (ref 0–0.4)
ERYTHROCYTE [DISTWIDTH] IN BLOOD BY AUTOMATED COUNT: 15.3 % (ref 11.5–14.5)
ERYTHROCYTE [DISTWIDTH] IN BLOOD BY AUTOMATED COUNT: 54.3 FL (ref 35–45)
GFR SERPL CREATININE-BSD FRML MDRD: 43 ML/MIN/1.73M2
GLUCOSE BLD-MCNC: 85 MG/DL (ref 70–108)
HCT VFR BLD CALC: 36 % (ref 37–47)
HEMOGLOBIN: 11 GM/DL (ref 12–16)
IMMATURE GRANS (ABS): 0.01 THOU/MM3 (ref 0–0.07)
IMMATURE GRANULOCYTES: 0.2 %
LYMPHOCYTES # BLD: 22.5 %
LYMPHOCYTES ABSOLUTE: 1.4 THOU/MM3 (ref 1–4.8)
MCH RBC QN AUTO: 29.3 PG (ref 26–33)
MCHC RBC AUTO-ENTMCNC: 30.6 GM/DL (ref 32.2–35.5)
MCV RBC AUTO: 96 FL (ref 81–99)
MONOCYTES # BLD: 14.8 %
MONOCYTES ABSOLUTE: 0.9 THOU/MM3 (ref 0.4–1.3)
NUCLEATED RED BLOOD CELLS: 0 /100 WBC
PLATELET # BLD: 263 THOU/MM3 (ref 130–400)
PMV BLD AUTO: 10.5 FL (ref 9.4–12.4)
POTASSIUM SERPL-SCNC: 4.8 MEQ/L (ref 3.5–5.2)
RBC # BLD: 3.75 MILL/MM3 (ref 4.2–5.4)
SEG NEUTROPHILS: 59.6 %
SEGMENTED NEUTROPHILS ABSOLUTE COUNT: 3.8 THOU/MM3 (ref 1.8–7.7)
SODIUM BLD-SCNC: 135 MEQ/L (ref 135–145)
WBC # BLD: 6.3 THOU/MM3 (ref 4.8–10.8)

## 2020-12-01 PROCEDURE — 1123F ACP DISCUSS/DSCN MKR DOCD: CPT | Performed by: FAMILY MEDICINE

## 2020-12-01 PROCEDURE — 99213 OFFICE O/P EST LOW 20 MIN: CPT | Performed by: FAMILY MEDICINE

## 2020-12-01 PROCEDURE — 1090F PRES/ABSN URINE INCON ASSESS: CPT | Performed by: FAMILY MEDICINE

## 2020-12-01 PROCEDURE — G8417 CALC BMI ABV UP PARAM F/U: HCPCS | Performed by: FAMILY MEDICINE

## 2020-12-01 PROCEDURE — G8427 DOCREV CUR MEDS BY ELIG CLIN: HCPCS | Performed by: FAMILY MEDICINE

## 2020-12-01 PROCEDURE — 1036F TOBACCO NON-USER: CPT | Performed by: FAMILY MEDICINE

## 2020-12-01 PROCEDURE — G8400 PT W/DXA NO RESULTS DOC: HCPCS | Performed by: FAMILY MEDICINE

## 2020-12-01 PROCEDURE — 4040F PNEUMOC VAC/ADMIN/RCVD: CPT | Performed by: FAMILY MEDICINE

## 2020-12-01 ASSESSMENT — ENCOUNTER SYMPTOMS
CONSTIPATION: 0
SORE THROAT: 0
BACK PAIN: 1
ABDOMINAL PAIN: 0
WHEEZING: 0
SHORTNESS OF BREATH: 0
BELCHING: 0
EYE PAIN: 0
HEARTBURN: 0
NAUSEA: 0
CHEST TIGHTNESS: 0
COUGH: 0

## 2020-12-01 NOTE — PROGRESS NOTES
Subjective:      Patient ID: Karen Brock is a 81 y.o. female. ashd  Stable       Lipids  Stable     Hypertension   This is a chronic problem. The current episode started more than 1 year ago. The problem has been resolved since onset. The problem is controlled. Pertinent negatives include no anxiety, chest pain, headaches, neck pain, palpitations, peripheral edema or shortness of breath. The current treatment provides significant improvement. Gastroesophageal Reflux   She reports no abdominal pain, no belching, no chest pain, no coughing, no heartburn, no nausea, no sore throat or no wheezing. This is a chronic problem. The current episode started more than 1 year ago. The problem occurs rarely. Pertinent negatives include no fatigue. She has tried a PPI for the symptoms. The treatment provided significant relief. Hyperlipidemia   This is a chronic problem. The current episode started more than 1 year ago. The problem is controlled. Pertinent negatives include no chest pain or shortness of breath. Current antihyperlipidemic treatment includes statins. The current treatment provides significant improvement of lipids. Back Pain   This is a chronic problem. The current episode started more than 1 year ago. The problem occurs daily. The problem has been waxing and waning since onset. The pain is present in the lumbar spine. The pain does not radiate. The pain is at a severity of 4/10. The pain is moderate. The pain is the same all the time. The symptoms are aggravated by bending. Stiffness is present all day. Pertinent negatives include no abdominal pain, chest pain, dysuria, fever, headaches, numbness or weakness. She has tried ice, bed rest, analgesics and muscle relaxant for the symptoms. The treatment provided mild relief.      Past Medical History:   Diagnosis Date    Acute blood loss as cause of postoperative anemia 03/2018    CAD (coronary artery disease)      cath  48    Cardiac valve prolapse     Chest pain 5/15/2020    Chronic back pain     Compression fracture of L2 ([address] 5/26/2020    COPD (chronic obstructive pulmonary disease) (HCC)     Ex-smoker     GERD (gastroesophageal reflux disease) 2/07    EGD    Glaucoma     H/O cardiac catheterization 2002    40-50% LAD   katharine    Hearing loss secondary to cerumen impaction 5/28/2019    Hyperlipidemia     Hypertension     Inadvertent durotomy 3/12/2018    Liver disease     history of hepatitis at age 22    Osteoarthritis 1999    right shoulder and back    Pericarditis 1996       Review of Systems   Constitutional: Negative for appetite change, fatigue and fever. HENT: Negative for congestion, ear pain, postnasal drip and sore throat. Eyes: Negative for pain and visual disturbance. Respiratory: Negative for cough, chest tightness, shortness of breath and wheezing. Cardiovascular: Negative for chest pain, palpitations and leg swelling. Gastrointestinal: Negative for abdominal pain, constipation, heartburn and nausea. Genitourinary: Negative for dysuria and frequency. Musculoskeletal: Positive for back pain. Negative for arthralgias, joint swelling, neck pain and neck stiffness. Skin: Negative for rash. Neurological: Negative for dizziness, weakness, numbness and headaches. Hematological: Negative for adenopathy. Does not bruise/bleed easily. Psychiatric/Behavioral: Negative for behavioral problems and sleep disturbance. The patient is not nervous/anxious. /72 (Site: Left Upper Arm, Position: Sitting, Cuff Size: Medium Adult)   Pulse 68   Temp 96.7 °F (35.9 °C) (Skin)   Resp 14   Ht 5' 1\" (1.549 m)   Wt 155 lb 2 oz (70.4 kg)   BMI 29.31 kg/m²   Objective:   Physical Exam  Vitals signs and nursing note reviewed. Constitutional:       Appearance: She is well-developed. HENT:      Head: Normocephalic and atraumatic.       Right Ear: External ear normal.      Left Ear: External ear normal. Nose: Nose normal.   Eyes:      General: No scleral icterus. Conjunctiva/sclera: Conjunctivae normal.      Pupils: Pupils are equal, round, and reactive to light. Neck:      Musculoskeletal: Normal range of motion and neck supple. Thyroid: No thyromegaly. Vascular: No JVD. Cardiovascular:      Rate and Rhythm: Normal rate and regular rhythm. Heart sounds: Normal heart sounds. Pulmonary:      Effort: Pulmonary effort is normal.      Breath sounds: Normal breath sounds. No wheezing or rales. Abdominal:      General: Bowel sounds are normal. There is no distension. Palpations: Abdomen is soft. There is no mass. Tenderness: There is no abdominal tenderness. Musculoskeletal: Normal range of motion. General: No tenderness. Comments:   Low  Back pain    Noted  Lumbar pain   Lymphadenopathy:      Cervical: No cervical adenopathy. Skin:     General: Skin is warm and dry. Findings: No rash. Neurological:      Mental Status: She is alert and oriented to person, place, and time. Cranial Nerves: No cranial nerve deficit. Deep Tendon Reflexes: Reflexes are normal and symmetric. Assessment:       Diagnosis Orders   1. Essential hypertension  Basic Metabolic Panel    CBC Auto Differential   2. Coronary artery disease involving native coronary artery of native heart without angina pectoris     3. Chronic back pain, unspecified back location, unspecified back pain laterality     4. Chronic obstructive pulmonary disease, unspecified COPD type ([address]     5. Gastroesophageal reflux disease without esophagitis     6. Pure hypercholesterolemia     7. Neurologic gait dysfunction     8.  Spinal stenosis of lumbar region with neurogenic claudication           Plan:      Current Outpatient Medications   Medication Sig Dispense Refill    tiZANidine (ZANAFLEX) 4 MG tablet Take 1 tablet by mouth every 8 hours as needed (muscle spasm) 100 tablet 2    HYDROcodone-acetaminophen (NORCO) 5-325 MG per tablet Take 1 tablet by mouth every 6 hours as needed for Pain for up to 30 days. 60 tablet 0    potassium chloride (KLOR-CON M) 20 MEQ extended release tablet Take 1 tablet by mouth daily 90 tablet 1    isosorbide mononitrate (IMDUR) 30 MG extended release tablet Take 1 tablet by mouth daily 90 tablet 1    metoprolol tartrate (LOPRESSOR) 25 MG tablet Take 0.5 tablets by mouth 2 times daily 90 tablet 1    nortriptyline (PAMELOR) 50 MG capsule Take 1 capsule by mouth nightly 90 capsule 1    simvastatin (ZOCOR) 40 MG tablet Take 1 tablet by mouth nightly 90 tablet 1    Multiple Vitamins-Minerals (THERAPEUTIC MULTIVITAMIN-MINERALS) tablet Take 1 tablet by mouth daily      latanoprost (XALATAN) 0.005 % ophthalmic solution Place 1 drop into both eyes nightly      nitroGLYCERIN (NITROSTAT) 0.4 MG SL tablet up to max of 3 total doses. If no relief after 1 dose, call 911. 30 tablet 0    pantoprazole (PROTONIX) 40 MG tablet Take 1 tablet by mouth 2 times daily (before meals) 60 tablet 0    sucralfate (CARAFATE) 1 GM tablet Take 1 tablet by mouth 4 times daily (before meals and nightly) 120 tablet 0    acetaminophen (TYLENOL) 325 MG tablet Take 2 tablets by mouth every 4 hours as needed for Pain Maximum dose of acetaminophen is 4000 mg from all sources in 24 hours.  aspirin 81 MG tablet Take 81 mg by mouth daily       No current facility-administered medications for this visit.           Orders Placed This Encounter   Procedures    Basic Metabolic Panel     Standing Status:   Future     Standing Expiration Date:   12/1/2021    CBC Auto Differential     Standing Status:   Future     Standing Expiration Date:   12/1/2021     No results found for this visit on 12/01/20.      see  In 3  mths   Elizabeth Slater MD

## 2020-12-02 ENCOUNTER — TELEPHONE (OUTPATIENT)
Dept: FAMILY MEDICINE CLINIC | Age: 85
End: 2020-12-02

## 2020-12-02 NOTE — TELEPHONE ENCOUNTER
----- Message from Nelson Figueroa MD sent at 12/2/2020  6:04 AM EST -----  Call all labs are stable

## 2020-12-15 NOTE — TELEPHONE ENCOUNTER
Date of last visit:  12/1/2020  Date of next visit:  3/3/2021    Requested Prescriptions     Pending Prescriptions Disp Refills    pantoprazole (PROTONIX) 40 MG tablet 60 tablet 0     Sig: Take 1 tablet by mouth 2 times daily (before meals)    HYDROcodone-acetaminophen (NORCO) 5-325 MG per tablet 60 tablet 0     Sig: Take 1 tablet by mouth every 6 hours as needed for Pain for up to 30 days.

## 2020-12-16 RX ORDER — PANTOPRAZOLE SODIUM 40 MG/1
40 TABLET, DELAYED RELEASE ORAL
Qty: 60 TABLET | Refills: 0 | Status: SHIPPED | OUTPATIENT
Start: 2020-12-16 | End: 2021-01-13 | Stop reason: SDUPTHER

## 2020-12-16 RX ORDER — HYDROCODONE BITARTRATE AND ACETAMINOPHEN 5; 325 MG/1; MG/1
1 TABLET ORAL EVERY 6 HOURS PRN
Qty: 60 TABLET | Refills: 0 | Status: SHIPPED | OUTPATIENT
Start: 2020-12-16 | End: 2021-01-20 | Stop reason: SDUPTHER

## 2021-01-12 NOTE — TELEPHONE ENCOUNTER
Date of last visit:  12/1/2020  Date of next visit:  3/3/2021    Requested Prescriptions     Pending Prescriptions Disp Refills    pantoprazole (PROTONIX) 40 MG tablet 60 tablet 0     Sig: Take 1 tablet by mouth 2 times daily (before meals)    sucralfate (CARAFATE) 1 GM tablet 120 tablet 0     Sig: Take 1 tablet by mouth 4 times daily (before meals and nightly)

## 2021-01-13 RX ORDER — PANTOPRAZOLE SODIUM 40 MG/1
40 TABLET, DELAYED RELEASE ORAL
Qty: 60 TABLET | Refills: 0 | Status: SHIPPED | OUTPATIENT
Start: 2021-01-13 | End: 2021-02-15 | Stop reason: SDUPTHER

## 2021-01-13 RX ORDER — SUCRALFATE 1 G/1
1 TABLET ORAL
Qty: 120 TABLET | Refills: 0 | Status: SHIPPED | OUTPATIENT
Start: 2021-01-13 | End: 2021-02-16 | Stop reason: SDUPTHER

## 2021-01-19 DIAGNOSIS — G89.29 CHRONIC BACK PAIN, UNSPECIFIED BACK LOCATION, UNSPECIFIED BACK PAIN LATERALITY: ICD-10-CM

## 2021-01-19 DIAGNOSIS — M54.9 CHRONIC BACK PAIN, UNSPECIFIED BACK LOCATION, UNSPECIFIED BACK PAIN LATERALITY: ICD-10-CM

## 2021-01-19 DIAGNOSIS — M48.062 SPINAL STENOSIS OF LUMBAR REGION WITH NEUROGENIC CLAUDICATION: ICD-10-CM

## 2021-01-19 NOTE — TELEPHONE ENCOUNTER
Patient called req refill of [place] to [facility].     Last seen    12/1/20    Next appt      3/3/21

## 2021-01-20 RX ORDER — HYDROCODONE BITARTRATE AND ACETAMINOPHEN 5; 325 MG/1; MG/1
1 TABLET ORAL EVERY 6 HOURS PRN
Qty: 60 TABLET | Refills: 0 | Status: SHIPPED | OUTPATIENT
Start: 2021-01-20 | End: 2021-02-18 | Stop reason: SDUPTHER

## 2021-01-27 DIAGNOSIS — G89.29 CHRONIC MIDLINE LOW BACK PAIN WITH RIGHT-SIDED SCIATICA: ICD-10-CM

## 2021-01-27 DIAGNOSIS — I10 ESSENTIAL HYPERTENSION: ICD-10-CM

## 2021-01-27 DIAGNOSIS — M54.41 CHRONIC MIDLINE LOW BACK PAIN WITH RIGHT-SIDED SCIATICA: ICD-10-CM

## 2021-01-27 NOTE — TELEPHONE ENCOUNTER
Dorian Elizondo called requesting a refill of the below medication which has been pended for you:     Requested Prescriptions     Pending Prescriptions Disp Refills    potassium chloride (KLOR-CON M) 20 MEQ extended release tablet 90 tablet 1     Sig: Take 1 tablet by mouth daily    nortriptyline (PAMELOR) 50 MG capsule 90 capsule 1     Sig: Take 1 capsule by mouth nightly       Last Appointment Date: 12/1/2020  Next Appointment Date: 3/3/2021    No Known Allergies     Please send to :    [facility]

## 2021-01-28 RX ORDER — NORTRIPTYLINE HYDROCHLORIDE 50 MG/1
50 CAPSULE ORAL NIGHTLY
Qty: 90 CAPSULE | Refills: 1 | Status: ON HOLD | OUTPATIENT
Start: 2021-01-28 | End: 2021-04-21 | Stop reason: HOSPADM

## 2021-01-28 RX ORDER — POTASSIUM CHLORIDE 20 MEQ/1
20 TABLET, EXTENDED RELEASE ORAL DAILY
Qty: 90 TABLET | Refills: 1 | Status: SHIPPED | OUTPATIENT
Start: 2021-01-28 | End: 2021-03-03 | Stop reason: SDUPTHER

## 2021-02-04 ENCOUNTER — OFFICE VISIT (OUTPATIENT)
Dept: FAMILY MEDICINE CLINIC | Age: 86
End: 2021-02-04

## 2021-02-04 VITALS
BODY MASS INDEX: 28.91 KG/M2 | DIASTOLIC BLOOD PRESSURE: 60 MMHG | SYSTOLIC BLOOD PRESSURE: 118 MMHG | HEIGHT: 61 IN | TEMPERATURE: 96.8 F | WEIGHT: 153.13 LBS | HEART RATE: 76 BPM | RESPIRATION RATE: 16 BRPM

## 2021-02-04 DIAGNOSIS — J44.9 CHRONIC OBSTRUCTIVE PULMONARY DISEASE, UNSPECIFIED COPD TYPE (HCC): ICD-10-CM

## 2021-02-04 DIAGNOSIS — M19.90 INFLAMMATORY ARTHRITIS: Primary | ICD-10-CM

## 2021-02-04 DIAGNOSIS — I48.0 PAROXYSMAL ATRIAL FIBRILLATION (HCC): ICD-10-CM

## 2021-02-04 DIAGNOSIS — I10 ESSENTIAL HYPERTENSION: ICD-10-CM

## 2021-02-04 DIAGNOSIS — M46.97 INFLAMMATORY SPONDYLOPATHY OF LUMBOSACRAL REGION (HCC): ICD-10-CM

## 2021-02-04 PROCEDURE — 99213 OFFICE O/P EST LOW 20 MIN: CPT | Performed by: FAMILY MEDICINE

## 2021-02-04 PROCEDURE — 1090F PRES/ABSN URINE INCON ASSESS: CPT | Performed by: FAMILY MEDICINE

## 2021-02-04 PROCEDURE — 1123F ACP DISCUSS/DSCN MKR DOCD: CPT | Performed by: FAMILY MEDICINE

## 2021-02-04 PROCEDURE — G8427 DOCREV CUR MEDS BY ELIG CLIN: HCPCS | Performed by: FAMILY MEDICINE

## 2021-02-04 PROCEDURE — 1036F TOBACCO NON-USER: CPT | Performed by: FAMILY MEDICINE

## 2021-02-04 PROCEDURE — G8400 PT W/DXA NO RESULTS DOC: HCPCS | Performed by: FAMILY MEDICINE

## 2021-02-04 PROCEDURE — 4040F PNEUMOC VAC/ADMIN/RCVD: CPT | Performed by: FAMILY MEDICINE

## 2021-02-04 PROCEDURE — 96372 THER/PROPH/DIAG INJ SC/IM: CPT | Performed by: FAMILY MEDICINE

## 2021-02-04 PROCEDURE — G8417 CALC BMI ABV UP PARAM F/U: HCPCS | Performed by: FAMILY MEDICINE

## 2021-02-04 RX ORDER — METHYLPREDNISOLONE ACETATE 80 MG/ML
80 INJECTION, SUSPENSION INTRA-ARTICULAR; INTRALESIONAL; INTRAMUSCULAR; SOFT TISSUE ONCE
Status: COMPLETED | OUTPATIENT
Start: 2021-02-04 | End: 2021-02-04

## 2021-02-04 RX ORDER — METHYLPREDNISOLONE ACETATE 40 MG/ML
40 INJECTION, SUSPENSION INTRA-ARTICULAR; INTRALESIONAL; INTRAMUSCULAR; SOFT TISSUE ONCE
Status: COMPLETED | OUTPATIENT
Start: 2021-02-04 | End: 2021-02-04

## 2021-02-04 RX ORDER — PREDNISONE 20 MG/1
TABLET ORAL
Qty: 24 TABLET | Refills: 0 | Status: SHIPPED | OUTPATIENT
Start: 2021-02-04 | End: 2021-03-03 | Stop reason: ALTCHOICE

## 2021-02-04 RX ADMIN — METHYLPREDNISOLONE ACETATE 40 MG: 40 INJECTION, SUSPENSION INTRA-ARTICULAR; INTRALESIONAL; INTRAMUSCULAR; SOFT TISSUE at 13:41

## 2021-02-04 RX ADMIN — METHYLPREDNISOLONE ACETATE 80 MG: 80 INJECTION, SUSPENSION INTRA-ARTICULAR; INTRALESIONAL; INTRAMUSCULAR; SOFT TISSUE at 13:42

## 2021-02-04 ASSESSMENT — ENCOUNTER SYMPTOMS
CONSTIPATION: 0
SORE THROAT: 0
COUGH: 0
ABDOMINAL PAIN: 0
WHEEZING: 0
CHEST TIGHTNESS: 0
EYE PAIN: 0
NAUSEA: 0
SHORTNESS OF BREATH: 0

## 2021-02-04 ASSESSMENT — PATIENT HEALTH QUESTIONNAIRE - PHQ9: SUM OF ALL RESPONSES TO PHQ QUESTIONS 1-9: 0

## 2021-02-04 NOTE — PROGRESS NOTES
Subjective:      Patient ID: Valente Odell is a 81 y.o. female. Hands  Worse   With  Knees  Worse   Pain      multiple     Joint  Pain     ashd  stable       Back pain  Stable         Hand Pain   The pain is present in the right hand, right fingers, left hand and left fingers. The quality of the pain is described as aching. The pain does not radiate. The pain is moderate. Pertinent negatives include no chest pain or numbness. The treatment provided no relief. Past Medical History:   Diagnosis Date    Acute blood loss as cause of postoperative anemia 03/2018    CAD (coronary artery disease)      cath  50    Cardiac valve prolapse     Chest pain 5/15/2020    Chronic back pain     Compression fracture of L2 ([facility] [address] 5/26/2020    COPD (chronic obstructive pulmonary disease) (HCC)     Ex-smoker     GERD (gastroesophageal reflux disease) 2/07    EGD    Glaucoma     H/O cardiac catheterization 2002    40-50% LAD   katharine    Hearing loss secondary to cerumen impaction 5/28/2019    Hyperlipidemia     Hypertension     Inadvertent durotomy 3/12/2018    Liver disease     history of hepatitis at age 22    Osteoarthritis 1999    right shoulder and back    Pericarditis 1996     Review of Systems   Constitutional: Negative for appetite change, fatigue and fever. HENT: Negative for congestion, ear pain, postnasal drip and sore throat. Eyes: Negative for pain and visual disturbance. Respiratory: Negative for cough, chest tightness, shortness of breath and wheezing. Cardiovascular: Negative for chest pain, palpitations and leg swelling. Gastrointestinal: Negative for abdominal pain, constipation and nausea. Genitourinary: Negative for dysuria and frequency. Musculoskeletal: Positive for arthralgias and joint swelling (  hands and  wrists ). Negative for neck pain and neck stiffness. Skin: Negative for rash. Neurological: Negative for dizziness, weakness, numbness and headaches. Hematological: Negative for adenopathy. Does not bruise/bleed easily. Psychiatric/Behavioral: Negative for behavioral problems and sleep disturbance. The patient is not nervous/anxious. /60 (Site: Right Upper Arm, Position: Sitting, Cuff Size: Medium Adult)   Pulse 76   Temp 96.8 °F (36 °C) (Skin)   Resp 16   Ht 5' 1\" (1.549 m)   Wt 153 lb 2 oz (69.5 kg)   BMI 28.93 kg/m²   Objective:   Physical Exam  Vitals signs and nursing note reviewed. Constitutional:       Appearance: She is well-developed. HENT:      Head: Normocephalic and atraumatic. Right Ear: External ear normal.      Left Ear: External ear normal.      Nose: Nose normal.   Eyes:      General: No scleral icterus. Conjunctiva/sclera: Conjunctivae normal.      Pupils: Pupils are equal, round, and reactive to light. Neck:      Musculoskeletal: Normal range of motion and neck supple. Thyroid: No thyromegaly. Vascular: No JVD. Cardiovascular:      Rate and Rhythm: Normal rate and regular rhythm. Heart sounds: Normal heart sounds. Pulmonary:      Effort: Pulmonary effort is normal.      Breath sounds: Normal breath sounds. No wheezing or rales. Abdominal:      General: Bowel sounds are normal. There is no distension. Palpations: Abdomen is soft. There is no mass. Tenderness: There is no abdominal tenderness. Musculoskeletal: Normal range of motion. General: No tenderness. Comments:   Hands and wrist   With  Pain and some  Swelling        Knees    With pain   Lymphadenopathy:      Cervical: No cervical adenopathy. Skin:     General: Skin is warm and dry. Findings: No rash. Neurological:      Mental Status: She is alert and oriented to person, place, and time. Cranial Nerves: No cranial nerve deficit. Deep Tendon Reflexes: Reflexes are normal and symmetric. Assessment:       Diagnosis Orders   1.  Inflammatory arthritis 2. Essential hypertension  metoprolol tartrate (LOPRESSOR) 25 MG tablet         Plan:      Current Outpatient Medications   Medication Sig Dispense Refill    metoprolol tartrate (LOPRESSOR) 25 MG tablet Take 0.5 tablets by mouth 2 times daily 90 tablet 1    predniSONE (DELTASONE) 20 MG tablet One  Tab po  Tid  For  3  Days and then pone  Tab po  Bid  For  5 days and  Then  One a day  For 5 days 24 tablet 0    potassium chloride (KLOR-CON M) 20 MEQ extended release tablet Take 1 tablet by mouth daily 90 tablet 1    nortriptyline (PAMELOR) 50 MG capsule Take 1 capsule by mouth nightly 90 capsule 1    HYDROcodone-acetaminophen (NORCO) 5-325 MG per tablet Take 1 tablet by mouth every 6 hours as needed for Pain for up to 30 days. 60 tablet 0    pantoprazole (PROTONIX) 40 MG tablet Take 1 tablet by mouth 2 times daily (before meals) 60 tablet 0    sucralfate (CARAFATE) 1 GM tablet Take 1 tablet by mouth 4 times daily (before meals and nightly) 120 tablet 0    tiZANidine (ZANAFLEX) 4 MG tablet Take 1 tablet by mouth every 8 hours as needed (muscle spasm) 100 tablet 2    isosorbide mononitrate (IMDUR) 30 MG extended release tablet Take 1 tablet by mouth daily 90 tablet 1    simvastatin (ZOCOR) 40 MG tablet Take 1 tablet by mouth nightly 90 tablet 1    Multiple Vitamins-Minerals (THERAPEUTIC MULTIVITAMIN-MINERALS) tablet Take 1 tablet by mouth daily      latanoprost (XALATAN) 0.005 % ophthalmic solution Place 1 drop into both eyes nightly      nitroGLYCERIN (NITROSTAT) 0.4 MG SL tablet up to max of 3 total doses. If no relief after 1 dose, call 911. 30 tablet 0    acetaminophen (TYLENOL) 325 MG tablet Take 2 tablets by mouth every 4 hours as needed for Pain Maximum dose of acetaminophen is 4000 mg from all sources in 24 hours.       aspirin 81 MG tablet Take 81 mg by mouth daily       Current Facility-Administered Medications   Medication Dose Route Frequency Provider Last Rate Last Admin  methylPREDNISolone acetate (DEPO-MEDROL) injection 40 mg  40 mg Intramuscular Once Kathryn Ducwkorth MD        methylPREDNISolone acetate (DEPO-MEDROL) injection 80 mg  80 mg Intramuscular Once Kathryn Duckworth MD       ,      pdn  For  Joints and   Keep march appt         Kathryn Duckworth MD

## 2021-02-04 NOTE — PROGRESS NOTES
Administrations This Visit     methylPREDNISolone acetate (DEPO-MEDROL) injection 40 mg     Admin Date  02/04/2021  13:41 Action  Given Dose  40 mg Route  Intramuscular Site  Dorsogluteal Right Administered By  Lazarus Virk    Ordering Provider: Jose Luis Bates MD    NDC: 5137-1068-66    Lot#: [id number]    : Ibeth Flynn.     Patient Supplied?: No          methylPREDNISolone acetate (DEPO-MEDROL) injection 80 mg     Admin Date  02/04/2021  13:42 Action  Given Dose  80 mg Route  Intramuscular Site  Dorsogluteal Right Administered By  Lazarus Virk    Ordering Provider: Jose Luis Bates MD    NDC: 5415-2821-12    Lot#: [id number]    : [place] [facility] [employer]    Patient Supplied?: No

## 2021-02-10 ENCOUNTER — HOSPITAL ENCOUNTER (EMERGENCY)
Age: 86
Discharge: HOME | End: 2021-02-10
Attending: EMERGENCY MEDICINE
Payer: MEDICARE

## 2021-02-10 ENCOUNTER — APPOINTMENT (OUTPATIENT)
Dept: GENERAL RADIOLOGY | Age: 86
End: 2021-02-10
Payer: MEDICARE

## 2021-02-10 VITALS
HEIGHT: 61 IN | SYSTOLIC BLOOD PRESSURE: 90 MMHG | TEMPERATURE: 97.6 F | WEIGHT: 150 LBS | HEART RATE: 68 BPM | DIASTOLIC BLOOD PRESSURE: 63 MMHG | BODY MASS INDEX: 28.32 KG/M2 | OXYGEN SATURATION: 96 % | RESPIRATION RATE: 20 BRPM
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
## 2021-02-10 DIAGNOSIS — R07.89 CHEST WALL PAIN: ICD-10-CM

## 2021-02-10 DIAGNOSIS — S20.219A CONTUSION OF CHEST WALL, UNSPECIFIED LATERALITY, INITIAL ENCOUNTER: Primary | ICD-10-CM

## 2021-02-10 PROCEDURE — 99284 EMERGENCY DEPT VISIT MOD MDM: CPT

## 2021-02-10 PROCEDURE — 71045 X-RAY EXAM CHEST 1 VIEW: CPT

## 2021-02-10 RX ORDER — IBUPROFEN 600 MG/1
600 TABLET ORAL 3 TIMES DAILY PRN
Qty: 30 TABLET | Refills: 0 | Status: ON HOLD | OUTPATIENT
Start: 2021-02-10 | End: 2021-04-03 | Stop reason: HOSPADM

## 2021-02-10 ASSESSMENT — ENCOUNTER SYMPTOMS
VOMITING: 0
CONSTIPATION: 0
RHINORRHEA: 0
NAUSEA: 0
ABDOMINAL PAIN: 0
WHEEZING: 0
CHEST TIGHTNESS: 0
VOICE CHANGE: 0
BACK PAIN: 0
SINUS PRESSURE: 0
DIARRHEA: 0
COUGH: 0
SHORTNESS OF BREATH: 0
SORE THROAT: 0
TROUBLE SWALLOWING: 0

## 2021-02-10 ASSESSMENT — PAIN DESCRIPTION - LOCATION: LOCATION: RIB CAGE

## 2021-02-10 ASSESSMENT — PAIN DESCRIPTION - FREQUENCY: FREQUENCY: CONTINUOUS

## 2021-02-10 ASSESSMENT — PAIN DESCRIPTION - DESCRIPTORS: DESCRIPTORS: SORE

## 2021-02-10 NOTE — ED NOTES
Bed: 018A  Expected date:   Expected time:   Means of arrival: [facility]  Comments:     Kimberley Xiao RN  02/10/21 0632
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
## 2021-02-11 ENCOUNTER — CARE COORDINATION (OUTPATIENT)
Dept: CARE COORDINATION | Age: 86
End: 2021-02-11

## 2021-02-11 SDOH — ECONOMIC STABILITY: TRANSPORTATION INSECURITY
IN THE PAST 12 MONTHS, HAS LACK OF TRANSPORTATION KEPT YOU FROM MEETINGS, WORK, OR FROM GETTING THINGS NEEDED FOR DAILY LIVING?: NO

## 2021-02-11 SDOH — HEALTH STABILITY: PHYSICAL HEALTH: ON AVERAGE, HOW MANY DAYS PER WEEK DO YOU ENGAGE IN MODERATE TO STRENUOUS EXERCISE (LIKE A BRISK WALK)?: 0 DAYS

## 2021-02-11 NOTE — CARE COORDINATION
Dr. Reji Neville, I have enrolled Kervin Denson into Care Coordination program to provide support and education to help manage chronic conditions. Please approve Plan of Care using smart phrase . Ccplanofcare. Thank you.

## 2021-02-15 NOTE — TELEPHONE ENCOUNTER
Date of last visit:  2/4/2021  Date of next visit:  3/3/2021    Requested Prescriptions     Pending Prescriptions Disp Refills    pantoprazole (PROTONIX) 40 MG tablet 60 tablet 2     Sig: Take 1 tablet by mouth 2 times daily (before meals)    sucralfate (CARAFATE) 1 GM tablet 120 tablet 0     Sig: Take 1 tablet by mouth 4 times daily (before meals and nightly)

## 2021-02-15 NOTE — TELEPHONE ENCOUNTER
Staecy Martino called requesting a refill of their:    pantoprazole (PROTONIX) 40 MG tablet BID  sucralfate (CARAFATE) 1 GM tablet Take 1 tablet by mouth 4 times daily (before meals and nightly)     Send 90 day supply to [facility] in [place]

## 2021-02-16 RX ORDER — PANTOPRAZOLE SODIUM 40 MG/1
40 TABLET, DELAYED RELEASE ORAL
Qty: 60 TABLET | Refills: 2 | Status: ON HOLD | OUTPATIENT
Start: 2021-02-16 | End: 2021-05-13 | Stop reason: HOSPADM

## 2021-02-16 RX ORDER — SUCRALFATE 1 G/1
1 TABLET ORAL
Qty: 120 TABLET | Refills: 0 | Status: ON HOLD | OUTPATIENT
Start: 2021-02-16 | End: 2021-05-13 | Stop reason: HOSPADM

## 2021-02-18 ENCOUNTER — HOSPITAL ENCOUNTER (EMERGENCY)
Age: 86
Discharge: HOME | End: 2021-02-18
Attending: FAMILY MEDICINE
Payer: MEDICARE

## 2021-02-18 ENCOUNTER — APPOINTMENT (OUTPATIENT)
Dept: GENERAL RADIOLOGY | Age: 86
End: 2021-02-18
Payer: MEDICARE

## 2021-02-18 ENCOUNTER — CARE COORDINATION (OUTPATIENT)
Dept: CARE COORDINATION | Age: 86
End: 2021-02-18

## 2021-02-18 ENCOUNTER — APPOINTMENT (OUTPATIENT)
Dept: CT IMAGING | Age: 86
End: 2021-02-18
Payer: MEDICARE

## 2021-02-18 VITALS
DIASTOLIC BLOOD PRESSURE: 58 MMHG | HEIGHT: 61 IN | RESPIRATION RATE: 18 BRPM | TEMPERATURE: 98.6 F | OXYGEN SATURATION: 96 % | SYSTOLIC BLOOD PRESSURE: 125 MMHG | BODY MASS INDEX: 28.51 KG/M2 | HEART RATE: 70 BPM | WEIGHT: 151 LBS

## 2021-02-18 DIAGNOSIS — S22.42XD CLOSED FRACTURE OF MULTIPLE RIBS OF LEFT SIDE WITH ROUTINE HEALING, SUBSEQUENT ENCOUNTER: Primary | ICD-10-CM

## 2021-02-18 DIAGNOSIS — M54.9 CHRONIC BACK PAIN, UNSPECIFIED BACK LOCATION, UNSPECIFIED BACK PAIN LATERALITY: ICD-10-CM
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
## 2021-02-18 DIAGNOSIS — M48.062 SPINAL STENOSIS OF LUMBAR REGION WITH NEUROGENIC CLAUDICATION: ICD-10-CM

## 2021-02-18 DIAGNOSIS — G89.29 CHRONIC BACK PAIN, UNSPECIFIED BACK LOCATION, UNSPECIFIED BACK PAIN LATERALITY: ICD-10-CM

## 2021-02-18 LAB
ALBUMIN SERPL-MCNC: 3.2 G/DL (ref 3.5–5.1)
ALP BLD-CCNC: 97 U/L (ref 38–126)
ALT SERPL-CCNC: 29 U/L (ref 11–66)
ANION GAP SERPL CALCULATED.3IONS-SCNC: 10 MEQ/L (ref 8–16)
AST SERPL-CCNC: 22 U/L (ref 5–40)
BASOPHILS # BLD: 0.1 %
BASOPHILS ABSOLUTE: 0 THOU/MM3 (ref 0–0.1)
BILIRUB SERPL-MCNC: < 0.2 MG/DL (ref 0.3–1.2)
BUN BLDV-MCNC: 37 MG/DL (ref 7–22)
CALCIUM SERPL-MCNC: 8.9 MG/DL (ref 8.5–10.5)
CHLORIDE BLD-SCNC: 104 MEQ/L (ref 98–111)
CO2: 18 MEQ/L (ref 23–33)
CREAT SERPL-MCNC: 1.1 MG/DL (ref 0.4–1.2)
EOSINOPHIL # BLD: 2.1 %
EOSINOPHILS ABSOLUTE: 0.2 THOU/MM3 (ref 0–0.4)
ERYTHROCYTE [DISTWIDTH] IN BLOOD BY AUTOMATED COUNT: 14.9 % (ref 11.5–14.5)
ERYTHROCYTE [DISTWIDTH] IN BLOOD BY AUTOMATED COUNT: 49.7 FL (ref 35–45)
GFR SERPL CREATININE-BSD FRML MDRD: 47 ML/MIN/1.73M2
GLUCOSE BLD-MCNC: 89 MG/DL (ref 70–108)
HCT VFR BLD CALC: 32.5 % (ref 37–47)
HEMOGLOBIN: 10.6 GM/DL (ref 12–16)
IMMATURE GRANS (ABS): 0.1 THOU/MM3 (ref 0–0.07)
IMMATURE GRANULOCYTES: 0.9 %
LYMPHOCYTES # BLD: 9.3 %
LYMPHOCYTES ABSOLUTE: 1.1 THOU/MM3 (ref 1–4.8)
MAGNESIUM: 2.2 MG/DL (ref 1.6–2.4)
MCH RBC QN AUTO: 29.7 PG (ref 26–33)
MCHC RBC AUTO-ENTMCNC: 32.6 GM/DL (ref 32.2–35.5)
MCV RBC AUTO: 91 FL (ref 81–99)
MONOCYTES # BLD: 6.9 %
MONOCYTES ABSOLUTE: 0.8 THOU/MM3 (ref 0.4–1.3)
NUCLEATED RED BLOOD CELLS: 0 /100 WBC
OSMOLALITY CALCULATION: 272.7 MOSMOL/KG (ref 275–300)
PLATELET # BLD: 280 THOU/MM3 (ref 130–400)
PMV BLD AUTO: 9.4 FL (ref 9.4–12.4)
POTASSIUM SERPL-SCNC: 5.1 MEQ/L (ref 3.5–5.2)
RBC # BLD: 3.57 MILL/MM3 (ref 4.2–5.4)
SEG NEUTROPHILS: 80.7 %
SEGMENTED NEUTROPHILS ABSOLUTE COUNT: 9.4 THOU/MM3 (ref 1.8–7.7)
SODIUM BLD-SCNC: 132 MEQ/L (ref 135–145)
TOTAL PROTEIN: 6 G/DL (ref 6.1–8)
TROPONIN T: < 0.01 NG/ML
WBC # BLD: 11.6 THOU/MM3 (ref 4.8–10.8)

## 2021-02-18 PROCEDURE — 85025 COMPLETE CBC W/AUTO DIFF WBC: CPT

## 2021-02-18 PROCEDURE — 99285 EMERGENCY DEPT VISIT HI MDM: CPT

## 2021-02-18 PROCEDURE — 80053 COMPREHEN METABOLIC PANEL: CPT

## 2021-02-18 PROCEDURE — 93005 ELECTROCARDIOGRAM TRACING: CPT | Performed by: FAMILY MEDICINE

## 2021-02-18 PROCEDURE — 36415 COLL VENOUS BLD VENIPUNCTURE: CPT

## 2021-02-18 PROCEDURE — 71045 X-RAY EXAM CHEST 1 VIEW: CPT

## 2021-02-18 PROCEDURE — 71260 CT THORAX DX C+: CPT

## 2021-02-18 PROCEDURE — 6360000004 HC RX CONTRAST MEDICATION: Performed by: EMERGENCY MEDICINE

## 2021-02-18 PROCEDURE — 83735 ASSAY OF MAGNESIUM: CPT

## 2021-02-18 PROCEDURE — 6370000000 HC RX 637 (ALT 250 FOR IP): Performed by: NURSE PRACTITIONER

## 2021-02-18 PROCEDURE — 84484 ASSAY OF TROPONIN QUANT: CPT

## 2021-02-18 RX ORDER — HYDROCODONE BITARTRATE AND ACETAMINOPHEN 5; 325 MG/1; MG/1
0.5 TABLET ORAL ONCE
Status: COMPLETED | OUTPATIENT
Start: 2021-02-18 | End: 2021-02-18

## 2021-02-18 RX ORDER — HYDROCODONE BITARTRATE AND ACETAMINOPHEN 5; 325 MG/1; MG/1
1 TABLET ORAL EVERY 6 HOURS PRN
Qty: 28 TABLET | Refills: 0 | Status: SHIPPED | OUTPATIENT
Start: 2021-02-18 | End: 2021-03-03 | Stop reason: SDUPTHER

## 2021-02-18 RX ORDER — HYDROCODONE BITARTRATE AND ACETAMINOPHEN 5; 325 MG/1; MG/1
1 TABLET ORAL EVERY 6 HOURS PRN
Qty: 28 TABLET | Refills: 0 | Status: SHIPPED | OUTPATIENT
Start: 2021-02-18 | End: 2021-02-18 | Stop reason: SDUPTHER

## 2021-02-18 RX ADMIN — HYDROCODONE BITARTRATE AND ACETAMINOPHEN 0.5 TABLET: 5; 325 TABLET ORAL at 15:28

## 2021-02-18 RX ADMIN — IOPAMIDOL 80 ML: 755 INJECTION, SOLUTION INTRAVENOUS at 14:52

## 2021-02-18 ASSESSMENT — ENCOUNTER SYMPTOMS
SORE THROAT: 0
RHINORRHEA: 0
COUGH: 0
CONSTIPATION: 0
ABDOMINAL DISTENTION: 0
SHORTNESS OF BREATH: 0
WHEEZING: 0
DIARRHEA: 0
VOMITING: 0
NAUSEA: 0
COLOR CHANGE: 0

## 2021-02-18 ASSESSMENT — PAIN DESCRIPTION - LOCATION: LOCATION: CHEST

## 2021-02-18 ASSESSMENT — PAIN DESCRIPTION - FREQUENCY: FREQUENCY: CONTINUOUS

## 2021-02-18 ASSESSMENT — PAIN DESCRIPTION - PAIN TYPE: TYPE: ACUTE PAIN

## 2021-02-18 ASSESSMENT — PAIN SCALES - GENERAL: PAINLEVEL_OUTOF10: 6
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
## 2021-02-18 NOTE — ED NOTES
Bed: 006A  Expected date: 2/18/21  Expected time: 12:46 PM  Means of arrival: [facility]  Comments:     Aleksandar Mendoza RN  02/18/21 1257

## 2021-02-18 NOTE — ED NOTES
Patient resting quietly in cot showing no signs of distress at this time. Rates pain 6/10. Dr. Colt Ashton at bedside to update patient. Will continue to monitor.       Hazel Lang RN  02/18/21 7006
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
## 2021-02-18 NOTE — ED NOTES
Patient given incentive spirometer and instructed on use. Patient demonstrated understanding.       Remy Skaggs RN  02/18/21 4216
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
## 2021-02-19 LAB
EKG ATRIAL RATE: 69 BPM
EKG P AXIS: 68 DEGREES
EKG P-R INTERVAL: 158 MS
EKG Q-T INTERVAL: 386 MS
EKG QRS DURATION: 78 MS
EKG QTC CALCULATION (BAZETT): 413 MS
EKG R AXIS: 47 DEGREES
EKG T AXIS: 71 DEGREES
EKG VENTRICULAR RATE: 69 BPM

## 2021-02-19 PROCEDURE — 93010 ELECTROCARDIOGRAM REPORT: CPT | Performed by: INTERNAL MEDICINE

## 2021-02-25 ENCOUNTER — CARE COORDINATION (OUTPATIENT)
Dept: CARE COORDINATION | Age: 86
End: 2021-02-25
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
## 2021-03-03 ENCOUNTER — OFFICE VISIT (OUTPATIENT)
Dept: FAMILY MEDICINE CLINIC | Age: 86
End: 2021-03-03

## 2021-03-03 VITALS
WEIGHT: 151 LBS | HEIGHT: 61 IN | HEART RATE: 76 BPM | BODY MASS INDEX: 28.51 KG/M2 | TEMPERATURE: 96.7 F | SYSTOLIC BLOOD PRESSURE: 124 MMHG | DIASTOLIC BLOOD PRESSURE: 64 MMHG | RESPIRATION RATE: 16 BRPM

## 2021-03-03 DIAGNOSIS — G89.29 CHRONIC BACK PAIN, UNSPECIFIED BACK LOCATION, UNSPECIFIED BACK PAIN LATERALITY: ICD-10-CM

## 2021-03-03 DIAGNOSIS — I10 ESSENTIAL HYPERTENSION: ICD-10-CM

## 2021-03-03 DIAGNOSIS — I25.10 CORONARY ARTERY DISEASE INVOLVING NATIVE CORONARY ARTERY OF NATIVE HEART WITHOUT ANGINA PECTORIS: ICD-10-CM

## 2021-03-03 DIAGNOSIS — S22.42XS CLOSED FRACTURE OF MULTIPLE RIBS OF LEFT SIDE, SEQUELA: Primary | ICD-10-CM

## 2021-03-03 DIAGNOSIS — M54.9 CHRONIC BACK PAIN, UNSPECIFIED BACK LOCATION, UNSPECIFIED BACK PAIN LATERALITY: ICD-10-CM

## 2021-03-03 DIAGNOSIS — M48.062 SPINAL STENOSIS OF LUMBAR REGION WITH NEUROGENIC CLAUDICATION: ICD-10-CM

## 2021-03-03 PROCEDURE — 1123F ACP DISCUSS/DSCN MKR DOCD: CPT | Performed by: FAMILY MEDICINE

## 2021-03-03 PROCEDURE — 1090F PRES/ABSN URINE INCON ASSESS: CPT | Performed by: FAMILY MEDICINE

## 2021-03-03 PROCEDURE — G8400 PT W/DXA NO RESULTS DOC: HCPCS | Performed by: FAMILY MEDICINE

## 2021-03-03 PROCEDURE — G8427 DOCREV CUR MEDS BY ELIG CLIN: HCPCS | Performed by: FAMILY MEDICINE

## 2021-03-03 PROCEDURE — G8417 CALC BMI ABV UP PARAM F/U: HCPCS | Performed by: FAMILY MEDICINE

## 2021-03-03 PROCEDURE — 99213 OFFICE O/P EST LOW 20 MIN: CPT | Performed by: FAMILY MEDICINE

## 2021-03-03 PROCEDURE — 4040F PNEUMOC VAC/ADMIN/RCVD: CPT | Performed by: FAMILY MEDICINE

## 2021-03-03 PROCEDURE — 1036F TOBACCO NON-USER: CPT | Performed by: FAMILY MEDICINE

## 2021-03-03 RX ORDER — ISOSORBIDE MONONITRATE 30 MG/1
30 TABLET, EXTENDED RELEASE ORAL DAILY
Qty: 90 TABLET | Refills: 1 | Status: ON HOLD | OUTPATIENT
Start: 2021-03-03 | End: 2021-04-21 | Stop reason: HOSPADM

## 2021-03-03 RX ORDER — POTASSIUM CHLORIDE 20 MEQ/1
20 TABLET, EXTENDED RELEASE ORAL DAILY
Qty: 90 TABLET | Refills: 1 | Status: ON HOLD | OUTPATIENT
Start: 2021-03-03 | End: 2021-04-21 | Stop reason: HOSPADM

## 2021-03-03 RX ORDER — HYDROCODONE BITARTRATE AND ACETAMINOPHEN 5; 325 MG/1; MG/1
1 TABLET ORAL 2 TIMES DAILY
Qty: 60 TABLET | Refills: 0 | Status: ON HOLD | OUTPATIENT
Start: 2021-03-03 | End: 2021-04-01 | Stop reason: ALTCHOICE

## 2021-03-03 ASSESSMENT — ENCOUNTER SYMPTOMS
NAUSEA: 0
BACK PAIN: 1
ABDOMINAL PAIN: 0
SHORTNESS OF BREATH: 0
COUGH: 0
EYE PAIN: 0
CHEST TIGHTNESS: 0
SORE THROAT: 0
CONSTIPATION: 0
WHEEZING: 0
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
## 2021-03-11 ENCOUNTER — CARE COORDINATION (OUTPATIENT)
Dept: CARE COORDINATION | Age: 86
End: 2021-03-11

## 2021-03-11 ASSESSMENT — ENCOUNTER SYMPTOMS: DYSPNEA ASSOCIATED WITH: EXERTION
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
## 2021-03-12 ENCOUNTER — TELEPHONE (OUTPATIENT)
Dept: FAMILY MEDICINE CLINIC | Age: 86
End: 2021-03-12

## 2021-03-12 RX ORDER — PREDNISONE 20 MG/1
TABLET ORAL
Qty: 10 TABLET | Refills: 0 | Status: ON HOLD | OUTPATIENT
Start: 2021-03-12 | End: 2021-04-01 | Stop reason: ALTCHOICE

## 2021-03-12 NOTE — TELEPHONE ENCOUNTER
Date of last visit:  3/3/2021  Date of next visit:  3/15/2021    Requested Prescriptions     Signed Prescriptions Disp Refills    predniSONE (DELTASONE) 20 MG tablet 10 tablet 0     Sig: Take 1 tablet, PO BID x 5 days     Authorizing Provider: Jose A Mckeon     Ordering User: Noemy Santamaria       Son called and requested something to help with Back Pain- sent over PDN, son informed and will make sure she is taking her Zanaflex.

## 2021-03-15 ENCOUNTER — OFFICE VISIT (OUTPATIENT)
Dept: FAMILY MEDICINE CLINIC | Age: 86
End: 2021-03-15

## 2021-03-15 ENCOUNTER — HOSPITAL ENCOUNTER (OUTPATIENT)
Dept: GENERAL RADIOLOGY | Age: 86
Discharge: HOME | End: 2021-03-15
Payer: MEDICARE

## 2021-03-15 ENCOUNTER — HOSPITAL ENCOUNTER (OUTPATIENT)
Age: 86
Discharge: HOME | End: 2021-03-15
Payer: MEDICARE

## 2021-03-15 VITALS
HEIGHT: 61 IN | BODY MASS INDEX: 28.51 KG/M2 | TEMPERATURE: 97.2 F | SYSTOLIC BLOOD PRESSURE: 112 MMHG | RESPIRATION RATE: 16 BRPM | DIASTOLIC BLOOD PRESSURE: 66 MMHG | WEIGHT: 151 LBS | HEART RATE: 76 BPM

## 2021-03-15 DIAGNOSIS — R07.81 RIB PAIN ON LEFT SIDE: Primary | ICD-10-CM

## 2021-03-15 DIAGNOSIS — I10 ESSENTIAL HYPERTENSION: ICD-10-CM

## 2021-03-15 DIAGNOSIS — J44.9 CHRONIC OBSTRUCTIVE PULMONARY DISEASE, UNSPECIFIED COPD TYPE (HCC): ICD-10-CM

## 2021-03-15 DIAGNOSIS — R07.81 RIB PAIN ON LEFT SIDE: ICD-10-CM

## 2021-03-15 DIAGNOSIS — R41.0 CONFUSION: ICD-10-CM

## 2021-03-15 LAB
ANION GAP SERPL CALCULATED.3IONS-SCNC: 13 MEQ/L (ref 8–16)
BASOPHILS # BLD: 0.2 %
BASOPHILS ABSOLUTE: 0 THOU/MM3 (ref 0–0.1)
BUN BLDV-MCNC: 53 MG/DL (ref 7–22)
CALCIUM SERPL-MCNC: 9.7 MG/DL (ref 8.5–10.5)
CHLORIDE BLD-SCNC: 106 MEQ/L (ref 98–111)
CO2: 14 MEQ/L (ref 23–33)
CREAT SERPL-MCNC: 1.6 MG/DL (ref 0.4–1.2)
EOSINOPHIL # BLD: 0 %
EOSINOPHILS ABSOLUTE: 0 THOU/MM3 (ref 0–0.4)
ERYTHROCYTE [DISTWIDTH] IN BLOOD BY AUTOMATED COUNT: 15.5 % (ref 11.5–14.5)
ERYTHROCYTE [DISTWIDTH] IN BLOOD BY AUTOMATED COUNT: 50 FL (ref 35–45)
GFR SERPL CREATININE-BSD FRML MDRD: 31 ML/MIN/1.73M2
GLUCOSE BLD-MCNC: 90 MG/DL (ref 70–108)
HCT VFR BLD CALC: 36.4 % (ref 37–47)
HEMOGLOBIN: 11.8 GM/DL (ref 12–16)
IMMATURE GRANS (ABS): 0.27 THOU/MM3 (ref 0–0.07)
IMMATURE GRANULOCYTES: 3.4 %
LYMPHOCYTES # BLD: 16.5 %
LYMPHOCYTES ABSOLUTE: 1.3 THOU/MM3 (ref 1–4.8)
MCH RBC QN AUTO: 28.8 PG (ref 26–33)
MCHC RBC AUTO-ENTMCNC: 32.4 GM/DL (ref 32.2–35.5)
MCV RBC AUTO: 88.8 FL (ref 81–99)
MONOCYTES # BLD: 6.2 %
MONOCYTES ABSOLUTE: 0.5 THOU/MM3 (ref 0.4–1.3)
NUCLEATED RED BLOOD CELLS: 0 /100 WBC
PLATELET # BLD: 460 THOU/MM3 (ref 130–400)
PMV BLD AUTO: 9 FL (ref 9.4–12.4)
POTASSIUM SERPL-SCNC: 4.1 MEQ/L (ref 3.5–5.2)
RBC # BLD: 4.1 MILL/MM3 (ref 4.2–5.4)
SEG NEUTROPHILS: 73.7 %
SEGMENTED NEUTROPHILS ABSOLUTE COUNT: 6 THOU/MM3 (ref 1.8–7.7)
SODIUM BLD-SCNC: 133 MEQ/L (ref 135–145)
WBC # BLD: 8.1 THOU/MM3 (ref 4.8–10.8)

## 2021-03-15 PROCEDURE — 99213 OFFICE O/P EST LOW 20 MIN: CPT | Performed by: FAMILY MEDICINE

## 2021-03-15 PROCEDURE — 80048 BASIC METABOLIC PNL TOTAL CA: CPT

## 2021-03-15 PROCEDURE — 4040F PNEUMOC VAC/ADMIN/RCVD: CPT | Performed by: FAMILY MEDICINE

## 2021-03-15 PROCEDURE — G8417 CALC BMI ABV UP PARAM F/U: HCPCS | Performed by: FAMILY MEDICINE

## 2021-03-15 PROCEDURE — 1036F TOBACCO NON-USER: CPT | Performed by: FAMILY MEDICINE

## 2021-03-15 PROCEDURE — 71101 X-RAY EXAM UNILAT RIBS/CHEST: CPT

## 2021-03-15 PROCEDURE — 1123F ACP DISCUSS/DSCN MKR DOCD: CPT | Performed by: FAMILY MEDICINE

## 2021-03-15 PROCEDURE — G8400 PT W/DXA NO RESULTS DOC: HCPCS | Performed by: FAMILY MEDICINE

## 2021-03-15 PROCEDURE — 1090F PRES/ABSN URINE INCON ASSESS: CPT | Performed by: FAMILY MEDICINE

## 2021-03-15 PROCEDURE — 85025 COMPLETE CBC W/AUTO DIFF WBC: CPT

## 2021-03-15 PROCEDURE — G8427 DOCREV CUR MEDS BY ELIG CLIN: HCPCS | Performed by: FAMILY MEDICINE

## 2021-03-15 PROCEDURE — 36415 COLL VENOUS BLD VENIPUNCTURE: CPT

## 2021-03-15 ASSESSMENT — ENCOUNTER SYMPTOMS
CONSTIPATION: 0
SORE THROAT: 0
SHORTNESS OF BREATH: 0
CHEST TIGHTNESS: 0
COUGH: 0
WHEEZING: 0
EYE PAIN: 0
NAUSEA: 0
ABDOMINAL PAIN: 0

## 2021-03-15 NOTE — PROGRESS NOTES
Subjective:      Patient ID: Parker Machuca is a 81 y.o. female. Left  Rib pain  With  Fracture  And  Dyspnea and  With pain      Copd   Noted    Ct scan  2-18-21  And with  Rib  Fractures     confusion and on \pain  meds     Past Medical History:   Diagnosis Date    Acute blood loss as cause of postoperative anemia 03/2018    CAD (coronary artery disease)      cath  50    Cardiac valve prolapse     Chest pain 5/15/2020    Chronic back pain     Compression fracture of L2 ([facility] 5/26/2020    COPD (chronic obstructive pulmonary disease) ([facility]     Ex-smoker     GERD (gastroesophageal reflux disease) 2/07    EGD    Glaucoma     H/O cardiac catheterization 2002    40-50% LAD   katharine    Hearing loss secondary to cerumen impaction 5/28/2019    Hyperlipidemia     Hypertension     Inadvertent durotomy 3/12/2018    Liver disease     history of hepatitis at age 22    Osteoarthritis 1999    right shoulder and back    Pericarditis 1996     Past Surgical History:   Procedure Laterality Date    APPENDECTOMY      at age 13years   [address]  2002    BUNIONECTOMY  2004    right foot    CARDIAC CATHETERIZATION  2007??  &   2012? ?    normal results    COLONOSCOPY      last one 2000???    ENDOSCOPY, COLON, DIAGNOSTIC      EYE SURGERY  2009??    right eye   [address]    still has ovaries    MS LAMINECTOMY,>2 SGMT,LUMBAR N/A 3/7/2018    LUMBAR LAMINECTOMY L3-S1 performed by Meli Loza MD at [address] [facility] OFFICE/OUTPT [address] N/A 3/12/2018    REPAIR DUROTOMY PLACEMENT OF SUBARACHNOID DRAIN performed by Meli Loza MD at [address]  left    SPINE SURGERY  7/02    L5 cyst    UPPER GASTROINTESTINAL ENDOSCOPY  2007    [facility]    UPPER GASTROINTESTINAL ENDOSCOPY Left 5/19/2020    EGD BIOPSY performed by Beverly Loco MD at [facility]     Social History     Socioeconomic History    Marital status:       Spouse name: Not on file    Number of children: 2    Years of education: Not on file    Highest education level: Not on file   Occupational History    Not on file   Social Needs    Financial resource strain: Not hard at all    Food insecurity     Worry: Never true     Inability: Never true   [employer] needs     Medical: No     Non-medical: No   Tobacco Use    Smoking status: Former Smoker     Years: 30.00     Types: Cigarettes     Quit date: 1989     Years since quittin.7    Smokeless tobacco: Never Used   Substance and Sexual Activity    Alcohol use: No    Drug use: No    Sexual activity: Not Currently   Lifestyle    Physical activity     Days per week: 0 days     Minutes per session: 0 min    Stress: Not on file   Relationships    Social connections     Talks on phone: Not on file     Gets together: Not on file     Attends Religion service: Not on file     Active member of club or organization: Not on file     Attends meetings of clubs or organizations: Not on file     Relationship status: Not on file    Intimate partner violence     Fear of current or ex partner: Not on file     Emotionally abused: Not on file     Physically abused: Not on file     Forced sexual activity: Not on file   Other Topics Concern    Not on file   Social History Narrative    No barriers with transportation    No barriers with medication affordability     Family History   Problem Relation Age of Onset    Tuberculosis Mother     Tuberculosis Father      Review of Systems   Constitutional: Negative for appetite change, fatigue and fever. HENT: Negative for congestion, ear pain, postnasal drip and sore throat. Eyes: Negative for pain and visual disturbance. Respiratory: Negative for cough, chest tightness, shortness of breath and wheezing. Cardiovascular: Negative for chest pain, palpitations and leg swelling. Gastrointestinal: Negative for abdominal pain, constipation and nausea.    Genitourinary: Negative for dysuria and frequency. Musculoskeletal: Negative for arthralgias, joint swelling, neck pain and neck stiffness. Skin: Negative for rash. Neurological: Negative for dizziness, weakness, numbness and headaches. Hematological: Negative for adenopathy. Does not bruise/bleed easily. Psychiatric/Behavioral: Negative for behavioral problems and sleep disturbance. The patient is not nervous/anxious. /66 (Site: Right Upper Arm, Position: Sitting, Cuff Size: Medium Adult)   Pulse 76   Temp 97.2 °F (36.2 °C) (Oral)   Resp 16   Ht 5' 1\" (1.549 m)   Wt 151 lb (68.5 kg)   BMI 28.53 kg/m²      Pulse  Ox  92%  Objective:   Physical Exam  Vitals signs and nursing note reviewed. Constitutional:       Appearance: She is well-developed. HENT:      Head: Normocephalic and atraumatic. Right Ear: External ear normal.      Left Ear: External ear normal.      Nose: Nose normal.   Eyes:      General: No scleral icterus. Conjunctiva/sclera: Conjunctivae normal.      Pupils: Pupils are equal, round, and reactive to light. Neck:      Musculoskeletal: Normal range of motion and neck supple. Thyroid: No thyromegaly. Vascular: No JVD. Cardiovascular:      Rate and Rhythm: Normal rate and regular rhythm. Heart sounds: Normal heart sounds. Pulmonary:      Effort: Pulmonary effort is normal.      Breath sounds: Normal breath sounds. No wheezing or rales. Comments:   Pain  Left  Anterior  Ribs  4 to 5th  Ribs  To axillary line   Chest:      Chest wall: Tenderness present. Abdominal:      General: Bowel sounds are normal. There is no distension. Palpations: Abdomen is soft. There is no mass. Tenderness: There is no abdominal tenderness. Musculoskeletal: Normal range of motion. General: No tenderness. Lymphadenopathy:      Cervical: No cervical adenopathy. Skin:     General: Skin is warm and dry. Findings: No rash.    Neurological:      Mental Status: She is alert and oriented to person, place, and time. Cranial Nerves: No cranial nerve deficit. Deep Tendon Reflexes: Reflexes are normal and symmetric. Assessment:       Diagnosis Orders   1. Rib pain on left side     2. Chronic obstructive pulmonary disease, unspecified COPD type ([facility]     3. Confusion           Plan:      Current Outpatient Medications   Medication Sig Dispense Refill    predniSONE (DELTASONE) 20 MG tablet Take 1 tablet, PO BID x 5 days 10 tablet 0    isosorbide mononitrate (IMDUR) 30 MG extended release tablet Take 1 tablet by mouth daily 90 tablet 1    potassium chloride (KLOR-CON M) 20 MEQ extended release tablet Take 1 tablet by mouth daily 90 tablet 1    HYDROcodone-acetaminophen (NORCO) 5-325 MG per tablet Take 1 tablet by mouth 2 times daily for 30 days. 60 tablet 0    pantoprazole (PROTONIX) 40 MG tablet Take 1 tablet by mouth 2 times daily (before meals) 60 tablet 2    sucralfate (CARAFATE) 1 GM tablet Take 1 tablet by mouth 4 times daily (before meals and nightly) 120 tablet 0    ibuprofen (IBU) 600 MG tablet Take 1 tablet by mouth 3 times daily as needed for Pain 30 tablet 0    metoprolol tartrate (LOPRESSOR) 25 MG tablet Take 0.5 tablets by mouth 2 times daily 90 tablet 1    nortriptyline (PAMELOR) 50 MG capsule Take 1 capsule by mouth nightly 90 capsule 1    tiZANidine (ZANAFLEX) 4 MG tablet Take 1 tablet by mouth every 8 hours as needed (muscle spasm) 100 tablet 2    simvastatin (ZOCOR) 40 MG tablet Take 1 tablet by mouth nightly 90 tablet 1    Multiple Vitamins-Minerals (THERAPEUTIC MULTIVITAMIN-MINERALS) tablet Take 1 tablet by mouth daily      latanoprost (XALATAN) 0.005 % ophthalmic solution Place 1 drop into both eyes nightly      nitroGLYCERIN (NITROSTAT) 0.4 MG SL tablet up to max of 3 total doses.  If no relief after 1 dose, call 911. 30 tablet 0    acetaminophen (TYLENOL) 325 MG tablet Take 2 tablets by mouth every 4 hours as needed for Pain Maximum dose of acetaminophen is 4000 mg from all sources in 24 hours.  aspirin 81 MG tablet Take 81 mg by mouth daily       No current facility-administered medications for this visit. Orders Placed This Encounter   Procedures    XR RIBS LEFT INCLUDE CHEST (MIN 3 VIEWS)     Standing Status:   Future     Standing Expiration Date:   3/16/2022     Order Specific Question:   Reason for exam:     Answer:   dyspnea    CBC Auto Differential     Standing Status:   Future     Standing Expiration Date:   3/15/2022    Basic Metabolic Panel     Standing Status:   Future     Standing Expiration Date:   3/15/2022    Home O2 eval (desaturation screen)     Standing Status:   Future     Standing Expiration Date:   3/15/2022     Josephine Houser will require the following home care treatments or therapies: nursing and physical therapy. Home care will be necessary because of inability to leave house without maximal assistance . Nicole Garcia The patient is in agreement to receiving home care.        pulse  Ox inconsistent so  Do hospital and  Xray and lab  Lara Bender MD

## 2021-03-16 ENCOUNTER — TELEPHONE (OUTPATIENT)
Dept: FAMILY MEDICINE CLINIC | Age: 86
End: 2021-03-16

## 2021-03-16 NOTE — TELEPHONE ENCOUNTER
----- Message from Lara Bender MD sent at 3/16/2021  7:02 AM EDT -----  Chest xray and ribs stable  And ok but mild dehydration so push fluids  Need pulse ox evaluation

## 2021-03-18 ENCOUNTER — APPOINTMENT (OUTPATIENT)
Dept: CT IMAGING | Age: 86
End: 2021-03-18
Payer: MEDICARE

## 2021-03-18 ENCOUNTER — HOSPITAL ENCOUNTER (EMERGENCY)
Age: 86
Discharge: HOME | End: 2021-03-18
Payer: MEDICARE

## 2021-03-18 VITALS
SYSTOLIC BLOOD PRESSURE: 122 MMHG | HEART RATE: 87 BPM | OXYGEN SATURATION: 96 % | BODY MASS INDEX: 28.32 KG/M2 | HEIGHT: 61 IN | WEIGHT: 150 LBS | RESPIRATION RATE: 24 BRPM | TEMPERATURE: 98.5 F | DIASTOLIC BLOOD PRESSURE: 63 MMHG

## 2021-03-18 DIAGNOSIS — E87.6 HYPOKALEMIA: ICD-10-CM

## 2021-03-18 DIAGNOSIS — R53.1 GENERAL WEAKNESS: Primary | ICD-10-CM

## 2021-03-18 LAB
ALBUMIN SERPL-MCNC: 3.2 G/DL (ref 3.5–5.1)
ALP BLD-CCNC: 114 U/L (ref 38–126)
ALT SERPL-CCNC: 30 U/L (ref 11–66)
ANION GAP SERPL CALCULATED.3IONS-SCNC: 14 MEQ/L (ref 8–16)
AST SERPL-CCNC: 26 U/L (ref 5–40)
BACTERIA: ABNORMAL /HPF
BASOPHILS # BLD: 0.2 %
BASOPHILS ABSOLUTE: 0 THOU/MM3 (ref 0–0.1)
BILIRUB SERPL-MCNC: 0.5 MG/DL (ref 0.3–1.2)
BILIRUBIN URINE: NEGATIVE
BLOOD, URINE: NEGATIVE
BUN BLDV-MCNC: 25 MG/DL (ref 7–22)
CALCIUM SERPL-MCNC: 9.2 MG/DL (ref 8.5–10.5)
CASTS 2: ABNORMAL /LPF
CASTS UA: ABNORMAL /LPF
CHARACTER, URINE: CLEAR
CHLORIDE BLD-SCNC: 101 MEQ/L (ref 98–111)
CO2: 19 MEQ/L (ref 23–33)
COLOR: YELLOW
CREAT SERPL-MCNC: 1.1 MG/DL (ref 0.4–1.2)
CRYSTALS, UA: ABNORMAL
D-DIMER QUANTITATIVE: 3576 NG/ML FEU (ref 0–500)
EKG ATRIAL RATE: 98 BPM
EKG P AXIS: 67 DEGREES
EKG P-R INTERVAL: 124 MS
EKG Q-T INTERVAL: 364 MS
EKG QRS DURATION: 72 MS
EKG QTC CALCULATION (BAZETT): 464 MS
EKG R AXIS: 31 DEGREES
EKG T AXIS: 59 DEGREES
EKG VENTRICULAR RATE: 98 BPM
EOSINOPHIL # BLD: 0.1 %
EOSINOPHILS ABSOLUTE: 0 THOU/MM3 (ref 0–0.4)
EPITHELIAL CELLS, UA: ABNORMAL /HPF
ERYTHROCYTE [DISTWIDTH] IN BLOOD BY AUTOMATED COUNT: 15.1 % (ref 11.5–14.5)
ERYTHROCYTE [DISTWIDTH] IN BLOOD BY AUTOMATED COUNT: 46.3 FL (ref 35–45)
GFR SERPL CREATININE-BSD FRML MDRD: 47 ML/MIN/1.73M2
GLUCOSE BLD-MCNC: 132 MG/DL (ref 70–108)
GLUCOSE URINE: NEGATIVE MG/DL
HCT VFR BLD CALC: 38.6 % (ref 37–47)
HEMOGLOBIN: 12.8 GM/DL (ref 12–16)
IMMATURE GRANS (ABS): 0.15 THOU/MM3 (ref 0–0.07)
IMMATURE GRANULOCYTES: 1.5 %
KETONES, URINE: ABNORMAL
LEUKOCYTE ESTERASE, URINE: ABNORMAL
LYMPHOCYTES # BLD: 11.8 %
LYMPHOCYTES ABSOLUTE: 1.2 THOU/MM3 (ref 1–4.8)
MAGNESIUM: 2.3 MG/DL (ref 1.6–2.4)
MCH RBC QN AUTO: 28.2 PG (ref 26–33)
MCHC RBC AUTO-ENTMCNC: 33.2 GM/DL (ref 32.2–35.5)
MCV RBC AUTO: 85 FL (ref 81–99)
MISCELLANEOUS 2: ABNORMAL
MONOCYTES # BLD: 3.5 %
MONOCYTES ABSOLUTE: 0.4 THOU/MM3 (ref 0.4–1.3)
NITRITE, URINE: NEGATIVE
NUCLEATED RED BLOOD CELLS: 0 /100 WBC
OSMOLALITY CALCULATION: 274.5 MOSMOL/KG (ref 275–300)
PH UA: 6.5 (ref 5–9)
PLATELET # BLD: 352 THOU/MM3 (ref 130–400)
PMV BLD AUTO: 9.6 FL (ref 9.4–12.4)
POTASSIUM REFLEX MAGNESIUM: 2.9 MEQ/L (ref 3.5–5.2)
PRO-BNP: 2871 PG/ML (ref 0–1800)
PROTEIN UA: 30
RBC # BLD: 4.54 MILL/MM3 (ref 4.2–5.4)
RBC URINE: ABNORMAL /HPF
RENAL EPITHELIAL, UA: ABNORMAL
SEG NEUTROPHILS: 82.9 %
SEGMENTED NEUTROPHILS ABSOLUTE COUNT: 8.4 THOU/MM3 (ref 1.8–7.7)
SODIUM BLD-SCNC: 134 MEQ/L (ref 135–145)
SPECIFIC GRAVITY, URINE: > 1.03 (ref 1–1.03)
TOTAL PROTEIN: 7 G/DL (ref 6.1–8)
TROPONIN T: 0.01 NG/ML
TROPONIN T: < 0.01 NG/ML
TSH SERPL DL<=0.05 MIU/L-ACNC: 0.83 UIU/ML (ref 0.4–4.2)
UROBILINOGEN, URINE: 0.2 EU/DL (ref 0–1)
WBC # BLD: 10.1 THOU/MM3 (ref 4.8–10.8)
WBC UA: ABNORMAL /HPF
YEAST: ABNORMAL

## 2021-03-18 PROCEDURE — 87186 SC STD MICRODIL/AGAR DIL: CPT

## 2021-03-18 PROCEDURE — 83880 ASSAY OF NATRIURETIC PEPTIDE: CPT

## 2021-03-18 PROCEDURE — 83735 ASSAY OF MAGNESIUM: CPT

## 2021-03-18 PROCEDURE — 80053 COMPREHEN METABOLIC PANEL: CPT

## 2021-03-18 PROCEDURE — 85379 FIBRIN DEGRADATION QUANT: CPT

## 2021-03-18 PROCEDURE — 36415 COLL VENOUS BLD VENIPUNCTURE: CPT

## 2021-03-18 PROCEDURE — 85025 COMPLETE CBC W/AUTO DIFF WBC: CPT

## 2021-03-18 PROCEDURE — 2580000003 HC RX 258: Performed by: NURSE PRACTITIONER

## 2021-03-18 PROCEDURE — 87086 URINE CULTURE/COLONY COUNT: CPT

## 2021-03-18 PROCEDURE — 84443 ASSAY THYROID STIM HORMONE: CPT

## 2021-03-18 PROCEDURE — 71275 CT ANGIOGRAPHY CHEST: CPT

## 2021-03-18 PROCEDURE — 74176 CT ABD & PELVIS W/O CONTRAST: CPT

## 2021-03-18 PROCEDURE — 87077 CULTURE AEROBIC IDENTIFY: CPT

## 2021-03-18 PROCEDURE — 6370000000 HC RX 637 (ALT 250 FOR IP): Performed by: NURSE PRACTITIONER

## 2021-03-18 PROCEDURE — 99285 EMERGENCY DEPT VISIT HI MDM: CPT

## 2021-03-18 PROCEDURE — 93005 ELECTROCARDIOGRAM TRACING: CPT | Performed by: EMERGENCY MEDICINE

## 2021-03-18 PROCEDURE — 81001 URINALYSIS AUTO W/SCOPE: CPT

## 2021-03-18 PROCEDURE — 6360000004 HC RX CONTRAST MEDICATION: Performed by: NURSE PRACTITIONER

## 2021-03-18 PROCEDURE — 84484 ASSAY OF TROPONIN QUANT: CPT

## 2021-03-18 RX ORDER — LIDOCAINE 4 G/G
1 PATCH TOPICAL DAILY
Status: DISCONTINUED | OUTPATIENT
Start: 2021-03-18 | End: 2021-03-18 | Stop reason: HOSPADM

## 2021-03-18 RX ORDER — POTASSIUM CHLORIDE 20 MEQ/1
40 TABLET, EXTENDED RELEASE ORAL ONCE
Status: COMPLETED | OUTPATIENT
Start: 2021-03-18 | End: 2021-03-18

## 2021-03-18 RX ORDER — 0.9 % SODIUM CHLORIDE 0.9 %
1000 INTRAVENOUS SOLUTION INTRAVENOUS ONCE
Status: COMPLETED | OUTPATIENT
Start: 2021-03-18 | End: 2021-03-18

## 2021-03-18 RX ADMIN — SODIUM CHLORIDE 1000 ML: 9 INJECTION, SOLUTION INTRAVENOUS at 18:15

## 2021-03-18 RX ADMIN — IOPAMIDOL 80 ML: 755 INJECTION, SOLUTION INTRAVENOUS at 18:39

## 2021-03-18 RX ADMIN — POTASSIUM CHLORIDE 40 MEQ: 1500 TABLET, EXTENDED RELEASE ORAL at 18:15

## 2021-03-18 ASSESSMENT — ENCOUNTER SYMPTOMS
RHINORRHEA: 0
NAUSEA: 0
COLOR CHANGE: 0
SINUS PRESSURE: 0
EYE REDNESS: 0
WHEEZING: 0
BLOOD IN STOOL: 0
SORE THROAT: 0
VOMITING: 0
ABDOMINAL PAIN: 0
SHORTNESS OF BREATH: 0
ABDOMINAL DISTENTION: 0
PHOTOPHOBIA: 0
BACK PAIN: 0
CHEST TIGHTNESS: 0
DIARRHEA: 0
COUGH: 0
VOICE CHANGE: 0
CONSTIPATION: 1

## 2021-03-18 ASSESSMENT — PAIN SCALES - GENERAL: PAINLEVEL_OUTOF10: 6

## 2021-03-18 NOTE — ED PROVIDER NOTES
[facility] Emergency Department    CHIEF COMPLAINT       Chief Complaint   Patient presents with    Fatigue       Nurses Notes reviewed and I agree except as noted in the HPI. HISTORY OF PRESENT ILLNESS    Ngozi Pinto is an 81 y.o. female who presents to the ED for evaluation of fatigue x 4 days. Patient states her family has told her that all she does is sleep and that she has not been eating or drinking for the last 4 days. Denies fever, chills, nausea, vomiting, abdominal pain, diarrhea, urinary symptoms, chest pain, SOB, cough, weight loss, or sick contacts. She reports no bowel movement in 2-3 days and she typically has at least 1 per day. Never experienced anything like this before. HPI was provided by the patient. REVIEW OF SYSTEMS     Review of Systems   Constitutional: Positive for activity change, appetite change and fatigue. Negative for chills, diaphoresis, fever and unexpected weight change. HENT: Negative for congestion, hearing loss, postnasal drip, rhinorrhea, sinus pressure, sore throat and voice change. Eyes: Negative for photophobia, redness and visual disturbance. Respiratory: Negative for cough, chest tightness, shortness of breath and wheezing. Cardiovascular: Negative for chest pain and palpitations. Gastrointestinal: Positive for constipation. Negative for abdominal distention, abdominal pain, blood in stool, diarrhea, nausea and vomiting. Endocrine: Negative for cold intolerance, heat intolerance, polydipsia, polyphagia and polyuria. Genitourinary: Negative for difficulty urinating, dysuria, flank pain, frequency, hematuria and vaginal pain. Musculoskeletal: Negative for arthralgias, back pain, gait problem, joint swelling, neck pain and neck stiffness. Skin: Negative for color change and rash. Allergic/Immunologic: Negative for immunocompromised state.    Neurological: Negative for dizziness, tremors, weakness, light-headedness, numbness and headaches. Hematological: Does not bruise/bleed easily. Psychiatric/Behavioral: Negative for behavioral problems, confusion and decreased concentration. The patient is not nervous/anxious. PAST MEDICAL HISTORY     Past Medical History:   Diagnosis Date    Acute blood loss as cause of postoperative anemia 03/2018    CAD (coronary artery disease)      cath  50    Cardiac valve prolapse     Chest pain 5/15/2020    Chronic back pain     Compression fracture of L2 ([address] 5/26/2020    COPD (chronic obstructive pulmonary disease) (HCC)     Ex-smoker     GERD (gastroesophageal reflux disease) 2/07    EGD    Glaucoma     H/O cardiac catheterization 2002    40-50% LAD   katharine    Hearing loss secondary to cerumen impaction 5/28/2019    Hyperlipidemia     Hypertension     Inadvertent durotomy 3/12/2018    Liver disease     history of hepatitis at age 22    Osteoarthritis 1999    right shoulder and back    Pericarditis 1996       SURGICALHISTORY      has a past surgical history that includes Hysterectomy (1980); Spine surgery (7/02); Bunionectomy (2004); Rotator cuff repair (left); Upper gastrointestinal endoscopy (2007); Cardiac catheterization (2007??  &   2012? ? ); back surgery (2002); Appendectomy; Colonoscopy; eye surgery (2009??); pr laminectomy,>2 sgmt,lumbar (N/A, 3/7/2018); pr office/outpt visit,procedure only (N/A, 3/12/2018); Endoscopy, colon, diagnostic; and Upper gastrointestinal endoscopy (Left, 5/19/2020). CURRENT MEDICATIONS       Previous Medications    ACETAMINOPHEN (TYLENOL) 325 MG TABLET    Take 2 tablets by mouth every 4 hours as needed for Pain Maximum dose of acetaminophen is 4000 mg from all sources in 24 hours. ASPIRIN 81 MG TABLET    Take 81 mg by mouth daily    HYDROCODONE-ACETAMINOPHEN (NORCO) 5-325 MG PER TABLET    Take 1 tablet by mouth 2 times daily for 30 days.     IBUPROFEN (IBU) 600 MG TABLET    Take 1 tablet by mouth 3 times daily as needed for Pain ISOSORBIDE MONONITRATE (IMDUR) 30 MG EXTENDED RELEASE TABLET    Take 1 tablet by mouth daily    LATANOPROST (XALATAN) 0.005 % OPHTHALMIC SOLUTION    Place 1 drop into both eyes nightly    METOPROLOL TARTRATE (LOPRESSOR) 25 MG TABLET    Take 0.5 tablets by mouth 2 times daily    MULTIPLE VITAMINS-MINERALS (THERAPEUTIC MULTIVITAMIN-MINERALS) TABLET    Take 1 tablet by mouth daily    NITROGLYCERIN (NITROSTAT) 0.4 MG SL TABLET    up to max of 3 total doses. If no relief after 1 dose, call 911. NORTRIPTYLINE (PAMELOR) 50 MG CAPSULE    Take 1 capsule by mouth nightly    PANTOPRAZOLE (PROTONIX) 40 MG TABLET    Take 1 tablet by mouth 2 times daily (before meals)    POTASSIUM CHLORIDE (KLOR-CON M) 20 MEQ EXTENDED RELEASE TABLET    Take 1 tablet by mouth daily    PREDNISONE (DELTASONE) 20 MG TABLET    Take 1 tablet, PO BID x 5 days    SIMVASTATIN (ZOCOR) 40 MG TABLET    Take 1 tablet by mouth nightly    SUCRALFATE (CARAFATE) 1 GM TABLET    Take 1 tablet by mouth 4 times daily (before meals and nightly)    TIZANIDINE (ZANAFLEX) 4 MG TABLET    Take 1 tablet by mouth every 8 hours as needed (muscle spasm)       ALLERGIES     has No Known Allergies. FAMILY HISTORY     She indicated that her mother is . She indicated that her father is . family history includes Tuberculosis in her father and mother. SOCIAL HISTORY       Social History     Socioeconomic History    Marital status:       Spouse name: Not on file    Number of children: 2    Years of education: Not on file    Highest education level: Not on file   Occupational History    Not on file   Social Needs    Financial resource strain: Not hard at all    Food insecurity     Worry: Never true     Inability: Never true   [employer] needs     Medical: No     Non-medical: No   Tobacco Use    Smoking status: Former Smoker     Years: 30.00     Types: Cigarettes     Quit date: 1989     Years since quittin.7    Smokeless tobacco: Never Used   Substance and Sexual Activity    Alcohol use: No    Drug use: No    Sexual activity: Not Currently   Lifestyle    Physical activity     Days per week: 0 days     Minutes per session: 0 min    Stress: Not on file   Relationships    Social connections     Talks on phone: Not on file     Gets together: Not on file     Attends Orthodoxy service: Not on file     Active member of club or organization: Not on file     Attends meetings of clubs or organizations: Not on file     Relationship status: Not on file    Intimate partner violence     Fear of current or ex partner: Not on file     Emotionally abused: Not on file     Physically abused: Not on file     Forced sexual activity: Not on file   Other Topics Concern    Not on file   Social History Narrative    No barriers with transportation    No barriers with medication affordability       PHYSICAL EXAM     INITIAL VITALS:  height is 5' 1\" (1.549 m) and weight is 150 lb (68 kg). Her oral temperature is 98.5 °F (36.9 °C). Her blood pressure is 127/74 and her pulse is 95. Her respiration is 20 and oxygen saturation is 96%. Physical Exam  Vitals signs and nursing note reviewed. Constitutional:       General: She is not in acute distress. Appearance: Normal appearance. She is well-developed and normal weight. She is not ill-appearing. HENT:      Head: Normocephalic and atraumatic. Right Ear: Tympanic membrane normal.      Left Ear: Tympanic membrane normal.      Nose: Nose normal.      Mouth/Throat:      Mouth: Mucous membranes are moist.      Pharynx: Oropharynx is clear. Uvula midline. Eyes:      Extraocular Movements: Extraocular movements intact. Conjunctiva/sclera: Conjunctivae normal.      Pupils: Pupils are equal, round, and reactive to light. Neck:      Musculoskeletal: Normal range of motion and neck supple. Cardiovascular:      Rate and Rhythm: Normal rate and regular rhythm. Pulses: Normal pulses. Heart sounds: Normal heart sounds, S1 normal and S2 normal.   Pulmonary:      Effort: Pulmonary effort is normal. No respiratory distress. Breath sounds: Normal breath sounds. Chest:      Chest wall: No tenderness. Abdominal:      General: Bowel sounds are normal. There is no distension. Palpations: Abdomen is soft. Tenderness: There is no abdominal tenderness. Musculoskeletal: Normal range of motion. Lymphadenopathy:      Cervical: No cervical adenopathy. Skin:     General: Skin is warm and dry. Capillary Refill: Capillary refill takes less than 2 seconds. Neurological:      Mental Status: She is alert and oriented to person, place, and time. Psychiatric:         Speech: Speech normal.         Behavior: Behavior normal.         Thought Content: Thought content normal.         DIFFERENTIAL DIAGNOSIS:   Dehydration, electrolyte abnormality, pulmonary embolism,    DIAGNOSTIC RESULTS     EKG: All EKG's are interpreted by the Emergency Department Physician who eithersigns or Co-signs this chart in the absence of a cardiologist.    EKG read and interpreted by myself with comparison to February 18, 2021 gives impression of normal sinus rhythm with heart rate of 98; interval 124; QRS 72;QTc 464; axis P-67, R-31, T-59. RADIOLOGY: non-plainfilm images(s) such as CT, Ultrasound and MRI are read by the radiologist.  Plain radiographic images are visualized and preliminarily interpreted by the emergency physician unless otherwise stated below.   CT ABDOMEN PELVIS WO CONTRAST Additional Contrast? None    (Results Pending)         LABS:   Labs Reviewed   CBC WITH AUTO DIFFERENTIAL   COMPREHENSIVE METABOLIC PANEL W/ REFLEX TO MG FOR LOW K   TSH WITH REFLEX   URINE RT REFLEX TO CULTURE       EMERGENCY DEPARTMENT COURSE:   Vitals:    Vitals:    03/18/21 1611   BP: 127/74   Pulse: 95   Resp: 20   Temp: 98.5 °F (36.9 °C)   TempSrc: Oral   SpO2: 96%   Weight: 150 lb (68 kg)   Height: 5' 1\" (1.549 m) MDM    Patient was seen and evaluated in the emergency department, patient appears to be in no acute distress, vital signs were reviewed, no significant findings are noted. Physical exam was completed, she was alert and oriented during my evaluation, she had no significant tenderness to the ribs. Labs were obtained, no significant electrolyte abnormality noted, no renal dysfunction noted, no dehydration noted. Discussed case with Dr. Michael Limon, he recommends D-dimer, troponin, BNP. Unfortunately my shift was coming to an end, the patient was signed out to Evelin Thibodeaux CNP, please see his note for final impression. Medications   lidocaine 4 % external patch 1 patch (has no administration in time range)       Patient was seenindependently by myself. The patient's final impression and disposition and plan was determined by myself. CRITICAL CARE:   None    CONSULTS:  None    PROCEDURES:  None    FINAL IMPRESSION   No diagnosis found. DISPOSITION/PLAN   Please see the note of Evelin Thibodeaux CNP. PATIENT REFERREDTO:  No follow-up provider specified. DISCHARGE MEDICATIONS:  New Prescriptions    No medications on file       (Please note that portions of this note were completed with a voice recognition program.  Efforts were made to edit the dictations but occasionally words are mis-transcribed.)    Provider:  I personally performed the services described in the documentation,reviewed and edited the documentation which was dictated to the scribe in my presence, and it accurately records my words and actions.     Adrián Wise CNP 03/18/21 4:39 PM    YASMINE Garcia - PAMELLA          [employer], APRN - CNP  03/19/21 [id number]

## 2021-03-18 NOTE — ED TRIAGE NOTES
Patient presents to the ED via [facility] c/c fatigue and \"just doesn't feel well\". EMS reports patient hasn't ate for about 3 days and has only drank about 2 bottles of water within that time. Patient is alert and oriented. Patient states she has been so tired recently and has been sleeping a lot. Patient reported chest pain 6/10 on the pain scale only when she takes a deep breath. IV access established and labs collected via [facility]. EKG complete.

## 2021-03-18 NOTE — ED NOTES
Urine sample collected and sent. Patient resting in bed, snacking on chex mix and water. Family at bedside. Call light in reach. Will continue to monitor.      Shy Cordoba RN  03/18/21 0752

## 2021-03-18 NOTE — ED NOTES
Patient medicated per MAR. Patient given glass of water. Patient encouraged to urinate. Family at bedside. Call light in reach. Will continue to monitor.      Veronika Rosenberg RN  03/18/21 [id number]

## 2021-03-18 NOTE — ED NOTES
Patient transported KEVAN to CT scan via rad tech in stable condition.      Pema Aguillon RN  03/18/21 5248

## 2021-03-19 ENCOUNTER — CARE COORDINATION (OUTPATIENT)
Dept: CARE COORDINATION | Age: 86
End: 2021-03-19

## 2021-03-19 LAB
ORGANISM: ABNORMAL
URINE CULTURE REFLEX: ABNORMAL

## 2021-03-19 PROCEDURE — 93010 ELECTROCARDIOGRAM REPORT: CPT | Performed by: INTERNAL MEDICINE

## 2021-03-19 NOTE — ED PROVIDER NOTES
[address]     Pt Name: Pallavi Baldwin  MRN: [id number]  [place] 1935  Date of evaluation: 3/19/21        Mid-level provider Note:    I have personally performed and/or participated in the history, exam and medical decision making and agree with all pertinent clinical information as noted by the previous provider. I have also reviewed and agree with the past medical, family and social history unless otherwise noted. I have personally performed a face to face diagnostic evaluation on this patient. I have reviewed the previous provider's findings and agree. Evaluation: Patient signed out to me by Perry Reeder counts: CNP pending CTA of chest and reevaluation. On my exam of patient, family reports that she is eating more than she has in quite some time while here in emergency department. They are concerned that she has not been compliant with her medications due to her hypokalemia today as she does take potassium supplements at home. I discussed all findings, CTA completed showing no acute PE in the presence of an elevated D-dimer. Family amenable to checking on patient more frequently and making sure she is staying compliant with her home medications. I discussed findings with patient's PCP Dr. Alonso Contreras trauma was who is agreeable to plan for discharge with outpatient follow-up. 1. General weakness    2.  Hypokalemia          DISPOSITION/PLAN  PATIENT REFERRED TO:  Debi Hamm MD  [address] [place] [address]  [address] [place] [address] [address] [facility]    In 3 days      [address] [facility] [address] [address] EMERGENCY DEPT  [address] [facility] [address]  [phone]  Go to   If symptoms worsen    DISCHARGE MEDICATIONS:  Discharge Medication List as of 3/18/2021  7:41 PM            YASMINE Mckoy CNP, APRN - CNP  03/19/21 4141
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
## 2021-03-20 ENCOUNTER — TELEPHONE (OUTPATIENT)
Dept: PHARMACY | Age: 86
End: 2021-03-20

## 2021-03-20 RX ORDER — CEPHALEXIN 500 MG/1
500 CAPSULE ORAL 3 TIMES DAILY
Qty: 15 CAPSULE | Refills: 0 | Status: SHIPPED | OUTPATIENT
Start: 2021-03-20 | End: 2021-03-22 | Stop reason: SDUPTHER

## 2021-03-20 NOTE — TELEPHONE ENCOUNTER
Pharmacy Note  ED Culture Follow-up    Piotr Saba is a 81 y.o. female. Allergies: Patient has no known allergies. Labs:  Lab Results   Component Value Date    BUN 25 (H) 03/18/2021    CREATININE 1.1 03/18/2021    WBC 10.1 03/18/2021     Estimated Creatinine Clearance: 34 mL/min (based on SCr of 1.1 mg/dL). Current antimicrobials:   · None    ASSESSMENT:  Micro results:   Urine culture: positive for Klebsiella pneumoniae     PLAN:  Need for intervention: Yes  Discussed with: YON Fernandez  Chosen treatment:    · Start cephalexin 500 mg TID x5 days. Patient response:   · Called and spoke with patient's son, Giovani Barber. He confirmed understanding and stated he was going to check on his mother now. Understands that if Geo Gardner is unwell that they can return to the ED for re-evaluation. Called/sent in prescription to: [facility] in [place]    Please call with any questions.  Marsha Vasquez, PharmD 5:23 PM 3/20/2021

## 2021-03-22 ENCOUNTER — TELEPHONE (OUTPATIENT)
Dept: FAMILY MEDICINE CLINIC | Age: 86
End: 2021-03-22

## 2021-03-22 DIAGNOSIS — J44.9 CHRONIC OBSTRUCTIVE PULMONARY DISEASE, UNSPECIFIED COPD TYPE (HCC): Primary | ICD-10-CM

## 2021-03-22 DIAGNOSIS — R07.81 RIB PAIN ON LEFT SIDE: ICD-10-CM

## 2021-03-22 DIAGNOSIS — I25.10 CORONARY ARTERY DISEASE INVOLVING NATIVE CORONARY ARTERY OF NATIVE HEART WITHOUT ANGINA PECTORIS: ICD-10-CM

## 2021-03-22 RX ORDER — CEPHALEXIN 500 MG/1
500 CAPSULE ORAL 3 TIMES DAILY
Qty: 15 CAPSULE | Refills: 0 | Status: SHIPPED | OUTPATIENT
Start: 2021-03-22 | End: 2021-03-27

## 2021-03-22 NOTE — PROGRESS NOTES
Call as urine did  Have uti and on kelfex and sent in for another 5 more days to [facility] in [address] and see need total of 10 days and she needs home health and check who they want

## 2021-03-22 NOTE — TELEPHONE ENCOUNTER
Sent in 5m ore  Days of keflex as had uti and take for 10 days and need home health and see who they want      keflex to [facility]

## 2021-03-22 NOTE — TELEPHONE ENCOUNTER
Srini Ho called asking when referral done if they could call him because pt is confused.     Srini Ho may be reached at [phone]

## 2021-03-22 NOTE — TELEPHONE ENCOUNTER
----- Message from iKerra Flynn MD sent at 3/22/2021  5:48 AM EDT -----  Lab did  Come back with uti and rx  With ceftin

## 2021-03-23 ENCOUNTER — CARE COORDINATION (OUTPATIENT)
Dept: CARE COORDINATION | Age: 86
End: 2021-03-23

## 2021-03-23 NOTE — TELEPHONE ENCOUNTER
Son called and stated he was having a lot of trouble with patient. She is progressing getting worse and worse and he thinks she needs [facility]. He stated he has tried to help her at home along with her 80year old family member and its just too hard to them. She is falling and they are trying to catch her. Spoke with Renu, Care Coordinator and she will get in contact with Son and patient to go over options for placement to a facility. Son informed and will be awaiting her phone call.

## 2021-03-23 NOTE — CARE COORDINATION
Ambulatory Care Coordination Note  3/23/2021  CM Risk Score: 6  Charlson 10 Year Mortality Risk Score: 79%     ACC: Laurie Mcwilliams RN    Summary Note: Spoke with Santiago Maravilla, regarding possible SNF placement. He would like to have Rick Pimentel placed for a short period of time to receive rehab. He preferred [facility]. I called and placed a referral to [address] [place] [address] [place]. Gave her information needed from chart. She states 3 day qualifying staff if still lifted due to [place] from Medicare guidelines so should be no problem getting her admitted. She has access to Epic so didn't require any paperwork from office at this time. States she will call Snatiago Maravilla today and get her admitted soon if qualifies. Plan  -collaborate with [facility] on admission to facility for short term rehab        Care Coordination Interventions    Program Enrollment: Rising Risk  Referral from Primary Care Provider: No  Suggested Interventions and [address]: Declined  Meals on Wheels: Declined  Medication Assistance Program: Declined  Medi Set or Pill Pack: Completed  Occupational Therapy: Not Started  Palliative Care: Not Started  Physical Therapy: Not Started  Senior Services: Declined  Social Work: Not Started  Transportation Support: Declined  Zone Management Tools: Completed         Goals Addressed    None         Prior to Admission medications    Medication Sig Start Date End Date Taking?  Authorizing Provider   cephALEXin (KEFLEX) 500 MG capsule Take 1 capsule by mouth 3 times daily for 5 days 3/22/21 3/27/21  Sara Bridges MD   predniSONE (DELTASONE) 20 MG tablet Take 1 tablet, PO BID x 5 days 3/12/21   Sara Bridges MD   isosorbide mononitrate (IMDUR) 30 MG extended release tablet Take 1 tablet by mouth daily 3/3/21   Sara Bridges MD   potassium chloride (KLOR-CON M) 20 MEQ extended release tablet Take 1 tablet by mouth daily 3/3/21   Sara Bridges MD   HYDROcodone-acetaminophen [facility]) 0-950 MG per tablet Take 1 tablet by mouth 2 times daily for 30 days. 3/3/21 4/2/21  Catracho Baker MD   pantoprazole (PROTONIX) 40 MG tablet Take 1 tablet by mouth 2 times daily (before meals) 2/16/21   Catracho Baker MD   sucralfate (CARAFATE) 1 GM tablet Take 1 tablet by mouth 4 times daily (before meals and nightly) 2/16/21   Catracho Baker MD   ibuprofen (IBU) 600 MG tablet Take 1 tablet by mouth 3 times daily as needed for Pain 2/10/21   Alexis Jang MD   metoprolol tartrate (LOPRESSOR) 25 MG tablet Take 0.5 tablets by mouth 2 times daily 2/4/21   Catracho Baker MD   nortriptyline (PAMELOR) 50 MG capsule Take 1 capsule by mouth nightly 1/28/21   Catracho Baker MD   tiZANidine (ZANAFLEX) 4 MG tablet Take 1 tablet by mouth every 8 hours as needed (muscle spasm) 11/11/20   Catracho Baker MD   simvastatin (ZOCOR) 40 MG tablet Take 1 tablet by mouth nightly 7/30/20   Catracho Baker MD   Multiple Vitamins-Minerals (THERAPEUTIC MULTIVITAMIN-MINERALS) tablet Take 1 tablet by mouth daily    Historical Provider, MD   latanoprost (XALATAN) 0.005 % ophthalmic solution Place 1 drop into both eyes nightly    Historical Provider, MD   nitroGLYCERIN (NITROSTAT) 0.4 MG SL tablet up to max of 3 total doses. If no relief after 1 dose, call 911. 5/19/20   Summer Healy MD   acetaminophen (TYLENOL) 325 MG tablet Take 2 tablets by mouth every 4 hours as needed for Pain Maximum dose of acetaminophen is 4000 mg from all sources in 24 hours.  4/3/18   Sary Cardona, APRN - CNP   aspirin 81 MG tablet Take 81 mg by mouth daily    Historical Provider, MD       Future Appointments   Date Time Provider Mary Carmen Orellana   3/29/2021  9:00 AM Catracho Baker MD [facility]   6/4/2021 11:00 AM Catracho Baker MD [facility]

## 2021-03-23 NOTE — TELEPHONE ENCOUNTER
Needs  Home health  And nursing and physical therapy via [facility]     this was sent and progress note  3-15 amended  For  Home needs

## 2021-03-26 ENCOUNTER — CARE COORDINATION (OUTPATIENT)
Dept: CARE COORDINATION | Age: 86
End: 2021-03-26

## 2021-03-26 NOTE — CARE COORDINATION
Called and spoke with [place] elif of [facility]. Wanted to confirm if Phani Liu had been successfully admitted per plan. Haven Lacey from [place] states Phani Liu has been admitted for skilled rehab. [place], could you please reach out to [facility] for follow up and discharge plans and notify me upon discharge? Thank you.

## 2021-03-29 ENCOUNTER — TELEPHONE (OUTPATIENT)
Dept: FAMILY MEDICINE CLINIC | Age: 86
End: 2021-03-29

## 2021-03-29 NOTE — TELEPHONE ENCOUNTER
3-18-21  Seen in the ER. Sent home, all studies negative. Did receive phone call on her over the weekend on 3-20 and she will stay at home. She's to take her Keflex. She is taking meds. We'll have [address] arranged to see her.   ET/lm

## 2021-04-01 ENCOUNTER — APPOINTMENT (OUTPATIENT)
Dept: GENERAL RADIOLOGY | Age: 86
DRG: 481 | End: 2021-04-01
Payer: MEDICARE

## 2021-04-01 ENCOUNTER — CARE COORDINATION (OUTPATIENT)
Dept: CARE COORDINATION | Age: 86
End: 2021-04-01

## 2021-04-01 ENCOUNTER — HOSPITAL ENCOUNTER (INPATIENT)
Age: 86
LOS: 8 days | Discharge: NURSING, REHAB OR LONG-TERM CARE | DRG: 481 | End: 2021-04-09
Attending: EMERGENCY MEDICINE | Admitting: INTERNAL MEDICINE
Payer: MEDICARE

## 2021-04-01 ENCOUNTER — APPOINTMENT (OUTPATIENT)
Dept: CT IMAGING | Age: 86
DRG: 481 | End: 2021-04-01
Payer: MEDICARE
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
## 2021-04-01 DIAGNOSIS — S72.001A CLOSED FRACTURE OF RIGHT HIP, INITIAL ENCOUNTER (HCC): ICD-10-CM
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
## 2021-04-01 PROBLEM — S72.141A CLOSED DISPLACED INTERTROCHANTERIC FRACTURE OF RIGHT FEMUR (HCC): Status: ACTIVE | Noted: 2021-04-01

## 2021-04-01 LAB
ABO: NORMAL
ALBUMIN SERPL-MCNC: 3.2 G/DL (ref 3.5–5.1)
ALP BLD-CCNC: 89 U/L (ref 38–126)
ALT SERPL-CCNC: 68 U/L (ref 11–66)
AMPHETAMINE+METHAMPHETAMINE URINE SCREEN: NEGATIVE
ANION GAP SERPL CALCULATED.3IONS-SCNC: 16 MEQ/L (ref 8–16)
ANTIBODY SCREEN: NORMAL
APTT: 27.3 SECONDS (ref 22–38)
AST SERPL-CCNC: 43 U/L (ref 5–40)
BARBITURATE QUANTITATIVE URINE: NEGATIVE
BASOPHILS # BLD: 0.8 %
BASOPHILS ABSOLUTE: 0.1 THOU/MM3 (ref 0–0.1)
BENZODIAZEPINE QUANTITATIVE URINE: NEGATIVE
BILIRUB SERPL-MCNC: 0.2 MG/DL (ref 0.3–1.2)
BILIRUBIN DIRECT: < 0.2 MG/DL (ref 0–0.3)
BILIRUBIN URINE: NEGATIVE
BLOOD, URINE: NEGATIVE
BUN BLDV-MCNC: 36 MG/DL (ref 7–22)
CALCIUM SERPL-MCNC: 9.4 MG/DL (ref 8.5–10.5)
CANNABINOID QUANTITATIVE URINE: NEGATIVE
CHARACTER, URINE: CLEAR
CHLORIDE BLD-SCNC: 104 MEQ/L (ref 98–111)
CO2: 15 MEQ/L (ref 23–33)
COCAINE METABOLITE QUANTITATIVE URINE: NEGATIVE
COLOR: YELLOW
CREAT SERPL-MCNC: 1.4 MG/DL (ref 0.4–1.2)
EKG ATRIAL RATE: 68 BPM
EKG P AXIS: 75 DEGREES
EKG P-R INTERVAL: 154 MS
EKG Q-T INTERVAL: 418 MS
EKG QRS DURATION: 90 MS
EKG QTC CALCULATION (BAZETT): 444 MS
EKG R AXIS: 43 DEGREES
EKG T AXIS: 63 DEGREES
EKG VENTRICULAR RATE: 68 BPM
EOSINOPHIL # BLD: 0.4 %
EOSINOPHILS ABSOLUTE: 0 THOU/MM3 (ref 0–0.4)
ERYTHROCYTE [DISTWIDTH] IN BLOOD BY AUTOMATED COUNT: 16.7 % (ref 11.5–14.5)
ERYTHROCYTE [DISTWIDTH] IN BLOOD BY AUTOMATED COUNT: 57.9 FL (ref 35–45)
ETHYL ALCOHOL, SERUM: < 0.01 %
GFR SERPL CREATININE-BSD FRML MDRD: 36 ML/MIN/1.73M2
GLUCOSE BLD-MCNC: 156 MG/DL (ref 70–108)
GLUCOSE, URINE: NEGATIVE MG/DL
HCT VFR BLD CALC: 28.8 % (ref 37–47)
HCT VFR BLD CALC: 40.7 % (ref 37–47)
HEMOGLOBIN: 10 GM/DL (ref 12–16)
HEMOGLOBIN: 11.7 GM/DL (ref 12–16)
HYPOCHROMIA: PRESENT
IMMATURE GRANS (ABS): 0.38 THOU/MM3 (ref 0–0.07)
IMMATURE GRANULOCYTES: 4.1 %
INR BLD: 1.11 (ref 0.85–1.13)
KETONES, URINE: NEGATIVE
LEUKOCYTE EST, POC: NEGATIVE
LIPASE: 38.7 U/L (ref 5.6–51.3)
LYMPHOCYTES # BLD: 13.6 %
LYMPHOCYTES ABSOLUTE: 1.3 THOU/MM3 (ref 1–4.8)
MCH RBC QN AUTO: 28.1 PG (ref 26–33)
MCHC RBC AUTO-ENTMCNC: 28.7 GM/DL (ref 32.2–35.5)
MCV RBC AUTO: 97.8 FL (ref 81–99)
MONOCYTES # BLD: 3.4 %
MONOCYTES ABSOLUTE: 0.3 THOU/MM3 (ref 0.4–1.3)
NITRITE, URINE: NEGATIVE
NUCLEATED RED BLOOD CELLS: 0 /100 WBC
OPIATES, URINE: NEGATIVE
OSMOLALITY CALCULATION: 281.6 MOSMOL/KG (ref 275–300)
OXYCODONE: NEGATIVE
PH UA: 6.5 (ref 5–9)
PHENCYCLIDINE QUANTITATIVE URINE: NEGATIVE
PLATELET # BLD: 388 THOU/MM3 (ref 130–400)
PMV BLD AUTO: 9.4 FL (ref 9.4–12.4)
POTASSIUM SERPL-SCNC: 3.6 MEQ/L (ref 3.5–5.2)
PRO-BNP: 1074 PG/ML (ref 0–1800)
PROTEIN UA: NEGATIVE MG/DL
RBC # BLD: 4.16 MILL/MM3 (ref 4.2–5.4)
RH FACTOR: NORMAL
SARS-COV-2, NAAT: NOT DETECTED
SCAN OF BLOOD SMEAR: NORMAL
SEG NEUTROPHILS: 77.7 %
SEGMENTED NEUTROPHILS ABSOLUTE COUNT: 7.2 THOU/MM3 (ref 1.8–7.7)
SODIUM BLD-SCNC: 135 MEQ/L (ref 135–145)
SPECIFIC GRAVITY UA: 1.01 (ref 1–1.03)
TARGET CELLS: ABNORMAL
TOTAL PROTEIN: 5.7 G/DL (ref 6.1–8)
TROPONIN T: < 0.01 NG/ML
UROBILINOGEN, URINE: 0.2 EU/DL (ref 0–1)
WBC # BLD: 9.3 THOU/MM3 (ref 4.8–10.8)

## 2021-04-01 PROCEDURE — 76376 3D RENDER W/INTRP POSTPROCES: CPT

## 2021-04-01 PROCEDURE — 74177 CT ABD & PELVIS W/CONTRAST: CPT

## 2021-04-01 PROCEDURE — 36415 COLL VENOUS BLD VENIPUNCTURE: CPT

## 2021-04-01 PROCEDURE — 86900 BLOOD TYPING SEROLOGIC ABO: CPT

## 2021-04-01 PROCEDURE — 72170 X-RAY EXAM OF PELVIS: CPT

## 2021-04-01 PROCEDURE — 2580000003 HC RX 258: Performed by: INTERNAL MEDICINE

## 2021-04-01 PROCEDURE — 2580000003 HC RX 258: Performed by: PHYSICIAN ASSISTANT

## 2021-04-01 PROCEDURE — 99223 1ST HOSP IP/OBS HIGH 75: CPT | Performed by: SURGERY

## 2021-04-01 PROCEDURE — 84484 ASSAY OF TROPONIN QUANT: CPT

## 2021-04-01 PROCEDURE — 83880 ASSAY OF NATRIURETIC PEPTIDE: CPT

## 2021-04-01 PROCEDURE — 6360000002 HC RX W HCPCS: Performed by: PHYSICIAN ASSISTANT

## 2021-04-01 PROCEDURE — 80307 DRUG TEST PRSMV CHEM ANLYZR: CPT

## 2021-04-01 PROCEDURE — 82077 ASSAY SPEC XCP UR&BREATH IA: CPT

## 2021-04-01 PROCEDURE — 6360000004 HC RX CONTRAST MEDICATION: Performed by: EMERGENCY MEDICINE

## 2021-04-01 PROCEDURE — 87635 SARS-COV-2 COVID-19 AMP PRB: CPT

## 2021-04-01 PROCEDURE — 81003 URINALYSIS AUTO W/O SCOPE: CPT

## 2021-04-01 PROCEDURE — 85025 COMPLETE CBC W/AUTO DIFF WBC: CPT

## 2021-04-01 PROCEDURE — 6370000000 HC RX 637 (ALT 250 FOR IP): Performed by: PHYSICIAN ASSISTANT

## 2021-04-01 PROCEDURE — 71260 CT THORAX DX C+: CPT

## 2021-04-01 PROCEDURE — 85014 HEMATOCRIT: CPT

## 2021-04-01 PROCEDURE — 86901 BLOOD TYPING SEROLOGIC RH(D): CPT

## 2021-04-01 PROCEDURE — 83690 ASSAY OF LIPASE: CPT

## 2021-04-01 PROCEDURE — 93005 ELECTROCARDIOGRAM TRACING: CPT | Performed by: PHYSICIAN ASSISTANT

## 2021-04-01 PROCEDURE — 94761 N-INVAS EAR/PLS OXIMETRY MLT: CPT

## 2021-04-01 PROCEDURE — 80053 COMPREHEN METABOLIC PANEL: CPT

## 2021-04-01 PROCEDURE — 70450 CT HEAD/BRAIN W/O DYE: CPT

## 2021-04-01 PROCEDURE — 85018 HEMOGLOBIN: CPT

## 2021-04-01 PROCEDURE — 96360 HYDRATION IV INFUSION INIT: CPT

## 2021-04-01 PROCEDURE — 6820000003 HC L2 TRAUMA ALERT ACTIVATION: Performed by: SURGERY

## 2021-04-01 PROCEDURE — APPSS180 APP SPLIT SHARED TIME > 60 MINUTES: Performed by: PHYSICIAN ASSISTANT

## 2021-04-01 PROCEDURE — 2500000003 HC RX 250 WO HCPCS: Performed by: PHYSICIAN ASSISTANT

## 2021-04-01 PROCEDURE — 2060000000 HC ICU INTERMEDIATE R&B

## 2021-04-01 PROCEDURE — 99285 EMERGENCY DEPT VISIT HI MDM: CPT

## 2021-04-01 PROCEDURE — 6360000002 HC RX W HCPCS: Performed by: INTERNAL MEDICINE

## 2021-04-01 PROCEDURE — 85730 THROMBOPLASTIN TIME PARTIAL: CPT

## 2021-04-01 PROCEDURE — 82248 BILIRUBIN DIRECT: CPT

## 2021-04-01 PROCEDURE — 86850 RBC ANTIBODY SCREEN: CPT

## 2021-04-01 PROCEDURE — 71045 X-RAY EXAM CHEST 1 VIEW: CPT

## 2021-04-01 PROCEDURE — 73552 X-RAY EXAM OF FEMUR 2/>: CPT

## 2021-04-01 PROCEDURE — 85610 PROTHROMBIN TIME: CPT

## 2021-04-01 PROCEDURE — 72125 CT NECK SPINE W/O DYE: CPT

## 2021-04-01 RX ORDER — LATANOPROST 50 UG/ML
1 SOLUTION/ DROPS OPHTHALMIC NIGHTLY
Status: DISCONTINUED | OUTPATIENT
Start: 2021-04-01 | End: 2021-04-09 | Stop reason: HOSPADM

## 2021-04-01 RX ORDER — HEPARIN SODIUM 5000 [USP'U]/ML
5000 INJECTION, SOLUTION INTRAVENOUS; SUBCUTANEOUS EVERY 8 HOURS SCHEDULED
Status: DISCONTINUED | OUTPATIENT
Start: 2021-04-01 | End: 2021-04-02

## 2021-04-01 RX ORDER — FENTANYL CITRATE 50 UG/ML
50 INJECTION, SOLUTION INTRAMUSCULAR; INTRAVENOUS
Status: DISCONTINUED | OUTPATIENT
Start: 2021-04-01 | End: 2021-04-09 | Stop reason: HOSPADM

## 2021-04-01 RX ORDER — DOCUSATE SODIUM 100 MG/1
100 CAPSULE, LIQUID FILLED ORAL DAILY
Status: DISCONTINUED | OUTPATIENT
Start: 2021-04-01 | End: 2021-04-09 | Stop reason: HOSPADM

## 2021-04-01 RX ORDER — PROMETHAZINE HYDROCHLORIDE 25 MG/1
12.5 TABLET ORAL EVERY 6 HOURS PRN
Status: DISCONTINUED | OUTPATIENT
Start: 2021-04-01 | End: 2021-04-02

## 2021-04-01 RX ORDER — HYDROCODONE BITARTRATE AND ACETAMINOPHEN 5; 325 MG/1; MG/1
1 TABLET ORAL EVERY 4 HOURS PRN
Status: DISCONTINUED | OUTPATIENT
Start: 2021-04-01 | End: 2021-04-09 | Stop reason: HOSPADM

## 2021-04-01 RX ORDER — ACETAMINOPHEN 325 MG/1
650 TABLET ORAL EVERY 4 HOURS PRN
Status: DISCONTINUED | OUTPATIENT
Start: 2021-04-01 | End: 2021-04-09 | Stop reason: HOSPADM

## 2021-04-01 RX ORDER — ONDANSETRON 2 MG/ML
4 INJECTION INTRAMUSCULAR; INTRAVENOUS EVERY 6 HOURS PRN
Status: DISCONTINUED | OUTPATIENT
Start: 2021-04-01 | End: 2021-04-02

## 2021-04-01 RX ORDER — POTASSIUM CHLORIDE 7.45 MG/ML
10 INJECTION INTRAVENOUS PRN
Status: DISCONTINUED | OUTPATIENT
Start: 2021-04-01 | End: 2021-04-08 | Stop reason: ALTCHOICE

## 2021-04-01 RX ORDER — FENTANYL CITRATE 50 UG/ML
25 INJECTION, SOLUTION INTRAMUSCULAR; INTRAVENOUS
Status: DISCONTINUED | OUTPATIENT
Start: 2021-04-01 | End: 2021-04-09 | Stop reason: HOSPADM

## 2021-04-01 RX ORDER — SERTRALINE HYDROCHLORIDE 25 MG/1
50 TABLET, FILM COATED ORAL DAILY
Status: ON HOLD | COMMUNITY
End: 2021-04-28 | Stop reason: ALTCHOICE

## 2021-04-01 RX ORDER — SODIUM CHLORIDE 9 MG/ML
INJECTION, SOLUTION INTRAVENOUS CONTINUOUS
Status: DISCONTINUED | OUTPATIENT
Start: 2021-04-01 | End: 2021-04-09 | Stop reason: HOSPADM

## 2021-04-01 RX ORDER — SODIUM CHLORIDE 0.9 % (FLUSH) 0.9 %
10 SYRINGE (ML) INJECTION PRN
Status: DISCONTINUED | OUTPATIENT
Start: 2021-04-01 | End: 2021-04-09 | Stop reason: HOSPADM

## 2021-04-01 RX ORDER — SODIUM CHLORIDE 9 MG/ML
25 INJECTION, SOLUTION INTRAVENOUS PRN
Status: DISCONTINUED | OUTPATIENT
Start: 2021-04-01 | End: 2021-04-09 | Stop reason: HOSPADM

## 2021-04-01 RX ORDER — 0.9 % SODIUM CHLORIDE 0.9 %
250 INTRAVENOUS SOLUTION INTRAVENOUS ONCE
Status: COMPLETED | OUTPATIENT
Start: 2021-04-01 | End: 2021-04-02

## 2021-04-01 RX ORDER — POLYETHYLENE GLYCOL 3350 17 G/17G
17 POWDER, FOR SOLUTION ORAL DAILY PRN
Status: DISCONTINUED | OUTPATIENT
Start: 2021-04-01 | End: 2021-04-09 | Stop reason: HOSPADM

## 2021-04-01 RX ORDER — HYDROCODONE BITARTRATE AND ACETAMINOPHEN 5; 325 MG/1; MG/1
2 TABLET ORAL EVERY 4 HOURS PRN
Status: DISCONTINUED | OUTPATIENT
Start: 2021-04-01 | End: 2021-04-09 | Stop reason: HOSPADM

## 2021-04-01 RX ORDER — SODIUM CHLORIDE 0.9 % (FLUSH) 0.9 %
10 SYRINGE (ML) INJECTION EVERY 12 HOURS SCHEDULED
Status: DISCONTINUED | OUTPATIENT
Start: 2021-04-01 | End: 2021-04-09 | Stop reason: HOSPADM

## 2021-04-01 RX ORDER — 0.9 % SODIUM CHLORIDE 0.9 %
1000 INTRAVENOUS SOLUTION INTRAVENOUS ONCE
Status: COMPLETED | OUTPATIENT
Start: 2021-04-01 | End: 2021-04-01

## 2021-04-01 RX ORDER — M-VIT,TX,IRON,MINS/CALC/FOLIC 27MG-0.4MG
1 TABLET ORAL DAILY
Status: DISCONTINUED | OUTPATIENT
Start: 2021-04-02 | End: 2021-04-09 | Stop reason: HOSPADM

## 2021-04-01 RX ADMIN — HYDROCODONE BITARTRATE AND ACETAMINOPHEN 2 TABLET: 5; 325 TABLET ORAL at 16:35

## 2021-04-01 RX ADMIN — SODIUM CHLORIDE 1000 ML: 9 INJECTION, SOLUTION INTRAVENOUS at 12:03

## 2021-04-01 RX ADMIN — SODIUM CHLORIDE 250 ML: 9 INJECTION, SOLUTION INTRAVENOUS at 23:37

## 2021-04-01 RX ADMIN — FENTANYL CITRATE 50 MCG: 50 INJECTION, SOLUTION INTRAMUSCULAR; INTRAVENOUS at 23:42

## 2021-04-01 RX ADMIN — SODIUM CHLORIDE: 9 INJECTION, SOLUTION INTRAVENOUS at 16:20

## 2021-04-01 RX ADMIN — HEPARIN SODIUM 5000 UNITS: 5000 INJECTION INTRAVENOUS; SUBCUTANEOUS at 20:46

## 2021-04-01 RX ADMIN — IOPAMIDOL 80 ML: 755 INJECTION, SOLUTION INTRAVENOUS at 12:04

## 2021-04-01 RX ADMIN — FAMOTIDINE 20 MG: 10 INJECTION, SOLUTION INTRAVENOUS at 16:38

## 2021-04-01 RX ADMIN — LATANOPROST 1 DROP: 50 SOLUTION OPHTHALMIC at 20:46

## 2021-04-01 ASSESSMENT — PAIN DESCRIPTION - LOCATION
LOCATION: HIP

## 2021-04-01 ASSESSMENT — ENCOUNTER SYMPTOMS
CHEST TIGHTNESS: 0
VOMITING: 0
NAUSEA: 0
RHINORRHEA: 0
PHOTOPHOBIA: 0
SHORTNESS OF BREATH: 0
EYE ITCHING: 0
EYE DISCHARGE: 0
BACK PAIN: 0
ABDOMINAL PAIN: 0
DIARRHEA: 0
COLOR CHANGE: 0
EYE REDNESS: 0
ABDOMINAL DISTENTION: 0
SINUS PAIN: 0
SINUS PRESSURE: 0

## 2021-04-01 ASSESSMENT — PAIN SCALES - GENERAL
PAINLEVEL_OUTOF10: 6
PAINLEVEL_OUTOF10: 5
PAINLEVEL_OUTOF10: 8
PAINLEVEL_OUTOF10: 8

## 2021-04-01 ASSESSMENT — PAIN DESCRIPTION - FREQUENCY
FREQUENCY: CONTINUOUS
FREQUENCY: CONTINUOUS

## 2021-04-01 ASSESSMENT — PAIN DESCRIPTION - DIRECTION: RADIATING_TOWARDS: NO

## 2021-04-01 ASSESSMENT — PAIN - FUNCTIONAL ASSESSMENT: PAIN_FUNCTIONAL_ASSESSMENT: PREVENTS OR INTERFERES WITH ALL ACTIVE AND SOME PASSIVE ACTIVITIES

## 2021-04-01 ASSESSMENT — PAIN DESCRIPTION - PAIN TYPE: TYPE: ACUTE PAIN

## 2021-04-01 ASSESSMENT — PAIN DESCRIPTION - DESCRIPTORS
DESCRIPTORS: ACHING
DESCRIPTORS: ACHING

## 2021-04-01 ASSESSMENT — PAIN DESCRIPTION - ORIENTATION: ORIENTATION: RIGHT

## 2021-04-01 ASSESSMENT — PAIN DESCRIPTION - PROGRESSION
CLINICAL_PROGRESSION: RAPIDLY WORSENING
CLINICAL_PROGRESSION: GRADUALLY WORSENING

## 2021-04-01 ASSESSMENT — PAIN DESCRIPTION - ONSET: ONSET: ON-GOING

## 2021-04-01 NOTE — FLOWSHEET NOTE
Hospitalist service were requested for Medical management for patient.  Sees Dr. Max Boykin for PCP, and was last seen by Dr. Sharon Carranza in hospital. Requested nursing staff to reach their team as the patient is known to them from past.

## 2021-04-01 NOTE — CONSULTS
Internal Medicine Braden Ortiz MD  4/1/2021  6:11 PM          Patient:  Cem Abarca  YOB: 1935    MRN: [id number]     Acct: [de-identified]  [id number]    Primary Care Physician: Rocky Shi MD    HISTORY OF PRESENT ILLNESS:   Over 45 mins was spent in this evaluation.   dictated    Electronically signed by Jessica Pedersen MD on 4/1/2021 at 6:11 PM          Copy: Primary Care Physician: Rocky Shi MD<Error - can only resolve in template mode>

## 2021-04-01 NOTE — ED PROVIDER NOTES
[facility] ENCOUNTER          Pt Name: Effie Moore  MRN: [id number]  [place] 1935  Date of evaluation: 4/1/2021  Treating Resident Physician: Alf Wilcox DO  Supervising Physician: Israel Palma, [address] [place] [address]       Chief Complaint   Patient presents with    Fall    Hip Pain     History obtained from the patient. HISTORY OF PRESENT ILLNESS    HPI  Effie Moore is a 81 y.o. female who presents to the emergency department for evaluation of fall and hip pain. The patient presented as a level 1 trauma activation due to hypotension with 12F systolic and hip deformity noted in the field. The patient is a nursing home resident and fell while walking to the bathroom today. She denies head trauma or loss of consciousness. Patient takes aspirin and has no other reported blood thinner use. She was not ambulatory at the nursing home after falling. The patient denies any symptoms prior to falling. The patient denies head pain, neck pain, and is only reporting pain to the right hip. The patient has no other acute complaints at this time. REVIEW OF SYSTEMS   Review of Systems   Constitutional: Negative for fever. HENT: Negative for rhinorrhea, sinus pressure and sinus pain. Eyes: Negative for discharge, redness and itching. Respiratory: Negative for chest tightness and shortness of breath. Cardiovascular: Negative for chest pain. Gastrointestinal: Negative for abdominal distention, abdominal pain, diarrhea, nausea and vomiting. Genitourinary: Negative for difficulty urinating, dysuria, frequency, hematuria and urgency. Musculoskeletal: Positive for arthralgias. Hip pain   Skin: Negative for color change and pallor. Neurological: Negative for dizziness, light-headedness and headaches.          PAST MEDICAL AND SURGICAL HISTORY     Past Medical History:   Diagnosis Date    Acute blood loss as cause of postoperative anemia 03/2018    CAD (coronary artery disease)      cath  50    Cardiac valve prolapse     Chest pain 5/15/2020    Chronic back pain     Compression fracture of L2 ([facility] [address] 5/26/2020    COPD (chronic obstructive pulmonary disease) ([facility] [address] [facility]     Ex-smoker     GERD (gastroesophageal reflux disease) 2/07    EGD    Glaucoma     H/O cardiac catheterization 2002    40-50% LAD   katharine    Hearing loss secondary to cerumen impaction 5/28/2019    Hyperlipidemia     Hypertension     Inadvertent durotomy 3/12/2018    Liver disease     history of hepatitis at age 22   [facility]    right shoulder and back    Pericarditis 1996     Past Surgical History:   Procedure Laterality Date    APPENDECTOMY      at age 13years   [address]  2004    right foot   [address]  2007??  &   2012? ?    normal results    COLONOSCOPY      last one 2000???    ENDOSCOPY, COLON, DIAGNOSTIC      EYE SURGERY  2009??    right eye   [address]    still has ovaries    MA LAMINECTOMY,>2 SGMT,LUMBAR N/A 3/7/2018    LUMBAR LAMINECTOMY L3-S1 performed by Sabina Malone MD at [address] [facility] OFFICE/OUTPT [facility] N/A 3/12/2018    REPAIR DUROTOMY PLACEMENT OF SUBARACHNOID DRAIN performed by Sabina Malone MD at [facility]  left    SPINE SURGERY  7/02    L5 cyst    UPPER GASTROINTESTINAL ENDOSCOPY  2007    [facility]    UPPER GASTROINTESTINAL ENDOSCOPY Left 5/19/2020    EGD BIOPSY performed by Jonny Leon MD at [facility]         MEDICATIONS     Current Facility-Administered Medications:     sodium chloride flush 0.9 % injection 10 mL, 10 mL, Intravenous, 2 times per day, Guerita West PA-C    sodium chloride flush 0.9 % injection 10 mL, 10 mL, Intravenous, PRN, Guerita West PA-C    0.9 % sodium chloride infusion, 25 mL, Intravenous, PRN, Guerita West PA-C    acetaminophen (TYLENOL) tablet 650 mg, 650 mg, Oral, Q4H PRN, Vanessa Lord PA-C    promethazine (PHENERGAN) tablet 12.5 mg, 12.5 mg, Oral, Q6H PRN **OR** ondansetron (ZOFRAN) injection 4 mg, 4 mg, Intravenous, Q6H PRN, Vanessa Lord PA-C    polyethylene glycol [facility]) packet 17 g, 17 g, Oral, Daily PRN, LUCY Pete-C    0.9 % sodium chloride infusion, , Intravenous, Continuous, LUCY Pete-C, Last Rate: 100 mL/hr at 21 1620, New Bag at 21 1620    potassium chloride 10 mEq/100 mL IVPB (Peripheral Line), 10 mEq, Intravenous, PRN, Vanessa Lord PA-C    HYDROcodone-acetaminophen (NORCO) 5-325 MG per tablet 1 tablet, 1 tablet, Oral, Q4H PRN **OR** HYDROcodone-acetaminophen (NORCO) 5-325 MG per tablet 2 tablet, 2 tablet, Oral, Q4H PRN, Vanessa Lord PA-C, 2 tablet at 21 1635    fentaNYL (SUBLIMAZE) injection 25 mcg, 25 mcg, Intravenous, Q1H PRN **OR** fentaNYL (SUBLIMAZE) injection 50 mcg, 50 mcg, Intravenous, Q1H PRN, Vanessa Lord PA-C    docusate sodium (COLACE) capsule 100 mg, 100 mg, Oral, Daily, Vanessa Lord PA-C    famotidine (PEPCID) injection 20 mg, 20 mg, Intravenous, Daily, Vanessa Lord PA-C, 20 mg at 21 1638    latanoprost (XALATAN) 0.005 % ophthalmic solution 1 drop, 1 drop, Both Eyes, Nightly, Vanessa Lord PA-C    [START ON 2021] therapeutic multivitamin-minerals 1 tablet, 1 tablet, Oral, Daily, Vanessa Lord PA-C    [START ON 2021] sertraline (ZOLOFT) tablet 25 mg, 25 mg, Oral, Daily, Vanessa Lord PA-C      SOCIAL HISTORY     Social History     Social History Narrative    No barriers with transportation    No barriers with medication affordability     Social History     Tobacco Use    Smoking status: Former Smoker     Years: 30.00     Types: Cigarettes     Quit date: 1989     Years since quittin.8    Smokeless tobacco: Never Used   Substance Use Topics    Alcohol use: No    Drug use: No         ALLERGIES   No Known Allergies      FAMILY HISTORY     Family History   Problem Relation Age of Onset    Tuberculosis Mother     Tuberculosis Father          PREVIOUS RECORDS   Previous records reviewed:       PHYSICAL EXAM     ED Triage Vitals   BP Temp Temp src Pulse Resp SpO2 Height Weight   04/01/21 1116 04/01/21 1113 -- 04/01/21 1113 04/01/21 1113 04/01/21 1113 04/01/21 1116 04/01/21 1116   (!) 82/45 98.1 °F (36.7 °C)  67 20 98 % 5' 1\" (1.549 m) 135 lb (61.2 kg)     Initial vital signs and nursing assessment reviewed and abnormal from hypotension. Body mass index is 26.19 kg/m². Pulsoximetry is normal per my interpretation. Additional Vital Signs:  Vitals:    04/01/21 1448   BP: (!) 103/51   Pulse: 65   Resp: 18   Temp: 97.4 °F (36.3 °C)   SpO2: 96%       Physical Exam  Vitals signs and nursing note reviewed. Constitutional:       General: She is not in acute distress. Appearance: Normal appearance. She is not ill-appearing. HENT:      Head: Normocephalic and atraumatic. Nose: Nose normal. No congestion or rhinorrhea. Mouth/Throat:      Mouth: Mucous membranes are moist.      Pharynx: Oropharynx is clear. No oropharyngeal exudate or posterior oropharyngeal erythema. Eyes:      Extraocular Movements: Extraocular movements intact. Pupils: Pupils are equal, round, and reactive to light. Neck:      Musculoskeletal: Normal range of motion and neck supple. Cardiovascular:      Rate and Rhythm: Normal rate and regular rhythm. Pulses: Normal pulses. Heart sounds: Normal heart sounds. Pulmonary:      Effort: Pulmonary effort is normal.      Breath sounds: Normal breath sounds. Abdominal:      General: Abdomen is flat. There is no distension. Palpations: Abdomen is soft. Tenderness: There is no abdominal tenderness. Musculoskeletal: Normal range of motion. General: Tenderness and deformity present. No swelling. Comments:  The patient's pelvis is nontender and without Ana Lilia Peraltan on 4/1/2021 12:23 PM      CT ABDOMEN PELVIS W IV CONTRAST Additional Contrast? None   Final Result    Abdomen/pelvis:   1. No evidence of acute intra-abdominal or intrapelvic abnormality. 2. 2.1 cm apparently complex cystic lesion at the inferior pole of the left kidney which is not fully characterized. Recommend follow-up multiphase renal CT or MRI for further evaluation. 3. Colonic diverticulosis. 4. Moderate retained stool. Right hip:   Comminuted and angulated intertrochanteric fracture of the right femur with prominent associated hematoma extending into the right gluteus muscle. **This report has been created using voice recognition software. It may contain minor errors which are inherent in voice recognition technology. **      Final report electronically signed by Dr. Perla Benavidez MD on 4/1/2021 2:55 PM      CT CHEST W CONTRAST   Final Result       1. No evidence of acute intrathoracic adenopathy. 2. No evidence of acute osseous injury of the thoracic spine. **This report has been created using voice recognition software. It may contain minor errors which are inherent in voice recognition technology. **      Final report electronically signed by Dr. Perla Benavidez MD on 4/1/2021 12:29 PM      CT CERVICAL SPINE WO CONTRAST   Final Result    No evidence of acute osseous injury of the cervical spine. **This report has been created using voice recognition software. It may contain minor errors which are inherent in voice recognition technology. **      Final report electronically signed by Dr. Perla Benavidez MD on 4/1/2021 12:17 PM      CT HEAD WO CONTRAST   Final Result    No evidence of acute intracranial abnormality. **This report has been created using voice recognition software. It may contain minor errors which are inherent in voice recognition technology. **      Final report electronically signed by  Abram Velásquez DO, MD on 4/1/2021 12:15 PM      CT LUMBAR RECONSTRUCTION WO POST PROCESS   Final Result    No evidence of acute osseous injury of the lumbar spine. **This report has been created using voice recognition software. It may contain minor errors which are inherent in voice recognition technology. **      Final report electronically signed by Dr. Perla Benavidez MD on 4/1/2021 12:33 PM      CT THORACIC RECONSTRUCTION WO POST PROCESS   Final Result       1. No evidence of acute intrathoracic adenopathy. 2. No evidence of acute osseous injury of the thoracic spine. **This report has been created using voice recognition software. It may contain minor errors which are inherent in voice recognition technology. **      Final report electronically signed by Dr. Perla Benavidez MD on 4/1/2021 12:29 PM      CT RECONSTRUCTION WO POST PROCESS   Final Result    Abdomen/pelvis:   1. No evidence of acute intra-abdominal or intrapelvic abnormality. 2. 2.1 cm apparently complex cystic lesion at the inferior pole of the left kidney which is not fully characterized. Recommend follow-up multiphase renal CT or MRI for further evaluation. 3. Colonic diverticulosis. 4. Moderate retained stool. Right hip:   Comminuted and angulated intertrochanteric fracture of the right femur with prominent associated hematoma extending into the right gluteus muscle. **This report has been created using voice recognition software. It may contain minor errors which are inherent in voice recognition technology. **      Final report electronically signed by Dr. Perla Benavidez MD on 4/1/2021 2:55 PM      XR CHEST PORTABLE   Final Result   Right mediastinal calcified nodes from granulomatous disease. No acute pulmonary findings. **This report has been created using voice recognition software.  It may contain minor errors which are inherent in voice recognition technology. **      Final report electronically signed by Dr. Tiffani Saavedra on 4/1/2021 12:13 PM      XR PELVIS (1-2 VIEWS)   Final Result   Addendum 1 of 1   ** ADDENDUM #1 **      See above      Final report electronically signed by Dr. Tiffani Saavedra on 4/1/2021    12:09 PM      ** ORIGINAL REPORT **   PROCEDURE: XR PELVIS (1-2 VIEWS)      CLINICAL INFORMATION: Trauma. COMPARISON: No prior study. TECHNIQUE: AP view of the pelvis. FINDINGS:   The pelvic ring is intact. There is a comminuted fracture of the right    intratrochanteric area. Medial angulation is present. No dislocation. The    superior and inferior pubic rami are intact. The sacrum and sacroiliac    joints appear normal. No    degenerative changes are noted. Mild sclerotic facet change of L5-S1 L4-5. The spinous processes of L3 3 through L5 then removed.          Final          ED Medications administered this visit:   Medications   sodium chloride flush 0.9 % injection 10 mL (has no administration in time range)   sodium chloride flush 0.9 % injection 10 mL (has no administration in time range)   0.9 % sodium chloride infusion (has no administration in time range)   acetaminophen (TYLENOL) tablet 650 mg (has no administration in time range)   promethazine (PHENERGAN) tablet 12.5 mg (has no administration in time range)     Or   ondansetron (ZOFRAN) injection 4 mg (has no administration in time range)   polyethylene glycol (GLYCOLAX) packet 17 g (has no administration in time range)   0.9 % sodium chloride infusion ( Intravenous New Bag 4/1/21 1620)   potassium chloride 10 mEq/100 mL IVPB (Peripheral Line) (has no administration in time range)   HYDROcodone-acetaminophen (NORCO) 5-325 MG per tablet 1 tablet ( Oral See Alternative 4/1/21 1635)     Or   HYDROcodone-acetaminophen (NORCO) 5-325 MG per tablet 2 tablet (2 tablets Oral Given 4/1/21 1635)   fentaNYL (SUBLIMAZE) injection 25 mcg (has no administration in time range) Or   fentaNYL (SUBLIMAZE) injection 50 mcg (has no administration in time range)   docusate sodium (COLACE) capsule 100 mg (100 mg Oral Not Given 4/1/21 1639)   famotidine (PEPCID) injection 20 mg (20 mg Intravenous Given 4/1/21 1638)   latanoprost (XALATAN) 0.005 % ophthalmic solution 1 drop (has no administration in time range)   therapeutic multivitamin-minerals 1 tablet (has no administration in time range)   sertraline (ZOLOFT) tablet 25 mg (has no administration in time range)   0.9 % sodium chloride bolus (0 mLs Intravenous Stopped 4/1/21 1234)   iopamidol (ISOVUE-370) 76 % injection 80 mL (80 mLs Intravenous Given 4/1/21 1204)         ED COURSE          MEDICATION CHANGES     Current Discharge Medication List            FINAL DISPOSITION     Patient was seen by the trauma team in the context of a level 1 trauma activation and received an IV fluid bolus with correction of hypotension. The patient was observed resting comfortably in bed in no acute distress throughout the emergency department course. X-ray of the pelvis performed at bedside was showing a right hip fracture. The patient underwent pan scan imaging which showed a comminuted and angulated intertrochanteric fracture of the right femur with prominent associated hematoma extending into the right gluteus muscle. My supervising physician discussed this case with the orthopedic service. We will plan to admit the patient to the trauma service for further evaluation and treatment. I discussed the need for admission to the hospital with the patient, who verbalized understanding and was agreeable for admission to the hospital.  The patient was subsequently admitted to the hospital.    Final diagnoses:   Fall at nursing home, initial encounter   Closed fracture of right hip, initial encounter [facility])     Condition: condition: stable  Dispo: Admit to Trauma      This transcription was electronically signed.  Parts of this transcriptions may have been dictated by use of voice recognition software and electronically transcribed, and parts may have been transcribed with the assistance of an ED scribe. The transcription may contain errors not detected in proofreading. Please refer to my supervising physician's documentation if my documentation differs.     Electronically Signed: Benjamín Cade, 04/01/21, 5:15 PM         Benjamín Cade DO  Resident  04/01/21 [id number]

## 2021-04-01 NOTE — CONSULTS
Orthopedic Consult    Requesting Physician: Dr. Tristan Gallardo:  Right hip pain    HISTORY OF PRESENT ILLNESS:      The patient is a 81 y.o. female with multiple medical comorbidities who ortho was consulted for the above noted complaint. Patient resides in [facility] and was noted to have a fall prior to arrival. She attempted to go to the bathroom independently and lost her balance falling onto the right hip. She had severe pain and was unable to get up and ambulate after the fall. Son accompanies the patient and states she has had a progressive decline health and ambulation over the past month. She was transferred to [facility] ED per EMS and was found to be hypotensive. Currently she is complaining of right hip pain and denies other injuries. X-rays show a comminuted displaced right intertrochanteric fracture. Past Medical History:    Past Medical History:   Diagnosis Date    Acute blood loss as cause of postoperative anemia 03/2018    CAD (coronary artery disease)      cath  50    Cardiac valve prolapse     Chest pain 5/15/2020    Chronic back pain     Compression fracture of L2 ([facility] [address] 5/26/2020    COPD (chronic obstructive pulmonary disease) ([facility] [address]     Ex-smoker     GERD (gastroesophageal reflux disease) 2/07    EGD    Glaucoma     H/O cardiac catheterization 2002    40-50% LAD   katharine    Hearing loss secondary to cerumen impaction 5/28/2019    Hyperlipidemia     Hypertension     Inadvertent durotomy 3/12/2018    Liver disease     history of hepatitis at age 22   [facility]    right shoulder and back    Pericarditis 1996       Past Surgical History:    Past Surgical History:   Procedure Laterality Date    APPENDECTOMY      at age 13years   [address]  2004    right foot   [address]  2007??  &   2012? ?    normal results    COLONOSCOPY      last one 2000???    ENDOSCOPY, COLON, DIAGNOSTIC      EYE SURGERY  2009??    right eye    HYSTERECTOMY  1980    still has ovaries    [place] LAMINECTOMY,>2 SGMT,LUMBAR N/A 3/7/2018    LUMBAR LAMINECTOMY L3-S1 performed by Tito Brock MD at [address] OFFICE/OUTPT [address] [facility] N/A 3/12/2018    REPAIR DUROTOMY PLACEMENT OF SUBARACHNOID DRAIN performed by Tito Brock MD at [address]  left    SPINE SURGERY  7/02    L5 cyst    UPPER GASTROINTESTINAL ENDOSCOPY  2007    [facility]    UPPER GASTROINTESTINAL ENDOSCOPY Left 5/19/2020    EGD BIOPSY performed by Daniel Maher MD at [facility]       Medications Prior to Admission:   No current facility-administered medications for this encounter. Current Outpatient Medications   Medication Sig Dispense Refill    predniSONE (DELTASONE) 20 MG tablet Take 1 tablet, PO BID x 5 days 10 tablet 0    isosorbide mononitrate (IMDUR) 30 MG extended release tablet Take 1 tablet by mouth daily 90 tablet 1    potassium chloride (KLOR-CON M) 20 MEQ extended release tablet Take 1 tablet by mouth daily 90 tablet 1    HYDROcodone-acetaminophen (NORCO) 5-325 MG per tablet Take 1 tablet by mouth 2 times daily for 30 days.  60 tablet 0    pantoprazole (PROTONIX) 40 MG tablet Take 1 tablet by mouth 2 times daily (before meals) 60 tablet 2    sucralfate (CARAFATE) 1 GM tablet Take 1 tablet by mouth 4 times daily (before meals and nightly) 120 tablet 0    ibuprofen (IBU) 600 MG tablet Take 1 tablet by mouth 3 times daily as needed for Pain 30 tablet 0    metoprolol tartrate (LOPRESSOR) 25 MG tablet Take 0.5 tablets by mouth 2 times daily 90 tablet 1    nortriptyline (PAMELOR) 50 MG capsule Take 1 capsule by mouth nightly 90 capsule 1    tiZANidine (ZANAFLEX) 4 MG tablet Take 1 tablet by mouth every 8 hours as needed (muscle spasm) 100 tablet 2    simvastatin (ZOCOR) 40 MG tablet Take 1 tablet by mouth nightly 90 tablet 1    Multiple Vitamins-Minerals (THERAPEUTIC MULTIVITAMIN-MINERALS) tablet Take 1 tablet by mouth daily      5' 1\" (1.549 m) 135 lb (61.2 kg)   04/01/21 1116 (!) 82/45 -- -- -- -- -- --   04/01/21 1113 -- 98.1 °F (36.7 °C) 67 20 98 % -- --     General appearance:  Alert and oriented x 3. No apparent distress, appears stated age and cooperative. HEENT:  Normal cephalic, atraumatic without obvious deformity. Conjunctivae/corneas clear. Neck: Supple, with full range of motion. Respiratory:  Normal respiratory effort. No audible Wheezes or Rhonchi. Cardiovascular:  Regular rate and rhythm. Abdomen: Soft, non-tender, non-distended. Musculoskeletal:  RLE shortened and externally rotated. Skin intact. Minimal swelling. TTP proximal femur. Painful log roll, DF/EHL/PF intact, 5/5. DP 2+. SILT  Skin: Skin color, texture, turgor normal.  No rashes or lesions. Neurologic:  Neurovascularly intact without any focal sensory/motor deficits. Sensation intact. DATA:  CBC:   Lab Results   Component Value Date    WBC 9.3 04/01/2021    HGB 11.7 04/01/2021     04/01/2021     BMP:    Lab Results   Component Value Date     04/01/2021    K 3.6 04/01/2021    K 2.9 03/18/2021     04/01/2021    CO2 15 04/01/2021    BUN 36 04/01/2021    CREATININE 1.4 04/01/2021    CALCIUM 9.4 04/01/2021    GLUCOSE 156 04/01/2021    GLUCOSE 112 09/12/2017     PT/INR:    Lab Results   Component Value Date    INR 1.11 04/01/2021     Troponin:    Lab Results   Component Value Date    TROPONINI <0.006 04/07/2013     Recent Labs     04/01/21  1130   LIPASE 38.7     Recent Labs     04/01/21  1130   AST 43*   ALT 68*   BILIDIR <0.2   BILITOT 0.2*   ALKPHOS 89       Radiology:   Ct Abdomen Pelvis Wo Contrast Additional Contrast? None    Result Date: 3/18/2021  PROCEDURE: CT ABDOMEN PELVIS WO CONTRAST CLINICAL INFORMATION: not eating or drinking acute kidney injury. COMPARISON: No prior study. TECHNIQUE:  Axial CT images were obtained through the abdomen and pelvis without contrast. Coronal and sagittal reformatted images were rendered.  All INFORMATION: Rib pain on left side COMPARISON: 2/18/2021 TECHNIQUE:  Ribs 5 views  FINDINGS: The cardiomediastinal silhouette appears within normal limits. No focal consolidation, pleural effusion or pneumothorax is seen. No acute osseous abnormalities are demonstrated. 1. No acute cardiopulmonary disease. No acute fracture or malalignment. **This report has been created using voice recognition software. It may contain minor errors which are inherent in voice recognition technology. ** Final report electronically signed by Dr. Olga Hastings on 3/15/2021 2:45 PM    Xr Pelvis (1-2 Views)    Addendum Date: 4/1/2021  See above Final report electronically PROCEDURE: XR PELVIS (1-2 VIEWS) CLINICAL INFORMATION: Trauma. COMPARISON: No prior study. TECHNIQUE: AP view of the pelvis. FINDINGS: The pelvic ring is intact. There is a comminuted fracture of the right intratrochanteric area. Medial angulation is present. No dislocation. The superior and inferior pubic rami are intact. The sacrum and sacroiliac joints appear normal. No degenerative changes are noted. Mild sclerotic facet change of L5-S1 L4-5. The spinous processes of L3 3 through L5 then removed. Result Date: 4/1/2021  PROCEDURE: XR PELVIS (1-2 VIEWS) CLINICAL INFORMATION: Trauma. COMPARISON: No prior study. TECHNIQUE: AP view of the pelvis. FINDINGS: The pelvic ring is intact. There is a comminuted fracture of the right intratrochanteric area. Medial angulation is present. No dislocation. The superior and inferior pubic rami are intact. The sacrum and sacroiliac joints appear normal. No degenerative changes are noted. Mild sclerotic facet change of L5-S1 L4-5. The spinous processes of L3 3 through L5 then removed. Medial angulated comminuted right hip fracture. . **This report has been created using voice recognition software. It may contain minor errors which are inherent in voice recognition technology. ** Final report electronically signed by  Tiffani Saavedra on 4/1/2021 11:57 AM    Xr Femur Right (min 2 Views)    Result Date: 4/1/2021  PROCEDURE: XR FEMUR RIGHT (MIN 2 VIEWS) CLINICAL INFORMATION: fracture, surgical planning. COMPARISON: No prior study. TECHNIQUE: 4 views of the right hip. FINDINGS: Fractures-comminuted fracture with medial angulation at the intratrochanteric area. Joint spaces-No subluxation or dislocation. Degenerative changes-No erosions or osteophytes. Effusions-None. Soft tissues-Unremarkable. Medial angulated comminuted right intratrochanteric fracture. **This report has been created using voice recognition software. It may contain minor errors which are inherent in voice recognition technology. ** Final report electronically signed by Dr. Tiffani Saavedra on 4/1/2021 12:23 PM    Ct Head Wo Contrast    Result Date: 4/1/2021  PROCEDURE: CT HEAD WO CONTRAST CLINICAL INFORMATION: Trauma fall aspirin. COMPARISON: CT head dated 3/10/2020. TECHNIQUE: Noncontrast 5 mm axial images were obtained through the brain. Sagittal and coronal reconstructions were created. All CT scans at this facility use dose modulation, iterative reconstruction, and/or weight-based dosing when appropriate to reduce radiation dose to as low as reasonably achievable. FINDINGS: There is stable moderate volume loss. Gray-white matter differentiation appears grossly preserved. No intracranial hemorrhage, mass effect or midline shift is identified. The calvarium appears intact. No evidence of acute intracranial abnormality. **This report has been created using voice recognition software. It may contain minor errors which are inherent in voice recognition technology. ** Final report electronically signed by Dr. Celso Eaton MD on 4/1/2021 12:15 PM    Ct Chest W Contrast    Result Date: 4/1/2021  PROCEDURE: CT CHEST W CONTRAST, CT THORACIC RECONSTRUCTION WO POST PROCESS CLINICAL INFORMATION: Trauma hypotensive . Fall.  COMPARISON: CT chest dated 3/18/2021. TECHNIQUE: Helical CT of the chest following intravenous administration of 80 mL Isovue-370 injected in the left arm with sagittal and coronal reconstructions. Reformatted images of the thoracic spine with axial, sagittal and coronal reconstructions were  also created. All CT scans at this facility use dose modulation, iterative reconstruction, and/or weight-based dosing when appropriate to reduce radiation dose to as low as reasonably achievable. FINDINGS: There is a stable linear pleural-based nodule at the lateral aspect of the right upper lobe near the apex. There is a stable 5 mm nodule in the right upper lobe more inferiorly. There is a stable 2 mm nodule at the lateral aspect of the major fissure. These are all stable dating back to 2019 as evidence for benignity. There is stable calcified nodule at the right lung base as evidence for old granulomatous disease. Lungs otherwise appear clear. The heart and great vessels are intact. No axillary, mediastinal or hilar lymphadenopathy is identified. There is a dextrocurvature of the thoracic spine, stable compared to prior exam. There is scalloping of the superior endplate of T8, unchanged compared to prior exam. No acute fracture of the thoracic vertebral column or bony thorax is identified. There are stable mild degenerative changes of the thoracic spine. There are stable hypodense cystic lesions in both kidneys. There are calcified granulomas in the liver and spleen. Visualized upper abdominal solid organs are otherwise unremarkable. 1. No evidence of acute intrathoracic adenopathy. 2. No evidence of acute osseous injury of the thoracic spine. **This report has been created using voice recognition software. It may contain minor errors which are inherent in voice recognition technology. ** Final report electronically signed by Dr. Yoni Fernandez MD on 4/1/2021 12:29 PM    Cta Chest W Wo Contrast    Result Date: 3/18/2021  PROCEDURE: CTA CHEST W WO CONTRAST CLINICAL INFORMATION: elevated d-dimer, evaluation for PE COMPARISON: 2/18/2021 TECHNIQUE: 1.5 mm axial images were obtained through the chest after the administration of IV contrast.  A non-contrast localizer was obtained. 3D reconstructions were performed on the scanner to include coronal and sagittal reformatted images through both the right and left pulmonary arteries. All CT scans at this facility use dose modulation, iterative reconstruction, and/or weight-based dosing when appropriate to reduce radiation dose to as low as reasonably achievable. FINDINGS: The central airways appear patent. No focal consolidation, pleural effusion or pneumothorax is seen. There is a subcentimeter pulmonary nodule within the right upper lobe which appears unchanged from August 2019. This may represent noncalcified granuloma. Follow-up in 12 months can be obtained to document continued stability. There is no CT angiographic evidence of pulmonary embolism. There is no axillary, hilar or mediastinal lymphadenopathy. There is a moderate-sized hiatal hernia. There is focal thickening along the leftward wall of the hiatal hernia on axial image 147. Cannot exclude a mucosal mass at this location. However, this could also be related to incomplete distention of the gastric lumen. Multiple left-sided rib fractures are again seen involving the left second through fifth ribs. These demonstrate partial healing. There may also be a nondisplaced fracture through the medial left clavicle on axial image 40.     1. No CT evidence of pulmonary embolism. 2. There is a moderate-sized hiatal hernia. There is focal thickening along the leftward wall of the hiatal hernia on axial image 147. Cannot exclude a mucosal mass at this location. However, this could be related to incomplete distention of the gastric lumen. 3. There is a subcentimeter pulmonary nodule within the right upper lobe which appears unchanged from August 2019.  This may represent noncalcified granuloma. Follow-up in 12 months can be obtained to document continued stability. 4. Multiple left-sided rib fractures are again seen involving the left second through fifth ribs. These demonstrate partial healing. 5. There may also be a nondisplaced fracture through the medial left clavicle on axial image 40. **This report has been created using voice recognition software. It may contain minor errors which are inherent in voice recognition technology. **  Final report electronically signed by Dr. Derrick De Los Santos on 3/18/2021 7:05 PM    Ct Cervical Spine Wo Contrast    Result Date: 4/1/2021  PROCEDURE: CT CERVICAL SPINE WO CONTRAST CLINICAL INFORMATION: Trauma. Fall. COMPARISON: CT cervical spine dated 3/10/2020. TECHNIQUE: 3 mm noncontrast axial images were obtained through the cervical spine with sagittal and coronal reconstructions. All CT scans at this facility use dose modulation, iterative reconstruction, and/or weight-based dosing when appropriate to reduce radiation dose to as low as reasonably achievable. FINDINGS: There is grade 2 anterolisthesis of C5 relative to C6 on the basis of degenerative change, stable compared to prior exam. There is a mild levocurvature of the cervical spine which is likely positional. There is otherwise anatomic vertebral body height and alignment. No fracture of the cervical vertebral column is identified. The craniocervical junction appears intact. No paraspinal or epidural fluid collection is identified. There are stable degenerative changes of the cervical spine. Paraspinal soft tissues are grossly unremarkable. No evidence of acute osseous injury of the cervical spine. **This report has been created using voice recognition software. It may contain minor errors which are inherent in voice recognition technology. ** Final report electronically signed by Dr. Authur Lombard, MD on 4/1/2021 12:17 PM    Xr Chest Portable    Result Date: 4/1/2021  PROCEDURE: XR CHEST PORTABLE CLINICAL INFORMATION: Trauma. COMPARISON: Chest x-ray of 3/15/2021. TECHNIQUE: AP upright view of the chest. FINDINGS: Tubes/Lines: None. Heart: The heart size is normal. Mediastinum: Multiple calcified nodes on the right paratracheal-upper right hilar area are still present. Lungs: There are no pulmonary infiltrates or effusions. Pleura: No effusion or pneumothorax. Vessels: Normal sized aorta with moderate calcification at the arch. Stable. Bones: No suspicious osseous lesions are present. Right mediastinal calcified nodes from granulomatous disease. No acute pulmonary findings. **This report has been created using voice recognition software. It may contain minor errors which are inherent in voice recognition technology. ** Final report electronically signed by Dr. Monalisa Zheng on 4/1/2021 12:13 PM    Ct Lumbar Reconstruction Wo Post Process    Result Date: 4/1/2021  PROCEDURE: CT LUMBAR RECONSTRUCTION WO POST PROCESS CLINICAL INFORMATION: Trauma. Fall. COMPARISON: CT abdomen pelvis dated 3/18/2021. TECHNIQUE: Reformatted images of the lumbar spine from CT abdomen and pelvis source images with axial, sagittal and coronal reconstructions. All CT scans at this facility use dose modulation, iterative reconstruction, and/or weight-based dosing when appropriate to reduce radiation dose to as low as reasonably achievable. FINDINGS: There is a mild levocurvature of the lumbar spine, stable compared to prior CT. 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 Redemonstration of laminectomies at L4-5 and L5-S1, unchanged compared to prior exam. There is scalloping of the superior endplate of L2 with approximately 20% central height loss, unchanged compared to prior exam. There is otherwise anatomic vertebral body height and alignment.  No acute fracture of the lumbar vertebral column is

## 2021-04-01 NOTE — ED NOTES
ED to inpatient nurses report    Chief Complaint   Patient presents with    Fall    Hip Pain      Present to ED from nursing home  LOC: alert and orientated to name, place, date  Vital signs   Vitals:    04/01/21 1138 04/01/21 1203 04/01/21 1221 04/01/21 1233   BP: (!) 88/49 (!) 105/45 100/60 102/72   Pulse: 65 72 70 69   Resp: 20 18 18 18   Temp:       SpO2: 100% 100%  100%   Weight:       Height:          Oxygen Baseline RA    Current needs required RA Bipap/Cpap No  LDAs:   Peripheral IV 04/01/21 Right Antecubital (Active)   Site Assessment Clean;Dry; Intact 04/01/21 1114   Line Status Infusing 04/01/21 1114   Dressing Status Clean;Dry 04/01/21 1114       Peripheral IV 04/01/21 Right Forearm (Active)   Site Assessment Clean;Dry; Intact 04/01/21 1125   Line Status Flushed 04/01/21 1125   Dressing Status Dry; Intact; Clean 04/01/21 1125   Dressing Intervention New 04/01/21 1125     Mobility: bedrest  Pending ED orders: none  Present condition: Restful on cart talking w/family at bedside. Denies needs or concerns at this time.      Electronically signed by Armen Mehta RN on 4/1/2021 at 12:56 PM       Brandie Ann RN  04/01/21 [id number]

## 2021-04-01 NOTE — ED NOTES
Pt to rm 01 per EMS reported fall while ambulating to the BR at [facility]. States she just lost her balance and fell. Did not hit her head, no LOC, no thinners. On arrival pt is A&Ox3- only complaint is right hip pain. Right hip noted shortened and externally rotated. PMS intact. BP low for EMS- pt states she usually runs low. Pt did received morning medications per [facility].       Gwendolyn Ly, RN  04/01/21 0678

## 2021-04-01 NOTE — H&P
 ENDOSCOPY, COLON, DIAGNOSTIC      EYE SURGERY  ??    right eye   [address]    still has ovaries    AL LAMINECTOMY,>2 SGMT,LUMBAR N/A 3/7/2018    LUMBAR LAMINECTOMY L3-S1 performed by Freddie Devries MD at [facility] OFFICE/OUTPT [address] N/A 3/12/2018    REPAIR DUROTOMY PLACEMENT OF SUBARACHNOID DRAIN performed by Freddie Devries MD at [facility]  left    SPINE SURGERY      L5 cyst    UPPER GASTROINTESTINAL ENDOSCOPY      [facility]    UPPER GASTROINTESTINAL ENDOSCOPY Left 2020    EGD BIOPSY performed by Ignacio Brewer MD at [facility]     Social History     Socioeconomic History    Marital status:       Spouse name: Not on file    Number of children: 2    Years of education: Not on file    Highest education level: Not on file   Occupational History    Not on file   Social Needs    Financial resource strain: Not hard at all    Food insecurity     Worry: Never true     Inability: Never true   [employer] needs     Medical: No     Non-medical: No   Tobacco Use    Smoking status: Former Smoker     Years: 30.00     Types: Cigarettes     Quit date: 1989     Years since quittin.8    Smokeless tobacco: Never Used   Substance and Sexual Activity    Alcohol use: No    Drug use: No    Sexual activity: Not Currently   Lifestyle    Physical activity     Days per week: 0 days     Minutes per session: 0 min    Stress: Not on file   Relationships    Social connections     Talks on phone: Not on file     Gets together: Not on file     Attends Restoration service: Not on file     Active member of club or organization: Not on file     Attends meetings of clubs or organizations: Not on file     Relationship status: Not on file    Intimate partner violence     Fear of current or ex partner: Not on file     Emotionally abused: Not on file     Physically abused: Not on file     Forced sexual activity: Not on file   Other Topics Concern    Not on file   Social History Narrative    No barriers with transportation    No barriers with medication affordability     Family History   Problem Relation Age of Onset    Tuberculosis Mother     Tuberculosis Father      Home medications:    Previous Medications    ACETAMINOPHEN (TYLENOL) 325 MG TABLET    Take 2 tablets by mouth every 4 hours as needed for Pain Maximum dose of acetaminophen is 4000 mg from all sources in 24 hours. ASPIRIN 81 MG TABLET    Take 81 mg by mouth daily    HYDROCODONE-ACETAMINOPHEN (NORCO) 5-325 MG PER TABLET    Take 1 tablet by mouth 2 times daily for 30 days. IBUPROFEN (IBU) 600 MG TABLET    Take 1 tablet by mouth 3 times daily as needed for Pain    ISOSORBIDE MONONITRATE (IMDUR) 30 MG EXTENDED RELEASE TABLET    Take 1 tablet by mouth daily    LATANOPROST (XALATAN) 0.005 % OPHTHALMIC SOLUTION    Place 1 drop into both eyes nightly    METOPROLOL TARTRATE (LOPRESSOR) 25 MG TABLET    Take 0.5 tablets by mouth 2 times daily    MULTIPLE VITAMINS-MINERALS (THERAPEUTIC MULTIVITAMIN-MINERALS) TABLET    Take 1 tablet by mouth daily    NITROGLYCERIN (NITROSTAT) 0.4 MG SL TABLET    up to max of 3 total doses. If no relief after 1 dose, call 911.     NORTRIPTYLINE (PAMELOR) 50 MG CAPSULE    Take 1 capsule by mouth nightly    PANTOPRAZOLE (PROTONIX) 40 MG TABLET    Take 1 tablet by mouth 2 times daily (before meals)    POTASSIUM CHLORIDE (KLOR-CON M) 20 MEQ EXTENDED RELEASE TABLET    Take 1 tablet by mouth daily    PREDNISONE (DELTASONE) 20 MG TABLET    Take 1 tablet, PO BID x 5 days    SIMVASTATIN (ZOCOR) 40 MG TABLET    Take 1 tablet by mouth nightly    SUCRALFATE (CARAFATE) 1 GM TABLET    Take 1 tablet by mouth 4 times daily (before meals and nightly)    TIZANIDINE (ZANAFLEX) 4 MG TABLET    Take 1 tablet by mouth every 8 hours as needed (muscle spasm)     Hospital medications:  Scheduled Meds:  Continuous Infusions:  PRN Meds:  Objective   ED TRIAGE VITALS  BP: (!) 88/49, Temp: 98.1 °F (36.7 °C), Pulse: 65, Resp: 20, SpO2: 100 %  [place] Coma Scale  Eye Opening: Spontaneous  Best Verbal Response: Oriented  Best Motor Response: Obeys commands  Tank Coma Scale Score: 15  No results found for this visit on 04/01/21. Physical Exam:  Patient Vitals for the past 24 hrs:   BP Temp Pulse Resp SpO2 Height Weight   04/01/21 1138 (!) 88/49 -- 65 20 100 % -- --   04/01/21 1125 (!) 89/48 -- 67 20 -- -- --   04/01/21 1121 -- -- -- -- 98 % -- --   04/01/21 1116 -- -- -- -- -- 5' 1\" (1.549 m) 135 lb (61.2 kg)   04/01/21 1116 (!) 82/45 -- -- -- -- -- --   04/01/21 1113 -- 98.1 °F (36.7 °C) 67 20 98 % -- --     Primary Assessment:  Airway: Patent, trachea midline. Breathing: Breath sounds present and equal bilaterally, spontaneous, and unlabored. Circulation: Hemodynamically stable, 2+ central and peripheral pulses. Disability: CANAS x 4, following commands. GCS =15. Secondary Assessment:  General: Alert, NAD. Frail and elderly appearing. Head: Normocephalic, mid face stable. Tympanic membranes intact. Nares patent bilaterally, no epistaxis. Mouth clear of foreign bodies, no lacerations or abrasions. Eyes: PERRL. EOMI. Nontraumatic. Neurologic: A & O x3. Following commands. CN 2-12 grossly intact. Neck: Trachea midline. Cervical spines NTTP midline, without step-offs, crepitus or deformity. Back: TL spines are NTTP midline, without step-offs, crepitus or deformity. No abrasions, contusions, or ecchymosis noted. Lungs: Clear to auscultation bilaterally. Chest Wall: Chest rise symmetrical.  Chest wall without tenderness to palpation. No crepitus, deformities, lacerations, or abrasions. Heart: RRR. Normal S1/S2. No obvious M/G/R. Abdomen:  Soft, NTTP. No guarding. Non-peritoneal.  Pelvis:  NTTP, stable to compression. Femoral pulses 2+. Extremities: Right lower extremity is shortened and externally rotated. There is tenderness with palpation over the right hip. PMS intact.   Radial appears intact. No paraspinal or epidural fluid collection is identified. There are stable degenerative changes of the cervical spine. Paraspinal soft    tissues are grossly unremarkable.           Impression    No evidence of acute osseous injury of the cervical spine.                   **This report has been created using voice recognition software. It may contain minor errors which are inherent in voice recognition technology. **       Final report electronically signed by Dr. Rox Fraire MD on 4/1/2021 12:17 PM       Narrative   PROCEDURE: CT CHEST W CONTRAST, CT THORACIC RECONSTRUCTION WO POST PROCESS       CLINICAL INFORMATION: Trauma hypotensive . Fall.       COMPARISON: CT chest dated 3/18/2021.       TECHNIQUE: Helical CT of the chest following intravenous administration of 80 mL [id number]-370 injected in the left arm with sagittal and coronal reconstructions. Reformatted images of the thoracic spine with axial, sagittal and coronal reconstructions were    also created.        All CT scans at this facility use dose modulation, iterative reconstruction, and/or weight-based dosing when appropriate to reduce radiation dose to as low as reasonably achievable.       FINDINGS: There is a stable linear pleural-based nodule at the lateral aspect of the right upper lobe near the apex. There is a stable 5 mm nodule in the right upper lobe more inferiorly. There is a stable 2 mm nodule at the lateral aspect of the major    fissure. These are all stable dating back to 2019 as evidence for benignity. There is stable calcified nodule at the right lung base as evidence for old granulomatous disease. Lungs otherwise appear clear. The heart and great vessels are intact. No    axillary, mediastinal or hilar lymphadenopathy is identified.  There is a dextrocurvature of the thoracic spine, stable compared to prior exam. There is scalloping of the superior endplate of T8, unchanged compared to prior exam. No acute fracture diverticula within the large bowel without adjacent inflammation to suggest diverticulitis. There is moderately prominent stool throughout the large bowel. Small bowel appears within normal limits. The unopacified bladder is    unremarkable. The uterus appears to be surgically absent. No free fluid is identified.       Right hip:   There is a comminuted intertrochanteric fracture of the right femur with displacement of the fracture fragments and approximately 90 degree angulation of the femoral neck relative to the femoral diaphysis. There is a prominent hematoma at the fracture    site extending into the right gluteus muscle. The humeral head is normally located opposite the glenoid.            Impression    Abdomen/pelvis:   1. No evidence of acute intra-abdominal or intrapelvic abnormality. 2. 2.1 cm apparently complex cystic lesion at the inferior pole of the left kidney which is not fully characterized. Recommend follow-up multiphase renal CT or MRI for further evaluation. 3. Colonic diverticulosis. 4. Moderate retained stool.       Right hip:   Comminuted and angulated intertrochanteric fracture of the right femur with prominent associated hematoma extending into the right gluteus muscle.                   **This report has been created using voice recognition software. It may contain minor errors which are inherent in voice recognition technology. **       Final report electronically signed by Dr. Mil Vickers MD on 4/1/2021 2:55 PM       Narrative   PROCEDURE: CT LUMBAR RECONSTRUCTION WO POST PROCESS       CLINICAL INFORMATION: Trauma.  Fall.       COMPARISON: CT abdomen pelvis dated 3/18/2021.       TECHNIQUE: Reformatted images of the lumbar spine from CT abdomen and pelvis source images with axial, sagittal and coronal reconstructions.       All CT scans at this facility use dose modulation, iterative reconstruction, and/or weight-based dosing when appropriate to reduce radiation dose to as low as reasonably achievable.       FINDINGS:   There is a mild levocurvature of the lumbar spine, stable compared to prior CT. 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 Redemonstration of laminectomies at L4-5 and    L5-S1, unchanged compared to prior exam. There is scalloping of the superior endplate of L2 with approximately 20% central height loss, unchanged compared to prior exam. There is otherwise anatomic vertebral body height and alignment. No acute fracture    of the lumbar vertebral column is identified. There are stable degenerative changes with facet hypertrophy at the lower lumbar levels.           Impression    No evidence of acute osseous injury of the lumbar spine.                   **This report has been created using voice recognition software. It may contain minor errors which are inherent in voice recognition technology. **       Final report electronically signed by Dr. Daniel Hopkins MD on 4/1/2021 12:33 PM     Fast Exam: Negative. Electronically signed by Glen Granados PA-C on 4/1/2021 at 11:44 AM   Patient seen and examined independently by me 4/1/2021    Vitals and Graphics reviewed       Labs and radiographs where available were reviewed. I discussed patient concerns with the patient's nurse and instructions were given. Please see our orders if there are any new ones for the updated patient care plan. Above discussed and I agree with Hermelinda AYALA, . See my additional comments below for updated orders and plan.    Patient low BP likely meds or dehydration  No traumatic reason for it upon presentation  Only injury identified is RIGHT intertroch fx  Hospitalist to see for medical clearance for OR for hip fx  D/w Dr Jonah Cho who saw her as well, and Hermelinda AYALA

## 2021-04-01 NOTE — LETTER
Beneficiary Notification Letter  BPCI Advanced     Your Doctor or [address],    We wanted to let you know that your health care provider, Lennie Finn, has volunteered to take part in our Centers for Medicare & Medicaid Services (CMS) Bundled Payments for [facility] (BPCI Advanced). This doesnt change your Medicare rights or benefits and you dont need to do anything. What are bundled payments? A bundled payment combines, or bundles together, payments that Medicare makes to your health care providers for the many different kinds of medical services you might get in a specific time period. In BPCI Advanced, this time period could include a hospital inpatient stay or outpatient procedure, plus 90 days. Why would Medicare bundle payments? Bundled payments are thought of as a value-based way to pay because health care providers are responsible for both the quality and cost of medical care they give. This is a relatively new way of paying health care providers compared to thefee-for-service way Medicare has traditionally paid, where providers are paid separately for each service they provide. Bundled payments encourage these providers to work together to provide better, more coordinated care during your hospital stay, or outpatient procedure, and through your recovery. What does BPCI Advance mean for me? Youre more likely to get even better care when hospitals, doctors, and other health care providers work together. In BPCI Advanced, hospitals, doctors, and other health care providers may be rewarded for providing better, more coordinated health care. Medicare will watch BPCI Advanced participants closely to make sure that you and other patients keep getting efficient, high quality care. What do I need to know about BPCI Advanced?   Whats most important for you to know is that your Medicare rights and benefits wont change because your health care provider is participating in [address] [facility]. Medicare will keep covering all of your medically necessary services. Even though Medicare will pay your doctor in a different way under BPCI Advanced, how much you have to pay wont change. Health care providers and suppliers who are enrolled in Medicare will submit their Medicare claims like they always have. Youll have all the same Medicare rights and protections, including the right to choose which hospital, doctor, or other health care provider you see. If you dont want to get care from a health care provider whos participating in [facility], then youll have to choose a different health care provider whos not participating in the Model. How can I give feedback about my health care? Medicare might ask you to take a voluntary survey about the services and care you received from [facility] during your hospital stay or outpatient procedure and for a specific period of time afterwards. You can decide whether you want to take the voluntary survey, but if you do, itll help Medicare make BPCI Advanced and the care of other Medicare patients better. If you have concerns or complaints about your care, you can:   · Talk to your doctor or health care provider. · Contact your Beneficiary and Family Centered Care Quality Improvement   Organization SAMUEL ELIAS [facility]). You can get your BFCC-QIOs phone number  at  Medicare.gov/contacts or by calling 1-800-MEDICARE. TTY users can call  6-852.305.6459. Where can I learn more about BPCI Advanced? Learn more about BPCI Advanced at https://innovation.cms.gov/initiatives/bpci-advanced/:  · A list of all the hospitals and physician group practices in the country participating in [address] [place] [address]. · All of the inpatient and outpatient Clinical Episodes that are currently included under BPCI Advanced.  A Clinical Episode is a grouping of medical conditions or diagnoses that are included in the [id number] [place] [address] Avenue.

## 2021-04-01 NOTE — ED NOTES
Bed: 001A  Expected date: 4/1/21  Expected time: 11:04 AM  Means of arrival: [facility]  Comments:     Sheyla Causey RN  04/01/21 4629

## 2021-04-01 NOTE — PROGRESS NOTES
[facility] THERAPY MISSED TREATMENT NOTE  [facility] NEUROSCIENCES 4A      Date: 2021  Patient Name: Bel Mcwilliams        MRN: [id number]    : 1935  (81 y.o.)    REASON FOR MISSED TREATMENT:  Received new order for speech therapy evaluation to complete cognitive evaluation; upon attempt patient resting in bed c/o 7/10 leg pain. ERICK Parada notified. ST introduced self and explained rational for speech therapy evaluation to complete cognitive evaluation d/t admission of fall. Patient politely declined this afternoon d/t being \"too tired. \" Patient politely accepted ST to re-attempt post surgery. Patient reports, \"plans for surgery . \" Patient NPO for potential surgery. Will plan to re-attempt  as appropriate or subsequent treatment dates to follow.     Con Rock M.S. Vickey

## 2021-04-01 NOTE — ED NOTES
Swabbed for Covid. Pt tolerated well continues A&Ox3- pain only in right hip at this time.       Ana Thompson RN  04/01/21 8356

## 2021-04-01 NOTE — ED NOTES
Pt transported to 4A by cart in stable condition. Called and informed 4A that the patient was on their way to the unit.       Jen Schumacher, SLIM  19/59/26 5877

## 2021-04-01 NOTE — ED NOTES
Pt returned to rn 01. Continues restful on cart,. A&Ox 3,  BP improving.       Rashid Moreira, RN  04/01/21 1205

## 2021-04-02 ENCOUNTER — ANESTHESIA EVENT (OUTPATIENT)
Dept: OPERATING ROOM | Age: 86
DRG: 481 | End: 2021-04-02
Payer: MEDICARE

## 2021-04-02 ENCOUNTER — ANESTHESIA (OUTPATIENT)
Dept: OPERATING ROOM | Age: 86
DRG: 481 | End: 2021-04-02
Payer: MEDICARE

## 2021-04-02 ENCOUNTER — APPOINTMENT (OUTPATIENT)
Dept: GENERAL RADIOLOGY | Age: 86
DRG: 481 | End: 2021-04-02
Payer: MEDICARE

## 2021-04-02 VITALS
SYSTOLIC BLOOD PRESSURE: 108 MMHG | DIASTOLIC BLOOD PRESSURE: 50 MMHG | TEMPERATURE: 96.8 F | RESPIRATION RATE: 22 BRPM | OXYGEN SATURATION: 83 %

## 2021-04-02 LAB
ALBUMIN SERPL-MCNC: 2.6 G/DL (ref 3.5–5.1)
ALP BLD-CCNC: 77 U/L (ref 38–126)
ALT SERPL-CCNC: 58 U/L (ref 11–66)
ANION GAP SERPL CALCULATED.3IONS-SCNC: 13 MEQ/L (ref 8–16)
AST SERPL-CCNC: 31 U/L (ref 5–40)
BASOPHILS # BLD: 0.3 %
BASOPHILS ABSOLUTE: 0 THOU/MM3 (ref 0–0.1)
BILIRUB SERPL-MCNC: 0.3 MG/DL (ref 0.3–1.2)
BUN BLDV-MCNC: 32 MG/DL (ref 7–22)
CALCIUM SERPL-MCNC: 8.6 MG/DL (ref 8.5–10.5)
CHLORIDE BLD-SCNC: 109 MEQ/L (ref 98–111)
CO2: 16 MEQ/L (ref 23–33)
CREAT SERPL-MCNC: 1.2 MG/DL (ref 0.4–1.2)
EOSINOPHIL # BLD: 0.4 %
EOSINOPHILS ABSOLUTE: 0 THOU/MM3 (ref 0–0.4)
ERYTHROCYTE [DISTWIDTH] IN BLOOD BY AUTOMATED COUNT: 16.4 % (ref 11.5–14.5)
ERYTHROCYTE [DISTWIDTH] IN BLOOD BY AUTOMATED COUNT: 51.5 FL (ref 35–45)
GFR SERPL CREATININE-BSD FRML MDRD: 43 ML/MIN/1.73M2
GLUCOSE BLD-MCNC: 138 MG/DL (ref 70–108)
HCT VFR BLD CALC: 25.9 % (ref 37–47)
HCT VFR BLD CALC: 32.5 % (ref 37–47)
HEMOGLOBIN: 10.1 GM/DL (ref 12–16)
HEMOGLOBIN: 8.2 GM/DL (ref 12–16)
IMMATURE GRANS (ABS): 0.41 THOU/MM3 (ref 0–0.07)
IMMATURE GRANULOCYTES: 4 %
LYMPHOCYTES # BLD: 6.5 %
LYMPHOCYTES ABSOLUTE: 0.7 THOU/MM3 (ref 1–4.8)
MCH RBC QN AUTO: 27.7 PG (ref 26–33)
MCHC RBC AUTO-ENTMCNC: 31.1 GM/DL (ref 32.2–35.5)
MCV RBC AUTO: 89.3 FL (ref 81–99)
MONOCYTES # BLD: 4.2 %
MONOCYTES ABSOLUTE: 0.4 THOU/MM3 (ref 0.4–1.3)
NUCLEATED RED BLOOD CELLS: 0 /100 WBC
PLATELET # BLD: 328 THOU/MM3 (ref 130–400)
PMV BLD AUTO: 9.8 FL (ref 9.4–12.4)
POTASSIUM SERPL-SCNC: 3.9 MEQ/L (ref 3.5–5.2)
RBC # BLD: 3.64 MILL/MM3 (ref 4.2–5.4)
SEG NEUTROPHILS: 84.6 %
SEGMENTED NEUTROPHILS ABSOLUTE COUNT: 8.6 THOU/MM3 (ref 1.8–7.7)
SODIUM BLD-SCNC: 138 MEQ/L (ref 135–145)
TOTAL PROTEIN: 5.7 G/DL (ref 6.1–8)
WBC # BLD: 10.2 THOU/MM3 (ref 4.8–10.8)

## 2021-04-02 PROCEDURE — 6370000000 HC RX 637 (ALT 250 FOR IP): Performed by: PHYSICIAN ASSISTANT

## 2021-04-02 PROCEDURE — 6360000002 HC RX W HCPCS: Performed by: ORTHOPAEDIC SURGERY

## 2021-04-02 PROCEDURE — 0QS604Z REPOSITION RIGHT UPPER FEMUR WITH INTERNAL FIXATION DEVICE, OPEN APPROACH: ICD-10-PCS | Performed by: ORTHOPAEDIC SURGERY

## 2021-04-02 PROCEDURE — 85025 COMPLETE CBC W/AUTO DIFF WBC: CPT

## 2021-04-02 PROCEDURE — 3600000014 HC SURGERY LEVEL 4 ADDTL 15MIN: Performed by: ORTHOPAEDIC SURGERY

## 2021-04-02 PROCEDURE — 6360000002 HC RX W HCPCS: Performed by: NURSE ANESTHETIST, CERTIFIED REGISTERED

## 2021-04-02 PROCEDURE — 2709999900 HC NON-CHARGEABLE SUPPLY: Performed by: ORTHOPAEDIC SURGERY

## 2021-04-02 PROCEDURE — 6370000000 HC RX 637 (ALT 250 FOR IP): Performed by: ORTHOPAEDIC SURGERY

## 2021-04-02 PROCEDURE — 94760 N-INVAS EAR/PLS OXIMETRY 1: CPT

## 2021-04-02 PROCEDURE — 73502 X-RAY EXAM HIP UNI 2-3 VIEWS: CPT

## 2021-04-02 PROCEDURE — 36415 COLL VENOUS BLD VENIPUNCTURE: CPT

## 2021-04-02 PROCEDURE — 2580000003 HC RX 258: Performed by: ORTHOPAEDIC SURGERY

## 2021-04-02 PROCEDURE — 3700000000 HC ANESTHESIA ATTENDED CARE: Performed by: ORTHOPAEDIC SURGERY

## 2021-04-02 PROCEDURE — 2500000003 HC RX 250 WO HCPCS: Performed by: NURSE ANESTHETIST, CERTIFIED REGISTERED

## 2021-04-02 PROCEDURE — 3700000001 HC ADD 15 MINUTES (ANESTHESIA): Performed by: ORTHOPAEDIC SURGERY

## 2021-04-02 PROCEDURE — 1200000003 HC TELEMETRY R&B

## 2021-04-02 PROCEDURE — 2500000003 HC RX 250 WO HCPCS: Performed by: ORTHOPAEDIC SURGERY

## 2021-04-02 PROCEDURE — 3600000004 HC SURGERY LEVEL 4 BASE: Performed by: ORTHOPAEDIC SURGERY

## 2021-04-02 PROCEDURE — 99232 SBSQ HOSP IP/OBS MODERATE 35: CPT | Performed by: SURGERY

## 2021-04-02 PROCEDURE — 2720000010 HC SURG SUPPLY STERILE: Performed by: ORTHOPAEDIC SURGERY

## 2021-04-02 PROCEDURE — C1713 ANCHOR/SCREW BN/BN,TIS/BN: HCPCS | Performed by: ORTHOPAEDIC SURGERY

## 2021-04-02 PROCEDURE — 85014 HEMATOCRIT: CPT

## 2021-04-02 PROCEDURE — 7100000000 HC PACU RECOVERY - FIRST 15 MIN: Performed by: ORTHOPAEDIC SURGERY

## 2021-04-02 PROCEDURE — 80053 COMPREHEN METABOLIC PANEL: CPT

## 2021-04-02 PROCEDURE — 6360000002 HC RX W HCPCS: Performed by: PHYSICIAN ASSISTANT

## 2021-04-02 PROCEDURE — 2580000003 HC RX 258: Performed by: INTERNAL MEDICINE

## 2021-04-02 PROCEDURE — 85018 HEMOGLOBIN: CPT

## 2021-04-02 PROCEDURE — 93010 ELECTROCARDIOGRAM REPORT: CPT | Performed by: INTERNAL MEDICINE

## 2021-04-02 PROCEDURE — 7100000001 HC PACU RECOVERY - ADDTL 15 MIN: Performed by: ORTHOPAEDIC SURGERY

## 2021-04-02 PROCEDURE — 2580000003 HC RX 258: Performed by: NURSE ANESTHETIST, CERTIFIED REGISTERED

## 2021-04-02 PROCEDURE — 3209999900 FLUORO FOR SURGICAL PROCEDURES

## 2021-04-02 PROCEDURE — APPSS60 APP SPLIT SHARED TIME 46-60 MINUTES: Performed by: PHYSICIAN ASSISTANT

## 2021-04-02 DEVICE — INTERTAN LAG/COMPRESSION SCREW KIT                                    90MM / 85MM
Type: IMPLANTABLE DEVICE | Status: FUNCTIONAL
Brand: TRIGEN

## 2021-04-02 DEVICE — TRIGEN LOW PROFILE SCREW 5.0MM X 40MM
Type: IMPLANTABLE DEVICE | Status: FUNCTIONAL
Brand: TRIGEN

## 2021-04-02 DEVICE — TRIGEN INTERTAN 10S 10MM X 36CM 125DEGREE RIGHT
Type: IMPLANTABLE DEVICE | Status: FUNCTIONAL
Brand: TRIGEN

## 2021-04-02 DEVICE — TRIGEN LOW PROFILE SCREW 5.0MM X 35MM
Type: IMPLANTABLE DEVICE | Status: FUNCTIONAL
Brand: TRIGEN

## 2021-04-02 RX ORDER — LIDOCAINE HCL/PF 100 MG/5ML
SYRINGE (ML) INJECTION PRN
Status: DISCONTINUED | OUTPATIENT
Start: 2021-04-02 | End: 2021-04-02 | Stop reason: SDUPTHER

## 2021-04-02 RX ORDER — SODIUM CHLORIDE 0.9 % (FLUSH) 0.9 %
10 SYRINGE (ML) INJECTION PRN
Status: DISCONTINUED | OUTPATIENT
Start: 2021-04-02 | End: 2021-04-02 | Stop reason: SDUPTHER

## 2021-04-02 RX ORDER — SENNA AND DOCUSATE SODIUM 50; 8.6 MG/1; MG/1
1 TABLET, FILM COATED ORAL 2 TIMES DAILY
Status: DISCONTINUED | OUTPATIENT
Start: 2021-04-02 | End: 2021-04-09 | Stop reason: HOSPADM

## 2021-04-02 RX ORDER — PROMETHAZINE HYDROCHLORIDE 25 MG/1
12.5 TABLET ORAL EVERY 6 HOURS PRN
Status: DISCONTINUED | OUTPATIENT
Start: 2021-04-02 | End: 2021-04-09 | Stop reason: HOSPADM

## 2021-04-02 RX ORDER — SODIUM CHLORIDE 0.9 % (FLUSH) 0.9 %
10 SYRINGE (ML) INJECTION EVERY 12 HOURS SCHEDULED
Status: DISCONTINUED | OUTPATIENT
Start: 2021-04-02 | End: 2021-04-02 | Stop reason: SDUPTHER

## 2021-04-02 RX ORDER — FENTANYL CITRATE 50 UG/ML
INJECTION, SOLUTION INTRAMUSCULAR; INTRAVENOUS PRN
Status: DISCONTINUED | OUTPATIENT
Start: 2021-04-02 | End: 2021-04-02 | Stop reason: SDUPTHER

## 2021-04-02 RX ORDER — CEFAZOLIN SODIUM 1 G/3ML
INJECTION, POWDER, FOR SOLUTION INTRAMUSCULAR; INTRAVENOUS PRN
Status: DISCONTINUED | OUTPATIENT
Start: 2021-04-02 | End: 2021-04-02 | Stop reason: SDUPTHER

## 2021-04-02 RX ORDER — SODIUM CHLORIDE 9 MG/ML
INJECTION, SOLUTION INTRAVENOUS CONTINUOUS PRN
Status: DISCONTINUED | OUTPATIENT
Start: 2021-04-02 | End: 2021-04-02 | Stop reason: SDUPTHER

## 2021-04-02 RX ORDER — ONDANSETRON 2 MG/ML
4 INJECTION INTRAMUSCULAR; INTRAVENOUS EVERY 6 HOURS PRN
Status: DISCONTINUED | OUTPATIENT
Start: 2021-04-02 | End: 2021-04-09 | Stop reason: HOSPADM

## 2021-04-02 RX ORDER — DEXAMETHASONE SODIUM PHOSPHATE 10 MG/ML
INJECTION, EMULSION INTRAMUSCULAR; INTRAVENOUS PRN
Status: DISCONTINUED | OUTPATIENT
Start: 2021-04-02 | End: 2021-04-02 | Stop reason: SDUPTHER

## 2021-04-02 RX ORDER — PROPOFOL 10 MG/ML
INJECTION, EMULSION INTRAVENOUS PRN
Status: DISCONTINUED | OUTPATIENT
Start: 2021-04-02 | End: 2021-04-02 | Stop reason: SDUPTHER

## 2021-04-02 RX ORDER — ONDANSETRON 2 MG/ML
INJECTION INTRAMUSCULAR; INTRAVENOUS PRN
Status: DISCONTINUED | OUTPATIENT
Start: 2021-04-02 | End: 2021-04-02 | Stop reason: SDUPTHER

## 2021-04-02 RX ADMIN — SODIUM CHLORIDE: 9 INJECTION, SOLUTION INTRAVENOUS at 07:25

## 2021-04-02 RX ADMIN — PHENYLEPHRINE HYDROCHLORIDE 200 MCG: 10 INJECTION INTRAVENOUS at 07:54

## 2021-04-02 RX ADMIN — SODIUM CHLORIDE: 9 INJECTION, SOLUTION INTRAVENOUS at 20:16

## 2021-04-02 RX ADMIN — PROPOFOL 100 MG: 10 INJECTION, EMULSION INTRAVENOUS at 07:30

## 2021-04-02 RX ADMIN — FENTANYL CITRATE 50 MCG: 50 INJECTION, SOLUTION INTRAMUSCULAR; INTRAVENOUS at 07:40

## 2021-04-02 RX ADMIN — PHENYLEPHRINE HYDROCHLORIDE 200 MCG: 10 INJECTION INTRAVENOUS at 07:52

## 2021-04-02 RX ADMIN — FENTANYL CITRATE 25 MCG: 50 INJECTION, SOLUTION INTRAMUSCULAR; INTRAVENOUS at 09:40

## 2021-04-02 RX ADMIN — FENTANYL CITRATE 25 MCG: 50 INJECTION, SOLUTION INTRAMUSCULAR; INTRAVENOUS at 09:35

## 2021-04-02 RX ADMIN — LATANOPROST 1 DROP: 50 SOLUTION OPHTHALMIC at 23:36

## 2021-04-02 RX ADMIN — CEFAZOLIN 2000 MG: 10 INJECTION, POWDER, FOR SOLUTION INTRAVENOUS at 16:34

## 2021-04-02 RX ADMIN — CEFAZOLIN 2000 MG: 1 INJECTION, POWDER, FOR SOLUTION INTRAMUSCULAR; INTRAVENOUS; PARENTERAL at 07:40

## 2021-04-02 RX ADMIN — FENTANYL CITRATE 50 MCG: 50 INJECTION, SOLUTION INTRAMUSCULAR; INTRAVENOUS at 02:39

## 2021-04-02 RX ADMIN — FENTANYL CITRATE 25 MCG: 50 INJECTION, SOLUTION INTRAMUSCULAR; INTRAVENOUS at 08:15

## 2021-04-02 RX ADMIN — PHENYLEPHRINE HYDROCHLORIDE 200 MCG: 10 INJECTION INTRAVENOUS at 07:48

## 2021-04-02 RX ADMIN — HYDROCODONE BITARTRATE AND ACETAMINOPHEN 1 TABLET: 5; 325 TABLET ORAL at 04:05

## 2021-04-02 RX ADMIN — CEFAZOLIN 2000 MG: 10 INJECTION, POWDER, FOR SOLUTION INTRAVENOUS at 23:36

## 2021-04-02 RX ADMIN — ONDANSETRON HYDROCHLORIDE 4 MG: 4 INJECTION, SOLUTION INTRAMUSCULAR; INTRAVENOUS at 08:32

## 2021-04-02 RX ADMIN — DOCUSATE SODIUM 50 MG AND SENNOSIDES 8.6 MG 1 TABLET: 8.6; 5 TABLET, FILM COATED ORAL at 20:15

## 2021-04-02 RX ADMIN — FENTANYL CITRATE 25 MCG: 50 INJECTION, SOLUTION INTRAMUSCULAR; INTRAVENOUS at 07:45

## 2021-04-02 RX ADMIN — DEXAMETHASONE SODIUM PHOSPHATE 5 MG: 10 INJECTION, EMULSION INTRAMUSCULAR; INTRAVENOUS at 07:45

## 2021-04-02 RX ADMIN — Medication 3 ML: at 07:30

## 2021-04-02 ASSESSMENT — PULMONARY FUNCTION TESTS
PIF_VALUE: 14
PIF_VALUE: 4
PIF_VALUE: 14
PIF_VALUE: 8
PIF_VALUE: 8
PIF_VALUE: 10
PIF_VALUE: 1
PIF_VALUE: 0
PIF_VALUE: 8
PIF_VALUE: 26
PIF_VALUE: 14
PIF_VALUE: 0
PIF_VALUE: 8
PIF_VALUE: 11
PIF_VALUE: 11
PIF_VALUE: 7
PIF_VALUE: 1
PIF_VALUE: 11
PIF_VALUE: 2
PIF_VALUE: 9
PIF_VALUE: 15
PIF_VALUE: 14
PIF_VALUE: 8
PIF_VALUE: 14
PIF_VALUE: 2
PIF_VALUE: 8
PIF_VALUE: 8
PIF_VALUE: 0
PIF_VALUE: 2
PIF_VALUE: 11
PIF_VALUE: 12
PIF_VALUE: 8
PIF_VALUE: 0
PIF_VALUE: 9
PIF_VALUE: 9
PIF_VALUE: 7

## 2021-04-02 ASSESSMENT — PAIN DESCRIPTION - ONSET: ONSET: ON-GOING

## 2021-04-02 ASSESSMENT — PAIN - FUNCTIONAL ASSESSMENT
PAIN_FUNCTIONAL_ASSESSMENT: PREVENTS OR INTERFERES SOME ACTIVE ACTIVITIES AND ADLS
PAIN_FUNCTIONAL_ASSESSMENT: PREVENTS OR INTERFERES SOME ACTIVE ACTIVITIES AND ADLS

## 2021-04-02 ASSESSMENT — PAIN DESCRIPTION - DESCRIPTORS
DESCRIPTORS: ACHING
DESCRIPTORS: ACHING

## 2021-04-02 ASSESSMENT — PAIN DESCRIPTION - LOCATION
LOCATION: HIP
LOCATION: HIP

## 2021-04-02 ASSESSMENT — PAIN DESCRIPTION - FREQUENCY: FREQUENCY: CONTINUOUS

## 2021-04-02 ASSESSMENT — PAIN SCALES - GENERAL
PAINLEVEL_OUTOF10: 9
PAINLEVEL_OUTOF10: 9
PAINLEVEL_OUTOF10: 0
PAINLEVEL_OUTOF10: 10

## 2021-04-02 ASSESSMENT — PAIN DESCRIPTION - PROGRESSION
CLINICAL_PROGRESSION: GRADUALLY IMPROVING
CLINICAL_PROGRESSION: GRADUALLY WORSENING

## 2021-04-02 NOTE — PROGRESS NOTES
0843  Pt. Responds to name on adm. To pacu. Right leg dressings x 3 intact and dry. 0850  Pt. Awake and oriented. Pt. Repeats \"Ow\". When asked where she is hurting, she states \"all over\". 0910  Pt. Resting quietly with eyes closed. 0935  Pt. Continues to say \"ow\". Pt. Medicated for pain. 10 Prieto Jeffers to see pt. Pt. Taking ice chips without difficulty. 1010  Report called to 7K.  1024  pacu criteria met. Transfer to 3V65.

## 2021-04-02 NOTE — ANESTHESIA POSTPROCEDURE EVALUATION
Department of Anesthesiology  Postprocedure Note    Patient: Kathryn Shah  MRN: [id number]  YOB: 1935  Date of evaluation: 4/2/2021  Time:  10:33 AM     Procedure Summary     Date: 04/02/21 Room / Location: Rico Ann 11 / Rico Ann    Anesthesia Start: 0725 Anesthesia Stop: 5049    Procedure: RIGHT HIP INTERTROCHANTERIC NAILING (Right ) Diagnosis: (CLOSED DISPLACED INTERTROCHANTERIC FRACTURE OF RIGHT FEMUR)    Surgeons: Jania Terry MD Responsible Provider: Egbert Leyden, MD    Anesthesia Type: general ASA Status: 3          Anesthesia Type: general    Gerardo Phase I: Gerardo Score: 10    Gerardo Phase II:      Last vitals: Reviewed and per EMR flowsheets. Anesthesia Post Evaluation    Patient location during evaluation: PACU  Patient participation: complete - patient participated  Level of consciousness: awake and alert  Airway patency: patent  Nausea & Vomiting: no nausea and no vomiting  Complications: no  Cardiovascular status: hemodynamically stable  Respiratory status: acceptable  Hydration status: euvolemic      [facility]  POST-ANESTHESIA NOTE       Name:  Kathryn Shah                                         Age:  81 y.o.   MRN:  [id number]      Last Vitals:  BP (!) 133/57   Pulse 96   Temp 98.6 °F (37 °C) (Temporal)   Resp 16   Ht 5' 1\" (1.549 m)   Wt 138 lb 9.6 oz (62.9 kg)   SpO2 96%   BMI 26.19 kg/m²   Patient Vitals for the past 4 hrs:   BP Temp Temp src Pulse Resp SpO2   04/02/21 1015 -- -- -- 96 -- 96 %   04/02/21 1010 (!) 133/57 -- -- 92 16 94 %   04/02/21 1005 (!) 130/59 -- -- 95 15 94 %   04/02/21 1000 (!) 122/57 -- -- 94 17 94 %   04/02/21 0955 (!) 121/56 -- -- 99 19 96 %   04/02/21 0950 (!) 109/58 -- -- 98 16 95 %   04/02/21 0945 (!) 113/55 -- -- 97 15 94 %   04/02/21 0940 (!) 117/55 -- -- 96 16 94 %   04/02/21 0935 (!) 127/59 -- -- 93 17 94 %   04/02/21 0930 (!) 129/59 -- -- 92 18 94 %   04/02/21 0925 -- -- -- 92 18 94 %   04/02/21 0920 (!) 134/59 -- -- 93 19 --   04/02/21 0915 130/63 -- -- 93 19 95 %   04/02/21 0910 (!) 132/59 -- -- 92 18 95 %   04/02/21 0905 (!) 124/58 -- -- 92 19 94 %   04/02/21 0900 (!) 129/58 -- -- 93 19 95 %   04/02/21 0855 (!) 126/59 -- -- 92 19 96 %   04/02/21 0850 (!) 115/56 -- -- 95 19 96 %   04/02/21 0845 -- -- -- 99 19 94 %   04/02/21 0843 (!) 116/57 98.6 °F (37 °C) Temporal 98 19 96 %       Level of Consciousness:  Awake    Respiratory:  Stable    Oxygen Saturation:  Stable    Cardiovascular:  Stable    Hydration:  Adequate    PONV:  Stable    Post-op Pain:  Adequate analgesia    Post-op Assessment:  No apparent anesthetic complications    Additional Follow-Up / Treatment / Comment:  None    Peggy Jacob MD  April 2, 2021   10:33 AM

## 2021-04-02 NOTE — BRIEF OP NOTE
Brief Postoperative Note      Patient: Denise Lindsey  YOB: 1935  MRN: [id number]    Date of Procedure: 4/2/2021    Pre-Op Diagnosis: CLOSED DISPLACED INTERTROCHANTERIC FRACTURE OF RIGHT FEMUR    Post-Op Diagnosis: Same       Procedure(s):  RIGHT HIP INTERTROCHANTERIC NAILING    Surgeon(s):  Clementina Becerra MD    Assistant:  * No surgical staff found *    Anesthesia: General    Estimated Blood Loss (mL): less than 600     Complications: None    Specimens:   * No specimens in log *    Implants:  Implant Name Type Inv.  Item Serial No.  Lot No. LRB No. Used Action   NAIL IM L36CM [id number] 125DEG RT FEM TRAPEZOIDAL ORTH RECON  NAIL IM L36CM [id number] 125DEG RT FEM TRAPEZOIDAL [facility] [employer]TH[employer]- [id number] Right 1 Implanted   KIT SCR LAG L90MM [id number] COMPR L85MM DIA7MM INTEGR INTLOK  KIT SCR LAG L90MM [id number] COMPR L85MM DIA7MM INTEGR Zulma Bott[employer] AND NEPHEW [facility] [id number] Right 1 Implanted   SCREW BNE L35MM DIA5MM SHENA TI INT CAPT HEX LO PROF FOR IM  SCREW BNE L35MM DIA5MM SHENA TI INT CAPT HEX LO PROF FOR IM  [facility] [id number] Right 1 Implanted   SCREW BNE L40MM DIA5MM SHENA TIB TI INT HEX LO PROF FOR IM  SCREW BNE L40MM DIA5MM SHENA TIB TI INT HEX LO PROF FOR IM  [facility] [id number] Right 1 Implanted         Drains:   Urethral Catheter Temperature probe (Active)   Catheter Indications Perioperative use in selected surgeries including but not limited to urologic, pelvic or need for intraoperative monitoring of urinary output due to prolonged surgery, large volume infusion or need for diuretic therapy in surgery 04/02/21 0403   Site Assessment Pink 04/02/21 0403   Urine Color Yellow 04/02/21 0403   Output (mL) 600 mL 04/02/21 0403       Findings: successful reduction intertrochanteric fracture    Electronically signed by Clementina Becerra MD on 4/2/2021 at 8:26 AM

## 2021-04-02 NOTE — FLOWSHEET NOTE
04/01/21 2216   Provider Notification   Reason for Communication Evaluate   Provider Name Dr. Meghan Bearden   Provider Notification Physician   Method of Communication Secure Message   Response Waiting for response   Notification Time 2217   Notified Dr. Meghan Bearden that patient's blood pressure 82/50 (61) manually. Currently awaiting response. 2228- Call returned from Dr. Meghan Bearden. See new orders for 250 ml bolus over 1 hour.

## 2021-04-02 NOTE — CARE COORDINATION
4/2/21, 11:01 AM EDT    DISCHARGE PLANNING EVALUATION  Spoke with Rafia Addison at [facility]. She reports that the son does not want the bed held and he was not sure if he wanted the patient to return to their facility. Patient moved to [address] [place] [address]. Updated Matt Knowles.

## 2021-04-02 NOTE — PROGRESS NOTES
[address] [address]  SPEECH THERAPY MISSED TREATMENT NOTE  [facility] OR      Date: 2021  Patient Name: Leydi Hinkle        MRN: [id number]    : 1935  (81 y.o.)    REASON FOR MISSED TREATMENT:  Attempted to see patient for completion of cognitive evaluation; upon attempt at [facility] Avani Glaser 134 patient off unit for surgery.  Will re-attempt later this date as patient is medically/clinically appropriate for completion of cognitive evaluation or     STEPHAN Chung

## 2021-04-02 NOTE — CARE COORDINATION
4/2/21, 1:53 PM EDT  Discharge Planning Evaluation  Social work consult received, patient from [facility]. Patient/Family preference is to return to The [address] [place] [address]. The patient's current payor source at the facility is Medicare. Medicare skilled days available: yes  Insurance precert:  no  Spoke with Jean Paul at the facility. Patient bed hold: unofficial  Anticipated transport plan: ambulance  Do they require COVID 19 test to return to ECF:no  Is there a required time frame which which COVID test needs done:NA      4/2/21, 1:53 PM EDT    Patient goals/plan/ treatment preferences discussed by  and . Patient goals/plan/ treatment preferences reviewed with patient/ family. Patient/ family verbalize understanding of discharge plan and are in agreement with goal/plan/treatment preferences. Understanding was demonstrated using the teach back method. AVS provided by RN at time of discharge, which includes all necessary medical information pertaining to the patients current course of illness, treatment, post-discharge goals of care, and treatment preferences. IMM Letter  IMM Letter given to Patient/Family/Significant other/Guardian/POA/by[de-identified] staff  IMM Letter date given[de-identified] 04/01/21       Anticipated discharge this weekend. Pt will return [address] under her Medicare benefit. Blue envelope on chart with discharge instructions.   RN is aware

## 2021-04-02 NOTE — FLOWSHEET NOTE
[facility] 61  OCCUPATIONAL THERAPY MISSED TREATMENT NOTE  STRZ OR  STRZ OR (General) POOL R*      Date: 2021  Patient Name: Brendan Mariano        CSN: [id number]   : 1935  (81 y.o.)  Gender: female                REASON FOR MISSED TREATMENT: OT eval/tx orders received, although pt currently off unit for R hip surgery. Will follow up 4/3 as able.

## 2021-04-02 NOTE — DISCHARGE INSTR - COC
Continuity of Care Form    Patient Name: Kei Simmons   :  1935  MRN:  [id number]    Admit date:  2021  Discharge date: 21    Code Status Order: Full Code   Advance Directives:   Advance Care Flowsheet Documentation       Date/Time Healthcare Directive Type of Healthcare Directive Copy in [address] Agent's Name Healthcare Agent's Phone Number    21 1304  Yes, patient has an advance directive for healthcare treatment  Durable power of  for health care;Living will  Other (Comment)  Healthcare power of   haja Smyth  --            Admitting Physician:  Johanna Mike MD  PCP: Heather Lamb MD    Discharging Nurse: [facility] Unit/Room#: 7K-13/013-A  Discharging Unit Phone Number: [phone]    Emergency Contact:   Extended Emergency Contact Information  Primary Emergency Contact: Mendel Smith [address] Phone: [phone]  Mobile Phone: [phone]  Relation: Child  Secondary Emergency Contact: José Hinkle [address] Phone: [phone]  Mobile Phone: [phone]  Relation: Brother/Sister    Past Surgical History:  Past Surgical History:   Procedure Laterality Date    APPENDECTOMY      at age 13years   [address]      right foot   [address]  ??  &   2012? ?    normal results    COLONOSCOPY      last one ???    ENDOSCOPY, COLON, DIAGNOSTIC      EYE SURGERY  ??    right eye   [address]    still has ovaries    MS LAMINECTOMY,>2 SGMT,LUMBAR N/A 3/7/2018    LUMBAR LAMINECTOMY L3-S1 performed by Shanna Vazquez MD at [facility] OFFICE/OUTPT [facility] N/A 3/12/2018    REPAIR DUROTOMY PLACEMENT OF SUBARACHNOID DRAIN performed by Shanna Vazquez MD at [facility]  left    SPINE SURGERY      L5 cyst    UPPER GASTROINTESTINAL ENDOSCOPY  2007    sheik    UPPER GASTROINTESTINAL ENDOSCOPY Left 5/19/2020    EGD BIOPSY performed by Kris Canales MD at [facility]       Immunization History:   Immunization History   Administered Date(s) Administered    Pneumococcal Conjugate 13-valent ([id number]) 09/22/2015    Pneumococcal Conjugate 7-valent (Prevnar7) 01/01/2003    Pneumococcal Polysaccharide ([id number]) 03/21/2010       Active Problems:  Patient Active Problem List   Diagnosis Code    Hyperlipidemia E78.5    Osteoarthritis M19.90    GERD (gastroesophageal reflux disease) K21.9    COPD (chronic obstructive pulmonary disease) ([facility] [address] J44.9    Chronic back pain M54.9, G89.29    CAD (coronary artery disease) I25.10    Hypertension I10    Spinal stenosis of lumbar region with neurogenic claudication M48.062    Neurologic gait dysfunction R26.9    Inflammatory spondylopathy of lumbosacral region [facility]) M46.97    Paroxysmal atrial fibrillation (HCC) I48.0    Closed displaced intertrochanteric fracture of right femur ([facility] [address] S72.141A    Fall at nursing home [address] Maribell Castillo, E47.400       Isolation/Infection:   Isolation            No Isolation          Patient Infection Status       Infection Onset Added Last Indicated Last Indicated By Review Planned Expiration Resolved Resolved By    None active    Resolved    COVID-19 Rule Out 04/01/21 04/01/21 04/01/21 SARS-CoV-2 NAAT (Rapid) (Ordered)   04/01/21 Rule-Out Test Resulted    COVID-19 Rule Out 05/15/20 05/16/20 05/15/20 Covid-19 Ambulatory (Ordered)   05/18/20 Rule-Out Test Resulted    COVID-19 Rule Out 05/15/20 05/15/20 05/15/20 COVID-19 (Ordered)   05/15/20 Rule-Out Test Resulted            Nurse Assessment:  Last Vital Signs: /60   Pulse 92   Temp 98.6 °F (37 °C) (Oral)   Resp 16   Ht 5' 1\" (1.549 m)   Wt 138 lb 9.6 oz (62.9 kg)   SpO2 97%   BMI 26.19 kg/m²     Last documented pain score (0-10 scale): Pain Level: 0  Last Weight:   Wt Readings from Last 1 Encounters:   04/01/21 138 lb 9.6 oz (62.9 kg)

## 2021-04-02 NOTE — OP NOTE
Operative Note      Patient: Effie Moore  YOB: 1935  MRN: [id number]    Date of Procedure: 4/2/2021    Pre-Op Diagnosis: CLOSED DISPLACED INTERTROCHANTERIC FRACTURE OF RIGHT FEMUR    Post-Op Diagnosis: Same       Procedure(s):  RIGHT HIP INTERTROCHANTERIC NAILING    Surgeon(s):  Dolly Garcia MD    Assistant:   * No surgical staff found *    Anesthesia: General    Estimated Blood Loss (mL): 563     Complications: None    Specimens:   * No specimens in log *    Implants:  Implant Name Type Inv. Item Serial No.  Lot No. LRB No. Used Action   NAIL IM L36CM [id number] 125DEG RT FEM TRAPEZOIDAL ORTH RECON  NAIL IM L36CM [id number] 125DEG RT FEM TRAPEZOIDAL [facility] [facility] [employer]- [id number] Right 1 Implanted   KIT SCR LAG L90MM [id number] COMPR L85MM DIA7MM INTEGR INTLOK  KIT SCR LAG L90MM [id number] COMPR L85MM DIA7MM INTEGR [employer] [id number] Right 1 Implanted   SCREW BNE L35MM DIA5MM SHENA TI INT CAPT HEX LO PROF FOR IM  SCREW BNE L35MM DIA5MM SHENA TI INT CAPT HEX LO PROF FOR IM  BLANCO AND NEP[employer]let [id number] Right 1 Implanted   SCREW BNE L40MM DIA5MM SHENA TIB TI INT HEX LO PROF FOR IM  SCREW BNE L40MM DIA5MM SHENA TIB TI INT HEX LO PROF FOR IM  BLANCO AND NEPHEW Venus Millet [id number] Right 1 Implanted         Drains:   Urethral Catheter Temperature probe (Active)   Catheter Indications Perioperative use in selected surgeries including but not limited to urologic, pelvic or need for intraoperative monitoring of urinary output due to prolonged surgery, large volume infusion or need for diuretic therapy in surgery 04/02/21 0403   Site Assessment Pink 04/02/21 0403   Urine Color Yellow 04/02/21 0403   Output (mL) 600 mL 04/02/21 0403       Findings: successful reduction of fracture    Detailed Description of Procedure: Indications: Effie Moore is a 81 y.o. female who fell from ground level 4/1/21.  The patient was evaluated in the ER and a right intertrochanteric hip fracture was identified. Operative intervention with intramedullary nailing of the right intertrochanteric femur fracture was recommended. The risks, benefits, alternatives of the procedure including but not limited to pain, bleeding, infection, non-union, blood clots, wound complications, need for further surgery were reviewed and informed consent was obtained. Procedure: After identification and marking in the pre-operative holding area, the patient was brought to the operating room and underwent smooth induction of general anesthesia. The patient was then placed on the fracture table in supine position. All bony prominences were well padded. The patient received 2 g ancef for pre-operative surgical site infection prophylaxis. Time out was performed confirming correct patient, site and surgery. Fluoroscopy was utilized and manual traction with internal rotation was applied resulting in reduction of the intertrochanteric hip fracture. The leg was prepped and draped in sterile fashion. A 4 cm incision was made through skin and IT band along the lateral thigh. Guide pin were then placed at the medial aspect of the greater trochanter and centered down the femoral shaft and directed to the less tuberosity. Location was confirmed with biplanar fluoroscopy. An opening reamer was then utilized. Ball tipped guide wire was then passed and femoral nail length was measured. A 10 mm x 360 mm Smith and Nephew Intertan nail was then inserted into the canal. Reduction was maintained. Guide pins were then placed in the center of the femoral neck and a lag screw measuring 90/85 mm and compression screw were then placed. The compression screw provided excellent compression and fracture reduction. The nail  and guides were removed and final hip x-rays were obtained. Attention was then turned to placement of a single distal interlocking screw using perfect [place] technique. After drilling, a 40 mm x 5.0 mm screw was placed in the distal interlocking screw hole. Final x-rays were obtained. All wounds were then thoroughly irrigated. The fascia was closed with 2-0 vicryl sutures. The subcutaneous tissues were closed with 3-0 monocryl and skin was closed with 3-0 nylon sutures. Sterile dressings were applied. At the conclusion of the procedure all needle and sponge counts were correct. The patient was transferred to the PACU in stable condition. Post-Operative Plan: The patient will be weightbearing as tolerated on the right hip with walker and will receive aspirin 325 mg daily for 4 weeks for DVT prophylaxis. We will see them back in office in 2 weeks with xrays right hip and femur.       Electronically signed by Victoria Waldrop MD on 4/2/2021 at 8:30 AM

## 2021-04-02 NOTE — CONSULTS
ALLERGIES:  She has no known allergies. SOCIAL HISTORY:  She quit smoking about 31 years ago and no other drug  use or alcohol use at this time. She resides at a nursing home. REVIEW OF SYSTEMS:  Mostly good appetite. Good bowel movement. Denies  any chest pain, palpitation, nausea, vomiting, or any blurry vision. She walks around at home with a walker. PHYSICAL EXAMINATION:  GENERAL:  I found an elderly patient who otherwise appears comfortable  tonight except for pain when she moves the right hip. HEENT:  Anicteric. VITAL SIGNS:  Afebrile. Most recent vitals showed blood pressure  103/51, pulse about 65, respirations are 18, temperature of 97.4. LUNGS:  Diminished, but show no evidence of wheezes, rales, or rhonchi. CARDIOVASCULAR:  S1 and S2 well heard. Diffuse PMI. No gallop or rub. ABDOMEN:  Soft, nontender. Positive bowel sounds. No palpable enlarged  organs. NEUROLOGIC:  She is alert, awake, responsive to command, and able to  move extremities though limited on the right side from the recent  fracture. EXTREMITIES:  Show no evidence of edema. There is some lateral rotation  of the right hip. AVAILABLE DIAGNOSTIC DATA:  Include the cardiogram, showed sinus rhythm,  nonspecific ST-T wave changes. The electrolytes showed a BUN of 36,  creatinine is 1.4, sodium is 135, potassium is 3.6, chloride is 104, CO2  15, glucose of 156. Troponin less than 0.01. LFT shows ALT slightly  high at 68, the AST is 43, alkaline phosphatase is 89. Drug screen is  negative. CBC:  White count is 9.3, hemoglobin is 11.7, hematocrit is  40.7, platelet count is 577. Normal differential count. The radiologic studies confirmed the x-ray fracture of the hip at the  trochanteric area. Portable chest x-ray showed some calcification in  the mediastinum. Otherwise, no acute pulmonary findings. Had a head CT  scan as part of the trauma showing no evidence of any intracranial  abnormalities.   This patient does have the fractured hip and she was  medically fit for the surgery. We will carefully rehydrate and follow  up the electrolytes as stated above. [address] [place] [address]  Della Maldonado M.D.    D: 04/01/2021 18:29:03       T: 04/01/2021 19:12:42     [email or web address]  Job#: [id number]     Doc#: [id number]    CC:

## 2021-04-02 NOTE — CARE COORDINATION
4/2/21, 4:16 PM EDT    DISCHARGE PLANNING EVALUATION  Received a call from Anjana at the [facility]. She reports that they do need a COVID test before patient is discharged.

## 2021-04-02 NOTE — ANESTHESIA PRE PROCEDURE
Department of Anesthesiology  Preprocedure Note       Name:  Rowena Boucher   Age:  81 y.o.  :  1935                                          MRN:  [id number]         Date:  2021      Surgeon: rBayden Da Silva):  Ryley Mc MD    Procedure: Procedure(s):  RIGHT HIP INTERTROCHANTERIC NAILING    Medications prior to admission:   Prior to Admission medications    Medication Sig Start Date End Date Taking?  Authorizing Provider   sertraline (ZOLOFT) 25 MG tablet Take 25 mg by mouth daily   Yes Historical Provider, MD   isosorbide mononitrate (IMDUR) 30 MG extended release tablet Take 1 tablet by mouth daily 3/3/21  Yes Vicki Galaviz MD   potassium chloride (KLOR-CON M) 20 MEQ extended release tablet Take 1 tablet by mouth daily 3/3/21  Yes Vicki Galaviz MD   pantoprazole (PROTONIX) 40 MG tablet Take 1 tablet by mouth 2 times daily (before meals) 21  Yes Vicki Galaviz MD   sucralfate (CARAFATE) 1 GM tablet Take 1 tablet by mouth 4 times daily (before meals and nightly) 21  Yes Vicki Galaviz MD   ibuprofen (IBU) 600 MG tablet Take 1 tablet by mouth 3 times daily as needed for Pain 2/10/21  Yes Lyly Olivera MD   metoprolol tartrate (LOPRESSOR) 25 MG tablet Take 0.5 tablets by mouth 2 times daily 21  Yes Vicki Galaviz MD   nortriptyline (PAMELOR) 50 MG capsule Take 1 capsule by mouth nightly 21  Yes Vicki Galaviz MD   tiZANidine (ZANAFLEX) 4 MG tablet Take 1 tablet by mouth every 8 hours as needed (muscle spasm) 20  Yes Vicki Galaviz MD   simvastatin (ZOCOR) 40 MG tablet Take 1 tablet by mouth nightly 20  Yes Vicki Galaviz MD   Multiple Vitamins-Minerals (THERAPEUTIC MULTIVITAMIN-MINERALS) tablet Take 1 tablet by mouth daily   Yes Historical Provider, MD   latanoprost (XALATAN) 0.005 % ophthalmic solution Place 1 drop into both eyes nightly   Yes Historical Provider, MD   nitroGLYCERIN (NITROSTAT) 0.4 MG SL tablet up to max of 3 total doses. If no relief after 1 dose, call 911. 5/19/20  Yes Augustus Escoto MD   acetaminophen (TYLENOL) 325 MG tablet Take 2 tablets by mouth every 4 hours as needed for Pain Maximum dose of acetaminophen is 4000 mg from all sources in 24 hours.  4/3/18  Yes YASMINE Bains - CNP   aspirin 81 MG tablet Take 81 mg by mouth daily   Yes Historical Provider, MD       Current medications:    Current Facility-Administered Medications   Medication Dose Route Frequency Provider Last Rate Last Admin    bupivacaine (PF) (MARCAINE) 30 mL, ketorolac (TORADOL) 30 mg, morphine (PF) 8 mg, sodium chloride bacteriostatic 0.9% 50 mL    PRN Piero Payne MD   30 mL at 04/02/21 0800    sodium chloride flush 0.9 % injection 10 mL  10 mL Intravenous 2 times per day Juanetta Roughen, PA-C        sodium chloride flush 0.9 % injection 10 mL  10 mL Intravenous PRN Juanetta Roughen, PA-C        0.9 % sodium chloride infusion  25 mL Intravenous PRN Juanetta Roughen, PA-C        acetaminophen (TYLENOL) tablet 650 mg  650 mg Oral Q4H PRN Juanetta Roughen, PA-C        promethazine (PHENERGAN) tablet 12.5 mg  12.5 mg Oral Q6H PRN Juanetta Roughen, PA-C        Or    ondansetron [facility]) injection 4 mg  4 mg Intravenous Q6H PRN Juanetta Roughen, PA-C        polyethylene glycol (GLYCOLAX) packet 17 g  17 g Oral Daily PRN Juanetta Roughen, PA-C        0.9 % sodium chloride infusion   Intravenous Continuous Simon Malik MD 75 mL/hr at 04/01/21 1858 Rate Change at 04/01/21 1858    potassium chloride 10 mEq/100 mL IVPB (Peripheral Line)  10 mEq Intravenous PRN Juanetta Roughen, PA-C        HYDROcodone-acetaminophen [facility]) 5-325 MG per tablet 1 tablet  1 tablet Oral Q4H PRN Juanetta Roughen, PA-C   1 tablet at 04/02/21 0405    Or    HYDROcodone-acetaminophen (NORCO) 5-325 MG per tablet 2 tablet  2 tablet Oral Q4H PRN Juanetta Roughen, PA-C   2 tablet at 04/01/21 1635    fentaNYL (SUBLIMAZE) injection 25 mcg  25 mcg Intravenous Q1H PRN Vijay Magallanes PA-C        Or    fentaNYL (SUBLIMAZE) injection 50 mcg  50 mcg Intravenous Q1H PRN Vijay Magallanes PA-C   50 mcg at 04/02/21 0239    docusate sodium (COLACE) capsule 100 mg  100 mg Oral Daily Vijay Magallanes PA-C        famotidine (PEPCID) injection 20 mg  20 mg Intravenous Daily Vijay Magallanes PA-C   20 mg at 04/01/21 1638    latanoprost (XALATAN) 0.005 % ophthalmic solution 1 drop  1 drop Both Eyes Nightly Vijay Magallanes PA-C   1 drop at 04/01/21 2046    therapeutic multivitamin-minerals 1 tablet  1 tablet Oral Daily Vijay Magallanes PA-C        sertraline (ZOLOFT) tablet 25 mg  25 mg Oral Daily Vijay Magallanes PA-C        heparin (porcine) injection 5,000 Units  5,000 Units Subcutaneous 3 times per day Marixa Burnham MD   5,000 Units at 04/01/21 2046     Facility-Administered Medications Ordered in Other Encounters   Medication Dose Route Frequency Provider Last Rate Last Admin    ceFAZolin (ANCEF) injection    PRN Arzella Deeds, APRN - CRNA   2,000 mg at 04/02/21 0740    propofol injection    PRN Arzella Deeds, APRN - CRNA   100 mg at 04/02/21 0730    fentaNYL (SUBLIMAZE) injection    PRN Arzella Deeds, APRN - CRNA   25 mcg at 04/02/21 0815    lidocaine injection    PRN Arzella Deeds, APRN - CRNA   3 mL at 04/02/21 0730    dexamethasone (PF) (DECADRON) injection    PRN Arzella Deeds, APRN - CRNA   5 mg at 04/02/21 0745    0.9 % sodium chloride infusion    Continuous PRN Arzella Deeds, APRN - CRNA   New Bag at 04/02/21 0725    phenylephrine (ALFONSO-SYNEPHRINE) injection    PRN Arzella Deeds, APRN - CRNA   200 mcg at 04/02/21 0754       Allergies:  No Known Allergies    Problem List:    Patient Active Problem List   Diagnosis Code    Hyperlipidemia E78.5    Osteoarthritis M19.90    GERD (gastroesophageal reflux disease) K21.9    COPD (chronic obstructive pulmonary disease) ([facility] [address] J44.9    Chronic back GASTROINTESTINAL ENDOSCOPY Left 2020    EGD BIOPSY performed by Renu Glass MD at [facility]       Social History:    Social History     Tobacco Use    Smoking status: Former Smoker     Years: 30.00     Types: Cigarettes     Quit date: 1989     Years since quittin.8    Smokeless tobacco: Never Used   Substance Use Topics    Alcohol use: No                                Counseling given: Not Answered      Vital Signs (Current):   Vitals:    21 2210 21 2334 21 0239 21 0403   BP: (!) 82/50 (!) 92/57 (!) 101/57 130/63   Pulse:  94 101 100   Resp:  18  18   Temp:    36.4 °C (97.6 °F)   TempSrc:    Oral   SpO2:  98%  98%   Weight:       Height:                                                  BP Readings from Last 3 Encounters:   21 130/63   21 (!) 93/53   21 122/63       NPO Status:                                                   Date of last liquid consumption: 21                        Date of last solid food consumption: 21    BMI:   Wt Readings from Last 3 Encounters:   21 138 lb 9.6 oz (62.9 kg)   21 150 lb (68 kg)   03/15/21 151 lb (68.5 kg)     Body mass index is 26.19 kg/m².     CBC:   Lab Results   Component Value Date    WBC 10.2 2021    RBC 3.64 2021    HGB 10.1 2021    HCT 32.5 2021    MCV 89.3 2021    RDW 14.5 2018     2021       CMP:   Lab Results   Component Value Date     2021    K 3.9 2021    K 2.9 2021     2021    CO2 16 2021    BUN 32 2021    CREATININE 1.2 2021    AGRATIO 0.9 2017    LABGLOM 43 2021    GLUCOSE 138 2021    GLUCOSE 112 2017    PROT 5.7 2021    CALCIUM 8.6 2021    BILITOT 0.3 2021    ALKPHOS 77 2021    AST 31 2021    ALT 58 2021       POC Tests: No results for input(s): POCGLU, POCNA, POCK, POCCL, POCBUN, POCHEMO, POCHCT in the last 72 hours. Coags:   Lab Results   Component Value Date    INR 1.11 04/01/2021    APTT 27.3 04/01/2021       HCG (If Applicable): No results found for: PREGTESTUR, PREGSERUM, HCG, HCGQUANT     ABGs: No results found for: PHART, PO2ART, PUG4SZD, GER9ERI, BEART, P1AYSPDS     Type & Screen (If Applicable):  Lab Results   Component Value Date    LABRH POS 04/01/2021       Drug/Infectious Status (If Applicable):  Lab Results   Component Value Date    HEPCAB Negative 05/18/2020       COVID-19 Screening (If Applicable):   Lab Results   Component Value Date    COVID19 NOT DETECTED 04/01/2021    COVID19 Not Detected 05/15/2020           Anesthesia Evaluation  Patient summary reviewed and Nursing notes reviewed no history of anesthetic complications:   Airway: Mallampati: III  TM distance: >3 FB   Neck ROM: limited  Mouth opening: < 3 FB Dental:      Comment: Missing several teeth, overall poor dentition, pt denies any loose teeth      Pulmonary:normal exam    (+) COPD:                             Cardiovascular:    (+) hypertension:, CAD:, dysrhythmias: atrial fibrillation, hyperlipidemia      ECG reviewed      Echocardiogram reviewed                  Neuro/Psych:   Negative Neuro/Psych ROS              GI/Hepatic/Renal:   (+) GERD:, liver disease:,           Endo/Other:    (+) : arthritis: OA., .                 Abdominal:           Vascular: negative vascular ROS. Anesthesia Plan      general     ASA 3       Induction: intravenous. MIPS: Postoperative opioids intended. Anesthetic plan and risks discussed with patient. Plan discussed with attending.                   YASMINE Montanez - ALEC   4/2/2021

## 2021-04-02 NOTE — PLAN OF CARE
Problem: Falls - Risk of:  Goal: Will remain free from falls  Description: Will remain free from falls  4/2/2021 1507 by Alejandrina Mott RN  Outcome: Ongoing  Note: Pt using call light appropriately to call for assistance with ambulation to the bathroom and to chair. Pt is also compliant with use of non-skid slippers. Pt reports understanding of fall prevention when discussed. Problem: Skin Integrity:  Goal: Will show no infection signs and symptoms  Description: Will show no infection signs and symptoms  4/2/2021 1507 by Alejandrina Mott RN  Outcome: Ongoing  Note: Pt's right hip surgical incision healing. Dressing is dry and intact and not due to be changed today. No other skin impairments noted. Pt understands the importance of frequent repositioning in order to prevent any skin breakdown. Problem: Pain:  Goal: Pain level will decrease  Description: Pain level will decrease  4/2/2021 1507 by Alejandrina Mott RN  Outcome: Ongoing  Note: Pt report pain at 6 on scale. Pt states oral/iv/im medication helping to achieve pain goal of a 4 on scale. Problem: Discharge Planning:  Goal: Discharged to appropriate level of care  Description: Discharged to appropriate level of care  4/2/2021 1507 by Alejandrina Mott RN  Outcome: Ongoing  Note: Pt planning to return to [facility] at time of discharge. Care plan reviewed with patient. Patient  verbalize understanding of the plan of care and contribute to goal setting.

## 2021-04-02 NOTE — PROGRESS NOTES
Dr. Julio César Mahajan Progress note       Internal Medicine              Patient:  Veronica Diallo  YOB: 1935    MRN: [id number]   Acct:  [de-identified]   [facility] PO[facility]*  Primary Care Physician: Jeffrie Dancer, MD    Admit Date: 4/1/2021           Subjective: Patient seen in PACU postoperatively      Objective:      Physical Exam:    Vitals:  Patient Vitals for the past 24 hrs:   BP Temp Temp src Pulse Resp SpO2 Height Weight   04/02/21 0855 (!) 126/59 -- -- 92 19 96 % -- --   04/02/21 0850 (!) 115/56 -- -- 95 19 96 % -- --   04/02/21 0845 -- -- -- 99 19 94 % -- --   04/02/21 0843 (!) 116/57 98.6 °F (37 °C) Temporal 98 19 96 % -- --   04/02/21 0403 130/63 97.6 °F (36.4 °C) Oral 100 18 98 % -- --   04/02/21 0239 (!) 101/57 -- -- 101 -- -- -- --   04/01/21 2334 (!) 92/57 -- -- 94 18 98 % -- --   04/01/21 2210 (!) 82/50 -- -- -- -- -- -- --   04/01/21 2038 88/66 98.2 °F (36.8 °C) Oral 89 17 97 % -- --   04/01/21 1448 (!) 103/51 97.4 °F (36.3 °C) Oral 65 18 96 % 5' 1\" (1.549 m) 138 lb 9.6 oz (62.9 kg)   04/01/21 1259 (!) 106/57 -- -- 65 18 98 % -- --   04/01/21 1233 102/72 -- -- 69 18 100 % -- --   04/01/21 1221 100/60 -- -- 70 18 -- -- --   04/01/21 1203 (!) 105/45 -- -- 72 18 100 % -- --   04/01/21 1138 (!) 88/49 -- -- 65 20 100 % -- --   04/01/21 1125 (!) 89/48 -- -- 67 20 -- -- --   04/01/21 1121 -- -- -- -- -- 98 % -- --   04/01/21 1116 -- -- -- -- -- -- 5' 1\" (1.549 m) 135 lb (61.2 kg)   04/01/21 1116 (!) 82/45 -- -- -- -- -- -- --   04/01/21 1113 -- 98.1 °F (36.7 °C) -- 67 20 98 % -- --     Weight: Weight: 138 lb 9.6 oz (62.9 kg)     24 hour intake/output:    Intake/Output Summary (Last 24 hours) at 4/2/2021 1000  Last data filed at 4/2/2021 0840  Gross per 24 hour   Intake 1728 ml   Output 1750 ml   Net -22 ml       General appearance - alert,and in no distress  Eyes - pupils equal and reactive,   Mouth - mucous membranes dry  Neck - supple, no significant adenopathy  Chest - clear to auscultation, no wheezes, rales or rhonchi, symmetric air entry  Heart - normal rate, regular rhythm, normal S1, S2,  Abdomen - soft, nontender, nondistended, no masses or organomegaly, pos bs. Neurological - alert, oriented, normal speech, no focal findings or movement disorder noted  Musculoskeletal -postop dressing on the right hip  Extremities - peripheral pulses normal, no pedal edema, no clubbing or cyanosis  Skin - normal coloration and turgor, no rashes, no suspicious skin lesions noted    Review of Labs and Diagnostic Testing:    CBC:   Recent Labs     04/02/21  0327   WBC 10.2   HGB 10.1*   HCT 32.5*   MCV 89.3        BMP:   Recent Labs     04/02/21  0327      K 3.9      CO2 16*   BUN 32*   CREATININE 1.2   CALCIUM 8.6   GLUCOSE 138*     PT/INR:   Recent Labs     04/01/21  1200   INR 1.11     APTT:   Recent Labs     04/01/21  1200   APTT 27.3      Lipids:   Recent Labs     04/01/21  1130 04/02/21  0327   ALKPHOS 89 77   ALT 68* 58   AST 43* 31   BILITOT 0.2* 0.3   BILIDIR <0.2  --    LABALBU 3.2* 2.6*   LIPASE 38.7  --      Troponin: No results for input(s): TROPONINI in the last 72 hours. Imaging:  Xr Pelvis (1-2 Views)    Addendum Date: 4/1/2021    ** ADDENDUM #1 ** See above Final report electronically signed by Dr. Viviana Pritchett on 4/1/2021 12:09 PM ** ORIGINAL REPORT ** PROCEDURE: XR PELVIS (1-2 VIEWS) CLINICAL INFORMATION: Trauma. COMPARISON: No prior study. TECHNIQUE: AP view of the pelvis. FINDINGS: The pelvic ring is intact. There is a comminuted fracture of the right intratrochanteric area. Medial angulation is present. No dislocation. The superior and inferior pubic rami are intact. The sacrum and sacroiliac joints appear normal. No degenerative changes are noted. Mild sclerotic facet change of L5-S1 L4-5. The spinous processes of L3 3 through L5 then removed.      Result Date: 4/1/2021  PROCEDURE: XR PELVIS (1-2 VIEWS) CLINICAL INFORMATION: Trauma. COMPARISON: No prior study. TECHNIQUE: AP view of the pelvis. FINDINGS: The pelvic ring is intact. There is a comminuted fracture of the right intratrochanteric area. Medial angulation is present. No dislocation. The superior and inferior pubic rami are intact. The sacrum and sacroiliac joints appear normal. No degenerative changes are noted. Mild sclerotic facet change of L5-S1 L4-5. The spinous processes of L3 3 through L5 then removed. Medial angulated comminuted right hip fracture. . **This report has been created using voice recognition software. It may contain minor errors which are inherent in voice recognition technology. ** Final report electronically signed by Dr. Stephanie Manuel on 4/1/2021 11:57 AM    Xr Femur Right (min 2 Views)    Result Date: 4/1/2021  PROCEDURE: XR FEMUR RIGHT (MIN 2 VIEWS) CLINICAL INFORMATION: fracture, surgical planning. COMPARISON: No prior study. TECHNIQUE: 4 views of the right hip. FINDINGS: Fractures-comminuted fracture with medial angulation at the intratrochanteric area. Joint spaces-No subluxation or dislocation. Degenerative changes-No erosions or osteophytes. Effusions-None. Soft tissues-Unremarkable. Medial angulated comminuted right intratrochanteric fracture. **This report has been created using voice recognition software. It may contain minor errors which are inherent in voice recognition technology. ** Final report electronically signed by Dr. Stephanie Manuel on 4/1/2021 12:23 PM    Ct Head Wo Contrast    Result Date: 4/1/2021  PROCEDURE: CT HEAD WO CONTRAST CLINICAL INFORMATION: Trauma fall aspirin. COMPARISON: CT head dated 3/10/2020. TECHNIQUE: Noncontrast 5 mm axial images were obtained through the brain. Sagittal and coronal reconstructions were created.  All CT scans at this facility use dose modulation, iterative reconstruction, and/or weight-based dosing when appropriate to reduce radiation dose to as low as reasonably achievable. FINDINGS: There is stable moderate volume loss. Gray-white matter differentiation appears grossly preserved. No intracranial hemorrhage, mass effect or midline shift is identified. The calvarium appears intact. No evidence of acute intracranial abnormality. **This report has been created using voice recognition software. It may contain minor errors which are inherent in voice recognition technology. ** Final report electronically signed by Dr. Connie Qiu MD on 4/1/2021 12:15 PM    Ct Chest W Contrast    Result Date: 4/1/2021  PROCEDURE: CT CHEST W CONTRAST, CT THORACIC RECONSTRUCTION WO POST PROCESS CLINICAL INFORMATION: Trauma hypotensive . Fall. COMPARISON: CT chest dated 3/18/2021. TECHNIQUE: Helical CT of the chest following intravenous administration of 80 mL Isovue-370 injected in the left arm with sagittal and coronal reconstructions. Reformatted images of the thoracic spine with axial, sagittal and coronal reconstructions were  also created. All CT scans at this facility use dose modulation, iterative reconstruction, and/or weight-based dosing when appropriate to reduce radiation dose to as low as reasonably achievable. FINDINGS: There is a stable linear pleural-based nodule at the lateral aspect of the right upper lobe near the apex. There is a stable 5 mm nodule in the right upper lobe more inferiorly. There is a stable 2 mm nodule at the lateral aspect of the major fissure. These are all stable dating back to 2019 as evidence for benignity. There is stable calcified nodule at the right lung base as evidence for old granulomatous disease. Lungs otherwise appear clear. The heart and great vessels are intact. No axillary, mediastinal or hilar lymphadenopathy is identified.  There is a dextrocurvature of the thoracic spine, stable compared to prior exam. There is scalloping of the superior endplate of T8, unchanged compared to prior exam. No acute fracture of the thoracic vertebral column or bony thorax is identified. There are stable mild degenerative changes of the thoracic spine. There are stable hypodense cystic lesions in both kidneys. There are calcified granulomas in the liver and spleen. Visualized upper abdominal solid organs are otherwise unremarkable. 1. No evidence of acute intrathoracic adenopathy. 2. No evidence of acute osseous injury of the thoracic spine. **This report has been created using voice recognition software. It may contain minor errors which are inherent in voice recognition technology. ** Final report electronically signed by Dr. Yoni Fernandez MD on 4/1/2021 12:29 PM    Ct Cervical Spine Wo Contrast    Result Date: 4/1/2021  PROCEDURE: CT CERVICAL SPINE WO CONTRAST CLINICAL INFORMATION: Trauma. Fall. COMPARISON: CT cervical spine dated 3/10/2020. TECHNIQUE: 3 mm noncontrast axial images were obtained through the cervical spine with sagittal and coronal reconstructions. All CT scans at this facility use dose modulation, iterative reconstruction, and/or weight-based dosing when appropriate to reduce radiation dose to as low as reasonably achievable. FINDINGS: There is grade 2 anterolisthesis of C5 relative to C6 on the basis of degenerative change, stable compared to prior exam. There is a mild levocurvature of the cervical spine which is likely positional. There is otherwise anatomic vertebral body height and alignment. No fracture of the cervical vertebral column is identified. The craniocervical junction appears intact. No paraspinal or epidural fluid collection is identified. There are stable degenerative changes of the cervical spine. Paraspinal soft tissues are grossly unremarkable. No evidence of acute osseous injury of the cervical spine. **This report has been created using voice recognition software. It may contain minor errors which are inherent in voice recognition technology. ** Final report electronically signed by Dr. Veto Carbajal Kalina Wiggins MD on 4/1/2021 12:17 PM    Ct Abdomen Pelvis W Iv Contrast Additional Contrast? None    Result Date: 4/1/2021  PROCEDURE: CT ABDOMEN PELVIS W IV CONTRAST, CT RECONSTRUCTION WO POST PROCESS CLINICAL INFORMATION: Trauma hypotensive . Fall. COMPARISON: CT abdomen pelvis dated 3/18/2021. TECHNIQUE: Helical CT of the abdomen and pelvis following intravenous administration of 80 mL Isovue-370 injected in the left arm with sagittal and coronal reconstructions. Reformatted images of the right hip with axial, sagittal and coronal reconstructions were also created. All CT scans at this facility use dose modulation, iterative reconstruction, and/or weight-based dosing when appropriate to reduce radiation dose to as low as reasonably achievable. FINDINGS:  Abdomen/pelvis: The liver and gallbladder are unremarkable. There is a moderate hiatal hernia, similar to prior exam. Adrenal glands are unremarkable. There are numerous bilateral renal cysts. However, there is a 2.1 x 1.8 cm hypodense lesion at the inferior pole of the  left kidney with possible internal enhancement. There are calcified granulomas in the spleen which is otherwise unremarkable. The pancreas is partially fatty replaced. No retroperitoneal or mesenteric lymphadenopathy is identified. There are numerous diverticula within the large bowel without adjacent inflammation to suggest diverticulitis. There is moderately prominent stool throughout the large bowel. Small bowel appears within normal limits. The unopacified bladder is unremarkable. The uterus appears to be surgically absent. No free fluid is identified. Right hip: There is a comminuted intertrochanteric fracture of the right femur with displacement of the fracture fragments and approximately 90 degree angulation of the femoral neck relative to the femoral diaphysis. There is a prominent hematoma at the fracture site extending into the right gluteus muscle.  The humeral head is normally located opposite the glenoid. Abdomen/pelvis: 1. No evidence of acute intra-abdominal or intrapelvic abnormality. 2. 2.1 cm apparently complex cystic lesion at the inferior pole of the left kidney which is not fully characterized. Recommend follow-up multiphase renal CT or MRI for further evaluation. 3. Colonic diverticulosis. 4. Moderate retained stool. Right hip: Comminuted and angulated intertrochanteric fracture of the right femur with prominent associated hematoma extending into the right gluteus muscle. **This report has been created using voice recognition software. It may contain minor errors which are inherent in voice recognition technology. ** Final report electronically signed by Dr. Alma Duarte MD on 4/1/2021 2:55 PM    Xr Chest Portable    Result Date: 4/1/2021  PROCEDURE: XR CHEST PORTABLE CLINICAL INFORMATION: Trauma. COMPARISON: Chest x-ray of 3/15/2021. TECHNIQUE: AP upright view of the chest. FINDINGS: Tubes/Lines: None. Heart: The heart size is normal. Mediastinum: Multiple calcified nodes on the right paratracheal-upper right hilar area are still present. Lungs: There are no pulmonary infiltrates or effusions. Pleura: No effusion or pneumothorax. Vessels: Normal sized aorta with moderate calcification at the arch. Stable. Bones: No suspicious osseous lesions are present. Right mediastinal calcified nodes from granulomatous disease. No acute pulmonary findings. **This report has been created using voice recognition software. It may contain minor errors which are inherent in voice recognition technology. ** Final report electronically signed by Dr. Irene Benjamin on 4/1/2021 12:13 PM    Ct Reconstruction Wo Post Process    Result Date: 4/1/2021  PROCEDURE: CT ABDOMEN PELVIS W IV CONTRAST, CT RECONSTRUCTION WO POST PROCESS CLINICAL INFORMATION: Trauma hypotensive . Fall. COMPARISON: CT abdomen pelvis dated 3/18/2021.  TECHNIQUE: Helical CT of the abdomen and pelvis following intravenous administration of 80 mL Isovue-370 injected in the left arm with sagittal and coronal reconstructions. Reformatted images of the right hip with axial, sagittal and coronal reconstructions were also created. All CT scans at this facility use dose modulation, iterative reconstruction, and/or weight-based dosing when appropriate to reduce radiation dose to as low as reasonably achievable. FINDINGS:  Abdomen/pelvis: The liver and gallbladder are unremarkable. There is a moderate hiatal hernia, similar to prior exam. Adrenal glands are unremarkable. There are numerous bilateral renal cysts. However, there is a 2.1 x 1.8 cm hypodense lesion at the inferior pole of the  left kidney with possible internal enhancement. There are calcified granulomas in the spleen which is otherwise unremarkable. The pancreas is partially fatty replaced. No retroperitoneal or mesenteric lymphadenopathy is identified. There are numerous diverticula within the large bowel without adjacent inflammation to suggest diverticulitis. There is moderately prominent stool throughout the large bowel. Small bowel appears within normal limits. The unopacified bladder is unremarkable. The uterus appears to be surgically absent. No free fluid is identified. Right hip: There is a comminuted intertrochanteric fracture of the right femur with displacement of the fracture fragments and approximately 90 degree angulation of the femoral neck relative to the femoral diaphysis. There is a prominent hematoma at the fracture site extending into the right gluteus muscle. The humeral head is normally located opposite the glenoid. Abdomen/pelvis: 1. No evidence of acute intra-abdominal or intrapelvic abnormality. 2. 2.1 cm apparently complex cystic lesion at the inferior pole of the left kidney which is not fully characterized. Recommend follow-up multiphase renal CT or MRI for further evaluation. 3. Colonic diverticulosis. 4. Moderate retained stool.  Right hip: Comminuted and angulated intertrochanteric fracture of the right femur with prominent associated hematoma extending into the right gluteus muscle. **This report has been created using voice recognition software. It may contain minor errors which are inherent in voice recognition technology. ** Final report electronically signed by Dr. Francisco Marquez MD on 4/1/2021 2:55 PM    Ct Lumbar Reconstruction Wo Post Process    Result Date: 4/1/2021  PROCEDURE: CT LUMBAR RECONSTRUCTION WO POST PROCESS CLINICAL INFORMATION: Trauma. Fall. COMPARISON: CT abdomen pelvis dated 3/18/2021. TECHNIQUE: Reformatted images of the lumbar spine from CT abdomen and pelvis source images with axial, sagittal and coronal reconstructions. All CT scans at this facility use dose modulation, iterative reconstruction, and/or weight-based dosing when appropriate to reduce radiation dose to as low as reasonably achievable. FINDINGS: There is a mild levocurvature of the lumbar spine, stable compared to prior CT. 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 Redemonstration of laminectomies at L4-5 and L5-S1, unchanged compared to prior exam. There is scalloping of the superior endplate of L2 with approximately 20% central height loss, unchanged compared to prior exam. There is otherwise anatomic vertebral body height and alignment. No acute fracture of the lumbar vertebral column is identified. There are stable degenerative changes with facet hypertrophy at the lower lumbar levels. No evidence of acute osseous injury of the lumbar spine. **This report has been created using voice recognition software. It may contain minor errors which are inherent in voice recognition technology. ** Final report electronically signed by Dr. Francisco Marquez MD on 4/1/2021 12:33 PM    Ct Thoracic Reconstruction Wo Post Process    Result Date: 4/1/2021  PROCEDURE: CT CHEST W CONTRAST, CT THORACIC RECONSTRUCTION WO POST PROCESS CLINICAL INFORMATION: Trauma hypotensive . Fall. COMPARISON: CT chest dated 3/18/2021. TECHNIQUE: Helical CT of the chest following intravenous administration of 80 mL Isovue-370 injected in the left arm with sagittal and coronal reconstructions. Reformatted images of the thoracic spine with axial, sagittal and coronal reconstructions were  also created. All CT scans at this facility use dose modulation, iterative reconstruction, and/or weight-based dosing when appropriate to reduce radiation dose to as low as reasonably achievable. FINDINGS: There is a stable linear pleural-based nodule at the lateral aspect of the right upper lobe near the apex. There is a stable 5 mm nodule in the right upper lobe more inferiorly. There is a stable 2 mm nodule at the lateral aspect of the major fissure. These are all stable dating back to 2019 as evidence for benignity. There is stable calcified nodule at the right lung base as evidence for old granulomatous disease. Lungs otherwise appear clear. The heart and great vessels are intact. No axillary, mediastinal or hilar lymphadenopathy is identified. There is a dextrocurvature of the thoracic spine, stable compared to prior exam. There is scalloping of the superior endplate of T8, unchanged compared to prior exam. No acute fracture of the thoracic vertebral column or bony thorax is identified. There are stable mild degenerative changes of the thoracic spine. There are stable hypodense cystic lesions in both kidneys. There are calcified granulomas in the liver and spleen. Visualized upper abdominal solid organs are otherwise unremarkable. 1. No evidence of acute intrathoracic adenopathy. 2. No evidence of acute osseous injury of the thoracic spine. **This report has been created using voice recognition software. It may contain minor errors which are inherent in voice recognition technology. ** Final report electronically signed by Dr. Manjinder Perrin MD on 4/1/2021 12:29 PM      EKG:      Diet: Diet NPO, After Midnight        Data:   Scheduled Meds: Scheduled Meds:   sodium chloride flush  10 mL Intravenous 2 times per day    docusate sodium  100 mg Oral Daily    famotidine (PEPCID) injection  20 mg Intravenous Daily    latanoprost  1 drop Both Eyes Nightly    therapeutic multivitamin-minerals  1 tablet Oral Daily    sertraline  25 mg Oral Daily     Continuous Infusions:   sodium chloride      sodium chloride 75 mL/hr at 04/01/21 1858     PRN Meds:.anesthetic and narcotic custom mixture, sodium chloride flush, sodium chloride, acetaminophen, polyethylene glycol, potassium chloride, HYDROcodone 5 mg - acetaminophen **OR** HYDROcodone 5 mg - acetaminophen, fentanNYL **OR** fentanNYL  Continuous Infusions:   sodium chloride      sodium chloride 75 mL/hr at 04/01/21 1858         Assessment   Principal Problem:    Closed displaced intertrochanteric fracture of right femur [facility])  Active Problems:    Spinal stenosis of lumbar region with neurogenic claudication    Hyperlipidemia    GERD (gastroesophageal reflux disease)    COPD (chronic obstructive pulmonary disease) ([address]    CAD (coronary artery disease)    Hypertension    Neurologic gait dysfunction    Fall at nursing home  Resolved Problems:    * No resolved hospital problems. *        Patient Active Problem List   Diagnosis    Hyperlipidemia    Osteoarthritis    GERD (gastroesophageal reflux disease)    COPD (chronic obstructive pulmonary disease) (HCC)    Chronic back pain    CAD (coronary artery disease)    Hypertension    Spinal stenosis of lumbar region with neurogenic claudication    Neurologic gait dysfunction    Inflammatory spondylopathy of lumbosacral region ([address]    Paroxysmal atrial fibrillation ([address]    Closed displaced intertrochanteric fracture of right femur ([address]    Fall at nursing home        Plan   Continue postop measures.   Follow-up postop labs  Therapy per orthopedic surgeon    Dr. Sharon Carranza is on call this weekend till next Tuesday.           Electronically signed by Thony Myrick MD on 4/2/2021 at 10:00 AM  Over 35 mins spent on this evaluation

## 2021-04-02 NOTE — PROGRESS NOTES
Roberto Baron  Daily Progress Note  Pt Name: Molly Newman  Medical Record Number: [id number]  Date of Birth 1935   Today's Date: 4/2/2021    HD: # 1    CC:\" I feel so much better than last night\"    ASSESSMENT  1. Active Hospital Problems    Diagnosis Date Noted    Spinal stenosis of lumbar region with neurogenic claudication [M48.062] 03/07/2018     Priority: High    Closed displaced intertrochanteric fracture of right femur ([facility] [address] [S72.141A] 04/01/2021    Fall at nursing home [K57. 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 - Patient on 4A.    -Transferred to [address] [place] [address] post surgery     Fall at nursing home              - Fall risk precautions              - PT/OT when appropriate              - SW consulted     Comminuted and angulated intertrochanteric fracture of the right femur with prominent associated hematoma extending into the right gluteus muscle              - Orthopedic surgery consulted              -Orthopedic surgery took to the OR today 4/2              - Serial HHs q8 for montoring of hematoma have been stable since admission.              - Hold anticoagulation - On ASA at home              - NPO              - Bedrest              - Ice PRN              - Pain control     Hypotension              - Hospitalist consulted for medical management and surgical clearance              - Arrived with bp 82/45, asx, otto scans negative for intrathoracic or intraabdominal trauma              - Patient reports chronic hypotension however ECF documents show pressures in the 120s-130s with home bp meds on board              - She is on Metoprolol, Imdur, Nitro at home              - 1L fluid bolus given in ED with improvement of SBP to 100              - Continue maintenance fluids, bolus as needed pending Hospitalist eval     Consults: Orthopedic surgery, Hospitalist     Pain Management              - Fentanyl, Norco, Tylenol     Prophylaxis: SCD's, Incentive Spirometry, Colace, Pepcid, Zofran     Clear liquid diet     IVF Management  Regular Neurovascular Checks  Repeat Labs Tomorrow AM  PT/OT/SLP Eval and Treat  Activity per Ortho recommendation.     Planned Discharge pending clinical course. - From ECF. -Discussed patient case with orthopedic surgery and internal medicine team.  Internal medicine is graciously excepted to take over case as attending. Trauma will sign off for now. Please contact trauma service with any further concern for traumatic injury. SUBJECTIVE  She is a 81 y.o. female who continues to present at [address] [place] [address] on [facility] following a fall at the nursing home. Patient reports 6/10 hip pain which is much improved since last night. She has been cold which improved with blankets and other warming measures. No other complaints on exam. Patient denies chest pain, shortness of breath, cough, headache, dizziness, lightheadedness, numbness, paraesthesias, weakness, chills, fevers, abdominal pain, nausea, vomiting,neck pain, or back pain. Plan for trauma to sign off, hospitalist team to take over as attending. Hemoglobin stable, WBC stable, appears to have a mild acidosis improving, BAILEE improving.,  Vital signs stable on exam, hypotension much improved. Care in coordination with trauma surgeon Dr. Tutu Colin. Internal medicine team is graciously read to take over case is attending. Trauma will sign off this time. Please contact trauma services with any further concern for traumatic injury.       Wt Readings from Last 3 Encounters:   04/01/21 138 lb 9.6 oz (62.9 kg)   03/18/21 150 lb (68 kg)   03/15/21 151 lb (68.5 kg)     Temp Readings from Last 3 Encounters:   04/02/21 98.6 °F (37 °C) (Temporal)   04/02/21 96.8 °F (36 °C)   03/18/21 98.5 °F (36.9 °C) (Oral)     BP Readings from Last 3 Encounters:   04/02/21 (!) 133/57   04/02/21 (!) 108/50   03/18/21 122/63     Pulse Readings from Last 3 Encounters:   04/02/21 96   03/18/21 87   03/15/21 76       24 HR INTAKE/OUTPUT :     Intake/Output Summary (Last 24 hours) at 4/2/2021 1037  Last data filed at 4/2/2021 0840  Gross per 24 hour   Intake 1728 ml   Output 1750 ml   Net -22 ml     Dietary Nutrition Supplements: Standard High Calorie Oral Supplement  DIET CLEAR LIQUID;    OBJECTIVE  CURRENT VITALS BP (!) 133/57   Pulse 96   Temp 98.6 °F (37 °C) (Temporal)   Resp 16   Ht 5' 1\" (1.549 m)   Wt 138 lb 9.6 oz (62.9 kg)   SpO2 96%   BMI 26.19 kg/m²   GENERAL: Presents supine in bed covered by blankets, Awake and alert; In no acute distress and well nourished. SKIN: Appropriate for ethnicity, warm and dry. EYES: No apparent trauma, discharge, or hematoma bilaterally. PERRL at 3mm  CARDIO: No visible chest wall deformity. No heaves or lifts. Strong/regular S1/S2 rate and rhythm. No rubs murmurs, or gallops. 2+ radial and dorsalis pedal pulses. Capillary refill <2 sec. No extremity edema noted. PULMONARY:  Trachea midline, no audible wheezing, increased respiratory effort, accessory muscle use, or asymmetrical chest wall movement. Lung sounds are clear to ascultation in superior and inferior fields. No wheezing, crackles, or rhonchi. ABDOMEN: Abdomen is non distended. Bowel sounds present in all four quadrants. Soft and non tender to palpation in all quadrants. NEURO: Follows commands. PMS intact, moves limbs freely. No focal neurological deficits  MSK: Extremities intact and present, moving all extremities.  strength 5/5 and equal bilaterally.      LABS  CBC :   Recent Labs     04/01/21  1130

## 2021-04-02 NOTE — PLAN OF CARE
Problem: Falls - Risk of:  Goal: Will remain free from falls  Description: Will remain free from falls  Outcome: Ongoing  Note: Patient remains free from falls this shift. Fall precautions in place with bed/chair exit alarmed. Fall sign posted and fall armband in place. Nonskid footwear used with transferring. Educated patient to use call light when in need of staff assistance with transferring, ambulating, and other activities of daily living. Patient appropriately uses call light this shift. Goal: Absence of physical injury  Description: Absence of physical injury  Outcome: Ongoing  Note: Hourly rounding in place to anticipate patient needs, such as assistance with pain, toileting, or repositioning, in order to decrease risk for injuries such as falls. Problem: Skin Integrity:  Goal: Will show no infection signs and symptoms  Description: Will show no infection signs and symptoms  Outcome: Ongoing  Note: Pt afebrile this shift  Goal: Absence of new skin breakdown  Description: Absence of new skin breakdown  Outcome: Ongoing  Note: No signs of skin breakdown. Skin warm, dry, and intact. Mucous membranes pink and moist.  Assistance with turns/ambulation provided PRN. Will continue to monitor. Problem: Pain:  Goal: Pain level will decrease  Description: Pain level will decrease  Outcome: Ongoing  Note: Patient able to use 0-10 pain scale. C/o 10/10 pain this shift in right hip area d/t fractured bone. Patient has a pain goal of \"no pain. \" Agreeable to take PRN pain medications.     Goal: Control of acute pain  Description: Control of acute pain  Outcome: Ongoing  Goal: Control of chronic pain  Description: Control of chronic pain  Outcome: Ongoing     Problem: Discharge Planning:  Goal: Discharged to appropriate level of care  Description: Discharged to appropriate level of care  Outcome: Ongoing  Note: Patient came from [facility], discharge planning is ongoing pending medical course Problem: Mobility - Impaired:  Goal: Mobility will improve to maximum level  Description: Mobility will improve to maximum level  Outcome: Ongoing  Note: Patient is bedrest and turn every 2 hours and as needed     Problem: Venous Thromboembolism:  Goal: Absence of deep vein thrombosis  Description: Absence of deep vein thrombosis  Outcome: Ongoing  Note: Patient has good pulses in BLE, surgery is 4/02/21 to repair fracture  Care plan reviewed with patient. Patient verbalize understanding of the plan of care and contribute to goal setting.

## 2021-04-02 NOTE — PROGRESS NOTES
Vika Caraballo 60  PHYSICAL THERAPY MISSED TREATMENT NOTE  [facility]    Date: 2021  Patient Name: Rivka Castro        MRN: [id number]   : 1935  (81 y.o.)  Gender: female                REASON FOR MISSED TREATMENT:  Hold treatment per nursing request.  Pt off the unit for ORIF of R hip. Will check back tomorrow.

## 2021-04-02 NOTE — PROGRESS NOTES
Pt admitted to  7K13. Complaints: ORIF of right hip. IV normal saline infusing into the right AC at a rate of 75 mls/ hour with about 800 mls in the bag still. IV site free of s/s of infection or infiltration. Vital signs obtained. Assessment and data collection initiated. Two nurse skin assessment performed by Jess Wilcox RN and [employer]. Oriented to room. Explained patients right to have family, representative or physician notified of their admission. Patient has declined for physician to be notified. Patient has declined for family/representative to be notified. The patient is interested in [employer]. Vashtis meds to beds program?:  no    Policies and procedures for  explained. All questions answered with no further questions at this time. Fall prevention and safety brochure discussed with patient. Bed alarm on. Call light in reach.

## 2021-04-03 LAB
ANION GAP SERPL CALCULATED.3IONS-SCNC: 10 MEQ/L (ref 8–16)
ANION GAP SERPL CALCULATED.3IONS-SCNC: 12 MEQ/L (ref 8–16)
BASOPHILS # BLD: 0.1 %
BASOPHILS ABSOLUTE: 0 THOU/MM3 (ref 0–0.1)
BUN BLDV-MCNC: 20 MG/DL (ref 7–22)
BUN BLDV-MCNC: 23 MG/DL (ref 7–22)
CALCIUM SERPL-MCNC: 8.1 MG/DL (ref 8.5–10.5)
CALCIUM SERPL-MCNC: 8.2 MG/DL (ref 8.5–10.5)
CHLORIDE BLD-SCNC: 110 MEQ/L (ref 98–111)
CHLORIDE BLD-SCNC: 112 MEQ/L (ref 98–111)
CO2: 14 MEQ/L (ref 23–33)
CO2: 16 MEQ/L (ref 23–33)
CREAT SERPL-MCNC: 0.9 MG/DL (ref 0.4–1.2)
CREAT SERPL-MCNC: 1 MG/DL (ref 0.4–1.2)
EOSINOPHIL # BLD: 0.3 %
EOSINOPHILS ABSOLUTE: 0 THOU/MM3 (ref 0–0.4)
ERYTHROCYTE [DISTWIDTH] IN BLOOD BY AUTOMATED COUNT: 16.6 % (ref 11.5–14.5)
ERYTHROCYTE [DISTWIDTH] IN BLOOD BY AUTOMATED COUNT: 17.1 % (ref 11.5–14.5)
ERYTHROCYTE [DISTWIDTH] IN BLOOD BY AUTOMATED COUNT: 51.8 FL (ref 35–45)
ERYTHROCYTE [DISTWIDTH] IN BLOOD BY AUTOMATED COUNT: 54.6 FL (ref 35–45)
GFR SERPL CREATININE-BSD FRML MDRD: 53 ML/MIN/1.73M2
GFR SERPL CREATININE-BSD FRML MDRD: 60 ML/MIN/1.73M2
GLUCOSE BLD-MCNC: 136 MG/DL (ref 70–108)
GLUCOSE BLD-MCNC: 95 MG/DL (ref 70–108)
HCT VFR BLD CALC: 23.9 % (ref 37–47)
HCT VFR BLD CALC: 26.7 % (ref 37–47)
HEMOGLOBIN: 7.5 GM/DL (ref 12–16)
HEMOGLOBIN: 8.3 GM/DL (ref 12–16)
IMMATURE GRANS (ABS): 0.17 THOU/MM3 (ref 0–0.07)
IMMATURE GRANULOCYTES: 2 %
LACTIC ACID: 1 MMOL/L (ref 0.5–2.2)
LYMPHOCYTES # BLD: 8.2 %
LYMPHOCYTES ABSOLUTE: 0.7 THOU/MM3 (ref 1–4.8)
MCH RBC QN AUTO: 27.8 PG (ref 26–33)
MCH RBC QN AUTO: 28.4 PG (ref 26–33)
MCHC RBC AUTO-ENTMCNC: 31.1 GM/DL (ref 32.2–35.5)
MCHC RBC AUTO-ENTMCNC: 31.4 GM/DL (ref 32.2–35.5)
MCV RBC AUTO: 88.5 FL (ref 81–99)
MCV RBC AUTO: 91.4 FL (ref 81–99)
MONOCYTES # BLD: 6 %
MONOCYTES ABSOLUTE: 0.5 THOU/MM3 (ref 0.4–1.3)
NUCLEATED RED BLOOD CELLS: 0 /100 WBC
PLATELET # BLD: 231 THOU/MM3 (ref 130–400)
PLATELET # BLD: 258 THOU/MM3 (ref 130–400)
PMV BLD AUTO: 10.2 FL (ref 9.4–12.4)
PMV BLD AUTO: 9.9 FL (ref 9.4–12.4)
POTASSIUM SERPL-SCNC: 3.1 MEQ/L (ref 3.5–5.2)
POTASSIUM SERPL-SCNC: 3.6 MEQ/L (ref 3.5–5.2)
RBC # BLD: 2.7 MILL/MM3 (ref 4.2–5.4)
RBC # BLD: 2.92 MILL/MM3 (ref 4.2–5.4)
SEG NEUTROPHILS: 83.4 %
SEGMENTED NEUTROPHILS ABSOLUTE COUNT: 7.3 THOU/MM3 (ref 1.8–7.7)
SODIUM BLD-SCNC: 136 MEQ/L (ref 135–145)
SODIUM BLD-SCNC: 138 MEQ/L (ref 135–145)
WBC # BLD: 10.4 THOU/MM3 (ref 4.8–10.8)
WBC # BLD: 8.7 THOU/MM3 (ref 4.8–10.8)

## 2021-04-03 PROCEDURE — 97530 THERAPEUTIC ACTIVITIES: CPT

## 2021-04-03 PROCEDURE — 1200000003 HC TELEMETRY R&B

## 2021-04-03 PROCEDURE — 97163 PT EVAL HIGH COMPLEX 45 MIN: CPT

## 2021-04-03 PROCEDURE — 97535 SELF CARE MNGMENT TRAINING: CPT

## 2021-04-03 PROCEDURE — 85025 COMPLETE CBC W/AUTO DIFF WBC: CPT

## 2021-04-03 PROCEDURE — 6360000002 HC RX W HCPCS: Performed by: ORTHOPAEDIC SURGERY

## 2021-04-03 PROCEDURE — 97166 OT EVAL MOD COMPLEX 45 MIN: CPT

## 2021-04-03 PROCEDURE — 80048 BASIC METABOLIC PNL TOTAL CA: CPT

## 2021-04-03 PROCEDURE — 36415 COLL VENOUS BLD VENIPUNCTURE: CPT

## 2021-04-03 PROCEDURE — 6370000000 HC RX 637 (ALT 250 FOR IP): Performed by: ORTHOPAEDIC SURGERY

## 2021-04-03 PROCEDURE — 97110 THERAPEUTIC EXERCISES: CPT

## 2021-04-03 PROCEDURE — 6370000000 HC RX 637 (ALT 250 FOR IP): Performed by: PHYSICIAN ASSISTANT

## 2021-04-03 PROCEDURE — 83605 ASSAY OF LACTIC ACID: CPT

## 2021-04-03 PROCEDURE — 85027 COMPLETE CBC AUTOMATED: CPT

## 2021-04-03 PROCEDURE — 92523 SPEECH SOUND LANG COMPREHEN: CPT

## 2021-04-03 PROCEDURE — 2500000003 HC RX 250 WO HCPCS: Performed by: PHYSICIAN ASSISTANT

## 2021-04-03 RX ORDER — LIDOCAINE HYDROCHLORIDE 20 MG/ML
JELLY TOPICAL EVERY 4 HOURS PRN
Status: DISCONTINUED | OUTPATIENT
Start: 2021-04-03 | End: 2021-04-09 | Stop reason: HOSPADM

## 2021-04-03 RX ORDER — ASPIRIN 325 MG
325 TABLET, DELAYED RELEASE (ENTERIC COATED) ORAL DAILY
Qty: 30 TABLET | Refills: 0
Start: 2021-04-05 | End: 2021-05-05

## 2021-04-03 RX ORDER — PSEUDOEPHEDRINE HCL 30 MG
100 TABLET ORAL 2 TIMES DAILY
Qty: 60 CAPSULE | Refills: 0 | Status: ON HOLD | OUTPATIENT
Start: 2021-04-05 | End: 2021-05-13 | Stop reason: HOSPADM

## 2021-04-03 RX ORDER — TRAMADOL HYDROCHLORIDE 50 MG/1
50 TABLET ORAL EVERY 6 HOURS PRN
Qty: 20 TABLET | Refills: 0 | Status: SHIPPED | OUTPATIENT
Start: 2021-04-03 | End: 2021-04-08

## 2021-04-03 RX ORDER — TRAMADOL HYDROCHLORIDE 50 MG/1
50 TABLET ORAL EVERY 6 HOURS PRN
Status: DISCONTINUED | OUTPATIENT
Start: 2021-04-03 | End: 2021-04-09 | Stop reason: HOSPADM

## 2021-04-03 RX ADMIN — HYDROCODONE BITARTRATE AND ACETAMINOPHEN 2 TABLET: 5; 325 TABLET ORAL at 11:36

## 2021-04-03 RX ADMIN — DOCUSATE SODIUM 100 MG: 100 CAPSULE, LIQUID FILLED ORAL at 08:58

## 2021-04-03 RX ADMIN — ENOXAPARIN SODIUM 40 MG: 40 INJECTION SUBCUTANEOUS at 08:58

## 2021-04-03 RX ADMIN — HYDROCODONE BITARTRATE AND ACETAMINOPHEN 2 TABLET: 5; 325 TABLET ORAL at 19:35

## 2021-04-03 RX ADMIN — DOCUSATE SODIUM 50 MG AND SENNOSIDES 8.6 MG 1 TABLET: 8.6; 5 TABLET, FILM COATED ORAL at 08:59

## 2021-04-03 RX ADMIN — LATANOPROST 1 DROP: 50 SOLUTION OPHTHALMIC at 20:13

## 2021-04-03 RX ADMIN — DOCUSATE SODIUM 50 MG AND SENNOSIDES 8.6 MG 1 TABLET: 8.6; 5 TABLET, FILM COATED ORAL at 20:13

## 2021-04-03 RX ADMIN — SERTRALINE 25 MG: 50 TABLET, FILM COATED ORAL at 08:59

## 2021-04-03 RX ADMIN — FAMOTIDINE 20 MG: 10 INJECTION, SOLUTION INTRAVENOUS at 10:03

## 2021-04-03 RX ADMIN — Medication 1 TABLET: at 08:59

## 2021-04-03 ASSESSMENT — PAIN SCALES - GENERAL
PAINLEVEL_OUTOF10: 8
PAINLEVEL_OUTOF10: 0
PAINLEVEL_OUTOF10: 5
PAINLEVEL_OUTOF10: 5

## 2021-04-03 ASSESSMENT — PAIN DESCRIPTION - LOCATION: LOCATION: LEG

## 2021-04-03 ASSESSMENT — PAIN DESCRIPTION - DESCRIPTORS: DESCRIPTORS: ACHING

## 2021-04-03 NOTE — PROGRESS NOTES
Stairs - Number of Steps: 1 small step ~2 inch threshold  Entrance Stairs - Rails: Both  Home Equipment: Rolling walker, Cane, [employer]   Bathroom Shower/Tub: Tub/Shower unit  Bathroom Toilet: Standard  Bathroom Equipment: Shower chair, Grab bars in shower       ADL Assistance: [address]: Needs assistance  Ambulation Assistance: Independent  Transfer Assistance: Independent    Active : Yes     Additional Comments: Patient reports independent at [facility] with no AD, reports shares household tasks with sister    VISION:Corrected    HEARING:  WFL    COGNITION: Decreased Recall, Decreased Insight, Decreased Problem Solving and Decreased Safety Awareness    RANGE OF MOTION:  Bilateral Upper Extremity:  Impaired - L grossly 85 degrees shoulder flexion and R grossly 75 degrees, pt reported hx shoulder injuries    STRENGTH:  Bilateral Upper Extremity:  Not Tested d/t limited ROM in B shoulders, general deconditioning noted throughout session    SENSATION:   WFL    ADL:   Grooming: with set-up. washing hands with wash cloth while seated on EOB  Upper Extremity Dressing: Minimal Assistance. for gown management. BALANCE:  Sitting Balance:  Stand By Assistance. Pt tolerated sitting unsupported on EOB x6 min while this OT changed sheets. Pt then completed EOB ADLs while seated to clean hands and dressing. Pt required multiple lengthy seated rest breaks throughout session d/t pain and fatigue. .   Standing Balance: Minimal Assistance, X 2. with RW for support. Pt stood x3 min with BUE support, pt tolerated WBing through RLE. During standing, pt unable to fully weight shift, but able to slide BLE back towards EOB, requiring a significant amount of time and leading to pain. Pt tolerated standing x2 min with no c/o dizziness, pt then became dizzy requesting to sit.  Attempted to take BP at this time, unable to obtain reading prior to dizziness residing    BED MOBILITY:  Rolling to Left: Minimal Assistance for positioning at end of session  Supine to Sit: Moderate Assistance with increased time for completion and VC for technique. Pt able to slowly guide BLE off of bed, requiring assist to guide UB. At this time, pt's IV came out. Pt began to bleed, provided guaze and pressure until bleeding stopped shortly after. RN notified immediately. Sit to Supine: Maximum Assistance, X 2 with increased time for completion and multiple VC for safety. Pt with significant pain during bed mobility  Scooting: Maximum Assistance, X 2 to scoot hips laterally while supine in bed at end of session. Pt required min A to scoot towards EOB when seated up on EOB    TRANSFERS:  Sit to Stand: Moderate Assistance, X 2. with VC and increased time for completion. Pt attempted with x1 assist, unable to clear bed. Stand to Sit: Minimal Assistance, X 2. to guide descent, min VC for safe hand placement at this time    FUNCTIONAL MOBILITY:  Not Tested d/t pt's inability to weight shift      Activity Tolerance:  Patient tolerance of  treatment: fair, limited by pain      Assessment:  Assessment: Pt presented with the listed deficits s/p fall and R hip fx. Pt with decreased strength, endurance, and activity tolerance. Pt currently required Mod A x2 for t/fs and is unable to complete pivots. Pt would benefit from continued OT to further increase overall strength, endurance, and indep with ADLs and IADLs. Performance deficits / Impairments: Decreased functional mobility , Decreased safe awareness, Decreased balance, Decreased posture, Decreased endurance, Decreased high-level IADLs, Decreased strength, Decreased ADL status  Prognosis: Good  REQUIRES OT FOLLOW UP: Yes  Decision Making: Medium Complexity  Safety Devices in place: Yes  Type of devices: All fall risk precautions in place, Bed alarm in place, Call light within reach, Left in bed    Treatment Initiated: Treatment and education initiated within context of evaluation.   Evaluation

## 2021-04-03 NOTE — PROGRESS NOTES
Gave patient morning medications. Denied needing pain medication currently. No further complaints. Denied needing anything else at this time.

## 2021-04-03 NOTE — PROGRESS NOTES
IM Progress Note  Dr. Cristal Watkins  4/3/2021 10:29 AM      Patient name Tiffani Harrison  [id number]  PCP: Julita Kc MD  Admit Date: 4/1/2021  Acct No. [de-identified]    Subjective: Interval History:   Pt lying in bed  D/w staff  Confusion not new  Not in any resp distress  Denies pain now          Diet: Dietary Nutrition Supplements: Standard High Calorie Oral Supplement  DIET GENERAL;    I/O last 3 completed shifts: In: 2908 [P.O.:720; I.V.:2188]  Out: 1450 [Urine:1350; Blood:100]  No intake/output data recorded. Admission weight: 135 lb (61.2 kg) as of 4/1/2021 11:08 AM  Wt Readings from Last 3 Encounters:   04/01/21 138 lb 9.6 oz (62.9 kg)   03/18/21 150 lb (68 kg)   03/15/21 151 lb (68.5 kg)     Body mass index is 26.19 kg/m². ROS   CVS;  no cp or palpitation  Resp: no SOB or cough  Neuro:  No numbness or weakness or dizziness  Abd: no nausea or vomiting or abd pain      Medications:   Scheduled Meds:   sennosides-docusate sodium  1 tablet Oral BID    enoxaparin  40 mg Subcutaneous Q24H    sodium chloride flush  10 mL Intravenous 2 times per day    docusate sodium  100 mg Oral Daily    famotidine (PEPCID) injection  20 mg Intravenous Daily    latanoprost  1 drop Both Eyes Nightly    therapeutic multivitamin-minerals  1 tablet Oral Daily    sertraline  25 mg Oral Daily     Continuous Infusions:   sodium chloride      sodium chloride 75 mL/hr at 04/02/21 2016       Labs :     CBC:   Recent Labs     04/01/21  1130 04/01/21  1130 04/02/21  0327 04/02/21  1135 04/03/21  0442   WBC 9.3  --  10.2  --  8.7   HGB 11.7*   < > 10.1* 8.2* 8.3*     --  328  --  258    < > = values in this interval not displayed.      BMP:    Recent Labs     04/01/21  1130 04/02/21  0327 04/03/21  0442    138 138   K 3.6 3.9 3.6    109 112*   CO2 15* 16* 14*   BUN 36* 32* 23*   CREATININE 1.4* 1.2 1.0   GLUCOSE 156* 138* 95     Hepatic:   Recent Labs     04/01/21  1130 04/02/21  0327   AST 43* 31   ALT 68* 58   BILITOT 0.2* 0.3   ALKPHOS 89 77     Troponin: No results for input(s): TROPONINI in the last 72 hours. BNP: No results for input(s): BNP in the last 72 hours. Lipids: No results for input(s): CHOL, HDL in the last 72 hours. Invalid input(s): LDLCALCU  INR:   Recent Labs     04/01/21  1200   INR 1.11       Radiology    Objective:   Vitals: BP (!) 123/57   Pulse 95   Temp 97.4 °F (36.3 °C) (Oral)   Resp 14   Ht 5' 1\" (1.549 m)   Wt 138 lb 9.6 oz (62.9 kg)   SpO2 95%   BMI 26.19 kg/m²   HEENT: Head:pupils react  Neck: supple  Lungs: clear to auscultation  Heart: regular rate and rhythm   Abdomen: soft BS heard   Extremities: warm no pedal  edema  Neurologic:  awake    Impression:   :   1. S/p Rt hip intertrochanteric nailing   2. Paroxysmal atrial fibrillation  3. Confusion not new  4. Hypertension. 5.  Hyperlipidemia. 6. CAD  7. GERD  8. COPD  9.  BAILEE resolved but noted CO2 decreasing  10 Anemia    Plan:    Check Hb lactic acid  Increase IVF   F/u on CO2 if still low change to bicarb drip  Check ca  Incentive spirometry  If hb declining will hold lovenox      Per Upton MD

## 2021-04-03 NOTE — PROGRESS NOTES
I saw her by accident not knowing she already had medicine coverage. I told her I would order some lidocaine jel for her to try for the chest wall pain related to the rib fractures.

## 2021-04-03 NOTE — PROGRESS NOTES
Retired  Education Level: 12th grade   Driving Status: Does not drive  Finance Management: Dependent/Unable- son completes  Medication Management: Dependent/Unable- ECF completes   ADL's: Assistance Required. Hobbies: Crafts, sewing   Vision Status: Glasses  Hearing: WFL       ORAL MOTOR:  Facial / Labial WFL    Lingual WFL    Dentition WFL    Velum WFL    Vocal Quality WFL    Sensation WFL    Cough Not Tested      SPEECH / VOICE:  Speech and Voice appear to be grossly intact for basic and complex daily communication    LANGUAGE:  Receptive:  Receptive language skills appear to be grossly intact for basic and complex daily communication. Expressive:  Expressive language skills appear to be grossly intact for basic and complex daily communication. COGNITION:  [place] Cognitive Assessment [facility]) version 7.1 completed. Pt scored . Normal is greater than or equal to /30. Inclusion of +1 point given highest level of education achieved less than/equal to 12th grade or GED with limited-0 post-secondary schooling     Orientation: 4/6  Immediate Recall: 4/5  Short-Term Recall: 0/5  Divergent Namin members/60 sec  Problem Solvin/-call light; basic  Reasonin/2  Sequencing: Impaired  Thought Organization: Impaired  Insight: poor-fair  Attention: multiple errors/redirections provided  Math Computation: 0/3  Executive Functionin/5    SWALLOWING:  Current Diet: Clear liquid- patient consumed PO trials of thin liquids via straw, no s/s of aspiration. Patient denies hx of dysphagia       RECOMMENDATIONS/ASSESSMENT:  DIAGNOSTIC IMPRESSIONS:  Patient presents with moderate-severe cognitive deficits as evidence by the findings outlined above. Patient demonstrated with severely impaired memory with limited carryover of newly learned information. Moderate cognitive impairments noted within the areas of thought organization, reasoning, problem solving and attention.  Expressive/recpetive language [facility] basic communicative exchanges. No dysarthria or dysphonia. Patient consuming liquids with good success at this time; denies hx of dysphagia- ST to continue to monitor informally and complete swallow evaluation only as clinically appropriate. ST with high safety concerns for patient to return to home setting as requested per patient. No family present to confirm patient repot. ST recommending 24/hour supervision + return to prior setting. Patient would benefit from skilled ST services to target barriers/deficits with goals on hospital safety and safe discharge recommendations     Rehabilitation Potential: good    EDUCATION:  Learner: Patient  Education:  Reviewed results and recommendations of this evaluation, Reviewed ST goals and Plan of Care, Reviewed recommendations for follow-up and Education Related to Potential Risks and Complications Due to Impairment/Illness/Injury  Evaluation of Education: Verbalizes understanding, Needs further instruction, No evidence of learning and Family not present    PLAN:  Skilled SLP intervention on acute care 3-5 x per week or until goals met and/or pt plateaus in function. Specific interventions for next session may include: cognitive tx. PATIENT GOAL:    Did not state. Will further assess during treatment.     SHORT TERM GOALS:  Short-term Goals  Timeframe for Short-term Goals: 2 weeks  Goal 1: Patient will complete functional recall, short term memory and working memory tasks with 70% accurcy provided mod cues to improve overall recall of basic daily and medical information  Goal 2: Patient will complete concrete divergent naming and basic ADL sequencing tasks with 70% accuracy provided mod cues to improve overall thought organization and lexical retrival  Goal 3: Patient will complete basic problem solving/reasoning tasks with 70% accuracy provided mod cues to improve overalls safety within hospital setting  Goal 4: Patient will complete basic sustained and alternating attention tasks with no more than x3 errors within 1-2 mintues in order to improve completion of mutli-step ADL tasks  Goal 5: Complete clinical swallow evaluation as clinically appropriate    LONG TERM GOALS:  No LTGs due to short [facility] STEPHAN Olson

## 2021-04-03 NOTE — PROGRESS NOTES
Vika Caraballo 61  OCCUPATIONAL THERAPY MISSED TREATMENT NOTE  [facility]OP[facility] 7K  7K-13/013-A      Date: 4/3/2021  Patient Name: Elke Beard        CSN: [id number]   : 1935  (81 y.o.)  Gender: female                REASON FOR MISSED TREATMENT: Pt resting in bed upon attempt, refusing OT stating \"I am tired and need rest, I am not getting up\". Encouragement provided, but pt continued to refuse. Will check back later as time allows.

## 2021-04-03 NOTE — PROGRESS NOTES
[address] [facility]  [address] [facility] [address]  Dept: [phone]  Loc: [phone]        Adult Orthopaedic Service      Patient Name:  Kei Simmons  YOB: 1935   MRN:  [id number]   Date: 04/03/21          Assessment:    s/p RIGHT HIP INTERTROCHANTERIC NAILING on 4/2/21 doing well    Plan:   -PT/OT  -WBAT with assistive device  -Start Lovenox today for DVT prophylaxis while hospitalized, will start on 325 mg ASA daily for 30 days at discharge  -Monitor post operative blood loss anemia  -Medical management per Hospitalist  -OK from Ortho perspective for discharge once medically optimized  -Follow up with Dr Sebastián Quach at 2 weeks post op  -Ortho signing off, will follow peripherally    Subjective:   Pain controlled, tolerating diet, resting comfortably in bed.     Medications:      sennosides-docusate sodium  1 tablet Oral BID    enoxaparin  40 mg Subcutaneous Q24H    sodium chloride flush  10 mL Intravenous 2 times per day    docusate sodium  100 mg Oral Daily    famotidine (PEPCID) injection  20 mg Intravenous Daily    latanoprost  1 drop Both Eyes Nightly    therapeutic multivitamin-minerals  1 tablet Oral Daily    sertraline  25 mg Oral Daily       Physical Exam:     Vitals:    04/03/21 0430   BP: 130/73   Pulse: 92   Resp: 16   Temp:    SpO2: 95%       Intake and Output Summary (Last 24 hours) at Date Time    Intake/Output Summary (Last 24 hours) at 4/3/2021 1709  Last data filed at 4/3/2021 0423  Gross per 24 hour   Intake 2908 ml   Output 1450 ml   Net 1458 ml       General appearance - no acute distress  Musculoskeletal -  Dressing/Splint C/D/I  Fires TA/EHL/GSC  SILT SP/DP/TN  WWP distally  Calves soft, nontender bilateral    Labs:     CBC with Differential:    Lab Results   Component Value Date    WBC 8.7 04/03/2021    RBC 2.92 04/03/2021    HGB 8.3 04/03/2021    HCT 26.7 04/03/2021     04/03/2021    MCV 91.4 04/03/2021 MCH 28.4 04/03/2021    MCHC 31.1 04/03/2021    RDW 14.5 09/21/2018    NRBC 0 04/03/2021    SEGSPCT 83.4 04/03/2021    LYMPHOPCT 27.0 09/12/2017    MONOPCT 6.0 04/03/2021    MONOPCT 11.7 09/12/2017    EOSPCT 2.9 09/12/2017    BASOPCT 0.3 09/12/2017    MONOSABS 0.5 04/03/2021    LYMPHSABS 0.7 04/03/2021    EOSABS 0.0 04/03/2021    BASOSABS 0.0 04/03/2021    DIFFTYPE see below 03/23/2014     BMP:    Lab Results   Component Value Date     04/03/2021    K 3.6 04/03/2021    K 2.9 03/18/2021     04/03/2021    CO2 14 04/03/2021    BUN 23 04/03/2021    LABALBU 2.6 04/02/2021    CREATININE 1.0 04/03/2021    CALCIUM 8.2 04/03/2021    LABGLOM 53 04/03/2021    GLUCOSE 95 04/03/2021    GLUCOSE 112 09/12/2017     PT/INR:    Lab Results   Component Value Date    INR 1.11 04/01/2021     Rads:   Radiological Procedure reviewed.   Signed by: Malinda Shafer
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
## 2021-04-04 LAB
ANION GAP SERPL CALCULATED.3IONS-SCNC: 11 MEQ/L (ref 8–16)
BASOPHILS # BLD: 0.2 %
BASOPHILS ABSOLUTE: 0 THOU/MM3 (ref 0–0.1)
BUN BLDV-MCNC: 20 MG/DL (ref 7–22)
CALCIUM SERPL-MCNC: 8.6 MG/DL (ref 8.5–10.5)
CHLORIDE BLD-SCNC: 112 MEQ/L (ref 98–111)
CO2: 16 MEQ/L (ref 23–33)
CREAT SERPL-MCNC: 0.9 MG/DL (ref 0.4–1.2)
EOSINOPHIL # BLD: 2.4 %
EOSINOPHILS ABSOLUTE: 0.2 THOU/MM3 (ref 0–0.4)
ERYTHROCYTE [DISTWIDTH] IN BLOOD BY AUTOMATED COUNT: 16.9 % (ref 11.5–14.5)
ERYTHROCYTE [DISTWIDTH] IN BLOOD BY AUTOMATED COUNT: 52.9 FL (ref 35–45)
GFR SERPL CREATININE-BSD FRML MDRD: 60 ML/MIN/1.73M2
GLUCOSE BLD-MCNC: 107 MG/DL (ref 70–108)
HCT VFR BLD CALC: 23.3 % (ref 37–47)
HEMOGLOBIN: 7.3 GM/DL (ref 12–16)
IMMATURE GRANS (ABS): 0.1 THOU/MM3 (ref 0–0.07)
IMMATURE GRANULOCYTES: 1.6 %
LYMPHOCYTES # BLD: 5.6 %
LYMPHOCYTES ABSOLUTE: 0.4 THOU/MM3 (ref 1–4.8)
MCH RBC QN AUTO: 27.7 PG (ref 26–33)
MCHC RBC AUTO-ENTMCNC: 31.3 GM/DL (ref 32.2–35.5)
MCV RBC AUTO: 88.3 FL (ref 81–99)
MONOCYTES # BLD: 4.5 %
MONOCYTES ABSOLUTE: 0.3 THOU/MM3 (ref 0.4–1.3)
NUCLEATED RED BLOOD CELLS: 0 /100 WBC
PLATELET # BLD: 227 THOU/MM3 (ref 130–400)
PMV BLD AUTO: 10 FL (ref 9.4–12.4)
POTASSIUM SERPL-SCNC: 3.4 MEQ/L (ref 3.5–5.2)
RBC # BLD: 2.64 MILL/MM3 (ref 4.2–5.4)
SEG NEUTROPHILS: 85.7 %
SEGMENTED NEUTROPHILS ABSOLUTE COUNT: 5.4 THOU/MM3 (ref 1.8–7.7)
SODIUM BLD-SCNC: 139 MEQ/L (ref 135–145)
WBC # BLD: 6.3 THOU/MM3 (ref 4.8–10.8)

## 2021-04-04 PROCEDURE — 6370000000 HC RX 637 (ALT 250 FOR IP): Performed by: ORTHOPAEDIC SURGERY

## 2021-04-04 PROCEDURE — 2580000003 HC RX 258: Performed by: INTERNAL MEDICINE

## 2021-04-04 PROCEDURE — 1200000003 HC TELEMETRY R&B

## 2021-04-04 PROCEDURE — 6370000000 HC RX 637 (ALT 250 FOR IP): Performed by: INTERNAL MEDICINE

## 2021-04-04 PROCEDURE — 6370000000 HC RX 637 (ALT 250 FOR IP): Performed by: PHYSICIAN ASSISTANT

## 2021-04-04 PROCEDURE — 2500000003 HC RX 250 WO HCPCS: Performed by: PHYSICIAN ASSISTANT

## 2021-04-04 PROCEDURE — 94760 N-INVAS EAR/PLS OXIMETRY 1: CPT

## 2021-04-04 PROCEDURE — 80048 BASIC METABOLIC PNL TOTAL CA: CPT

## 2021-04-04 PROCEDURE — 36415 COLL VENOUS BLD VENIPUNCTURE: CPT

## 2021-04-04 PROCEDURE — 6360000002 HC RX W HCPCS: Performed by: ORTHOPAEDIC SURGERY

## 2021-04-04 PROCEDURE — 85025 COMPLETE CBC W/AUTO DIFF WBC: CPT

## 2021-04-04 RX ORDER — POTASSIUM CHLORIDE 20 MEQ/1
40 TABLET, EXTENDED RELEASE ORAL ONCE
Status: COMPLETED | OUTPATIENT
Start: 2021-04-04 | End: 2021-04-04

## 2021-04-04 RX ADMIN — SERTRALINE 25 MG: 50 TABLET, FILM COATED ORAL at 08:29

## 2021-04-04 RX ADMIN — DOCUSATE SODIUM 100 MG: 100 CAPSULE, LIQUID FILLED ORAL at 08:31

## 2021-04-04 RX ADMIN — HYDROCODONE BITARTRATE AND ACETAMINOPHEN 2 TABLET: 5; 325 TABLET ORAL at 08:32

## 2021-04-04 RX ADMIN — ENOXAPARIN SODIUM 40 MG: 40 INJECTION SUBCUTANEOUS at 08:29

## 2021-04-04 RX ADMIN — HYDROCODONE BITARTRATE AND ACETAMINOPHEN 1 TABLET: 5; 325 TABLET ORAL at 12:27

## 2021-04-04 RX ADMIN — SODIUM CHLORIDE: 9 INJECTION, SOLUTION INTRAVENOUS at 08:52

## 2021-04-04 RX ADMIN — HYDROCODONE BITARTRATE AND ACETAMINOPHEN 2 TABLET: 5; 325 TABLET ORAL at 04:11

## 2021-04-04 RX ADMIN — FAMOTIDINE 20 MG: 10 INJECTION, SOLUTION INTRAVENOUS at 08:31

## 2021-04-04 RX ADMIN — Medication 1 TABLET: at 08:29

## 2021-04-04 RX ADMIN — DOCUSATE SODIUM 50 MG AND SENNOSIDES 8.6 MG 1 TABLET: 8.6; 5 TABLET, FILM COATED ORAL at 08:30

## 2021-04-04 RX ADMIN — LATANOPROST 1 DROP: 50 SOLUTION OPHTHALMIC at 21:38

## 2021-04-04 RX ADMIN — POTASSIUM CHLORIDE 40 MEQ: 1500 TABLET, EXTENDED RELEASE ORAL at 12:26

## 2021-04-04 RX ADMIN — SODIUM CHLORIDE: 9 INJECTION, SOLUTION INTRAVENOUS at 18:50

## 2021-04-04 RX ADMIN — HYDROCODONE BITARTRATE AND ACETAMINOPHEN 1 TABLET: 5; 325 TABLET ORAL at 21:38

## 2021-04-04 ASSESSMENT — PAIN SCALES - GENERAL
PAINLEVEL_OUTOF10: 8
PAINLEVEL_OUTOF10: 5
PAINLEVEL_OUTOF10: 7
PAINLEVEL_OUTOF10: 7
PAINLEVEL_OUTOF10: 2

## 2021-04-04 ASSESSMENT — PAIN DESCRIPTION - ONSET: ONSET: ON-GOING

## 2021-04-04 ASSESSMENT — PAIN DESCRIPTION - LOCATION
LOCATION: HIP
LOCATION: HIP

## 2021-04-04 ASSESSMENT — PAIN DESCRIPTION - PAIN TYPE: TYPE: SURGICAL PAIN

## 2021-04-04 ASSESSMENT — PAIN DESCRIPTION - ORIENTATION: ORIENTATION: RIGHT

## 2021-04-04 NOTE — PLAN OF CARE
Problem: Falls - Risk of:  Goal: Will remain free from falls  Description: Will remain free from falls  Outcome: Ongoing  Note: Patient refused to get out of bed today. Patient able to use call light to call for assistance. Problem: Falls - Risk of:  Goal: Absence of physical injury  Description: Absence of physical injury  Outcome: Ongoing  Note: Pt free of physical injury. Problem: Skin Integrity:  Goal: Will show no infection signs and symptoms  Description: Will show no infection signs and symptoms  Outcome: Ongoing  Note: Surgical dressing x 3 to right hip dry and intact. Redness noted to buttock. Blister to left buttock. Problem: Skin Integrity:  Goal: Absence of new skin breakdown  Description: Absence of new skin breakdown  Outcome: Ongoing  Note: Blister to left buttock noted. Redness noted to buttock. Problem: Pain:  Goal: Pain level will decrease  Description: Pain level will decrease  Outcome: Ongoing  Note: Patient rates her pain as  a 7  out of 10. Pain goal 2. Patient taking norco for pain. Problem: Pain:  Goal: Control of acute pain  Description: Control of acute pain  Outcome: Ongoing  Note: Pt voices pain medication helps with her pain. Problem: Pain:  Goal: Control of chronic pain  Description: Control of chronic pain  Outcome: Ongoing  Note: Pt denies chronic pain. Problem: Discharge Planning:  Goal: Discharged to appropriate level of care  Description: Discharged to appropriate level of care  Outcome: Ongoing  Note: Discharge planning in progress. Patient plans to go to [facility] at discharge. Problem: Mobility - Impaired:  Goal: Mobility will improve to maximum level  Description: Mobility will improve to maximum level  Outcome: Ongoing  Note: Pt to work with PT and OT. Patient refused to get out of bed today. Encourage patient to increase activity.       Problem: Venous Thromboembolism:  Goal: Absence of deep vein thrombosis  Description: Absence of deep vein thrombosis  Outcome: Ongoing  Note: SCD bilateral lower legs. Patient getting lovenox as scheduled. Care plan reviewed with patient. Patient verbalize understanding of the plan of care and contribute to goal setting.

## 2021-04-05 LAB
ABO: NORMAL
ANION GAP SERPL CALCULATED.3IONS-SCNC: 9 MEQ/L (ref 8–16)
ANTIBODY SCREEN: NORMAL
BACTERIA: ABNORMAL /HPF
BILIRUBIN URINE: NEGATIVE
BLOOD, URINE: ABNORMAL
BUN BLDV-MCNC: 17 MG/DL (ref 7–22)
CALCIUM SERPL-MCNC: 8.3 MG/DL (ref 8.5–10.5)
CASTS 2: ABNORMAL /LPF
CASTS UA: ABNORMAL /LPF
CHARACTER, URINE: CLEAR
CHLORIDE BLD-SCNC: 109 MEQ/L (ref 98–111)
CO2: 16 MEQ/L (ref 23–33)
COLOR: YELLOW
CREAT SERPL-MCNC: 0.8 MG/DL (ref 0.4–1.2)
CRYSTALS, UA: ABNORMAL
EPITHELIAL CELLS, UA: ABNORMAL /HPF
GFR SERPL CREATININE-BSD FRML MDRD: 68 ML/MIN/1.73M2
GLUCOSE BLD-MCNC: 88 MG/DL (ref 70–108)
GLUCOSE URINE: NEGATIVE MG/DL
HCT VFR BLD CALC: 22.1 % (ref 37–47)
HCT VFR BLD CALC: 22.6 % (ref 37–47)
HCT VFR BLD CALC: 27.9 % (ref 37–47)
HEMOGLOBIN: 6.8 GM/DL (ref 12–16)
HEMOGLOBIN: 6.9 GM/DL (ref 12–16)
HEMOGLOBIN: 8.9 GM/DL (ref 12–16)
KETONES, URINE: NEGATIVE
LEUKOCYTE ESTERASE, URINE: NEGATIVE
MISCELLANEOUS 2: ABNORMAL
NITRITE, URINE: NEGATIVE
PH UA: 5.5 (ref 5–9)
POTASSIUM SERPL-SCNC: 3.7 MEQ/L (ref 3.5–5.2)
PROTEIN UA: 30
RBC URINE: ABNORMAL /HPF
RENAL EPITHELIAL, UA: ABNORMAL
RH FACTOR: NORMAL
SODIUM BLD-SCNC: 134 MEQ/L (ref 135–145)
SPECIFIC GRAVITY, URINE: 1.01 (ref 1–1.03)
UROBILINOGEN, URINE: 0.2 EU/DL (ref 0–1)
WBC UA: ABNORMAL /HPF
YEAST: ABNORMAL

## 2021-04-05 PROCEDURE — 86900 BLOOD TYPING SEROLOGIC ABO: CPT

## 2021-04-05 PROCEDURE — 86901 BLOOD TYPING SEROLOGIC RH(D): CPT

## 2021-04-05 PROCEDURE — 80048 BASIC METABOLIC PNL TOTAL CA: CPT

## 2021-04-05 PROCEDURE — 97110 THERAPEUTIC EXERCISES: CPT

## 2021-04-05 PROCEDURE — 86850 RBC ANTIBODY SCREEN: CPT

## 2021-04-05 PROCEDURE — 2580000003 HC RX 258: Performed by: INTERNAL MEDICINE

## 2021-04-05 PROCEDURE — 36415 COLL VENOUS BLD VENIPUNCTURE: CPT

## 2021-04-05 PROCEDURE — 85014 HEMATOCRIT: CPT

## 2021-04-05 PROCEDURE — 6370000000 HC RX 637 (ALT 250 FOR IP): Performed by: PHYSICIAN ASSISTANT

## 2021-04-05 PROCEDURE — P9040 RBC LEUKOREDUCED IRRADIATED: HCPCS

## 2021-04-05 PROCEDURE — 97530 THERAPEUTIC ACTIVITIES: CPT

## 2021-04-05 PROCEDURE — 97129 THER IVNTJ 1ST 15 MIN: CPT

## 2021-04-05 PROCEDURE — 85018 HEMOGLOBIN: CPT

## 2021-04-05 PROCEDURE — 94760 N-INVAS EAR/PLS OXIMETRY 1: CPT

## 2021-04-05 PROCEDURE — 1200000003 HC TELEMETRY R&B

## 2021-04-05 PROCEDURE — P9016 RBC LEUKOCYTES REDUCED: HCPCS

## 2021-04-05 PROCEDURE — 36430 TRANSFUSION BLD/BLD COMPNT: CPT

## 2021-04-05 PROCEDURE — 81001 URINALYSIS AUTO W/SCOPE: CPT

## 2021-04-05 PROCEDURE — 86923 COMPATIBILITY TEST ELECTRIC: CPT

## 2021-04-05 PROCEDURE — 6370000000 HC RX 637 (ALT 250 FOR IP): Performed by: ORTHOPAEDIC SURGERY

## 2021-04-05 RX ORDER — SODIUM CHLORIDE 9 MG/ML
INJECTION, SOLUTION INTRAVENOUS PRN
Status: DISCONTINUED | OUTPATIENT
Start: 2021-04-05 | End: 2021-04-09 | Stop reason: HOSPADM

## 2021-04-05 RX ORDER — FAMOTIDINE 20 MG/1
20 TABLET, FILM COATED ORAL DAILY
Status: DISCONTINUED | OUTPATIENT
Start: 2021-04-05 | End: 2021-04-09 | Stop reason: HOSPADM

## 2021-04-05 RX ADMIN — SODIUM CHLORIDE: 9 INJECTION, SOLUTION INTRAVENOUS at 16:20

## 2021-04-05 RX ADMIN — TRAMADOL HYDROCHLORIDE 50 MG: 50 TABLET, FILM COATED ORAL at 00:33

## 2021-04-05 RX ADMIN — HYDROCODONE BITARTRATE AND ACETAMINOPHEN 1 TABLET: 5; 325 TABLET ORAL at 04:45

## 2021-04-05 RX ADMIN — Medication 1 TABLET: at 10:00

## 2021-04-05 RX ADMIN — HYDROCODONE BITARTRATE AND ACETAMINOPHEN 1 TABLET: 5; 325 TABLET ORAL at 14:33

## 2021-04-05 RX ADMIN — LATANOPROST 1 DROP: 50 SOLUTION OPHTHALMIC at 22:30

## 2021-04-05 RX ADMIN — FAMOTIDINE 20 MG: 20 TABLET, FILM COATED ORAL at 10:01

## 2021-04-05 RX ADMIN — SODIUM CHLORIDE: 9 INJECTION, SOLUTION INTRAVENOUS at 04:07

## 2021-04-05 RX ADMIN — DOCUSATE SODIUM 50 MG AND SENNOSIDES 8.6 MG 1 TABLET: 8.6; 5 TABLET, FILM COATED ORAL at 10:01

## 2021-04-05 RX ADMIN — SERTRALINE 25 MG: 50 TABLET, FILM COATED ORAL at 10:00

## 2021-04-05 RX ADMIN — DOCUSATE SODIUM 100 MG: 100 CAPSULE, LIQUID FILLED ORAL at 10:04

## 2021-04-05 ASSESSMENT — PAIN SCALES - GENERAL
PAINLEVEL_OUTOF10: 0
PAINLEVEL_OUTOF10: 5

## 2021-04-05 ASSESSMENT — PAIN DESCRIPTION - PAIN TYPE: TYPE: SURGICAL PAIN

## 2021-04-05 ASSESSMENT — PAIN DESCRIPTION - ORIENTATION
ORIENTATION: RIGHT
ORIENTATION: RIGHT

## 2021-04-05 ASSESSMENT — PAIN DESCRIPTION - PROGRESSION: CLINICAL_PROGRESSION: NOT CHANGED

## 2021-04-05 NOTE — CARE COORDINATION
4/5/21, 10:41 AM EDT    DISCHARGE  [address] day: 4  Location: [facility]13/013- Reason for admit: Fall at nursing home, initial encounter [W19. Andrés Carias, [id number]]   Procedure: 4/2/21 surgery by Dr Nicole De Anda: RIGHT HIP INTERTROCHANTERIC NAILING  Barriers to Discharge: PT/OT. FWBAT. Pain management. Hgb [phone] today. Recheck now 6.8. CO2 low. IV fluids. Hold Lovenox today. PCP: Oliva Brito MD  Readmission Risk Score: 20%  Patient Goals/Plan/Treatment Preferences: Planning return to [facility]. See SW notes.

## 2021-04-05 NOTE — PROGRESS NOTES
[address] [address]  PHYSICAL THERAPY MISSED TREATMENT NOTE  ACUTE CARE  [facility]K              Missed Treatment    Patient's with critical hemoglobin of 6.8. Nurse preparing to give blood, recommending holding PT at this time. PT to check back at a later time as able.
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
## 2021-04-05 NOTE — PROGRESS NOTES
[address] [facility]  Occupational Therapy  Daily Note  Time:   Time In: 6330  Time Out: 3257  Timed Code Treatment Minutes: 29 Minutes  Minutes: 29       Date: 2021  Patient Name: Jazlyn Curtis,   Gender: female      Room: [facility]13/013-A  MRN: [id number]  : 1935  (81 y.o.)  Referring Practitioner: Sarita Shaikh MD  Diagnosis: Fall at nursing home, initial encounter  Additional Pertinent Hx: The patient is a 81 y.o. female with multiple medical comorbidities who ortho was consulted for the above noted complaint. Patient resides in [facility] and was noted to have a fall prior to arrival. She attempted to go to the bathroom independently and lost her balance falling onto the right hip. She had severe pain and was unable to get up and ambulate after the fall. Son accompanies the patient and states she has had a progressive decline health and ambulation over the past month. She was transferred to [facility] ED per EMS and was found to be hypotensive. Currently she is complaining of right hip pain and denies other injuries. X-rays show a comminuted displaced right intertrochanteric fracture. Pt with recent history of rib fractures on left. Patient s/p RIGHT HIP INTERTROCHANTERIC NAILING . Restrictions/Precautions:  Restrictions/Precautions: Weight Bearing, Fall Risk  Right Lower Extremity Weight Bearing: Weight Bearing As Tolerated     SUBJECTIVE: Pt seated in bedside chair upon arrival, agreeable to OT session. RN okayed session stating pt had been up in chair since 7:00 and requesting return to bed. PAIN: Did not rate: RLE    Vitals: Orthostatic Blood Pressure: Seated in bedside chair: 110/59; Standing at RW: 114/51    COGNITION: Slow Processing, Decreased Recall, Decreased Insight, Decreased Problem Solving, Decreased Safety Awareness and Impaired Attention    ADL:   No ADL's completed this session. BALANCE:  Sitting Balance:  Stand By Assistance.  EOB  Standing Balance: Minimal Assistance, X 2.    x2 minutes for BP read standing at RW; x10 secs in prep for transfer with HHA. BED MOBILITY:  Rolling to Left: Moderate Assistance, X 2    Rolling to Right: Moderate Assistance, X 2    Sit to Supine: Maximum Assistance, X 2    Scooting: Moderate Assistance, X 2 EOB   Comment: Rolling side to side for several trials for positioning. TRANSFERS:  Sit to Stand:  Minimal Assistance, X 2.    Stand to Sit: Minimal Assistance, X 2.    Stand Pivot: Moderate Assist x 2 (recliner to EOB) *Completed by OTR. FUNCTIONAL MOBILITY:  OTR to further assess     ASSESSMENT:     Activity Tolerance:  Patient tolerance of  treatment: fair. Pt limited by pain. Discharge Recommendations: Subacute/Skilled Nursing Facility   Equipment Recommendations: Equipment Needed: No  Plan: Times per week: 6x  Times per day: Daily  Current Treatment Recommendations: Strengthening, Balance Training, Functional Mobility Training, Endurance Training, Home Management Training, Patient/Caregiver Education & Training, Self-Care / ADL, Safety Education & Training    Patient Education  Patient Education: Importance of Increasing Activity, Assistive Device Safety and Standing balance, and safety with transfers. Goals  Short term goals  Time Frame for Short term goals: by discharge  Short term goal 1: Pt will complete sit to stand t/fs and no > than Mod A x1 and no safety cues in prep for t/fs to [facility]  Short term goal 2: Pt will tolerate dynamic standing with 1-2 hand release and no safety cues wtih Min A to increase indep with grooming  Short term goal 3: Pt will complete LB dressing with AE prn and no VC for technique to increase ease with dressing  Short term goal 4: Pt will tolerate OTR to further assess functional t/fs and mobility when appropriate.  (MET and REVISED)     Long term goals  Time Frame for Long term goals : no LTG d/t short ELOS    Revised Short-Term Goals  Short term goals  Time Frame for Short term goals: by discharge  Short term goal 1: Pt will complete sit to stand t/fs and no > than Mod A x1 and no safety cues in prep for t/fs to [facility]  Short term goal 2: Pt will tolerate dynamic standing with 1-2 hand release and no safety cues wtih Min A to increase indep with grooming  Short term goal 3: Pt will complete LB dressing with AE prn and no VC for technique to increase ease with dressing  Short term goal 4: Pt will complete functional pivot t/fs to/from various surfaces with Mod A x 2 and min vcs for safety to increase indep with toileting. Short term goal 5: Pt will tolerate further assessment of mobility when appropriate. Long term goals  Time Frame for Long term goals : no LTG d/t short ELOS    Following session, patient left in safe position with all fall risk precautions in place. Treatment session and note completed by Claus WILSON   Assessment and goal revision of Progress Note and assessment of pivots completed by Juan Manuel Douglas OT.

## 2021-04-05 NOTE — PROGRESS NOTES
IM Progress Note  Dr. Skyler Lindquist  4/5/2021 12:06 PM      Patient name Brennon Lockhart  [id number]  PCP: Felix Fallon MD  Admit Date: 4/1/2021  Acct No. [de-identified]    Subjective: Interval History:   Pt to receive blood as Hb low  Surgical site looks clean    Diet: Dietary Nutrition Supplements: Standard High Calorie Oral Supplement  DIET GENERAL;    I/O last 3 completed shifts: In: 2871.4 [P.O.:500; I.V.:2371.4]  Out: 150 [Urine:150]  No intake/output data recorded. Admission weight: 135 lb (61.2 kg) as of 4/1/2021 11:08 AM  Wt Readings from Last 3 Encounters:   04/01/21 138 lb 9.6 oz (62.9 kg)   03/18/21 150 lb (68 kg)   03/15/21 151 lb (68.5 kg)     Body mass index is 26.19 kg/m². ROS   CVS;  no cp or palpitation  Resp: no SOB or cough  Neuro:  No numbness or weakness or dizziness  Abd: no nausea or vomiting or abd pain      Medications:   Scheduled Meds:   famotidine  20 mg Oral Daily    sennosides-docusate sodium  1 tablet Oral BID    enoxaparin  40 mg Subcutaneous Q24H    sodium chloride flush  10 mL Intravenous 2 times per day    docusate sodium  100 mg Oral Daily    latanoprost  1 drop Both Eyes Nightly    therapeutic multivitamin-minerals  1 tablet Oral Daily    sertraline  25 mg Oral Daily     Continuous Infusions:   sodium chloride      sodium chloride      sodium chloride 100 mL/hr at 04/05/21 0407       Labs :     CBC:   Recent Labs     04/03/21  0442 04/03/21  1231 04/04/21  0530 04/05/21  0417 04/05/21  1006   WBC 8.7 10.4 6.3  --   --    HGB 8.3* 7.5* 7.3* 6.9* 6.8*    231 227  --   --      BMP:    Recent Labs     04/03/21  1231 04/04/21  0530 04/05/21  0417    139 134*   K 3.1* 3.4* 3.7    112* 109   CO2 16* 16* 16*   BUN 20 20 17   CREATININE 0.9 0.9 0.8   GLUCOSE 136* 107 88     Hepatic:   No results for input(s): AST, ALT, ALB, BILITOT, ALKPHOS in the last 72 hours. Troponin: No results for input(s): TROPONINI in the last 72 hours.   BNP: No results for input(s): BNP in the last 72 hours. Lipids: No results for input(s): CHOL, HDL in the last 72 hours. Invalid input(s): LDLCALCU  INR:   No results for input(s): INR in the last 72 hours. Radiology    Objective:   Vitals: BP (!) 135/59   Pulse 87   Temp 98.2 °F (36.8 °C) (Oral)   Resp 16   Ht 5' 1\" (1.549 m)   Wt 138 lb 9.6 oz (62.9 kg)   SpO2 98%   BMI 26.19 kg/m²   HEENT: Head:pupils react  Neck: supple  Lungs: clear to auscultation  Heart: regular rate and rhythm   Abdomen: soft BS heard   Extremities: warm no pedal  edema  Neurologic:  Awake and oriented    Impression:   :   1. S/p Rt hip intertrochanteric nailing   2. Paroxysmal atrial fibrillation  3. Confusion not new  4. Hypertension. 5.  Hyperlipidemia. 6. CAD  7. GERD  8. COPD  9.  BAILEE resolved but noted CO2 decreasing  10 Anemia    Plan:    Check Hb post transfusion  Hold lovenox today  Decrease IVF  Check  UA as pt with c/o freq  F/u on Katy Osorio MD

## 2021-04-05 NOTE — PROGRESS NOTES
[facility]  INPATIENT PHYSICAL THERAPY  DAILY NOTE  [facility] 7K - 7K-13/013-A    Time In: 4375  Time Out: 1411  Timed Code Treatment Minutes: 24 Minutes  Minutes: 24          Date: 2021  Patient Name: Tavon ,  Gender:  female        MRN: [id number]  : 1935  (81 y.o.)  Referral Date : 21  Referring Practitioner: Cristo Pulido MD  Diagnosis: Fall at nursing home, initial encounter  Additional Pertinent Hx: The patient is a 81 y.o. female with multiple medical comorbidities who ortho was consulted for the above noted complaint. Patient resides in [facility] and was noted to have a fall prior to arrival. She attempted to go to the bathroom independently and lost her balance falling onto the right hip. She had severe pain and was unable to get up and ambulate after the fall. Son accompanies the patient and states she has had a progressive decline health and ambulation over the past month. She was transferred to [facility] ED per EMS and was found to be hypotensive. Currently she is complaining of right hip pain and denies other injuries. X-rays show a comminuted displaced right intertrochanteric fracture. Pt with recent history of rib fractures on left. Patient s/p RIGHT HIP INTERTROCHANTERIC NAILING . Prior Level of Function:  Lives With: Family  Type of Home: House  Home Layout: One level  Home Access: Stairs to enter with rails  Entrance Stairs - Number of Steps: 1 small step ~2 inch threshold  Entrance Stairs - Rails: Both  Home Equipment: Rolling walker, Cane, [employer]   Bathroom Shower/Tub: Tub/Shower unit  Bathroom Toilet: Standard  Bathroom Equipment: Shower chair, Grab bars in shower    ADL Assistance: [address]: Needs assistance  Ambulation Assistance: Independent  Transfer Assistance: Independent  Active :  Yes  Additional Comments: Patient reports independent at [facility] with no AD, reports shares household tasks with sister    Restrictions/Precautions:  Restrictions/Precautions: Weight Bearing, Fall Risk  Right Lower Extremity Weight Bearing: Weight Bearing As Tolerated     SUBJECTIVE: pt required min encouragement, RN stated pt just started receiving blood and ok to work with pt but don't push her, family present and supportive    PAIN: not rated, pain in RLE    Vitals: Vitals not assessed per clinical judgement, see nursing flowsheet    OBJECTIVE:  Bed Mobility:  Rolling to Left: Maximum Assistance, X 1   Supine to Sit: Maximum Assistance, X 1  Sit to Supine: Maximum Assistance, X 1   Scooting: Maximum Assistance, X 1  HOB up 30 degrees, use BR, cues for sequencing for pt to assist  Transfers:  Sit to Stand: Maximum Assistance, X 1  Stand to Sit:Moderate Assistance, X 1  Cues for hand placement and wt shift fwd, BUE HHA  Ambulation:  maxAx1  Distance: 2 steps back to bed  Surface: Level Tile  Device:Hand-Held Assist  Gait Deviations: Forward Flexed Posture, Slow Shelly, Decreased Step Length Bilaterally, Decreased Weight Shift Right, Decreased Heel Strike Bilaterally and Unsteady Gait    Balance:  Static Sitting Balance:  Stand By Assistance, min kyphotic posture, min lightheadedness that resolved  Static Standing Balance: Maximum Assistance, X 1, BUE HHA, progressed to modAx1 with cues for erect posture, tolerated around 20 seconds    Exercise:  Patient was guided in 1 set(s) 10 reps of exercise to both lower extremities. Ankle pumps, Glut sets, Quad sets and Heelslides. Exercises were completed for increased independence with functional mobility. Functional Outcome Measures: Completed  AM-PAC Inpatient Mobility Raw Score : 10  -PAC Inpatient T-Scale Score : 32.29    ASSESSMENT:  Assessment: Patient progressing toward established goals. Activity Tolerance:  Patient tolerance of  treatment: fair.       Equipment Recommendations:Equipment Needed: No  Discharge Recommendations:  Continue to assess pending progress,

## 2021-04-06 LAB
ANION GAP SERPL CALCULATED.3IONS-SCNC: 10 MEQ/L (ref 8–16)
BASOPHILS # BLD: 0.2 %
BASOPHILS ABSOLUTE: 0 THOU/MM3 (ref 0–0.1)
BUN BLDV-MCNC: 15 MG/DL (ref 7–22)
CALCIUM SERPL-MCNC: 8.5 MG/DL (ref 8.5–10.5)
CHLORIDE BLD-SCNC: 110 MEQ/L (ref 98–111)
CO2: 15 MEQ/L (ref 23–33)
CREAT SERPL-MCNC: 0.7 MG/DL (ref 0.4–1.2)
EOSINOPHIL # BLD: 3.6 %
EOSINOPHILS ABSOLUTE: 0.2 THOU/MM3 (ref 0–0.4)
ERYTHROCYTE [DISTWIDTH] IN BLOOD BY AUTOMATED COUNT: 15.9 % (ref 11.5–14.5)
ERYTHROCYTE [DISTWIDTH] IN BLOOD BY AUTOMATED COUNT: 51 FL (ref 35–45)
GFR SERPL CREATININE-BSD FRML MDRD: 80 ML/MIN/1.73M2
GLUCOSE BLD-MCNC: 108 MG/DL (ref 70–108)
HCT VFR BLD CALC: 25.8 % (ref 37–47)
HEMOGLOBIN: 8.5 GM/DL (ref 12–16)
IMMATURE GRANS (ABS): 0.12 THOU/MM3 (ref 0–0.07)
IMMATURE GRANULOCYTES: 2.2 %
LYMPHOCYTES # BLD: 6.5 %
LYMPHOCYTES ABSOLUTE: 0.4 THOU/MM3 (ref 1–4.8)
MCH RBC QN AUTO: 29.1 PG (ref 26–33)
MCHC RBC AUTO-ENTMCNC: 32.9 GM/DL (ref 32.2–35.5)
MCV RBC AUTO: 88.4 FL (ref 81–99)
MONOCYTES # BLD: 5.6 %
MONOCYTES ABSOLUTE: 0.3 THOU/MM3 (ref 0.4–1.3)
NUCLEATED RED BLOOD CELLS: 0 /100 WBC
PLATELET # BLD: 194 THOU/MM3 (ref 130–400)
PMV BLD AUTO: 10.1 FL (ref 9.4–12.4)
POTASSIUM SERPL-SCNC: 3.4 MEQ/L (ref 3.5–5.2)
RBC # BLD: 2.92 MILL/MM3 (ref 4.2–5.4)
SEG NEUTROPHILS: 81.9 %
SEGMENTED NEUTROPHILS ABSOLUTE COUNT: 4.4 THOU/MM3 (ref 1.8–7.7)
SODIUM BLD-SCNC: 135 MEQ/L (ref 135–145)
WBC # BLD: 5.4 THOU/MM3 (ref 4.8–10.8)

## 2021-04-06 PROCEDURE — 6370000000 HC RX 637 (ALT 250 FOR IP): Performed by: PHYSICIAN ASSISTANT

## 2021-04-06 PROCEDURE — 36415 COLL VENOUS BLD VENIPUNCTURE: CPT

## 2021-04-06 PROCEDURE — 80048 BASIC METABOLIC PNL TOTAL CA: CPT

## 2021-04-06 PROCEDURE — 85025 COMPLETE CBC W/AUTO DIFF WBC: CPT

## 2021-04-06 PROCEDURE — 6370000000 HC RX 637 (ALT 250 FOR IP): Performed by: NURSE PRACTITIONER

## 2021-04-06 PROCEDURE — 94761 N-INVAS EAR/PLS OXIMETRY MLT: CPT

## 2021-04-06 PROCEDURE — 1200000003 HC TELEMETRY R&B

## 2021-04-06 PROCEDURE — 6370000000 HC RX 637 (ALT 250 FOR IP): Performed by: ORTHOPAEDIC SURGERY

## 2021-04-06 PROCEDURE — 97530 THERAPEUTIC ACTIVITIES: CPT

## 2021-04-06 PROCEDURE — 97110 THERAPEUTIC EXERCISES: CPT

## 2021-04-06 RX ADMIN — Medication 1 TABLET: at 09:07

## 2021-04-06 RX ADMIN — SERTRALINE 25 MG: 50 TABLET, FILM COATED ORAL at 09:01

## 2021-04-06 RX ADMIN — ACETAMINOPHEN 650 MG: 325 TABLET ORAL at 21:57

## 2021-04-06 RX ADMIN — TRAMADOL HYDROCHLORIDE 50 MG: 50 TABLET, FILM COATED ORAL at 09:07

## 2021-04-06 RX ADMIN — LATANOPROST 1 DROP: 50 SOLUTION OPHTHALMIC at 21:58

## 2021-04-06 RX ADMIN — FAMOTIDINE 20 MG: 20 TABLET, FILM COATED ORAL at 09:01

## 2021-04-06 RX ADMIN — DOCUSATE SODIUM 100 MG: 100 CAPSULE, LIQUID FILLED ORAL at 09:01

## 2021-04-06 RX ADMIN — HYDROCODONE BITARTRATE AND ACETAMINOPHEN 1 TABLET: 5; 325 TABLET ORAL at 05:28

## 2021-04-06 RX ADMIN — DOCUSATE SODIUM 50 MG AND SENNOSIDES 8.6 MG 1 TABLET: 8.6; 5 TABLET, FILM COATED ORAL at 09:07

## 2021-04-06 RX ADMIN — ASPIRIN 325 MG: 325 TABLET, COATED ORAL at 09:07

## 2021-04-06 RX ADMIN — ASPIRIN 325 MG: 325 TABLET, COATED ORAL at 21:56

## 2021-04-06 RX ADMIN — DOCUSATE SODIUM 50 MG AND SENNOSIDES 8.6 MG 1 TABLET: 8.6; 5 TABLET, FILM COATED ORAL at 21:56

## 2021-04-06 RX ADMIN — HYDROCODONE BITARTRATE AND ACETAMINOPHEN 1 TABLET: 5; 325 TABLET ORAL at 13:27

## 2021-04-06 ASSESSMENT — PAIN DESCRIPTION - PAIN TYPE
TYPE: ACUTE PAIN;SURGICAL PAIN

## 2021-04-06 ASSESSMENT — PAIN DESCRIPTION - PROGRESSION
CLINICAL_PROGRESSION: GRADUALLY IMPROVING
CLINICAL_PROGRESSION: NOT CHANGED
CLINICAL_PROGRESSION: NOT CHANGED
CLINICAL_PROGRESSION: GRADUALLY WORSENING

## 2021-04-06 ASSESSMENT — PAIN DESCRIPTION - FREQUENCY
FREQUENCY: CONTINUOUS

## 2021-04-06 ASSESSMENT — PAIN DESCRIPTION - LOCATION
LOCATION: LEG

## 2021-04-06 ASSESSMENT — PAIN SCALES - GENERAL
PAINLEVEL_OUTOF10: 9
PAINLEVEL_OUTOF10: 8
PAINLEVEL_OUTOF10: 4
PAINLEVEL_OUTOF10: 8
PAINLEVEL_OUTOF10: 9

## 2021-04-06 ASSESSMENT — PAIN DESCRIPTION - ORIENTATION
ORIENTATION: RIGHT

## 2021-04-06 ASSESSMENT — PAIN DESCRIPTION - DESCRIPTORS
DESCRIPTORS: ACHING
DESCRIPTORS: ACHING;CRAMPING

## 2021-04-06 ASSESSMENT — PAIN DESCRIPTION - ONSET
ONSET: GRADUAL
ONSET: ON-GOING

## 2021-04-06 NOTE — PLAN OF CARE
Problem: Falls - Risk of:  Goal: Will remain free from falls  Description: Will remain free from falls  Outcome: Ongoing  Goal: Absence of physical injury  Description: Absence of physical injury  Outcome: Ongoing     Problem: Skin Integrity:  Goal: Will show no infection signs and symptoms  Description: Will show no infection signs and symptoms  Outcome: Ongoing  Goal: Absence of new skin breakdown  Description: Absence of new skin breakdown  Outcome: Ongoing     Problem: Pain:  Goal: Pain level will decrease  Description: Pain level will decrease  Outcome: Ongoing  Goal: Control of acute pain  Description: Control of acute pain  Outcome: Ongoing  Goal: Control of chronic pain  Description: Control of chronic pain  Outcome: Ongoing     Problem: Discharge Planning:  Goal: Discharged to appropriate level of care  Description: Discharged to appropriate level of care  Outcome: Ongoing   Plan return to the [address] [place] [address] at discharge  Problem: Mobility - Impaired:  Goal: Mobility will improve to maximum level  Description: Mobility will improve to maximum level  Outcome: Ongoing     Problem: Venous Thromboembolism:  Goal: Absence of deep vein thrombosis  Description: Absence of deep vein thrombosis  Outcome: Ongoing

## 2021-04-06 NOTE — PROGRESS NOTES
In bed. Alert, oriented to person, place, time, situation. PERRL 3mm to 2mm brisk. Hair dry, shiny. Mucous membranes pink, moist. Skin warm, dry, appropriate for ethnicity. Dressing noted on right leg. Serous drainage noted. Intact. Heart sounds regular. No cough noted. Lung sounds clear anterior, posterior, lateral. Abdomen round, soft, non tender to touch. Bowel sounds active in all four quadrants. Denies passing gas. Denies difficulty urinating. Arm drift negative bilateral. Skin turgor brisk. Capillary refill less than three seconds. Radial pulses +2, equal bilateral. No edema noted in extremities. Pedal push and pull strong bilateral. Dorsalis pedis and posterior tibialis pulses +2, equal bilateral. Call light and over bed table within reach. Side rails up times two. Wheels locked. Denies needs.  Eleuterio Guadalupe [facility]

## 2021-04-06 NOTE — FLOWSHEET NOTE
Son is active decision maker. Documents on file. Patient fell; son notes she has been declining. Patient was asleep when  attempted visit.

## 2021-04-06 NOTE — PROGRESS NOTES
Progress note      Internal Medicine Specialities             Patient:  Brendan Mariano  YOB: 1935    MRN: [id number]   Acct:  [de-identified]   7K-13/013-A  Primary Care Physician: Zainab Levi MD    Admit Date: 4/1/2021           Subjective: Pt resting sitting in chair. She reports feeling better and not in as much pain        Objective:      Physical Exam:    Vitals:  Patient Vitals for the past 24 hrs:   BP Temp Temp src Pulse Resp SpO2   04/06/21 1010 -- -- -- -- -- 95 %   04/06/21 0815 122/66 97.4 °F (36.3 °C) Oral 86 18 96 %   04/06/21 0529 (!) 139/59 98.7 °F (37.1 °C) Oral 96 18 93 %   04/05/21 2334 129/68 -- -- -- -- --   04/05/21 2230 118/60 98 °F (36.7 °C) Oral 87 17 98 %   04/05/21 1615 124/79 97.5 °F (36.4 °C) Oral 89 18 99 %   04/05/21 1337 (!) 117/56 98.4 °F (36.9 °C) Oral 89 16 96 %   04/05/21 1317 (!) 114/53 98 °F (36.7 °C) Oral 90 18 99 %   04/05/21 1251 125/63 98 °F (36.7 °C) Oral 90 18 98 %   04/05/21 1223 -- -- -- -- -- 99 %     Weight: Weight: 138 lb 9.6 oz (62.9 kg)     24 hour intake/output:    Intake/Output Summary (Last 24 hours) at 4/6/2021 1125  Last data filed at 4/5/2021 2112  Gross per 24 hour   Intake 200 ml   Output 200 ml   Net 0 ml       General appearance - alert, well appearing, and in no distress  Eyes - pupils equal and reactive, extraocular eye movements intact  Mouth - mucous membranes moist, pharynx normal without lesions  Neck - supple, no significant adenopathy  Chest - clear to auscultation, no wheezes, rales or rhonchi, symmetric air entry  Heart - normal rate, regular rhythm, normal S1, S2, no murmurs, rubs, clicks or gallops  Abdomen - soft, nontender, nondistended, no masses or organomegaly, pos bs.   Neurological - alert, oriented, normal speech, no focal findings or movement disorder noted  Musculoskeletal - no joint tenderness, deformity or swelling  Extremities - peripheral pulses normal, no pedal edema, no clubbing or cyanosis  Skin - normal coloration and turgor, no rashes, no suspicious skin lesions noted    Review of Labs and Diagnostic Testing:    CBC:   Recent Labs     04/06/21  0603   WBC 5.4   HGB 8.5*   HCT 25.8*   MCV 88.4        BMP:   Recent Labs     04/06/21  0603      K 3.4*      CO2 15*   BUN 15   CREATININE 0.7   CALCIUM 8.5   GLUCOSE 108     PT/INR: No results for input(s): PROTIME, INR in the last 72 hours. APTT: No results for input(s): APTT in the last 72 hours. Lipids: No results for input(s): ALKPHOS, ALT, AST, BILITOT, BILIDIR, LABALBU, AMYLASE, LIPASE in the last 72 hours. Troponin: No results for input(s): TROPONINT in the last 72 hours. Imaging:  [unfilled]    EKG:      Diet: Dietary Nutrition Supplements: Standard High Calorie Oral Supplement  DIET GENERAL;        Data:   Scheduled Meds: Scheduled Meds:   aspirin  325 mg Oral BID    famotidine  20 mg Oral Daily    sennosides-docusate sodium  1 tablet Oral BID    sodium chloride flush  10 mL Intravenous 2 times per day    docusate sodium  100 mg Oral Daily    latanoprost  1 drop Both Eyes Nightly    therapeutic multivitamin-minerals  1 tablet Oral Daily    sertraline  25 mg Oral Daily     Continuous Infusions:   sodium chloride      sodium chloride      sodium chloride 50 mL/hr at 04/05/21 1620     PRN Meds:.sodium chloride, traMADol, lidocaine, promethazine **OR** ondansetron, magnesium hydroxide, sodium chloride flush, sodium chloride, acetaminophen, polyethylene glycol, potassium chloride, HYDROcodone 5 mg - acetaminophen **OR** HYDROcodone 5 mg - acetaminophen, fentanNYL **OR** fentanNYL  Continuous Infusions:   sodium chloride      sodium chloride      sodium chloride 50 mL/hr at 04/05/21 1620         Assessment/Plan   1. S/P right hip intertrochanteric nailing   -Pt has less pain. Taking Norco daily but no fentanyl   -Cont to work with PT/OT;  Explained importance of ambulating   -Educated on use of incentive spirometry  2. Decreased CO2   -Cont IVF; Noted RN informed that IV access was lost; IV team to try and get access  3. Anemia   -Hg stable after transfusion yesterday   -Cont to monitor   4. Urinary frequency   -Noted UA negative for infection   -Pt having decreased sx of urinary frequency  5. DVT prophylaxis    -Full dose ASA BID per ortho   -SCD's when in bed    Poss D/C to [facility] tomorrow if labs stable    Assessment and plan of care discussed with supervising physician. Electronically signed by YASMINE Daniels - CNP on 4/6/2021 at 11:25 AM    I have discussed the case, including pertinent history and exam findings with the NP. I have seen and examined the patient and the key elements of the encounter have been performed by me. I agree with the assessment, plan and orders as documented by the NP.     Electronically signed by Simon Malik MD on 4/6/2021 at 3:24 PM

## 2021-04-06 NOTE — PROGRESS NOTES
[facility]  Occupational Therapy  Daily Note  Time:   Time In: 2935  Time Out: 1430  Timed Code Treatment Minutes: 31 Minutes  Minutes: 31    Date: 2021  Patient Name: Tiffani Harrison,   Gender: female      Room: [facility]013-A  MRN: [id number]  : 1935  (81 y.o.)  Referring Practitioner: Tena Rock MD  Diagnosis: Fall at nursing home, initial encounter  Additional Pertinent Hx: The patient is a 81 y.o. female with multiple medical comorbidities who ortho was consulted for the above noted complaint. Patient resides in [facility] and was noted to have a fall prior to arrival. She attempted to go to the bathroom independently and lost her balance falling onto the right hip. She had severe pain and was unable to get up and ambulate after the fall. Son accompanies the patient and states she has had a progressive decline health and ambulation over the past month. She was transferred to [facility] ED per EMS and was found to be hypotensive. Currently she is complaining of right hip pain and denies other injuries. X-rays show a comminuted displaced right intertrochanteric fracture. Pt with recent history of rib fractures on left. Patient s/p RIGHT HIP INTERTROCHANTERIC NAILING . Restrictions/Precautions:  Restrictions/Precautions: Weight Bearing, Fall Risk  Right Lower Extremity Weight Bearing: Weight Bearing As Tolerated     SUBJECTIVE: RN okayed OT session. Upon arrival patient was sitting up in recliner. Pt was agreeable to OT session. PAIN: 7/10: Right Hip     Vitals: Vitals not assessed per clinical judgement, see nursing flowsheet    COGNITION: Slow Processing, Decreased Recall, Decreased Insight, Decreased Problem Solving and Decreased Safety Awareness    ADL:   No ADL's completed this session.-Pt declined need to complete self cares. BALANCE:  Sitting Balance:  Stand By Assistance. sitting forward in recliner.    Standing Balance: Contact Guard Assistance, Minimal no LTG d/t short ELOS    Following session, patient left in safe position with all fall risk precautions in place.

## 2021-04-06 NOTE — PROGRESS NOTES
Patient observed resting with eyes closed in chair. Call light within reach. Wheels locked. Denies needs.  Yesenia Islas [facility]

## 2021-04-06 NOTE — PROGRESS NOTES
Orthopaedic Progress Note      SUBJECTIVE:    Chief Complaint   Patient presents with    Fall    Hip Pain   POD 4 ORIF right femur  Seen in bed, pain well controlled with medications. Denies numbness and tingling. Physical    Vitals:    04/06/21 0815   BP: 122/66   Pulse: 86   Resp: 18   Temp: 97.4 °F (36.3 °C)   SpO2: 96%         OBJECTIVE  RLE dressings c/d/i. Flex and extends toes and ankle. Soft compartments. ROM hip painful. DP 2+. SILT.        Data  CBC:   Lab Results   Component Value Date    WBC 5.4 04/06/2021    RBC 2.92 04/06/2021    HGB 8.5 04/06/2021    HCT 25.8 04/06/2021    MCV 88.4 04/06/2021    MCH 29.1 04/06/2021    MCHC 32.9 04/06/2021    RDW 14.5 09/21/2018     04/06/2021    MPV 10.1 04/06/2021     BMP:    Lab Results   Component Value Date     04/06/2021    K 3.4 04/06/2021    K 2.9 03/18/2021     04/06/2021    CO2 15 04/06/2021    BUN 15 04/06/2021    LABALBU 2.6 04/02/2021    CREATININE 0.7 04/06/2021    CALCIUM 8.5 04/06/2021    LABGLOM 80 04/06/2021    GLUCOSE 108 04/06/2021    GLUCOSE 112 09/12/2017     Uric Acid:  No components found for: URIC  PT/INR:    Lab Results   Component Value Date    INR 1.11 04/01/2021     PTT:    Lab Results   Component Value Date    APTT 27.3 04/01/2021   [APTT  Troponin:    Lab Results   Component Value Date    TROPONINI <0.006 04/07/2013     Urine Culture:  No components found for: CURINE    Current Inpatient Medications    Current Facility-Administered Medications: aspirin EC tablet 325 mg, 325 mg, Oral, BID  famotidine (PEPCID) tablet 20 mg, 20 mg, Oral, Daily  0.9 % sodium chloride infusion, , Intravenous, PRN  traMADol (ULTRAM) tablet 50 mg, 50 mg, Oral, Q6H PRN  lidocaine (XYLOCAINE) 2 % jelly, , Topical, Q4H PRN  promethazine (PHENERGAN) tablet 12.5 mg, 12.5 mg, Oral, Q6H PRN **OR** ondansetron (ZOFRAN) injection 4 mg, 4 mg, Intravenous, Q6H PRN  sennosides-docusate sodium (SENOKOT-S) 8.6-50 MG tablet 1 tablet, 1 tablet, Oral,

## 2021-04-06 NOTE — PROGRESS NOTES
Patient in chair. Alert, oriented to person, place, time, situation. Hair dry, shiny. Mucous membranes pink, moist. Skin warm, dry, appropriate for ethnicity. Noted right femur incision cover, Noted serous drainage. Intact. Heels elevated off of chair. Call light and over bed within reach. Wheels locked. Denies needs.  Nolan Pereira [facility]

## 2021-04-06 NOTE — PROGRESS NOTES
[address] [address]  INPATIENT PHYSICAL THERAPY  DAILY NOTE  [facility] 7K - 7K-13/013-A    Time In: 1001  Time Out: 1034  Timed Code Treatment Minutes: 33 Minutes  Minutes: 33          Date: 2021  Patient Name: Kathryn Shah,  Gender:  female        MRN: [id number]  : 1935  (81 y.o.)  Referral Date : 21  Referring Practitioner: Jania Terry MD  Diagnosis: Fall at nursing home, initial encounter  Additional Pertinent Hx: The patient is a 81 y.o. female with multiple medical comorbidities who ortho was consulted for the above noted complaint. Patient resides in [facility] and was noted to have a fall prior to arrival. She attempted to go to the bathroom independently and lost her balance falling onto the right hip. She had severe pain and was unable to get up and ambulate after the fall. Son accompanies the patient and states she has had a progressive decline health and ambulation over the past month. She was transferred to [facility] ED per EMS and was found to be hypotensive. Currently she is complaining of right hip pain and denies other injuries. X-rays show a comminuted displaced right intertrochanteric fracture. Pt with recent history of rib fractures on left. Patient s/p RIGHT HIP INTERTROCHANTERIC NAILING . Prior Level of Function:  Lives With: Family  Type of Home: House  Home Layout: One level  Home Access: Stairs to enter with rails  Entrance Stairs - Number of Steps: 1 small step ~2 inch threshold  Entrance Stairs - Rails: Both  Home Equipment: Rolling walker, Cane, [employer]   Bathroom Shower/Tub: Tub/Shower unit  Bathroom Toilet: Standard  Bathroom Equipment: Shower chair, Grab bars in shower    ADL Assistance: [place]: Needs assistance  Ambulation Assistance: Independent  Transfer Assistance: Independent  Active :  Yes  Additional Comments: Patient reports independent at [facility] with no AD, reports shares household tasks with sister    Restrictions/Precautions:  Restrictions/Precautions: Weight Bearing, Fall Risk  Right Lower Extremity Weight Bearing: Weight Bearing As Tolerated     SUBJECTIVE: RN approved session. Patient laying in bed upon arrival and agreeable to therapy. Patient incontinent of urine during session. PAIN: denies at rest, reports 4/10 R hip with weight bearing    Vitals: Vitals not assessed per clinical judgement, see nursing flowsheet    OBJECTIVE:  Bed Mobility:  Rolling to Right: Minimal Assistance, with head of bed raised, with verbal cues , with increased time for completion, cues to use bedrail   Supine to Sit: Moderate Assistance, with head of bed raised, with verbal cues , with increased time for completion  Scooting: Minimal Assistance    Transfers:  Sit to Stand: Minimal Assistance, with increased time for completion, cues for hand placement, with verbal cues  Stand to Sit:Minimal Assistance, Moderate Assistance, X 1, with increased time for completion, cues for hand placement, with verbal cues, Mod A to sit on BSC using RW, Min A to bedside chair from [facility]: Moderate Assistance, with increased time for completion, cues for hand placement, with verbal cues, used RW, bed>BSC, cues for sequencing, difficulty advancing either foot due to pain, requires Mod A to bring hips to safe position to sit  BSC>chair: Dependent, maya lockett for safety    Exercise:  Patient was guided in 1 set(s) 10 reps of exercise to both lower extremities. Ankle pumps, Glut sets, Quad sets, Heelslides, Hip abduction/adduction, Straight leg raises, Seated marches and Long arc quads. Exercises were completed for increased independence with functional mobility. Functional Outcome Measures: Completed  AM-PAC Inpatient Mobility Raw Score : 10  AM-PAC Inpatient T-Scale Score : 32.29    ASSESSMENT:  Assessment: Patient progressing toward established goals. Activity Tolerance:  Patient tolerance of  treatment: good.

## 2021-04-07 ENCOUNTER — APPOINTMENT (OUTPATIENT)
Dept: CT IMAGING | Age: 86
DRG: 481 | End: 2021-04-07
Payer: MEDICARE

## 2021-04-07 LAB
ANION GAP SERPL CALCULATED.3IONS-SCNC: 9 MEQ/L (ref 8–16)
BUN BLDV-MCNC: 13 MG/DL (ref 7–22)
CALCIUM SERPL-MCNC: 8.5 MG/DL (ref 8.5–10.5)
CHLORIDE BLD-SCNC: 109 MEQ/L (ref 98–111)
CO2: 16 MEQ/L (ref 23–33)
CREAT SERPL-MCNC: 0.6 MG/DL (ref 0.4–1.2)
D-DIMER QUANTITATIVE: 6757 NG/ML FEU (ref 0–500)
EKG ATRIAL RATE: 98 BPM
EKG P AXIS: 63 DEGREES
EKG P-R INTERVAL: 128 MS
EKG Q-T INTERVAL: 358 MS
EKG QRS DURATION: 86 MS
EKG QTC CALCULATION (BAZETT): 457 MS
EKG R AXIS: 25 DEGREES
EKG T AXIS: 61 DEGREES
EKG VENTRICULAR RATE: 98 BPM
ERYTHROCYTE [DISTWIDTH] IN BLOOD BY AUTOMATED COUNT: 16.1 % (ref 11.5–14.5)
ERYTHROCYTE [DISTWIDTH] IN BLOOD BY AUTOMATED COUNT: 50.5 FL (ref 35–45)
GFR SERPL CREATININE-BSD FRML MDRD: > 90 ML/MIN/1.73M2
GLUCOSE BLD-MCNC: 85 MG/DL (ref 70–108)
HCT VFR BLD CALC: 26.3 % (ref 37–47)
HEMOGLOBIN: 8.6 GM/DL (ref 12–16)
MCH RBC QN AUTO: 28.7 PG (ref 26–33)
MCHC RBC AUTO-ENTMCNC: 32.7 GM/DL (ref 32.2–35.5)
MCV RBC AUTO: 87.7 FL (ref 81–99)
PLATELET # BLD: 181 THOU/MM3 (ref 130–400)
PMV BLD AUTO: 9.8 FL (ref 9.4–12.4)
POTASSIUM SERPL-SCNC: 3 MEQ/L (ref 3.5–5.2)
POTASSIUM SERPL-SCNC: 4.3 MEQ/L (ref 3.5–5.2)
RBC # BLD: 3 MILL/MM3 (ref 4.2–5.4)
SODIUM BLD-SCNC: 134 MEQ/L (ref 135–145)
TROPONIN T: < 0.01 NG/ML
TROPONIN T: < 0.01 NG/ML
WBC # BLD: 4.5 THOU/MM3 (ref 4.8–10.8)

## 2021-04-07 PROCEDURE — 85027 COMPLETE CBC AUTOMATED: CPT

## 2021-04-07 PROCEDURE — 97110 THERAPEUTIC EXERCISES: CPT

## 2021-04-07 PROCEDURE — 85379 FIBRIN DEGRADATION QUANT: CPT

## 2021-04-07 PROCEDURE — 6370000000 HC RX 637 (ALT 250 FOR IP): Performed by: ORTHOPAEDIC SURGERY

## 2021-04-07 PROCEDURE — 97535 SELF CARE MNGMENT TRAINING: CPT

## 2021-04-07 PROCEDURE — 6370000000 HC RX 637 (ALT 250 FOR IP): Performed by: NURSE PRACTITIONER

## 2021-04-07 PROCEDURE — 84484 ASSAY OF TROPONIN QUANT: CPT

## 2021-04-07 PROCEDURE — 99223 1ST HOSP IP/OBS HIGH 75: CPT | Performed by: INTERNAL MEDICINE

## 2021-04-07 PROCEDURE — 97530 THERAPEUTIC ACTIVITIES: CPT

## 2021-04-07 PROCEDURE — 36415 COLL VENOUS BLD VENIPUNCTURE: CPT

## 2021-04-07 PROCEDURE — 93005 ELECTROCARDIOGRAM TRACING: CPT | Performed by: INTERNAL MEDICINE

## 2021-04-07 PROCEDURE — 6360000002 HC RX W HCPCS: Performed by: PHYSICIAN ASSISTANT

## 2021-04-07 PROCEDURE — 2580000003 HC RX 258: Performed by: INTERNAL MEDICINE

## 2021-04-07 PROCEDURE — 84132 ASSAY OF SERUM POTASSIUM: CPT

## 2021-04-07 PROCEDURE — 6360000002 HC RX W HCPCS: Performed by: INTERNAL MEDICINE

## 2021-04-07 PROCEDURE — 6370000000 HC RX 637 (ALT 250 FOR IP): Performed by: PHYSICIAN ASSISTANT

## 2021-04-07 PROCEDURE — 6370000000 HC RX 637 (ALT 250 FOR IP): Performed by: INTERNAL MEDICINE

## 2021-04-07 PROCEDURE — 80048 BASIC METABOLIC PNL TOTAL CA: CPT

## 2021-04-07 PROCEDURE — 1200000003 HC TELEMETRY R&B

## 2021-04-07 RX ORDER — PANTOPRAZOLE SODIUM 40 MG/1
40 TABLET, DELAYED RELEASE ORAL
Status: DISCONTINUED | OUTPATIENT
Start: 2021-04-08 | End: 2021-04-07

## 2021-04-07 RX ORDER — PANTOPRAZOLE SODIUM 40 MG/1
40 TABLET, DELAYED RELEASE ORAL
Status: DISCONTINUED | OUTPATIENT
Start: 2021-04-07 | End: 2021-04-07

## 2021-04-07 RX ORDER — SUCRALFATE 1 G/1
1 TABLET ORAL
Status: DISCONTINUED | OUTPATIENT
Start: 2021-04-07 | End: 2021-04-09 | Stop reason: HOSPADM

## 2021-04-07 RX ORDER — NITROGLYCERIN 0.4 MG/1
0.4 TABLET SUBLINGUAL EVERY 5 MIN PRN
Status: DISCONTINUED | OUTPATIENT
Start: 2021-04-07 | End: 2021-04-09 | Stop reason: HOSPADM

## 2021-04-07 RX ORDER — PANTOPRAZOLE SODIUM 40 MG/1
40 TABLET, DELAYED RELEASE ORAL
Status: DISCONTINUED | OUTPATIENT
Start: 2021-04-07 | End: 2021-04-09 | Stop reason: HOSPADM

## 2021-04-07 RX ORDER — MORPHINE SULFATE 2 MG/ML
2 INJECTION, SOLUTION INTRAMUSCULAR; INTRAVENOUS ONCE
Status: COMPLETED | OUTPATIENT
Start: 2021-04-07 | End: 2021-04-07

## 2021-04-07 RX ADMIN — SERTRALINE 25 MG: 50 TABLET, FILM COATED ORAL at 09:42

## 2021-04-07 RX ADMIN — NITROGLYCERIN 0.4 MG: 0.4 TABLET, ORALLY DISINTEGRATING SUBLINGUAL at 13:57

## 2021-04-07 RX ADMIN — SUCRALFATE 1 G: 1 TABLET ORAL at 20:53

## 2021-04-07 RX ADMIN — MORPHINE SULFATE 2 MG: 2 INJECTION, SOLUTION INTRAMUSCULAR; INTRAVENOUS at 17:44

## 2021-04-07 RX ADMIN — SODIUM CHLORIDE: 9 INJECTION, SOLUTION INTRAVENOUS at 09:41

## 2021-04-07 RX ADMIN — ASPIRIN 325 MG: 325 TABLET, COATED ORAL at 09:40

## 2021-04-07 RX ADMIN — Medication 1 TABLET: at 09:41

## 2021-04-07 RX ADMIN — POTASSIUM CHLORIDE 10 MEQ: 7.46 INJECTION, SOLUTION INTRAVENOUS at 10:39

## 2021-04-07 RX ADMIN — ACETAMINOPHEN 650 MG: 325 TABLET ORAL at 09:07

## 2021-04-07 RX ADMIN — POTASSIUM CHLORIDE 10 MEQ: 7.46 INJECTION, SOLUTION INTRAVENOUS at 09:40

## 2021-04-07 RX ADMIN — LATANOPROST 1 DROP: 50 SOLUTION OPHTHALMIC at 20:54

## 2021-04-07 RX ADMIN — NITROGLYCERIN 0.4 MG: 0.4 TABLET, ORALLY DISINTEGRATING SUBLINGUAL at 14:04

## 2021-04-07 RX ADMIN — POTASSIUM CHLORIDE 10 MEQ: 7.46 INJECTION, SOLUTION INTRAVENOUS at 15:07

## 2021-04-07 RX ADMIN — DOCUSATE SODIUM 50 MG AND SENNOSIDES 8.6 MG 1 TABLET: 8.6; 5 TABLET, FILM COATED ORAL at 09:40

## 2021-04-07 RX ADMIN — HYDROCODONE BITARTRATE AND ACETAMINOPHEN 1 TABLET: 5; 325 TABLET ORAL at 20:54

## 2021-04-07 RX ADMIN — POTASSIUM CHLORIDE 10 MEQ: 7.46 INJECTION, SOLUTION INTRAVENOUS at 12:57

## 2021-04-07 RX ADMIN — DOCUSATE SODIUM 100 MG: 100 CAPSULE, LIQUID FILLED ORAL at 09:40

## 2021-04-07 RX ADMIN — ASPIRIN 325 MG: 325 TABLET, COATED ORAL at 17:13

## 2021-04-07 RX ADMIN — PANTOPRAZOLE SODIUM 40 MG: 40 TABLET, DELAYED RELEASE ORAL at 20:53

## 2021-04-07 RX ADMIN — POTASSIUM CHLORIDE 10 MEQ: 7.46 INJECTION, SOLUTION INTRAVENOUS at 11:49

## 2021-04-07 RX ADMIN — NITROGLYCERIN 2 INCH: 20 OINTMENT TOPICAL at 20:53

## 2021-04-07 RX ADMIN — POTASSIUM CHLORIDE 10 MEQ: 7.46 INJECTION, SOLUTION INTRAVENOUS at 13:59

## 2021-04-07 RX ADMIN — POLYETHYLENE GLYCOL 3350 17 G: 17 POWDER, FOR SOLUTION ORAL at 09:40

## 2021-04-07 RX ADMIN — FAMOTIDINE 20 MG: 20 TABLET, FILM COATED ORAL at 09:40

## 2021-04-07 ASSESSMENT — PAIN DESCRIPTION - PROGRESSION
CLINICAL_PROGRESSION: NOT CHANGED
CLINICAL_PROGRESSION: GRADUALLY WORSENING

## 2021-04-07 ASSESSMENT — PAIN DESCRIPTION - FREQUENCY
FREQUENCY: CONTINUOUS

## 2021-04-07 ASSESSMENT — PAIN DESCRIPTION - PAIN TYPE
TYPE: SURGICAL PAIN
TYPE: ACUTE PAIN
TYPE: ACUTE PAIN

## 2021-04-07 ASSESSMENT — PAIN DESCRIPTION - ONSET
ONSET: ON-GOING
ONSET: ON-GOING

## 2021-04-07 ASSESSMENT — PAIN DESCRIPTION - ORIENTATION
ORIENTATION: UPPER;MID
ORIENTATION: RIGHT

## 2021-04-07 ASSESSMENT — PAIN - FUNCTIONAL ASSESSMENT: PAIN_FUNCTIONAL_ASSESSMENT: PREVENTS OR INTERFERES SOME ACTIVE ACTIVITIES AND ADLS

## 2021-04-07 ASSESSMENT — PAIN DESCRIPTION - LOCATION
LOCATION: HIP
LOCATION: HIP

## 2021-04-07 ASSESSMENT — PAIN SCALES - GENERAL
PAINLEVEL_OUTOF10: 6
PAINLEVEL_OUTOF10: 9
PAINLEVEL_OUTOF10: 6

## 2021-04-07 ASSESSMENT — PAIN DESCRIPTION - DESCRIPTORS
DESCRIPTORS: ACHING;DULL
DESCRIPTORS: PRESSURE
DESCRIPTORS: ACHING

## 2021-04-07 NOTE — PROGRESS NOTES
Progress note      Internal Medicine Specialities             Patient:  Brennon Lockhart  YOB: 1935    MRN: [id number]   Acct:  [de-identified]   7K-13/013-A  Primary Care Physician: Felix Fallon MD    Admit Date: 4/1/2021           Subjective: had a chest pain this afternoon that responded to nitro. Patient says the pain is associated with respiration. Objective:      Physical Exam:    Vitals:  Patient Vitals for the past 24 hrs:   BP Temp Temp src Pulse Resp SpO2   04/07/21 1419 120/60 -- -- 92 18 95 %   04/07/21 1357 (!) 144/69 97.8 °F (36.6 °C) Oral 92 18 97 %   04/07/21 0915 133/60 97.5 °F (36.4 °C) Oral 90 18 96 %   04/07/21 0445 (!) 142/64 98 °F (36.7 °C) Oral 88 16 92 %   04/06/21 2130 (!) 175/77 97.9 °F (36.6 °C) Oral 88 18 97 %   04/06/21 1601 118/72 97.1 °F (36.2 °C) Oral 72 18 94 %     Weight: Weight: 138 lb 9.6 oz (62.9 kg)     24 hour intake/output:    Intake/Output Summary (Last 24 hours) at 4/7/2021 1454  Last data filed at 4/7/2021 1336  Gross per 24 hour   Intake 1198.53 ml   Output 900 ml   Net 298.53 ml       General appearance - alert, well appearing, and in no distress  Eyes - pupils equal and reactive, extraocular eye movements intact  Mouth - mucous membranes moist, pharynx normal without lesions  Neck - supple, no significant adenopathy  Chest - clear to auscultation, no wheezes, rales or rhonchi, symmetric air entry  Heart - normal rate, regular rhythm, normal S1, S2, no murmurs, rubs, clicks or gallops  Abdomen - soft, nontender, nondistended, no masses or organomegaly, pos bs.   Neurological - alert, oriented, normal speech, no focal findings or movement disorder noted  Musculoskeletal - no joint tenderness, deformity or swelling  Extremities - peripheral pulses normal, no pedal edema, no clubbing or cyanosis  Skin - normal coloration and turgor, no rashes, no suspicious skin lesions noted    Review of Labs and Diagnostic Testing:    CBC:   Recent Labs     04/07/21  0630   WBC 4.5*   HGB 8.6*   HCT 26.3*   MCV 87.7        BMP:   Recent Labs     04/07/21  0630   *   K 3.0*      CO2 16*   BUN 13   CREATININE 0.6   CALCIUM 8.5   GLUCOSE 85     PT/INR: No results for input(s): PROTIME, INR in the last 72 hours. APTT: No results for input(s): APTT in the last 72 hours. Lipids: No results for input(s): ALKPHOS, ALT, AST, BILITOT, BILIDIR, LABALBU, AMYLASE, LIPASE in the last 72 hours. Troponin: No results for input(s): TROPONINT in the last 72 hours. Imaging:  [unfilled]    EKG:      Diet: Dietary Nutrition Supplements: Standard High Calorie Oral Supplement  DIET GENERAL;        Data:   Scheduled Meds: Scheduled Meds:   nitroglycerin  2 inch Topical 4 times per day    aspirin  325 mg Oral BID    famotidine  20 mg Oral Daily    sennosides-docusate sodium  1 tablet Oral BID    sodium chloride flush  10 mL Intravenous 2 times per day    docusate sodium  100 mg Oral Daily    latanoprost  1 drop Both Eyes Nightly    therapeutic multivitamin-minerals  1 tablet Oral Daily    sertraline  25 mg Oral Daily     Continuous Infusions:   sodium chloride      sodium chloride      sodium chloride 50 mL/hr at 04/07/21 0941     PRN Meds:.nitroGLYCERIN, sodium chloride, traMADol, lidocaine, promethazine **OR** ondansetron, magnesium hydroxide, sodium chloride flush, sodium chloride, acetaminophen, polyethylene glycol, potassium chloride, HYDROcodone 5 mg - acetaminophen **OR** HYDROcodone 5 mg - acetaminophen, fentanNYL **OR** fentanNYL  Continuous Infusions:   sodium chloride      sodium chloride      sodium chloride 50 mL/hr at 04/07/21 0941         Assessment/Plan   1. S/P right hip intertrochanteric nailing   -Pt has less pain. Taking Norco daily but no fentanyl   -Cont to work with PT/OT; Explained importance of ambulating   -Educated on use of incentive spirometry  2.  Decreased

## 2021-04-07 NOTE — PROGRESS NOTES
[facility]  INPATIENT PHYSICAL THERAPY  DAILY NOTE  [facility] ORTHOPEDICS 7K - 7K-13/013-A    Time In: 5646  Time Out: 8100  Timed Code Treatment Minutes: 35 Minutes  Minutes: 33          Date: 2021  Patient Name: Kei Simmons,  Gender:  female        MRN: [id number]  : 1935  (81 y.o.)  Referral Date : 21  Referring Practitioner: Venkat Hidalgo MD  Diagnosis: Fall at nursing home, initial encounter  Additional Pertinent Hx: The patient is a 81 y.o. female with multiple medical comorbidities who ortho was consulted for the above noted complaint. Patient resides in [facility] and was noted to have a fall prior to arrival. She attempted to go to the bathroom independently and lost her balance falling onto the right hip. She had severe pain and was unable to get up and ambulate after the fall. Son accompanies the patient and states she has had a progressive decline health and ambulation over the past month. She was transferred to [facility] ED per EMS and was found to be hypotensive. Currently she is complaining of right hip pain and denies other injuries. X-rays show a comminuted displaced right intertrochanteric fracture. Pt with recent history of rib fractures on left. Patient s/p RIGHT HIP INTERTROCHANTERIC NAILING . Prior Level of Function:  Lives With: Family  Type of Home: House  Home Layout: One level  Home Access: Stairs to enter with rails  Entrance Stairs - Number of Steps: 1 small step ~2 inch threshold  Entrance Stairs - Rails: Both  Home Equipment: Rolling walker, Cane, [employer]   Bathroom Shower/Tub: Tub/Shower unit  Bathroom Toilet: Standard  Bathroom Equipment: Shower chair, Grab bars in shower    ADL Assistance: [address]: Needs assistance  Ambulation Assistance: Independent  Transfer Assistance: Independent  Active :  Yes  Additional Comments: Patient reports independent at [facility] with no AD, reports shares household tasks with sister    Restrictions/Precautions:  Restrictions/Precautions: Weight Bearing, Fall Risk  Right Lower Extremity Weight Bearing: Weight Bearing As Tolerated     SUBJECTIVE: pt in chair on arrival, pleasant, and agreeable to therapy. PAIN: 9/10: R hip all the way down to R foot. Nursing notified and pt given pain meds. Vitals: Vitals not assessed per clinical judgement, see nursing flowsheet    OBJECTIVE:  Bed Mobility:  Not Tested    Transfers:  Sit to Stand: Mod Assistance first time, [address] 2nd time- chair to walker, stood for 15\" and 1'20\" with B UE support. Stand to [place] [id number]- walker to chair  Pt required verbal cues for hand placement for safety and to scoot feet back further underneath of her. Pt limited d/t pain in R LE. Ambulation:  Not Tested    Balance:  Static Sitting Balance: Stand By Assistance- in prep for sit<>stand, rest breaks  Dynamic Sitting Balance: Stand By Assistance- pt completed seated LE exercises  Static Standing Balance: Contact Guard Assistance- Pt stood for 15\" x1 and 1'20\" x1 at walker with B UE support. Exercise:  Patient was guided in 1 set(s) 10 reps of exercise to both lower extremities. Ankle pumps, Glut sets, Quad sets, Heelslides, Short arc quads and Hip abduction/adduction. Pt requires Sarah to get through full ROM for R LE exercises d/t pain. Exercises were completed for increased independence with functional mobility. Functional Outcome Measures: Completed  -PAC Inpatient Mobility Raw Score : 10  -PAC Inpatient T-Scale Score : 32.29    ASSESSMENT:  Assessment: Patient progressing toward established goals. Activity Tolerance:  Patient tolerance of  treatment: fair. Pt able to complete seated LE exercises, however requires Sarah for all exercises on R LE d/t pain. Pt states, \"it just won't move. \" Pt able to statically stand at walker this date for 15\" and 1'20\". Pt limited by pain.       Equipment Recommendations:Equipment

## 2021-04-07 NOTE — PROGRESS NOTES
[facility] THERAPY MISSED TREATMENT NOTE  [facility] ORTHOPEDICS 7K      Date: 2021  Patient Name: Effie Moore        MRN: [id number]    : 1935  (81 y.o.)    REASON FOR MISSED TREATMENT:  Attempted to see patient for completion of speech therapy treatment session at 1400; upon attempt patient unavailable d/t testing/scan entering room.  Will hold this date and re-attempt     STEPHAN Topete Dire

## 2021-04-07 NOTE — FLOWSHEET NOTE
[facility] 88 PROGRESS NOTE      Patient: Kei Simmons  Room #: 0V-34/123-U            YOB: 1935  Age: 81 y.o. Gender: female            Admit Date & Time: 4/1/2021 11:08 AM    Assessment:  After entering the room this student  immediately noticed the smile on the 84yo patient. She was happy to have a visitor. She mentioned being in a ,\"little bit of pain. \"    Interventions:  I actively listened to her tell how her late  would dress like Narendra Bustamante for a day in their little town. She showed me a video of her grand daughter and we talked about her future with he new knew. She asked for prayer      Outcomes:  Calm and peace and her smill along with thankful gratitude. Plan: 1. Continue spiritual care visits.     Electronically signed by Ken Zendejas, on 4/7/2021 at 2:56 PM.  [address] [place] [address]  [phone]

## 2021-04-07 NOTE — CARE COORDINATION
4/7/21, 3:02 PM EDT    DISCHARGE  [address] day: 6  Location: [facility]13/013- Reason for admit: Fall at nursing home, initial encounter [W19. Elizabeth Bautista, [id number]]   Procedure: 4/2/21 surgery by Dr Ubaldo Forrester: RIGHT HIP INTERTROCHANTERIC NAILING  Barriers to Discharge: PT/OT. FWBAT. Pain management. Hgb 8.6  CO2 16.  K+ 3.0  Na 134. IV fluids. Hold Lovenox. Cardiology consult for chest pain. PCP: Zoe Cagle MD  Readmission Risk Score: 17%  Patient Goals/Plan/Treatment Preferences: Dr Benito Flood noted plan for discharge to [facility] tomorrow if labs stable.

## 2021-04-07 NOTE — PROGRESS NOTES
[facility]  Occupational Therapy  Daily Note  Time:   Time In: 6842  Time Out: 1345  Timed Code Treatment Minutes: 30 Minutes  Minutes: 30          Date: 2021  Patient Name: Oleg Lott,   Gender: female      Room: [facility]13/013-A  MRN: [id number]  : 1935  (81 y.o.)  Referring Practitioner: Tracie Díaz MD  Diagnosis: Fall at nursing home, initial encounter  Additional Pertinent Hx: The patient is a 81 y.o. female with multiple medical comorbidities who ortho was consulted for the above noted complaint. Patient resides in [facility] and was noted to have a fall prior to arrival. She attempted to go to the bathroom independently and lost her balance falling onto the right hip. She had severe pain and was unable to get up and ambulate after the fall. Son accompanies the patient and states she has had a progressive decline health and ambulation over the past month. She was transferred to [address] [place] [address] ED per EMS and was found to be hypotensive. Currently she is complaining of right hip pain and denies other injuries. X-rays show a comminuted displaced right intertrochanteric fracture. Pt with recent history of rib fractures on left. Patient s/p RIGHT HIP INTERTROCHANTERIC NAILING . Restrictions/Precautions:  Restrictions/Precautions: Weight Bearing, Fall Risk  Right Lower Extremity Weight Bearing: Weight Bearing As Tolerated     SUBJECTIVE: Pt. Nurse approved OT treatment. Pt. On the [facility] upon entry. Pt. Agreeable to OT treatment. PAIN: reports 0/10:    Vitals: Vitals not assessed per clinical judgement, see nursing flowsheet    COGNITION: Slow Processing, Decreased Recall, Decreased Insight, Decreased Problem Solving and Decreased Safety Awareness    ADL:   Toileting: Dependent, X 2, with set-up and with verbal cues . Pt. D to complete hygiene and clothing managment. Toilet Transfer: Maximum Assistance, X 2, with verbal cues  and with increased time for completion. Jose Raul Alcantar BALANCE:  Sitting Balance:  Stand By Assistance. while seated at EOB  Standing Balance: [employer]. inside maya stedy    BED MOBILITY:  Supine to Sit: Maximum Assistance, X 1 with LEs    TRANSFERS:  Sit to Stand: Moderate Assistance, X 2, cues for hand placement. Stand to Sit: Contact Guard Assistance, X 2, cues for hand placement. FUNCTIONAL MOBILITY:  Assistive Device: maya stedy  Assist Level: Moderate Assistance and X 2. Distance: from [facility] to EOB       ADDITIONAL ACTIVITIES:  Patient completed BUE strengthening exercises with skilled education on HEP: completed x10 reps x1 set with AROM in all joints and all planes in order to improve UE strength and activity tolerance required for BADL routine and toilet / shower transfers. Patient tolerated fair, requiring brief rest breaks. Patient also required visual and verbal cues for technique. ASSESSMENT:     Activity Tolerance:  Patient tolerance of  treatment: fair.        Discharge Recommendations: Subacute/Skilled Nursing Facility   Equipment Recommendations: Equipment Needed: No  Plan: Times per week: 6x  Times per day: Daily  Current Treatment Recommendations: Strengthening, Balance Training, Functional Mobility Training, Endurance Training, Home Management Training, Patient/Caregiver Education & Training, Self-Care / ADL, Safety Education & Training    Patient Education  Patient Education: ADL's, Home Exercise Program, Equipment Education, Importance of Increasing Activity and Assistive Device Safety    Goals  Short term goals  Time Frame for Short term goals: by discharge  Short term goal 1: Pt will complete sit to stand t/fs and no > than Mod A x1 and no safety cues in prep for t/fs to [facility]  Short term goal 2: Pt will tolerate dynamic standing with 1-2 hand release and no safety cues wtih Min A to increase indep with grooming  Short term goal 3: Pt will complete LB dressing with AE prn and no VC for technique to increase ease with dressing  Short term goal 4: Pt will complete functional pivot t/fs to/from various surfaces with Mod A x 2 and min vcs for safety to increase indep with toileting. Short term goal 5: Pt will tolerate further assessment of mobility when appropriate. Long term goals  Time Frame for Long term goals : no LTG d/t short ELOS    Following session, patient left in safe position with all fall risk precautions in place.

## 2021-04-07 NOTE — PROGRESS NOTES
Patient complaining of chest pain, described as a pressure by the patient. Dr. Thomas Matson notified and orders received. Awaiting stat ekg.

## 2021-04-07 NOTE — CONSULTS
Chest pain   CLOSED DISPLACED INTERTROCHANTERIC FRACTURE OF RIGHT FEMUR  S/p fall  S/P right hip intertrochanteric nailing 4/3/21  Post op day 5  Copd  GERD  HTN  HLP    Plan  Serial ekg and trop  Echo  NTG PRN  Morphine 2 mg  Sq x1  Re-Start protonix  Will follow  [id number]    Ha Rose MD

## 2021-04-08 ENCOUNTER — APPOINTMENT (OUTPATIENT)
Dept: CT IMAGING | Age: 86
DRG: 481 | End: 2021-04-08
Payer: MEDICARE

## 2021-04-08 PROBLEM — E43 SEVERE MALNUTRITION (HCC): Chronic | Status: ACTIVE | Noted: 2021-04-08

## 2021-04-08 LAB
ANION GAP SERPL CALCULATED.3IONS-SCNC: 10 MEQ/L (ref 8–16)
BUN BLDV-MCNC: 11 MG/DL (ref 7–22)
CALCIUM SERPL-MCNC: 8.3 MG/DL (ref 8.5–10.5)
CHLORIDE BLD-SCNC: 109 MEQ/L (ref 98–111)
CO2: 15 MEQ/L (ref 23–33)
CREAT SERPL-MCNC: 0.7 MG/DL (ref 0.4–1.2)
EKG ATRIAL RATE: 89 BPM
EKG P AXIS: 59 DEGREES
EKG P-R INTERVAL: 132 MS
EKG Q-T INTERVAL: 364 MS
EKG QRS DURATION: 84 MS
EKG QTC CALCULATION (BAZETT): 442 MS
EKG R AXIS: 20 DEGREES
EKG T AXIS: 43 DEGREES
EKG VENTRICULAR RATE: 89 BPM
ERYTHROCYTE [DISTWIDTH] IN BLOOD BY AUTOMATED COUNT: 16.8 % (ref 11.5–14.5)
ERYTHROCYTE [DISTWIDTH] IN BLOOD BY AUTOMATED COUNT: 53.4 FL (ref 35–45)
GFR SERPL CREATININE-BSD FRML MDRD: 80 ML/MIN/1.73M2
GLUCOSE BLD-MCNC: 87 MG/DL (ref 70–108)
HCT VFR BLD CALC: 26.8 % (ref 37–47)
HEMOGLOBIN: 8.6 GM/DL (ref 12–16)
LV EF: 60 %
LVEF MODALITY: NORMAL
MCH RBC QN AUTO: 28.8 PG (ref 26–33)
MCHC RBC AUTO-ENTMCNC: 32.1 GM/DL (ref 32.2–35.5)
MCV RBC AUTO: 89.6 FL (ref 81–99)
PLATELET # BLD: 167 THOU/MM3 (ref 130–400)
PMV BLD AUTO: 9.6 FL (ref 9.4–12.4)
POTASSIUM SERPL-SCNC: 3.4 MEQ/L (ref 3.5–5.2)
RBC # BLD: 2.99 MILL/MM3 (ref 4.2–5.4)
SODIUM BLD-SCNC: 134 MEQ/L (ref 135–145)
TROPONIN T: < 0.01 NG/ML
TROPONIN T: < 0.01 NG/ML
WBC # BLD: 4.6 THOU/MM3 (ref 4.8–10.8)

## 2021-04-08 PROCEDURE — 97530 THERAPEUTIC ACTIVITIES: CPT

## 2021-04-08 PROCEDURE — 6370000000 HC RX 637 (ALT 250 FOR IP): Performed by: NURSE PRACTITIONER

## 2021-04-08 PROCEDURE — 2580000003 HC RX 258: Performed by: PHYSICIAN ASSISTANT

## 2021-04-08 PROCEDURE — 80048 BASIC METABOLIC PNL TOTAL CA: CPT

## 2021-04-08 PROCEDURE — 93306 TTE W/DOPPLER COMPLETE: CPT

## 2021-04-08 PROCEDURE — 97535 SELF CARE MNGMENT TRAINING: CPT

## 2021-04-08 PROCEDURE — 36415 COLL VENOUS BLD VENIPUNCTURE: CPT

## 2021-04-08 PROCEDURE — 84484 ASSAY OF TROPONIN QUANT: CPT

## 2021-04-08 PROCEDURE — 6370000000 HC RX 637 (ALT 250 FOR IP): Performed by: PHYSICIAN ASSISTANT

## 2021-04-08 PROCEDURE — 97110 THERAPEUTIC EXERCISES: CPT

## 2021-04-08 PROCEDURE — 94760 N-INVAS EAR/PLS OXIMETRY 1: CPT

## 2021-04-08 PROCEDURE — 99232 SBSQ HOSP IP/OBS MODERATE 35: CPT | Performed by: STUDENT IN AN ORGANIZED HEALTH CARE EDUCATION/TRAINING PROGRAM

## 2021-04-08 PROCEDURE — 6360000004 HC RX CONTRAST MEDICATION: Performed by: INTERNAL MEDICINE

## 2021-04-08 PROCEDURE — 6370000000 HC RX 637 (ALT 250 FOR IP): Performed by: INTERNAL MEDICINE

## 2021-04-08 PROCEDURE — 85027 COMPLETE CBC AUTOMATED: CPT

## 2021-04-08 PROCEDURE — 1200000003 HC TELEMETRY R&B

## 2021-04-08 PROCEDURE — 2580000003 HC RX 258: Performed by: INTERNAL MEDICINE

## 2021-04-08 PROCEDURE — 71275 CT ANGIOGRAPHY CHEST: CPT

## 2021-04-08 PROCEDURE — 6370000000 HC RX 637 (ALT 250 FOR IP): Performed by: STUDENT IN AN ORGANIZED HEALTH CARE EDUCATION/TRAINING PROGRAM

## 2021-04-08 RX ORDER — POTASSIUM CHLORIDE 20 MEQ/1
20 TABLET, EXTENDED RELEASE ORAL PRN
Status: DISCONTINUED | OUTPATIENT
Start: 2021-04-08 | End: 2021-04-09 | Stop reason: HOSPADM

## 2021-04-08 RX ORDER — POTASSIUM CHLORIDE 20 MEQ/1
40 TABLET, EXTENDED RELEASE ORAL PRN
Status: DISCONTINUED | OUTPATIENT
Start: 2021-04-08 | End: 2021-04-09 | Stop reason: HOSPADM

## 2021-04-08 RX ORDER — POTASSIUM CHLORIDE 7.45 MG/ML
10 INJECTION INTRAVENOUS PRN
Status: DISCONTINUED | OUTPATIENT
Start: 2021-04-08 | End: 2021-04-09 | Stop reason: HOSPADM

## 2021-04-08 RX ADMIN — SODIUM CHLORIDE, PRESERVATIVE FREE 10 ML: 5 INJECTION INTRAVENOUS at 09:38

## 2021-04-08 RX ADMIN — SUCRALFATE 1 G: 1 TABLET ORAL at 17:28

## 2021-04-08 RX ADMIN — LATANOPROST 1 DROP: 50 SOLUTION OPHTHALMIC at 20:17

## 2021-04-08 RX ADMIN — NITROGLYCERIN 2 INCH: 20 OINTMENT TOPICAL at 20:21

## 2021-04-08 RX ADMIN — SUCRALFATE 1 G: 1 TABLET ORAL at 20:17

## 2021-04-08 RX ADMIN — ASPIRIN 325 MG: 325 TABLET, COATED ORAL at 09:37

## 2021-04-08 RX ADMIN — SODIUM CHLORIDE, PRESERVATIVE FREE 10 ML: 5 INJECTION INTRAVENOUS at 20:17

## 2021-04-08 RX ADMIN — NITROGLYCERIN 2 INCH: 20 OINTMENT TOPICAL at 04:36

## 2021-04-08 RX ADMIN — PANTOPRAZOLE SODIUM 40 MG: 40 TABLET, DELAYED RELEASE ORAL at 06:15

## 2021-04-08 RX ADMIN — SERTRALINE 25 MG: 50 TABLET, FILM COATED ORAL at 09:38

## 2021-04-08 RX ADMIN — PANTOPRAZOLE SODIUM 40 MG: 40 TABLET, DELAYED RELEASE ORAL at 17:28

## 2021-04-08 RX ADMIN — HYDROCODONE BITARTRATE AND ACETAMINOPHEN 1 TABLET: 5; 325 TABLET ORAL at 06:15

## 2021-04-08 RX ADMIN — NITROGLYCERIN 2 INCH: 20 OINTMENT TOPICAL at 09:37

## 2021-04-08 RX ADMIN — HYDROCODONE BITARTRATE AND ACETAMINOPHEN 2 TABLET: 5; 325 TABLET ORAL at 01:51

## 2021-04-08 RX ADMIN — FAMOTIDINE 20 MG: 20 TABLET, FILM COATED ORAL at 09:37

## 2021-04-08 RX ADMIN — ASPIRIN 325 MG: 325 TABLET, COATED ORAL at 17:28

## 2021-04-08 RX ADMIN — IOPAMIDOL 80 ML: 755 INJECTION, SOLUTION INTRAVENOUS at 03:54

## 2021-04-08 RX ADMIN — POTASSIUM CHLORIDE 40 MEQ: 1500 TABLET, EXTENDED RELEASE ORAL at 09:37

## 2021-04-08 RX ADMIN — NITROGLYCERIN 2 INCH: 20 OINTMENT TOPICAL at 17:28

## 2021-04-08 RX ADMIN — SUCRALFATE 1 G: 1 TABLET ORAL at 06:15

## 2021-04-08 RX ADMIN — HYDROCODONE BITARTRATE AND ACETAMINOPHEN 2 TABLET: 5; 325 TABLET ORAL at 20:17

## 2021-04-08 RX ADMIN — Medication 1 TABLET: at 09:38

## 2021-04-08 RX ADMIN — SODIUM CHLORIDE: 9 INJECTION, SOLUTION INTRAVENOUS at 06:18

## 2021-04-08 ASSESSMENT — PAIN DESCRIPTION - DESCRIPTORS
DESCRIPTORS: ACHING;DULL
DESCRIPTORS: ACHING
DESCRIPTORS: ACHING;DULL
DESCRIPTORS: ACHING;DULL
DESCRIPTORS: ACHING

## 2021-04-08 ASSESSMENT — PAIN DESCRIPTION - FREQUENCY
FREQUENCY: CONTINUOUS

## 2021-04-08 ASSESSMENT — PAIN DESCRIPTION - ONSET
ONSET: ON-GOING

## 2021-04-08 ASSESSMENT — PAIN DESCRIPTION - ORIENTATION
ORIENTATION: RIGHT
ORIENTATION: ANTERIOR;MID;UPPER
ORIENTATION: RIGHT

## 2021-04-08 ASSESSMENT — PAIN - FUNCTIONAL ASSESSMENT: PAIN_FUNCTIONAL_ASSESSMENT: PREVENTS OR INTERFERES SOME ACTIVE ACTIVITIES AND ADLS

## 2021-04-08 ASSESSMENT — PAIN SCALES - GENERAL
PAINLEVEL_OUTOF10: 9
PAINLEVEL_OUTOF10: 6
PAINLEVEL_OUTOF10: 4
PAINLEVEL_OUTOF10: 7
PAINLEVEL_OUTOF10: 5
PAINLEVEL_OUTOF10: 7

## 2021-04-08 ASSESSMENT — PAIN DESCRIPTION - PROGRESSION
CLINICAL_PROGRESSION: GRADUALLY IMPROVING
CLINICAL_PROGRESSION: GRADUALLY IMPROVING
CLINICAL_PROGRESSION: NOT CHANGED
CLINICAL_PROGRESSION: GRADUALLY WORSENING
CLINICAL_PROGRESSION: NOT CHANGED
CLINICAL_PROGRESSION: GRADUALLY WORSENING
CLINICAL_PROGRESSION: GRADUALLY WORSENING
CLINICAL_PROGRESSION: NOT CHANGED
CLINICAL_PROGRESSION: GRADUALLY IMPROVING
CLINICAL_PROGRESSION: GRADUALLY IMPROVING
CLINICAL_PROGRESSION: NOT CHANGED

## 2021-04-08 ASSESSMENT — PAIN DESCRIPTION - PAIN TYPE
TYPE: ACUTE PAIN
TYPE: ACUTE PAIN;SURGICAL PAIN
TYPE: ACUTE PAIN;SURGICAL PAIN
TYPE: ACUTE PAIN
TYPE: ACUTE PAIN;SURGICAL PAIN

## 2021-04-08 ASSESSMENT — PAIN DESCRIPTION - LOCATION
LOCATION: HIP
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
## 2021-04-08 NOTE — CARE COORDINATION
4/8/21, 10:45 AM EDT    DISCHARGE  [address] day: 7  Location: [facility]13/013-A Reason for admit: Fall at nursing home, initial encounter [W19. Jatinder Smith, R41.822]   Procedure:  4/2/21 surgery by Dr Randi Pabon  Barriers to Discharge: PT/OT. FWBAT. Pain management. Hgb 8.6   K+ 3.4  Na 134. Troponin 0.010 x3. IV fluids. Hold Lovenox. Cardiology consult for chest pain: Serial ekg and trop. Echo. NTG PRN. Morphine 2 mg  Sq x1. Re-Start protonix  PCP: Darek Borjas MD  Readmission Risk Score: 21%  Patient Goals/Plan/Treatment Preferences: Planning [facility] E[facility], when medically stable. Dr Dianne Hughes told staff - plan Friday discharge. Await input from Dr Kel Hopkins today.

## 2021-04-08 NOTE — CARE COORDINATION
4/8/21, 11:53 AM EDT    DISCHARGE PLANNING EVALUATION      Planning return to [facility], anticipated for tomorrow. Facility will accept at discharge. Transfer packet is on chart.

## 2021-04-08 NOTE — CONSULTS
[address] [id number]                                  CONSULTATION    PATIENT NAME: Evangelista Quach                 :        1935  MED REC NO:   [id number]                           ROOM:       0013  ACCOUNT NO:   [de-identified]                           ADMIT DATE: 2021  PROVIDER:     Della Diego. LESLEY Cowart Read:  2021    REASON FOR CONSULTATION:  Chest pain today, around 2:00 p.m. in the  afternoon. REASON FOR ADMISSION:  Status post fall and right femoral neck fracture  and had an open reduction and internal fixation on 2021. PRIMARY CARDIOLOGIST:  Luciano Arredondo MD.    HISTORY OF PRESENT ILLNESS:  This is an 29-year-old female patient,  presented from nursing home. Basically, she under normal circumstances  walks with a walker and today she lost her balance and fell at the  nursing home and presented to the emergency room, and she was found to  have a right hip fracture. The patient denied having any loss of  consciousness. Denied having any chest pain on admission or palpitation  or any new shortness of breath. So, basically she had an x-ray done,  which confirmed right intertrochanteric fracture of the femoral neck and  Orthopedic has been consulted. Orthopedic evaluation was done and so  she has right hip intertrochanteric nailing, basically open reduction  and internal fixation was performed on 2021, and the patient has  been doing well until this afternoon today. Around 2:00 p.m., she was  noted to have chest pain, pressure, retrosternal, heaviness, like 4/10,  radiating. No associated shortness of breath. No nausea or vomiting. No diaphoresis. She was given two sublingual nitroglycerins without the  chest pain, got a little better, around 1-2/10, but still she feels  there is some discomfort there.   It is constant and it is not  intermittent and never had this type of pain before, but, she has  history of gastroesophageal reflux disease, she is on Pepcid. No known  history of coronary artery disease. REVIEW OF SYSTEMS:  Negative except the above-mentioned ones. PAST MEDICAL HISTORY:  Includes history of COPD, gastroesophageal reflux  disease, hypertension, hyperlipidemia, back pain, compression fracture,  osteoarthritis. PAST SURGICAL HISTORY:  Appendectomy, back surgery, bunionectomy,  rotator cuff repair. HOME MEDICATIONS:  Prior to this presentation:  Tylenol, Imdur 30,  Xalatan, Lopressor 12.5 twice a day, multivitamin, nitroglycerin,  Protonix 40 twice a day before meal, potassium chloride, Zocor, Zoloft  and sucralfate. She is already also on Carafate and Zanaflex, basically  four times a day. FAMILY HISTORY:  No family history of premature coronary artery disease. SOCIAL HISTORY:  She is a former smoker, quit in [employer]. No alcohol. No  drug use. ALLERGIES:  No known drug allergies. PHYSICAL EXAMINATION:  GENERAL:  Looks sick, in no form of distress. VITAL SIGNS:  Blood pressure 120/60, pulse rate 92, respiratory rate 18,  and temperature 97.8. HEENT:  Pale conjunctivae. Nonicteric sclerae. Pupils are equal and  reactive bilaterally to light. NECK:  No lymphadenopathy. No goiter. No JVD. CHEST:  Clear to auscultation and percussion. CARDIOVASCULAR:  Arteries are felt; carotid, femoral, pedal.  No bruits. S1, S2 well heard. No murmur or gallop rhythm. ABDOMEN:  Soft, nontender, nondistended. Bowel sounds are positive. GENITOURINARY:  No CVA, flank or suprapubic tenderness. LOCOMOTOR:  No cyanosis, clubbing, muscle or joint tenderness. SKIN:  No rashes, ulcers or nodules. CNS:  Alert, awake, and oriented to time, person, and place. Cranial  nerves are intact. Sensation is intact to light touch and pain. Motor:  Normal muscle strength and tone. Appropriate mood and affect.     IMAGING AND LABORATORY:  EKG done today for the chest pain, normal sinus  rhythm. Basically, normal EKG, no ST changes, no abnormality. Troponin  x1 less than 0.01. Troponin less than 0.01 on admission as well. Sodium was 134, potassium 3; needs to be replaced. BUN 13, creatinine  0.6. As stated white blood cell 4.5, hemoglobin 8.6, platelet 411. Right hip x-ray, medial angulated comminuted right intertrochanteric  fracture that is on the x-ray of the hip and an echocardiogram on  05/2020, ejection fraction 55%. I had not noticed so far, she had a nuclear stress test in 2014, was  negative for ischemia then. ASSESSMENT:  1. Chest pain, atypical in nature, it is basically dull, mild, pressure  on the chest, like 4/10 and resolved to 1-2/10 now and so after  nitroglycerin, but still persisting with no acute EKG abnormalities. Troponin negative. We will run further serial troponins, serial EKGs. We will give her morphine 2 mg IV x1 and nitro paste. 2.  Status post fall. 3.  Closed, displaced intertrochanteric fracture of the right femur. 4.  Right intertrochanteric nailing on 04/03/2021.  5.  Today, postop day #5. 6.  COPD. 7.  Hypertension. 8.  Gastroesophageal reflux disease. 9.  Hyperlipidemia. PLAN:  1. At this level, we will continue serial troponins, serial EKGs,  continue nitroglycerin p.r.n. Nitroglycerin paste to be done. Morphine  2 mg IV x1, and we will get an echocardiogram and based on the finding  and around the course, we will gauge further care. Consider noninvasive  ischemia workup if gets pain free and wants acute coronary syndrome  ruled out. Started on Protonix 40 daily, now she is only on Pepcid. 2.  Based on the course, we will gauge further care. Thank you for allowing me to participate in the care of this patient. I  will follow up with you. Jessica Bess.  Phong Devries M.D.    D: 04/07/2021 17:07:46       T: 04/07/2021 20:08:39     [email or web address]  Job#: [id number]     Doc#: [id number]    CC:

## 2021-04-08 NOTE — PROGRESS NOTES
Comprehensive Nutrition Assessment    Type and Reason for Visit:  RD Nutrition Re-Screen/LOS    Nutrition Recommendations/Plan:   *Recommend a Multivitamin w/minerals daily. *Started Ensure Compact TID. *Continue current diet. Nutrition Assessment: Pt. severely malnourished AEB criteria listed below. At risk for further nutritional compromise r/t admit d/t right femur fx 2/2 fall at [facility], increased nutrient needs to aid in healing of fracture, ongoing poor oral intake and underlying medical condition (hx CAD, GERd, COPD, HLD, HTN). Nutrition recommendations/interventions as per above. Malnutrition Assessment:  Malnutrition Status:  Severe malnutrition    Context:  Chronic Illness     Findings of the 6 clinical characteristics of malnutrition:  Energy Intake:  7 - 75% or less estimated energy requirements for 1 month or longer  Weight Loss:  (-9.8% weight loss in 2 months per EMR)     Body Fat Loss:  No significant body fat loss     Muscle Mass Loss:  No significant muscle mass loss    Fluid Accumulation:  1 - Mild Extremities    Estimated Daily Nutrient Needs:  Energy (kcal):  0616-0376 kcal/day (20-25 kcal/kg); Weight Used for Energy Requirements:  (63 kg on 4/1)     Protein (g):  67+ g/day (1.4+ g/kg); Weight Used for Protein Requirements:  (IBW 48 kg)          Nutrition Related Findings: admit d/t right femur fracture 2/2 fall at [facility]; pt seen; she reports poor intake of meals over the past 2 months consuming less than 50% of most meals 2/2 poor appetite; pt with unplanned weight loss of -9.8% in 2 months per EMR; +wound; pt reports she likes Ensure and would like it TID, but prefers Ensure Compact since it is smaller; pt denies N/V or chewing/swallowing difficulty with food; last BM x2 on 4/7. Labs: Na 134, K+ 3.4.  Rx includes: Colace, Senna, Carafate, Multivitamin and IVF      Wounds:  Pressure Injury, Stage II(on left ankle)       Current Nutrition Therapies:    DIET GENERAL;  Dietary Nutrition Supplements: Low Volume Supplement    Anthropometric Measures:  · Height: 5' 1\" (154.9 cm)  · Current Body Weight: 138 lb 9.6 oz (62.9 kg)(4/1; +1 non-pitting edema BLE)   · Admission Body Weight: 138 lb 9.6 oz (62.9 kg)(4/1; +1 non-pitting edema BLE)    · Usual Body Weight: (151 lbs per pt report. Per EMR: 153 lb 2 ozon 2/4/21; 157 lb 3.2 oz  on 8/31/20; 153 lb 6.4 oz on 5/15/20)     · Ideal Body Weight: 105 lbs  · BMI: 26.2  · BMI Categories: Overweight (BMI 25.0-29. 9)       Nutrition Diagnosis:   · Severe malnutrition, In context of chronic illness related to inadequate protein-energy intake(poor appetite) as evidenced by poor intake prior to admission, weight loss(-9.8% weight loss in 2 months per EMR)    Nutrition Interventions:   Food and/or Nutrient Delivery:  Continue Current Diet, Start Oral Nutrition Supplement, Vitamin Supplement  Nutrition Education/Counseling:  Education initiated(Encouraged po intake of meals and ONS at best effort)   Coordination of Nutrition Care:  Continue to monitor while inpatient    Goals:  Pt will consume 75% or more of meals during LOS       Nutrition Monitoring and Evaluation:   Behavioral-Environmental Outcomes:  None Identified   Food/Nutrient Intake Outcomes:  Diet Advancement/Tolerance, Food and Nutrient Intake, Supplement Intake, Vitamin/Mineral Intake  Physical Signs/Symptoms Outcomes:  Biochemical Data, GI Status, Weight, Skin, Nutrition Focused Physical Findings, Fluid Status or Edema     Discharge Planning:    Continue current diet, Continue Oral Nutrition Supplement     Electronically signed by Sridhar Alvarado RD, LD on 4/8/21 at 12:33 PM EDT    Contact: [phone]

## 2021-04-08 NOTE — PROGRESS NOTES
sister    Restrictions/Precautions:  Restrictions/Precautions: Weight Bearing, Fall Risk  Right Lower Extremity Weight Bearing: Weight Bearing As Tolerated     SUBJECTIVE: RN approved session. Pt resting in bedside chair upon arrival requesting to get back into bed. Agreeable to therapy. Pts IV noted to be leaking upon PTA arrival, RN notified. Pt also with complaints of chest pain throughout session, RN aware. PAIN: 6/10: R LE, did complain of chest pain intermittently throughout session    Vitals: Vitals not assessed per clinical judgement, see nursing flowsheet    OBJECTIVE:  Bed Mobility:  Sit to Supine: Moderate Assistance, Of B LEs     Transfers:  Sit to Stand: Moderate Assistance, Minimal Assistance, Max x1 from bedside chair into [address] steady. Min x1 from seat of DONELL steady. Stand to Sit:Minimal Assistance, Cues to control decent of transfer  Recliner to Edge of Bed: Dependent, X 1, With use of DONELL steady    Balance:  Static Sitting Balance:  Stand By Assistance  Static Standing Balance: Contact Guard Assistance  Dynamic Standing Balance: Contact Guard Assistance, Pt completed weight shifts in DONELL steady, able to clear each foot off of floor 2 times (did note increased pain in R LE with weight bearing)    Exercise:  Patient was guided in 1 set(s) 10 reps of exercise to both lower extremities. Ankle pumps, Glut sets, Quad sets, Heelslides and Hip abduction/adduction. Exercises were completed for increased independence with functional mobility. Functional Outcome Measures: Completed  AM-PAC Inpatient Mobility Raw Score : 9  AM-PAC Inpatient T-Scale Score : 30.55    ASSESSMENT:  Assessment: Patient progressing toward established goals. and Pt tolerated session fairly well. Limited by pain, decreased strength, decreased mobility. Will require continued therapy before returning home. SNF for PT  Activity Tolerance:  Patient tolerance of  treatment: good.       Equipment Recommendations:Equipment Needed: No  Discharge Recommendations:  Continue to assess pending progress, Subacute/Skilled Nursing Facility, Patient would benefit from continued therapy after discharge    Plan: Times per week: 6xO  Times per day: Daily  Current Treatment Recommendations: Strengthening, Transfer Training, Endurance Training, Neuromuscular Re-education, Patient/Caregiver Education & Training, Balance Training, Gait Training, Home Exercise Program, Functional Mobility Training, Stair training, Safety Education & Training    Patient Education  Patient Education: Plan of Care, [employer] Mobility, Transfers    Goals:  Patient goals : go home  Short term goals  Time Frame for Short term goals: at discharge  Short term goal 1: Patient will complete sit < > stand with max assist x1 to stand to transfer to chair. Short term goal 2: std pivot transfer with modAx1 to get bed to/from chair safely  Short term goal 3: PT to assess gait  Short term goal 4: Patient will complete supine < > sit with moderate assist to transfer in/out of bed with decreased difficulty. Long term goals  Time Frame for Long term goals : N/A due to short estimated length of stay    Following session, patient left in safe position with all fall risk precautions in place.

## 2021-04-08 NOTE — PROGRESS NOTES
[address] [facility]  Occupational Therapy  Daily Note  Time In: 1021  Time Out: 1051  Timed Code Treatment Minutes: 30 Minutes  Minutes: 30          Date: 2021  Patient Name: Molly Newman,   Gender: female      Room: [facility]013-A  MRN: [id number]  : 1935  (81 y.o.)  Referring Practitioner: Martinez Morgan MD  Diagnosis: Fall at nursing home, initial encounter  Additional Pertinent Hx: The patient is a 81 y.o. female with multiple medical comorbidities who ortho was consulted for the above noted complaint. Patient resides in [facility] and was noted to have a fall prior to arrival. She attempted to go to the bathroom independently and lost her balance falling onto the right hip. She had severe pain and was unable to get up and ambulate after the fall. Son accompanies the patient and states she has had a progressive decline health and ambulation over the past month. She was transferred to [facility] ED per EMS and was found to be hypotensive. Currently she is complaining of right hip pain and denies other injuries. X-rays show a comminuted displaced right intertrochanteric fracture. Pt with recent history of rib fractures on left. Patient s/p RIGHT HIP INTERTROCHANTERIC NAILING . Restrictions/Precautions:  Restrictions/Precautions: Weight Bearing, Fall Risk  Right Lower Extremity Weight Bearing: Weight Bearing As Tolerated     SUBJECTIVE: Pt sitting in recliner chair upon arrival, pt agreeable to OT session, RN gave approval for OT session     PAIN: 9/10 prior to session with pt due for pain medication    Vitals: Vitals not assessed per clinical judgement, see nursing flowsheet    COGNITION: WFL    ADL:   Grooming: with set-up. Pt sitting at edge of chair to complete with therapist providing toothbrush and toothpaste. And Combed hair    BALANCE:  Sitting Balance:  Stand By Assistance.  Due to increased pain, and pt demo'ing increased difficulty with shifting hips back into chair  Standing Balance: Contact Guard Assistance. for 20-30 seconds with pt able to bear weight through RLE before pt c/o increased RLE pain. BED MOBILITY:  Not Tested    TRANSFERS:  Sit to Stand:  Minimal Assistance. Due to increased RLE pain, with pt cued on pushing up from chair and for maintaining balance with pt using RW and BUE for support   Stand to Sit: [employer]. Due to increased RLE pain with vc's to reach back to sit from using RW while standing       ASSESSMENT:     Activity Tolerance:  Patient tolerance of  treatment: good. Discharge Recommendations: Subacute/Skilled Nursing Facility   Equipment Recommendations: Equipment Needed: No  Plan: Times per week: 6x  Times per day: Daily  Current Treatment Recommendations: Strengthening, Balance Training, Functional Mobility Training, Endurance Training, Home Management Training, Patient/Caregiver Education & Training, Self-Care / ADL, Safety Education & Training    Patient Education  Patient Education: Importance of Increasing Activity, with pt educated on shifting hips back in chair for proper positioning, with pt requiring extended time to complete    Goals  Short term goals  Time Frame for Short term goals: by discharge  Short term goal 1: Pt will complete sit to stand t/fs and no > than Mod A x1 and no safety cues in prep for t/fs to [facility]  Short term goal 2: Pt will tolerate dynamic standing with 1-2 hand release and no safety cues wtih Min A to increase indep with grooming  Short term goal 3: Pt will complete LB dressing with AE prn and no VC for technique to increase ease with dressing  Short term goal 4: Pt will complete functional pivot t/fs to/from various surfaces with Mod A x 2 and min vcs for safety to increase indep with toileting. Short term goal 5: Pt will tolerate further assessment of mobility when appropriate.   Long term goals  Time Frame for Long term goals : no LTG d/t short ELOS    Following session, patient left in safe position with all fall risk precautions in place.

## 2021-04-09 VITALS
BODY MASS INDEX: 26.17 KG/M2 | TEMPERATURE: 98.2 F | HEIGHT: 61 IN | HEART RATE: 92 BPM | DIASTOLIC BLOOD PRESSURE: 61 MMHG | WEIGHT: 138.6 LBS | SYSTOLIC BLOOD PRESSURE: 118 MMHG | RESPIRATION RATE: 18 BRPM | OXYGEN SATURATION: 96 %

## 2021-04-09 LAB
POTASSIUM SERPL-SCNC: 3.9 MEQ/L (ref 3.5–5.2)
SARS-COV-2, NAAT: NOT DETECTED

## 2021-04-09 PROCEDURE — 87635 SARS-COV-2 COVID-19 AMP PRB: CPT

## 2021-04-09 PROCEDURE — 36415 COLL VENOUS BLD VENIPUNCTURE: CPT

## 2021-04-09 PROCEDURE — 6370000000 HC RX 637 (ALT 250 FOR IP): Performed by: PHYSICIAN ASSISTANT

## 2021-04-09 PROCEDURE — 2580000003 HC RX 258: Performed by: PHYSICIAN ASSISTANT

## 2021-04-09 PROCEDURE — 6370000000 HC RX 637 (ALT 250 FOR IP): Performed by: STUDENT IN AN ORGANIZED HEALTH CARE EDUCATION/TRAINING PROGRAM

## 2021-04-09 PROCEDURE — 6370000000 HC RX 637 (ALT 250 FOR IP): Performed by: NURSE PRACTITIONER

## 2021-04-09 PROCEDURE — 84132 ASSAY OF SERUM POTASSIUM: CPT

## 2021-04-09 PROCEDURE — 6370000000 HC RX 637 (ALT 250 FOR IP): Performed by: INTERNAL MEDICINE

## 2021-04-09 PROCEDURE — 97535 SELF CARE MNGMENT TRAINING: CPT

## 2021-04-09 PROCEDURE — 97110 THERAPEUTIC EXERCISES: CPT

## 2021-04-09 PROCEDURE — 97530 THERAPEUTIC ACTIVITIES: CPT

## 2021-04-09 PROCEDURE — 6370000000 HC RX 637 (ALT 250 FOR IP): Performed by: ORTHOPAEDIC SURGERY

## 2021-04-09 RX ORDER — TIZANIDINE 4 MG/1
4 TABLET ORAL EVERY 8 HOURS PRN
Qty: 9 TABLET | Refills: 0 | Status: ON HOLD | DISCHARGE
Start: 2021-04-09 | End: 2021-05-13 | Stop reason: HOSPADM

## 2021-04-09 RX ADMIN — HYDROCODONE BITARTRATE AND ACETAMINOPHEN 2 TABLET: 5; 325 TABLET ORAL at 09:19

## 2021-04-09 RX ADMIN — FAMOTIDINE 20 MG: 20 TABLET, FILM COATED ORAL at 09:19

## 2021-04-09 RX ADMIN — SERTRALINE 25 MG: 50 TABLET, FILM COATED ORAL at 11:43

## 2021-04-09 RX ADMIN — NITROGLYCERIN 2 INCH: 20 OINTMENT TOPICAL at 03:52

## 2021-04-09 RX ADMIN — DOCUSATE SODIUM 100 MG: 100 CAPSULE, LIQUID FILLED ORAL at 09:19

## 2021-04-09 RX ADMIN — NITROGLYCERIN 2 INCH: 20 OINTMENT TOPICAL at 09:19

## 2021-04-09 RX ADMIN — ASPIRIN 325 MG: 325 TABLET, COATED ORAL at 09:19

## 2021-04-09 RX ADMIN — SUCRALFATE 1 G: 1 TABLET ORAL at 06:04

## 2021-04-09 RX ADMIN — HYDROCODONE BITARTRATE AND ACETAMINOPHEN 1 TABLET: 5; 325 TABLET ORAL at 03:52

## 2021-04-09 RX ADMIN — POTASSIUM CHLORIDE 20 MEQ: 1500 TABLET, EXTENDED RELEASE ORAL at 09:19

## 2021-04-09 RX ADMIN — SUCRALFATE 1 G: 1 TABLET ORAL at 11:42

## 2021-04-09 RX ADMIN — SODIUM CHLORIDE, PRESERVATIVE FREE 10 ML: 5 INJECTION INTRAVENOUS at 09:20

## 2021-04-09 RX ADMIN — DOCUSATE SODIUM 50 MG AND SENNOSIDES 8.6 MG 1 TABLET: 8.6; 5 TABLET, FILM COATED ORAL at 09:19

## 2021-04-09 RX ADMIN — Medication 1 TABLET: at 09:19

## 2021-04-09 RX ADMIN — PANTOPRAZOLE SODIUM 40 MG: 40 TABLET, DELAYED RELEASE ORAL at 06:05

## 2021-04-09 ASSESSMENT — PAIN DESCRIPTION - DESCRIPTORS
DESCRIPTORS: ACHING

## 2021-04-09 ASSESSMENT — PAIN SCALES - GENERAL
PAINLEVEL_OUTOF10: 0
PAINLEVEL_OUTOF10: 7
PAINLEVEL_OUTOF10: 7

## 2021-04-09 ASSESSMENT — PAIN DESCRIPTION - ONSET
ONSET: ON-GOING
ONSET: ON-GOING

## 2021-04-09 ASSESSMENT — PAIN DESCRIPTION - PROGRESSION
CLINICAL_PROGRESSION: GRADUALLY WORSENING
CLINICAL_PROGRESSION: NOT CHANGED

## 2021-04-09 ASSESSMENT — PAIN - FUNCTIONAL ASSESSMENT: PAIN_FUNCTIONAL_ASSESSMENT: PREVENTS OR INTERFERES SOME ACTIVE ACTIVITIES AND ADLS

## 2021-04-09 ASSESSMENT — PAIN DESCRIPTION - LOCATION
LOCATION: HIP
LOCATION: HIP

## 2021-04-09 ASSESSMENT — PAIN DESCRIPTION - ORIENTATION: ORIENTATION: RIGHT

## 2021-04-09 ASSESSMENT — PAIN DESCRIPTION - PAIN TYPE: TYPE: ACUTE PAIN;SURGICAL PAIN

## 2021-04-09 ASSESSMENT — PAIN DESCRIPTION - FREQUENCY: FREQUENCY: CONTINUOUS

## 2021-04-09 NOTE — PLAN OF CARE
Problem: Falls - Risk of:  Goal: Will remain free from falls  Description: Will remain free from falls  Outcome: Ongoing  Note: No falls this shift. Pt verbalizes understanding of fall precautions. Compliant with non-skid slippers. Uses call light appropriately. Goal: Absence of physical injury  Description: Absence of physical injury  Outcome: Ongoing  Note: Pt free from injury. Uses call light appropriately. Problem: Skin Integrity:  Goal: Will show no infection signs and symptoms  Description: Will show no infection signs and symptoms  Outcome: Ongoing  Note: No signs or symptoms of infection at right hip incision sites, pt afebrile. Goal: Absence of new skin breakdown  Description: Absence of new skin breakdown  Outcome: Ongoing  Note: No new skin breakdown noted. Right hip dressing intact. Pt does turn and reposition every 2 hours. Problem: Pain:  Goal: Pain level will decrease  Description: Pain level will decrease  Outcome: Ongoing  Note: Pt's right hip pain has been between 6-9/10 on the pain scale with a pain goal of 1. Pt tolerating ice to hip, turing and repositioning and PO pain meds. Pt getting periods of rest.      Problem: Discharge Planning:  Goal: Discharged to appropriate level of care  Description: Discharged to appropriate level of care  Outcome: Ongoing  Note: Pt planning to be discharged to the [place] [facility]. Problem: Mobility - Impaired:  Goal: Mobility will improve to maximum level  Description: Mobility will improve to maximum level  Outcome: Ongoing  Note: Pt is up with 1-2 assist maya stedy and gait belt to commode and back to bed. Pt is tolerating fairly well. Problem: Venous Thromboembolism:  Goal: Absence of deep vein thrombosis  Description: Absence of deep vein thrombosis  Outcome: Ongoing  Note: No sign of DVT. Pt is compliant with SCDs . Pt is receiving aspirin.       Problem: Neurological  Goal: Maximum potential motor/sensory/cognitive function  Outcome:

## 2021-04-09 NOTE — PROGRESS NOTES
Discharge instructions given, no voiced concerns, transported to the [facility] by [facility]

## 2021-04-09 NOTE — PROGRESS NOTES
[address] [facility]  Occupational Therapy  Daily Note  Time:   Time In: 912  Time Out: 6535  Timed Code Treatment Minutes: 45 Minutes  Minutes: 38          Date: 2021  Patient Name: Angel Landrum,   Gender: female      Room: [facility]13/013-A  MRN: [id number]  : 1935  (81 y.o.)  Referring Practitioner: Denver Lamp, MD  Diagnosis: Fall at nursing home, initial encounter  Additional Pertinent Hx: The patient is a 81 y.o. female with multiple medical comorbidities who ortho was consulted for the above noted complaint. Patient resides in [facility] and was noted to have a fall prior to arrival. She attempted to go to the bathroom independently and lost her balance falling onto the right hip. She had severe pain and was unable to get up and ambulate after the fall. Son accompanies the patient and states she has had a progressive decline health and ambulation over the past month. She was transferred to [facility] ED per EMS and was found to be hypotensive. Currently she is complaining of right hip pain and denies other injuries. X-rays show a comminuted displaced right intertrochanteric fracture. Pt with recent history of rib fractures on left. Patient s/p RIGHT HIP INTERTROCHANTERIC NAILING . Restrictions/Precautions:  Restrictions/Precautions: Weight Bearing, Fall Risk  Right Lower Extremity Weight Bearing: Weight Bearing As Tolerated     SUBJECTIVE: Pt. Nurse okayed OT treatment. Pt. Sitting up in chair with c/o pain in RLE, Pt. Nurse present and pain medication given. Post session Pt. Up in recliner with call light in hand and all needs met at this time. PAIN: 8/10: RLE    Vitals: Vitals not assessed per clinical judgement, see nursing flowsheet    COGNITION: WFL    ADL:   Toileting: Maximum Assistance, with set-up and with verbal cues . Toilet Transfer: Dependent, with set-up and with verbal cues . with maya lockett. BALANCE:  Sitting Balance:  Stand By Assistance. Standing Balance: Contact Guard Assistance, with cues for safety. in [facility] steady. pt. completed 2 trials of static standing for 2 mins each trial in prep for transfers     BED MOBILITY:  Not Tested    TRANSFERS:  Sit to Stand: Moderate Assistance, X 1, with increased time for completion, cues for hand placement. Stand to Sit: Contact Guard Assistance, Minimal Assistance, X 1, cues for hand placement. FUNCTIONAL MOBILITY:  Assistive Device: None  Assist Level:  Dependent. Distance: to/from [facility] with maya lockett. Vcs for hand positioning required       ADDITIONAL ACTIVITIES:  Patient completed BUE strengthening exercises with skilled education on HEP: completed x10 reps x1 set with SROM in all joints and all planes in order to improve UE strength and activity tolerance required for BADL routine and toilet / shower transfers. SROM performed due to decreased AROM in LUE. Patient tolerated fair , requiring frequent brief rest breaks. Patient also required verbal and visual cues for technique. ASSESSMENT:     Activity Tolerance:  Patient tolerance of  treatment: fair. Discharge Recommendations: Subacute/Skilled Nursing Facility   Equipment Recommendations: Equipment Needed: No  Plan: Times per week: 6x  Times per day: Daily  Current Treatment Recommendations: Strengthening, Balance Training, Functional Mobility Training, Endurance Training, Home Management Training, Patient/Caregiver Education & Training, Self-Care / ADL, Safety Education & Training    Patient Education  Patient Education: ADL's, Home Exercise Program, Equipment Education, Importance of Increasing Activity and Assistive Device Safety and safety with functional mobility and transfers.     Goals  Short term goals  Time Frame for Short term goals: by discharge  Short term goal 1: Pt will complete sit to stand t/fs and no > than Mod A x1 and no safety cues in prep for t/fs to [facility]  Short term goal 2: Pt will tolerate dynamic standing with 1-2 hand release and no safety cues wtih Min A to increase indep with grooming  Short term goal 3: Pt will complete LB dressing with AE prn and no VC for technique to increase ease with dressing  Short term goal 4: Pt will complete functional pivot t/fs to/from various surfaces with Mod A x 2 and min vcs for safety to increase indep with toileting. Short term goal 5: Pt will tolerate further assessment of mobility when appropriate. Long term goals  Time Frame for Long term goals : no LTG d/t short ELOS    Following session, patient left in safe position with all fall risk precautions in place.

## 2021-04-09 NOTE — PROGRESS NOTES
Physician Progress Note      Javon Goodman  CSN #:                  [id number]  :                       1935  ADMIT DATE:       2021 11:08 AM  DISCH DATE:        2021 2:48 PM  RESPONDING  PROVIDER #:        Alexei Cristina MD          QUERY TEXT:    Pt admitted with right intratrochanteric fracture. Noted documentation of   severe malnutrition by dietician consultant. If possible, please document in   progress notes and discharge summary:    The medical record reflects the following:    Risk Factors: poor po intake  Clinical Indicators: severe malnutrition per dietician per AND/ASPEN   guidelines as evidenced by energy intake 75% or less estimated energy   requirements for 1 month or longer, 9.8% weight loss in 2 months, mild fluid   accumulation in extremities  Treatment: Dietician consult & Ensure Compact TID    Thank you! Eather Kocher, Judyth Bee, CRCR  RN Clinical   P: [phone]  Options provided:  -- Severe malnutrition confirmed present on admission  -- Severe malnutrition confirmed not present on admission  -- Severe malnutrition ruled out  -- Other - I will add my own diagnosis  -- Disagree - Not applicable / Not valid  -- Disagree - Clinically unable to determine / Unknown  -- Refer to Clinical Documentation Reviewer    PROVIDER RESPONSE TEXT:    The diagnosis of severe malnutrition was ruled out.     Query created by: Ryley Worthington on 2021 1:08 PM      Electronically signed by:  Alexei Cristina MD 2021 3:14 PM

## 2021-04-09 NOTE — CARE COORDINATION
4/9/21, 2:31 PM EDT    DISCHARGE PLANNING EVALUATION      Spoke with [place] [facility]. Facility can accept today. Ethan Olguin faxed. Transport scheduled for 1800. Confirmed plan with son. 4/9/21, 2:31 PM EDT    Patient goals/plan/ treatment preferences discussed by  and . Patient goals/plan/ treatment preferences reviewed with patient/ family. Patient/ family verbalize understanding of discharge plan and are in agreement with goal/plan/treatment preferences. Understanding was demonstrated using the teach back method. AVS provided by RN at time of discharge, which includes all necessary medical information pertaining to the patients current course of illness, treatment, post-discharge goals of care, and treatment preferences.     Services After Discharge  Services At/After Discharge: Nursing Services, Skilled Therapy, Aide Services, In ambulance([facility])   [place] Letter  IMM Letter given to Patient/Family/Significant other/Guardian/POA/by[de-identified]   IMM Letter date given[de-identified] 04/09/21  IMM Letter time given[de-identified] [phone]

## 2021-04-09 NOTE — PROGRESS NOTES
[facility]  INPATIENT PHYSICAL THERAPY  DAILY NOTE  [facility] ORTHOPEDICS 7K - 7K-13/013-A    Time In: 1024  Time Out: 1058  Timed Code Treatment Minutes: 34 Minutes  Minutes: 34          Date: 2021  Patient Name: Cem Abarca,  Gender:  female        MRN: [id number]  : 1935  (81 y.o.)  Referral Date : 21  Referring Practitioner: Dave Roberts MD  Diagnosis: Fall at nursing home, initial encounter  Additional Pertinent Hx: The patient is a 81 y.o. female with multiple medical comorbidities who ortho was consulted for the above noted complaint. Patient resides in [facility] ([place], [place]) and was noted to have a fall prior to arrival. She attempted to go to the bathroom independently and lost her balance falling onto the right hip. She had severe pain and was unable to get up and ambulate after the fall. Son accompanies the patient and states she has had a progressive decline health and ambulation over the past month. She was transferred to [facility] ED per EMS and was found to be hypotensive. Currently she is complaining of right hip pain and denies other injuries. X-rays show a comminuted displaced right intertrochanteric fracture. Pt with recent history of rib fractures on left. Patient s/p RIGHT HIP INTERTROCHANTERIC NAILING . Prior Level of Function:  Lives With: Family  Type of Home: House  Home Layout: One level  Home Access: Stairs to enter with rails  Entrance Stairs - Number of Steps: 1 small step ~2 inch threshold  Entrance Stairs - Rails: Both  Home Equipment: Rolling walker, Cane, [place]   Bathroom Shower/Tub: Tub/Shower unit  Bathroom Toilet: Standard  Bathroom Equipment: Shower chair, Grab bars in shower    ADL Assistance: [address]: Needs assistance  Ambulation Assistance: Independent  Transfer Assistance: Independent  Active :  Yes  Additional Comments: Patient reports independent at [facility] with no AD, reports shares household tasks with sister    Restrictions/Precautions:  Restrictions/Precautions: Weight Bearing, Fall Risk  Right Lower Extremity Weight Bearing: Weight Bearing As Tolerated      SUBJECTIVE: Patient sitting up in chair, pleasant and agreeable to therapy. Patient described chest pain and LE weakness following standing with RW. Patient nurse notified. Patient left in safe position with all needs in reach. PAIN: 6/10: R hip    Vitals: Oxygen: 96%  Heart Rate: 78    OBJECTIVE:  Bed Mobility:  Not Tested    Transfers:  Sit to Stand: Contact Guard Assistance, Minimal Assistance, Rocking to initiate stand, min A to encourage forward weight shift and verbal cues to stand upright  Stand to Sit:Contact Guard Assistance  * Instructed patient in sit<>stand transfer from room chair to standing with RW  *cues for sequencing and hand placement    *preformed 2 sit to stands,   * c/o SOB and R hip pain    Pre Gait:  *Standing with RW, CGA  *standing for ~ 2 min each  *lateral weight shifts with minimal weight shift to right, unable to pick feet off floor to step    Exercise:  Patient was guided in 1 set(s) 5-10 reps of exercise to both lower extremities. Ankle pumps, Glut sets, Quad sets, Heelslides and Long arc quads  *therapist assisted RLE for heel slides and LAQ. Exercises were completed for increased independence with functional mobility. Functional Outcome Measures: Completed  -PAC Inpatient Mobility Raw Score : 10  -PAC Inpatient T-Scale Score : 32.29    ASSESSMENT:  Assessment: Patient progressing toward established goals. Session limited by SOB and R hip pain in standing. Patient unsafe to return home. Patient has limited mobility and unable to preform transfers without  lift equipment to complete transfers, recommend discharge to skilled nursing facility and continued therapy after discharge. Activity Tolerance:  Patient tolerance of  treatment: good.  Limited by standing tolerance and R hip pain     Equipment Recommendations:Equipment Needed: No  Discharge Recommendations:  Continue to assess pending progress, Subacute/Skilled Nursing Facility, Patient would benefit from continued therapy after discharge    Plan: Times per week: 6xO  Times per day: Daily  Current Treatment Recommendations: Strengthening, Transfer Training, Endurance Training, Neuromuscular Re-education, Patient/Caregiver Education & Training, Balance Training, Gait Training, Home Exercise Program, Functional Mobility Training, Stair training, Safety Education & Training    Patient Education  Patient Education: Plan of Care, Transfers, Verbal Exercise Instruction    Goals:  Patient goals : go home  Short term goals  Time Frame for Short term goals: at discharge  Short term goal 1: Patient will complete sit < > stand with max assist x1 to stand to transfer to chair. Short term goal 2: std pivot transfer with modAx1 to get bed to/from chair safely  Short term goal 3: PT to assess gait  Short term goal 4: Patient will complete supine < > sit with moderate assist to transfer in/out of bed with decreased difficulty. Long term goals  Time Frame for Long term goals : N/A due to short estimated length of stay    Following session, patient left in safe position with all fall risk precautions in place. Therapy session and note completed by ANDRES Tellez under supervision of therapist stated above.

## 2021-04-09 NOTE — DISCHARGE SUMMARY
Discharge Summary  4/9/2021  12:28 PM    Patient:  Hilda Caor  YOB: 1935    MRN: [id number]   Acct: [de-identified]   7K-13/013-A   Primary Care Physician: Milady Ngo MD    Admit date:  4/1/2021    Discharge date:  4/9/2021    Discharge Diagnoses:    Patient Active Problem List   Diagnosis    Hyperlipidemia    Osteoarthritis    GERD (gastroesophageal reflux disease)    COPD (chronic obstructive pulmonary disease) ([address]    Chronic back pain    CAD (coronary artery disease)    Hypertension    Spinal stenosis of lumbar region with neurogenic claudication    Neurologic gait dysfunction    Inflammatory spondylopathy of lumbosacral region ([address]    Paroxysmal atrial fibrillation ([address]    Closed displaced intertrochanteric fracture of right femur ([address]    Fall at nursing home    Severe malnutrition [facility])       Discharge Medications:       Aundra Crystal \"Julianne\"   Home Medication Instructions NJS:[id number]    Printed on:04/09/21 1228   Medication Information                      acetaminophen (TYLENOL) 325 MG tablet  Take 2 tablets by mouth every 4 hours as needed for Pain Maximum dose of acetaminophen is 4000 mg from all sources in 24 hours. aspirin 325 MG EC tablet  Take 1 tablet by mouth daily             docusate sodium (COLACE, DULCOLAX) 100 MG CAPS  Take 100 mg by mouth 2 times daily             isosorbide mononitrate (IMDUR) 30 MG extended release tablet  Take 1 tablet by mouth daily             latanoprost (XALATAN) 0.005 % ophthalmic solution  Place 1 drop into both eyes nightly             metoprolol tartrate (LOPRESSOR) 25 MG tablet  Take 0.5 tablets by mouth 2 times daily             Multiple Vitamins-Minerals (THERAPEUTIC MULTIVITAMIN-MINERALS) tablet  Take 1 tablet by mouth daily             nitroGLYCERIN (NITROSTAT) 0.4 MG SL tablet  up to max of 3 total doses.  If no relief after 1 dose, call 911.             nortriptyline (PAMELOR) 50 MG capsule  Take 1 capsule by mouth nightly             pantoprazole (PROTONIX) 40 MG tablet  Take 1 tablet by mouth 2 times daily (before meals)             potassium chloride (KLOR-CON M) 20 MEQ extended release tablet  Take 1 tablet by mouth daily             sertraline (ZOLOFT) 25 MG tablet  Take 25 mg by mouth daily             simvastatin (ZOCOR) 40 MG tablet  Take 1 tablet by mouth nightly             sucralfate (CARAFATE) 1 GM tablet  Take 1 tablet by mouth 4 times daily (before meals and nightly)             tiZANidine (ZANAFLEX) 4 MG tablet  Take 1 tablet by mouth every 8 hours as needed (muscle spasm)               Scheduled Meds: Scheduled Meds:   nitroglycerin  2 inch Topical 4 times per day    pantoprazole  40 mg Oral BID AC    sucralfate  1 g Oral 4x Daily AC & HS    aspirin  325 mg Oral BID    famotidine  20 mg Oral Daily    sennosides-docusate sodium  1 tablet Oral BID    sodium chloride flush  10 mL Intravenous 2 times per day    docusate sodium  100 mg Oral Daily    latanoprost  1 drop Both Eyes Nightly    therapeutic multivitamin-minerals  1 tablet Oral Daily    sertraline  25 mg Oral Daily     Continuous Infusions:   sodium chloride      sodium chloride      sodium chloride 50 mL/hr at 04/08/21 0618     PRN Meds:.potassium chloride, potassium chloride **OR** potassium alternative oral replacement **OR** potassium chloride, nitroGLYCERIN, sodium chloride, traMADol, lidocaine, promethazine **OR** ondansetron, magnesium hydroxide, sodium chloride flush, sodium chloride, acetaminophen, polyethylene glycol, HYDROcodone 5 mg - acetaminophen **OR** HYDROcodone 5 mg - acetaminophen, fentanNYL **OR** fentanNYL  Continuous Infusions:   sodium chloride      sodium chloride      sodium chloride 50 mL/hr at 04/08/21 0618       Intake/Output Summary (Last 24 hours) at 4/9/2021 1228  Last data filed at 4/9/2021 0351  Gross per 24 hour   Intake 300 ml   Output 200 ml   Net 100 ml       Diet:  DIET GENERAL;  Dietary Nutrition Supplements: Low Volume Supplement    Activity:  Activity as tolerated (Patient may move about with assist as indicated or with supervision.)    Follow-up:  in the next few days with Vicki Galaviz MD, in 2 weeks with Dr Sruthi Valdivia    Consultants:  Cardiology, Orthopedic Surgery. Trauma    Procedures:  Right Hip Intertrochanteric Nailing      Objective:  Lab Results   Component Value Date    WBC 4.6 04/08/2021    RBC 2.99 04/08/2021    HGB 8.6 04/08/2021    HCT 26.8 04/08/2021    MCV 89.6 04/08/2021    MCH 28.8 04/08/2021    MCHC 32.1 04/08/2021    RDW 14.5 09/21/2018     04/08/2021    MPV 9.6 04/08/2021     Lab Results   Component Value Date     04/08/2021    K 3.9 04/09/2021    K 2.9 03/18/2021     04/08/2021    CO2 15 04/08/2021    BUN 11 04/08/2021    CREATININE 0.7 04/08/2021    GLUCOSE 87 04/08/2021    GLUCOSE 112 09/12/2017    CALCIUM 8.3 04/08/2021     Lab Results   Component Value Date    CALCIUM 8.3 04/08/2021     No results found for: IONCA  Lab Results   Component Value Date    MG 2.3 03/18/2021     No results found for: PHOS  No results found for: BNP  Lab Results   Component Value Date    ALKPHOS 77 04/02/2021    ALT 58 04/02/2021    AST 31 04/02/2021    PROT 5.7 04/02/2021    BILITOT 0.3 04/02/2021    BILIDIR <0.2 04/01/2021    LABALBU 2.6 04/02/2021     Lab Results   Component Value Date    LACTA 1.0 04/03/2021     No results found for: AMYLASE  Lab Results   Component Value Date    LIPASE 38.7 04/01/2021     Lab Results   Component Value Date    CHOL 124 05/16/2020    TRIG 83 05/16/2020    HDL 63 05/16/2020    LDLCALC 44 05/16/2020     No results for input(s): POCGLU in the last 72 hours. No results for input(s): CKTOTAL, CKMB, TROPONINI in the last 72 hours.   No results found for: LABA1C  Lab Results   Component Value Date    INR 1.11 04/01/2021     No results found for: PHART, PO2ART, DVA9MYJ, HTI0UWZ, [facility] Course: clinical course pertinent history and exam findings with the NP. I have seen and examined the patient and the key elements of the encounter have been performed by me. I agree with the assessment, plan and orders as documented by the NP.     Electronically signed by Eliazar Clifton MD on 4/10/2021 at 8:20 AM

## 2021-04-14 ENCOUNTER — HOSPITAL ENCOUNTER (INPATIENT)
Age: 86
LOS: 5 days | Discharge: NURSING, REHAB OR LONG-TERM CARE | DRG: 811 | End: 2021-04-21
Attending: INTERNAL MEDICINE | Admitting: INTERNAL MEDICINE
Payer: MEDICARE

## 2021-04-14 DIAGNOSIS — R07.2 PRECORDIAL PAIN: ICD-10-CM

## 2021-04-14 DIAGNOSIS — D64.9 POSTOPERATIVE ANEMIA: ICD-10-CM

## 2021-04-14 DIAGNOSIS — N17.9 ACUTE KIDNEY INJURY (HCC): Primary | ICD-10-CM

## 2021-04-14 LAB
ANION GAP SERPL CALCULATED.3IONS-SCNC: 17 MEQ/L (ref 8–16)
BASOPHILS # BLD: 0.2 %
BASOPHILS ABSOLUTE: 0 THOU/MM3 (ref 0–0.1)
BUN BLDV-MCNC: 46 MG/DL (ref 7–22)
CALCIUM SERPL-MCNC: 8.4 MG/DL (ref 8.5–10.5)
CHLORIDE BLD-SCNC: 98 MEQ/L (ref 98–111)
CO2: 17 MEQ/L (ref 23–33)
CREAT SERPL-MCNC: 2.3 MG/DL (ref 0.4–1.2)
EOSINOPHIL # BLD: 3.3 %
EOSINOPHILS ABSOLUTE: 0.2 THOU/MM3 (ref 0–0.4)
ERYTHROCYTE [DISTWIDTH] IN BLOOD BY AUTOMATED COUNT: 17.5 % (ref 11.5–14.5)
ERYTHROCYTE [DISTWIDTH] IN BLOOD BY AUTOMATED COUNT: 55.9 FL (ref 35–45)
GFR SERPL CREATININE-BSD FRML MDRD: 20 ML/MIN/1.73M2
GLUCOSE BLD-MCNC: 126 MG/DL (ref 70–108)
HCT VFR BLD CALC: 24.4 % (ref 37–47)
HEMOGLOBIN: 7.8 GM/DL (ref 12–16)
IMMATURE GRANS (ABS): 0.06 THOU/MM3 (ref 0–0.07)
IMMATURE GRANULOCYTES: 1 %
LYMPHOCYTES # BLD: 7.4 %
LYMPHOCYTES ABSOLUTE: 0.5 THOU/MM3 (ref 1–4.8)
MCH RBC QN AUTO: 28.6 PG (ref 26–33)
MCHC RBC AUTO-ENTMCNC: 32 GM/DL (ref 32.2–35.5)
MCV RBC AUTO: 89.4 FL (ref 81–99)
MONOCYTES # BLD: 2.5 %
MONOCYTES ABSOLUTE: 0.2 THOU/MM3 (ref 0.4–1.3)
NUCLEATED RED BLOOD CELLS: 0 /100 WBC
OSMOLALITY CALCULATION: 277.9 MOSMOL/KG (ref 275–300)
PLATELET # BLD: 187 THOU/MM3 (ref 130–400)
PMV BLD AUTO: 11.2 FL (ref 9.4–12.4)
POTASSIUM REFLEX MAGNESIUM: 3.9 MEQ/L (ref 3.5–5.2)
RBC # BLD: 2.73 MILL/MM3 (ref 4.2–5.4)
SEG NEUTROPHILS: 85.6 %
SEGMENTED NEUTROPHILS ABSOLUTE COUNT: 5.2 THOU/MM3 (ref 1.8–7.7)
SODIUM BLD-SCNC: 132 MEQ/L (ref 135–145)
TROPONIN T: 0.06 NG/ML
WBC # BLD: 6.1 THOU/MM3 (ref 4.8–10.8)

## 2021-04-14 PROCEDURE — 84484 ASSAY OF TROPONIN QUANT: CPT

## 2021-04-14 PROCEDURE — 36415 COLL VENOUS BLD VENIPUNCTURE: CPT

## 2021-04-14 PROCEDURE — 99285 EMERGENCY DEPT VISIT HI MDM: CPT

## 2021-04-14 PROCEDURE — G0378 HOSPITAL OBSERVATION PER HR: HCPCS

## 2021-04-14 PROCEDURE — 80048 BASIC METABOLIC PNL TOTAL CA: CPT

## 2021-04-14 PROCEDURE — 85025 COMPLETE CBC W/AUTO DIFF WBC: CPT

## 2021-04-14 RX ORDER — SODIUM CHLORIDE 9 MG/ML
INJECTION, SOLUTION INTRAVENOUS CONTINUOUS
Status: DISCONTINUED | OUTPATIENT
Start: 2021-04-14 | End: 2021-04-15 | Stop reason: SDUPTHER

## 2021-04-14 ASSESSMENT — PAIN SCALES - GENERAL
PAINLEVEL_OUTOF10: 7
PAINLEVEL_OUTOF10: 7

## 2021-04-14 ASSESSMENT — ENCOUNTER SYMPTOMS
NAUSEA: 0
COUGH: 0
CONSTIPATION: 0
VOMITING: 0
ABDOMINAL PAIN: 0
DIARRHEA: 0
SHORTNESS OF BREATH: 0

## 2021-04-14 ASSESSMENT — PAIN DESCRIPTION - FREQUENCY: FREQUENCY: CONTINUOUS

## 2021-04-14 ASSESSMENT — PAIN DESCRIPTION - ORIENTATION
ORIENTATION: MID
ORIENTATION: MID

## 2021-04-14 ASSESSMENT — PAIN DESCRIPTION - LOCATION: LOCATION: ABDOMEN

## 2021-04-14 NOTE — ED NOTES
Bed: 014A  Expected date:   Expected time:   Means of arrival: [facility]  Comments:     Brittanie House RN  04/14/21 7806

## 2021-04-15 ENCOUNTER — APPOINTMENT (OUTPATIENT)
Dept: ULTRASOUND IMAGING | Age: 86
DRG: 811 | End: 2021-04-15
Payer: MEDICARE

## 2021-04-15 LAB
ABSOLUTE RETIC #: 31 THOU/MM3 (ref 20–115)
ANION GAP SERPL CALCULATED.3IONS-SCNC: 13 MEQ/L (ref 8–16)
BACTERIA: ABNORMAL /HPF
BASOPHILS # BLD: 0 %
BASOPHILS ABSOLUTE: 0 THOU/MM3 (ref 0–0.1)
BILIRUBIN URINE: NEGATIVE
BLOOD, URINE: ABNORMAL
BUN BLDV-MCNC: 37 MG/DL (ref 7–22)
CALCIUM SERPL-MCNC: 8 MG/DL (ref 8.5–10.5)
CASTS 2: ABNORMAL /LPF
CASTS UA: ABNORMAL /LPF
CHARACTER, URINE: ABNORMAL
CHLORIDE BLD-SCNC: 105 MEQ/L (ref 98–111)
CHLORIDE, URINE: < 20 MEQ/L
CO2: 14 MEQ/L (ref 23–33)
COLOR: YELLOW
CREAT SERPL-MCNC: 1.8 MG/DL (ref 0.4–1.2)
CRYSTALS, UA: ABNORMAL
EKG ATRIAL RATE: 99 BPM
EKG P AXIS: 55 DEGREES
EKG P-R INTERVAL: 160 MS
EKG Q-T INTERVAL: 348 MS
EKG QRS DURATION: 86 MS
EKG QTC CALCULATION (BAZETT): 446 MS
EKG R AXIS: 34 DEGREES
EKG T AXIS: 67 DEGREES
EKG VENTRICULAR RATE: 99 BPM
EOSINOPHIL # BLD: 2.5 %
EOSINOPHILS ABSOLUTE: 0.1 THOU/MM3 (ref 0–0.4)
EPITHELIAL CELLS, UA: ABNORMAL /HPF
ERYTHROCYTE [DISTWIDTH] IN BLOOD BY AUTOMATED COUNT: 17.3 % (ref 11.5–14.5)
ERYTHROCYTE [DISTWIDTH] IN BLOOD BY AUTOMATED COUNT: 55.6 FL (ref 35–45)
FERRITIN: 1148 NG/ML (ref 10–291)
GFR SERPL CREATININE-BSD FRML MDRD: 27 ML/MIN/1.73M2
GLUCOSE BLD-MCNC: 104 MG/DL (ref 70–108)
GLUCOSE URINE: NEGATIVE MG/DL
HCT VFR BLD CALC: 24 % (ref 37–47)
HEMOGLOBIN: 7.6 GM/DL (ref 12–16)
IMMATURE GRANS (ABS): 0.05 THOU/MM3 (ref 0–0.07)
IMMATURE GRANULOCYTES: 0.9 %
IMMATURE RETIC FRACT: 18.7 % (ref 3–15.9)
IRON: 17 UG/DL (ref 50–170)
KETONES, URINE: NEGATIVE
LEUKOCYTE ESTERASE, URINE: NEGATIVE
LYMPHOCYTES # BLD: 6.5 %
LYMPHOCYTES ABSOLUTE: 0.4 THOU/MM3 (ref 1–4.8)
MCH RBC QN AUTO: 28.4 PG (ref 26–33)
MCHC RBC AUTO-ENTMCNC: 31.7 GM/DL (ref 32.2–35.5)
MCV RBC AUTO: 89.6 FL (ref 81–99)
MISCELLANEOUS 2: ABNORMAL
MONOCYTES # BLD: 2.5 %
MONOCYTES ABSOLUTE: 0.1 THOU/MM3 (ref 0.4–1.3)
NITRITE, URINE: NEGATIVE
NUCLEATED RED BLOOD CELLS: 0 /100 WBC
PH UA: 5.5 (ref 5–9)
PLATELET # BLD: 178 THOU/MM3 (ref 130–400)
PMV BLD AUTO: 10.8 FL (ref 9.4–12.4)
POTASSIUM SERPL-SCNC: 3.5 MEQ/L (ref 3.5–5.2)
POTASSIUM, URINE: < 3 MEQ/L
PROTEIN UA: 30
RBC # BLD: 2.68 MILL/MM3 (ref 4.2–5.4)
RBC URINE: ABNORMAL /HPF
RENAL EPITHELIAL, UA: ABNORMAL
RETIC HEMOGLOBIN: 23.3 PG (ref 28.2–35.7)
RETICULOCYTE ABSOLUTE COUNT: 1.1 % (ref 0.5–2)
SEG NEUTROPHILS: 87.6 %
SEGMENTED NEUTROPHILS ABSOLUTE COUNT: 4.8 THOU/MM3 (ref 1.8–7.7)
SODIUM BLD-SCNC: 132 MEQ/L (ref 135–145)
SODIUM URINE: < 20 MEQ/L
SPECIFIC GRAVITY, URINE: 1.01 (ref 1–1.03)
TOTAL IRON BINDING CAPACITY: 116 UG/DL (ref 171–450)
TROPONIN T: 0.04 NG/ML
TSH SERPL DL<=0.05 MIU/L-ACNC: 1.3 UIU/ML (ref 0.4–4.2)
UROBILINOGEN, URINE: 0.2 EU/DL (ref 0–1)
WBC # BLD: 5.5 THOU/MM3 (ref 4.8–10.8)
WBC UA: ABNORMAL /HPF
YEAST: ABNORMAL

## 2021-04-15 PROCEDURE — 2580000003 HC RX 258: Performed by: INTERNAL MEDICINE

## 2021-04-15 PROCEDURE — 85046 RETICYTE/HGB CONCENTRATE: CPT

## 2021-04-15 PROCEDURE — 82728 ASSAY OF FERRITIN: CPT

## 2021-04-15 PROCEDURE — 85025 COMPLETE CBC W/AUTO DIFF WBC: CPT

## 2021-04-15 PROCEDURE — 6370000000 HC RX 637 (ALT 250 FOR IP): Performed by: INTERNAL MEDICINE

## 2021-04-15 PROCEDURE — 93005 ELECTROCARDIOGRAM TRACING: CPT | Performed by: INTERNAL MEDICINE

## 2021-04-15 PROCEDURE — 36415 COLL VENOUS BLD VENIPUNCTURE: CPT

## 2021-04-15 PROCEDURE — 83540 ASSAY OF IRON: CPT

## 2021-04-15 PROCEDURE — 84484 ASSAY OF TROPONIN QUANT: CPT

## 2021-04-15 PROCEDURE — 2500000003 HC RX 250 WO HCPCS: Performed by: INTERNAL MEDICINE

## 2021-04-15 PROCEDURE — 6360000002 HC RX W HCPCS: Performed by: INTERNAL MEDICINE

## 2021-04-15 PROCEDURE — 81001 URINALYSIS AUTO W/SCOPE: CPT

## 2021-04-15 PROCEDURE — 96365 THER/PROPH/DIAG IV INF INIT: CPT

## 2021-04-15 PROCEDURE — 84300 ASSAY OF URINE SODIUM: CPT

## 2021-04-15 PROCEDURE — 76770 US EXAM ABDO BACK WALL COMP: CPT

## 2021-04-15 PROCEDURE — 83550 IRON BINDING TEST: CPT

## 2021-04-15 PROCEDURE — 93010 ELECTROCARDIOGRAM REPORT: CPT | Performed by: NUCLEAR MEDICINE

## 2021-04-15 PROCEDURE — 80048 BASIC METABOLIC PNL TOTAL CA: CPT

## 2021-04-15 PROCEDURE — 84133 ASSAY OF URINE POTASSIUM: CPT

## 2021-04-15 PROCEDURE — G0378 HOSPITAL OBSERVATION PER HR: HCPCS

## 2021-04-15 PROCEDURE — 82436 ASSAY OF URINE CHLORIDE: CPT

## 2021-04-15 PROCEDURE — 51798 US URINE CAPACITY MEASURE: CPT

## 2021-04-15 PROCEDURE — 99223 1ST HOSP IP/OBS HIGH 75: CPT | Performed by: INTERNAL MEDICINE

## 2021-04-15 PROCEDURE — 84443 ASSAY THYROID STIM HORMONE: CPT

## 2021-04-15 PROCEDURE — 99222 1ST HOSP IP/OBS MODERATE 55: CPT | Performed by: INTERNAL MEDICINE

## 2021-04-15 RX ORDER — PANTOPRAZOLE SODIUM 40 MG/1
40 TABLET, DELAYED RELEASE ORAL
Status: DISCONTINUED | OUTPATIENT
Start: 2021-04-15 | End: 2021-04-21 | Stop reason: HOSPADM

## 2021-04-15 RX ORDER — POTASSIUM CHLORIDE 20 MEQ/1
20 TABLET, EXTENDED RELEASE ORAL DAILY
Status: DISCONTINUED | OUTPATIENT
Start: 2021-04-15 | End: 2021-04-15

## 2021-04-15 RX ORDER — SODIUM CHLORIDE 0.9 % (FLUSH) 0.9 %
5-40 SYRINGE (ML) INJECTION PRN
Status: DISCONTINUED | OUTPATIENT
Start: 2021-04-15 | End: 2021-04-21 | Stop reason: HOSPADM

## 2021-04-15 RX ORDER — ACETAMINOPHEN 325 MG/1
650 TABLET ORAL EVERY 4 HOURS PRN
Status: DISCONTINUED | OUTPATIENT
Start: 2021-04-15 | End: 2021-04-15 | Stop reason: SDUPTHER

## 2021-04-15 RX ORDER — SUCRALFATE 1 G/1
1 TABLET ORAL
Status: DISCONTINUED | OUTPATIENT
Start: 2021-04-15 | End: 2021-04-21 | Stop reason: HOSPADM

## 2021-04-15 RX ORDER — ACETAMINOPHEN 325 MG/1
650 TABLET ORAL EVERY 4 HOURS PRN
Status: DISCONTINUED | OUTPATIENT
Start: 2021-04-15 | End: 2021-04-21 | Stop reason: HOSPADM

## 2021-04-15 RX ORDER — POTASSIUM CHLORIDE 20 MEQ/1
20 TABLET, EXTENDED RELEASE ORAL 2 TIMES DAILY WITH MEALS
Status: DISCONTINUED | OUTPATIENT
Start: 2021-04-15 | End: 2021-04-19 | Stop reason: DRUGHIGH

## 2021-04-15 RX ORDER — ISOSORBIDE MONONITRATE 30 MG/1
30 TABLET, EXTENDED RELEASE ORAL DAILY
Status: DISCONTINUED | OUTPATIENT
Start: 2021-04-15 | End: 2021-04-21 | Stop reason: HOSPADM

## 2021-04-15 RX ORDER — M-VIT,TX,IRON,MINS/CALC/FOLIC 27MG-0.4MG
1 TABLET ORAL DAILY
Status: DISCONTINUED | OUTPATIENT
Start: 2021-04-15 | End: 2021-04-21 | Stop reason: HOSPADM

## 2021-04-15 RX ORDER — SIMVASTATIN 20 MG
40 TABLET ORAL NIGHTLY
Status: DISCONTINUED | OUTPATIENT
Start: 2021-04-15 | End: 2021-04-15 | Stop reason: CLARIF

## 2021-04-15 RX ORDER — LATANOPROST 50 UG/ML
1 SOLUTION/ DROPS OPHTHALMIC NIGHTLY
Status: DISCONTINUED | OUTPATIENT
Start: 2021-04-15 | End: 2021-04-21 | Stop reason: HOSPADM

## 2021-04-15 RX ORDER — ATORVASTATIN CALCIUM 20 MG/1
20 TABLET, FILM COATED ORAL DAILY
Status: DISCONTINUED | OUTPATIENT
Start: 2021-04-15 | End: 2021-04-21 | Stop reason: HOSPADM

## 2021-04-15 RX ORDER — SODIUM CHLORIDE 9 MG/ML
25 INJECTION, SOLUTION INTRAVENOUS PRN
Status: DISCONTINUED | OUTPATIENT
Start: 2021-04-15 | End: 2021-04-21 | Stop reason: HOSPADM

## 2021-04-15 RX ORDER — TIZANIDINE 4 MG/1
4 TABLET ORAL EVERY 8 HOURS PRN
Status: DISCONTINUED | OUTPATIENT
Start: 2021-04-15 | End: 2021-04-17

## 2021-04-15 RX ORDER — SODIUM CHLORIDE 9 MG/ML
INJECTION, SOLUTION INTRAVENOUS CONTINUOUS
Status: DISCONTINUED | OUTPATIENT
Start: 2021-04-15 | End: 2021-04-15

## 2021-04-15 RX ORDER — NITROGLYCERIN 0.4 MG/1
0.4 TABLET SUBLINGUAL EVERY 5 MIN PRN
Status: DISCONTINUED | OUTPATIENT
Start: 2021-04-15 | End: 2021-04-21 | Stop reason: HOSPADM

## 2021-04-15 RX ORDER — SODIUM CHLORIDE 0.9 % (FLUSH) 0.9 %
5-40 SYRINGE (ML) INJECTION EVERY 12 HOURS SCHEDULED
Status: DISCONTINUED | OUTPATIENT
Start: 2021-04-15 | End: 2021-04-21 | Stop reason: HOSPADM

## 2021-04-15 RX ORDER — DOCUSATE SODIUM 100 MG/1
100 CAPSULE, LIQUID FILLED ORAL 2 TIMES DAILY
Status: DISCONTINUED | OUTPATIENT
Start: 2021-04-15 | End: 2021-04-21 | Stop reason: HOSPADM

## 2021-04-15 RX ADMIN — SUCRALFATE 1 G: 1 TABLET ORAL at 10:21

## 2021-04-15 RX ADMIN — METOPROLOL TARTRATE 12.5 MG: 25 TABLET, FILM COATED ORAL at 21:24

## 2021-04-15 RX ADMIN — ACETAMINOPHEN 650 MG: 325 TABLET ORAL at 19:25

## 2021-04-15 RX ADMIN — SUCRALFATE 1 G: 1 TABLET ORAL at 05:13

## 2021-04-15 RX ADMIN — ATORVASTATIN CALCIUM 20 MG: 20 TABLET, FILM COATED ORAL at 10:21

## 2021-04-15 RX ADMIN — SODIUM BICARBONATE: 84 INJECTION, SOLUTION INTRAVENOUS at 14:21

## 2021-04-15 RX ADMIN — SODIUM CHLORIDE: 9 INJECTION, SOLUTION INTRAVENOUS at 00:25

## 2021-04-15 RX ADMIN — POTASSIUM CHLORIDE 20 MEQ: 1500 TABLET, EXTENDED RELEASE ORAL at 10:21

## 2021-04-15 RX ADMIN — IRON SUCROSE 300 MG: 20 INJECTION, SOLUTION INTRAVENOUS at 13:33

## 2021-04-15 RX ADMIN — DOCUSATE SODIUM 100 MG: 100 CAPSULE, LIQUID FILLED ORAL at 10:21

## 2021-04-15 RX ADMIN — PANTOPRAZOLE SODIUM 40 MG: 40 TABLET, DELAYED RELEASE ORAL at 19:25

## 2021-04-15 RX ADMIN — ASPIRIN 325 MG: 325 TABLET, COATED ORAL at 10:21

## 2021-04-15 RX ADMIN — LATANOPROST 1 DROP: 50 SOLUTION OPHTHALMIC at 21:24

## 2021-04-15 RX ADMIN — Medication 1 TABLET: at 10:20

## 2021-04-15 RX ADMIN — SUCRALFATE 1 G: 1 TABLET ORAL at 01:18

## 2021-04-15 RX ADMIN — POTASSIUM CHLORIDE 20 MEQ: 1500 TABLET, EXTENDED RELEASE ORAL at 19:25

## 2021-04-15 RX ADMIN — ISOSORBIDE MONONITRATE 30 MG: 30 TABLET ORAL at 10:21

## 2021-04-15 RX ADMIN — DOCUSATE SODIUM 100 MG: 100 CAPSULE, LIQUID FILLED ORAL at 21:25

## 2021-04-15 RX ADMIN — PANTOPRAZOLE SODIUM 40 MG: 40 TABLET, DELAYED RELEASE ORAL at 05:14

## 2021-04-15 RX ADMIN — LATANOPROST 1 DROP: 50 SOLUTION OPHTHALMIC at 01:18

## 2021-04-15 RX ADMIN — SUCRALFATE 1 G: 1 TABLET ORAL at 19:25

## 2021-04-15 RX ADMIN — SERTRALINE HYDROCHLORIDE 25 MG: 50 TABLET, FILM COATED ORAL at 10:21

## 2021-04-15 ASSESSMENT — PAIN SCALES - GENERAL
PAINLEVEL_OUTOF10: 0
PAINLEVEL_OUTOF10: 0
PAINLEVEL_OUTOF10: 4
PAINLEVEL_OUTOF10: 0

## 2021-04-15 ASSESSMENT — ENCOUNTER SYMPTOMS
RHINORRHEA: 0
CHEST TIGHTNESS: 0
BACK PAIN: 0

## 2021-04-15 NOTE — PROGRESS NOTES
It was reported to this RN that patient had 634 ml of residual urine on bladder scan. Garcia placed and left in place per [place] Cici GARZON.  Urology will see in AM

## 2021-04-15 NOTE — ED NOTES
[facility] called at this time for update. Update given to RN at this time regarding admission status.         Doc Chen RN  04/14/21 [id number]

## 2021-04-15 NOTE — PROCEDURES
A Bladder scan was performed at 0603 . The patient's last void was at incontinent . The residual amount was measured to be >999 ML. Report of results was given to Jessy Dutton.

## 2021-04-15 NOTE — ED PROVIDER NOTES
[facility] Emergency Department    CHIEF COMPLAINT       Chief Complaint   Patient presents with    Abnormal Lab     Nurses Notes reviewed and I agree except as noted in the HPI. HISTORY OF PRESENT ILLNESS    Autumn Boykin analia 81 y.o. female past medical history of COPD, HTN, HLD, CAD, GERD who presents to the ED status post right hip surgery for evaluation of abnormal lab result, hypotension, and epigastric/right upper quadrant/chest pain. She reports gradual onset of 8 out of 10 constant dull aching nonradiating chest pain across her epigastrium, worse in the right upper quadrant, over the last 2 to 3 days. She is also complaining of right hip pain s/p her procedure. Per chart review, Ms. Jacob Coreas underwent a right hip intertrochanteric nailing on 4/2/2021. Not performed for this encounter. Not performed for this encounter. CTA PE on 4/8/2021 is negative for PEs. She was discharged to SNF on 4/9/2020 (5 days ago). Ms. Jacob Coreas presents with paperwork from SNF that indicates recent drop in her hemoglobin to 7.2 and raise in her creatinine to 2. HPI was provided by the patient    REVIEW OF SYSTEMS     Review of Systems   Constitutional: Negative for chills, fatigue and fever. HENT: Negative for congestion, ear discharge, ear pain, postnasal drip and rhinorrhea. Respiratory: Negative for cough, chest tightness and shortness of breath. Cardiovascular: Positive for chest pain. Gastrointestinal: Negative for abdominal pain, constipation, diarrhea, nausea and vomiting. Genitourinary: Negative for difficulty urinating, dysuria, enuresis, flank pain and hematuria. Musculoskeletal: Positive for arthralgias. Negative for back pain and joint swelling.        + R hip pain   Skin: Negative for rash. Neurological: Negative for dizziness, light-headedness, numbness and headaches. Psychiatric/Behavioral: Negative for agitation, behavioral problems and confusion.       All other systems negative except as noted. PAST MEDICAL HISTORY     Past Medical History:   Diagnosis Date    Acute blood loss as cause of postoperative anemia 03/2018    CAD (coronary artery disease)      cath  50    Cardiac valve prolapse     Chest pain 5/15/2020    Chronic back pain     Compression fracture of L2 ([facility] [address] 5/26/2020    COPD (chronic obstructive pulmonary disease) (HCC)     Ex-smoker     GERD (gastroesophageal reflux disease) 2/07    EGD    Glaucoma     H/O cardiac catheterization 2002    40-50% LAD   katharine    Hearing loss secondary to cerumen impaction 5/28/2019    Hyperlipidemia     Hypertension     Inadvertent durotomy 3/12/2018    Liver disease     history of hepatitis at age 22    Osteoarthritis 1999    right shoulder and back    Pericarditis 1996     SURGICALHISTORY      has a past surgical history that includes Hysterectomy (1980); Spine surgery (7/02); Bunionectomy (2004); Rotator cuff repair (left); Upper gastrointestinal endoscopy (2007); Cardiac catheterization (2007??  &   2012? ? ); back surgery (2002); Appendectomy; Colonoscopy; eye surgery (2009??); pr laminectomy,>2 sgmt,lumbar (N/A, 3/7/2018); pr office/outpt visit,procedure only (N/A, 3/12/2018); Endoscopy, colon, diagnostic; Upper gastrointestinal endoscopy (Left, 5/19/2020); and hip surgery (Right, 4/2/2021).     CURRENT MEDICATIONS       Current Discharge Medication List      CONTINUE these medications which have NOT CHANGED    Details   tiZANidine (ZANAFLEX) 4 MG tablet Take 1 tablet by mouth every 8 hours as needed (muscle spasm)  Qty: 9 tablet, Refills: 0      docusate sodium (COLACE, DULCOLAX) 100 MG CAPS Take 100 mg by mouth 2 times daily  Qty: 60 capsule, Refills: 0      aspirin 325 MG EC tablet Take 1 tablet by mouth daily  Qty: 30 tablet, Refills: 0      sertraline (ZOLOFT) 25 MG tablet Take 25 mg by mouth daily      isosorbide mononitrate (IMDUR) 30 MG extended release tablet Take 1 tablet by mouth daily  Qty: 90 tablet, Refills: 1    Associated Diagnoses: Coronary artery disease involving native coronary artery of native heart without angina pectoris      potassium chloride (KLOR-CON M) 20 MEQ extended release tablet Take 1 tablet by mouth daily  Qty: 90 tablet, Refills: 1    Associated Diagnoses: Essential hypertension      pantoprazole (PROTONIX) 40 MG tablet Take 1 tablet by mouth 2 times daily (before meals)  Qty: 60 tablet, Refills: 2      sucralfate (CARAFATE) 1 GM tablet Take 1 tablet by mouth 4 times daily (before meals and nightly)  Qty: 120 tablet, Refills: 0      metoprolol tartrate (LOPRESSOR) 25 MG tablet Take 0.5 tablets by mouth 2 times daily  Qty: 90 tablet, Refills: 1    Associated Diagnoses: Essential hypertension      nortriptyline (PAMELOR) 50 MG capsule Take 1 capsule by mouth nightly  Qty: 90 capsule, Refills: 1    Associated Diagnoses: Chronic midline low back pain with right-sided sciatica      simvastatin (ZOCOR) 40 MG tablet Take 1 tablet by mouth nightly  Qty: 90 tablet, Refills: 1    Associated Diagnoses: Pure hypercholesterolemia      Multiple Vitamins-Minerals (THERAPEUTIC MULTIVITAMIN-MINERALS) tablet Take 1 tablet by mouth daily      latanoprost (XALATAN) 0.005 % ophthalmic solution Place 1 drop into both eyes nightly      acetaminophen (TYLENOL) 325 MG tablet Take 2 tablets by mouth every 4 hours as needed for Pain Maximum dose of acetaminophen is 4000 mg from all sources in 24 hours. nitroGLYCERIN (NITROSTAT) 0.4 MG SL tablet up to max of 3 total doses. If no relief after 1 dose, call 911. Qty: 30 tablet, Refills: 0           ALLERGIES     has No Known Allergies. FAMILY HISTORY     She indicated that her mother is . She indicated that her father is . family history includes Tuberculosis in her father and mother. SOCIAL HISTORY       Social History     Socioeconomic History    Marital status:       Spouse name: Not on file    Number of children: 2    Years of education: Not on file    Highest education level: Not on file   Occupational History    Not on file   Social Needs    Financial resource strain: Not hard at all    Food insecurity     Worry: Never true     Inability: Never true   [employer] needs     Medical: No     Non-medical: No   Tobacco Use    Smoking status: Former Smoker     Years: 30.00     Types: Cigarettes     Quit date: 1989     Years since quittin.8    Smokeless tobacco: Never Used   Substance and Sexual Activity    Alcohol use: No    Drug use: No    Sexual activity: Not Currently   Lifestyle    Physical activity     Days per week: 0 days     Minutes per session: 0 min    Stress: Not on file   Relationships    Social connections     Talks on phone: Not on file     Gets together: Not on file     Attends Orthodox service: Not on file     Active member of club or organization: Not on file     Attends meetings of clubs or organizations: Not on file     Relationship status: Not on file    Intimate partner violence     Fear of current or ex partner: Not on file     Emotionally abused: Not on file     Physically abused: Not on file     Forced sexual activity: Not on file   Other Topics Concern    Not on file   Social History Narrative    No barriers with transportation    No barriers with medication affordability       PHYSICAL EXAM     INITIAL VITALS:  height is 5' 1\" (1.549 m) and weight is 151 lb (68.5 kg). Her oral temperature is 97.7 °F (36.5 °C). Her blood pressure is 118/52 (abnormal) and her pulse is 94. Her respiration is 20 and oxygen saturation is 97%. Physical Exam  Constitutional:       General: She is not in acute distress. Appearance: She is not ill-appearing or diaphoretic. Comments: She is confused on exam.   HENT:      Head: Normocephalic and atraumatic. Mouth/Throat:      Mouth: Mucous membranes are moist.      Pharynx: Oropharynx is clear. Eyes:      General: No scleral icterus. Conjunctiva/sclera: Conjunctivae normal.   Neck:      Musculoskeletal: Normal range of motion. Cardiovascular:      Rate and Rhythm: Normal rate and regular rhythm. Pulses: Normal pulses. Heart sounds: Normal heart sounds. No murmur. No friction rub. No gallop. Pulmonary:      Effort: Pulmonary effort is normal.      Breath sounds: Normal breath sounds. No wheezing, rhonchi or rales. Abdominal:      Palpations: Abdomen is soft. Tenderness: There is no abdominal tenderness. There is no guarding or rebound. Musculoskeletal:         General: Tenderness present. Comments: Recent sx of right hip   Skin:     General: Skin is warm and dry. Capillary Refill: Capillary refill takes less than 2 seconds. Neurological:      General: No focal deficit present. Mental Status: She is disoriented. Psychiatric:         Mood and Affect: Mood normal.         Behavior: Behavior normal.       DIFFERENTIAL DIAGNOSIS:   Anemia, renal failure    DIAGNOSTIC RESULTS     EKG: All EKG's are interpreted by the Emergency Department Physician who eithersigns or Co-signs this chart in the absence of a cardiologist.    RADIOLOGY: non-plainfilm images(s) such as CT, Ultrasound and MRI are read by the radiologist.  Plain radiographic images are visualized and preliminarily interpreted by the emergency physician unless otherwise stated below.   No orders to display     LABS:   Labs Reviewed   BASIC METABOLIC PANEL W/ REFLEX TO MG FOR LOW K - Abnormal; Notable for the following components:       Result Value    Sodium 132 (*)     CO2 17 (*)     Glucose 126 (*)     BUN 46 (*)     CREATININE 2.3 (*)     Calcium 8.4 (*)     All other components within normal limits   CBC WITH AUTO DIFFERENTIAL - Abnormal; Notable for the following components:    RBC 2.73 (*)     Hemoglobin 7.8 (*)     Hematocrit 24.4 (*)     MCHC 32.0 (*)     RDW-CV 17.5 (*)     RDW-SD 55.9 (*)     Lymphocytes Absolute 0.5 (*)     Monocytes patient/patient representative. Questions answered. Medications asdirected, including OTC medications for supportive care. Education provided on medications. Differential diagnosis(s) discussed with patient/patient representative. Home care/self care instructions reviewed withpatient/patient representative. Patient is to follow-up with family care provider in 2-3 days if no improvement. Patient is to go to the emergency department if symptoms worsen. Patient/patient representative isaware of care plan, questions answered, verbalizes understanding and is in agreement. CRITICAL CARE:   None    CONSULTS:  Internal Medicine Dr. Aleks Molina:  None    FINAL IMPRESSION     1. Acute kidney injury ([facility] [address]    2. Postoperative anemia          DISPOSITION/PLAN   DISPOSITION Admitted 04/14/2021 10:55:52 PM          PATIENT REFERREDTO:  No follow-up provider specified.     DISCHARGE MEDICATIONS:  Current Discharge Medication List          (Please note that portions of this note were completed with a voice recognition program.  Efforts were made to edit the dictations but occasionally words are mis-transcribed.)         YASMINE Ortiz - YASMINE Choi CNP  04/15/21 9961

## 2021-04-15 NOTE — ED NOTES
Pt up in bed with blankets over bottom half. Patient updated on plan of care at this time and expresses no concerns regarding treatment. Patient VSS. Respirations are regular and unlabored, patient is alert and oriented x4. Patient bed rails up x2 and call light within reach. Will continue to monitor.        Jose Bonilla RN  04/14/21 2641

## 2021-04-15 NOTE — PROGRESS NOTES
Patient states difficulty peeing. Bladder Scan ordered and showing greater than 999. Writer RN contacts Dr. Ana Mock to update and ask for orders. Waiting of response at this time.

## 2021-04-15 NOTE — ED NOTES
ED nurse-to-nurse bedside report    Chief Complaint   Patient presents with    Abnormal Lab      LOC: alert and orientated to name, place, date  Vital signs   Vitals:    04/14/21 2005 04/14/21 2020 04/14/21 2117 04/14/21 2223   BP: (!) 95/55 (!) 93/53 (!) 100/50 (!) 99/53   Pulse: 98  80 87   Resp: 19 22 26   Temp: 98.3 °F (36.8 °C)      TempSrc: Oral      SpO2: 100%  99% 99%   Weight: 151 lb (68.5 kg)      Height: 5' 1\" (1.549 m)         Pain:    Pain Interventions: NA  Pain Goal: NA  Oxygen: No    Current needs required RA   Telemetry: Yes  LDAs:   Peripheral IV 04/14/21 Right Forearm (Active)   Site Assessment Clean;Dry; Intact 04/14/21 2025   Line Status Blood return noted; Flushed; Infusing;Specimen collected 04/14/21 2025   Dressing Status Clean;Dry; Intact 04/14/21 2025   Dressing Intervention New 04/14/21 2025     Continuous Infusions:    sodium chloride       Mobility: Requires assistance * 2  Chowdhury Fall Risk Score:    Fall Risk 2/11/2021 8/31/2020 3/12/2020 2/26/2019 9/26/2018 4/4/2018 11/7/2017   2 or more falls in past year? no no no no no no no   Fall with injury in past year? no no no no no no no     Fall Interventions: Side rails and call light  Report given to: Mo [address] [place] [address], RN  04/14/21 9697

## 2021-04-15 NOTE — DISCHARGE INSTR - COC
Continuity of Care Form    Patient Name: Bel Mcwilliams   :  1935  MRN:  [id number]    Admit date:  2021  Discharge date:  2021      Code Status Order: Full Code   Advance Directives:     Admitting Physician:  Reynold Matta MD  PCP: Katie Vickers MD    Discharging Nurse: [facility] Unit/Room#: [address] [facility]  Discharging Unit Phone Number: [phone]    Emergency Contact:   Extended Emergency Contact Information  Primary Emergency Contact: Estephanie Dalton [facility] [address] [facility] Phone: [phone]  Mobile Phone: [phone]  Relation: Child  Secondary Emergency Contact: Tyrese Szymanski [address] Phone: [phone]  Mobile Phone: [phone]  Relation: Brother/Sister    Past Surgical History:  Past Surgical History:   Procedure Laterality Date    APPENDECTOMY      at age 13years   [address]      right foot   [address]  ??  &   2012? ?    normal results    COLONOSCOPY      last one ???    ENDOSCOPY, COLON, DIAGNOSTIC      EYE SURGERY  ??    right eye    HIP SURGERY Right 2021    RIGHT HIP INTERTROCHANTERIC NAILING performed by Leidy Matthews MD at [address]    still has ovaries    ND LAMINECTOMY,>2 SGMT,LUMBAR N/A 3/7/2018    LUMBAR LAMINECTOMY L3-S1 performed by Luciano Stacy MD at [address] [place] [address]/OUTPT [address] [place] [address] N/A 3/12/2018    REPAIR DUROTOMY PLACEMENT OF SUBARACHNOID DRAIN performed by Luciano Stacy MD at [facility]  left    SPINE SURGERY      L5 cyst    UPPER GASTRO[facility]TESTINAL ENDOSCOPY      [facility]    UPPER GASTROINTESTINAL ENDOSCOPY Left 2020    EGD BIOPSY performed by Christine Billings MD at [facility]       Immunization History:   Immunization History   Administered Date(s) Administered    Pneumococcal Conjugate 13-valent ([id number]) 2015    Pneumococcal Conjugate 7-valent (Jackye Saas) 01/01/2003    Pneumococcal Polysaccharide ([id number]) 03/21/2010       Active Problems:  Patient Active Problem List   Diagnosis Code    Hyperlipidemia E78.5    Osteoarthritis M19.90    GERD (gastroesophageal reflux disease) K21.9    COPD (chronic obstructive pulmonary disease) ([facility] [address] J44.9    Chronic back pain M54.9, G89.29    CAD (coronary artery disease) I25.10    Hypertension I10    Spinal stenosis of lumbar region with neurogenic claudication M48.062    Neurologic gait dysfunction R26.9    Inflammatory spondylopathy of lumbosacral region [facility]) M46.97    Paroxysmal atrial fibrillation (HCC) I48.0    Closed displaced intertrochanteric fracture of right femur ([facility] [address] S72.141A    Fall at nursing home [address] [place] , Y92.129    Severe malnutrition ([facility] [address] E43    Acute kidney injury ([facility] [address] N17.9    Anemia D64.9    Urinary retention R33.9    Chest pain R07.9    Elevated troponin R77.8       Isolation/Infection:   Isolation          No Isolation        Patient Infection Status     Infection Onset Added Last Indicated Last Indicated By Review Planned Expiration Resolved Resolved By    None active    Resolved    COVID-19 Rule Out 04/21/21 04/21/21 04/21/21 COVID-19, Rapid (Ordered)   04/21/21 Rule-Out Test Resulted    C-diff Rule Out 04/18/21 04/18/21 04/18/21 Clostridium Difficile Toxin/Antigen (Ordered)   04/19/21 Edilberto Morris RN    COVID-19 Rule Out 04/01/21 04/01/21 04/01/21 SARS-CoV-2 NAAT (Rapid) (Ordered)   04/01/21 Rule-Out Test Resulted    COVID-19 Rule Out 05/15/20 05/16/20 05/15/20 Covid-19 Ambulatory (Ordered)   05/18/20 Rule-Out Test Resulted    COVID-19 Rule Out 05/15/20 05/15/20 05/15/20 COVID-19 (Ordered)   05/15/20 Rule-Out Test Resulted          Nurse Assessment:  Last Vital Signs: BP (!) 104/54   Pulse 90   Temp 98 °F (36.7 °C) (Oral)   Resp 16   Ht 5' 1\" (1.549 m)   Wt 160 lb 8 oz (72.8 kg)   SpO2 92%   BMI 30.33 kg/m²     Last documented pain score (0-10 scale): Pain Level: 6  Last Weight:   Wt Readings from Last 1 Encounters:   04/21/21 160 lb 8 oz (72.8 kg)     Mental Status:  oriented and alert    IV Access:  - None    Nursing Mobility/ADLs:  Walking   Assisted  Transfer  Assisted  Bathing  Dependent  Dressing  Dependent  Toileting  Dependent  Feeding  Assisted  Med Admin  Dependent  Med Delivery   whole and prefers mixed with applesauce    Wound Care Documentation and Therapy:  Incision 03/12/18 Back (Active)   Number of days: 5834       Wound 04/01/21 Ankle Left; Outer (Active)   Wound Etiology Pressure Stage  2 04/21/21 0945   Dressing Status Clean;Dry; Intact 04/18/21 2043   Wound Cleansed Soap and water 04/17/21 2350   Dressing/Treatment Open to air 04/21/21 0945   Wound Assessment Other (Comment) 04/02/21 0403   Drainage Amount None 04/21/21 0945   Alfreda-wound Assessment Other (Comment) 04/02/21 0403   Number of days: 19       Wound 04/04/21 Buttocks Right blister opened up (Active)   Wound Etiology Pressure Stage  2 04/21/21 0945   Dressing Status Clean;Dry; Intact 04/18/21 2043   Wound Cleansed Soap and water 04/18/21 2043   Dressing/Treatment Open to air 04/21/21 0945   Wound Assessment Pink/red 04/21/21 0945   Drainage Amount None 04/21/21 0945   Odor None 04/21/21 0945   Number of days: 16       Wound 04/15/21 Buttocks Left (Active)   Wound Etiology Pressure Stage  2 04/21/21 0945   Dressing Status Other (Comment) 04/21/21 0945   Wound Cleansed Soap and water 04/18/21 2043   Dressing/Treatment Open to air 04/21/21 0945   Wound Assessment Pink/red 04/21/21 0945   Drainage Amount None 04/21/21 0945   Odor None 04/21/21 0945   Alfreda-wound Assessment Blisters 04/21/21 0945   Number of days: 6        Elimination:  Continence:   · Bowel: Yes  · Bladder: No  Urinary Catheter:  Insertion Date: 4- and Indication for Use of Catheter: Urology/Urologist seeing this patient or inserted indwelling catheter   Colostomy/Ileostomy/Ileal Conduit: No       Date of Last BM: 4-19-21    Intake/Output Summary (Last 24 hours) at 4/21/2021 1409  Last data filed at 4/21/2021 1006  Gross per 24 hour   Intake 1625 ml   Output 875 ml   Net 750 ml     I/O last 3 completed shifts: In: 6075 [P.O.:1250; I.V.:55]  Out: 850 [Urine:750; Emesis/NG output:100]    Safety Concerns: At Risk for Falls    Impairments/Disabilities:      None    Nutrition Therapy:  Current Nutrition Therapy:   - Oral Diet:  renal, carb, dysphagia bite size, thin liquids    Routes of Feeding: Oral  Liquids: Thin Liquids  Daily Fluid Restriction: no  Last Modified Barium Swallow with Video (Video Swallowing Test): not done    Treatments at the Time of Hospital Discharge:   Respiratory Treatments:   Oxygen Therapy:  is not on home oxygen therapy.   Ventilator:    - No ventilator support    Rehab Therapies: Physical Therapy, Occupational Therapy and Speech/Language Therapy  Weight Bearing Status/Restrictions: No weight bearing restirctions  Other Medical Equipment (for information only, NOT a DME order):  walker, bedside commode and hospital bed  Other Treatments:     Patient's personal belongings (please select all that are sent with patient):  Glasses    RN SIGNATURE:  Electronically signed by Jeffrey Duggan RN on 4/21/2021 at 2:09 PM      CASE MANAGEMENT/SOCIAL WORK SECTION    Inpatient Status Date: 4/14/2021    Readmission Risk Assessment Score:  Readmission Risk              Risk of Unplanned Readmission:        26           Discharging to Facility/ Agency   · Name: [address]  · Address: [address] [place] [address], [address] [place] [address], [address] [place] [address] Road  · Phone: [phone]  · Fax: [phone]    Dialysis Facility (if applicable)   · Name:  · Address:  · Dialysis Schedule:  · Phone:  · Fax:    / signature: Electronically signed by JIM Aggarwal on 4/15/21 at 11:00 AM EDT    PHYSICIAN SECTION    Prognosis: Fair    Condition at Discharge: Stable    Rehab Potential (if transferring to Rehab): Fair    Recommended Labs or Other Treatments After Discharge: F/U cardiology and urology    Physician Certification: I certify the above information and transfer of Shayy Murders  is necessary for the continuing treatment of the diagnosis listed and that she requires [facility] for greater 30 days.      Update Admission H&P: No change in H&P    PHYSICIAN SIGNATURE: Electronically signed by Kelsey Sanabria MD on 4/21/2021 at 1:53 PM

## 2021-04-15 NOTE — H&P
Dr. Sissy Kirby ( Internal Medicine Specialties )  H&P  4/15/2021  10:42 AM    Patient:  Lukasz Burgos  YOB: 1935    MRN: [id number]   Acct:  [de-identified]   [id number]  Primary Care Physician: Morris Cornell MD  Over 45 mins spent on this evaluation.   dictated    Electronically signed by Edmund Glez MD on 4/15/2021 at 10:42 AM         Copy: Primary Care Physician: Morris Cornell MD

## 2021-04-15 NOTE — H&P
nursing home at this time. OCCUPATION:  She is retired. FAMILY HISTORY:  Noncontributory. REVIEW OF SYSTEMS:  Very poor historian. Admits to poor appetite. Denies any chest pain at this time or heaviness or any nausea or  vomiting. She has apparently had some difficulty urinating and she was  straight cathed this morning, post bladder scan of about a liter. PHYSICAL EXAMINATION:  GENERAL:  I found an elderly patient who looks clinically pale, but does  not appear to be in acute distress. VITAL SIGNS:  Showing blood pressure at 107/55, pulse 93, respirations  20, and temperature of 98.5. LUNGS:  Showed diminished breath sounds, slight crackles at the bases. CARDIOVASCULAR SYSTEM:  Diffuse PMI. S1 and S2 though heard. No  gallop. ABDOMEN:  Soft, obese, nontender. Positive bowel sounds. I could not  palpate for any enlarged organs. NEUROLOGIC:  The patient is alert, awake, responsive, a little weak. EXTREMITIES:  Show no edema. Some chronic stasis dermatitis. The right  hip does not show any fullness to suggest active bleeding. LABORATORY DATA:  The most recent lab study shows a white count of 5.5,  hemoglobin is 7.6, hematocrit is 24, platelet count is 073. Electrolytes showed a BUN of 37, creatinine is 1.8, sodium of 132,  potassium of 3.5, chloride is 105, CO2 of 14, and a glucose of 104. EKG is pending. Troponin less than 0.01 previously; however, on this  admission, it is 0.05 and 0.43. EKG though is pending. ASSESSMENT AND PLAN:  Anemia, not quite certain loss. Dr. Malissa Vargas is  the family MD and he has a contemplating transfusion, however, with IV  iron. The patient will be evaluated also by the cardiologist in view of  the positive troponin level for further evaluation. On the urinary  retention, she has been straight cathed now and they will reassess. Call urologist, might need a Garcia catheter placement. [address] [place] [address]  Nabor Dozier M.D.    D: 04/15/2021 10:57:59 T: 04/15/2021 12:34:21     [email or web address]  Job#: [id number]     Doc#: [id number]    CC:

## 2021-04-15 NOTE — ED TRIAGE NOTES
Patient arrives to the ED via EMS for abnormal labs, hypotension, and mild abdominal pain. Patient's BP on arrival is 95/55 in the right arm. Patient reports her epigastric pain as a 7/10 in severity. Patient reports right hip surgery from a fall that occurred three days ago. Patient is on continuous 2 liters of oxygen via nasal cannula which she wears at home. Patient resting in trendelumberg position, breathing easy and unlabored. Patient's son at bedside. No other concerns or complaints at this time.

## 2021-04-15 NOTE — CONSULTS
[facility]      Patient:  Omaira Gonzalez  YOB: 1935    MRN: [id number]   Acct: [de-identified]     Primary Care Physician: Levan Dance, MD    REASON FOR CONSULT:    for pos trop     CHIEF COMPLAINT:    Fatigue     HISTORY OF PRESENT ILLNESS:    Omaira Gonzalez is a pleasant 81year old female patient with past medical history that includes prior tobacco abuse, GERD, HTN, Dyslipidemia, COPD, nonobstructive CAD. She was recently admitted to [facility] for hip fracture post fall. She underwent surgery, then was discharged to Rehab. Her labs revealed evidence for anemia. The patient was admitted back to [facility] for further evaluation. Her Hgb was 7.6. The patient has evidence for BAILEE with concern for urinary retention. Cr 1.8 (was 2.3 yesterday and 0.7 on 4/8/2021). Cardiology was consulted for elevated Troponin. Troponin 0.056, trended down to 0.043. EKG today revealed SR, PACs. She is a poor historian. She was evaluated on recent admission by Dr Chino Alba for atypical chest pains. Echocardiogram 4/8/2021 revealed an EF of 73%, grade I diastolic dysfunction. Patient denies shortness of breath, dyspnea on exertion, orthopnea, paroxysmal nocturnal dyspnea, palpitations, dizziness, syncope.     All labs, EKG's, diagnostic testing and images as well as cardiac cath, stress testing   were reviewed during this encounter    CARDIAC TESTING  Echo:   ECHO:   Results for orders placed during the hospital encounter of 04/01/21   ECHO Complete 2D W Doppler W Color    Narrative Transthoracic Echocardiography Report (TTE)     Demographics      Patient Name    Lyndsey Lyn  Gender               Female                   M      MR #            [id number]         Race                                                         Ethnicity      Account #       [de-identified]         Room Number          2879      Accession       [id number]        Date of Study        04/08/2021   Number      Date of Birth   1935        Referring Physician  LUCY Garcia MD      Age             81 year(s)        Quang Siddiqi,                                                          [facility]                                        Interpreting         Kimo Maynard MD                                     Physician     Procedure    Type of Study      TTE procedure:ECHOCARDIOGRAM COMPLETE 2D W DOPPLER W COLOR. Procedure Date  Date: 04/08/2021 Start: 08:19 AM    Study Location: Bedside  Technical Quality: Limited visualization due to lung interface. Indications:Chest pain. Additional Medical History: Former smoker, GERD, COPD, Arthritis,  Pericarditis, Mitral valve prolapse    Patient Status: Routine    Height: 61 inches Weight: 138 pounds BSA: 1.61 m^2 BMI: 26.08 kg/m^2    BP: 138/68 mmHg     Conclusions      Summary   Normal left ventricle size and systolic function. Ejection fraction was   estimated at 60 %. There were no regional left ventricular wall motion   abnormalities and wall thickness was within normal limits. Doppler parameters were consistent with abnormal left ventricular   relaxation (grade 1 diastolic dysfunction). Signature      ----------------------------------------------------------------   Electronically signed by Kimo Maynard MD (Interpreting   physician) on 04/08/2021 at 07:36 PM   ----------------------------------------------------------------      Findings      Mitral Valve   The mitral valve structure was normal with normal leaflet separation. DOPPLER: The transmitral velocity was within the normal range with no   evidence for mitral stenosis. Mild mitral regurgitation is present. Aortic Valve   The aortic valve was trileaflet with normal thickness and cuspal   separation.  DOPPLER: Transaortic velocity was within the normal range with   no evidence of aortic stenosis. Mild aortic regurgitation is noted. Tricuspid Valve   The tricuspid valve structure was normal with normal leaflet separation. DOPPLER: There was no evidence of tricuspid stenosis. Mild tricuspid regurgitation visualized. Pulmonic Valve   The pulmonic valve leaflets exhibited normal thickness, no calcification,   and normal cuspal separation. DOPPLER: The transpulmonic velocity was   within the normal range with no evidence for regurgitation. Left Atrium   Left atrial size was normal.      Left Ventricle   Normal left ventricle size and systolic function. Ejection fraction was   estimated at 60 %. There were no regional left ventricular wall motion   abnormalities and wall thickness was within normal limits. Doppler parameters were consistent with abnormal left ventricular   relaxation (grade 1 diastolic dysfunction). Right Atrium   Right atrial size was normal.      Right Ventricle   The right ventricular size was normal with normal systolic function and   wall thickness. Pericardial Effusion   The pericardium was normal in appearance with no evidence of a pericardial   effusion. Pleural Effusion   No evidence of pleural effusion. Aorta / Great Vessels   -Aortic root dimension within normal limits.   -The Pulmonary artery is within normal limits. -IVC size is within normal limits with normal respiratory phasic changes.      M-Mode/2D Measurements & Calculations      LV Diastolic   LV Systolic Dimension:    AV Cusp Separation: 1.8 cmLA   Dimension: 4.4 3.2 cm                    Dimension: 2.8 cmAO Root   cm             LV Volume Diastolic: 45.7 Dimension: 3.4 cmLA Area: 11.6   LV FS:27.3 %   ml                        cm^2   LV PW          LV Volume Systolic: 41 ml   Diastolic: 0.8 LV EDV/LV EDV Index: 87.7   cm             ml/54 m^2LV ESV/LV ESV   Septum         Index: 41 ml/25 m^2       RV Diastolic Dimension: 1.5 cm Diastolic: 0.8 EF Calculated: 53.3 %   cm                                       LA/Aorta: 0.82                                               LA volume/Index: 24.5 ml /15m^2     Doppler Measurements & Calculations      MV Peak E-Wave: 82 cm/s    AV Peak Velocity: 159  LVOT Peak Velocity: 101   MV Peak A-Wave: 103 cm/s   cm/s                   cm/s   MV E/A Ratio: 0.8          AV Peak Gradient:      LVOT Peak Gradient: 4   MV Peak Gradient: 2.69     10.11 mmHg             mmHg   mmHg      MV Deceleration Time: 201   msec                                              TR Velocity:276 cm/s   MV P1/2t: 59 msec          AV P1/2t: 669 msec     TR Gradient:30.47 mmHg   MVA by PHT:3.73 cm^2                              PV Peak Velocity: 106                                                     cm/s   MV E' Septal Velocity: 5.4                        PV Peak Gradient: 4.49   cm/s                       AV DVI (Vmax):0.64     mmHg   MV A' Septal Velocity:   11.5 cm/s   MV E' Lateral Velocity:   8.8 cm/s   MV A' Lateral Velocity:   16.6 cm/s   E/E' septal: 15.19   E/E' lateral: 9.32   MR Velocity: 469 cm/s     [email or web address] [id number]        Past Medical History:    Past Medical History:   Diagnosis Date    Acute blood loss as cause of postoperative anemia 03/2018    CAD (coronary artery disease)      cath  50    Cardiac valve prolapse     Chest pain 5/15/2020    Chronic back pain     Compression fracture of L2 ([address] 5/26/2020    COPD (chronic obstructive pulmonary disease) (HCC)     Ex-smoker     GERD (gastroesophageal reflux disease) 2/07    EGD    Glaucoma     H/O cardiac catheterization 2002    40-50% LAD   katharine    Hearing loss secondary to cerumen impaction 5/28/2019    Hyperlipidemia     Hypertension     Inadvertent durotomy 3/12/2018    Liver disease     history of hepatitis at age 20    Osteoarthritis 1999    right shoulder and back    Pericarditis 1996       Past Surgical History:    Past Surgical History:   Procedure Laterality Date    APPENDECTOMY      at age 13years   [address]  2002    BUNIONECTOMY  2004    right foot   [address]  2007??  &   2012? ?    normal results    COLONOSCOPY      last one 2000???    ENDOSCOPY, COLON, DIAGNOSTIC      EYE SURGERY  2009??    right eye    HIP SURGERY Right 4/2/2021    RIGHT HIP INTERTROCHANTERIC NAILING performed by Domenica Aguayo MD at [address]    still has ovaries    [place] LAMINECTOMY,>2 SGMT,LUMBAR N/A 3/7/2018    LUMBAR LAMINECTOMY L3-S1 performed by Juan F Bedolla MD at [facility] OFFICE/OUTPT [address] N/A 3/12/2018    REPAIR DUROTOMY PLACEMENT OF SUBARACHNOID DRAIN performed by Juan F Bedolla MD at [facility]  left    [address]  7/02    L5 cyst    UPPER GASTROINTESTINAL ENDOSCOPY  2007    [facility]    UPPER GASTROINTESTINAL ENDOSCOPY Left 5/19/2020    EGD BIOPSY performed by Claus Arenas MD at [facility]       Medications Prior to Admission:    Medications Prior to Admission: tiZANidine (ZANAFLEX) 4 MG tablet, Take 1 tablet by mouth every 8 hours as needed (muscle spasm)  docusate sodium (COLACE, DULCOLAX) 100 MG CAPS, Take 100 mg by mouth 2 times daily  aspirin 325 MG EC tablet, Take 1 tablet by mouth daily  sertraline (ZOLOFT) 25 MG tablet, Take 25 mg by mouth daily  isosorbide mononitrate (IMDUR) 30 MG extended release tablet, Take 1 tablet by mouth daily  potassium chloride (KLOR-CON M) 20 MEQ extended release tablet, Take 1 tablet by mouth daily  pantoprazole (PROTONIX) 40 MG tablet, Take 1 tablet by mouth 2 times daily (before meals)  sucralfate (CARAFATE) 1 GM tablet, Take 1 tablet by mouth 4 times daily (before meals and nightly)  metoprolol tartrate (LOPRESSOR) 25 MG tablet, Take 0.5 tablets by mouth 2 times daily  nortriptyline (PAMELOR) 50 MG capsule, Take 1 capsule by mouth nightly  simvastatin (ZOCOR) 40 MG tablet, Take 1 tablet by mouth nightly  Multiple Vitamins-Minerals (THERAPEUTIC MULTIVITAMIN-MINERALS) tablet, Take 1 tablet by mouth daily  latanoprost (XALATAN) 0.005 % ophthalmic solution, Place 1 drop into both eyes nightly  acetaminophen (TYLENOL) 325 MG tablet, Take 2 tablets by mouth every 4 hours as needed for Pain Maximum dose of acetaminophen is 4000 mg from all sources in 24 hours. nitroGLYCERIN (NITROSTAT) 0.4 MG SL tablet, up to max of 3 total doses. If no relief after 1 dose, call 911. Allergies:    Patient has no known allergies. Social History:    reports that she quit smoking about 31 years ago. Her smoking use included cigarettes. She quit after 30.00 years of use. She has never used smokeless tobacco. She reports that she does not drink alcohol or use drugs. Family History:   family history includes Tuberculosis in her father and mother. REVIEW OF SYSTEMS:  Constitutional: negative for anorexia, chills and fevers,weight change  Skin: negative for new skin rash per patient  HEENT: negative for head trauma or new visual changes  Respiratory: negative for cough, hemoptysis, wheezing, JULIEN, SOB   Cardiovascular: negative for  orthopnea, palpitations and syncope. Gastrointestinal: negative for abdominal pain,nausea , vomiting, constipation, diarrhea.   Hematologic/lymphatic: negative for bruising,prolonged bleeding,blood clots  Musculoskeletal:negative for muscle weakness, myalgias,wasting  Neurological: negative for coordination problems, dizziness, gait problems and vertigo  Behavioral/Psych:negative for mood/sleep disturbance      PHYSICAL EXAM:   Vitals:  Patient Vitals for the past 24 hrs:   BP Temp Temp src Pulse Resp SpO2 Height Weight   04/15/21 0348 137/71 98.7 °F (37.1 °C) Oral 94 20 99 % -- --   04/15/21 0000 (!) 118/52 97.7 °F (36.5 °C) Oral 94 20 97 % -- --   04/14/21 2343 (!) 91/53 -- -- 89 20 100 % -- -- 04/14/21 2223 (!) 99/53 -- -- 87 26 99 % -- --   04/14/21 2117 (!) 100/50 -- -- 80 22 99 % -- --   04/14/21 2020 (!) 93/53 -- -- -- -- -- -- --   04/14/21 2005 (!) 95/55 98.3 °F (36.8 °C) Oral 98 19 100 % 5' 1\" (1.549 m) 151 lb (68.5 kg)       Last 3 weights: Wt Readings from Last 3 Encounters:   04/14/21 151 lb (68.5 kg)   04/01/21 138 lb 9.6 oz (62.9 kg)   03/18/21 150 lb (68 kg)     24 hour intake/output:    Intake/Output Summary (Last 24 hours) at 4/15/2021 1014  Last data filed at 4/15/2021 3743  Gross per 24 hour   Intake 372.49 ml   Output 950 ml   Net -577.51 ml     BMI:Body mass index is 28.53 kg/m². General Appearance: alert and oriented to person, place and time, well developed and well- nourished, in no acute distress  Skin: warm and dry, no rash or erythema  Eyes: pupils equal, round, and reactive to light, extraocular eye movements intact, conjunctivae normal  Neck: supple and non-tender without mass, no thyromegaly or thyroid nodules, no cervical lymphadenopathy  Pulmonary/Chest: clear to auscultation bilaterally- no wheezes, rales or rhonchi, normal air movement, no respiratory distress  Cardiovascular: normal rate, regular rhythm, normal S1 and S2, no murmur. No rubs, clicks, or gallops, distal pulses intact, no carotid bruits, Negative JVD  Radial Pulses: intact 2+  Abdomen: soft, non-tender, non-distended, normal bowel sounds, no masses or organomegaly  Extremities: no cyanosis, clubbing . trace Edema  Musculoskeletal: normal range of motion, no joint swelling, deformity or tenderness      RADIOLOGY   No results found.     LABS:  Recent Labs     04/14/21  2017 04/15/21  0019   TROPONINT 0.056* 0.043*     CBC:   Lab Results   Component Value Date    WBC 5.5 04/15/2021    RBC 2.68 04/15/2021    HGB 7.6 04/15/2021    HCT 24.0 04/15/2021    MCV 89.6 04/15/2021    MCH 28.4 04/15/2021    MCHC 31.7 04/15/2021    RDW 14.5 09/21/2018     04/15/2021    MPV 10.8 04/15/2021     BMP:    Lab Results   Component Value Date     04/15/2021    K 3.5 04/15/2021    K 3.9 04/14/2021     04/15/2021    CO2 14 04/15/2021    BUN 37 04/15/2021    LABALBU 2.6 04/02/2021    CREATININE 1.8 04/15/2021    CALCIUM 8.0 04/15/2021    LABGLOM 27 04/15/2021    GLUCOSE 104 04/15/2021    GLUCOSE 112 09/12/2017     Hepatic Function Panel:    Lab Results   Component Value Date    ALKPHOS 77 04/02/2021    ALT 58 04/02/2021    AST 31 04/02/2021    PROT 5.7 04/02/2021    BILITOT 0.3 04/02/2021    BILIDIR <0.2 04/01/2021    LABALBU 2.6 04/02/2021     Magnesium:    Lab Results   Component Value Date    MG 2.3 03/18/2021     Warfarin PT/INR:  No components found for: PTPATWAR, PTINRWAR  HgBA1c:  No results found for: LABA1C  FLP:    Lab Results   Component Value Date    TRIG 83 05/16/2020    HDL 63 05/16/2020    LDLCALC 44 05/16/2020    LDLDIRECT 71 03/21/2019     TSH:    Lab Results   Component Value Date    TSH 0.830 03/18/2021     BNP: No results found for: BNP      ASSESSMENT:  Tavon  is a pleasant 81year old female patient with past medical history that includes prior tobacco abuse, GERD, HTN, Dyslipidemia, COPD, nonobstructive CAD. She was recently admitted to [facility] for hip fracture post fall. She underwent surgery, then was discharged to Rehab. Her labs revealed evidence for anemia. The patient was admitted back to [facility] for further evaluation. Her Hgb was 7.6. The patient has evidence for BAILEE with concern for urinary retention. Cr 1.8 (was 2.3 yesterday and 0.7 on 4/8/2021). Cardiology was consulted for elevated Troponin. Troponin 0.056, trended down to 0.043. EKG today revealed SR, PACs. She is a poor historian. She was evaluated on recent admission by Dr Gerber Santmaaria for atypical chest pains. Echocardiogram 4/8/2021 revealed an EF of 32%, grade I diastolic dysfunction.  Patient denies shortness of breath, dyspnea on exertion, orthopnea, paroxysmal nocturnal dyspnea, palpitations, dizziness,

## 2021-04-15 NOTE — CONSULTS
[facility]    [address]  [facility]  4/15/2021 1:36 PM    Pt Name:    Ezra Darby  MRN:     [id number]   [id number]  YOB: 1935  Admit Date:    4/14/2021  7:57 PM  Primary Care Physician:  Mitchell Barker MD        Reason for Consult:  BAILEE    History:   The patient is a 81 y.o. female who presented to [facility] from SNF for abnormal labs, hypotension, and RUQ/chest pain. History is limited as patient has mild confusion throughout exam.  No family members present at bedside. Per chart review, patient was recently discharged on 4/9/21 following fall with intertrochanteric fracture of right femur. She did have CTA with contrast on day of discharge. At SNF, she was found to have abnormal labs, hypotension, and RUQ/epigastric pain. Today, she continues to complain of pain in her epigastric region, without radiation. Also has some right sided hip pain and feels very tired. Denies chest pain or shortness of breath. Per nurse, family has been very concerned with her decreased oral intake during the last few months. States patient barely eats. No recent history of diarrhea. When asked if she is urinating, patient states that she is. However, she was found to have urinary retention on bladder scan this a.m., >999 mL, with ~950 mLs output with straight cath.       Past Medical History:  Past Medical History:   Diagnosis Date    Acute blood loss as cause of postoperative anemia 03/2018    CAD (coronary artery disease)      cath  50    Cardiac valve prolapse     Chest pain 5/15/2020    Chronic back pain     Compression fracture of L2 ([address] 5/26/2020    COPD (chronic obstructive pulmonary disease) ([facility])     Ex-smoker     GERD (gastroesophageal reflux disease) 2/07    EGD    Glaucoma     H/O cardiac catheterization 2002    40-50% LAD   katharine    Hearing loss secondary to cerumen impaction 5/28/2019    Hyperlipidemia     Hypertension     Inadvertent durotomy 3/12/2018    Liver disease     history of hepatitis at age 22   [address]    right shoulder and back    Pericarditis        Past Surgical History:  Past Surgical History:   Procedure Laterality Date    APPENDECTOMY      at age 13years   [address]      BUNIONECTOMY  2004    right foot   [address]  2007??  &   2012? ?    normal results    COLONOSCOPY      last one ???    ENDOSCOPY, COLON, DIAGNOSTIC      EYE SURGERY  ??    right eye    HIP SURGERY Right 2021    RIGHT HIP INTERTROCHANTERIC NAILING performed by Anabella Bo MD at [address]    still has ovaries    MS LAMINECTOMY,>2 SGMT,LUMBAR N/A 3/7/2018    LUMBAR LAMINECTOMY L3-S1 performed by Martina Jones MD at [facility] OFFICE/OUTPT [facility] N/A 3/12/2018    REPAIR DUROTOMY PLACEMENT OF SUBARACHNOID DRAIN performed by Martina Jones MD at [address]  left    SPINE SURGERY      L5 cyst    UPPER GASTROINTESTINAL ENDOSCOPY      [facility]    UPPER GASTROINTESTINAL ENDOSCOPY Left 2020    EGD BIOPSY performed by Maggie Jones MD at [facility]       Family History:  Family History   Problem Relation Age of Onset    Tuberculosis Mother     Tuberculosis Father        Social History:  Social History     Socioeconomic History    Marital status:       Spouse name: Not on file    Number of children: 2    Years of education: Not on file    Highest education level: Not on file   Occupational History    Not on file   Social Needs    Financial resource strain: Not hard at all    Food insecurity     Worry: Never true     Inability: Never true   [employer] needs     Medical: No     Non-medical: No   Tobacco Use    Smoking status: Former Smoker     Years: 30.00     Types: Cigarettes     Quit date: 1989     Years since quittin.8    Smokeless tobacco: Never Used   Substance and Sexual Activity    Alcohol use: No    Drug use: No    Sexual activity: Not Currently   Lifestyle    Physical activity     Days per week: 0 days     Minutes per session: 0 min    Stress: Not on file   Relationships    Social connections     Talks on phone: Not on file     Gets together: Not on file     Attends [employer] service: Not on file     Active member of club or organization: Not on file     Attends meetings of clubs or organizations: Not on file     Relationship status: Not on file    Intimate partner violence     Fear of current or ex partner: Not on file     Emotionally abused: Not on file     Physically abused: Not on file     Forced sexual activity: Not on file   Other Topics Concern    Not on file   Social History Narrative    No barriers with transportation    No barriers with medication affordability       Home Meds:  Prior to Admission medications    Medication Sig Start Date End Date Taking?  Authorizing Provider   tiZANidine (ZANAFLEX) 4 MG tablet Take 1 tablet by mouth every 8 hours as needed (muscle spasm) 4/9/21  Yes Phyliss Formosa, APRN - CNP   docusate sodium (COLACE, DULCOLAX) 100 MG CAPS Take 100 mg by mouth 2 times daily 4/5/21 5/5/21 Yes Leslee Hinds PA-C   aspirin 325 MG EC tablet Take 1 tablet by mouth daily 4/5/21 5/5/21 Yes Leslee Hinds PA-C   sertraline (ZOLOFT) 25 MG tablet Take 25 mg by mouth daily   Yes Historical Provider, MD   isosorbide mononitrate (IMDUR) 30 MG extended release tablet Take 1 tablet by mouth daily 3/3/21  Yes Chip Mckeon MD   potassium chloride (KLOR-CON M) 20 MEQ extended release tablet Take 1 tablet by mouth daily 3/3/21  Yes Chip Mckeon MD   pantoprazole (PROTONIX) 40 MG tablet Take 1 tablet by mouth 2 times daily (before meals) 2/16/21  Yes Chip Mckeon MD   sucralfate (CARAFATE) 1 GM tablet Take 1 tablet by mouth 4 times daily (before meals and nightly) 2/16/21  Yes Ying oHpson MD Sarah   metoprolol tartrate (LOPRESSOR) 25 MG tablet Take 0.5 tablets by mouth 2 times daily 2/4/21  Yes Julita Kc MD   nortriptyline (PAMELOR) 50 MG capsule Take 1 capsule by mouth nightly 1/28/21  Yes Julita Kc MD   simvastatin (ZOCOR) 40 MG tablet Take 1 tablet by mouth nightly 7/30/20  Yes Julita Kc MD   Multiple Vitamins-Minerals (THERAPEUTIC MULTIVITAMIN-MINERALS) tablet Take 1 tablet by mouth daily   Yes Historical Provider, MD   latanoprost (XALATAN) 0.005 % ophthalmic solution Place 1 drop into both eyes nightly   Yes Historical Provider, MD   acetaminophen (TYLENOL) 325 MG tablet Take 2 tablets by mouth every 4 hours as needed for Pain Maximum dose of acetaminophen is 4000 mg from all sources in 24 hours. 4/3/18  Yes YASMINE Bains - CNP   nitroGLYCERIN (NITROSTAT) 0.4 MG SL tablet up to max of 3 total doses. If no relief after 1 dose, call 911. 5/19/20   Layne Mcgowan MD       Review of Systems:  Limited by patient's intermittent confusion  Constitutional: no fever or chills  Head: No headaches  Eyes: no blurry vision, no discharge  Ears: no hearing changes  Respiratory: no shortness of breath or cough or sputum production  Cardiovascular: no chest pain, no edema  GI: no nausea, no vomiting or diarrhea. Endorses epigastric/RUQ pain. : denies any hematuria, no flank pain  Skin: no rash  Musculoskeletal: no joint pain, moves all ext. Pain at right hip, incisional site.   Neuro: no tremor, no slurred speech  Psychiatric: stable mood, no obvious depression or insomnia    Current Meds:  Infusion:    sodium chloride      sodium bicarbonate infusion       Meds:    sodium chloride flush  5-40 mL Intravenous 2 times per day    therapeutic multivitamin-minerals  1 tablet Oral Daily    latanoprost  1 drop Both Eyes Nightly    metoprolol tartrate  12.5 mg Oral BID    pantoprazole  40 mg Oral BID AC    sucralfate  1 g Oral 4x Daily AC & HS    isosorbide mononitrate  30 mg Oral Daily    potassium chloride  20 mEq Oral Daily    sertraline  25 mg Oral Daily    docusate sodium  100 mg Oral BID    aspirin  325 mg Oral Daily    atorvastatin  20 mg Oral Daily    iron sucrose  300 mg Intravenous Once     Meds prn: sodium chloride flush, sodium chloride, acetaminophen, nitroGLYCERIN, tiZANidine     Allergies/Intolerances: ALLERGIES: Patient has no known allergies. 24HR INTAKE/OUTPUT:      Intake/Output Summary (Last 24 hours) at 4/15/2021 1336  Last data filed at 4/15/2021 4371  Gross per 24 hour   Intake 372.49 ml   Output 950 ml   Net -577.51 ml     I/O last 3 completed shifts: In: 372.5 [I.V.:372.5]  Out: 950 [Urine:950]  No intake/output data recorded. Admission weight: 151 lb (68.5 kg)  Wt Readings from Last 3 Encounters:   04/14/21 151 lb (68.5 kg)   04/01/21 138 lb 9.6 oz (62.9 kg)   03/18/21 150 lb (68 kg)     Body mass index is 28.53 kg/m². Physical Examination:  VITALS:  BP (!) 107/55   Pulse 93   Temp 98.5 °F (36.9 °C) (Oral)   Resp 20   Ht 5' 1\" (1.549 m)   Wt 151 lb (68.5 kg)   SpO2 98%   BMI 28.53 kg/m²   Weight:   Wt Readings from Last 3 Encounters:   04/14/21 151 lb (68.5 kg)   04/01/21 138 lb 9.6 oz (62.9 kg)   03/18/21 150 lb (68 kg)     Constitutional and General Appearance: alert and cooperative with exam, appears comfortable, no distress. Confused throughout exam.  Pale-appearing. Eyes: no icteric sclera, no pallor conjunctiva, no discharge seen from either eye  Ears and Nose: normal external appearance of left and right ear and nose. No active drainage from nose. Oral: dry oral mucus membranes  Neck: No jugular venous distention, appears symmetric, good ROM  Lungs: Air entry B/L, no crackles or rales, no use of accessory muscles. Diminished at bases due to poor effort. Heart: regular rate, S1, S2  Extremities: Trace LE edema to ankles. Edema present at left knee.   GI: soft, non-tender, no guarding  Skin: no rash

## 2021-04-15 NOTE — ED NOTES
Patient transported to [facility]  by cart in stable condition. Patient monitored on cardiac telemetry. Patient on 2 LPM O2 via nasal canula. IV infusing and line is patent.         Chyna Parker, EMT-P  04/14/21 [id number]

## 2021-04-15 NOTE — CARE COORDINATION
4/15/21, 10:36 AM EDT  DISCHARGE PLANNING EVALUATION:    Kota Zurita       Admitted: 4/14/2021/ [address] day: 0   Location: [address] [place] [address]-A Reason for admit: Acute kidney injury ([facility] [N17.9]   PMH:  has a past medical history of Acute blood loss as cause of postoperative anemia, CAD (coronary artery disease), Cardiac valve prolapse, Chest pain, Chronic back pain, Compression fracture of L2 (HCC), COPD (chronic obstructive pulmonary disease) ([facility] Ex-smoker, GERD (gastroesophageal reflux disease), Glaucoma, H/O cardiac catheterization, Hearing loss secondary to cerumen impaction, Hyperlipidemia, Hypertension, Inadvertent durotomy, Liver disease, Osteoarthritis, and Pericarditis. Procedure: No.  Barriers to Discharge: To ER for abnormal labs, hypotension and RUQ/chest pain. Hx of rt hip nailing on 4-2-21. Complains also of rt hip pain. Hgb down to 7.2 and creat up to 2.3. Troponin elevated. Confusion noted in ER. Hypotensive in ER. Cardiology and Nephrology consulted. SW consulted as pt is from [facility]. PCP: David Rios MD  Patient Goals/Plan/Treatment Preferences: CM visit deferred today as pt is from [facility] and SW will follow for continuity of services. CM will continue to follow for medical needs. Transportation/Food Security/Housekeeping Addressed:  No issues identified.

## 2021-04-15 NOTE — PROGRESS NOTES
Present to pt room for consult of right hip incision and buttocks wounds POA. Pt had ORIF of the right femur on 4/2/21 with Dr. Sebastián Quach. Pt noted to have staple closure with erythema to jesse wound surrounding staple closure - likely from staples and edema. Only scant serosanguinous drainage noted to old dressing. No odor or other s/s of infection. Painted staple lines with betadine and covered with ABD pad an medipore tape. Would recommend ortho to continue to follow this area. Pt rolled to left side for assessment of bilateral buttocks. Wound photo and assessment below. Right buttock and coccyx has DTPI present on admission. Applied zinc oxide to this area. Staff to continue to apply each shift and as needed. Pillow placed under left side. Pt is not on a support surface and will need at [employer] or SPR overlay. Pt states she is aware of when she urinates/has bowel movements. Pt bilateral heels offloaded with pillow support. Pt HOB below 30 degrees. Depends removed, no depends while in bed or chair. Pt on moisture wicking chux pad. Bed in low, call light in reach.         Right hip incisions      Wound type: DTPI to right buttock and coccyx POA  Wound size: Right buttock: 5cm x 0.3cm  Cocccyx: 1.3cm x 1cm x 0.05cm  Undermining or Tunneling: none  Wound assessment/color: coccyx 50% purple, 50%maroon  Right buttock: 100% purple, intact skin  Drainage amount: coccyx scant serosanguinous, no drainage to right buttock  Odor: none  Margins: attached  Jesse wound: erythema  Exposed structure: none    Left buttock denuded, red and blanching

## 2021-04-16 PROBLEM — D64.9 ANEMIA: Status: ACTIVE | Noted: 2021-04-16

## 2021-04-16 PROBLEM — R33.9 URINARY RETENTION: Status: ACTIVE | Noted: 2021-04-16

## 2021-04-16 LAB
ABO: NORMAL
ANION GAP SERPL CALCULATED.3IONS-SCNC: 11 MEQ/L (ref 8–16)
ANTIBODY IDENTIFICATION: NORMAL
ANTIBODY SCREEN: NORMAL
BASOPHILS # BLD: 0 %
BASOPHILS ABSOLUTE: 0 THOU/MM3 (ref 0–0.1)
BUN BLDV-MCNC: 26 MG/DL (ref 7–22)
CALCIUM SERPL-MCNC: 8 MG/DL (ref 8.5–10.5)
CHLORIDE BLD-SCNC: 107 MEQ/L (ref 98–111)
CO2: 19 MEQ/L (ref 23–33)
CREAT SERPL-MCNC: 1.2 MG/DL (ref 0.4–1.2)
EOSINOPHIL # BLD: 4 %
EOSINOPHILS ABSOLUTE: 0.1 THOU/MM3 (ref 0–0.4)
ERYTHROCYTE [DISTWIDTH] IN BLOOD BY AUTOMATED COUNT: 17.3 % (ref 11.5–14.5)
ERYTHROCYTE [DISTWIDTH] IN BLOOD BY AUTOMATED COUNT: 57.3 FL (ref 35–45)
FYA ANTIGEN: NORMAL
GFR SERPL CREATININE-BSD FRML MDRD: 43 ML/MIN/1.73M2
GLUCOSE BLD-MCNC: 122 MG/DL (ref 70–108)
HCT VFR BLD CALC: 22.7 % (ref 37–47)
HCT VFR BLD CALC: 23.9 % (ref 37–47)
HCT VFR BLD CALC: 24.4 % (ref 37–47)
HEMOGLOBIN: 6.9 GM/DL (ref 12–16)
HEMOGLOBIN: 7.1 GM/DL (ref 12–16)
HEMOGLOBIN: 7.9 GM/DL (ref 12–16)
IMMATURE GRANS (ABS): 0.05 THOU/MM3 (ref 0–0.07)
IMMATURE GRANULOCYTES: 1.3 %
JKA ANTIGEN: NORMAL
K ANTIGEN: NORMAL
LYMPHOCYTES # BLD: 8.6 %
LYMPHOCYTES ABSOLUTE: 0.3 THOU/MM3 (ref 1–4.8)
MCH RBC QN AUTO: 27.7 PG (ref 26–33)
MCHC RBC AUTO-ENTMCNC: 30.4 GM/DL (ref 32.2–35.5)
MCV RBC AUTO: 91.2 FL (ref 81–99)
MONOCYTES # BLD: 3.2 %
MONOCYTES ABSOLUTE: 0.1 THOU/MM3 (ref 0.4–1.3)
NUCLEATED RED BLOOD CELLS: 0 /100 WBC
PATHOLOGIST REVIEW: ABNORMAL
PHOSPHORUS: 1.9 MG/DL (ref 2.4–4.7)
PLATELET # BLD: 195 THOU/MM3 (ref 130–400)
PMV BLD AUTO: 10.9 FL (ref 9.4–12.4)
POIKILOCYTES: ABNORMAL
POTASSIUM SERPL-SCNC: 3.3 MEQ/L (ref 3.5–5.2)
RBC # BLD: 2.49 MILL/MM3 (ref 4.2–5.4)
RH FACTOR: NORMAL
SCAN OF BLOOD SMEAR: NORMAL
SEG NEUTROPHILS: 82.9 %
SEGMENTED NEUTROPHILS ABSOLUTE COUNT: 3.1 THOU/MM3 (ref 1.8–7.7)
SODIUM BLD-SCNC: 137 MEQ/L (ref 135–145)
WBC # BLD: 3.7 THOU/MM3 (ref 4.8–10.8)

## 2021-04-16 PROCEDURE — 6370000000 HC RX 637 (ALT 250 FOR IP): Performed by: INTERNAL MEDICINE

## 2021-04-16 PROCEDURE — 84165 PROTEIN E-PHORESIS SERUM: CPT

## 2021-04-16 PROCEDURE — G0378 HOSPITAL OBSERVATION PER HR: HCPCS

## 2021-04-16 PROCEDURE — 2500000003 HC RX 250 WO HCPCS: Performed by: INTERNAL MEDICINE

## 2021-04-16 PROCEDURE — 36430 TRANSFUSION BLD/BLD COMPNT: CPT

## 2021-04-16 PROCEDURE — 86901 BLOOD TYPING SEROLOGIC RH(D): CPT

## 2021-04-16 PROCEDURE — 86334 IMMUNOFIX E-PHORESIS SERUM: CPT

## 2021-04-16 PROCEDURE — 2060000000 HC ICU INTERMEDIATE R&B

## 2021-04-16 PROCEDURE — 82784 ASSAY IGA/IGD/IGG/IGM EACH: CPT

## 2021-04-16 PROCEDURE — 85018 HEMOGLOBIN: CPT

## 2021-04-16 PROCEDURE — 99232 SBSQ HOSP IP/OBS MODERATE 35: CPT | Performed by: NURSE PRACTITIONER

## 2021-04-16 PROCEDURE — 86922 COMPATIBILITY TEST ANTIGLOB: CPT

## 2021-04-16 PROCEDURE — 86850 RBC ANTIBODY SCREEN: CPT

## 2021-04-16 PROCEDURE — 84100 ASSAY OF PHOSPHORUS: CPT

## 2021-04-16 PROCEDURE — 99232 SBSQ HOSP IP/OBS MODERATE 35: CPT | Performed by: INTERNAL MEDICINE

## 2021-04-16 PROCEDURE — 85025 COMPLETE CBC W/AUTO DIFF WBC: CPT

## 2021-04-16 PROCEDURE — 85014 HEMATOCRIT: CPT

## 2021-04-16 PROCEDURE — 2580000003 HC RX 258: Performed by: INTERNAL MEDICINE

## 2021-04-16 PROCEDURE — 99221 1ST HOSP IP/OBS SF/LOW 40: CPT | Performed by: UROLOGY

## 2021-04-16 PROCEDURE — P9016 RBC LEUKOCYTES REDUCED: HCPCS

## 2021-04-16 PROCEDURE — 84155 ASSAY OF PROTEIN SERUM: CPT

## 2021-04-16 PROCEDURE — 83883 ASSAY NEPHELOMETRY NOT SPEC: CPT

## 2021-04-16 PROCEDURE — 36415 COLL VENOUS BLD VENIPUNCTURE: CPT

## 2021-04-16 PROCEDURE — 86900 BLOOD TYPING SEROLOGIC ABO: CPT

## 2021-04-16 PROCEDURE — 80048 BASIC METABOLIC PNL TOTAL CA: CPT

## 2021-04-16 PROCEDURE — 86870 RBC ANTIBODY IDENTIFICATION: CPT

## 2021-04-16 RX ORDER — SODIUM CHLORIDE 9 MG/ML
INJECTION, SOLUTION INTRAVENOUS PRN
Status: DISCONTINUED | OUTPATIENT
Start: 2021-04-16 | End: 2021-04-21 | Stop reason: HOSPADM

## 2021-04-16 RX ORDER — 0.9 % SODIUM CHLORIDE 0.9 %
250 INTRAVENOUS SOLUTION INTRAVENOUS ONCE
Status: COMPLETED | OUTPATIENT
Start: 2021-04-16 | End: 2021-04-17

## 2021-04-16 RX ORDER — MIDODRINE HYDROCHLORIDE 5 MG/1
5 TABLET ORAL
Status: DISCONTINUED | OUTPATIENT
Start: 2021-04-16 | End: 2021-04-17

## 2021-04-16 RX ADMIN — SUCRALFATE 1 G: 1 TABLET ORAL at 21:15

## 2021-04-16 RX ADMIN — METOPROLOL TARTRATE 12.5 MG: 25 TABLET, FILM COATED ORAL at 10:51

## 2021-04-16 RX ADMIN — POTASSIUM CHLORIDE 20 MEQ: 1500 TABLET, EXTENDED RELEASE ORAL at 10:51

## 2021-04-16 RX ADMIN — MIDODRINE HYDROCHLORIDE 5 MG: 5 TABLET ORAL at 18:40

## 2021-04-16 RX ADMIN — SUCRALFATE 1 G: 1 TABLET ORAL at 05:51

## 2021-04-16 RX ADMIN — SUCRALFATE 1 G: 1 TABLET ORAL at 10:51

## 2021-04-16 RX ADMIN — TIZANIDINE 4 MG: 4 TABLET ORAL at 18:40

## 2021-04-16 RX ADMIN — SODIUM CHLORIDE 250 ML: 9 INJECTION, SOLUTION INTRAVENOUS at 21:13

## 2021-04-16 RX ADMIN — POTASSIUM CHLORIDE 20 MEQ: 1500 TABLET, EXTENDED RELEASE ORAL at 18:39

## 2021-04-16 RX ADMIN — ACETAMINOPHEN 650 MG: 325 TABLET ORAL at 23:25

## 2021-04-16 RX ADMIN — PANTOPRAZOLE SODIUM 40 MG: 40 TABLET, DELAYED RELEASE ORAL at 18:39

## 2021-04-16 RX ADMIN — LATANOPROST 1 DROP: 50 SOLUTION OPHTHALMIC at 21:15

## 2021-04-16 RX ADMIN — ASPIRIN 325 MG: 325 TABLET, COATED ORAL at 10:51

## 2021-04-16 RX ADMIN — Medication 1 TABLET: at 10:52

## 2021-04-16 RX ADMIN — SERTRALINE HYDROCHLORIDE 25 MG: 50 TABLET, FILM COATED ORAL at 10:52

## 2021-04-16 RX ADMIN — ATORVASTATIN CALCIUM 20 MG: 20 TABLET, FILM COATED ORAL at 10:52

## 2021-04-16 RX ADMIN — ISOSORBIDE MONONITRATE 30 MG: 30 TABLET ORAL at 10:51

## 2021-04-16 RX ADMIN — SODIUM BICARBONATE: 84 INJECTION, SOLUTION INTRAVENOUS at 23:21

## 2021-04-16 RX ADMIN — TIZANIDINE 4 MG: 4 TABLET ORAL at 05:52

## 2021-04-16 RX ADMIN — POTASSIUM PHOSPHATE, MONOBASIC AND POTASSIUM PHOSPHATE, DIBASIC 15 MMOL: 224; 236 INJECTION, SOLUTION, CONCENTRATE INTRAVENOUS at 10:52

## 2021-04-16 RX ADMIN — ACETAMINOPHEN 650 MG: 325 TABLET ORAL at 05:52

## 2021-04-16 RX ADMIN — SUCRALFATE 1 G: 1 TABLET ORAL at 18:40

## 2021-04-16 RX ADMIN — PANTOPRAZOLE SODIUM 40 MG: 40 TABLET, DELAYED RELEASE ORAL at 05:52

## 2021-04-16 ASSESSMENT — PAIN SCALES - GENERAL
PAINLEVEL_OUTOF10: 4
PAINLEVEL_OUTOF10: 0
PAINLEVEL_OUTOF10: 8
PAINLEVEL_OUTOF10: 0
PAINLEVEL_OUTOF10: 0

## 2021-04-16 ASSESSMENT — PAIN DESCRIPTION - LOCATION: LOCATION: HIP

## 2021-04-16 ASSESSMENT — PAIN DESCRIPTION - ORIENTATION: ORIENTATION: RIGHT

## 2021-04-16 NOTE — PROGRESS NOTES
Renal Progress Note  [facility]    Patient :  Angel Landrum; 81 y.o. MRN# [id number]  Location:  8B-33/033-A  Attending:  Haydee Mar MD  Admit Date:  4/14/2021   Hospital Day: 0      Subjective:     Nephrology is following the patient for BAILEE. Patient seen and examined. Her BP is low today. Blood transfusion ordered. hgb is 7.1. Patient seems more alert today. She has alvarez cath with good urine output. Outpatient Medications:     Medications Prior to Admission: tiZANidine (ZANAFLEX) 4 MG tablet, Take 1 tablet by mouth every 8 hours as needed (muscle spasm)  docusate sodium (COLACE, DULCOLAX) 100 MG CAPS, Take 100 mg by mouth 2 times daily  aspirin 325 MG EC tablet, Take 1 tablet by mouth daily  sertraline (ZOLOFT) 25 MG tablet, Take 25 mg by mouth daily  isosorbide mononitrate (IMDUR) 30 MG extended release tablet, Take 1 tablet by mouth daily  potassium chloride (KLOR-CON M) 20 MEQ extended release tablet, Take 1 tablet by mouth daily  pantoprazole (PROTONIX) 40 MG tablet, Take 1 tablet by mouth 2 times daily (before meals)  sucralfate (CARAFATE) 1 GM tablet, Take 1 tablet by mouth 4 times daily (before meals and nightly)  metoprolol tartrate (LOPRESSOR) 25 MG tablet, Take 0.5 tablets by mouth 2 times daily  nortriptyline (PAMELOR) 50 MG capsule, Take 1 capsule by mouth nightly  simvastatin (ZOCOR) 40 MG tablet, Take 1 tablet by mouth nightly  Multiple Vitamins-Minerals (THERAPEUTIC MULTIVITAMIN-MINERALS) tablet, Take 1 tablet by mouth daily  latanoprost (XALATAN) 0.005 % ophthalmic solution, Place 1 drop into both eyes nightly  acetaminophen (TYLENOL) 325 MG tablet, Take 2 tablets by mouth every 4 hours as needed for Pain Maximum dose of acetaminophen is 4000 mg from all sources in 24 hours. nitroGLYCERIN (NITROSTAT) 0.4 MG SL tablet, up to max of 3 total doses. If no relief after 1 dose, call 911.     Current Medications:     Scheduled Meds:    midodrine  5 mg Oral TID WC  sodium chloride flush  5-40 mL Intravenous 2 times per day    therapeutic multivitamin-minerals  1 tablet Oral Daily    latanoprost  1 drop Both Eyes Nightly    metoprolol tartrate  12.5 mg Oral BID    pantoprazole  40 mg Oral BID AC    sucralfate  1 g Oral 4x Daily AC & HS    isosorbide mononitrate  30 mg Oral Daily    sertraline  25 mg Oral Daily    docusate sodium  100 mg Oral BID    aspirin  325 mg Oral Daily    atorvastatin  20 mg Oral Daily    potassium chloride  20 mEq Oral BID WC     Continuous Infusions:    sodium chloride      sodium chloride      sodium bicarbonate infusion Stopped (21 0639)     PRN Meds:  sodium chloride, sodium chloride flush, sodium chloride, acetaminophen, nitroGLYCERIN, tiZANidine    Input/Output:       I/O last 3 completed shifts: In: 9 [I.V.:]  Out: 975 [Urine:975]. Patient Vitals for the past 96 hrs (Last 3 readings):   Weight   21 151 lb (68.5 kg)       Vital Signs:   Temperature:  Temp: 97.8 °F (36.6 °C)  TMax:   Temp (24hrs), Av.6 °F (36.4 °C), Min:97.4 °F (36.3 °C), Max:97.8 °F (36.6 °C)    Respirations:  Resp: 18  Pulse:   Pulse: 78  BP:    BP: (!) 88/53  BP Range: Systolic (94SHA), EAU:971 , Min:83 , XTH:600       Diastolic (28PMV), ETTA:87, Min:47, Max:61      Physical Examination:     General:  Awake, alert, no distress  HEENT: NC/AT/ MMM  Chest:               diminished  Cardiac:  S1 S2   Abdomen: Soft, non-tender,  Neuro:  No facial droop, No Asterixis  SKIN:  No rashes, good skin turgor.   Extremities:  Trace edema noted    Labs:       Recent Labs     04/14/21  2017 04/15/21  0642 21  0709   WBC 6.1 5.5  --    RBC 2.73* 2.68*  --    HGB 7.8* 7.6* 7.1*   HCT 24.4* 24.0* 23.9*   MCV 89.4 89.6  --    MCH 28.6 28.4  --    MCHC 32.0* 31.7*  --     178  --    MPV 11.2 10.8  --       BMP:   Recent Labs     04/14/21  2017 04/15/21  0642 21  0539   * 132* 137   K 3.9 3.5 3.3*   CL 98 105 107   CO2 17* 14* 19*   BUN 46* 37* 26*   CREATININE 2.3* 1.8* 1.2   GLUCOSE 126* 104 122*   CALCIUM 8.4* 8.0* 8.0*      Phosphorus:     Recent Labs     04/16/21  0539   PHOS 1.9*     Magnesium:  No results for input(s): MG in the last 72 hours. Albumin:  No results for input(s): LABALBU in the last 72 hours. BNP:    No results found for: BNP  RYLEE:    No results found for: RYLEE  SPEP:  Lab Results   Component Value Date    PROT 5.7 04/02/2021     UPEP:   No results found for: LABPE  C3:   No results found for: C3  C4:   No results found for: C4  MPO ANCA:   No results found for: MPO  PR3 ANCA:   No results found for: PR3  Anti-GBM:   No results found for: GBMABIGG  Hep BsAg:         Lab Results   Component Value Date    HEPBSAG Negative 05/18/2020     Hep C AB:          Lab Results   Component Value Date    HEPCAB Negative 05/18/2020       Urinalysis/Chemistries:      Lab Results   Component Value Date    NITRU NEGATIVE 04/15/2021    COLORU YELLOW 04/15/2021    PHUR 5.5 04/15/2021    LABCAST NONE SEEN 02/13/2020    LABCAST NONE SEEN 02/13/2020    WBCUA 5-9 04/15/2021    RBCUA 5-10 04/15/2021    YEAST NONE SEEN 04/15/2021    BACTERIA MANY 04/15/2021    SPECGRAV 1.010 04/01/2021    LEUKOCYTESUR NEGATIVE 04/15/2021    UROBILINOGEN 0.2 04/15/2021    BILIRUBINUR NEGATIVE 04/15/2021    BLOODU TRACE 04/15/2021    GLUCOSEU NEGATIVE 04/15/2021    KETUA NEGATIVE 04/15/2021    AMORPHOUS DEBRIS 03/28/2018     Urine Sodium:   No results found for: ANNABEL  Urine Potassium:  No results found for: KUR  Urine Chloride:  No results found for: CLUR  Urine Osmolarity: No results found for: OSMOU  Urine Protein:   No components found for: TOTALPROTEIN, URINE   Urine Creatinine:   No results found for: LABCREA  Urine Eosinophils:  No components found for: UEOS        Impression and Plan:  1. BAILEE likely obstructive uropathy from urinary retention compounded by hypotension: improving  2.  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

## 2021-04-16 NOTE — PROGRESS NOTES
Cardiology Progress Note      Patient:  Ezra Darby  YOB: 1935  MRN: [id number]   Acct: [de-identified]  [facility] Date:  4/14/2021  Primary Cardiologist: Dr. Serjio Murillo  Seen by Dr. Pepper Craven     Per prior cardiology consult note-  REASON FOR CONSULT:    for pos trop      CHIEF COMPLAINT:    Fatigue      HISTORY OF PRESENT ILLNESS:    Ezra Darby is a pleasant 81year old female patient with past medical history that includes prior tobacco abuse, GERD, HTN, Dyslipidemia, COPD, nonobstructive CAD. She was recently admitted to [facility] for hip fracture post fall. She underwent surgery, then was discharged to Rehab. Her labs revealed evidence for anemia. The patient was admitted back to [facility] for further evaluation. Her Hgb was 7.6. The patient has evidence for BAILEE with concern for urinary retention. Cr 1.8 (was 2.3 yesterday and 0.7 on 4/8/2021). Cardiology was consulted for elevated Troponin. Troponin 0.056, trended down to 0.043. EKG today revealed SR, PACs. She is a poor historian. She was evaluated on recent admission by Dr Anushka Ballard for atypical chest pains. Echocardiogram 4/8/2021 revealed an EF of 60%, grade I diastolic dysfunction.  Patient denies shortness of breath, dyspnea on exertion, orthopnea, paroxysmal nocturnal dyspnea, palpitations, dizziness, syncope.         Subjective (Events in last 24 hours):     Pt in bed - no chest pain per her   VSS  Tele SR no ectopy      Objective:   BP (!) 88/53   Pulse 78   Temp 97.8 °F (36.6 °C) (Oral)   Resp 18   Ht 5' 1\" (1.549 m)   Wt 151 lb (68.5 kg)   SpO2 98%   BMI 28.53 kg/m²        TELEMETRY: SR no ectopy    Physical Exam:  General Appearance: alert and oriented to person, place and time, in no acute distress  Cardiovascular: normal rate, regular rhythm, normal S1 and S2, no murmurs, rubs, clicks, or gallops, distal pulses intact,   Pulmonary/Chest: clear to auscultation bilaterally- no wheezes, rales or rhonchi, normal air movement, no respiratory distress  Abdomen: soft, non-tender, non-distended, normal bowel sounds, no masses Extremities: no cyanosis, clubbing or edema, pulses present   Skin: warm and dry, no rash or erythema   Musculoskeletal: normal range of motion, no joint swelling, deformity or tenderness  Neurological: alert, oriented, normal speech, no focal findings or movement disorder noted    Medications:    potassium phosphate IVPB  15 mmol Intravenous Once    sodium chloride flush  5-40 mL Intravenous 2 times per day    therapeutic multivitamin-minerals  1 tablet Oral Daily    latanoprost  1 drop Both Eyes Nightly    metoprolol tartrate  12.5 mg Oral BID    pantoprazole  40 mg Oral BID AC    sucralfate  1 g Oral 4x Daily AC & HS    isosorbide mononitrate  30 mg Oral Daily    sertraline  25 mg Oral Daily    docusate sodium  100 mg Oral BID    aspirin  325 mg Oral Daily    atorvastatin  20 mg Oral Daily    potassium chloride  20 mEq Oral BID WC      sodium chloride      sodium chloride      sodium bicarbonate infusion Stopped (04/16/21 0639)     sodium chloride, , PRN  sodium chloride flush, 5-40 mL, PRN  sodium chloride, 25 mL, PRN  acetaminophen, 650 mg, Q4H PRN  nitroGLYCERIN, 0.4 mg, Q5 Min PRN  tiZANidine, 4 mg, Q8H PRN        Diagnostics:  EKG:    15-APR-2021 11:19:03 [facility] ROUTINE RETRIEVAL  Sinus rhythm with Premature atrial complexes  Otherwise normal ECG  When compared with ECG of 07-APR-2021 18:40,  Premature atrial complexes are now Present  Confirmed by Danilo Orellana ([id number]) on 4/15/2021 6:52:33 PM    Echo:   Electronically signed by Elissa Price MD (Interpreting   physician) on 04/08/2021 at 07:36 PM   ----------------------------------------------------------------      Findings      Mitral Valve   The mitral valve structure was normal with normal leaflet separation. DOPPLER: The transmitral velocity was within the normal range with no   evidence for mitral stenosis.    Mild mitral regurgitation is present. Aortic Valve   The aortic valve was trileaflet with normal thickness and cuspal   separation. DOPPLER: Transaortic velocity was within the normal range with   no evidence of aortic stenosis. Mild aortic regurgitation is noted. Tricuspid Valve   The tricuspid valve structure was normal with normal leaflet separation. DOPPLER: There was no evidence of tricuspid stenosis. Mild tricuspid regurgitation visualized. Pulmonic Valve   The pulmonic valve leaflets exhibited normal thickness, no calcification,   and normal cuspal separation. DOPPLER: The transpulmonic velocity was   within the normal range with no evidence for regurgitation. Left Atrium   Left atrial size was normal.      Left Ventricle   Normal left ventricle size and systolic function. Ejection fraction was   estimated at 60 %. There were no regional left ventricular wall motion   abnormalities and wall thickness was within normal limits. Doppler parameters were consistent with abnormal left ventricular   relaxation (grade 1 diastolic dysfunction). Right Atrium   Right atrial size was normal.      Right Ventricle   The right ventricular size was normal with normal systolic function and   wall thickness. Pericardial Effusion   The pericardium was normal in appearance with no evidence of a pericardial   effusion. Pleural Effusion   No evidence of pleural effusion. Aorta / Great Vessels   -Aortic root dimension within normal limits.   -The Pulmonary artery is within normal limits. -IVC size is within normal limits with normal respiratory phasic changes. Stress:pending  3/26/2014  Negative for ischemia / normal EF    Left Heart Cath: unknown    Lab Data:    Cardiac Enzymes:  No results for input(s): CKTOTAL, CKMB, CKMBINDEX, TROPONINI in the last 72 hours.     CBC:   Lab Results   Component Value Date    WBC 5.5 04/15/2021    RBC 2.68 04/15/2021    HGB 7.1 04/16/2021    HCT 23.9 04/16/2021     04/15/2021       CMP:    Lab Results   Component Value Date     04/16/2021    K 3.3 04/16/2021    K 3.9 04/14/2021     04/16/2021    CO2 19 04/16/2021    BUN 26 04/16/2021    CREATININE 1.2 04/16/2021    AGRATIO 0.9 09/12/2017    LABGLOM 43 04/16/2021    GLUCOSE 122 04/16/2021    GLUCOSE 112 09/12/2017    CALCIUM 8.0 04/16/2021       Hepatic Function Panel:    Lab Results   Component Value Date    ALKPHOS 77 04/02/2021    ALT 58 04/02/2021    AST 31 04/02/2021    PROT 5.7 04/02/2021    BILITOT 0.3 04/02/2021    BILIDIR <0.2 04/01/2021    LABALBU 2.6 04/02/2021       Magnesium:    Lab Results   Component Value Date    MG 2.3 03/18/2021       PT/INR:    Lab Results   Component Value Date    INR 1.11 04/01/2021       HgBA1c:  No results found for: LABA1C    FLP:    Lab Results   Component Value Date    TRIG 83 05/16/2020    HDL 63 05/16/2020    LDLCALC 44 05/16/2020    LDLDIRECT 71 03/21/2019       TSH:    Lab Results   Component Value Date    TSH 1.300 04/15/2021         Assessment:    ARTURO     Urinary retention     Chest pain - atypical     Mild trop elevation - likely secondary to recent surgery and ARTURO     S\p right hip intertrochanteric nailing on 4/2/2021     Acute anemia   Hypotension     HTN  HLP    Nonobstructive CAD       Plan:  · Stress pending - if stress wnl then can be DC from cardiology standpoint   · Pt cardiologist is Dr. Toan Szymanski --> please have her follow with him          Electronically signed by YASMINE Ruiz - CNP on 4/16/2021 at 11:34 AM

## 2021-04-16 NOTE — CARE COORDINATION
4/16/21, 1:53 PM EDT    DISCHARGE  [address] day: 0  Location: -/033-A Reason for admit: Acute kidney injury ([facility] [address] [N17.9]  Anemia [D64.9]   Procedure: No  Barriers to Discharge: Today K+ 3.3. HGB 7.1 today. Creat 1.2. Order for transfusion of PRC's. Stress test was ordered for today but pt declines this. It was reported that pt had some confusion overnight. Garcia was placed due to retention. Wound team saw pt yesterday. BP low late this am at 88/53. O2 at 1L/NC with sat 98%. PCP: Sneha Howard MD   %  Patient Goals/Plan/Treatment Preferences: Pt status changed to Inpt. IMM obtained. SW is following for return to [facility] at discharge.

## 2021-04-16 NOTE — PROGRESS NOTES
Cedric Carias [phone] notified of Lexiscan stress test.  Test explained. He does not approve of the stress test at this time. He will be coming into see his mother soon and will discusse with doctor.

## 2021-04-16 NOTE — CONSULTS
hepatitis at age 22   [facility]    right shoulder and back    Pericarditis      Past Surgical History:        Procedure Laterality Date    APPENDECTOMY      at age 13years   [address]      BUNIONECTOMY  2004    right foot   [address]  ??  &   ? ?    normal results    COLONOSCOPY      last one ???    ENDOSCOPY, COLON, DIAGNOSTIC      EYE SURGERY  ??    right eye    HIP SURGERY Right 2021    RIGHT HIP INTERTROCHANTERIC NAILING performed by Gill Lindsay MD at [address]    still has ovaries    NC LAMINECTOMY,>2 SGMT,LUMBAR N/A 3/7/2018    LUMBAR LAMINECTOMY L3-S1 performed by Anayeli Hernandez MD at [facility] OFFICE/OUTPT [facility] N/A 3/12/2018    REPAIR DUROTOMY PLACEMENT OF SUBARACHNOID DRAIN performed by Anayeli Hernandez MD at [facility]  left    SPINE SURGERY      L5 cyst    UPPER [facility]STRO[facility]TESTINAL ENDOSCOPY      [facility]    UPPER GASTROINTESTINAL ENDOSCOPY Left 2020    EGD BIOPSY performed by Lakisha Lewis MD at [facility]       Social History     Socioeconomic History    Marital status:       Spouse name: Not on file    Number of children: 2    Years of education: Not on file    Highest education level: Not on file   Occupational History    Not on file   Social Needs    Financial resource strain: Not hard at all    Food insecurity     Worry: Never true     Inability: Never true   [employer] needs     Medical: No     Non-medical: No   Tobacco Use    Smoking status: Former Smoker     Years: 30.00     Types: Cigarettes     Quit date: 1989     Years since quittin.8    Smokeless tobacco: Never Used   Substance and Sexual Activity    Alcohol use: No    Drug use: No    Sexual activity: Not Currently   Lifestyle    Physical activity     Days per week: 0 days     Minutes per session: 0 min    Stress: Not on file   Relationships    Social membranes are normal.   Eyes:         EOM are normal. No scleral icterus. Nose:    The external appearance of the nose is normal  Ears: The ears appear normal to external inspection   Neck:         Supple, symmetrical, trachea midline, no adenopathy, thyroid symmetric, not enlarged and no tenderness. Cardiovascular:        Normal rate, regular rhythm, S1 S2 heart sounds. Pulmonary/Chest:       Chest symmetric with normal A/P diameter, no wheezes, rales, or rhonchi noted. Normal respiratory rate and rhthym. No use of accessory muscles. Abdominal:          Soft. No tenderness. Active bowel sounds. Genitalia:    Alvarez catheter draining yellow urine. Denies pain or spasm from alvarez  Musculoskeletal:    Normal range of motion. She exhibits no edema or tenderness of lower extremities. Extremities:    No cyanosis, clubbing, or edema present. Neurological:    Alert and oriented. No cranial nerve deficit. There are no focalizing motor or sensory deficits.   Reports wkness    DATA:  CBC:   Lab Results   Component Value Date    WBC 5.5 04/15/2021    RBC 2.68 04/15/2021    HGB 7.1 04/16/2021    HCT 23.9 04/16/2021    MCV 89.6 04/15/2021    MCH 28.4 04/15/2021    MCHC 31.7 04/15/2021    RDW 14.5 09/21/2018     04/15/2021    MPV 10.8 04/15/2021     BMP:    Lab Results   Component Value Date     04/16/2021    K 3.3 04/16/2021    K 3.9 04/14/2021     04/16/2021    CO2 19 04/16/2021    BUN 26 04/16/2021    CREATININE 1.2 04/16/2021    CALCIUM 8.0 04/16/2021    LABGLOM 43 04/16/2021    GLUCOSE 122 04/16/2021    GLUCOSE 112 09/12/2017     BUN/Creatinine:    Lab Results   Component Value Date    BUN 26 04/16/2021    CREATININE 1.2 04/16/2021     Magnesium:    Lab Results   Component Value Date    MG 2.3 03/18/2021     Phosphorus:    Lab Results   Component Value Date    PHOS 1.9 04/16/2021     PT/INR:    Lab Results   Component Value Date    INR 1.11 04/01/2021     U/A:    Lab Results Component Value Date    COLORU YELLOW 04/15/2021    PHUR 5.5 04/15/2021    LABCAST NONE SEEN 02/13/2020    LABCAST NONE SEEN 02/13/2020    WBCUA 5-9 04/15/2021    RBCUA 5-10 04/15/2021    YEAST NONE SEEN 04/15/2021    BACTERIA MANY 04/15/2021    SPECGRAV 1.010 04/01/2021    LEUKOCYTESUR NEGATIVE 04/15/2021    UROBILINOGEN 0.2 04/15/2021    BILIRUBINUR NEGATIVE 04/15/2021    BLOODU TRACE 04/15/2021    GLUCOSEU NEGATIVE 04/15/2021    AMORPHOUS DEBRIS 03/28/2018       Imaging: The patient has had a Renal Ultrasound which I have independently reviewed along with its accompanying report. The study demonstrates   Impression       1. The right kidney is asymmetrically larger than the left. 2. There is thinning of the left renal cortex. 3. There are several bilateral Bosniak 1 cyst.   4. The bilateral renal artery resistive indices are elevated which is likely related to chronic medical renal disease.          IMPRESSION:   Urinary retention  BAILEE - 2.3 on admission (baseline 0.6-0.7) 1.2 today  Anemia    Plan:    Keep alvarez in at discharge for maximum decompression and evacuation  Renal US shows not hydro, stones or obstruction  CRT improved, nearing baseline    Will continue to monitor until CRT nadirs    Thank you for including us in the care of [address]ASMINE  04/16/21 7:38 AM  Urology

## 2021-04-16 NOTE — PROGRESS NOTES
Dr. Ana M Resendiz note       Internal Medicine              Patient:  Jazlyn Curtis  YOB: 1935    MRN: [id number]   Acct:  [de-identified]   8B-33/033-A  Primary Care Physician: Dee Sue MD    Admit Date: 4/14/2021           Subjective: she feels better this am. Denies chest pain        Objective:      Physical Exam:    Vitals:  Patient Vitals for the past 24 hrs:   BP Temp Temp src Pulse Resp SpO2   04/16/21 0416 125/61 97.7 °F (36.5 °C) Oral 85 16 98 %   04/16/21 0010 (!) 106/53 97.5 °F (36.4 °C) Oral 77 16 97 %   04/15/21 2122 (!) 110/56 97.5 °F (36.4 °C) Oral 95 16 95 %   04/15/21 1245 (!) 92/50 98 °F (36.7 °C) Oral -- -- 98 %   04/15/21 1024 (!) 107/55 98.5 °F (36.9 °C) Oral 93 20 98 %     Weight: Weight: 151 lb (68.5 kg)     24 hour intake/output:    Intake/Output Summary (Last 24 hours) at 4/16/2021 1864  Last data filed at 4/16/2021 0425  Gross per 24 hour   Intake 2309 ml   Output 975 ml   Net 1334 ml       General appearance - alert, ill appearing, and in no distress  Eyes - pupils equal and reactive, extraocular eye movements intact  Mouth - mucous membranes moist, pharynx normal without lesions  Neck - supple, no significant adenopathy  Chest - clinically clear  Heart - normal rate, regular rhythm, normal S1, S2,   Abdomen - soft, nontender, pos bs.   Neurological - alert, oriented, normal speech, no focal findings or movement disorder noted  Musculoskeletal - s/p rt hip surgery  Extremities - peripheral pulses normal, no pedal edema, no clubbing or cyanosis  Skin - normal coloration and turgor, no rashes, no suspicious skin lesions noted    Review of Labs and Diagnostic Testing:    CBC:   Recent Labs     04/15/21  0642 04/16/21  0709   WBC 5.5  --    HGB 7.6* 7.1*   HCT 24.0* 23.9*   MCV 89.6  --      --      BMP:   Recent Labs     04/16/21  0539      K 3.3*      CO2 19*   PHOS 1.9*   BUN 26*   CREATININE 1. 2   CALCIUM 8.0*   GLUCOSE 122*     PT/INR: No results for input(s): PROTIME, INR in the last 72 hours. APTT: No results for input(s): APTT in the last 72 hours. Lipids: No results for input(s): ALKPHOS, ALT, AST, BILITOT, BILIDIR, LABALBU, AMYLASE, LIPASE in the last 72 hours. Troponin: No results for input(s): TROPONINI in the last 72 hours. Imaging:  Us Renal Complete    Result Date: 4/15/2021  PROCEDURE: US RENAL COMPLETE CLINICAL INFORMATION: 72-year-old female with acute kidney injury. COMPARISON: No prior study. TECHNIQUE: Grayscale and color images were obtained of both kidneys. FINDINGS: RIGHT KIDNEY - 10.9 x 6.2 x 5.3 cm Resistive Index - 0.81 Cortical Thickness - 1.4 cm LEFT KIDNEY - 8.1 x 6.4 x 4.9 cm Resistive Index - 0.81 Cortical Thickness - 0.69 cm URINARY BLADDER Pre-Void - Garcia catheter in place There is normal echogenicity of the renal parenchyma bilaterally. The right kidney is asymmetrically larger than the left. There is thinning of the left renal cortex. There is prominence of the right renal pelvis without zaheer hydronephrosis. No stones are noted. There are several anechoic simple appearing nonvascular cyst arising from both kidneys. The largest on the right measures 2.3 x 2.2 x 2.5 cm and the largest on the left measures 2.5 x 2.7 x 2.2 cm. Urinary bladder is nondistended and a Garcia catheter is within its lumen. 1. The right kidney is asymmetrically larger than the left. 2. There is thinning of the left renal cortex. 3. There are several bilateral Bosniak 1 cyst. 4. The bilateral renal artery resistive indices are elevated which is likely related to chronic medical renal disease. **This report has been created using voice recognition software. It may contain minor errors which are inherent in voice recognition technology. ** Final report electronically signed by Dr Eduard Briscoe on 4/15/2021 6:46 PM      EKG:      Diet: DIET RENAL; Carb Control: 4 carb choices (60 gms)/meal        Data:   Scheduled Meds: Scheduled Meds:   potassium phosphate IVPB  15 mmol Intravenous Once    sodium chloride flush  5-40 mL Intravenous 2 times per day    therapeutic multivitamin-minerals  1 tablet Oral Daily    latanoprost  1 drop Both Eyes Nightly    metoprolol tartrate  12.5 mg Oral BID    pantoprazole  40 mg Oral BID AC    sucralfate  1 g Oral 4x Daily AC & HS    isosorbide mononitrate  30 mg Oral Daily    sertraline  25 mg Oral Daily    docusate sodium  100 mg Oral BID    aspirin  325 mg Oral Daily    atorvastatin  20 mg Oral Daily    potassium chloride  20 mEq Oral BID WC     Continuous Infusions:   sodium chloride      sodium bicarbonate infusion Stopped (04/16/21 0639)     PRN Meds:.sodium chloride flush, sodium chloride, acetaminophen, nitroGLYCERIN, tiZANidine  Continuous Infusions:        Assessment   Active Problems:    CAD (coronary artery disease)    Acute kidney injury ([address]    Anemia    Urinary retention  Resolved Problems:    * No resolved hospital problems.  *        Patient Active Problem List   Diagnosis    Hyperlipidemia    Osteoarthritis    GERD (gastroesophageal reflux disease)    COPD (chronic obstructive pulmonary disease) (HCC)    Chronic back pain    CAD (coronary artery disease)    Hypertension    Spinal stenosis of lumbar region with neurogenic claudication    Neurologic gait dysfunction    Inflammatory spondylopathy of lumbosacral region ([address]    Paroxysmal atrial fibrillation ([address]    Closed displaced intertrochanteric fracture of right femur ([address]    Fall at nursing home    Severe malnutrition ([address]    Acute kidney injury ([address]    Anemia    Urinary retention        Plan   For stress test this am.  Cont ivf  Will transfuse with 1 unit of prbc due to the cad  F/u labs      Electronically signed by Kristen Suero MD on 4/16/2021 at 9:52 AM  Over 35 mins spent on this evaluation    Dr. Porsha Mcdaniel is on call this weekend till next Tuesday.

## 2021-04-16 NOTE — PROGRESS NOTES
[facility] THERAP[facility] MISS[facility] TREATMENT NOTE  [facility] SURG 8B  8B-33/033-A      Date: 2021  Patient Name: Tavon         CSN: [id number]   : 1935  (81 y.o.)  Gender: female                REASON FOR MISSED TREATMENT: OT attempted at this time. RN reported pt should be leaving for stress test soon, requesting to hold at this time.  Will check back as able

## 2021-04-17 ENCOUNTER — APPOINTMENT (OUTPATIENT)
Dept: GENERAL RADIOLOGY | Age: 86
DRG: 811 | End: 2021-04-17
Payer: MEDICARE

## 2021-04-17 LAB
ALBUMIN SERPL-MCNC: 1.9 G/DL (ref 3.5–5.1)
ALP BLD-CCNC: 143 U/L (ref 38–126)
ALT SERPL-CCNC: 37 U/L (ref 11–66)
ANION GAP SERPL CALCULATED.3IONS-SCNC: 10 MEQ/L (ref 8–16)
AST SERPL-CCNC: 55 U/L (ref 5–40)
BASOPHILS # BLD: 0 %
BASOPHILS ABSOLUTE: 0 THOU/MM3 (ref 0–0.1)
BILIRUB SERPL-MCNC: 0.3 MG/DL (ref 0.3–1.2)
BILIRUBIN DIRECT: < 0.2 MG/DL (ref 0–0.3)
BUN BLDV-MCNC: 21 MG/DL (ref 7–22)
CALCIUM SERPL-MCNC: 7.9 MG/DL (ref 8.5–10.5)
CHLORIDE BLD-SCNC: 107 MEQ/L (ref 98–111)
CO2: 20 MEQ/L (ref 23–33)
CREAT SERPL-MCNC: 1 MG/DL (ref 0.4–1.2)
EOSINOPHIL # BLD: 4.8 %
EOSINOPHILS ABSOLUTE: 0.1 THOU/MM3 (ref 0–0.4)
ERYTHROCYTE [DISTWIDTH] IN BLOOD BY AUTOMATED COUNT: 17.2 % (ref 11.5–14.5)
ERYTHROCYTE [DISTWIDTH] IN BLOOD BY AUTOMATED COUNT: 53.9 FL (ref 35–45)
GFR SERPL CREATININE-BSD FRML MDRD: 53 ML/MIN/1.73M2
GLUCOSE BLD-MCNC: 122 MG/DL (ref 70–108)
HCT VFR BLD CALC: 26.2 % (ref 37–47)
HEMOGLOBIN: 8.4 GM/DL (ref 12–16)
IMMATURE GRANS (ABS): 0.07 THOU/MM3 (ref 0–0.07)
IMMATURE GRANULOCYTES: 2.3 %
LYMPHOCYTES # BLD: 11.6 %
LYMPHOCYTES ABSOLUTE: 0.4 THOU/MM3 (ref 1–4.8)
MCH RBC QN AUTO: 28 PG (ref 26–33)
MCHC RBC AUTO-ENTMCNC: 32.1 GM/DL (ref 32.2–35.5)
MCV RBC AUTO: 87.3 FL (ref 81–99)
MONOCYTES # BLD: 5.5 %
MONOCYTES ABSOLUTE: 0.2 THOU/MM3 (ref 0.4–1.3)
NUCLEATED RED BLOOD CELLS: 0 /100 WBC
PHOSPHORUS: 2.5 MG/DL (ref 2.4–4.7)
PLATELET # BLD: 201 THOU/MM3 (ref 130–400)
PLATELET ESTIMATE: ADEQUATE
PMV BLD AUTO: 10.8 FL (ref 9.4–12.4)
POTASSIUM SERPL-SCNC: 3.9 MEQ/L (ref 3.5–5.2)
RBC # BLD: 3 MILL/MM3 (ref 4.2–5.4)
SCAN OF BLOOD SMEAR: NORMAL
SEG NEUTROPHILS: 75.8 %
SEGMENTED NEUTROPHILS ABSOLUTE COUNT: 2.3 THOU/MM3 (ref 1.8–7.7)
SODIUM BLD-SCNC: 137 MEQ/L (ref 135–145)
TOTAL PROTEIN: 4.7 G/DL (ref 6.1–8)
WBC # BLD: 3.1 THOU/MM3 (ref 4.8–10.8)

## 2021-04-17 PROCEDURE — 99231 SBSQ HOSP IP/OBS SF/LOW 25: CPT | Performed by: NURSE PRACTITIONER

## 2021-04-17 PROCEDURE — 99232 SBSQ HOSP IP/OBS MODERATE 35: CPT | Performed by: INTERNAL MEDICINE

## 2021-04-17 PROCEDURE — 2060000000 HC ICU INTERMEDIATE R&B

## 2021-04-17 PROCEDURE — 80053 COMPREHEN METABOLIC PANEL: CPT

## 2021-04-17 PROCEDURE — 87186 SC STD MICRODIL/AGAR DIL: CPT

## 2021-04-17 PROCEDURE — 84100 ASSAY OF PHOSPHORUS: CPT

## 2021-04-17 PROCEDURE — 6370000000 HC RX 637 (ALT 250 FOR IP): Performed by: NURSE PRACTITIONER

## 2021-04-17 PROCEDURE — 6370000000 HC RX 637 (ALT 250 FOR IP): Performed by: INTERNAL MEDICINE

## 2021-04-17 PROCEDURE — 87077 CULTURE AEROBIC IDENTIFY: CPT

## 2021-04-17 PROCEDURE — 85025 COMPLETE CBC W/AUTO DIFF WBC: CPT

## 2021-04-17 PROCEDURE — 82248 BILIRUBIN DIRECT: CPT

## 2021-04-17 PROCEDURE — 36415 COLL VENOUS BLD VENIPUNCTURE: CPT

## 2021-04-17 PROCEDURE — 2580000003 HC RX 258: Performed by: INTERNAL MEDICINE

## 2021-04-17 PROCEDURE — 87086 URINE CULTURE/COLONY COUNT: CPT

## 2021-04-17 PROCEDURE — 86335 IMMUNFIX E-PHORSIS/URINE/CSF: CPT

## 2021-04-17 PROCEDURE — 84156 ASSAY OF PROTEIN URINE: CPT

## 2021-04-17 PROCEDURE — 71045 X-RAY EXAM CHEST 1 VIEW: CPT

## 2021-04-17 RX ORDER — SODIUM BICARBONATE 650 MG/1
1300 TABLET ORAL 2 TIMES DAILY
Status: DISCONTINUED | OUTPATIENT
Start: 2021-04-17 | End: 2021-04-21 | Stop reason: HOSPADM

## 2021-04-17 RX ORDER — MIDODRINE HYDROCHLORIDE 5 MG/1
5 TABLET ORAL ONCE
Status: COMPLETED | OUTPATIENT
Start: 2021-04-17 | End: 2021-04-17

## 2021-04-17 RX ORDER — DOPAMINE HYDROCHLORIDE 160 MG/100ML
2-20 INJECTION, SOLUTION INTRAVENOUS CONTINUOUS
Status: DISCONTINUED | OUTPATIENT
Start: 2021-04-17 | End: 2021-04-21 | Stop reason: HOSPADM

## 2021-04-17 RX ORDER — 0.9 % SODIUM CHLORIDE 0.9 %
250 INTRAVENOUS SOLUTION INTRAVENOUS ONCE
Status: COMPLETED | OUTPATIENT
Start: 2021-04-17 | End: 2021-04-17

## 2021-04-17 RX ORDER — MIDODRINE HYDROCHLORIDE 10 MG/1
10 TABLET ORAL
Status: DISCONTINUED | OUTPATIENT
Start: 2021-04-18 | End: 2021-04-20

## 2021-04-17 RX ADMIN — ATORVASTATIN CALCIUM 20 MG: 20 TABLET, FILM COATED ORAL at 09:40

## 2021-04-17 RX ADMIN — SUCRALFATE 1 G: 1 TABLET ORAL at 17:53

## 2021-04-17 RX ADMIN — MIDODRINE HYDROCHLORIDE 5 MG: 5 TABLET ORAL at 18:38

## 2021-04-17 RX ADMIN — ASPIRIN 325 MG: 325 TABLET, COATED ORAL at 09:40

## 2021-04-17 RX ADMIN — MIDODRINE HYDROCHLORIDE 5 MG: 5 TABLET ORAL at 12:44

## 2021-04-17 RX ADMIN — DOCUSATE SODIUM 100 MG: 100 CAPSULE, LIQUID FILLED ORAL at 23:24

## 2021-04-17 RX ADMIN — SUCRALFATE 1 G: 1 TABLET ORAL at 07:39

## 2021-04-17 RX ADMIN — SODIUM BICARBONATE 1300 MG: 650 TABLET ORAL at 14:58

## 2021-04-17 RX ADMIN — POTASSIUM CHLORIDE 20 MEQ: 1500 TABLET, EXTENDED RELEASE ORAL at 09:39

## 2021-04-17 RX ADMIN — TIZANIDINE 4 MG: 4 TABLET ORAL at 13:53

## 2021-04-17 RX ADMIN — SUCRALFATE 1 G: 1 TABLET ORAL at 12:44

## 2021-04-17 RX ADMIN — MIDODRINE HYDROCHLORIDE 5 MG: 5 TABLET ORAL at 09:39

## 2021-04-17 RX ADMIN — PANTOPRAZOLE SODIUM 40 MG: 40 TABLET, DELAYED RELEASE ORAL at 07:39

## 2021-04-17 RX ADMIN — POTASSIUM CHLORIDE 20 MEQ: 1500 TABLET, EXTENDED RELEASE ORAL at 17:53

## 2021-04-17 RX ADMIN — SODIUM CHLORIDE 250 ML: 9 INJECTION, SOLUTION INTRAVENOUS at 15:45

## 2021-04-17 RX ADMIN — SERTRALINE HYDROCHLORIDE 25 MG: 50 TABLET, FILM COATED ORAL at 09:39

## 2021-04-17 RX ADMIN — Medication 1 TABLET: at 09:39

## 2021-04-17 RX ADMIN — SUCRALFATE 1 G: 1 TABLET ORAL at 23:24

## 2021-04-17 RX ADMIN — ISOSORBIDE MONONITRATE 30 MG: 30 TABLET ORAL at 09:40

## 2021-04-17 RX ADMIN — SODIUM CHLORIDE, PRESERVATIVE FREE 10 ML: 5 INJECTION INTRAVENOUS at 23:23

## 2021-04-17 RX ADMIN — ACETAMINOPHEN 650 MG: 325 TABLET ORAL at 13:53

## 2021-04-17 RX ADMIN — PANTOPRAZOLE SODIUM 40 MG: 40 TABLET, DELAYED RELEASE ORAL at 15:05

## 2021-04-17 RX ADMIN — SODIUM CHLORIDE 250 ML: 9 INJECTION, SOLUTION INTRAVENOUS at 16:21

## 2021-04-17 RX ADMIN — LATANOPROST 1 DROP: 50 SOLUTION OPHTHALMIC at 23:23

## 2021-04-17 RX ADMIN — METOPROLOL TARTRATE 12.5 MG: 25 TABLET, FILM COATED ORAL at 09:40

## 2021-04-17 RX ADMIN — MIDODRINE HYDROCHLORIDE 5 MG: 5 TABLET ORAL at 16:18

## 2021-04-17 ASSESSMENT — PAIN DESCRIPTION - LOCATION
LOCATION: HIP
LOCATION: CHEST

## 2021-04-17 ASSESSMENT — PAIN SCALES - GENERAL
PAINLEVEL_OUTOF10: 0
PAINLEVEL_OUTOF10: 0
PAINLEVEL_OUTOF10: 6

## 2021-04-17 ASSESSMENT — PAIN DESCRIPTION - DESCRIPTORS: DESCRIPTORS: ACHING

## 2021-04-17 ASSESSMENT — PAIN DESCRIPTION - ONSET: ONSET: ON-GOING

## 2021-04-17 ASSESSMENT — PAIN DESCRIPTION - PROGRESSION: CLINICAL_PROGRESSION: NOT CHANGED

## 2021-04-17 NOTE — PROGRESS NOTES
Urology Progress Note    Chief Complaint: abnormal lab  Reason for consultation:  Urinary retention    Subjective:     Pt resting in bed. Denies pain or complaints. Garcia draining light yellow clear urine. Son at bedside. Reports pt has not been having straight catheterization prior to admission. Vitals:  /62   Pulse 84   Temp 97.6 °F (36.4 °C) (Oral)   Resp 18   Ht 5' 1\" (1.549 m)   Wt 151 lb (68.5 kg)   SpO2 96%   BMI 28.53 kg/m²   Temp  Av.9 °F (36.6 °C)  Min: 97.5 °F (36.4 °C)  Max: 98.4 °F (36.9 °C)    Intake/Output Summary (Last 24 hours) at 2021 1115  Last data filed at 2021 0423  Gross per 24 hour   Intake 1333 ml   Output 925 ml   Net 408 ml       Social History     Socioeconomic History    Marital status:       Spouse name: Not on file    Number of children: 2    Years of education: Not on file    Highest education level: Not on file   Occupational History    Not on file   Social Needs    Financial resource strain: Not hard at all    Food insecurity     Worry: Never true     Inability: Never true   [employer] needs     Medical: No     Non-medical: No   Tobacco Use    Smoking status: Former Smoker     Years: 30.00     Types: Cigarettes     Quit date: 1989     Years since quittin.8    Smokeless tobacco: Never Used   Substance and Sexual Activity    Alcohol use: No    Drug use: No    Sexual activity: Not Currently   Lifestyle    Physical activity     Days per week: 0 days     Minutes per session: 0 min    Stress: Not on file   Relationships    Social connections     Talks on phone: Not on file     Gets together: Not on file     Attends [employer] service: Not on file     Active member of club or organization: Not on file     Attends meetings of clubs or organizations: Not on file     Relationship status: Not on file    Intimate partner violence     Fear of current or ex partner: Not on file     Emotionally abused: Not on file Physically abused: Not on file     Forced sexual activity: Not on file   Other Topics Concern    Not on file   Social History Narrative    No barriers with transportation    No barriers with medication affordability     Family History   Problem Relation Age of Onset    Tuberculosis Mother     Tuberculosis Father      No Known Allergies      Constitutional: Alert and oriented times x3, no acute distress, and cooperative to examination with appropriate mood and affect. HEENT:   Head:         Normocephalic and atraumatic. Mucous membranes are normal.   Eyes:         EOM are normal. No scleral icterus. Nose:    The external appearance of the nose is normal  Ears: The ears appear normal to external inspection. Cardiovascular:       Normal rate, regular rhythm. Pulmonary/Chest:  Normal respiratory rate and rhthym. No use of accessory muscles. Lungs clear bilaterally. Abdominal:          Soft. No tenderness. Active bowel sounds. Genitalia:    Garcia catheter draining clear yellow urine  Musculoskeletal:    Normal range of motion. He exhibits no edema or tenderness of lower extremities. Extremities:    No cyanosis, clubbing, or edema present. Neurological:    Alert and oriented.      Labs:  WBC:    Lab Results   Component Value Date    WBC 3.1 04/17/2021     Hemoglobin/Hematocrit:    Lab Results   Component Value Date    HGB 8.4 04/17/2021    HCT 26.2 04/17/2021     BMP:    Lab Results   Component Value Date     04/17/2021    K 3.9 04/17/2021    K 3.9 04/14/2021     04/17/2021    CO2 20 04/17/2021    BUN 21 04/17/2021    LABALBU 2.6 04/02/2021    CREATININE 1.0 04/17/2021    CALCIUM 7.9 04/17/2021    LABGLOM 53 04/17/2021       information found for this patient   Narrative   PROCEDURE: US RENAL COMPLETE       CLINICAL INFORMATION: 29-year-old female with acute kidney injury.       COMPARISON: No prior study.       TECHNIQUE: Grayscale and color images were obtained of both kidneys.       FINDINGS:       RIGHT KIDNEY - 10.9 x 6.2 x 5.3 cm   Resistive Index - 0.81   Cortical Thickness - 1.4 cm       LEFT KIDNEY - 8.1 x 6.4 x 4.9 cm   Resistive Index - 0.81   Cortical Thickness - 0.69 cm       URINARY BLADDER   Pre-Void - Alvarez catheter in place       There is normal echogenicity of the renal parenchyma bilaterally. The right kidney is asymmetrically larger than the left. There is thinning of the left renal cortex. There is prominence of the right renal pelvis without zaheer hydronephrosis. No stones    are noted.       There are several anechoic simple appearing nonvascular cyst arising from both kidneys. The largest on the right measures 2.3 x 2.2 x 2.5 cm and the largest on the left measures 2.5 x 2.7 x 2.2 cm.       Urinary bladder is nondistended and a Alvarez catheter is within its lumen.           Impression       1. The right kidney is asymmetrically larger than the left. 2. There is thinning of the left renal cortex. 3. There are several bilateral Bosniak 1 cyst.   4. The bilateral renal artery resistive indices are elevated which is likely related to chronic medical renal disease. Impression:  Acute vs acute on chronic urinary retention  BAILEE    Plan:    Continue alvarez at this time until pt is more functional and mobile. Continue at d/c. Creatinine continues to improve. Virtual visit in 2 weeks after discharge to discuss voiding trial.      Urology signing off. Call with any changes in condition or questions.       RYAN Robles  04/17/21 11:15 AM  Urology

## 2021-04-17 NOTE — FLOWSHEET NOTE
04/17/21 1715   Patient Goal of the Day(Whiteboard)   Patient Daily Goal reviewed with Patient; Family member   Patient's reason for admission BAILEE   Precautions   Precautions Fall risk; Total hip   Negative Pressure Room No   Positive Pressure Room No   Safe Environment   Arm Bands On ID; Fall   Patient has limb restriction? No   Overbed Table Within Reach Yes   Safety Measures Bed/Chair alarm on;Bed/Chair-Wheels locked; Bed in low position;Gripper socks   Fall Risk Interventions   Toilet Every 2 Hours-In Advance of Need Yes  (Garcia in place)   Hourly Visual Checks Awake; In bed   Fall Visual Posted Armband; Fall sign posted   Room Door Open Yes   Alarm On Bed   Mobility   Activity In bed   Repositioned Lying left side;Pillow support   Patient Turned Turns self   Head of Bed Elevated  Self regulated   Heels/Feet Heel(s) elevated off bed; Foot of bed elevated   Range of Motion Active; All extremities   Patient Arrival To Unit 1710   Transport Method Bed   Anti-Embolism Devices Bilateral;Pneumatic compression devices, below knee   Anti-Embolism Intervention Off (Comment)   Pain Assessment   Pain Assessment 0-10   Pain Level 3   Patient's Stated Pain Goal No pain   Pain Type Acute pain   Pain Location Hip   Pain Orientation Right   Pain Radiating Towards none   Pain Descriptors Aching   Pain Frequency Continuous   Pain Onset On-going   Clinical Progression Not changed   Non-Pharmaceutical Pain Intervention(s) Elevation;Relaxation techniques;Repositioned; Rest       Pt admitted to  4K26 From 8B33. Complaints: Hypotension. No IVF running upon admission, INT in RFA 20g and LFA 22g - patient on room air. IV site free of s/s of infection or infiltration. Vital signs obtained. Assessment and data collection initiated. Two nurse skin assessment performed by [facility] ERICK and Emilia Boothe. Oriented to room. Policies and procedures for  explained. All questions answered with no further questions at this time.  Fall prevention and safety brochure discussed with patient. Bed alarm on. Call light in reach. Family at bedside, patient A+O x 4, belongings with patient, chart transferred with patient.

## 2021-04-17 NOTE — PROGRESS NOTES
At approximately [phone] this RN was informed that pt SPB was in low 60's. Upon doing a manual BP it was 58/30 notified Dr Sherrie Parker MD order to give 250 ml normal saline bolus, recheck and notify of new reading. At 1610 BP was 70/36 notified MD ask if rapid should be call order to give  additional 250 normal saline and given additional dose of Midodrine 5mg po and notify of readings. 1630 BP 82/40 ,70, 95% on RA. Pt c/o feeling tired, notified Dr Bambi Adler MD order to transfer patient off unit for dopamine drip. Notified Charge of order to transfer. Obtained bed, called report to [address] and transferred patient to [facility]. Family accompanied patient during transfer.

## 2021-04-17 NOTE — PROGRESS NOTES
Renal Progress Note  [facility]    Patient :  Kei Simmons; 81 y.o. MRN# [id number]  Location:  8B-33/033-A  Attending:  Brooks Brantley MD  Admit Date:  4/14/2021   Hospital Day: 1      Subjective:     Nephrology is following the patient for BAILEE. Patient seen and examined. BP is improved. C/o chest pain. iniitially refused stress test but now agreeable. Outpatient Medications:     Medications Prior to Admission: tiZANidine (ZANAFLEX) 4 MG tablet, Take 1 tablet by mouth every 8 hours as needed (muscle spasm)  docusate sodium (COLACE, DULCOLAX) 100 MG CAPS, Take 100 mg by mouth 2 times daily  aspirin 325 MG EC tablet, Take 1 tablet by mouth daily  sertraline (ZOLOFT) 25 MG tablet, Take 25 mg by mouth daily  isosorbide mononitrate (IMDUR) 30 MG extended release tablet, Take 1 tablet by mouth daily  potassium chloride (KLOR-CON M) 20 MEQ extended release tablet, Take 1 tablet by mouth daily  pantoprazole (PROTONIX) 40 MG tablet, Take 1 tablet by mouth 2 times daily (before meals)  sucralfate (CARAFATE) 1 GM tablet, Take 1 tablet by mouth 4 times daily (before meals and nightly)  metoprolol tartrate (LOPRESSOR) 25 MG tablet, Take 0.5 tablets by mouth 2 times daily  nortriptyline (PAMELOR) 50 MG capsule, Take 1 capsule by mouth nightly  simvastatin (ZOCOR) 40 MG tablet, Take 1 tablet by mouth nightly  Multiple Vitamins-Minerals (THERAPEUTIC MULTIVITAMIN-MINERALS) tablet, Take 1 tablet by mouth daily  latanoprost (XALATAN) 0.005 % ophthalmic solution, Place 1 drop into both eyes nightly  acetaminophen (TYLENOL) 325 MG tablet, Take 2 tablets by mouth every 4 hours as needed for Pain Maximum dose of acetaminophen is 4000 mg from all sources in 24 hours. nitroGLYCERIN (NITROSTAT) 0.4 MG SL tablet, up to max of 3 total doses. If no relief after 1 dose, call 911.     Current Medications:     Scheduled Meds:    sodium bicarbonate  1,300 mg Oral BID    midodrine  5 mg Oral TID WC    sodium chloride flush  5-40 mL Intravenous 2 times per day    therapeutic multivitamin-minerals  1 tablet Oral Daily    latanoprost  1 drop Both Eyes Nightly    metoprolol tartrate  12.5 mg Oral BID    pantoprazole  40 mg Oral BID AC    sucralfate  1 g Oral 4x Daily AC & HS    isosorbide mononitrate  30 mg Oral Daily    sertraline  25 mg Oral Daily    docusate sodium  100 mg Oral BID    aspirin  325 mg Oral Daily    atorvastatin  20 mg Oral Daily    potassium chloride  20 mEq Oral BID WC     Continuous Infusions:    sodium chloride      sodium chloride       PRN Meds:  sodium chloride, sodium chloride flush, sodium chloride, acetaminophen, nitroGLYCERIN, tiZANidine    Input/Output:       I/O last 3 completed shifts: In: 8361 [I.V.:1062; Blood:271]  Out: 925 [Urine:925]. Patient Vitals for the past 96 hrs (Last 3 readings):   Weight   21 151 lb (68.5 kg)       Vital Signs:   Temperature:  Temp: 97.8 °F (36.6 °C)  TMax:   Temp (24hrs), Av.9 °F (36.6 °C), Min:97.5 °F (36.4 °C), Max:98.4 °F (36.9 °C)    Respirations:  Resp: 14  Pulse:   Pulse: 73  BP:    BP: (!) 118/57  BP Range: Systolic (51LWI), VGU:412 , Min:80 , GUP:358       Diastolic (40RCO), IWH:34, Min:44, Max:62      Physical Examination:     General:  Awake, alert, no distress  HEENT: NC/AT/ MMM  Chest:               diminished  Cardiac:  S1 S2   Abdomen: Soft, non-tender,  Neuro:  No facial droop, No Asterixis  SKIN:  No rashes, good skin turgor.   Extremities:  Trace edema noted    Labs:       Recent Labs     04/15/21  0642 21  0539 21  0709 21  2128 21  0354   WBC 5.5 3.7*  --   --  3.1*   RBC 2.68* 2.49*  --   --  3.00*   HGB 7.6* 6.9* 7.1* 7.9* 8.4*   HCT 24.0* 22.7* 23.9* 24.4* 26.2*   MCV 89.6 91.2  --   --  87.3   MCH 28.4 27.7  --   --  28.0   MCHC 31.7* 30.4*  --   --  32.1*    195  --   --  201   MPV 10.8 10.9  --   --  10.8      BMP:   Recent Labs     04/15/21  0642 21  0539 04/17/21  0354   * 137 137   K 3.5 3.3* 3.9    107 107   CO2 14* 19* 20*   BUN 37* 26* 21   CREATININE 1.8* 1.2 1.0   GLUCOSE 104 122* 122*   CALCIUM 8.0* 8.0* 7.9*      Phosphorus:     Recent Labs     04/16/21  0539 04/17/21 0354   PHOS 1.9* 2.5     Magnesium:  No results for input(s): MG in the last 72 hours. Albumin:  No results for input(s): LABALBU in the last 72 hours. BNP:    No results found for: BNP  RYLEE:    No results found for: RYLEE  SPEP:  Lab Results   Component Value Date    PROT 5.7 04/02/2021     UPEP:   No results found for: LABPE  C3:   No results found for: C3  C4:   No results found for: C4  MPO ANCA:   No results found for: MPO  PR3 ANCA:   No results found for: PR3  Anti-GBM:   No results found for: GBMABIGG  Hep BsAg:         Lab Results   Component Value Date    HEPBSAG Negative 05/18/2020     Hep C AB:          Lab Results   Component Value Date    HEPCAB Negative 05/18/2020       Urinalysis/Chemistries:      Lab Results   Component Value Date    NITRU NEGATIVE 04/15/2021    COLORU YELLOW 04/15/2021    PHUR 5.5 04/15/2021    LABCAST NONE SEEN 02/13/2020    LABCAST NONE SEEN 02/13/2020    WBCUA 5-9 04/15/2021    RBCUA 5-10 04/15/2021    YEAST NONE SEEN 04/15/2021    BACTERIA MANY 04/15/2021    SPECGRAV 1.010 04/01/2021    LEUKOCYTESUR NEGATIVE 04/15/2021    UROBILINOGEN 0.2 04/15/2021    BILIRUBINUR NEGATIVE 04/15/2021    BLOODU TRACE 04/15/2021    GLUCOSEU NEGATIVE 04/15/2021    KETUA NEGATIVE 04/15/2021    AMORPHOUS DEBRIS 03/28/2018     Urine Sodium:   No results found for: ANNABEL  Urine Potassium:  No results found for: KUR  Urine Chloride:  No results found for: CLUR  Urine Osmolarity: No results found for: OSMOU  Urine Protein:   No components found for: TOTALPROTEIN, URINE   Urine Creatinine:   No results found for: LABCREA  Urine Eosinophils:  No components found for: UEOS        Impression and Plan:  1.  BAILEE likely obstructive uropathy from urinary retention compounded by hypotension: improving, will stop IV fluids. 2. Chronic Nonanion gap metabolic acidosis with hypokalemia: cannot rule out underlying RTA. Urine anion gap is equivocal.  Will DC bicarb drip. Start po bicarb  3. Hypokalemia; improved, continue KCl 20 meq bID  4. Hypotension:better with Midodrine  5. Urinary retention: has alvarez  6. Anemia: s/p PRBC transfusion  7. S/p right hip surgery  8. Elevated troponin: having stress test  9. CAD, diastolic CHF        Please don't hesitate to call with any questions.   Electronically signed by [id number] Sharlene Olguin DO on 4/17/2021 at 1:48 PM

## 2021-04-17 NOTE — PROGRESS NOTES
Progress note      [facility]             Patient:  Kei Simmons  YOB: 1935    MRN: [id number]   Acct:  [de-identified]   8B-33/033-A  Primary Care Physician: Heather Lamb MD    Admit Date: 4/14/2021           Subjective: Pt resting in bed. Denies chest pain. Noted she refused stress test yesterday. She would rather follow up with Dr Huong Roman outpatient.       Objective:      Physical Exam:    Vitals:  Patient Vitals for the past 24 hrs:   BP Temp Temp src Pulse Resp SpO2   04/17/21 0451 (!) 117/58 -- -- -- -- --   04/17/21 0423 (!) 97/57 97.9 °F (36.6 °C) Oral 80 18 94 %   04/17/21 0053 (!) 114/54 -- -- 77 -- --   04/16/21 2323 (!) 103/58 98.4 °F (36.9 °C) Axillary 77 16 --   04/16/21 2241 (!) 108/56 -- Oral 73 16 --   04/16/21 2210 (!) 99/51 -- -- 72 16 --   04/16/21 2138 (!) 91/55 -- -- 61 16 --   04/16/21 2124 (!) 103/50 -- -- 73 16 --   04/16/21 2115 (!) 89/53 -- -- 75 16 --   04/16/21 2108 (!) 80/48 98.1 °F (36.7 °C) Oral 78 16 --   04/16/21 2049 (!) 80/44 -- -- 75 16 --   04/16/21 1805 (!) 105/54 97.8 °F (36.6 °C) Oral 95 18 94 %   04/16/21 1620 (!) 88/50 97.5 °F (36.4 °C) Oral 77 18 97 %   04/16/21 1547 (!) 94/51 97.9 °F (36.6 °C) Oral 77 20 94 %   04/16/21 1104 (!) 88/53 97.8 °F (36.6 °C) Oral 78 18 98 %     Weight: Weight: 151 lb (68.5 kg)     24 hour intake/output:    Intake/Output Summary (Last 24 hours) at 4/17/2021 0923  Last data filed at 4/17/2021 0423  Gross per 24 hour   Intake 1333 ml   Output 925 ml   Net 408 ml       General appearance - alert, well appearing, and in no distress  Eyes - pupils equal and reactive, extraocular eye movements intact  Mouth - mucous membranes moist, pharynx normal without lesions  Neck - supple, no significant adenopathy  Chest - clear to auscultation, no wheezes, rales or rhonchi, symmetric air entry  Heart - normal rate, regular rhythm, normal S1, S2, no murmurs, rubs, clicks or gallops  Abdomen - soft, nontender, nondistended, no masses or organomegaly, pos bs. - Garcia catheter in place  Neurological - alert, oriented, normal speech, no focal findings or movement disorder noted  Musculoskeletal - s/p right hip surgery  Extremities - peripheral pulses normal, no pedal edema, no clubbing or cyanosis  Skin - normal coloration and turgor, no rashes, no suspicious skin lesions noted    Review of Labs and Diagnostic Testing:    CBC:   Recent Labs     04/17/21  0354   WBC 3.1*   HGB 8.4*   HCT 26.2*   MCV 87.3        BMP:   Recent Labs     04/17/21  0354      K 3.9      CO2 20*   PHOS 2.5   BUN 21   CREATININE 1.0   CALCIUM 7.9*   GLUCOSE 122*     PT/INR: No results for input(s): PROTIME, INR in the last 72 hours. APTT: No results for input(s): APTT in the last 72 hours. Lipids: No results for input(s): ALKPHOS, ALT, AST, BILITOT, BILIDIR, LABALBU, AMYLASE, LIPASE in the last 72 hours.   Troponin:   Recent Labs     04/15/21  0019   TROPONINT 0.043*        Imaging:  [unfilled]    EKG:      Diet: DIET RENAL; Carb Control: 4 carb choices (60 gms)/meal        Data:   Scheduled Meds: Scheduled Meds:   midodrine  5 mg Oral TID WC    sodium chloride flush  5-40 mL Intravenous 2 times per day    therapeutic multivitamin-minerals  1 tablet Oral Daily    latanoprost  1 drop Both Eyes Nightly    metoprolol tartrate  12.5 mg Oral BID    pantoprazole  40 mg Oral BID AC    sucralfate  1 g Oral 4x Daily AC & HS    isosorbide mononitrate  30 mg Oral Daily    sertraline  25 mg Oral Daily    docusate sodium  100 mg Oral BID    aspirin  325 mg Oral Daily    atorvastatin  20 mg Oral Daily    potassium chloride  20 mEq Oral BID WC     Continuous Infusions:   sodium chloride      sodium chloride      sodium bicarbonate infusion 75 mL/hr at 04/16/21 2321     PRN Meds:.sodium chloride, sodium chloride flush, sodium chloride, acetaminophen, nitroGLYCERIN, tiZANidine  Continuous Infusions:   sodium chloride      sodium chloride      sodium bicarbonate infusion 75 mL/hr at 04/16/21 2321         Assessment/Plan   1. BAILEE   -Renal following   -Cr improved   -On Bicarb drip  2. Hypotension   -On Midodrine   -Mildly improved BP  3. Urinary retention   -Has alvarez; Recommended to keep in at discharge per urology  4. Microcytic anemia   -Hgb stable today S/P 1URBC yesterday  5. Elevated troponin/Chest pain   -Pt refused stress test yesterday   -Pt understands risks of not following up with stress test   -Reports intermittent chest pain but not currently   -Wants to follow up outpatient with Dr Daphney Webber  6. CAD   -Cont home meds  7. Diastolic HF   -Cont home meds   8. HLD   -Cont home med   9. DVT prophylaxis     -SCD's        Assessment and plan of care discussed with supervising physician, Dr Tawanda Wheatley. Electronically signed by YASMINE Downing CNP on 4/17/2021 at 9:23 AM    Addendum/attestation by David Morales MD:  I have seen and examined the patient independently. Face to face evaluation and examination was performed. The above evaluation and note has been reviewed. Laboratory and radiological data were reviewed. I Have discussed with Noemy Bey CNP about this patient in detail. The above assessment and plan has been reviewed. Please see my modifications mentioned below. My modifications:  Discussed with pt and son , Both he and the patient have agreed to proceed with the stress test recommended by the cardiologist . Will update the cardiologist.   Has some precordial tenderness , plan CXR.

## 2021-04-18 ENCOUNTER — APPOINTMENT (OUTPATIENT)
Dept: GENERAL RADIOLOGY | Age: 86
DRG: 811 | End: 2021-04-18
Payer: MEDICARE

## 2021-04-18 LAB
ANION GAP SERPL CALCULATED.3IONS-SCNC: 13 MEQ/L (ref 8–16)
BASOPHILS # BLD: 0.4 %
BASOPHILS ABSOLUTE: 0 THOU/MM3 (ref 0–0.1)
BUN BLDV-MCNC: 16 MG/DL (ref 7–22)
CALCIUM SERPL-MCNC: 8 MG/DL (ref 8.5–10.5)
CHLORIDE BLD-SCNC: 102 MEQ/L (ref 98–111)
CO2: 20 MEQ/L (ref 23–33)
CREAT SERPL-MCNC: 0.9 MG/DL (ref 0.4–1.2)
EKG ATRIAL RATE: 77 BPM
EKG P AXIS: 62 DEGREES
EKG P-R INTERVAL: 148 MS
EKG Q-T INTERVAL: 390 MS
EKG QRS DURATION: 84 MS
EKG QTC CALCULATION (BAZETT): 441 MS
EKG R AXIS: 38 DEGREES
EKG T AXIS: 57 DEGREES
EKG VENTRICULAR RATE: 77 BPM
EOSINOPHIL # BLD: 4.4 %
EOSINOPHILS ABSOLUTE: 0.1 THOU/MM3 (ref 0–0.4)
ERYTHROCYTE [DISTWIDTH] IN BLOOD BY AUTOMATED COUNT: 17.6 % (ref 11.5–14.5)
ERYTHROCYTE [DISTWIDTH] IN BLOOD BY AUTOMATED COUNT: 57.1 FL (ref 35–45)
GFR SERPL CREATININE-BSD FRML MDRD: 60 ML/MIN/1.73M2
GLUCOSE BLD-MCNC: 86 MG/DL (ref 70–108)
HCT VFR BLD CALC: 32.9 % (ref 37–47)
HEMOGLOBIN: 10 GM/DL (ref 12–16)
IMMATURE GRANS (ABS): 0.1 THOU/MM3 (ref 0–0.07)
IMMATURE GRANULOCYTES: 3.7 %
LACTIC ACID: 1.3 MMOL/L (ref 0.5–2)
LYMPHOCYTES # BLD: 14.8 %
LYMPHOCYTES ABSOLUTE: 0.4 THOU/MM3 (ref 1–4.8)
MCH RBC QN AUTO: 27.5 PG (ref 26–33)
MCHC RBC AUTO-ENTMCNC: 30.4 GM/DL (ref 32.2–35.5)
MCV RBC AUTO: 90.6 FL (ref 81–99)
MONOCYTES # BLD: 6.7 %
MONOCYTES ABSOLUTE: 0.2 THOU/MM3 (ref 0.4–1.3)
NUCLEATED RED BLOOD CELLS: 0 /100 WBC
PHOSPHORUS: 2.5 MG/DL (ref 2.4–4.7)
PLATELET # BLD: 208 THOU/MM3 (ref 130–400)
PMV BLD AUTO: 10.2 FL (ref 9.4–12.4)
POTASSIUM SERPL-SCNC: 3.9 MEQ/L (ref 3.5–5.2)
PROCALCITONIN: 0.22 NG/ML (ref 0.01–0.09)
RBC # BLD: 3.63 MILL/MM3 (ref 4.2–5.4)
SEG NEUTROPHILS: 70 %
SEGMENTED NEUTROPHILS ABSOLUTE COUNT: 1.9 THOU/MM3 (ref 1.8–7.7)
SODIUM BLD-SCNC: 135 MEQ/L (ref 135–145)
TROPONIN T: 0.02 NG/ML
WBC # BLD: 2.7 THOU/MM3 (ref 4.8–10.8)

## 2021-04-18 PROCEDURE — 99232 SBSQ HOSP IP/OBS MODERATE 35: CPT | Performed by: INTERNAL MEDICINE

## 2021-04-18 PROCEDURE — 93010 ELECTROCARDIOGRAM REPORT: CPT | Performed by: NUCLEAR MEDICINE

## 2021-04-18 PROCEDURE — 84484 ASSAY OF TROPONIN QUANT: CPT

## 2021-04-18 PROCEDURE — 84145 PROCALCITONIN (PCT): CPT

## 2021-04-18 PROCEDURE — 97166 OT EVAL MOD COMPLEX 45 MIN: CPT

## 2021-04-18 PROCEDURE — 6370000000 HC RX 637 (ALT 250 FOR IP): Performed by: INTERNAL MEDICINE

## 2021-04-18 PROCEDURE — 80048 BASIC METABOLIC PNL TOTAL CA: CPT

## 2021-04-18 PROCEDURE — 93005 ELECTROCARDIOGRAM TRACING: CPT | Performed by: INTERNAL MEDICINE

## 2021-04-18 PROCEDURE — 83605 ASSAY OF LACTIC ACID: CPT

## 2021-04-18 PROCEDURE — 2060000000 HC ICU INTERMEDIATE R&B

## 2021-04-18 PROCEDURE — 36415 COLL VENOUS BLD VENIPUNCTURE: CPT

## 2021-04-18 PROCEDURE — 71045 X-RAY EXAM CHEST 1 VIEW: CPT

## 2021-04-18 PROCEDURE — 84100 ASSAY OF PHOSPHORUS: CPT

## 2021-04-18 PROCEDURE — 6370000000 HC RX 637 (ALT 250 FOR IP): Performed by: NURSE PRACTITIONER

## 2021-04-18 PROCEDURE — 2580000003 HC RX 258: Performed by: INTERNAL MEDICINE

## 2021-04-18 PROCEDURE — 97530 THERAPEUTIC ACTIVITIES: CPT

## 2021-04-18 PROCEDURE — 85025 COMPLETE CBC W/AUTO DIFF WBC: CPT

## 2021-04-18 PROCEDURE — 6360000002 HC RX W HCPCS: Performed by: REGISTERED NURSE

## 2021-04-18 PROCEDURE — 2580000003 HC RX 258: Performed by: REGISTERED NURSE

## 2021-04-18 RX ORDER — SODIUM CHLORIDE 9 MG/ML
INJECTION, SOLUTION INTRAVENOUS CONTINUOUS
Status: DISCONTINUED | OUTPATIENT
Start: 2021-04-18 | End: 2021-04-19

## 2021-04-18 RX ORDER — MIDODRINE HYDROCHLORIDE 10 MG/1
10 TABLET ORAL
Status: CANCELLED | OUTPATIENT
Start: 2021-04-19

## 2021-04-18 RX ADMIN — POTASSIUM CHLORIDE 20 MEQ: 1500 TABLET, EXTENDED RELEASE ORAL at 08:49

## 2021-04-18 RX ADMIN — PIPERACILLIN AND TAZOBACTAM 3375 MG: 3; .375 INJECTION, POWDER, LYOPHILIZED, FOR SOLUTION INTRAVENOUS at 15:54

## 2021-04-18 RX ADMIN — ASPIRIN 325 MG: 325 TABLET, COATED ORAL at 08:49

## 2021-04-18 RX ADMIN — PANTOPRAZOLE SODIUM 40 MG: 40 TABLET, DELAYED RELEASE ORAL at 15:03

## 2021-04-18 RX ADMIN — SUCRALFATE 1 G: 1 TABLET ORAL at 20:44

## 2021-04-18 RX ADMIN — ACETAMINOPHEN 650 MG: 325 TABLET ORAL at 19:03

## 2021-04-18 RX ADMIN — SODIUM CHLORIDE, PRESERVATIVE FREE 10 ML: 5 INJECTION INTRAVENOUS at 10:29

## 2021-04-18 RX ADMIN — MIDODRINE HYDROCHLORIDE 10 MG: 10 TABLET ORAL at 08:48

## 2021-04-18 RX ADMIN — SODIUM CHLORIDE, PRESERVATIVE FREE 10 ML: 5 INJECTION INTRAVENOUS at 20:44

## 2021-04-18 RX ADMIN — SERTRALINE HYDROCHLORIDE 25 MG: 50 TABLET, FILM COATED ORAL at 08:49

## 2021-04-18 RX ADMIN — SODIUM BICARBONATE 1300 MG: 650 TABLET ORAL at 02:04

## 2021-04-18 RX ADMIN — ISOSORBIDE MONONITRATE 30 MG: 30 TABLET ORAL at 08:49

## 2021-04-18 RX ADMIN — SUCRALFATE 1 G: 1 TABLET ORAL at 15:03

## 2021-04-18 RX ADMIN — PIPERACILLIN AND TAZOBACTAM 3375 MG: 3; .375 INJECTION, POWDER, LYOPHILIZED, FOR SOLUTION INTRAVENOUS at 23:52

## 2021-04-18 RX ADMIN — LATANOPROST 1 DROP: 50 SOLUTION OPHTHALMIC at 20:45

## 2021-04-18 RX ADMIN — SODIUM BICARBONATE 1300 MG: 650 TABLET ORAL at 08:49

## 2021-04-18 RX ADMIN — POTASSIUM CHLORIDE 20 MEQ: 1500 TABLET, EXTENDED RELEASE ORAL at 15:03

## 2021-04-18 RX ADMIN — SODIUM CHLORIDE: 9 INJECTION, SOLUTION INTRAVENOUS at 20:43

## 2021-04-18 RX ADMIN — ACETAMINOPHEN 650 MG: 325 TABLET ORAL at 15:03

## 2021-04-18 RX ADMIN — SODIUM BICARBONATE 1300 MG: 650 TABLET ORAL at 23:50

## 2021-04-18 RX ADMIN — Medication 1 TABLET: at 08:49

## 2021-04-18 RX ADMIN — MIDODRINE HYDROCHLORIDE 10 MG: 10 TABLET ORAL at 15:03

## 2021-04-18 RX ADMIN — ACETAMINOPHEN 650 MG: 325 TABLET ORAL at 10:26

## 2021-04-18 RX ADMIN — MIDODRINE HYDROCHLORIDE 10 MG: 10 TABLET ORAL at 17:51

## 2021-04-18 RX ADMIN — SUCRALFATE 1 G: 1 TABLET ORAL at 08:49

## 2021-04-18 RX ADMIN — ATORVASTATIN CALCIUM 20 MG: 20 TABLET, FILM COATED ORAL at 08:49

## 2021-04-18 RX ADMIN — PANTOPRAZOLE SODIUM 40 MG: 40 TABLET, DELAYED RELEASE ORAL at 08:49

## 2021-04-18 RX ADMIN — METOPROLOL TARTRATE 12.5 MG: 25 TABLET, FILM COATED ORAL at 08:49

## 2021-04-18 RX ADMIN — METOPROLOL TARTRATE 12.5 MG: 25 TABLET, FILM COATED ORAL at 20:44

## 2021-04-18 ASSESSMENT — PAIN SCALES - GENERAL
PAINLEVEL_OUTOF10: 3
PAINLEVEL_OUTOF10: 3
PAINLEVEL_OUTOF10: 8

## 2021-04-18 ASSESSMENT — PAIN DESCRIPTION - PAIN TYPE: TYPE: ACUTE PAIN

## 2021-04-18 ASSESSMENT — PAIN DESCRIPTION - LOCATION: LOCATION: HIP

## 2021-04-18 ASSESSMENT — PAIN DESCRIPTION - DESCRIPTORS: DESCRIPTORS: ACHING

## 2021-04-18 ASSESSMENT — PAIN DESCRIPTION - PROGRESSION: CLINICAL_PROGRESSION: NOT CHANGED

## 2021-04-18 ASSESSMENT — PAIN DESCRIPTION - FREQUENCY: FREQUENCY: INTERMITTENT

## 2021-04-18 ASSESSMENT — PAIN DESCRIPTION - ORIENTATION: ORIENTATION: LOWER;MID

## 2021-04-18 ASSESSMENT — PAIN DESCRIPTION - ONSET: ONSET: ON-GOING

## 2021-04-18 NOTE — PLAN OF CARE
Problem: Falls - Risk of:  Goal: Will remain free from falls  Description: Will remain free from falls  Outcome: Met This Shift  Goal: Absence of physical injury  Description: Absence of physical injury  Outcome: Met This Shift     Problem: Skin Integrity:  Goal: Will show no infection signs and symptoms  Description: Will show no infection signs and symptoms  Outcome: Ongoing  Goal: Absence of new skin breakdown  Description: Absence of new skin breakdown  Outcome: Ongoing     Problem: DISCHARGE BARRIERS  Goal: Patient's continuum of care needs are met  Outcome: Met This Shift     Problem: Urinary Retention:  Goal: Urinary elimination within specified parameters  Description: Urinary elimination within specified parameters  Outcome: Ongoing  Goal: Able to perform urinary catheter care  Description: Able to perform urinary catheter care  Outcome: Ongoing  Goal: Able to perform urinary self-catheterization  Description: Able to perform urinary self-catheterization  Outcome: Ongoing  Goal: Ability to reestablish a normal urinary elimination pattern will improve - after catheter removal  Description: Ability to reestablish a normal urinary elimination pattern will improve - after catheter removal  Outcome: Ongoing  Goal: Ability to recognize the need to void and respond appropriately will improve  Description: Ability to recognize the need to void and respond appropriately will improve  Outcome: Ongoing  Goal: Absence of postvoid residual urine  Description: Absence of postvoid residual urine  Outcome: Ongoing     Problem: Urinary Retention:  Goal: Urinary elimination within specified parameters  Description: Urinary elimination within specified parameters  Outcome: Ongoing  Goal: Able to perform urinary catheter care  Description: Able to perform urinary catheter care  Outcome: Ongoing  Goal: Able to perform urinary self-catheterization  Description: Able to perform urinary self-catheterization  Outcome: Ongoing  Goal: Ability to reestablish a normal urinary elimination pattern will improve - after catheter removal  Description: Ability to reestablish a normal urinary elimination pattern will improve - after catheter removal  Outcome: Ongoing  Goal: Ability to recognize the need to void and respond appropriately will improve  Description: Ability to recognize the need to void and respond appropriately will improve  Outcome: Ongoing  Goal: Absence of postvoid residual urine  Description: Absence of postvoid residual urine  Outcome: Ongoing     Problem: Nutritional:  Goal: Maintenance of adequate nutrition will be supported  Description: Maintenance of adequate nutrition will be supported  Outcome: Ongoing     Problem: Physical Regulation:  Goal: Complications related to the disease process, condition or treatment will be avoided or minimized  Description: Complications related to the disease process, condition or treatment will be avoided or minimized  Outcome: Met This Shift     Problem: Urinary Elimination:  Goal: Ability to achieve and maintain adequate urine output will be supported  Description: Ability to achieve and maintain adequate urine output will be supported  Outcome: Ongoing     Problem: Pain:  Goal: Pain level will decrease  Description: Pain level will decrease  Outcome: Met This Shift  Goal: Control of acute pain  Description: Control of acute pain  Outcome: Met This Shift  Goal: Control of chronic pain  Description: Control of chronic pain  Outcome: Met This Shift     Problem: Fluid Volume:  Goal: Will show no signs or symptoms of fluid imbalance  Description: Will show no signs or symptoms of fluid imbalance  Outcome: Ongoing

## 2021-04-18 NOTE — PROGRESS NOTES
Progress note      [facility]             Patient:  Tavon   YOB: 1935    MRN: [id number]   Acct:  [id number]   [id number]  Primary Care Physician: Oliva Brito MD    Admit Date: 4/14/2021           Subjective: Pt resting in bed. Noted pt was moved to 4K last night due to an episode of hypotension 58/30 and decreased level of consciousness. When pt got to the floor her BP was 100/55 with a MAP of 69 so Dopamine drip was not started. Pt states that she just does not feel well and she can not describe it. She denies any chest pain or SOB.       Objective:      Physical Exam:    Vitals:  Patient Vitals for the past 24 hrs:   BP Temp Temp src Pulse Resp SpO2 Weight   04/18/21 0530 (!) 121/90 -- -- 93 26 -- --   04/18/21 0335 (!) 145/79 97.8 °F (36.6 °C) Oral 85 18 96 % 160 lb 1.6 oz (72.6 kg)   04/18/21 0014 (!) 118/49 97.5 °F (36.4 °C) Oral 82 18 96 % --   04/17/21 1930 (!) 103/55 97.6 °F (36.4 °C) Oral 72 19 96 % --   04/17/21 1830 (!) 105/54 -- -- 60 18 95 % --   04/17/21 1725 (!) 100/55 97.5 °F (36.4 °C) Oral 69 18 95 % --   04/17/21 1628 (!) 82/40 -- -- 70 20 95 % --   04/17/21 1610 (!) 70/36 -- -- -- -- -- --   04/17/21 1530 (!) 58/30 97 °F (36.1 °C) Axillary -- 16 94 % --   04/17/21 1131 (!) 118/57 97.8 °F (36.6 °C) Oral 73 14 95 % --   04/17/21 0924 130/62 97.6 °F (36.4 °C) Oral 84 18 96 % --     Weight: Weight: 160 lb 1.6 oz (72.6 kg)     24 hour intake/output:    Intake/Output Summary (Last 24 hours) at 4/18/2021 0757  Last data filed at 4/18/2021 0335  Gross per 24 hour   Intake 400 ml   Output 1100 ml   Net -700 ml       General appearance - alert, ill looking  Eyes - pupils equal and reactive, extraocular eye movements intact  Mouth - mucous membranes moist, pharynx normal without lesions  Neck - supple, no significant adenopathy  Chest - clear to auscultation, no wheezes, rales or rhonchi,  sodium chloride      sodium chloride       PRN Meds:.sodium chloride, sodium chloride flush, sodium chloride, acetaminophen, nitroGLYCERIN  Continuous Infusions:   DOPamine Stopped (04/18/21 0035)    sodium chloride      sodium chloride           Assessment/Plan   1. BIALEE   -Renal following   -Cr improved   -Off Bicarb drip; on PO bicarb now  2. Hypotension   -On Midodrine   -Had episode of hypotension to 58/30 last night and was moved to 4K   -She had 500cc bolus and BP improved   -Dopamine drip ordered but has not been started due to MAP >65   -BP this /90  3. Urinary retention   -Has alvarez; Recommended to keep in at discharge per urology   -Urology signed off with instructions for 2 week follow up outpatient  4. Leukopenia   -CXR shows small infiltrates    -Start on abx due to sepsis criteria   5. Microcytic anemia   -Hgb stable  6. Elevated troponin/Chest pain   -Pt now agreeable to stress test once she feels better   7. CAD   -Cont home meds  8. Diastolic HF   -Cont home meds   9. HLD   -Cont home med   10. Precordial Tenderness   -CXR shows small bilateral effusions and small infiltrate  11/. DVT prophylaxis     -SCD's        Assessment and plan of care discussed with supervising physician, Dr Vonda Loredo. Electronically signed by YASMINE Mccurdy - CNP on 4/18/2021 at 7:57 AM  Addendum/attestation by Ale Thompson MD:  I have seen and examined the patient independently. Face to face evaluation and examination was performed. The above evaluation and note has been reviewed. Laboratory and radiological data were reviewed. I Have discussed with Peggy Jeffries CNP about this patient in detail. The above assessment and plan has been reviewed. Please see my modifications mentioned below.       My modifications:

## 2021-04-18 NOTE — FLOWSHEET NOTE
04/18/21 1059   Vital Signs   Pulse 80   Heart Rate Source Monitor   Resp 24   /63   BP Location Left upper arm   MAP (mmHg) 82   Patient Position Sitting   Level of Consciousness Alert (0)   Patient Currently in Pain Yes   Pain Assessment   Pain Assessment 0-10   Pain Level 8   Pain Type Acute pain   Pain Location Chest   Pain Orientation Lower;Mid   Pain Frequency Intermittent   Patient's Stated Pain Goal No pain   Pain Radiating Towards none   Pain Descriptors Aching   Pain Onset On-going   Clinical Progression Not changed   Non-Pharmaceutical Pain Intervention(s) Relaxation techniques;Repositioned; Rest   Oxygen Therapy   SpO2 94 %   Pulse Oximeter Device Mode Continuous   Pulse Oximeter Device Location Finger   O2 Device None (Room air)     Patient having chest pain, Kelsey Callejas made aware  STAT Troponins, STAT CXR, and STAT EKG ordered.

## 2021-04-18 NOTE — PROGRESS NOTES
Renal Progress Note  [facility]    Patient :  Denise Lindsey; 81 y.o. MRN# [id number]  Location:  [facility]35/257-G  Attending:  Aubrie Begum MD  Admit Date:  4/14/2021   Hospital Day: 2      Subjective:     Nephrology is following the patient for BAILEE. Patient seen and examined. Transferred to  overnight due to hypotension. bp improved now. Midodrine dose increased. Having loose stools. Outpatient Medications:     Medications Prior to Admission: tiZANidine (ZANAFLEX) 4 MG tablet, Take 1 tablet by mouth every 8 hours as needed (muscle spasm)  docusate sodium (COLACE, DULCOLAX) 100 MG CAPS, Take 100 mg by mouth 2 times daily  aspirin 325 MG EC tablet, Take 1 tablet by mouth daily  sertraline (ZOLOFT) 25 MG tablet, Take 25 mg by mouth daily  isosorbide mononitrate (IMDUR) 30 MG extended release tablet, Take 1 tablet by mouth daily  potassium chloride (KLOR-CON M) 20 MEQ extended release tablet, Take 1 tablet by mouth daily  pantoprazole (PROTONIX) 40 MG tablet, Take 1 tablet by mouth 2 times daily (before meals)  sucralfate (CARAFATE) 1 GM tablet, Take 1 tablet by mouth 4 times daily (before meals and nightly)  metoprolol tartrate (LOPRESSOR) 25 MG tablet, Take 0.5 tablets by mouth 2 times daily  nortriptyline (PAMELOR) 50 MG capsule, Take 1 capsule by mouth nightly  simvastatin (ZOCOR) 40 MG tablet, Take 1 tablet by mouth nightly  Multiple Vitamins-Minerals (THERAPEUTIC MULTIVITAMIN-MINERALS) tablet, Take 1 tablet by mouth daily  latanoprost (XALATAN) 0.005 % ophthalmic solution, Place 1 drop into both eyes nightly  acetaminophen (TYLENOL) 325 MG tablet, Take 2 tablets by mouth every 4 hours as needed for Pain Maximum dose of acetaminophen is 4000 mg from all sources in 24 hours. nitroGLYCERIN (NITROSTAT) 0.4 MG SL tablet, up to max of 3 total doses. If no relief after 1 dose, call 911.     Current Medications:     Scheduled Meds:    sodium bicarbonate  1,300 mg Oral BID    midodrine 10.8 10.2    < > = values in this interval not displayed. BMP:   Recent Labs     04/16/21  0539 04/17/21  0354 04/18/21  0506    137 135   K 3.3* 3.9 3.9    107 102   CO2 19* 20* 20*   BUN 26* 21 16   CREATININE 1.2 1.0 0.9   GLUCOSE 122* 122* 86   CALCIUM 8.0* 7.9* 8.0*      Phosphorus:     Recent Labs     04/16/21  0539 04/17/21  0354 04/18/21  0506   PHOS 1.9* 2.5 2.5     Magnesium:  No results for input(s): MG in the last 72 hours.   Albumin:    Recent Labs     04/17/21  0354   LABALBU 1.9*     BNP:    No results found for: BNP  RYLEE:    No results found for: RYLEE  SPEP:  Lab Results   Component Value Date    PROT 4.7 04/17/2021     UPEP:   No results found for: LABPE  C3:   No results found for: C3  C4:   No results found for: C4  MPO ANCA:   No results found for: MPO  PR3 ANCA:   No results found for: PR3  Anti-GBM:   No results found for: GBMABIGG  Hep BsAg:         Lab Results   Component Value Date    HEPBSAG Negative 05/18/2020     Hep C AB:          Lab Results   Component Value Date    HEPCAB Negative 05/18/2020       Urinalysis/Chemistries:      Lab Results   Component Value Date    NITRU NEGATIVE 04/15/2021    COLORU YELLOW 04/15/2021    PHUR 5.5 04/15/2021    LABCAST NONE SEEN 02/13/2020    LABCAST NONE SEEN 02/13/2020    WBCUA 5-9 04/15/2021    RBCUA 5-10 04/15/2021    YEAST NONE SEEN 04/15/2021    BACTERIA MANY 04/15/2021    SPECGRAV 1.010 04/01/2021    LEUKOCYTESUR NEGATIVE 04/15/2021    UROBILINOGEN 0.2 04/15/2021    BILIRUBINUR NEGATIVE 04/15/2021    BLOODU TRACE 04/15/2021    GLUCOSEU NEGATIVE 04/15/2021    KETUA NEGATIVE 04/15/2021    AMORPHOUS DEBRIS 03/28/2018     Urine Sodium:   No results found for: ANNABEL  Urine Potassium:  No results found for: KUR  Urine Chloride:  No results found for: CLUR  Urine Osmolarity: No results found for: OSMOU  Urine Protein:   No components found for: TOTALPROTEIN, URINE   Urine Creatinine:   No results found for: LABCREA  Urine Eosinophils:  No components found for: UEOS        Impression and Plan:  1. BAILEE likely obstructive uropathy from urinary retention compounded by hypotension: resolved  2. Chronic Nonanion gap metabolic acidosis with hypokalemia: cannot rule out underlying RTA. Urine anion gap is equivocal.  She has been having diarrhea and is likely related to that. Labs look stable will continue on po bicarb  3. Hypokalemia; improved, continue KCl 20 meq bID  4. Hypotension:better with Midodrine higher dose 10 mg TID. Check orthostatics  5. Urinary retention: has alvarez  6. Anemia: s/p PRBC transfusion  7. S/p right hip surgery  8. Elevated troponin:  stress test ordered  9. CAD, diastolic CHF  10. Severe protein calorie malnutrition        Please don't hesitate to call with any questions.   Electronically signed by [id number] Sharlene Olguin DO on 4/18/2021 at 9:53 AM

## 2021-04-18 NOTE — PROGRESS NOTES
[facility] ICU STEPDOWN TELEMETRY 4K  EVALUATION    Time:   Time In: 912  Time Out: 935  Timed Code Treatment Minutes: 13 Minutes  Minutes: 23          Date: 2021  Patient Name: Rowena Boucher,   Gender: female      MRN: [id number]  : 1935  (81 y.o.)  Referring Practitioner: Jesica Montilla. Freddy Patel MD  Diagnosis: chest pain  Additional Pertinent Hx: 81 y.o. female past medical history of COPD, HTN, HLD, CAD, GERD who presents to the ED status post right hip surgery for evaluation of abnormal lab result, hypotension, and epigastric/right upper quadrant/chest pain. She reports gradual onset of 8 out of 10 constant dull aching nonradiating chest pain across her epigastrium, worse in the right upper quadrant, over the last 2 to 3 days. She is also complaining of right hip pain s/p her procedure. Per chart review, Ms. Rosalinda Abrams underwent a right hip intertrochanteric nailing on 2021. Not performed for this encounter. Not performed for this encounter. CTA PE on 2021 is negative for PEs. She was discharged to SNF on 2020 (5 days ago). Ms. Rosalinda Abrams presents with paperwork from SNF that indicates recent drop in her hemoglobin to 7.2 and raise in her creatinine to 2. Restrictions/Precautions:  Restrictions/Precautions: Fall Risk, General Precautions    Subjective  Chart Reviewed: Yes, Orders, Progress Notes, History and Physical, Imaging  Patient assessed for rehabilitation services?: Yes  Family / Caregiver Present: Yes    Subjective: RN approved session. RN reports they were preparing to get her out of bed. A & O x 2.     Pain:  Pain Assessment  Patient Currently in Pain: No  Pain Assessment: 0-10  Pain Location: Hip  Pain Orientation: Right  Patient's Stated Pain Goal: 6    Vitals: Vitals not assessed per clinical judgement, see nursing flowsheet    Social/Functional History:  Lives With: Family  Type of Home: House  Home Layout: One level  Home Access: Stairs to enter with rails  Entrance Stairs - Number of Steps: 1  Entrance Stairs - Rails: Right  Home Equipment: Rolling walker(did not use an AD prior to fall and R hip fracture recently.)   Bathroom Shower/Tub: Tub/Shower unit, Walk-in shower  Bathroom Toilet: Standard  Bathroom Equipment: Grab bars in shower  Bathroom Accessibility: Accessible       ADL Assistance: Independent  Ambulation Assistance: Independent  Transfer Assistance: Independent    Active : Yes  Mode of Transportation: Car          VISION:Corrected    HEARING:  WFL    COGNITION: Slow Processing    RANGE OF MOTION:  Right Upper Extremity: WFL  Left Upper Extremity:  Impaired - AROM of shldr flex to approx 80 degrees; reports failed rotator cuff surgery     STRENGTH:  Right Upper Extremity: Impaired - shldr flex 4-/5 ext 4/5 elbow flex and ext 4/5   Left Upper Extremity:  Impaired - elbow flex and ext 4/5    SENSATION:   WFL    ADL:   No ADL's completed this session. Raphael Carver BALANCE:  Sitting Balance:  Contact Guard Assistance. c/o feeling dizzy once seated upright  Standing Balance: Maximum Assistance, X 2. put minimal wt thru R LE which was recently operated on    BED MOBILITY:  Supine to Sit: Maximum Assistance, X 2 would not initiate in getting self to EOB and cued for tech with assist for UB and LB    TRANSFERS:  Sit to Stand:  Maximum Assistance, X 2. cues for tech and sequencing with patient not putting wt thru R LE  During stand pivot transfer from EOB to recliner; patient's Clint Maki dragged behind her to sit in recliner due to not wanting to put wt thru it. Placed a pillow b/t patient's feet to keep R toe pointed up at midline due to internally rotating. FUNCTIONAL MOBILITY:  Assistive Device: not tested this date  Assist Level:  Not tested. Distance: not tested       Exercise:  none completed this date. Activity Tolerance:  Patient tolerance of  treatment: fair.  Due to pain in R LE and de-conditioning from little to no activity since R hip surgery. Assessment:  Assessment: Patient requires extensive assist for functional transfes and ADLs due to not wanting to put wt thru R LE or actively move it impacting cargiver burden and patient's safety for safe completion of her ADLs as prior. Performance deficits / Impairments: Decreased functional mobility , Decreased balance, Decreased ADL status, Decreased endurance, Decreased strength  Prognosis: Fair  REQUIRES OT FOLLOW UP: Yes  Decision Making: Medium Complexity    Treatment Initiated: Treatment and education initiated within context of evaluation. Evaluation time included review of current medical information, gathering information related to past medical, social and functional history, completion of standardized testing, formal and informal observation of tasks, assessment of data and development of plan of care and goals. Treatment time included skilled education and facilitation of tasks to increase safety and independence with ADL's for improved functional independence and quality of life. Discharge Recommendations:  Continue to assess pending progress, Patient would benefit from continued therapy after discharge, Subacute/Skilled Nursing Facility    Patient Education:  OT Education: OT Role, Plan of Care, Precautions, ADL Adaptive Strategies, Transfer Training    Equipment Recommendations:       Plan:  Times per week: 6x  Times per day: Daily  Current Treatment Recommendations: Strengthening, Endurance Training, Neuromuscular Re-education, Patient/Caregiver Education & Training, Self-Care / ADL, Safety Education & Training. See long-term goal time frame for expected duration of plan of care. If no long-term goals established, a short length of stay is anticipated.     Goals:  Patient goals : return home  Short term goals  Time Frame for Short term goals: by discharge  Short term goal 1: Patient will tolerate 3 min functional standing wtih sit to stand and participate with MIN A to increase activity tolerance for ADL routine. Short term goal 2: Patient will complete stand pivot transfer with MIN A and staff education. Short term goal 3: OTR to assess functional mobility and create goal as appropriate. Short term goal 4: patient will demonstrate 4+/5 (B) UE strength to increase ease with toileting routine. Short term goal 5: Patient will complete dressing and grooming sitting EOB with SBA. Following session, patient left in safe position with all fall risk precautions in place.

## 2021-04-19 ENCOUNTER — APPOINTMENT (OUTPATIENT)
Dept: INTERVENTIONAL RADIOLOGY/VASCULAR | Age: 86
DRG: 811 | End: 2021-04-19
Payer: MEDICARE

## 2021-04-19 ENCOUNTER — TELEPHONE (OUTPATIENT)
Dept: UROLOGY | Age: 86
End: 2021-04-19

## 2021-04-19 LAB
ANION GAP SERPL CALCULATED.3IONS-SCNC: 10 MEQ/L (ref 8–16)
BASOPHILS # BLD: 0.5 %
BASOPHILS ABSOLUTE: 0 THOU/MM3 (ref 0–0.1)
BUN BLDV-MCNC: 20 MG/DL (ref 7–22)
CALCIUM SERPL-MCNC: 8.2 MG/DL (ref 8.5–10.5)
CHLORIDE BLD-SCNC: 108 MEQ/L (ref 98–111)
CO2: 21 MEQ/L (ref 23–33)
CREAT SERPL-MCNC: 0.8 MG/DL (ref 0.4–1.2)
EOSINOPHIL # BLD: 7.5 %
EOSINOPHILS ABSOLUTE: 0.2 THOU/MM3 (ref 0–0.4)
ERYTHROCYTE [DISTWIDTH] IN BLOOD BY AUTOMATED COUNT: 17.2 % (ref 11.5–14.5)
ERYTHROCYTE [DISTWIDTH] IN BLOOD BY AUTOMATED COUNT: 55.8 FL (ref 35–45)
GFR SERPL CREATININE-BSD FRML MDRD: 68 ML/MIN/1.73M2
GLUCOSE BLD-MCNC: 102 MG/DL (ref 70–108)
HCT VFR BLD CALC: 29.3 % (ref 37–47)
HEMOGLOBIN: 9.2 GM/DL (ref 12–16)
IMMATURE GRANS (ABS): 0.12 THOU/MM3 (ref 0–0.07)
IMMATURE GRANULOCYTES: 6 %
LYMPHOCYTES # BLD: 14.4 %
LYMPHOCYTES ABSOLUTE: 0.3 THOU/MM3 (ref 1–4.8)
MCH RBC QN AUTO: 28.2 PG (ref 26–33)
MCHC RBC AUTO-ENTMCNC: 31.4 GM/DL (ref 32.2–35.5)
MCV RBC AUTO: 89.9 FL (ref 81–99)
MONOCYTES # BLD: 7 %
MONOCYTES ABSOLUTE: 0.1 THOU/MM3 (ref 0.4–1.3)
NUCLEATED RED BLOOD CELLS: 0 /100 WBC
ORGANISM: ABNORMAL
PLATELET # BLD: 196 THOU/MM3 (ref 130–400)
PLATELET ESTIMATE: ADEQUATE
PMV BLD AUTO: 10.4 FL (ref 9.4–12.4)
POIKILOCYTES: ABNORMAL
POTASSIUM SERPL-SCNC: 3.7 MEQ/L (ref 3.5–5.2)
RBC # BLD: 3.26 MILL/MM3 (ref 4.2–5.4)
SCAN OF BLOOD SMEAR: NORMAL
SEG NEUTROPHILS: 64.6 %
SEGMENTED NEUTROPHILS ABSOLUTE COUNT: 1.3 THOU/MM3 (ref 1.8–7.7)
SODIUM BLD-SCNC: 139 MEQ/L (ref 135–145)
URINE CULTURE, ROUTINE: ABNORMAL
WBC # BLD: 2 THOU/MM3 (ref 4.8–10.8)

## 2021-04-19 PROCEDURE — 80048 BASIC METABOLIC PNL TOTAL CA: CPT

## 2021-04-19 PROCEDURE — 6370000000 HC RX 637 (ALT 250 FOR IP): Performed by: REGISTERED NURSE

## 2021-04-19 PROCEDURE — 2580000003 HC RX 258: Performed by: REGISTERED NURSE

## 2021-04-19 PROCEDURE — 85025 COMPLETE CBC W/AUTO DIFF WBC: CPT

## 2021-04-19 PROCEDURE — 6360000002 HC RX W HCPCS: Performed by: INTERNAL MEDICINE

## 2021-04-19 PROCEDURE — 99232 SBSQ HOSP IP/OBS MODERATE 35: CPT | Performed by: INTERNAL MEDICINE

## 2021-04-19 PROCEDURE — 2060000000 HC ICU INTERMEDIATE R&B

## 2021-04-19 PROCEDURE — 6370000000 HC RX 637 (ALT 250 FOR IP): Performed by: INTERNAL MEDICINE

## 2021-04-19 PROCEDURE — 36415 COLL VENOUS BLD VENIPUNCTURE: CPT

## 2021-04-19 PROCEDURE — 6370000000 HC RX 637 (ALT 250 FOR IP): Performed by: NURSE PRACTITIONER

## 2021-04-19 PROCEDURE — 2580000003 HC RX 258: Performed by: INTERNAL MEDICINE

## 2021-04-19 PROCEDURE — 93970 EXTREMITY STUDY: CPT

## 2021-04-19 PROCEDURE — 6360000002 HC RX W HCPCS: Performed by: REGISTERED NURSE

## 2021-04-19 RX ORDER — POTASSIUM CHLORIDE 750 MG/1
20 TABLET, FILM COATED, EXTENDED RELEASE ORAL 2 TIMES DAILY WITH MEALS
Status: DISCONTINUED | OUTPATIENT
Start: 2021-04-20 | End: 2021-04-21 | Stop reason: HOSPADM

## 2021-04-19 RX ORDER — HYDROCODONE BITARTRATE AND ACETAMINOPHEN 5; 325 MG/1; MG/1
1 TABLET ORAL EVERY 6 HOURS PRN
Status: DISCONTINUED | OUTPATIENT
Start: 2021-04-19 | End: 2021-04-21 | Stop reason: HOSPADM

## 2021-04-19 RX ORDER — TRAMADOL HYDROCHLORIDE 50 MG/1
50 TABLET ORAL EVERY 6 HOURS PRN
Status: ON HOLD | COMMUNITY
End: 2021-04-21 | Stop reason: HOSPADM

## 2021-04-19 RX ADMIN — ACETAMINOPHEN 650 MG: 325 TABLET ORAL at 02:23

## 2021-04-19 RX ADMIN — SUCRALFATE 1 G: 1 TABLET ORAL at 08:38

## 2021-04-19 RX ADMIN — Medication 1 TABLET: at 08:37

## 2021-04-19 RX ADMIN — ENOXAPARIN SODIUM 70 MG: 80 INJECTION SUBCUTANEOUS at 17:20

## 2021-04-19 RX ADMIN — SODIUM BICARBONATE 1300 MG: 650 TABLET ORAL at 08:37

## 2021-04-19 RX ADMIN — SUCRALFATE 1 G: 1 TABLET ORAL at 12:06

## 2021-04-19 RX ADMIN — MIDODRINE HYDROCHLORIDE 10 MG: 10 TABLET ORAL at 12:06

## 2021-04-19 RX ADMIN — LATANOPROST 1 DROP: 50 SOLUTION OPHTHALMIC at 21:02

## 2021-04-19 RX ADMIN — SODIUM BICARBONATE 1300 MG: 650 TABLET ORAL at 21:02

## 2021-04-19 RX ADMIN — PANTOPRAZOLE SODIUM 40 MG: 40 TABLET, DELAYED RELEASE ORAL at 08:38

## 2021-04-19 RX ADMIN — ASPIRIN 325 MG: 325 TABLET, COATED ORAL at 08:37

## 2021-04-19 RX ADMIN — ATORVASTATIN CALCIUM 20 MG: 20 TABLET, FILM COATED ORAL at 08:37

## 2021-04-19 RX ADMIN — HYDROCODONE BITARTRATE AND ACETAMINOPHEN 1 TABLET: 5; 325 TABLET ORAL at 12:38

## 2021-04-19 RX ADMIN — MIDODRINE HYDROCHLORIDE 10 MG: 10 TABLET ORAL at 08:37

## 2021-04-19 RX ADMIN — HYDROCODONE BITARTRATE AND ACETAMINOPHEN 1 TABLET: 5; 325 TABLET ORAL at 18:52

## 2021-04-19 RX ADMIN — ISOSORBIDE MONONITRATE 30 MG: 30 TABLET ORAL at 08:37

## 2021-04-19 RX ADMIN — SUCRALFATE 1 G: 1 TABLET ORAL at 17:19

## 2021-04-19 RX ADMIN — CEFTRIAXONE SODIUM 1000 MG: 1 INJECTION, POWDER, FOR SOLUTION INTRAMUSCULAR; INTRAVENOUS at 13:38

## 2021-04-19 RX ADMIN — PIPERACILLIN AND TAZOBACTAM 3375 MG: 3; .375 INJECTION, POWDER, LYOPHILIZED, FOR SOLUTION INTRAVENOUS at 06:41

## 2021-04-19 RX ADMIN — POTASSIUM CHLORIDE 20 MEQ: 1500 TABLET, EXTENDED RELEASE ORAL at 08:38

## 2021-04-19 RX ADMIN — SUCRALFATE 1 G: 1 TABLET ORAL at 21:02

## 2021-04-19 RX ADMIN — ACETAMINOPHEN 650 MG: 325 TABLET ORAL at 09:13

## 2021-04-19 RX ADMIN — METOPROLOL TARTRATE 12.5 MG: 25 TABLET, FILM COATED ORAL at 21:02

## 2021-04-19 RX ADMIN — POTASSIUM CHLORIDE 20 MEQ: 1500 TABLET, EXTENDED RELEASE ORAL at 17:20

## 2021-04-19 RX ADMIN — SODIUM CHLORIDE, PRESERVATIVE FREE 10 ML: 5 INJECTION INTRAVENOUS at 21:02

## 2021-04-19 RX ADMIN — PANTOPRAZOLE SODIUM 40 MG: 40 TABLET, DELAYED RELEASE ORAL at 17:19

## 2021-04-19 RX ADMIN — METOPROLOL TARTRATE 12.5 MG: 25 TABLET, FILM COATED ORAL at 08:38

## 2021-04-19 RX ADMIN — SERTRALINE HYDROCHLORIDE 25 MG: 50 TABLET, FILM COATED ORAL at 08:37

## 2021-04-19 ASSESSMENT — PAIN SCALES - GENERAL
PAINLEVEL_OUTOF10: 6
PAINLEVEL_OUTOF10: 3
PAINLEVEL_OUTOF10: 7

## 2021-04-19 ASSESSMENT — PAIN DESCRIPTION - LOCATION: LOCATION: CHEST

## 2021-04-19 ASSESSMENT — PAIN DESCRIPTION - DESCRIPTORS: DESCRIPTORS: DULL

## 2021-04-19 NOTE — PROGRESS NOTES
[address] THERAPY MISSED TREATMENT NOTE  [facility] ICU STEPDOWN TELEMETRY 4K  4K-26/026-A      Date: 2021  Patient Name: Adithya Hernandez        CSN: [id number]   : 1935  (81 y.o.)  Gender: female   Referring Practitioner: Natanael Moreno.  Raul Daily MD  Diagnosis: chest pain         REASON FOR MISSED TREATMENT: Pt off floor upon attempt, will check back on

## 2021-04-19 NOTE — TELEPHONE ENCOUNTER
----- Message from [facility], APRN - CNP sent at 4/18/2021  6:20 AM EDT -----  Pt will need virtual visit to discuss void trial in 2 weeks. Resident of the [place].

## 2021-04-19 NOTE — PROGRESS NOTES
[address] [facility] THERAPY MISSED TREATMENT NOTE  [facility] ICU STEPDOWN TELEMETRY 4K      Date: 2021  Patient Name: Adithya Hernandez        MRN: [id number]    : 1935  (81 y.o.)    REASON FOR MISSED TREATMENT:  Attempted to see pt [phone] for completion of clinical swallow evaluation given reports for coughing with PO intake. At time of arrival, pt KEVAN s/t need for further diagnostic imaging. RN [facility] denying concerns r/t potential s/s aspiration within meal consumption, however, reduced appetite communicated. Will f/u on  to complete evaluation as appropriate.        Yuriy Lozada M.A., [address] [place] [address]

## 2021-04-19 NOTE — PROGRESS NOTES
gallops  Abdomen - soft, nontender, nondistended, no masses or organomegaly, pos bs. - Garcia catheter in place  Neurological - alert, oriented, normal speech, no focal findings or movement disorder noted  Musculoskeletal - s/p right hip surgery  Extremities - peripheral pulses normal, BL lower extremity 2+ pitting edema, left side worse than right  Skin - normal coloration and turgor, no rashes, no suspicious skin lesions noted    Review of Labs and Diagnostic Testing:    CBC:   Recent Labs     04/19/21  0557   WBC 2.0*   HGB 9.2*   HCT 29.3*   MCV 89.9        BMP:   Recent Labs     04/18/21  0506 04/19/21  0557    139   K 3.9 3.7    108   CO2 20* 21*   PHOS 2.5  --    BUN 16 20   CREATININE 0.9 0.8   CALCIUM 8.0* 8.2*   GLUCOSE 86 102     PT/INR: No results for input(s): PROTIME, INR in the last 72 hours. APTT: No results for input(s): APTT in the last 72 hours.    Lipids:   Recent Labs     04/17/21  0354   ALKPHOS 143*   ALT 37   AST 55*   BILITOT 0.3   BILIDIR <0.2   LABALBU 1.9*     Troponin:   Recent Labs     04/18/21  1117   TROPONINT 0.021*        Imaging:  [unfilled]    EKG:      Diet: DIET RENAL; Carb Control: 4 carb choices (60 gms)/meal        Data:   Scheduled Meds: Scheduled Meds:   cefTRIAXone (ROCEPHIN) IV  1,000 mg Intravenous Q24H    sodium bicarbonate  1,300 mg Oral BID    midodrine  10 mg Oral TID WC    sodium chloride flush  5-40 mL Intravenous 2 times per day    therapeutic multivitamin-minerals  1 tablet Oral Daily    latanoprost  1 drop Both Eyes Nightly    metoprolol tartrate  12.5 mg Oral BID    pantoprazole  40 mg Oral BID AC    sucralfate  1 g Oral 4x Daily AC & HS    isosorbide mononitrate  30 mg Oral Daily    sertraline  25 mg Oral Daily    docusate sodium  100 mg Oral BID    aspirin  325 mg Oral Daily    atorvastatin  20 mg Oral Daily    potassium chloride  20 mEq Oral BID WC     Continuous Infusions:   sodium chloride 50 mL/hr at 04/18/21 2043  DOPamine Stopped (04/18/21 0035)    sodium chloride      sodium chloride       PRN Meds:.sodium chloride, sodium chloride flush, sodium chloride, acetaminophen, nitroGLYCERIN  Continuous Infusions:   sodium chloride 50 mL/hr at 04/18/21 2043    DOPamine Stopped (04/18/21 0035)    sodium chloride      sodium chloride       VENOUS USS   1. Deep venous thrombosis involving the left proximal femoral vein and common femoral vein and additional thrombus in the distal left femoral vein. 2. Thrombus in left-sided calf veins. 3. No evidence of deep venous thrombosis in the right lower extremity. Assessment/Plan   1. BAILEE   -Renal following   -Cr improved  2. Hypotension   -On Midodrine   -BP improved today  3. Urinary retention   -Has alvarez; Recommended to keep in at discharge per urology; has been changed prior to last culture obtained. -Urology signed off with instructions for 2 week follow up outpatient  4. Lymphocytopenia   -On abx   -Cont to monitor  5. UTI   -Changed abx to Rocephin due to c+s   -Cont IVF  6. Microcytic anemia   -Hgb stable  7. Elevated troponin/Chest pain   -Noted cardiology saw the pt and does not feel that stress test is necessary due to chest pain being reproducible in nature   -Pain management with [place]  8. CAD   -Cont home meds  9. Diastolic HF   -Cont home meds   10. HLD   -Cont home med   11. Lower extremity swelling   -Venous U/S ordered per cardiology to R/O DVT  12. DVT prophylaxis     -SCD's        Assessment and plan of care discussed with supervising physician, Dr Porsha Mcdaniel. Electronically signed by YASMINE Isaac CNP on 4/19/2021 at 11:44 AM    Addendum/attestation by Rona Alvarez MD:  I have seen and examined the patient independently. Face to face evaluation and examination was performed. The above evaluation and note has been reviewed. Laboratory and radiological data were reviewed.    I Have discussed with Tamiko Louise CNP about this patient in detail. The above assessment and plan has been reviewed. Please see my modifications mentioned below.       My modifications:  Venous uss + for DVT, plan full dose lovenox, watch hemoglobin, check stool hemoccult     Dr Kel Hopkins resumes care in a.m

## 2021-04-19 NOTE — CONSULTS
neurological deficits. NECK:  She has no JVD. CHEST:  She had chest wall pain with minimal palpitation across the  chest.  The patient had few basal crackles. DIAGNOSTIC DATA:  EKG showed sinus rhythm, no acute changes. Troponin  is borderline elevated, probably related to her recent surgery and the  anemia. The patient had CTA of the chest on her prior admission, which was  negative for PE. She had an echo on her admission, which showed  preserved systolic function of the left ventricle. IMPRESSION:  1.   1.  The patient with an atypical chest pain, I doubt it is cardiac. Continue with the conservative treatment. 2.  History of nonobstructive coronary artery disease. 3.  History of gastroesophageal reflux. 4.  Recent hip surgery. 5.  UTI. 6.  History of hypertension. 7.  History of dyslipidemia. PLAN:  1. From the cardiac standpoint, we will continue with the medical  treatment and the elevation of troponin probably secondary to recent  surgery, age, and the anemia. We will hold any invasive cardiac  procedure now unless her clinical condition changes. 2.  Of course, we should be concerned about DVT and PE in this patient  who has very little mobility with the recent hip surgery. Ultrasound of  the lower limb venous _____ will be ordered. 3.  Continue excellent care provided by the admitting service. We thank you very much for allowing us to share in the management of  this patient. We will followup with you. Yulia Calderon M.D.    D: 04/19/2021 7:35:16       T: 04/19/2021 8:33:00     [email or web address]  Job#: [id number]     Doc#: [id number]    CC:  Perez Sutton M.D.

## 2021-04-19 NOTE — PROGRESS NOTES
Renal Progress Note  [facility]    Patient :  Lukasz Burgos; 81 y.o. MRN# [id number]  Location:  6B95/551-C  Attending:  Edmund Glez MD  Admit Date:  4/14/2021   Hospital Day: 3      Subjective:     Nephrology is following the patient for BAILEE. Patient seen and examined earlier this morning. This is a late entry. Patient complains of chest pain which is reproducible. Outpatient Medications:     Medications Prior to Admission: traMADol (ULTRAM) 50 MG tablet, Take 50 mg by mouth every 6 hours as needed (moderate to severe pain). tiZANidine (ZANAFLEX) 4 MG tablet, Take 1 tablet by mouth every 8 hours as needed (muscle spasm)  docusate sodium (COLACE, DULCOLAX) 100 MG CAPS, Take 100 mg by mouth 2 times daily  aspirin 325 MG EC tablet, Take 1 tablet by mouth daily  sertraline (ZOLOFT) 25 MG tablet, Take 50 mg by mouth daily   isosorbide mononitrate (IMDUR) 30 MG extended release tablet, Take 1 tablet by mouth daily  potassium chloride (KLOR-CON M) 20 MEQ extended release tablet, Take 1 tablet by mouth daily  pantoprazole (PROTONIX) 40 MG tablet, Take 1 tablet by mouth 2 times daily (before meals)  sucralfate (CARAFATE) 1 GM tablet, Take 1 tablet by mouth 4 times daily (before meals and nightly)  metoprolol tartrate (LOPRESSOR) 25 MG tablet, Take 0.5 tablets by mouth 2 times daily  nortriptyline (PAMELOR) 50 MG capsule, Take 1 capsule by mouth nightly  simvastatin (ZOCOR) 40 MG tablet, Take 1 tablet by mouth nightly  Multiple Vitamins-Minerals (THERAPEUTIC MULTIVITAMIN-MINERALS) tablet, Take 1 tablet by mouth daily  latanoprost (XALATAN) 0.005 % ophthalmic solution, Place 1 drop into both eyes nightly  acetaminophen (TYLENOL) 325 MG tablet, Take 2 tablets by mouth every 4 hours as needed for Pain Maximum dose of acetaminophen is 4000 mg from all sources in 24 hours. nitroGLYCERIN (NITROSTAT) 0.4 MG SL tablet, up to max of 3 total doses. If no relief after 1 dose, call 911.     Current Medications:     Scheduled Meds:    cefTRIAXone (ROCEPHIN) IV  1,000 mg Intravenous Q24H    sodium bicarbonate  1,300 mg Oral BID    midodrine  10 mg Oral TID WC    sodium chloride flush  5-40 mL Intravenous 2 times per day    therapeutic multivitamin-minerals  1 tablet Oral Daily    latanoprost  1 drop Both Eyes Nightly    metoprolol tartrate  12.5 mg Oral BID    pantoprazole  40 mg Oral BID AC    sucralfate  1 g Oral 4x Daily AC & HS    isosorbide mononitrate  30 mg Oral Daily    sertraline  25 mg Oral Daily    docusate sodium  100 mg Oral BID    aspirin  325 mg Oral Daily    atorvastatin  20 mg Oral Daily    potassium chloride  20 mEq Oral BID WC     Continuous Infusions:    DOPamine Stopped (21 0035)    sodium chloride      sodium chloride       PRN Meds:  HYDROcodone 5 mg - acetaminophen, sodium chloride, sodium chloride flush, sodium chloride, acetaminophen, nitroGLYCERIN    Input/Output:       I/O last 3 completed shifts: In: 130 [P.O.:120; I.V.:10]  Out: 550 [Urine:550]. Patient Vitals for the past 96 hrs (Last 3 readings):   Weight   21 0335 160 lb 1.6 oz (72.6 kg)       Vital Signs:   Temperature:  Temp: 97.6 °F (36.4 °C)  TMax:   Temp (24hrs), Av.7 °F (36.5 °C), Min:97.5 °F (36.4 °C), Max:97.9 °F (36.6 °C)    Respirations:  Resp: 18  Pulse:   Pulse: 78  BP:    BP: 128/63  BP Range: Systolic (49IKY), LQF:777 , Min:111 , PDY:931       Diastolic (76PDO), RALPH:81, Min:51, Max:71      Physical Examination:     General:  Awake, alert, no distress  HEENT: NC/AT/ MMM  Chest:               diminished  Cardiac:  S1 S2   Abdomen: Soft, non-tender,  Neuro:  No facial droop, No Asterixis  SKIN:  No rashes, good skin turgor.   Extremities:  Left leg edema    Labs:       Recent Labs     21  0354 21  0506 21  0557   WBC 3.1* 2.7* 2.0*   RBC 3.00* 3.63* 3.26*   HGB 8.4* 10.0* 9.2*   HCT 26.2* 32.9* 29.3*   MCV 87.3 90.6 89.9   MCH 28.0 27.5 28.2   MCHC 32.1* 30.4* 31.4*    208 196   MPV 10.8 10.2 10.4      BMP:   Recent Labs     04/17/21  0354 04/18/21  0506 04/19/21  0557    135 139   K 3.9 3.9 3.7    102 108   CO2 20* 20* 21*   BUN 21 16 20   CREATININE 1.0 0.9 0.8   GLUCOSE 122* 86 102   CALCIUM 7.9* 8.0* 8.2*      Phosphorus:     Recent Labs     04/17/21  0354 04/18/21  0506   PHOS 2.5 2.5     Magnesium:  No results for input(s): MG in the last 72 hours.   Albumin:    Recent Labs     04/17/21  0354   LABALBU 1.9*     BNP:    No results found for: BNP  RYLEE:    No results found for: RYELE  SPEP:  Lab Results   Component Value Date    PROT 4.7 04/17/2021     UPEP:   No results found for: LABPE  C3:   No results found for: C3  C4:   No results found for: C4  MPO ANCA:   No results found for: MPO  PR3 ANCA:   No results found for: PR3  Anti-GBM:   No results found for: GBMABIGG  Hep BsAg:         Lab Results   Component Value Date    HEPBSAG Negative 05/18/2020     Hep C AB:          Lab Results   Component Value Date    HEPCAB Negative 05/18/2020       Urinalysis/Chemistries:      Lab Results   Component Value Date    NITRU NEGATIVE 04/15/2021    COLORU YELLOW 04/15/2021    PHUR 5.5 04/15/2021    LABCAST NONE SEEN 02/13/2020    LABCAST NONE SEEN 02/13/2020    WBCUA 5-9 04/15/2021    RBCUA 5-10 04/15/2021    YEAST NONE SEEN 04/15/2021    BACTERIA MANY 04/15/2021    SPECGRAV 1.010 04/01/2021    LEUKOCYTESUR NEGATIVE 04/15/2021    UROBILINOGEN 0.2 04/15/2021    BILIRUBINUR NEGATIVE 04/15/2021    BLOODU TRACE 04/15/2021    GLUCOSEU NEGATIVE 04/15/2021    KETUA NEGATIVE 04/15/2021    AMORPHOUS DEBRIS 03/28/2018     Urine Sodium:   No results found for: ANNABEL  Urine Potassium:  No results found for: KUR  Urine Chloride:  No results found for: CLUR  Urine Osmolarity: No results found for: OSMOU  Urine Protein:   No components found for: TOTALPROTEIN, URINE   Urine Creatinine:   No results found for: LABCREA  Urine Eosinophils:  No components found for: UEOS        Impression and Plan:  1. BAILEE likely obstructive uropathy from urinary retention compounded by hypotension: resolved  2. Chronic Nonanion gap metabolic acidosis with hypokalemia: cannot rule out underlying RTA. Urine anion gap is equivocal.  She has been having diarrhea and is likely related to that. Continue po bicarb  3. Hypokalemia; improved, continue KCl 20 meq bID  4. Hypotension:better with Midodrine. Stop IV fluids  5. Urinary retention: has alvarez  6. Anemia: s/p PRBC transfusion  7. S/p right hip surgery  8. Elevated troponin:    9. CAD, diastolic CHF. 10. LE edema: check doppler US. Stop fluids        Please don't hesitate to call with any questions.   Electronically signed by Rosa Douglas DO on 4/19/2021 at 12:33 PM

## 2021-04-19 NOTE — PLAN OF CARE
process, condition or treatment will be avoided or minimized  Description: Complications related to the disease process, condition or treatment will be avoided or minimized  Outcome: Met This Shift     Problem: Urinary Elimination:  Goal: Ability to achieve and maintain adequate urine output will be supported  Description: Ability to achieve and maintain adequate urine output will be supported  Outcome: Met This Shift     Problem: Pain:  Goal: Pain level will decrease  Description: Pain level will decrease  Outcome: Met This Shift  Goal: Control of acute pain  Description: Control of acute pain  Outcome: Met This Shift  Goal: Control of chronic pain  Description: Control of chronic pain  Outcome: Met This Shift

## 2021-04-19 NOTE — CARE COORDINATION
4/19/21, 7:27 AM EDT    DISCHARGE BARRIERS        Patient transferred to . Report given to susanna Vidal, regarding discharge plan for this patient.

## 2021-04-19 NOTE — PROGRESS NOTES
Pharmacy Medication History Note      List of current medications patient is taking is complete. Source of information: [facility]    Changes made to medication list:  Medications removed (include reason, ex. therapy complete or physician discontinued):  None    Medications added/doses adjusted:  Increased sertraline from 25mg daily to 50mg daily  Added tramadol 50mg q6h prn pain    Other notes (ex. Recent course of antibiotics, Coumadin dosing):  Denies use of other OTC or herbal medications.           Electronically signed by Timo Mckay, [address] [place] [address] on 4/19/2021 at 8:50 AM

## 2021-04-20 LAB
ANION GAP SERPL CALCULATED.3IONS-SCNC: 11 MEQ/L (ref 8–16)
BASOPHILS # BLD: 0.9 %
BASOPHILS ABSOLUTE: 0 THOU/MM3 (ref 0–0.1)
BUN BLDV-MCNC: 14 MG/DL (ref 7–22)
CALCIUM SERPL-MCNC: 8 MG/DL (ref 8.5–10.5)
CHLORIDE BLD-SCNC: 104 MEQ/L (ref 98–111)
CO2: 20 MEQ/L (ref 23–33)
CREAT SERPL-MCNC: 0.7 MG/DL (ref 0.4–1.2)
EKG ATRIAL RATE: 94 BPM
EKG P AXIS: 55 DEGREES
EKG P-R INTERVAL: 140 MS
EKG Q-T INTERVAL: 358 MS
EKG QRS DURATION: 78 MS
EKG QTC CALCULATION (BAZETT): 447 MS
EKG R AXIS: 43 DEGREES
EKG T AXIS: 61 DEGREES
EKG VENTRICULAR RATE: 94 BPM
EOSINOPHIL # BLD: 5.6 %
EOSINOPHILS ABSOLUTE: 0.1 THOU/MM3 (ref 0–0.4)
ERYTHROCYTE [DISTWIDTH] IN BLOOD BY AUTOMATED COUNT: 17.1 % (ref 11.5–14.5)
ERYTHROCYTE [DISTWIDTH] IN BLOOD BY AUTOMATED COUNT: 54.7 FL (ref 35–45)
GFR SERPL CREATININE-BSD FRML MDRD: 80 ML/MIN/1.73M2
GLUCOSE BLD-MCNC: 110 MG/DL (ref 70–108)
HCT VFR BLD CALC: 31.1 % (ref 37–47)
HEMOGLOBIN: 9.5 GM/DL (ref 12–16)
IMMATURE GRANS (ABS): 0.18 THOU/MM3 (ref 0–0.07)
IMMATURE GRANULOCYTES: 8.4 %
IMMUNOFIXATION ELECTROPHORESIS: NORMAL
KAPPA/LAMBDA FREE LIGHT CHAINS: NORMAL
LYMPHOCYTES # BLD: 18.7 %
LYMPHOCYTES ABSOLUTE: 0.4 THOU/MM3 (ref 1–4.8)
MCH RBC QN AUTO: 27.2 PG (ref 26–33)
MCHC RBC AUTO-ENTMCNC: 30.5 GM/DL (ref 32.2–35.5)
MCV RBC AUTO: 89.1 FL (ref 81–99)
MONOCYTES # BLD: 8.4 %
MONOCYTES ABSOLUTE: 0.2 THOU/MM3 (ref 0.4–1.3)
NUCLEATED RED BLOOD CELLS: 0 /100 WBC
PLATELET # BLD: 222 THOU/MM3 (ref 130–400)
PMV BLD AUTO: 10.2 FL (ref 9.4–12.4)
POTASSIUM SERPL-SCNC: 3.9 MEQ/L (ref 3.5–5.2)
RBC # BLD: 3.49 MILL/MM3 (ref 4.2–5.4)
SEG NEUTROPHILS: 58 %
SEGMENTED NEUTROPHILS ABSOLUTE COUNT: 1.2 THOU/MM3 (ref 1.8–7.7)
SODIUM BLD-SCNC: 135 MEQ/L (ref 135–145)
WBC # BLD: 2.1 THOU/MM3 (ref 4.8–10.8)

## 2021-04-20 PROCEDURE — 6370000000 HC RX 637 (ALT 250 FOR IP): Performed by: INTERNAL MEDICINE

## 2021-04-20 PROCEDURE — 97110 THERAPEUTIC EXERCISES: CPT

## 2021-04-20 PROCEDURE — 93005 ELECTROCARDIOGRAM TRACING: CPT | Performed by: INTERNAL MEDICINE

## 2021-04-20 PROCEDURE — 2580000003 HC RX 258: Performed by: INTERNAL MEDICINE

## 2021-04-20 PROCEDURE — 97530 THERAPEUTIC ACTIVITIES: CPT

## 2021-04-20 PROCEDURE — 6370000000 HC RX 637 (ALT 250 FOR IP): Performed by: NURSE PRACTITIONER

## 2021-04-20 PROCEDURE — 99232 SBSQ HOSP IP/OBS MODERATE 35: CPT | Performed by: INTERNAL MEDICINE

## 2021-04-20 PROCEDURE — 6370000000 HC RX 637 (ALT 250 FOR IP): Performed by: REGISTERED NURSE

## 2021-04-20 PROCEDURE — 2580000003 HC RX 258: Performed by: REGISTERED NURSE

## 2021-04-20 PROCEDURE — 6360000002 HC RX W HCPCS: Performed by: INTERNAL MEDICINE

## 2021-04-20 PROCEDURE — 97535 SELF CARE MNGMENT TRAINING: CPT

## 2021-04-20 PROCEDURE — 97163 PT EVAL HIGH COMPLEX 45 MIN: CPT

## 2021-04-20 PROCEDURE — 85025 COMPLETE CBC W/AUTO DIFF WBC: CPT

## 2021-04-20 PROCEDURE — 80048 BASIC METABOLIC PNL TOTAL CA: CPT

## 2021-04-20 PROCEDURE — 2060000000 HC ICU INTERMEDIATE R&B

## 2021-04-20 PROCEDURE — 92610 EVALUATE SWALLOWING FUNCTION: CPT

## 2021-04-20 PROCEDURE — 36415 COLL VENOUS BLD VENIPUNCTURE: CPT

## 2021-04-20 PROCEDURE — 6360000002 HC RX W HCPCS: Performed by: REGISTERED NURSE

## 2021-04-20 PROCEDURE — 93010 ELECTROCARDIOGRAM REPORT: CPT | Performed by: INTERNAL MEDICINE

## 2021-04-20 RX ORDER — ONDANSETRON 2 MG/ML
4 INJECTION INTRAMUSCULAR; INTRAVENOUS EVERY 6 HOURS PRN
Status: DISCONTINUED | OUTPATIENT
Start: 2021-04-20 | End: 2021-04-21 | Stop reason: HOSPADM

## 2021-04-20 RX ORDER — MIDODRINE HYDROCHLORIDE 5 MG/1
5 TABLET ORAL
Status: DISCONTINUED | OUTPATIENT
Start: 2021-04-20 | End: 2021-04-21 | Stop reason: HOSPADM

## 2021-04-20 RX ADMIN — Medication 1 TABLET: at 07:50

## 2021-04-20 RX ADMIN — SODIUM BICARBONATE 1300 MG: 650 TABLET ORAL at 07:50

## 2021-04-20 RX ADMIN — ISOSORBIDE MONONITRATE 30 MG: 30 TABLET ORAL at 07:55

## 2021-04-20 RX ADMIN — HYDROCODONE BITARTRATE AND ACETAMINOPHEN 1 TABLET: 5; 325 TABLET ORAL at 14:52

## 2021-04-20 RX ADMIN — SUCRALFATE 1 G: 1 TABLET ORAL at 21:06

## 2021-04-20 RX ADMIN — PANTOPRAZOLE SODIUM 40 MG: 40 TABLET, DELAYED RELEASE ORAL at 05:11

## 2021-04-20 RX ADMIN — MIDODRINE HYDROCHLORIDE 5 MG: 10 TABLET ORAL at 17:14

## 2021-04-20 RX ADMIN — HYDROCODONE BITARTRATE AND ACETAMINOPHEN 1 TABLET: 5; 325 TABLET ORAL at 09:54

## 2021-04-20 RX ADMIN — ENOXAPARIN SODIUM 70 MG: 80 INJECTION SUBCUTANEOUS at 17:14

## 2021-04-20 RX ADMIN — SUCRALFATE 1 G: 1 TABLET ORAL at 17:14

## 2021-04-20 RX ADMIN — SODIUM BICARBONATE 1300 MG: 650 TABLET ORAL at 21:09

## 2021-04-20 RX ADMIN — PANTOPRAZOLE SODIUM 40 MG: 40 TABLET, DELAYED RELEASE ORAL at 14:52

## 2021-04-20 RX ADMIN — SODIUM CHLORIDE, PRESERVATIVE FREE 20 ML: 5 INJECTION INTRAVENOUS at 07:52

## 2021-04-20 RX ADMIN — POTASSIUM CHLORIDE 20 MEQ: 750 TABLET, EXTENDED RELEASE ORAL at 07:49

## 2021-04-20 RX ADMIN — DOCUSATE SODIUM 100 MG: 100 CAPSULE, LIQUID FILLED ORAL at 07:49

## 2021-04-20 RX ADMIN — ONDANSETRON 4 MG: 2 INJECTION INTRAMUSCULAR; INTRAVENOUS at 10:38

## 2021-04-20 RX ADMIN — METOPROLOL TARTRATE 12.5 MG: 25 TABLET, FILM COATED ORAL at 07:50

## 2021-04-20 RX ADMIN — ATORVASTATIN CALCIUM 20 MG: 20 TABLET, FILM COATED ORAL at 07:51

## 2021-04-20 RX ADMIN — LATANOPROST 1 DROP: 50 SOLUTION OPHTHALMIC at 21:07

## 2021-04-20 RX ADMIN — HYDROCODONE BITARTRATE AND ACETAMINOPHEN 1 TABLET: 5; 325 TABLET ORAL at 03:37

## 2021-04-20 RX ADMIN — CEFTRIAXONE SODIUM 1000 MG: 1 INJECTION, POWDER, FOR SOLUTION INTRAMUSCULAR; INTRAVENOUS at 14:52

## 2021-04-20 RX ADMIN — ENOXAPARIN SODIUM 70 MG: 80 INJECTION SUBCUTANEOUS at 05:11

## 2021-04-20 RX ADMIN — DOCUSATE SODIUM 100 MG: 100 CAPSULE, LIQUID FILLED ORAL at 21:06

## 2021-04-20 RX ADMIN — HYDROCODONE BITARTRATE AND ACETAMINOPHEN 1 TABLET: 5; 325 TABLET ORAL at 21:06

## 2021-04-20 RX ADMIN — SODIUM CHLORIDE, PRESERVATIVE FREE 10 ML: 5 INJECTION INTRAVENOUS at 21:06

## 2021-04-20 RX ADMIN — METOPROLOL TARTRATE 12.5 MG: 25 TABLET, FILM COATED ORAL at 21:07

## 2021-04-20 RX ADMIN — SUCRALFATE 1 G: 1 TABLET ORAL at 05:11

## 2021-04-20 RX ADMIN — MIDODRINE HYDROCHLORIDE 5 MG: 10 TABLET ORAL at 11:26

## 2021-04-20 RX ADMIN — SERTRALINE HYDROCHLORIDE 25 MG: 50 TABLET, FILM COATED ORAL at 07:51

## 2021-04-20 RX ADMIN — ASPIRIN 325 MG: 325 TABLET, COATED ORAL at 07:49

## 2021-04-20 ASSESSMENT — PAIN DESCRIPTION - LOCATION
LOCATION: CHEST
LOCATION: CHEST

## 2021-04-20 ASSESSMENT — PAIN SCALES - GENERAL
PAINLEVEL_OUTOF10: 7
PAINLEVEL_OUTOF10: 8
PAINLEVEL_OUTOF10: 0
PAINLEVEL_OUTOF10: 7
PAINLEVEL_OUTOF10: 6

## 2021-04-20 ASSESSMENT — PAIN DESCRIPTION - FREQUENCY: FREQUENCY: INTERMITTENT

## 2021-04-20 ASSESSMENT — PAIN DESCRIPTION - ORIENTATION: ORIENTATION: MID;RIGHT

## 2021-04-20 ASSESSMENT — PAIN DESCRIPTION - DESCRIPTORS: DESCRIPTORS: DULL
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
## 2021-04-20 NOTE — PROGRESS NOTES
[address] ICU STEPDOWN TELEMETRY 4K  Clinical Swallow Evaluation      SLP Individual Minutes  Time In: 9  Time Out: 8101  Minutes: 16  Timed Code Treatment Minutes: 0 Minutes       Date: 2021  Patient Name: Adithya Hernandez      CSN: [id number]   : 1935  (81 y.o.)  Gender: female   Referring Physician: YASMINE Noel CNP  Diagnosis: Acute kidney injury  Secondary Diagnosis: dysphagia    History of Present Illness/Injury: Pt admitted with above dx. Please refer to physician H&P for full details. ST consulted to evaluate swallowing and ascertain need for goal development d/t RN reports of coughing with PO intake and reduced appetite. ERICK Espinoza reported pt has difficulty with swallowing medication, requires a bite of pudding to consume pill in its entirety. Past Medical History:   Diagnosis Date    Acute blood loss as cause of postoperative anemia 2018    CAD (coronary artery disease)      cath  50    Cardiac valve prolapse     Chest pain 5/15/2020    Chronic back pain     Compression fracture of L2 ([address] 2020    COPD (chronic obstructive pulmonary disease) (HCC)     Ex-smoker     GERD (gastroesophageal reflux disease)     EGD    Glaucoma     H/O cardiac catheterization     40-50% LAD   katharine    Hearing loss secondary to cerumen impaction 2019    Hyperlipidemia     Hypertension     Inadvertent durotomy 3/12/2018    Liver disease     history of hepatitis at age 22    Osteoarthritis     right shoulder and back    Pericarditis        SUBJECTIVE:  Pt sitting in bed with family members present. ERICK Espinoza arrived to administer medication. Pt reported sensation of medication \"getting stuck\" required consecutive sips of water to swallow pill. Pt cooperative and pleasant this date, albeit evident fatigue.  Per daughter/pt report, pt's appetite has diminished and pt is no longer enjoying meal consumption. OBJECTIVE:    Pain:  7/10 - Pain location: abdominal    Current Diet: Regular solids (renal diet), thin liquids    Respiratory Status:  Independent    Behavioral Observation:  Alert and Lethargic    Oral Mechanism Evaluation:      Facial / Labial Impaired Generalized weakness   Lingual Impaired Reduced ROM, brown patches observed on lingual body. Dentition Impaired    Velum Not Tested    Vocal Quality Impaired    Sensation Not Tested    Cough Not Tested      Patient Evaluated Using: Thin liquid via cup/straw, puree, soft/bite size, hard/coarse solids    Oral Phase:  Impaired:  Impaired Mastication    Pharyngeal Phase: WFL:  Pharyngeal phase appears WFL but cannot rule out pharyngeal phase deficits from a bedside swallowing evaluation alone. Signs and Symptoms of Laryngeal Penetration/Aspiration: No signs/symptoms of aspiration evident in this evaluation, but cannot rule out silent aspiration. Impresssions: Pt presents with mild oral dysphagia and suspected pharyngeal phase WFL. Pt with adequate lip seal with suspected good bolus control/formation. Pt with increased mastication for advance textured solids (I.e hard/coarse) with mild oral stasis observed. Stasis cleared with liquid wash. Pharyngeal phase highly unremarkable, with no overt s/s of aspiration observed this date, certainly unable to determine pharyngeal integrity and r/o airway invasion at bedside alone. D/t pt's increased fatigue and with concerns for consuming regular meal in its entirety, recommend soft/bite size with thin liquids (medication crushed in applesauce). Recommend smaller more frequent meals to assist with caloric intake. Recommend skilled ST services to assist dysphagia management.     following completion of clinical swallow evaluation, transitioned to skilled service provision with direct education provided re: s/s aspiration (immediate/delayed coughing, throat clearing, wet vocal quality) and alternatives for caloric intake (I.e smaller more frequent meals, consuming Ensure); verbal receptiveness noted. RECOMMENDATIONS/ASSESSMENT:  Instrumental Evaluation: Instrumental evaluation not indicated at this time. Diet Recommendations:  Soft/bite size, thin liquids (medication crushed in applesauce)  Strategies:  Full Upright Position, Small Bite/Sip, Alternate Solids and Liquids and Monitor for Fatigue   Rehabilitation Potential: fair, evident fatigue    EDUCATION:  Learner: Patient and Family  Education:  Reviewed results and recommendations of this evaluation, Reviewed diet and strategies, Reviewed signs, symptoms and risks of aspiration, Reviewed ST goals and Plan of Care and Reviewed recommendations for follow-up  Evaluation of Education: Verbalizes understanding and Needs further instruction    PLAN:  Skilled SLP intervention on acute care 3-5 x per week or until goals met and/or pt plateaus in function. Specific interventions for next session may include: dysphagia management. PATIENT GOAL:    Did not state. Will further assess during treatment. SHORT TERM GOALS:  Short-term Goals  Timeframe for Short-term Goals: 2 weeks  Goal 1: Pt will consume soft/bite size diet with thin liquids (advance PO trials with ST) without overt s/s of aspiration to assist with nutritional/hydrational measures as well as determine pt readiness for advancement in dietary level. LONG TERM GOALS:  No LTG given short ELOS.     ALYSIA Souza, Student Intern  Jered Barros M.A., [address] [place] [address]

## 2021-04-20 NOTE — PLAN OF CARE
Problem: Falls - Risk of:  Goal: Will remain free from falls  Description: Will remain free from falls  4/20/2021 1235 by Leelee Hough RN  Outcome: Ongoing  Note: Patient bed in lowest position, wheels locked, 2/4 side rails up and alarm on. Call light and belongings within reach. Pathway clear. Nonskid footwear on. Patient rounded on hourly. Fall risk due to weakness and pain medication. Extensive 1 assist with walker. Fall risk assessment complete. Patient remains free from falls this shift, will continue to monitor. Problem: Skin Integrity:  Goal: Will show no infection signs and symptoms  Description: Will show no infection signs and symptoms  4/20/2021 1235 by Leelee Hough RN  Outcome: Ongoing  Note: Patient repositions every 2 hours. Heels elevated off of bed. Skin kept clean and dry. Skin assessed with every head to toe. Umesh scale complete. Patient has stage 2 and DTI present on buttocks/ coccyx. EPC cream applied. Problem: DISCHARGE BARRIERS  Goal: Patient's continuum of care needs are met  4/20/2021 1235 by Leelee Hough RN  Outcome: Ongoing  Note: Patient plans to go to [facility]. Son is POA. SW on case and planning for DC to [facility] but need to confirm with son. Discharge planning ongoing. Problem: Urinary Retention:  Goal: Urinary elimination within specified parameters  Description: Urinary elimination within specified parameters  4/20/2021 1235 by Leelee Hough RN  Outcome: Ongoing  Note: Garcia catheter in place per urology. Yellow, clear urine draining. Garcia care preformed this shift. Problem: Nutritional:  Goal: Maintenance of adequate nutrition will be supported  Description: Maintenance of adequate nutrition will be supported  4/20/2021 1235 by Leelee Hough RN  Outcome: Ongoing  Note: Patient on renal diet. Tolerating it fairly well. Mild nausea and x2 emesis present. Zofran ordered and administered.       Problem: Pain:  Goal: Pain level will decrease  Description: Pain level will decrease  4/20/2021 1235 by Kriss Anderson RN  Outcome: Ongoing  Note: Patient pain goal is 4/10. Patient reporting pain up to 8/10. PRN Norco and tylenol. Patient utilizes rest and repositioning for comfort. Pain assessment ongoing. Problem: Venous Thromboembolism:  Goal: Absence of signs or symptoms of impaired coagulation  Description: Absence of signs or symptoms of impaired coagulation  Note: Patient started on high dose Lovenox for DVT found in left leg. Left leg has weak pulses and is warm to touch, kaiden and swollen. Leg kept elevated. HR and respirations remain WDL. ON continuous cardiac monitor. Care plan reviewed with patient and daughter in law. Patient and daughter in law verbalize understanding of the plan of care and contribute to goal setting.

## 2021-04-20 NOTE — PROGRESS NOTES
[facility]           Progress Note    Patient Name:  Ezra Darby  Medical Record Number:  [id number]  YOB: 1935    Date:  4/20/2021  Time:  1:28 PM  Completed By:  Enrike Martinez RN    Reason for Palliative Care Evaluation:  Goals/patient verbalizing that she wants to give up    Advance Directives  [] [facility] DNR Form  [x] Living Will  [x] Medical POA    Current Code Status  [x] Full Resuscitation  [] DNR-Comfort Care-Arrest  [] DNR-Comfort Care  [] Limited   [] No CPR   [] No shock   [] No ET intubation/reintubation   [] No resuscitative medications   [] Other limitation:    Performance Status:  60    Family/Patient Discussion:  Patient working with therapy. Per primary RN and nursing notes, patient has been confused. Patient's daughter, Heber Paulino is at the bedside. Palliative care introduced. Discussion about code status levels and what each level entails. Discussed complications of rib fractures, brain and organ damage associated with resuscitative measures. Encouraged Heber Paulino to speak with her family/patient and if any change is desired, instructed her to inform staff. Emotional support provided. Plan/Follow-Up:  Palliative care will continue to be available if needs arise. Please call prn.     Enrike Maritnez RN  4/20/2021,  1:28 PM

## 2021-04-20 NOTE — CARE COORDINATION
4/20/21, 12:15 PM EDT    DISCHARGE  [address]ospital [address] day: 4  Location: [facility] Reason for admit: Acute kidney injury ([facility] [address] [N17.9]  Anemia [D64.9]   Procedure:   04/18 CXR   bilat pleural effusions  04/19  Doppler results  :          1. Deep venous thrombosis involving the left proximal femoral vein and common femoral vein and additional thrombus in the distal left femoral vein. 2. Thrombus in left-sided calf veins. 3. No evidence of deep venous thrombosis in the right lower extremity. Barriers to Discharge: B/P 125/59, on Dopamine gtt, chest pain t/o night, EKG stat, Lovenox for + doppler, patient confused,  Palliative care, PT, OT, conts on  Rocephin and Zosyn IV, PPI. SLP follows for swallow eval.    PCP: Vicki Galaviz MD  Readmission Risk Score: 27%  Patient Goals/Plan/Treatment Preferences: SW following for ECF at discharge.

## 2021-04-20 NOTE — PROGRESS NOTES
[facility] ICU STEPDOWN TELEMETRY 4K  Occupational Therapy  Daily Note  Time:   Time In: 0110  Time Out: 0142  Timed Code Treatment Minutes: 32 Minutes  Minutes: 32          Date: 2021  Patient Name: Kei Simmons,   Gender: female      Room: -26/026-A  MRN: [id number]  : 1935  (81 y.o.)  Referring Practitioner: Christoph Hopkins MD  Diagnosis: chest pain  Additional Pertinent Hx: 81 y.o. female past medical history of COPD, HTN, HLD, CAD, GERD who presents to the ED status post right hip surgery for evaluation of abnormal lab result, hypotension, and epigastric/right upper quadrant/chest pain. She reports gradual onset of 8 out of 10 constant dull aching nonradiating chest pain across her epigastrium, worse in the right upper quadrant, over the last 2 to 3 days. She is also complaining of right hip pain s/p her procedure. Per chart review, Ms. Albin Villela underwent a right hip intertrochanteric nailing on 2021. Not performed for this encounter. Not performed for this encounter. CTA PE on 2021 is negative for PEs. She was discharged to SNF on 2020 (5 days ago). Ms. Albin Villela presents with paperwork from SNF that indicates recent drop in her hemoglobin to 7.2 and raise in her creatinine to 2. Restrictions/Precautions:  Restrictions/Precautions: Fall Risk, General Precautions     SUBJECTIVE: Pt laying in bed upon arrival, pt agreeable to OT session, RN gave approval for therapy session    PAIN: 0/10: prior to session, 7/10 with back after session    Vitals: Vitals not assessed per clinical judgement, see nursing flowsheet    COGNITION: WFL    ADL:   Upper Extremity Dressing: Stand By Assistance. with donning gown and threading arms into gown while seated EOB  Toileting: Maximum Assistance. with patient standing and using RW for support. Pt noted to be incontinent of bowel this date. BALANCE:  Standing Balance: Contact Guard Assistancex2.  With pt standing for 1 minute with 4 trials this date while MURPHY completing jesse care due to pt incontinent of stool. RN present to assist with standing    BED MOBILITY:  Supine to Sit: Moderate Assistance with bringing BLE to EOB and for pt to complete independently    TRANSFERS:  Sit to Stand:  Minimal Assistance. with pt given vc's to push off of bed and for posture  Stand to Sit: Contact Guard Assistance. vc's for reaching back to sit with good understanding  Stand Pivot: Contact Guard Assistance, X 2. from EOB to recliner chair        ASSESSMENT:     Activity Tolerance:  Patient tolerance of  treatment: good. Discharge Recommendations: Continue to assess pending progress, Patient would benefit from continued therapy after discharge, Subacute/Skilled Nursing Facility   Equipment Recommendations:    Plan: Times per week: 6x  Times per day: Daily  Current Treatment Recommendations: Strengthening, Endurance Training, Neuromuscular Re-education, Patient/Caregiver Education & Training, Self-Care / ADL, Safety Education & Training    Patient Education  Patient Education: Importance of Increasing Activity to decrease risk of pneumonia with good understanding. Pt educated on importance of getting OOB, and to increase participation with good understanding    Goals  Short term goals  Time Frame for Short term goals: by discharge  Short term goal 1: Patient will tolerate 3 min functional standing wtih sit to stand and participate with MIN A to increase activity tolerance for ADL routine. Short term goal 2: Patient will complete stand pivot transfer with MIN A and staff education. Short term goal 3: OTR to assess functional mobility and create goal as appropriate. Short term goal 4: patient will demonstrate 4+/5 (B) UE strength to increase ease with toileting routine. Short term goal 5: Patient will complete dressing and grooming sitting EOB with SBA.     Following session, patient left in safe position with all fall risk precautions in place.

## 2021-04-20 NOTE — PLAN OF CARE
Problem: Nutrition  Goal: Optimal nutrition therapy  Outcome: Ongoing  Nutrition Problem #1: Inadequate oral intake  Intervention: Food and/or Nutrient Delivery: Continue Current Diet, Start Oral Nutrition Supplement  Nutritional Goals: Pt. will consume 75% or more of meals to promote wound healing during LOS.

## 2021-04-20 NOTE — PROGRESS NOTES
Progress note      [facility]             Patient:  Elke Beard  YOB: 1935    MRN: [id number]   Acct:  [id number]   [id number]  Primary Care Physician: Zoe Cagle MD    Admit Date: 4/14/2021           Subjective: feels better but weak. Objective:      Physical Exam:    Vitals:  Patient Vitals for the past 24 hrs:   BP Temp Temp src Pulse Resp SpO2   04/20/21 1430 90/77 -- -- -- -- --   04/20/21 1115 (!) 125/59 98.3 °F (36.8 °C) Oral 93 16 91 %   04/20/21 0745 (!) 140/64 98.1 °F (36.7 °C) Oral 101 20 92 %   04/20/21 0330 (!) 146/69 97.3 °F (36.3 °C) Oral 98 18 93 %   04/20/21 0000 (!) 140/66 98 °F (36.7 °C) Oral 84 18 93 %   04/19/21 2059 135/60 97.6 °F (36.4 °C) Oral 90 18 94 %   04/19/21 1518 (!) 142/66 97.7 °F (36.5 °C) Oral 79 18 95 %     Weight: Weight: 160 lb 1.6 oz (72.6 kg)     24 hour intake/output:    Intake/Output Summary (Last 24 hours) at 4/20/2021 1449  Last data filed at 4/20/2021 1413  Gross per 24 hour   Intake 1540 ml   Output 1800 ml   Net -260 ml       General appearance - alert, responsive. Eyes - pupils equal and reactive, extraocular eye movements intact  Mouth - mucous membranes moist, pharynx normal without lesions  Neck - supple, no significant adenopathy  Chest - clear to auscultation, no wheezes, rales or rhonchi, symmetric air entry,   Heart - normal rate, regular rhythm, normal S1, S2, no murmurs, rubs, clicks or gallops  Abdomen - soft, nontender, nondistended, no masses or organomegaly, pos bs.   - Garcia catheter in place  Neurological - alert, oriented, normal speech, no focal findings or movement disorder noted  Musculoskeletal - s/p right hip surgery  Extremities - peripheral pulses normal, BL lower extremity 2+ pitting edema, left side worse than right  Skin - coloration is slightly paleno rashes, no suspicious skin lesions noted    Review of Labs and Diagnostic Testing:    CBC:   Recent Labs     04/20/21  0558   WBC 2.1*   HGB 9.5*   HCT 31.1*   MCV 89.1        BMP:   Recent Labs     04/18/21  0506 04/18/21  0506 04/20/21  0558      < > 135   K 3.9   < > 3.9      < > 104   CO2 20*   < > 20*   PHOS 2.5  --   --    BUN 16   < > 14   CREATININE 0.9   < > 0.7   CALCIUM 8.0*   < > 8.0*   GLUCOSE 86   < > 110*    < > = values in this interval not displayed. PT/INR: No results for input(s): PROTIME, INR in the last 72 hours. APTT: No results for input(s): APTT in the last 72 hours. Lipids:   No results for input(s): ALKPHOS, ALT, AST, BILITOT, BILIDIR, LABALBU, AMYLASE, LIPASE in the last 72 hours. Troponin:   Recent Labs     04/18/21  1117   TROPONINT 0.021*        Imaging:  [unfilled]    EKG:      Diet: DIET RENAL; Carb Control: 4 carb choices (60 gms)/meal        Data:   Scheduled Meds: Scheduled Meds:   midodrine  5 mg Oral TID WC    cefTRIAXone (ROCEPHIN) IV  1,000 mg Intravenous Q24H    enoxaparin  1 mg/kg Subcutaneous Q12H    potassium chloride  20 mEq Oral BID WC    sodium bicarbonate  1,300 mg Oral BID    sodium chloride flush  5-40 mL Intravenous 2 times per day    therapeutic multivitamin-minerals  1 tablet Oral Daily    latanoprost  1 drop Both Eyes Nightly    metoprolol tartrate  12.5 mg Oral BID    pantoprazole  40 mg Oral BID AC    sucralfate  1 g Oral 4x Daily AC & HS    isosorbide mononitrate  30 mg Oral Daily    sertraline  25 mg Oral Daily    docusate sodium  100 mg Oral BID    aspirin  325 mg Oral Daily    atorvastatin  20 mg Oral Daily     Continuous Infusions:   DOPamine Stopped (04/18/21 0035)    sodium chloride      sodium chloride       PRN Meds:.ondansetron, HYDROcodone 5 mg - acetaminophen, sodium chloride, sodium chloride flush, sodium chloride, acetaminophen, nitroGLYCERIN  Continuous Infusions:   DOPamine Stopped (04/18/21 0035)    sodium chloride      sodium chloride       VENOUS USS   1.  Deep venous thrombosis involving the left proximal femoral vein and common femoral vein and additional thrombus in the distal left femoral vein. 2. Thrombus in left-sided calf veins. 3. No evidence of deep venous thrombosis in the right lower extremity. Assessment/Plan   1. BAILEE   -Renal following   -Cr improved  2. Hypotension   -On Midodrine   -BP improved today  3. Urinary retention   -Has alvarez; Recommended to keep in at discharge per urology; has been changed prior to last culture obtained. -Urology signed off with instructions for 2 week follow up outpatient  4. Lymphocytopenia   -On abx   -Cont to monitor  5. UTI   -Changed abx to Rocephin due to c+s   -Cont IVF  6. Microcytic anemia   -Hgb stable  7. Elevated troponin/Chest pain   -Noted cardiology saw the pt and does not feel that stress test is necessary due to chest pain being reproducible in nature   -Pain management with [place]  8. CAD   -Cont home meds  9. Diastolic HF   -Cont home meds   10. HLD   -Cont home med   11. Lower extremity swelling   -Venous U/S ordered per cardiology to R/O DVT  12. DVT prophylaxis     -SCD's    meds as reviewed.   Pain control          Electronically signed by Jennifer Dawson MD on 4/20/2021 at 2:49 PM

## 2021-04-20 NOTE — PROGRESS NOTES
Renal Progress Note  [facility]    Patient :  Omaira Gonzalez; 81 y.o. MRN# [id number]  Location:  [address] [place] [address]  Attending:  Jessica Mederos MD  Admit Date:  4/14/2021   Hospital Day: 4      Subjective:     Nephrology is following the patient for BAILEE. Patient seen and examined earlier this morning. Seems to be in better spirits today. More alert. Found to have LLE DVT and is on Lovenox. Outpatient Medications:     Medications Prior to Admission: traMADol (ULTRAM) 50 MG tablet, Take 50 mg by mouth every 6 hours as needed (moderate to severe pain). tiZANidine (ZANAFLEX) 4 MG tablet, Take 1 tablet by mouth every 8 hours as needed (muscle spasm)  docusate sodium (COLACE, DULCOLAX) 100 MG CAPS, Take 100 mg by mouth 2 times daily  aspirin 325 MG EC tablet, Take 1 tablet by mouth daily  sertraline (ZOLOFT) 25 MG tablet, Take 50 mg by mouth daily   isosorbide mononitrate (IMDUR) 30 MG extended release tablet, Take 1 tablet by mouth daily  potassium chloride (KLOR-CON M) 20 MEQ extended release tablet, Take 1 tablet by mouth daily  pantoprazole (PROTONIX) 40 MG tablet, Take 1 tablet by mouth 2 times daily (before meals)  sucralfate (CARAFATE) 1 GM tablet, Take 1 tablet by mouth 4 times daily (before meals and nightly)  metoprolol tartrate (LOPRESSOR) 25 MG tablet, Take 0.5 tablets by mouth 2 times daily  nortriptyline (PAMELOR) 50 MG capsule, Take 1 capsule by mouth nightly  simvastatin (ZOCOR) 40 MG tablet, Take 1 tablet by mouth nightly  Multiple Vitamins-Minerals (THERAPEUTIC MULTIVITAMIN-MINERALS) tablet, Take 1 tablet by mouth daily  latanoprost (XALATAN) 0.005 % ophthalmic solution, Place 1 drop into both eyes nightly  acetaminophen (TYLENOL) 325 MG tablet, Take 2 tablets by mouth every 4 hours as needed for Pain Maximum dose of acetaminophen is 4000 mg from all sources in 24 hours. nitroGLYCERIN (NITROSTAT) 0.4 MG SL tablet, up to max of 3 total doses.  If no relief after 1 dose, call 911.    Current Medications:     Scheduled Meds:    midodrine  5 mg Oral TID WC    cefTRIAXone (ROCEPHIN) IV  1,000 mg Intravenous Q24H    enoxaparin  1 mg/kg Subcutaneous Q12H    potassium chloride  20 mEq Oral BID WC    sodium bicarbonate  1,300 mg Oral BID    sodium chloride flush  5-40 mL Intravenous 2 times per day    therapeutic multivitamin-minerals  1 tablet Oral Daily    latanoprost  1 drop Both Eyes Nightly    metoprolol tartrate  12.5 mg Oral BID    pantoprazole  40 mg Oral BID AC    sucralfate  1 g Oral 4x Daily AC & HS    isosorbide mononitrate  30 mg Oral Daily    sertraline  25 mg Oral Daily    docusate sodium  100 mg Oral BID    aspirin  325 mg Oral Daily    atorvastatin  20 mg Oral Daily     Continuous Infusions:    DOPamine Stopped (21 0035)    sodium chloride      sodium chloride       PRN Meds:  HYDROcodone 5 mg - acetaminophen, sodium chloride, sodium chloride flush, sodium chloride, acetaminophen, nitroGLYCERIN    Input/Output:       I/O last 3 completed shifts: In: 1140 [I.V.:1140]  Out: 0519 [OGUUT:1287]. Patient Vitals for the past 96 hrs (Last 3 readings):   Weight   21 0335 160 lb 1.6 oz (72.6 kg)       Vital Signs:   Temperature:  Temp: 98.1 °F (36.7 °C)  TMax:   Temp (24hrs), Av.7 °F (36.5 °C), Min:97.3 °F (36.3 °C), Max:98.1 °F (36.7 °C)    Respirations:  Resp: 20  Pulse:   Pulse: 101  BP:    BP: (!) 140/64  BP Range: Systolic (88HPY), KMC:587 , Min:128 , FWW:151       Diastolic (11GIK), SEJ:17, Min:60, Max:69      Physical Examination:     General:  Awake, alert, no distress  HEENT: NC/AT/ MMM  Chest:               Diminished no rales  Cardiac:  S1 S2   Abdomen: Soft, non-tender,  Neuro:  No facial droop, No Asterixis  SKIN:  No rashes, good skin turgor.   Extremities:  Left leg edema    Labs:       Recent Labs     21  0506 21  0557 21  0558   WBC 2.7* 2.0* 2.1*   RBC 3.63* 3.26* 3.49*   HGB 10.0* 9.2* 9.5*   HCT 32.9* 29.3* 31.1*   MCV 90.6 89.9 89.1   MCH 27.5 28.2 27.2   MCHC 30.4* 31.4* 30.5*    196 222   MPV 10.2 10.4 10.2      BMP:   Recent Labs     04/18/21  0506 04/19/21  0557 04/20/21  0558    139 135   K 3.9 3.7 3.9    108 104   CO2 20* 21* 20*   BUN 16 20 14   CREATININE 0.9 0.8 0.7   GLUCOSE 86 102 110*   CALCIUM 8.0* 8.2* 8.0*      Phosphorus:     Recent Labs     04/18/21  0506   PHOS 2.5     Magnesium:  No results for input(s): MG in the last 72 hours. Albumin:    No results for input(s): LABALBU in the last 72 hours.   BNP:    No results found for: BNP  RYLEE:    No results found for: RYLEE  SPEP:  Lab Results   Component Value Date    PROT 4.7 04/17/2021     UPEP:   No results found for: LABPE  C3:   No results found for: C3  C4:   No results found for: C4  MPO ANCA:   No results found for: MPO  PR3 ANCA:   No results found for: PR3  Anti-GBM:   No results found for: GBMABIGG  Hep BsAg:         Lab Results   Component Value Date    HEPBSAG Negative 05/18/2020     Hep C AB:          Lab Results   Component Value Date    HEPCAB Negative 05/18/2020       Urinalysis/Chemistries:      Lab Results   Component Value Date    NITRU NEGATIVE 04/15/2021    COLORU YELLOW 04/15/2021    PHUR 5.5 04/15/2021    LABCAST NONE SEEN 02/13/2020    LABCAST NONE SEEN 02/13/2020    WBCUA 5-9 04/15/2021    RBCUA 5-10 04/15/2021    YEAST NONE SEEN 04/15/2021    BACTERIA MANY 04/15/2021    SPECGRAV 1.010 04/01/2021    LEUKOCYTESUR NEGATIVE 04/15/2021    UROBILINOGEN 0.2 04/15/2021    BILIRUBINUR NEGATIVE 04/15/2021    BLOODU TRACE 04/15/2021    GLUCOSEU NEGATIVE 04/15/2021    KETUA NEGATIVE 04/15/2021    AMORPHOUS DEBRIS 03/28/2018     Urine Sodium:   No results found for: ANNABEL  Urine Potassium:  No results found for: KUR  Urine Chloride:  No results found for: CLUR  Urine Osmolarity: No results found for: OSMOU  Urine Protein:   No components found for: TOTALPROTEIN, URINE   Urine Creatinine:   No results found for: LABCREA  Urine Eosinophils:  No components found for: UEOS        Impression and Plan:  1. BAILEE likely obstructive uropathy from urinary retention compounded by hypotension: resolved  2. Chronic Nonanion gap metabolic acidosis with hypokalemia: cannot rule out underlying RTA. Urine anion gap is equivocal.  Labs are stable now on po bicarb and increased dose of potassium, will continue  3. Hypokalemia; improved, continue KCl 20 meq bID  4. Hypotension:better with Midodrine. Will reduce to 5 mg TID  5. Urinary retention: has alvarez and to be DC'd with it. Urology follow up  6. Anemia: s/p PRBC transfusion  7. S/p right hip surgery  8. Elevated troponin:    9. CAD, diastolic CHF. 10. Left leg DVT    Stable from renal standpoint        Please don't hesitate to call with any questions.   Electronically signed by Karthik Galloway DO on 4/20/2021 at 8:59 AM

## 2021-04-20 NOTE — FLOWSHEET NOTE
04/20/21 1030   Provider Notification   Reason for Communication New orders   Provider Name Reece Pabon   Provider Notification Physician   Method of Communication Secure Message   Response See orders   Notification Time 1003     Patient experiencing nausea and emesis with no PRN medication to treat. Asked if something could be ordered for nausea. Zofran ordered.

## 2021-04-20 NOTE — PROGRESS NOTES
Comprehensive Nutrition Assessment    Type and Reason for Visit:  Initial, Consult(Poor Intake/Appetite 5 or more days)    Nutrition Recommendations/Plan:   Diet as per SLP recommendations. Will send Ensure Compact TID. Recommend continue MVI. Will monitor need for additional nutrition interventions. Nutrition Assessment:    Pt. nutritionally compromised AEB wounds. At risk for further nutrition compromise r/t increased nutrient needs for wound healing, ORIF 4/2/21 and underlying medical condition (hx CAD, COPD, GERD, HLD, HTN). Nutrition recommendations/interventions as per above. Malnutrition Assessment:  Malnutrition Status:  Insufficient data    Context:  Chronic Illness     Findings of the 6 clinical characteristics of malnutrition:  Energy Intake:  Unable to assess  Weight Loss:  Unable to assess     Body Fat Loss:  Unable to assess     Muscle Mass Loss:  Unable to assess    Fluid Accumulation:  Unable to assess     Strength:  Not Performed    Estimated Daily Nutrient Needs:  Energy (kcal):  9505-7289 kcals (18-20); Weight Used for Energy Requirements:  (73 kgm 4/18)     Protein (g):  67+ grams (1.4+); Weight Used for Protein Requirements:  Ideal(48 kgm)            Nutrition Related Findings:   Attempted to see pt. 4 times this afternoon; pt. With therapy, with MD, getting cleaned up; po 1-25% per graphics; SLP following; 4/20: Glucose 110, BUN 14, Cr 0.7;  Rx includes ATB, Colace, Carafate, MVI; from ECF    Wounds:  Stage II(buttocks, ankle; (wound care noting DTI); incision -hip)       Current Nutrition Therapies:    DIET RENAL; Carb Control: 4 carb choices (60 gms)/meal; Dysphagia Soft and Bite-Sized    Anthropometric Measures:  · Height: 5' 1\" (154.9 cm)  · Current Body Weight: 160 lb 1.6 oz (72.6 kg)   · Admission Body Weight: 160 lb 1.6 oz (72.6 kg)(4/18 +2 edema)    · Usual Body Weight: (per EMR: 5/15/20: 153# 6.4 oz; 4/1/21: 138# 9.6 oz)     · Ideal Body Weight: 105 lbs;     · BMI:

## 2021-04-21 VITALS
SYSTOLIC BLOOD PRESSURE: 100 MMHG | TEMPERATURE: 97.6 F | DIASTOLIC BLOOD PRESSURE: 55 MMHG | BODY MASS INDEX: 30.3 KG/M2 | WEIGHT: 160.5 LBS | HEIGHT: 61 IN | OXYGEN SATURATION: 92 % | RESPIRATION RATE: 16 BRPM | HEART RATE: 94 BPM

## 2021-04-21 LAB
ANION GAP SERPL CALCULATED.3IONS-SCNC: 10 MEQ/L (ref 8–16)
BUN BLDV-MCNC: 14 MG/DL (ref 7–22)
CALCIUM SERPL-MCNC: 8.2 MG/DL (ref 8.5–10.5)
CHLORIDE BLD-SCNC: 103 MEQ/L (ref 98–111)
CO2: 22 MEQ/L (ref 23–33)
CREAT SERPL-MCNC: 0.9 MG/DL (ref 0.4–1.2)
GFR SERPL CREATININE-BSD FRML MDRD: 60 ML/MIN/1.73M2
GLUCOSE BLD-MCNC: 98 MG/DL (ref 70–108)
POTASSIUM SERPL-SCNC: 4 MEQ/L (ref 3.5–5.2)
SARS-COV-2, NAAT: NOT DETECTED
SODIUM BLD-SCNC: 135 MEQ/L (ref 135–145)

## 2021-04-21 PROCEDURE — 97535 SELF CARE MNGMENT TRAINING: CPT

## 2021-04-21 PROCEDURE — 97110 THERAPEUTIC EXERCISES: CPT

## 2021-04-21 PROCEDURE — 6360000002 HC RX W HCPCS: Performed by: REGISTERED NURSE

## 2021-04-21 PROCEDURE — 80048 BASIC METABOLIC PNL TOTAL CA: CPT

## 2021-04-21 PROCEDURE — 92526 ORAL FUNCTION THERAPY: CPT

## 2021-04-21 PROCEDURE — 6370000000 HC RX 637 (ALT 250 FOR IP): Performed by: INTERNAL MEDICINE

## 2021-04-21 PROCEDURE — 2580000003 HC RX 258: Performed by: INTERNAL MEDICINE

## 2021-04-21 PROCEDURE — 6370000000 HC RX 637 (ALT 250 FOR IP): Performed by: NURSE PRACTITIONER

## 2021-04-21 PROCEDURE — 6370000000 HC RX 637 (ALT 250 FOR IP): Performed by: REGISTERED NURSE

## 2021-04-21 PROCEDURE — 36415 COLL VENOUS BLD VENIPUNCTURE: CPT

## 2021-04-21 PROCEDURE — 97116 GAIT TRAINING THERAPY: CPT

## 2021-04-21 PROCEDURE — 87635 SARS-COV-2 COVID-19 AMP PRB: CPT

## 2021-04-21 PROCEDURE — 2580000003 HC RX 258: Performed by: REGISTERED NURSE

## 2021-04-21 PROCEDURE — 99232 SBSQ HOSP IP/OBS MODERATE 35: CPT | Performed by: INTERNAL MEDICINE

## 2021-04-21 PROCEDURE — 6360000002 HC RX W HCPCS: Performed by: INTERNAL MEDICINE

## 2021-04-21 RX ORDER — SODIUM BICARBONATE 650 MG/1
1300 TABLET ORAL 2 TIMES DAILY
DISCHARGE
Start: 2021-04-21

## 2021-04-21 RX ORDER — NITROGLYCERIN 0.4 MG/1
TABLET SUBLINGUAL
Qty: 25 TABLET | Refills: 3 | Status: ON HOLD | DISCHARGE
Start: 2021-04-21 | End: 2021-05-13 | Stop reason: HOSPADM

## 2021-04-21 RX ORDER — ISOSORBIDE MONONITRATE 30 MG/1
30 TABLET, EXTENDED RELEASE ORAL DAILY
Qty: 30 TABLET | Refills: 3 | Status: ON HOLD | DISCHARGE
Start: 2021-04-22 | End: 2021-05-13 | Stop reason: HOSPADM

## 2021-04-21 RX ORDER — POTASSIUM CHLORIDE 1500 MG/1
20 TABLET, FILM COATED, EXTENDED RELEASE ORAL 2 TIMES DAILY WITH MEALS
Qty: 60 TABLET | Refills: 3 | Status: ON HOLD | DISCHARGE
Start: 2021-04-21 | End: 2021-05-13 | Stop reason: HOSPADM

## 2021-04-21 RX ORDER — MIDODRINE HYDROCHLORIDE 5 MG/1
5 TABLET ORAL
Qty: 90 TABLET | Refills: 3 | Status: ON HOLD | DISCHARGE
Start: 2021-04-21 | End: 2021-05-13 | Stop reason: HOSPADM

## 2021-04-21 RX ORDER — AMOXICILLIN AND CLAVULANATE POTASSIUM 875; 125 MG/1; MG/1
1 TABLET, FILM COATED ORAL 2 TIMES DAILY
Qty: 14 TABLET | Refills: 0 | Status: ON HOLD | DISCHARGE
Start: 2021-04-21 | End: 2021-05-13 | Stop reason: HOSPADM

## 2021-04-21 RX ORDER — HYDROCODONE BITARTRATE AND ACETAMINOPHEN 5; 325 MG/1; MG/1
1 TABLET ORAL EVERY 6 HOURS PRN
Refills: 0 | Status: SHIPPED | DISCHARGE
Start: 2021-04-21 | End: 2021-04-24

## 2021-04-21 RX ADMIN — METOPROLOL TARTRATE 12.5 MG: 25 TABLET, FILM COATED ORAL at 10:06

## 2021-04-21 RX ADMIN — CEFTRIAXONE SODIUM 1000 MG: 1 INJECTION, POWDER, FOR SOLUTION INTRAMUSCULAR; INTRAVENOUS at 12:08

## 2021-04-21 RX ADMIN — ENOXAPARIN SODIUM 70 MG: 80 INJECTION SUBCUTANEOUS at 05:30

## 2021-04-21 RX ADMIN — ATORVASTATIN CALCIUM 20 MG: 20 TABLET, FILM COATED ORAL at 10:01

## 2021-04-21 RX ADMIN — Medication 1 TABLET: at 10:00

## 2021-04-21 RX ADMIN — ASPIRIN 325 MG: 325 TABLET, COATED ORAL at 09:59

## 2021-04-21 RX ADMIN — ISOSORBIDE MONONITRATE 30 MG: 30 TABLET ORAL at 10:01

## 2021-04-21 RX ADMIN — SODIUM BICARBONATE 1300 MG: 650 TABLET ORAL at 10:00

## 2021-04-21 RX ADMIN — MIDODRINE HYDROCHLORIDE 5 MG: 10 TABLET ORAL at 12:11

## 2021-04-21 RX ADMIN — SUCRALFATE 1 G: 1 TABLET ORAL at 05:38

## 2021-04-21 RX ADMIN — SERTRALINE HYDROCHLORIDE 25 MG: 50 TABLET, FILM COATED ORAL at 10:00

## 2021-04-21 RX ADMIN — PANTOPRAZOLE SODIUM 40 MG: 40 TABLET, DELAYED RELEASE ORAL at 05:30

## 2021-04-21 RX ADMIN — SUCRALFATE 1 G: 1 TABLET ORAL at 10:09

## 2021-04-21 RX ADMIN — SODIUM CHLORIDE, PRESERVATIVE FREE 10 ML: 5 INJECTION INTRAVENOUS at 10:01

## 2021-04-21 RX ADMIN — POTASSIUM CHLORIDE 20 MEQ: 750 TABLET, EXTENDED RELEASE ORAL at 10:00

## 2021-04-21 RX ADMIN — HYDROCODONE BITARTRATE AND ACETAMINOPHEN 1 TABLET: 5; 325 TABLET ORAL at 05:30

## 2021-04-21 RX ADMIN — HYDROCODONE BITARTRATE AND ACETAMINOPHEN 1 TABLET: 5; 325 TABLET ORAL at 12:03

## 2021-04-21 RX ADMIN — MIDODRINE HYDROCHLORIDE 5 MG: 10 TABLET ORAL at 10:06

## 2021-04-21 ASSESSMENT — PAIN DESCRIPTION - ONSET
ONSET: ON-GOING
ONSET: ON-GOING

## 2021-04-21 ASSESSMENT — PAIN DESCRIPTION - FREQUENCY
FREQUENCY: CONTINUOUS

## 2021-04-21 ASSESSMENT — PAIN DESCRIPTION - ORIENTATION
ORIENTATION: MID;RIGHT

## 2021-04-21 ASSESSMENT — PAIN DESCRIPTION - PAIN TYPE
TYPE: ACUTE PAIN
TYPE: CHRONIC PAIN

## 2021-04-21 ASSESSMENT — PAIN SCALES - GENERAL
PAINLEVEL_OUTOF10: 7
PAINLEVEL_OUTOF10: 9
PAINLEVEL_OUTOF10: 6
PAINLEVEL_OUTOF10: 5
PAINLEVEL_OUTOF10: 6
PAINLEVEL_OUTOF10: 7
PAINLEVEL_OUTOF10: 6

## 2021-04-21 NOTE — CARE COORDINATION
Update: plans ECF when medically cleared; updated Attending ECF can accept today; need rapid covid testing; continue to monitor; collaborated beatrice Baker, Dmitri1 Sandeep Márquez  Electronically signed by Yuliana Najera RN on 4/21/2021 at 9:25 AM  Update; received order for rapid covid testing for likely ECF today; collaborated beatrice Baker, Dmitri1 Sandeep Márquez, Attending, Nelly Obrien RN  Electronically signed by Yuliana Najera RN on 4/21/2021 at 9:53 AM

## 2021-04-21 NOTE — DISCHARGE SUMMARY
Discharge Summary  4/21/2021  11:37 AM    Patient:  Denise Lindsey  YOB: 1935    MRN: [id number]   Acct: [id number]   -[id number]-C   Primary Care Physician: Joseph Mejia MD    Admit date:  4/14/2021    Discharge date:  4/21/2021    Discharge Diagnoses:    Patient Active Problem List   Diagnosis    Hyperlipidemia    Osteoarthritis    GERD (gastroesophageal reflux disease)    COPD (chronic obstructive pulmonary disease) ([address]    Chronic back pain    CAD (coronary artery disease)    Hypertension    Spinal stenosis of lumbar region with neurogenic claudication    Neurologic gait dysfunction    Inflammatory spondylopathy of lumbosacral region ([address]    [facility]    Closed displaced intertrochanteric fracture of right femur ([facility]    Fall at nursing home    Severe malnutrition ([address]    Acute kidney injury ([facility]    Anemia    Urinary retention    Chest pain    Elevated troponin       Discharge Medications:       Twila Corti \"Julianne\"   Home Medication Instructions [id number]    Printed on:04/21/21 1137   Medication Information                      acetaminophen (TYLENOL) 325 MG tablet  Take 2 tablets by mouth every 4 hours as needed for Pain Maximum dose of acetaminophen is 4000 mg from all sources in 24 hours. amoxicillin-clavulanate (AUGMENTIN) 875-125 MG per tablet  Take 1 tablet by mouth 2 times daily for 7 days             apixaban (ELIQUIS) 5 MG TABS tablet  Take 1 tablet by mouth 2 times daily             aspirin 325 MG EC tablet  Take 1 tablet by mouth daily             docusate sodium (COLACE, DULCOLAX) 100 MG CAPS  Take 100 mg by mouth 2 times daily             HYDROcodone-acetaminophen (NORCO) 5-325 MG per tablet  Take 1 tablet by mouth every 6 hours as needed for Pain for up to 3 days.              isosorbide mononitrate (IMDUR) 30 MG extended release tablet  Take 1 tablet by mouth daily             latanoprost (XALATAN) 0.005 % ophthalmic solution  Place 1 drop into both eyes nightly             metoprolol tartrate (LOPRESSOR) 25 MG tablet  Take 0.5 tablets by mouth 2 times daily             midodrine (PROAMATINE) 5 MG tablet  Take 1 tablet by mouth 3 times daily (with meals)             Multiple Vitamins-Minerals (THERAPEUTIC MULTIVITAMIN-MINERALS) tablet  Take 1 tablet by mouth daily             nitroGLYCERIN (NITROSTAT) 0.4 MG SL tablet  up to max of 3 total doses. If no relief after 1 dose, call 911.              pantoprazole (PROTONIX) 40 MG tablet  Take 1 tablet by mouth 2 times daily (before meals)             potassium chloride (KLOR-CON M) 20 MEQ TBCR extended release tablet  Take 1 tablet by mouth 2 times daily (with meals)             sertraline (ZOLOFT) 25 MG tablet  Take 50 mg by mouth daily              simvastatin (ZOCOR) 40 MG tablet  Take 1 tablet by mouth nightly             sodium bicarbonate 650 MG tablet  Take 2 tablets by mouth 2 times daily             sucralfate (CARAFATE) 1 GM tablet  Take 1 tablet by mouth 4 times daily (before meals and nightly)             tiZANidine (ZANAFLEX) 4 MG tablet  Take 1 tablet by mouth every 8 hours as needed (muscle spasm)               Scheduled Meds: Scheduled Meds:   midodrine  5 mg Oral TID WC    cefTRIAXone (ROCEPHIN) IV  1,000 mg Intravenous Q24H    enoxaparin  1 mg/kg Subcutaneous Q12H    potassium chloride  20 mEq Oral BID WC    sodium bicarbonate  1,300 mg Oral BID    sodium chloride flush  5-40 mL Intravenous 2 times per day    therapeutic multivitamin-minerals  1 tablet Oral Daily    latanoprost  1 drop Both Eyes Nightly    metoprolol tartrate  12.5 mg Oral BID    pantoprazole  40 mg Oral BID AC    sucralfate  1 g Oral 4x Daily AC & HS    isosorbide mononitrate  30 mg Oral Daily    sertraline  25 mg Oral Daily    docusate sodium  100 mg Oral BID    aspirin  325 mg Oral Daily    atorvastatin  20 mg Oral Daily     Continuous Infusions:   DOPamine Stopped (04/18/21 0035)    sodium chloride      sodium chloride       PRN Meds:.ondansetron, HYDROcodone 5 mg - acetaminophen, sodium chloride, sodium chloride flush, sodium chloride, acetaminophen, nitroGLYCERIN  Continuous Infusions:   DOPamine Stopped (04/18/21 0035)    sodium chloride      sodium chloride         Intake/Output Summary (Last 24 hours) at 4/21/2021 1137  Last data filed at 4/21/2021 0534  Gross per 24 hour   Intake 1305 ml   Output 750 ml   Net 555 ml       Diet:  DIET RENAL; Carb Control: 4 carb choices (60 gms)/meal; Dysphagia Soft and Bite-Sized  Dietary Nutrition Supplements: Low Volume Supplement    Activity:  Activity as tolerated (Patient may move about with assist as indicated or with supervision.)    Follow-up:  in the next few weeks with Vicki Galaviz MD,  in 2 weeks with Urology, in 2 weeks with Cardiology    Consultants:  Urology, Nephrology, Cardiology    Procedures:  None    Diagnostic Test:  None  Objective:  Lab Results   Component Value Date    WBC 2.1 04/20/2021    RBC 3.49 04/20/2021    HGB 9.5 04/20/2021    HCT 31.1 04/20/2021    MCV 89.1 04/20/2021    MCH 27.2 04/20/2021    MCHC 30.5 04/20/2021    RDW 14.5 09/21/2018     04/20/2021    MPV 10.2 04/20/2021     Lab Results   Component Value Date     04/21/2021    K 4.0 04/21/2021    K 3.9 04/14/2021     04/21/2021    CO2 22 04/21/2021    BUN 14 04/21/2021    CREATININE 0.9 04/21/2021    GLUCOSE 98 04/21/2021    GLUCOSE 112 09/12/2017    CALCIUM 8.2 04/21/2021     Lab Results   Component Value Date    CALCIUM 8.2 04/21/2021     No results found for: IONCA  Lab Results   Component Value Date    MG 2.3 03/18/2021     Lab Results   Component Value Date    PHOS 2.5 04/18/2021     No results found for: BNP  Lab Results   Component Value Date    ALKPHOS 143 04/17/2021    ALT 37 04/17/2021    AST 55 04/17/2021    PROT 4.7 04/17/2021    BILITOT 0.3 04/17/2021    BILIDIR <0.2 04/17/2021    LABALBU 1.9 04/17/2021     Lab Results   Component Value Date    LACTA 1.3 04/18/2021     No results found for: AMYLASE  Lab Results   Component Value Date    LIPASE 38.7 04/01/2021     Lab Results   Component Value Date    CHOL 124 05/16/2020    TRIG 83 05/16/2020    HDL 63 05/16/2020    LDLCALC 44 05/16/2020     No results for input(s): POCGLU in the last 72 hours. No results for input(s): CKTOTAL, CKMB, TROPONINI in the last 72 hours. No results found for: LABA1C  Lab Results   Component Value Date    INR 1.11 04/01/2021     No results found for: PHART, PO2ART, ZBF2JPX, NBP1BTR, [facility] Course: clinical course has improved, Pt was admitted to the hospital on 4/14 due to anemia, elevated tropinin and BAILEE after being d/c from the hospital on 4/8 s/p right hip fracture repair. She had some urinary retention and hence urology was consulted and placed a alvarez catheter with recommendations to keep in at d/c and follow up virtually with urology. Cardiology initially recommended a stress test d/t elevated troponin and hx of chest pain but pt refused. Care was transferred over to Dr Sharron Bennett per pt's request and he did not see it necessary for a stress test as he believes the trops were due to BAILEE and the chest pain was musculoskeletal in nature. She received 1U RBC while inpatient and her hgb has been stable since. Nephrology followed for BAILEE and this has since resolved. She had an episode of hypotension in the night on 4/18 and was transferred to step down with an order to start dopamine drip if BP did not recover. After fluid bolus her BP remained WNL and dopamine was not started. Pt was found to have a pneumonia and a UTI and was started on broad spectrum abx until urine culture came back. At that time she was transitioned to Rocephin. She also developed swelling in her legs and was diagnosed with DVT's and was subsequently started on therapeutic dose of Lovenox. Pt to transition to PO abx and PO eliquis on d/c. Follow up with urology and cardiology outpatient. Vitals: BP (!) 104/54   Pulse 90   Temp 98 °F (36.7 °C) (Oral)   Resp 16   Ht 5' 1\" (1.549 m)   Wt 160 lb 8 oz (72.8 kg)   SpO2 92%   BMI 30.33 kg/m²   Physical Exam:  General appearance - alert, well appearing, and in no distress  Eyes - pupils equal and reactive, extraocular eye movements intact  Neck - supple, no significant adenopathy  Chest - clear to auscultation, no wheezes, rales or rhonchi, symmetric air entry  Heart - normal rate, regular rhythm, normal S1, S2, no murmurs, rubs, clicks or gallops  Abdomen - soft, nontender, nondistended, no masses or organomegaly pos bs:   Neurological - alert, oriented, normal speech, no focal findings or movement disorder noted  Extremities - peripheral pulses normal, 2+ bilateral pitting edema left side worse than right      Disposition: SNF    Condition: Stable    Over 35 minutes spent on encounter      Loly Ang CNP     Copy: Primary Care Physician: Milady Ngo MD  Internal Medicine    I have discussed the case, including pertinent history and exam findings with the NP. I have seen and examined the patient and the key elements of the encounter have been performed by me. I agree with the assessment, plan and orders as documented by the NP.     Electronically signed by Hank Rocha MD on 4/21/2021 at 1:53 PM
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
## 2021-04-21 NOTE — PROGRESS NOTES
[address] [facility] THERAPY  STRZ ICU STEPDOWN TELEMETRY 4K  DAILY NOTE    TIME   SLP Individual Minutes  Time In: 1320  Time Out: Trent  Minutes: 11  Timed Code Treatment Minutes: 0 Minutes       Date: 2021  Patient Name: Erasmo Dumont      CSN: [id number]   : 1935  (81 y.o.)  Gender: female   Referring Physician:  YASMINE Isaac CNP  Diagnosis: acute kidney injury  Secondary Diagnosis: dysphagia  Precautions: fall risk, aspiration precautions  Current Diet: Soft/bite size, thin liquids (medication crushed in applesauce)  Swallowing Strategies: Standard Universal Swallow Precautions  Date of Last MBS/FEES: Not Applicable    Pain:  No pain reported. Subjective:  RN Karla approved of dysphagia therapy this date. Karla reported pt swallowed medication without overt s/s of aspiration, however required additional bite of applesauce after Potassium pill to complete swallow. Pt sitting upright in bed eating lunch upon ST arrival. Daughter was present and reported pt with increase alertness/appetite since yesterday; pt verbalized agreement. Daughter voiced concerns with caloric intake upon pt discharge. ST recommend utilization of 4-6 smaller meals, consumption of Ensure and modified diet (I.e soft/bite size) to assist with caloric intake. Short-Term Goals:  SHORT TERM GOAL #1:  Goal 1: Pt will consume soft/bite size diet with thin liquids (advance PO trials with ST) without overt s/s of aspiration to assist with nutritional/hydrational measures as well as determine pt readiness for advancement in dietary level. INTERVENTIONS:Dietary analysis was completed (with use of compensatory strategies) to determine pt's readiness for dietary advancement.    -thin liquid via cup/straw x5.    -Oral phase unremarkable.   -wet gurgly vocal quality observed 1/5 trials, quality improved after throat clear.    -sherbert x10: oral phase highly unremarkable.  No overt s/s of aspiration observed. -gustavo cracker dipped in pudding x1:    -increased mastication duration. Pt reported \"gustavo cracker is too hard\". -Trace oral stasis observed, cleared with liquid wash   -No overt s/s of aspiration observed. *pt refused additional trials of soft/bite size, stating \"I don't like the gustavo crackers\"    *pt observed to drink 50% of Ensure during PO trials. *pt with increased fatigue as trials continued. *good success this date with utilization of swallow strategies (I.e sitting upright, small bites/sips, alternating bites/sips)    Recommend continuation of soft/bite size solids, thin liquids (with utilization of swallow strategies). Recommend 4-6 smaller meals + Ensure to reduce pt fatigue and assist with caloric intake. SHORT TERM GOAL #2:  Goal 2: ST/staff will complete comprehensive oral care hygiene to prevent growth of bacterial colonization within oral cavity. INTERVENTIONS:DNT d/t focus on other goals. No LTG given short ELOS. EDUCATION:  Learner: Patient and Family  Education:  Reviewed diet and strategies and Reviewed ST goals and Plan of Care  Evaluation of Education: Verbalizes understanding and Needs further instruction    ASSESSMENT/PLAN:  Activity Tolerance:  Patient tolerance of  treatment: fair. Increased fatigue with additional trials     Assessment/Plan: Patient progressing toward established goals. Continues to require skilled care of licensed speech pathologist to progress toward achievement of established goals and plan of care. .     Plan for Next Session: advance PO trials     ALYSIA Pierre, Student Intern  Tolu Paul M.A., [address] [place] [address]

## 2021-04-21 NOTE — PROGRESS NOTES
relief after 1 dose, call 911. Current Medications:     Scheduled Meds:    midodrine  5 mg Oral TID WC    cefTRIAXone (ROCEPHIN) IV  1,000 mg Intravenous Q24H    enoxaparin  1 mg/kg Subcutaneous Q12H    potassium chloride  20 mEq Oral BID WC    sodium bicarbonate  1,300 mg Oral BID    sodium chloride flush  5-40 mL Intravenous 2 times per day    therapeutic multivitamin-minerals  1 tablet Oral Daily    latanoprost  1 drop Both Eyes Nightly    metoprolol tartrate  12.5 mg Oral BID    pantoprazole  40 mg Oral BID AC    sucralfate  1 g Oral 4x Daily AC & HS    isosorbide mononitrate  30 mg Oral Daily    sertraline  25 mg Oral Daily    docusate sodium  100 mg Oral BID    aspirin  325 mg Oral Daily    atorvastatin  20 mg Oral Daily     Continuous Infusions:    DOPamine Stopped (21 0035)    sodium chloride      sodium chloride       PRN Meds:  ondansetron, HYDROcodone 5 mg - acetaminophen, sodium chloride, sodium chloride flush, sodium chloride, acetaminophen, nitroGLYCERIN    Input/Output:       I/O last 3 completed shifts: In: 0306 [P.O.:1250; I.V.:55]  Out: 850 [Urine:750; Emesis/NG output:100]. Patient Vitals for the past 96 hrs (Last 3 readings):   Weight   21 0530 160 lb 8 oz (72.8 kg)   21 0335 160 lb 1.6 oz (72.6 kg)       Vital Signs:   Temperature:  Temp: 98 °F (36.7 °C)  TMax:   Temp (24hrs), Av.9 °F (36.6 °C), Min:97.3 °F (36.3 °C), Max:98.3 °F (36.8 °C)    Respirations:  Resp: 16  Pulse:   Pulse: 90  BP:    BP: (!) 104/54  BP Range: Systolic (79QTY), ZDW:202 , Min:90 , HVR:057       Diastolic (80NJQ), EGH:48, Min:53, Max:70      Physical Examination:     General:  Awake, alert, no distress  HEENT: NC/AT/ MMM  Chest:               Diminished no rales  Cardiac:  S1 S2   Abdomen: Soft, non-tender,  Neuro:  No facial droop, No Asterixis  SKIN:  No rashes, good skin turgor.   Extremities:  Left leg edema    Labs:       Recent Labs     21  0557 21  0558 WBC 2.0* 2.1*   RBC 3.26* 3.49*   HGB 9.2* 9.5*   HCT 29.3* 31.1*   MCV 89.9 89.1   MCH 28.2 27.2   MCHC 31.4* 30.5*    222   MPV 10.4 10.2      BMP:   Recent Labs     04/19/21  0557 04/20/21  0558 04/21/21  0806    135 135   K 3.7 3.9 4.0    104 103   CO2 21* 20* 22*   BUN 20 14 14   CREATININE 0.8 0.7 0.9   GLUCOSE 102 110* 98   CALCIUM 8.2* 8.0* 8.2*      Phosphorus:     No results for input(s): PHOS in the last 72 hours. Magnesium:  No results for input(s): MG in the last 72 hours. Albumin:    No results for input(s): LABALBU in the last 72 hours.   BNP:    No results found for: BNP  RYLEE:    No results found for: RYLEE  SPEP:  Lab Results   Component Value Date    PROT 4.7 04/17/2021     UPEP:   No results found for: LABPE  C3:   No results found for: C3  C4:   No results found for: C4  MPO ANCA:   No results found for: MPO  PR3 ANCA:   No results found for: PR3  Anti-GBM:   No results found for: GBMABIGG  Hep BsAg:         Lab Results   Component Value Date    HEPBSAG Negative 05/18/2020     Hep C AB:          Lab Results   Component Value Date    HEPCAB Negative 05/18/2020       Urinalysis/Chemistries:      Lab Results   Component Value Date    NITRU NEGATIVE 04/15/2021    COLORU YELLOW 04/15/2021    PHUR 5.5 04/15/2021    LABCAST NONE SEEN 02/13/2020    LABCAST NONE SEEN 02/13/2020    WBCUA 5-9 04/15/2021    RBCUA 5-10 04/15/2021    YEAST NONE SEEN 04/15/2021    BACTERIA MANY 04/15/2021    SPECGRAV 1.010 04/01/2021    LEUKOCYTESUR NEGATIVE 04/15/2021    UROBILINOGEN 0.2 04/15/2021    BILIRUBINUR NEGATIVE 04/15/2021    BLOODU TRACE 04/15/2021    GLUCOSEU NEGATIVE 04/15/2021    KETUA NEGATIVE 04/15/2021    AMORPHOUS DEBRIS 03/28/2018     Urine Sodium:   No results found for: ANNABEL  Urine Potassium:  No results found for: KUR  Urine Chloride:  No results found for: CLUR  Urine Osmolarity: No results found for: OSMOU  Urine Protein:   No components found for: TOTALPROTEIN, URINE   Urine Creatinine:   No results found for: LABCREA  Urine Eosinophils:  No components found for: UEOS        Impression and Plan:  1. BAILEE likely obstructive uropathy from urinary retention compounded by hypotension: resolved  2. Chronic Nonanion gap metabolic acidosis with hypokalemia:likely from diarrhea, labs are better  3. Hypokalemia; improved, continue KCl 20 meq bID  4. Hypotension:better with Midodrine. 5. Urinary retention: has alvarez and to be DC'd with it. Urology follow up  6. Anemia: s/p PRBC transfusion  7. S/p right hip surgery  8. Elevated troponin:    9. CAD, diastolic CHF. 10. Left leg DVT    Stable from renal standpoint for DC  Repeat bmp 2 weeks and f/u with me in clinic. Please don't hesitate to call with any questions.   Electronically signed by Josiane Nunez DO on 4/21/2021 at 12:08 PM

## 2021-04-21 NOTE — CARE COORDINATION
4/21/21, 12:03 PM EDT    Patient goals/plan/ treatment preferences discussed by  and . Patient goals/plan/ treatment preferences reviewed with patient/ family. Patient/ family verbalize understanding of discharge plan and are in agreement with goal/plan/treatment preferences. Understanding was demonstrated using the teach back method. AVS provided by RN at time of discharge, which includes all necessary medical information pertaining to the patients current course of illness, treatment, post-discharge goals of care, and treatment preferences. Services After Discharge  Services At/After Discharge: Nursing Services, Skilled Therapy, Aide Services, In ambulance(The [address] [place] [address])   [place] Letter  IMM Letter given to Patient/Family/Significant other/Guardian/POA/by[de-identified]   IMM Letter date given[de-identified] 04/21/21  IMM Letter time given[de-identified] 9396     Spoke with patient and son and are aware of possible discharge for today. Patient's COVID test negative. Facility and family aware of transport time for 6 PM with [facility]. RN has phone and fax number for discharge and denied any needs. Spoke with family at bedside daughter and updated on transport time. No concerns voiced.

## 2021-04-21 NOTE — PROGRESS NOTES
1600 Report called to [facility] at [facility] ALEX PATRICIA II.VIERTEL. AVS faxed. Waiting for [facility] for transport. Family here. 10 29 21 taken by [facility] to [facility] with belongings.
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
## 2021-04-22 ENCOUNTER — CARE COORDINATION (OUTPATIENT)
Dept: CARE COORDINATION | Age: 86
End: 2021-04-22

## 2021-04-22 ENCOUNTER — CARE COORDINATION (OUTPATIENT)
Dept: CASE MANAGEMENT | Age: 86
End: 2021-04-22

## 2021-04-22 LAB — IMMUNOFIXATION URINE: NORMAL

## 2021-04-22 NOTE — CARE COORDINATION
Patient discharged to [facility] 4/21/2021.     Yoshi Power LPN    [phone]  [facility] Coordinator

## 2021-04-27 ENCOUNTER — HOSPITAL ENCOUNTER (INPATIENT)
Age: 86
LOS: 15 days | Discharge: HOSPICE | DRG: 444 | End: 2021-05-13
Attending: EMERGENCY MEDICINE | Admitting: INTERNAL MEDICINE
Payer: MEDICARE

## 2021-04-27 ENCOUNTER — APPOINTMENT (OUTPATIENT)
Dept: GENERAL RADIOLOGY | Age: 86
DRG: 444 | End: 2021-04-27
Payer: MEDICARE

## 2021-04-27 ENCOUNTER — APPOINTMENT (OUTPATIENT)
Dept: CT IMAGING | Age: 86
DRG: 444 | End: 2021-04-27
Payer: MEDICARE

## 2021-04-27 DIAGNOSIS — R74.01 TRANSAMINITIS: ICD-10-CM

## 2021-04-27 DIAGNOSIS — I50.9 ACUTE ON CHRONIC CONGESTIVE HEART FAILURE, UNSPECIFIED HEART FAILURE TYPE (HCC): ICD-10-CM

## 2021-04-27 DIAGNOSIS — I48.91 ATRIAL FIBRILLATION WITH RVR (HCC): Primary | ICD-10-CM

## 2021-04-27 DIAGNOSIS — R79.89 ELEVATED LFTS: ICD-10-CM

## 2021-04-27 DIAGNOSIS — J90 BILATERAL PLEURAL EFFUSION: ICD-10-CM

## 2021-04-27 LAB
ALBUMIN SERPL-MCNC: 2.3 G/DL (ref 3.5–5.1)
ALP BLD-CCNC: 158 U/L (ref 38–126)
ALT SERPL-CCNC: 129 U/L (ref 11–66)
ANION GAP SERPL CALCULATED.3IONS-SCNC: 15 MEQ/L (ref 8–16)
AST SERPL-CCNC: 203 U/L (ref 5–40)
BASOPHILS # BLD: 0.4 %
BASOPHILS ABSOLUTE: 0 THOU/MM3 (ref 0–0.1)
BILIRUB SERPL-MCNC: 0.3 MG/DL (ref 0.3–1.2)
BUN BLDV-MCNC: 10 MG/DL (ref 7–22)
CALCIUM SERPL-MCNC: 8.2 MG/DL (ref 8.5–10.5)
CHLORIDE BLD-SCNC: 102 MEQ/L (ref 98–111)
CO2: 20 MEQ/L (ref 23–33)
CREAT SERPL-MCNC: 0.7 MG/DL (ref 0.4–1.2)
EOSINOPHIL # BLD: 0.1 %
EOSINOPHILS ABSOLUTE: 0 THOU/MM3 (ref 0–0.4)
ERYTHROCYTE [DISTWIDTH] IN BLOOD BY AUTOMATED COUNT: 17 % (ref 11.5–14.5)
ERYTHROCYTE [DISTWIDTH] IN BLOOD BY AUTOMATED COUNT: 57.8 FL (ref 35–45)
GFR SERPL CREATININE-BSD FRML MDRD: 80 ML/MIN/1.73M2
GLUCOSE BLD-MCNC: 96 MG/DL (ref 70–108)
HCT VFR BLD CALC: 32.6 % (ref 37–47)
HEMOGLOBIN: 9.5 GM/DL (ref 12–16)
IMMATURE GRANS (ABS): 0.28 THOU/MM3 (ref 0–0.07)
IMMATURE GRANULOCYTES: 3.8 %
LYMPHOCYTES # BLD: 6.5 %
LYMPHOCYTES ABSOLUTE: 0.5 THOU/MM3 (ref 1–4.8)
MCH RBC QN AUTO: 27.2 PG (ref 26–33)
MCHC RBC AUTO-ENTMCNC: 29.1 GM/DL (ref 32.2–35.5)
MCV RBC AUTO: 93.4 FL (ref 81–99)
MONOCYTES # BLD: 4.2 %
MONOCYTES ABSOLUTE: 0.3 THOU/MM3 (ref 0.4–1.3)
NUCLEATED RED BLOOD CELLS: 0 /100 WBC
OSMOLALITY CALCULATION: 272.7 MOSMOL/KG (ref 275–300)
PLATELET # BLD: 344 THOU/MM3 (ref 130–400)
PMV BLD AUTO: 10.5 FL (ref 9.4–12.4)
POTASSIUM SERPL-SCNC: 4.4 MEQ/L (ref 3.5–5.2)
PRO-BNP: 9643 PG/ML (ref 0–1800)
RBC # BLD: 3.49 MILL/MM3 (ref 4.2–5.4)
SEG NEUTROPHILS: 85 %
SEGMENTED NEUTROPHILS ABSOLUTE COUNT: 6.3 THOU/MM3 (ref 1.8–7.7)
SODIUM BLD-SCNC: 137 MEQ/L (ref 135–145)
TOTAL PROTEIN: 5.2 G/DL (ref 6.1–8)
TROPONIN T: < 0.01 NG/ML
WBC # BLD: 7.4 THOU/MM3 (ref 4.8–10.8)

## 2021-04-27 PROCEDURE — 96360 HYDRATION IV INFUSION INIT: CPT

## 2021-04-27 PROCEDURE — 93005 ELECTROCARDIOGRAM TRACING: CPT | Performed by: EMERGENCY MEDICINE

## 2021-04-27 PROCEDURE — 99285 EMERGENCY DEPT VISIT HI MDM: CPT

## 2021-04-27 PROCEDURE — 85025 COMPLETE CBC W/AUTO DIFF WBC: CPT

## 2021-04-27 PROCEDURE — 83690 ASSAY OF LIPASE: CPT

## 2021-04-27 PROCEDURE — 6370000000 HC RX 637 (ALT 250 FOR IP): Performed by: EMERGENCY MEDICINE

## 2021-04-27 PROCEDURE — 80053 COMPREHEN METABOLIC PANEL: CPT

## 2021-04-27 PROCEDURE — 36415 COLL VENOUS BLD VENIPUNCTURE: CPT

## 2021-04-27 PROCEDURE — 84484 ASSAY OF TROPONIN QUANT: CPT

## 2021-04-27 PROCEDURE — 83880 ASSAY OF NATRIURETIC PEPTIDE: CPT

## 2021-04-27 PROCEDURE — 71045 X-RAY EXAM CHEST 1 VIEW: CPT

## 2021-04-27 PROCEDURE — 71275 CT ANGIOGRAPHY CHEST: CPT

## 2021-04-27 RX ORDER — HYDROCODONE BITARTRATE AND ACETAMINOPHEN 5; 325 MG/1; MG/1
1 TABLET ORAL ONCE
Status: COMPLETED | OUTPATIENT
Start: 2021-04-28 | End: 2021-04-27

## 2021-04-27 RX ORDER — 0.9 % SODIUM CHLORIDE 0.9 %
1000 INTRAVENOUS SOLUTION INTRAVENOUS ONCE
Status: COMPLETED | OUTPATIENT
Start: 2021-04-27 | End: 2021-04-28

## 2021-04-27 RX ADMIN — HYDROCODONE BITARTRATE AND ACETAMINOPHEN 1 TABLET: 5; 325 TABLET ORAL at 23:42

## 2021-04-27 ASSESSMENT — PAIN DESCRIPTION - PAIN TYPE
TYPE: ACUTE PAIN
TYPE: ACUTE PAIN

## 2021-04-27 ASSESSMENT — PAIN DESCRIPTION - ORIENTATION
ORIENTATION: MID

## 2021-04-27 ASSESSMENT — PAIN DESCRIPTION - DESCRIPTORS
DESCRIPTORS: ACHING

## 2021-04-27 ASSESSMENT — PAIN DESCRIPTION - LOCATION: LOCATION: CHEST

## 2021-04-27 ASSESSMENT — PAIN SCALES - GENERAL
PAINLEVEL_OUTOF10: 7

## 2021-04-27 ASSESSMENT — PAIN DESCRIPTION - FREQUENCY
FREQUENCY: CONTINUOUS
FREQUENCY: CONTINUOUS

## 2021-04-27 NOTE — LETTER
a grouping of medical conditions or diagnoses that are included in the [id number] [place] [address] Avenue.

## 2021-04-28 ENCOUNTER — APPOINTMENT (OUTPATIENT)
Dept: ULTRASOUND IMAGING | Age: 86
DRG: 444 | End: 2021-04-28
Payer: MEDICARE

## 2021-04-28 ENCOUNTER — CARE COORDINATION (OUTPATIENT)
Dept: CARE COORDINATION | Age: 86
End: 2021-04-28

## 2021-04-28 ENCOUNTER — APPOINTMENT (OUTPATIENT)
Dept: CT IMAGING | Age: 86
DRG: 444 | End: 2021-04-28
Payer: MEDICARE

## 2021-04-28 PROBLEM — I48.91 ATRIAL FIBRILLATION WITH RVR (HCC): Status: ACTIVE | Noted: 2021-04-28

## 2021-04-28 LAB
EKG ATRIAL RATE: 127 BPM
EKG P AXIS: 66 DEGREES
EKG P-R INTERVAL: 118 MS
EKG Q-T INTERVAL: 320 MS
EKG QRS DURATION: 70 MS
EKG QTC CALCULATION (BAZETT): 465 MS
EKG R AXIS: 26 DEGREES
EKG T AXIS: 67 DEGREES
EKG VENTRICULAR RATE: 127 BPM
LIPASE: 27.6 U/L (ref 5.6–51.3)
MRSA SCREEN RT-PCR: NEGATIVE
SARS-COV-2, NAAT: NOT DETECTED
VANCOMYCIN RESISTANT ENTEROCOCCUS: NEGATIVE

## 2021-04-28 PROCEDURE — 6370000000 HC RX 637 (ALT 250 FOR IP): Performed by: REGISTERED NURSE

## 2021-04-28 PROCEDURE — 2500000003 HC RX 250 WO HCPCS: Performed by: INTERNAL MEDICINE

## 2021-04-28 PROCEDURE — 6360000002 HC RX W HCPCS: Performed by: INTERNAL MEDICINE

## 2021-04-28 PROCEDURE — 74176 CT ABD & PELVIS W/O CONTRAST: CPT

## 2021-04-28 PROCEDURE — 87635 SARS-COV-2 COVID-19 AMP PRB: CPT

## 2021-04-28 PROCEDURE — 87081 CULTURE SCREEN ONLY: CPT

## 2021-04-28 PROCEDURE — 6360000002 HC RX W HCPCS: Performed by: REGISTERED NURSE

## 2021-04-28 PROCEDURE — 2140000000 HC CCU INTERMEDIATE R&B

## 2021-04-28 PROCEDURE — 2580000003 HC RX 258: Performed by: EMERGENCY MEDICINE

## 2021-04-28 PROCEDURE — 76705 ECHO EXAM OF ABDOMEN: CPT

## 2021-04-28 PROCEDURE — 2500000003 HC RX 250 WO HCPCS: Performed by: EMERGENCY MEDICINE

## 2021-04-28 PROCEDURE — 2580000003 HC RX 258: Performed by: REGISTERED NURSE

## 2021-04-28 PROCEDURE — 87641 MR-STAPH DNA AMP PROBE: CPT

## 2021-04-28 PROCEDURE — 6360000004 HC RX CONTRAST MEDICATION: Performed by: EMERGENCY MEDICINE

## 2021-04-28 PROCEDURE — 87500 VANOMYCIN DNA AMP PROBE: CPT

## 2021-04-28 RX ORDER — ONDANSETRON 2 MG/ML
4 INJECTION INTRAMUSCULAR; INTRAVENOUS EVERY 6 HOURS PRN
Status: DISCONTINUED | OUTPATIENT
Start: 2021-04-28 | End: 2021-04-30

## 2021-04-28 RX ORDER — LATANOPROST 50 UG/ML
1 SOLUTION/ DROPS OPHTHALMIC NIGHTLY
Status: DISCONTINUED | OUTPATIENT
Start: 2021-04-28 | End: 2021-05-13 | Stop reason: HOSPADM

## 2021-04-28 RX ORDER — SUCRALFATE 1 G/1
1 TABLET ORAL
Status: DISCONTINUED | OUTPATIENT
Start: 2021-04-28 | End: 2021-05-13 | Stop reason: HOSPADM

## 2021-04-28 RX ORDER — HEPARIN SODIUM 1000 [USP'U]/ML
2000 INJECTION, SOLUTION INTRAVENOUS; SUBCUTANEOUS PRN
Status: DISCONTINUED | OUTPATIENT
Start: 2021-04-28 | End: 2021-04-29 | Stop reason: ALTCHOICE

## 2021-04-28 RX ORDER — SODIUM BICARBONATE 650 MG/1
1300 TABLET ORAL 2 TIMES DAILY
Status: DISCONTINUED | OUTPATIENT
Start: 2021-04-28 | End: 2021-05-13 | Stop reason: HOSPADM

## 2021-04-28 RX ORDER — ISOSORBIDE MONONITRATE 30 MG/1
30 TABLET, EXTENDED RELEASE ORAL DAILY
Status: DISCONTINUED | OUTPATIENT
Start: 2021-04-28 | End: 2021-05-01

## 2021-04-28 RX ORDER — POTASSIUM CHLORIDE 750 MG/1
20 TABLET, FILM COATED, EXTENDED RELEASE ORAL 2 TIMES DAILY WITH MEALS
Status: DISCONTINUED | OUTPATIENT
Start: 2021-04-28 | End: 2021-05-02

## 2021-04-28 RX ORDER — PANTOPRAZOLE SODIUM 40 MG/1
40 TABLET, DELAYED RELEASE ORAL
Status: DISCONTINUED | OUTPATIENT
Start: 2021-04-29 | End: 2021-04-28

## 2021-04-28 RX ORDER — MIDODRINE HYDROCHLORIDE 5 MG/1
5 TABLET ORAL
Status: DISCONTINUED | OUTPATIENT
Start: 2021-04-28 | End: 2021-05-01

## 2021-04-28 RX ORDER — POLYETHYLENE GLYCOL 3350 17 G/17G
17 POWDER, FOR SOLUTION ORAL DAILY PRN
Status: DISCONTINUED | OUTPATIENT
Start: 2021-04-28 | End: 2021-05-13 | Stop reason: HOSPADM

## 2021-04-28 RX ORDER — SODIUM CHLORIDE 0.9 % (FLUSH) 0.9 %
5-40 SYRINGE (ML) INJECTION PRN
Status: DISCONTINUED | OUTPATIENT
Start: 2021-04-28 | End: 2021-05-13 | Stop reason: HOSPADM

## 2021-04-28 RX ORDER — DILTIAZEM HYDROCHLORIDE 5 MG/ML
10 INJECTION INTRAVENOUS ONCE
Status: COMPLETED | OUTPATIENT
Start: 2021-04-28 | End: 2021-04-28

## 2021-04-28 RX ORDER — HEPARIN SODIUM 1000 [USP'U]/ML
4000 INJECTION, SOLUTION INTRAVENOUS; SUBCUTANEOUS PRN
Status: DISCONTINUED | OUTPATIENT
Start: 2021-04-28 | End: 2021-04-29 | Stop reason: ALTCHOICE

## 2021-04-28 RX ORDER — TIZANIDINE 4 MG/1
4 TABLET ORAL EVERY 8 HOURS PRN
Status: DISCONTINUED | OUTPATIENT
Start: 2021-04-28 | End: 2021-05-13 | Stop reason: HOSPADM

## 2021-04-28 RX ORDER — DOCUSATE SODIUM 100 MG/1
100 CAPSULE, LIQUID FILLED ORAL 2 TIMES DAILY
Status: DISCONTINUED | OUTPATIENT
Start: 2021-04-28 | End: 2021-05-13 | Stop reason: HOSPADM

## 2021-04-28 RX ORDER — AMOXICILLIN AND CLAVULANATE POTASSIUM 875; 125 MG/1; MG/1
1 TABLET, FILM COATED ORAL 2 TIMES DAILY
Status: DISCONTINUED | OUTPATIENT
Start: 2021-04-28 | End: 2021-04-28

## 2021-04-28 RX ORDER — SODIUM CHLORIDE 9 MG/ML
25 INJECTION, SOLUTION INTRAVENOUS PRN
Status: DISCONTINUED | OUTPATIENT
Start: 2021-04-28 | End: 2021-05-13 | Stop reason: HOSPADM

## 2021-04-28 RX ORDER — MIRTAZAPINE 15 MG/1
7.5 TABLET, FILM COATED ORAL NIGHTLY
Status: DISCONTINUED | OUTPATIENT
Start: 2021-04-28 | End: 2021-05-13 | Stop reason: HOSPADM

## 2021-04-28 RX ORDER — SODIUM CHLORIDE 0.9 % (FLUSH) 0.9 %
5-40 SYRINGE (ML) INJECTION EVERY 12 HOURS SCHEDULED
Status: DISCONTINUED | OUTPATIENT
Start: 2021-04-28 | End: 2021-05-13 | Stop reason: HOSPADM

## 2021-04-28 RX ORDER — ACETAMINOPHEN 325 MG/1
650 TABLET ORAL EVERY 6 HOURS PRN
Status: DISCONTINUED | OUTPATIENT
Start: 2021-04-28 | End: 2021-05-02

## 2021-04-28 RX ORDER — DIGOXIN 0.25 MG/ML
125 INJECTION INTRAMUSCULAR; INTRAVENOUS ONCE
Status: DISCONTINUED | OUTPATIENT
Start: 2021-04-28 | End: 2021-04-30

## 2021-04-28 RX ORDER — ACETAMINOPHEN 650 MG/1
650 SUPPOSITORY RECTAL EVERY 6 HOURS PRN
Status: DISCONTINUED | OUTPATIENT
Start: 2021-04-28 | End: 2021-05-02

## 2021-04-28 RX ORDER — IPRATROPIUM BROMIDE AND ALBUTEROL SULFATE 2.5; .5 MG/3ML; MG/3ML
1 SOLUTION RESPIRATORY (INHALATION) EVERY 4 HOURS PRN
Status: DISCONTINUED | OUTPATIENT
Start: 2021-04-28 | End: 2021-04-30

## 2021-04-28 RX ORDER — DIGOXIN 250 MCG
125 TABLET ORAL DAILY
Status: DISCONTINUED | OUTPATIENT
Start: 2021-04-29 | End: 2021-05-04

## 2021-04-28 RX ORDER — NITROGLYCERIN 20 MG/100ML
5-200 INJECTION INTRAVENOUS CONTINUOUS
Status: DISCONTINUED | OUTPATIENT
Start: 2021-04-28 | End: 2021-04-28

## 2021-04-28 RX ORDER — FUROSEMIDE 10 MG/ML
20 INJECTION INTRAMUSCULAR; INTRAVENOUS ONCE
Status: COMPLETED | OUTPATIENT
Start: 2021-04-28 | End: 2021-04-28

## 2021-04-28 RX ORDER — ATORVASTATIN CALCIUM 20 MG/1
20 TABLET, FILM COATED ORAL NIGHTLY
Status: DISCONTINUED | OUTPATIENT
Start: 2021-04-28 | End: 2021-05-06

## 2021-04-28 RX ORDER — MIRTAZAPINE 15 MG/1
7.5 TABLET, FILM COATED ORAL NIGHTLY
COMMUNITY

## 2021-04-28 RX ORDER — PANTOPRAZOLE SODIUM 40 MG/1
40 TABLET, DELAYED RELEASE ORAL
Status: DISCONTINUED | OUTPATIENT
Start: 2021-04-28 | End: 2021-05-01 | Stop reason: ALTCHOICE

## 2021-04-28 RX ORDER — HEPARIN SODIUM 10000 [USP'U]/100ML
5-30 INJECTION, SOLUTION INTRAVENOUS CONTINUOUS
Status: DISCONTINUED | OUTPATIENT
Start: 2021-04-28 | End: 2021-04-28

## 2021-04-28 RX ORDER — PROMETHAZINE HYDROCHLORIDE 25 MG/1
12.5 TABLET ORAL EVERY 6 HOURS PRN
Status: DISCONTINUED | OUTPATIENT
Start: 2021-04-28 | End: 2021-04-30

## 2021-04-28 RX ORDER — SIMVASTATIN 20 MG
40 TABLET ORAL NIGHTLY
Status: DISCONTINUED | OUTPATIENT
Start: 2021-04-28 | End: 2021-04-28 | Stop reason: ALTCHOICE

## 2021-04-28 RX ORDER — HEPARIN SODIUM 1000 [USP'U]/ML
4000 INJECTION, SOLUTION INTRAVENOUS; SUBCUTANEOUS ONCE
Status: COMPLETED | OUTPATIENT
Start: 2021-04-28 | End: 2021-04-28

## 2021-04-28 RX ADMIN — FUROSEMIDE 20 MG: 10 INJECTION, SOLUTION INTRAMUSCULAR; INTRAVENOUS at 13:59

## 2021-04-28 RX ADMIN — PIPERACILLIN AND TAZOBACTAM 3375 MG: 3; .375 INJECTION, POWDER, LYOPHILIZED, FOR SOLUTION INTRAVENOUS at 17:04

## 2021-04-28 RX ADMIN — AMOXICILLIN AND CLAVULANATE POTASSIUM 1 TABLET: 875; 125 TABLET, FILM COATED ORAL at 13:59

## 2021-04-28 RX ADMIN — ONDANSETRON 4 MG: 2 INJECTION INTRAMUSCULAR; INTRAVENOUS at 21:34

## 2021-04-28 RX ADMIN — LATANOPROST 1 DROP: 50 SOLUTION OPHTHALMIC at 21:34

## 2021-04-28 RX ADMIN — MIDODRINE HYDROCHLORIDE 5 MG: 5 TABLET ORAL at 14:00

## 2021-04-28 RX ADMIN — DILTIAZEM HYDROCHLORIDE 10 MG/HR: 5 INJECTION, SOLUTION INTRAVENOUS at 02:24

## 2021-04-28 RX ADMIN — SUCRALFATE 1 G: 1 TABLET ORAL at 21:34

## 2021-04-28 RX ADMIN — ASPIRIN 325 MG: 325 TABLET, COATED ORAL at 13:59

## 2021-04-28 RX ADMIN — SUCRALFATE 1 G: 1 TABLET ORAL at 14:00

## 2021-04-28 RX ADMIN — SODIUM CHLORIDE 1000 ML: 9 INJECTION, SOLUTION INTRAVENOUS at 00:09

## 2021-04-28 RX ADMIN — HEPARIN SODIUM 12 UNITS/KG/HR: 10000 INJECTION, SOLUTION INTRAVENOUS at 11:46

## 2021-04-28 RX ADMIN — POTASSIUM CHLORIDE 20 MEQ: 750 TABLET, FILM COATED, EXTENDED RELEASE ORAL at 17:04

## 2021-04-28 RX ADMIN — PANTOPRAZOLE SODIUM 40 MG: 40 TABLET, DELAYED RELEASE ORAL at 17:04

## 2021-04-28 RX ADMIN — ATORVASTATIN CALCIUM 20 MG: 20 TABLET, FILM COATED ORAL at 21:34

## 2021-04-28 RX ADMIN — DOCUSATE SODIUM 100 MG: 100 CAPSULE, LIQUID FILLED ORAL at 13:59

## 2021-04-28 RX ADMIN — HEPARIN SODIUM 4000 UNITS: 1000 INJECTION, SOLUTION INTRAVENOUS; SUBCUTANEOUS at 11:46

## 2021-04-28 RX ADMIN — SODIUM BICARBONATE 1300 MG: 650 TABLET ORAL at 21:34

## 2021-04-28 RX ADMIN — NITROGLYCERIN 5 MCG/MIN: 20 INJECTION INTRAVENOUS at 08:56

## 2021-04-28 RX ADMIN — SERTRALINE HYDROCHLORIDE 50 MG: 50 TABLET, FILM COATED ORAL at 13:58

## 2021-04-28 RX ADMIN — DILTIAZEM HYDROCHLORIDE 10 MG: 5 INJECTION INTRAVENOUS at 02:19

## 2021-04-28 RX ADMIN — MIDODRINE HYDROCHLORIDE 5 MG: 5 TABLET ORAL at 17:05

## 2021-04-28 RX ADMIN — SODIUM BICARBONATE 1300 MG: 650 TABLET ORAL at 13:58

## 2021-04-28 RX ADMIN — MIRTAZAPINE 7.5 MG: 15 TABLET, FILM COATED ORAL at 21:34

## 2021-04-28 RX ADMIN — METOPROLOL TARTRATE 25 MG: 25 TABLET, FILM COATED ORAL at 13:59

## 2021-04-28 RX ADMIN — IOPAMIDOL 80 ML: 755 INJECTION, SOLUTION INTRAVENOUS at 00:55

## 2021-04-28 RX ADMIN — SODIUM CHLORIDE, PRESERVATIVE FREE 10 ML: 5 INJECTION INTRAVENOUS at 21:34

## 2021-04-28 RX ADMIN — POTASSIUM CHLORIDE 20 MEQ: 750 TABLET, FILM COATED, EXTENDED RELEASE ORAL at 13:58

## 2021-04-28 RX ADMIN — ISOSORBIDE MONONITRATE 30 MG: 30 TABLET ORAL at 13:59

## 2021-04-28 RX ADMIN — METOPROLOL TARTRATE 25 MG: 25 TABLET, FILM COATED ORAL at 21:34

## 2021-04-28 ASSESSMENT — PAIN SCALES - GENERAL
PAINLEVEL_OUTOF10: 7
PAINLEVEL_OUTOF10: 7
PAINLEVEL_OUTOF10: 8

## 2021-04-28 ASSESSMENT — ENCOUNTER SYMPTOMS
WHEEZING: 0
EYE ITCHING: 0
CONSTIPATION: 0
EYE PAIN: 0
EYE DISCHARGE: 0
CHEST TIGHTNESS: 1
VOMITING: 0
BACK PAIN: 0
SORE THROAT: 0
STRIDOR: 0
ABDOMINAL PAIN: 0
NAUSEA: 0
RHINORRHEA: 0
ABDOMINAL DISTENTION: 0
SHORTNESS OF BREATH: 1
DIARRHEA: 0
EYE REDNESS: 0
COUGH: 1
PHOTOPHOBIA: 0

## 2021-04-28 ASSESSMENT — PAIN DESCRIPTION - PROGRESSION
CLINICAL_PROGRESSION: NOT CHANGED
CLINICAL_PROGRESSION: NOT CHANGED

## 2021-04-28 ASSESSMENT — PAIN DESCRIPTION - DESCRIPTORS
DESCRIPTORS: ACHING
DESCRIPTORS: CRUSHING

## 2021-04-28 ASSESSMENT — PAIN - FUNCTIONAL ASSESSMENT: PAIN_FUNCTIONAL_ASSESSMENT: PREVENTS OR INTERFERES SOME ACTIVE ACTIVITIES AND ADLS

## 2021-04-28 ASSESSMENT — PAIN DESCRIPTION - FREQUENCY
FREQUENCY: CONTINUOUS
FREQUENCY: CONTINUOUS

## 2021-04-28 ASSESSMENT — PAIN DESCRIPTION - ORIENTATION
ORIENTATION: MID
ORIENTATION: MID

## 2021-04-28 ASSESSMENT — PAIN DESCRIPTION - PAIN TYPE: TYPE: ACUTE PAIN

## 2021-04-28 ASSESSMENT — PAIN DESCRIPTION - LOCATION
LOCATION: CHEST
LOCATION: CHEST

## 2021-04-28 ASSESSMENT — PAIN DESCRIPTION - ONSET: ONSET: ON-GOING

## 2021-04-28 NOTE — ED NOTES
Pt reports no improvement in chest pain at this time and HR at 156. Attempted to message Dr. Sissy Kirby for additional orders at this time. Vitals as documented.      Rebekah Anthony, [facility]  04/28/21 1102

## 2021-04-28 NOTE — CARE COORDINATION
04/28/21 0917   Readmission Assessment   Number of Days since last admission? 1-7 days   Previous Disposition SNF   Who is being Interviewed Patient   What was the patient's/caregiver's perception as to why they think they needed to return back to the hospital? Other (Comment)  (Discharged to [facility]))   Did you visit your Primary Care Physician after you left the hospital, before you returned this time? No   Why weren't you able to visit your PCP? Other (Comment)  (Discharged to [facility]))   Did you see a specialist, such as Cardiac, Pulmonary, Orthopedic Physician, etc. after you left the hospital? No   Who advised the patient to return to the hospital? Skilled Unit   Does the patient report anything that got in the way of taking their medications? No   In our efforts to provide the best possible care to you and others like you, can you think of anything that we could have done to help you after you left the hospital the first time, so that you might not have needed to return so soon?  Other (Comment)  (Nothing, discharged to [facility].)

## 2021-04-28 NOTE — ED NOTES
Called lab about adding on lipase. States would get it running.       Dom Will, ERICK  04/28/21 6118

## 2021-04-28 NOTE — PLAN OF CARE
Problem: Falls - Risk of:  Goal: Will remain free from falls  Description: Will remain free from falls  Outcome: Ongoing  Note: No falls this shift. Patient instructed on how to use call light, verbalized understanding. Problem: Pain:  Goal: Pain level will decrease  Description: Pain level will decrease  Outcome: Ongoing  Note: Complains of chest pain, discovered after ultrasound of gallbladder that this pain is related to gallbladder versus heart complication. Problem: DISCHARGE BARRIERS  Goal: Patient's continuum of care needs are met  4/28/2021 1749 by Colt Luque RN  Outcome: Ongoing  Note: Plans to return to [address] [place] [address] upon discharge.   4/28/2021 1519 by JIM Rush  Outcome: Ongoing

## 2021-04-28 NOTE — DISCHARGE INSTR - COC
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 Jaqueline Cintron, Y92.129    Severe malnutrition ([facility] E43    Acute kidney injury ([facility] N17.9    Anemia D64.9    Urinary retention R33.9    Chest pain R07.9    Elevated troponin R77.8    Atrial fibrillation with RVR (HCC) I48.91       Isolation/Infection:   Isolation          No Isolation        Patient Infection Status     Infection Onset Added Last Indicated Last Indicated By Review Planned Expiration Resolved Resolved By    None active    Resolved    COVID-19 Rule Out 04/28/21 04/28/21 04/28/21 COVID-19, Rapid (Ordered)   04/28/21 Rule-Out Test Resulted    COVID-19 Rule Out 04/21/21 04/21/21 04/21/21 COVID-19, Rapid (Ordered)   04/21/21 Rule-Out Test Resulted    C-diff Rule Out 04/18/21 04/18/21 04/18/21 Clostridium Difficile Toxin/Antigen (Ordered)   04/19/21 Sigifredo Colin RN    COVID-19 Rule Out 04/01/21 04/01/21 04/01/21 SARS-CoV-2 NAAT (Rapid) (Ordered)   04/01/21 Rule-Out Test Resulted    COVID-19 Rule Out 05/15/20 05/16/20 05/15/20 Covid-19 Ambulatory (Ordered)   05/18/20 Rule-Out Test Resulted    COVID-19 Rule Out 05/15/20 05/15/20 05/15/20 COVID-19 (Ordered)   05/15/20 Rule-Out Test Resulted          Nurse Assessment:  Last Vital Signs: /62   Pulse 95   Temp 97.8 °F (36.6 °C) (Axillary)   Resp 16   Ht 5' 1\" (1.549 m)   Wt 152 lb 12.5 oz (69.3 kg)   SpO2 97%   BMI 28.87 kg/m²     Last documented pain score (0-10 scale): Pain Level: 8  Last Weight:   Wt Readings from Last 1 Encounters:   04/28/21 152 lb 12.5 oz (69.3 kg)     Mental Status:  disoriented    IV Access:  [facility] MANUELA IV ACCESS:[id number]}    Nursing Mobility/ADLs:  Walking   Dependent  Transfer  Dependent  Bathing  Dependent  Dressing  Dependent  Toileting  Dependent  Feeding  Assisted  Med Admin  Assisted  Med Delivery   whole    Wound Care Documentation and Therapy:  Incision 03/12/18 Back (Active)   Number of days: 1143       Wound 04/01/21 Ankle Left; Outer (Active)   Wound Etiology Pressure Stage  2 04/28/21 0805   Dressing Status Clean;Dry; Intact 04/28/21 0805   Wound Cleansed Soap and water 04/28/21 0805   Dressing/Treatment Open to air 04/28/21 0805   Drainage Amount None 04/28/21 0805   Alfreda-wound Assessment Other (Comment) 04/28/21 0805   Number of days: 26       Wound 04/04/21 Buttocks Right blister opened up (Active)   Wound Etiology Pressure Stage  2 04/28/21 0805   Dressing Status Clean;Dry; Intact 04/28/21 0805   Wound Cleansed Soap and water 04/28/21 0805   Dressing/Treatment Protective barrier 04/28/21 0805   Wound Assessment Pink/red 04/28/21 0805   Drainage Amount None 04/28/21 0805   Odor None 04/28/21 0805   Number of days: 23       Wound 04/15/21 Buttocks Left (Active)   Wound Etiology Pressure Stage  2 04/28/21 0805   Dressing Status New dressing applied 04/28/21 0805   Wound Cleansed Soap and water 04/28/21 0805   Dressing/Treatment Barrier film 04/28/21 0805   Wound Assessment Pink/red 04/28/21 0805   Drainage Amount None 04/28/21 0805   Odor None 04/28/21 0805   Alfreda-wound Assessment Blisters 04/28/21 0805   Number of days: 13        Elimination:  Continence:   · Bowel: alvarez  · Bladder: No  Urinary Catheter:  Insertion Date: 5/5/21   Colostomy/Ileostomy/Ileal Conduit: No       Date of Last BM: 5/11/2021    Intake/Output Summary (Last 24 hours) at 4/28/2021 1524  Last data filed at 4/28/2021 1445  Gross per 24 hour   Intake 112.9 ml   Output 125 ml   Net -12.1 ml     I/O last 3 completed shifts: In: 112.9 [I.V.:112.9]  Out: 125 [Urine:125]    Safety Concerns: At Risk for Falls    Impairments/Disabilities:      Vision    Nutrition Therapy:  Current Nutrition Therapy:   - Oral Diet:  General    Routes of Feeding: Oral  Liquids: No Restrictions  Daily Fluid Restriction: no  Last Modified Barium Swallow with Video (Video Swallowing Test): not done    Treatments at the Time of Hospital Discharge:   Respiratory Treatments:previously duonebs during admission, currently d/c  Oxygen Therapy:  is not on home oxygen therapy. Ventilator:    - No ventilator support    Rehab Therapies: none-hospice  Weight Bearing Status/Restrictions: No weight bearing restirctions  Other Medical Equipment (for information only, NOT a DME order):  wheelchair and hospital bed  Other Treatments: hospice    Patient's personal belongings (please select all that are sent with patient):  Glasses    RN SIGNATURE:  Electronically signed by Yuriy Rothman RN on 5/13/21 at 9:54 AM EDT    CASE MANAGEMENT/SOCIAL WORK SECTION    Inpatient Status Date: 4/27/2021    Readmission Risk Assessment Score:  Readmission Risk              Risk of Unplanned Readmission:        31           Discharging to Facility/ Agency   · Name: [address] [place] [address]  · Address:[address] [place] [address], [address] [place] [address]  · [facility]:[phone]  · [facility]:[phone]    Dialysis Facility (if applicable)   · Name:  · Address:  · Dialysis Schedule:  · Phone:  · Fax:    / signature: Electronically signed by JIM Mcfadden on 4/28/21 at 3:26 PM EDT    PHYSICIAN SECTION    Prognosis: {Prognosis:[id number]}    Condition at Discharge: [address] Patient Condition:[id number]}    Rehab Potential (if transferring to Rehab): {Prognosis:[id number]}    Recommended

## 2021-04-28 NOTE — ED NOTES
In for hourly rounding. Pt resting on cot in position of comfort. Pt remains A&Ox4, resps easy and unlabored. Pt pain remains unchanged at this time. Monitor remains in place. Updated pt on POC. Will monitor.        Dmitriy Mays RN  04/28/21 9207

## 2021-04-28 NOTE — ED NOTES
Pt provided with ice water per request. Denies additional needs at this time      Danny Chaudhry  04/28/21 8461

## 2021-04-28 NOTE — CONSULTS
VITAL SIGNS:  Showed her blood pressure is 113/54. She was in atrial  fib. She has been on the Cardizem drip. NEUROLOGIC:  She has no focal neurological deficit. NECK:  She has no JVD. She has no thyromegaly. LUNGS:  Poor air exchange. Scattered respiratory rhonchi. HEART:  She has 2/6 systolic murmur. ABDOMEN:  Her abdomen is soft _____. EXTREMITIES:  Lower limbs, there is no edema. LABORATORY DATA:  Her lab work showed her BUN is 10, creatinine is 0.7. Her BNP is 9643. Troponin was negative. Her albumin is 2.3. The  elevation of the liver enzymes. Hemoglobin is 9.5, hematocrit is 32.2. EKG showed atrial fibrillation. IMPRESSION:  1. Atypical chest pain. Cardiac enzymes are negative. We will obtain  serial cardiac enzymes. Continue the Cardizem drip and we will place  her on the nitro drip. Continue with the beta-blockers. 2.  New-onset atrial fib, on heparin. She might need to have  anticoagulation. 3.  Anemia probably related to her recent surgery. 4.  Elevation of her liver enzymes and low albumin consistent with  malnutrition probably a nutrition consultation would be helpful for the  patient. 5.  Mild anemia probably secondary to her recent surgery. 6.  History of hypercholesterolemia. 7.  The patient will be treated conservatively now. Pending her  clinical course, further recommendation will be made. We thank you very much for allowing us to share in the management of  this patient. We will follow up with you.         Flaco Foy M.D.    D: 04/28/2021 9:10:58       T: 04/28/2021 10:15:11     [email or web address]  Job#: [id number]     Doc#: [id number]    CC:

## 2021-04-28 NOTE — ED NOTES
Assumed pt care at this time. Report received from Maria L Diaz. Pt resting in bed with resp regular. Denies needs. States pain comes and goes in chest. Call light in reach.       Katy Rizzo RN  04/28/21 2443

## 2021-04-28 NOTE — ED PROVIDER NOTES
[facility]  EMERGENCY DEPARTMENT ENCOUNTER      PATIENT NAME: Stacie Reyes  MRN: [id number]  : 1935  JULIEN: 2021  PROVIDER: Debby Ceron MD      CHIEF COMPLAINT       Chief Complaint   Patient presents with    Chest Pain     due to pneumonia       Nurses Notes reviewed and I agree except as noted in the HPI. HISTORY OF PRESENT ILLNESS    Stacie Reyes is a 81 y.o. female who presents to Emergency Department with Chest Pain (due to pneumonia)      27-year-old female with past medical history of anemia, CAD, chronic back pain, L2 compression fracture, recent right intertrochanteric fracture, COPD, and pericarditis presents to ED complaining chest pain and increasing shortness of breath. This has been an ongoing issue since 2021 after her right hip intertrochanteric fracture nailing at [facility]. Of note patient was admitted on 2021 by Dr. Pema Wilkinson for anemia, troponin elevation, and BAILEE. Patient refused stress test.  Patient states she does not feel much improvement with her chest pain upon discharge on 2021. This HPI was provided by the patient's son. REVIEW OF SYSTEMS     Review of Systems   Constitutional: Positive for activity change. Negative for appetite change, chills, fatigue, fever and unexpected weight change. HENT: Negative for congestion, ear discharge, ear pain, hearing loss, nosebleeds, rhinorrhea and sore throat. Eyes: Negative for photophobia, pain, discharge, redness and itching. Respiratory: Positive for cough, chest tightness and shortness of breath. Negative for wheezing and stridor. Cardiovascular: Positive for chest pain. Negative for palpitations and leg swelling. Gastrointestinal: Negative for abdominal distention, abdominal pain, constipation, diarrhea, nausea and vomiting. Endocrine: Negative for cold intolerance, heat intolerance, polydipsia and polyphagia.    Genitourinary: Negative for dysuria, flank pain, frequency and hematuria. Musculoskeletal: Negative for arthralgias, back pain, gait problem, myalgias, neck pain and neck stiffness. Skin: Negative for pallor, rash and wound. Allergic/Immunologic: Negative for environmental allergies and food allergies. Neurological: Positive for dizziness. Negative for tremors, syncope, weakness and headaches. Psychiatric/Behavioral: Negative for agitation, behavioral problems, confusion, self-injury, sleep disturbance and suicidal ideas. PAST MEDICAL HISTORY     Past Medical History:   Diagnosis Date    Acute blood loss as cause of postoperative anemia 03/2018    CAD (coronary artery disease)      cath  50    Cardiac valve prolapse     Chest pain 5/15/2020    Chronic back pain     Compression fracture of L2 ([address] 5/26/2020    COPD (chronic obstructive pulmonary disease) (HCC)     Ex-smoker     GERD (gastroesophageal reflux disease) 2/07    EGD    Glaucoma     H/O cardiac catheterization 2002    40-50% LAD   katharine    Hearing loss secondary to cerumen impaction 5/28/2019    Hyperlipidemia     Hypertension     Inadvertent durotomy 3/12/2018    Liver disease     history of hepatitis at age 22   [address] [facility]    right shoulder and back    Pericarditis 1996       SURGICAL HISTORY       Past Surgical History:   Procedure Laterality Date    APPENDECTOMY      at age 13years   [address]  2004    right foot   [address]  2007??  &   2012? ?    normal results    COLONOSCOPY      last one 2000???    ENDOSCOPY, COLON, DIAGNOSTIC      EYE SURGERY  2009??    right eye    HIP SURGERY Right 4/2/2021    RIGHT HIP INTERTROCHANTERIC NAILING performed by Jose Ramon Veronica MD at [address]    still has ovaries    ID LAMINECTOMY,>2 SGMT,LUMBAR N/A 3/7/2018    LUMBAR LAMINECTOMY L3-S1 performed by Robert Zamora MD at [address] OFFICE/OUTPT VISIT,PROCEDURE ONLY N/A 3/12/2018    REPAIR DUROTOMY PLACEMENT OF SUBARACHNOID DRAIN performed by Med Boles MD at [address] [place] [address]  left    SPINE SURGERY  7/02    L5 cyst    UPPER GASTROINTESTINAL ENDOSCOPY  2007    [facility]    UPPER GASTROINTESTINAL ENDOSCOPY Left 5/19/2020    EGD BIOPSY performed by Johnna Almaraz MD at [place] [address]       Previous Medications    ACETAMINOPHEN (TYLENOL) 325 MG TABLET    Take 2 tablets by mouth every 4 hours as needed for Pain Maximum dose of acetaminophen is 4000 mg from all sources in 24 hours. AMOXICILLIN-CLAVULANATE (AUGMENTIN) 875-125 MG PER TABLET    Take 1 tablet by mouth 2 times daily for 7 days    APIXABAN (ELIQUIS) 5 MG TABS TABLET    Take 1 tablet by mouth 2 times daily    ASPIRIN 325 MG EC TABLET    Take 1 tablet by mouth daily    DOCUSATE SODIUM (COLACE, DULCOLAX) 100 MG CAPS    Take 100 mg by mouth 2 times daily    ISOSORBIDE MONONITRATE (IMDUR) 30 MG EXTENDED RELEASE TABLET    Take 1 tablet by mouth daily    LATANOPROST (XALATAN) 0.005 % OPHTHALMIC SOLUTION    Place 1 drop into both eyes nightly    METOPROLOL TARTRATE (LOPRESSOR) 25 MG TABLET    Take 0.5 tablets by mouth 2 times daily    MIDODRINE (PROAMATINE) 5 MG TABLET    Take 1 tablet by mouth 3 times daily (with meals)    MULTIPLE VITAMINS-MINERALS (THERAPEUTIC MULTIVITAMIN-MINERALS) TABLET    Take 1 tablet by mouth daily    NITROGLYCERIN (NITROSTAT) 0.4 MG SL TABLET    up to max of 3 total doses. If no relief after 1 dose, call 911.     PANTOPRAZOLE (PROTONIX) 40 MG TABLET    Take 1 tablet by mouth 2 times daily (before meals)    POTASSIUM CHLORIDE (KLOR-CON M) 20 MEQ TBCR EXTENDED RELEASE TABLET    Take 1 tablet by mouth 2 times daily (with meals)    SERTRALINE (ZOLOFT) 25 MG TABLET    Take 50 mg by mouth daily     SIMVASTATIN (ZOCOR) 40 MG TABLET    Take 1 tablet by mouth nightly    SODIUM BICARBONATE 650 MG TABLET    Take 2 tablets by mouth 2 times daily    SUCRALFATE (CARAFATE) Lymphocytes 6.5 %    Monocytes 4.2 %    Eosinophils 0.1 %    Basophils 0.4 %    Immature Granulocytes 3.8 %    Segs Absolute 6.3 1 - 7 thou/mm3    Lymphocytes Absolute 0.5 (L) 1.0 - 4.8 thou/mm3    Monocytes Absolute 0.3 (L) 0.4 - 1.3 thou/mm3    Eosinophils Absolute 0.0 0.0 - 0.4 thou/mm3    Basophils Absolute 0.0 0.0 - 0.1 thou/mm3    Immature Grans (Abs) 0.28 (H) 0.00 - 0.07 thou/mm3    nRBC 0 /100 wbc   Comprehensive Metabolic Panel   Result Value Ref Range    Glucose 96 70 - 108 mg/dL    CREATININE 0.7 0.4 - 1.2 mg/dL    BUN 10 7 - 22 mg/dL    Sodium 137 135 - 145 meq/L    Potassium 4.4 3.5 - 5.2 meq/L    Chloride 102 98 - 111 meq/L    CO2 20 (L) 23 - 33 meq/L    Calcium 8.2 (L) 8.5 - 10.5 mg/dL     (H) 5 - 40 U/L    Alkaline Phosphatase 158 (H) 38 - 126 U/L    Total Protein 5.2 (L) 6.1 - 8.0 g/dL    Albumin 2.3 (L) 3.5 - 5.1 g/dL    Total Bilirubin 0.3 0.3 - 1.2 mg/dL     (H) 11 - 66 U/L   Troponin   Result Value Ref Range    Troponin T < 0.010 ng/ml   Brain Natriuretic Peptide   Result Value Ref Range    Pro-BNP 9643.0 (H) 0.0 - 1800.0 pg/mL   Anion Gap   Result Value Ref Range    Anion Gap 15.0 8.0 - 16.0 meq/L   Glomerular Filtration Rate, Estimated   Result Value Ref Range    Est, Glom Filt Rate 80 (A) ml/min/1.73m2   Osmolality   Result Value Ref Range    Osmolality Calc 272.7 (L) 275.0 - 300.0 mOsmol/kg   EKG 12 Lead   Result Value Ref Range    Ventricular Rate 127 BPM    Atrial Rate 127 BPM    P-R Interval 118 ms    QRS Duration 70 ms    Q-T Interval 320 ms    QTc Calculation (Bazett) 465 ms    P Axis 66 degrees    R Axis 26 degrees    T Axis 67 degrees       RADIOLOGY  CT ABDOMEN PELVIS WO CONTRAST Additional Contrast? None   Final Result   1. Low-attenuation areas are noted within the bilateral femoral veins. It is uncertain whether this is due to mixing artifact from the prior CTA    chest, or perhaps deep venous thrombosis.   Bilateral lower extremity    venous duplex ultrasound mLs Intravenous Stopped 4/28/21 0120)   HYDROcodone-acetaminophen (NORCO) 5-325 MG per tablet 1 tablet (1 tablet Oral Given 4/27/21 2342)   iopamidol (ISOVUE-370) 76 % injection 80 mL (80 mLs Intravenous Given 4/28/21 0055)     Her initial EKG shows wandering pacer with RVR versus atrial fibrillation with RVR, hemodynamically stable. Patient is given normal saline bolus. Her heart rate slightly improved. Labs: WBC 7.4, hemoglobin 9.5, glucose 96, creatinine, BNP 9643 (base line around 1000), troponin less than 0.01.  , . Chest x-ray reviews no significant interval changes. CTA chest with IV contrast: No PE, mild to moderate bilateral pleural effusion. Rate controlled with IV Cardizem drip. She is already on aspirin and Eliquis. Case is discussed and patient is admitted by Dr. Benito Flood. She may need reevaluation by cardiologist, stress test, and thoracentesis. CRITICAL CARE:   CRITICAL CARE: There was a high probability of clinically significant/life threatening deterioration in this patient's condition which required my urgent intervention. Total critical care time was 30 minutes. This excludes any time for separately reportable procedures. CONSULTS:  Dr. Thony Vance:  None    RE-EXAMINATION AND RE-EVALUATION   Stable and feeling better. VITALS IN ED  Vitals:    04/28/21 0018 04/28/21 0120 04/28/21 0217 04/28/21 0225   BP:  121/80 119/69 113/66   Pulse: 110 156 151 145   Resp: 22 20     Temp:       TempSrc:       SpO2: 97% 96%     Weight:       Height:           FINAL IMPRESSION      1. Atrial fibrillation with RVR ([address]    2. Bilateral pleural effusion    3. Transaminitis    4. Acute on chronic congestive heart failure, unspecified heart failure type ([address]          DISPOSITION/PLAN   Admit    PATIENT REFERRED TO:  No follow-up provider specified.     DISCHARGE MEDICATIONS:  New Prescriptions    No medications on file       (Please note that portions of this note were completed with a voice recognition program.  Efforts were made to edit the dictations but occasionally words aremis-transcribed.)    Charlane Boas, MD Charlane Boas, MD  04/28/21 0692

## 2021-04-28 NOTE — H&P
Internal Medicine Specialties  H&P  4/28/2021  11:04 AM    Patient:  Jazlyn Curtis  YOB: 1935    MRN: [id number]   Acct:  [de-identified]   3B-30/030-A  Primary Care Physician: Dee Sue MD    Chief Complaint:  Chest Pain    History of Present Illness: The patient is a 81 y.o. female with pmhx of CAD, COPD, hypotension, chronic anemia, right hip fracture repair, appendectomy who presents with increased SOB and chest pain. She was discharged on 4/21 after a 7 day stay at the hospital for elevated troponin and BAILEE. During that stay she developed suspected pneumonia and UTI and was started on abx. DVTs were also found in left leg and pt was started on therapeutic dose of eliquis. Pt was taken to ED by EMS from nursing home due to pt experiencing chest \"achiness\" since she was d/c from the hospital. She reports some SOB also. Pt was placed on 2L NC. Pt also states that she vomited today 1 time some bile. In the emergency department pt was noted to be in A Fib with RVR but upon reviewing EKG, it showed sinus tachycardia with PAC's, CT abdomen/chest showed calcified granulomas in right hepatic lobe abnd increased attenuation of gallbladder. CTA chest showed no PE but some BL pleural effusions are present. Pt has no pain in her abdomen but LFT's noted to be elevated.         Past Medical History:        Diagnosis Date    Acute blood loss as cause of postoperative anemia 03/2018    CAD (coronary artery disease)      cath  50    Cardiac valve prolapse     Chest pain 5/15/2020    Chronic back pain     Compression fracture of L2 ([address] 5/26/2020    COPD (chronic obstructive pulmonary disease) (HCC)     Ex-smoker     GERD (gastroesophageal reflux disease) 2/07    EGD    Glaucoma     H/O cardiac catheterization 2002    40-50% LAD   katharine    Hearing loss secondary to cerumen impaction 5/28/2019    Hyperlipidemia     Hypertension     Inadvertent durotomy 3/12/2018    Liver disease history of hepatitis at age 22   [address]    right shoulder and back    Pericarditis 1996       Past Surgical History:        Procedure Laterality Date    APPENDECTOMY      at age 13years   [address]  2002    BUNIONECTOMY  2004    right foot   [address]  2007??  &   2012? ?    normal results    COLONOSCOPY      last one 2000???    ENDOSCOPY, COLON, DIAGNOSTIC      EYE SURGERY  2009??    right eye    HIP SURGERY Right 4/2/2021    RIGHT HIP INTERTROCHANTERIC NAILING performed by Zara Young MD at [address]    still has ovaries    [place] LAMINECTOMY,>2 SGMT,LUMBAR N/A 3/7/2018    LUMBAR LAMINECTOMY L3-S1 performed by Claude Patton MD at [facility] OFFICE/OUTPT [facility] N/A 3/12/2018    REPAIR DUROTOMY PLACEMENT OF SUBARACHNOID DRAIN performed by Claude Patton MD at [facility]  left    SPINE SURGERY  7/02    L5 cyst    UPPER GASTROINTESTINAL ENDOSCOPY  2007    [facility]    UPPER GASTROINTESTINAL ENDOSCOPY Left 5/19/2020    EGD BIOPSY performed by Aubrey Morris MD at [address] Medications: Medications Prior to Admission: sertraline (ZOLOFT) 50 MG tablet, Take 50 mg by mouth daily  Lactobacillus-Inulin (CULTURELLE DIGESTIVE DAILY PO), Take 1 capsule by mouth daily  mirtazapine (REMERON) 15 MG tablet, Take 7.5 mg by mouth nightly  Multiple Vitamins-Minerals (PRESERVISION AREDS 2+MULTI VIT PO), Take 1 capsule by mouth daily  sodium bicarbonate 650 MG tablet, Take 2 tablets by mouth 2 times daily  isosorbide mononitrate (IMDUR) 30 MG extended release tablet, Take 1 tablet by mouth daily  nitroGLYCERIN (NITROSTAT) 0.4 MG SL tablet, up to max of 3 total doses. If no relief after 1 dose, call 911.   metoprolol tartrate (LOPRESSOR) 25 MG tablet, Take 0.5 tablets by mouth 2 times daily  potassium chloride (KLOR-CON M) 20 MEQ TBCR extended release tablet, Take 1 tablet by mouth 2 times daily (with meals)  midodrine (PROAMATINE) 5 MG tablet, Take 1 tablet by mouth 3 times daily (with meals)  amoxicillin-clavulanate (AUGMENTIN) 875-125 MG per tablet, Take 1 tablet by mouth 2 times daily for 7 days  apixaban (ELIQUIS) 5 MG TABS tablet, Take 1 tablet by mouth 2 times daily  docusate sodium (COLACE, DULCOLAX) 100 MG CAPS, Take 100 mg by mouth 2 times daily  aspirin 325 MG EC tablet, Take 1 tablet by mouth daily  pantoprazole (PROTONIX) 40 MG tablet, Take 1 tablet by mouth 2 times daily (before meals)  sucralfate (CARAFATE) 1 GM tablet, Take 1 tablet by mouth 4 times daily (before meals and nightly)  simvastatin (ZOCOR) 40 MG tablet, Take 1 tablet by mouth nightly  Multiple Vitamins-Minerals (THERAPEUTIC MULTIVITAMIN-MINERALS) tablet, Take 1 tablet by mouth daily  latanoprost (XALATAN) 0.005 % ophthalmic solution, Place 1 drop into both eyes nightly  tiZANidine (ZANAFLEX) 4 MG tablet, Take 1 tablet by mouth every 8 hours as needed (muscle spasm)  acetaminophen (TYLENOL) 325 MG tablet, Take 2 tablets by mouth every 4 hours as needed for Pain Maximum dose of acetaminophen is 4000 mg from all sources in 24 hours. Allergies:  Patient has no known allergies. Social History:    reports that she quit smoking about 31 years ago. Her smoking use included cigarettes. She quit after 30.00 years of use. She has never used smokeless tobacco. She reports that she does not drink alcohol or use drugs.         Family History:       Problem Relation Age of Onset    Tuberculosis Mother     Tuberculosis Father        Review of Systems:    General ROS: negative  Psychological ROS: negative  Hematological and Lymphatic ROS: negative  Endocrine ROS: negative  Vision:negative  ENT ROS: Denies  Respiratory ROS: Shortness of breath  Cardiovascular ROS: Chest Pain resolved since starting nitro drip  Gastrointestinal ROS: no abdominal pain, change in bowel habits, or black or bloody stools  Genito-Urinary ROS: no dysuria, trouble noted  Musculoskeletal - no joint tenderness, deformity or swelling  Extremities - peripheral pulses normal, left leg edematous   Skin - normal coloration and turgor, no rashes, no suspicious skin lesions noted    Review of Labs and Diagnostic Testing:    CBC:   Recent Labs     04/27/21 2128   WBC 7.4   HGB 9.5*   HCT 32.6*   MCV 93.4        BMP:   Recent Labs     04/27/21 2128      K 4.4      CO2 20*   BUN 10   CREATININE 0.7   CALCIUM 8.2*   GLUCOSE 96     PT/INR: No results for input(s): PROTIME, INR in the last 72 hours. APTT: No results for input(s): APTT in the last 72 hours. Lipids:   Recent Labs     04/27/21 2128   ALKPHOS 158*   *   *   BILITOT 0.3   LABALBU 2.3*   LIPASE 27.6     Troponin:   Recent Labs     04/27/21 2128   TROPONINT < 0.010        Imaging:  Echo Complete 2d W Doppler W Color    Result Date: 4/8/2021  Transthoracic Echocardiography Report (TTE)  Demographics   Patient Name    Aime Latin  Gender               Female                  M   MR #            [id number]         Race                                                      Ethnicity   Account #       [de-identified]         Room Number          6380   Accession       [id number]        Date of Study        04/08/2021  Number   Date of Birth   1935        Referring Physician  LUCY Felix MD   Age             81 year(s)        [place]                                                         [facility]                                     Interpreting         Keila Khan MD                                    Physician  Procedure Type of Study   TTE procedure:ECHOCARDIOGRAM COMPLETE 2D W DOPPLER W COLOR.   Procedure Date Date: 04/08/2021 Start: 08:19 AM Study Location: Bedside Technical Quality: Limited visualization due to lung interface. Indications:Chest pain. Additional Medical History: Former smoker, GERD, COPD, Arthritis, Pericarditis, Mitral valve prolapse Patient Status: Routine Height: 61 inches Weight: 138 pounds BSA: 1.61 m^2 BMI: 26.08 kg/m^2 BP: 138/68 mmHg  Conclusions   Summary  Normal left ventricle size and systolic function. Ejection fraction was  estimated at 60 %. There were no regional left ventricular wall motion  abnormalities and wall thickness was within normal limits. Doppler parameters were consistent with abnormal left ventricular  relaxation (grade 1 diastolic dysfunction). Signature   ----------------------------------------------------------------  Electronically signed by Pat Sheth MD (Interpreting  physician) on 04/08/2021 at 07:36 PM  ----------------------------------------------------------------   Findings   Mitral Valve  The mitral valve structure was normal with normal leaflet separation. DOPPLER: The transmitral velocity was within the normal range with no  evidence for mitral stenosis. Mild mitral regurgitation is present. Aortic Valve  The aortic valve was trileaflet with normal thickness and cuspal  separation. DOPPLER: Transaortic velocity was within the normal range with  no evidence of aortic stenosis. Mild aortic regurgitation is noted. Tricuspid Valve  The tricuspid valve structure was normal with normal leaflet separation. DOPPLER: There was no evidence of tricuspid stenosis. Mild tricuspid regurgitation visualized. Pulmonic Valve  The pulmonic valve leaflets exhibited normal thickness, no calcification,  and normal cuspal separation. DOPPLER: The transpulmonic velocity was  within the normal range with no evidence for regurgitation. Left Atrium  Left atrial size was normal.   Left Ventricle  Normal left ventricle size and systolic function. Ejection fraction was  estimated at 60 %.  There were no regional left ventricular wall motion  abnormalities and wall thickness was within normal limits. Doppler parameters were consistent with abnormal left ventricular  relaxation (grade 1 diastolic dysfunction). Right Atrium  Right atrial size was normal.   Right Ventricle  The right ventricular size was normal with normal systolic function and  wall thickness. Pericardial Effusion  The pericardium was normal in appearance with no evidence of a pericardial  effusion. Pleural Effusion  No evidence of pleural effusion. Aorta / Great Vessels  -Aortic root dimension within normal limits.  -The Pulmonary artery is within normal limits. -IVC size is within normal limits with normal respiratory phasic changes.   M-Mode/2D Measurements & Calculations   LV Diastolic   LV Systolic Dimension:    AV Cusp Separation: 1.8 cmLA  Dimension: 4.4 3.2 cm                    Dimension: 2.8 cmAO Root  cm             LV Volume Diastolic: 08.3 Dimension: 3.4 cmLA Area: 11.6  LV FS:27.3 %   ml                        cm^2  LV PW          LV Volume Systolic: 41 ml  Diastolic: 0.8 LV EDV/LV EDV Index: 87.7  cm             ml/54 m^2LV ESV/LV ESV  Septum         Index: 41 ml/25 m^2       RV Diastolic Dimension: 1.5 cm  Diastolic: 0.8 EF Calculated: 53.3 %  cm                                       LA/Aorta: 0.82                                            LA volume/Index: 24.5 ml /15m^2  Doppler Measurements & Calculations   MV Peak E-Wave: 82 cm/s    AV Peak Velocity: 159  LVOT Peak Velocity: 101  MV Peak A-Wave: 103 cm/s   cm/s                   cm/s  MV E/A Ratio: 0.8          AV Peak Gradient:      LVOT Peak Gradient: 4  MV Peak Gradient: 2.69     10.11 mmHg             mmHg  mmHg   MV Deceleration Time: 201  msec                                              TR Velocity:276 cm/s  MV P1/2t: 59 msec          AV P1/2t: 669 msec     TR Gradient:30.47 mmHg  MVA by PHT:3.73 cm^2                              PV Peak Velocity: 106                                                    cm/s  MV E' Septal Velocity: 5.4 aneurysm. Subcutaneous edema/anasarca is noted within the left lateral abdominal wall. CT PELVIS: Garcia catheter is noted in the lumen of the urinary bladder. There is no evidence of a small bowel obstruction or free air. There is no pericolonic inflammation. Appendix is not definitively visualized. Surgical lupis and screws transfix a comminuted intertrochanteric fracture of the right hip. Multilevel laminectomy defects are again noted within the lumbar spine. Again noted is mild grade 1 anterolisthesis of L4 on L5. Subcutaneous edema/stranding is noted within the proximal thighs. Small amount of soft tissue fluid is noted within the proximal right thigh which may be related to postoperative changes. Low attenuation areas are noted within the bilateral femoral veins. It is uncertain whether this is due to mixing artifact from the prior CTA chest, or perhaps DVT. Bilateral lower extremity venous duplex ultrasound study is recommended. 1.  Low-attenuation areas are noted within the bilateral femoral veins. It is uncertain whether this is due to mixing artifact from the prior CTA chest, or perhaps deep venous thrombosis. Bilateral lower extremity venous duplex ultrasound study in the ER is recommended. 2.  No evidence of a bowel obstruction or free air. 3.  Postoperative changes are noted involving the right hip. 4.  Faint increased attenuation in the gallbladder which could represent vicarious excretion of contrast, gallstones, and/or sludge. There is no pericholecystic inflammation. 5.  Mild to moderate prominence of the right renal pelvis is noted, this appears slightly improved compared to the prior study. 6.  Additional findings are detailed above.  This document has been electronically signed by: Leonard Coles M.D. on 04/28/2021 02:49 AM All CTs at this facility use dose modulation techniques and iterative reconstructions, and/or weight-based dosing when appropriate to reduce radiation to a low as reasonably achievable. Xr Pelvis (1-2 Views)    Addendum Date: 4/1/2021     ADDENDUM #1 See above Final report electronically signed by Dr. Eamon Scott on 4/1/2021 12:09 PMORIGINAL REPORT  PROCEDURE: XR PELVIS (1-2 VIEWS) CLINICAL INFORMATION: Trauma. COMPARISON: No prior study. TECHNIQUE: AP view of the pelvis. FINDINGS: The pelvic ring is intact. There is a comminuted fracture of the right intratrochanteric area. Medial angulation is present. No dislocation. The superior and inferior pubic rami are intact. The sacrum and sacroiliac joints appear normal. No degenerative changes are noted. Mild sclerotic facet change of L5-S1 L4-5. The spinous processes of L3 3 through L5 then removed. Result Date: 4/1/2021  PROCEDURE: XR PELVIS (1-2 VIEWS) CLINICAL INFORMATION: Trauma. COMPARISON: No prior study. TECHNIQUE: AP view of the pelvis. FINDINGS: The pelvic ring is intact. There is a comminuted fracture of the right intratrochanteric area. Medial angulation is present. No dislocation. The superior and inferior pubic rami are intact. The sacrum and sacroiliac joints appear normal. No degenerative changes are noted. Mild sclerotic facet change of L5-S1 L4-5. The spinous processes of L3 3 through L5 then removed. Medial angulated comminuted right hip fracture. . **This report has been created using voice recognition software. It may contain minor errors which are inherent in voice recognition technology. ** Final report electronically signed by Dr. Eamon Scott on 4/1/2021 11:57 AM    Xr Hip Right (2-3 Views)    Result Date: 4/2/2021  PROCEDURE: XR HIP RIGHT (2-3 VIEWS) CLINICAL INFORMATION: post nailing. COMPARISON: Right hip series dated 4/23/2018. TECHNIQUE: Intraoperative fluoroscopic support for right femoral surgery. There are 4 saved fluoroscopic images. Fluoroscopy time = 90.3 seconds.      FINDINGS/IMPRESSION: Saved fluoroscopic images show intramedullary nail with distal fixation screw and cross threaded screws traversing the right femoral neck with anatomic alignment and no evidence of hardware failure. Please refer to the operative report for further information. **This report has been created using voice recognition software. It may contain minor errors which are inherent in voice recognition technology. ** Final report electronically signed by Dr. Evelyn Wiley MD on 4/2/2021 12:31 PM    Xr Femur Right (min 2 Views)    Result Date: 4/1/2021  PROCEDURE: XR FEMUR RIGHT (MIN 2 VIEWS) CLINICAL INFORMATION: fracture, surgical planning. COMPARISON: No prior study. TECHNIQUE: 4 views of the right hip. FINDINGS: Fractures-comminuted fracture with medial angulation at the intratrochanteric area. Joint spaces-No subluxation or dislocation. Degenerative changes-No erosions or osteophytes. Effusions-None. Soft tissues-Unremarkable. Medial angulated comminuted right intratrochanteric fracture. **This report has been created using voice recognition software. It may contain minor errors which are inherent in voice recognition technology. ** Final report electronically signed by Dr. Jose Francisco Cooper on 4/1/2021 12:23 PM    Ct Head Wo Contrast    Result Date: 4/1/2021  PROCEDURE: CT HEAD WO CONTRAST CLINICAL INFORMATION: Trauma fall aspirin. COMPARISON: CT head dated 3/10/2020. TECHNIQUE: Noncontrast 5 mm axial images were obtained through the brain. Sagittal and coronal reconstructions were created. All CT scans at this facility use dose modulation, iterative reconstruction, and/or weight-based dosing when appropriate to reduce radiation dose to as low as reasonably achievable. FINDINGS: There is stable moderate volume loss. Gray-white matter differentiation appears grossly preserved. No intracranial hemorrhage, mass effect or midline shift is identified. The calvarium appears intact. No evidence of acute intracranial abnormality. **This report has been created using voice recognition software. It may contain minor errors which are inherent in voice recognition technology. ** Final report electronically signed by Dr. Reymundo Shoemaker MD on 4/1/2021 12:15 PM    Ct Chest W Contrast    Result Date: 4/1/2021  PROCEDURE: CT CHEST W CONTRAST, CT THORACIC RECONSTRUCTION WO POST PROCESS CLINICAL INFORMATION: Trauma hypotensive . Fall. COMPARISON: CT chest dated 3/18/2021. TECHNIQUE: Helical CT of the chest following intravenous administration of 80 mL Isovue-370 injected in the left arm with sagittal and coronal reconstructions. Reformatted images of the thoracic spine with axial, sagittal and coronal reconstructions were  also created. All CT scans at this facility use dose modulation, iterative reconstruction, and/or weight-based dosing when appropriate to reduce radiation dose to as low as reasonably achievable. FINDINGS: There is a stable linear pleural-based nodule at the lateral aspect of the right upper lobe near the apex. There is a stable 5 mm nodule in the right upper lobe more inferiorly. There is a stable 2 mm nodule at the lateral aspect of the major fissure. These are all stable dating back to 2019 as evidence for benignity. There is stable calcified nodule at the right lung base as evidence for old granulomatous disease. Lungs otherwise appear clear. The heart and great vessels are intact. No axillary, mediastinal or hilar lymphadenopathy is identified. There is a dextrocurvature of the thoracic spine, stable compared to prior exam. There is scalloping of the superior endplate of T8, unchanged compared to prior exam. No acute fracture of the thoracic vertebral column or bony thorax is identified. There are stable mild degenerative changes of the thoracic spine. There are stable hypodense cystic lesions in both kidneys. There are calcified granulomas in the liver and spleen. Visualized upper abdominal solid organs are otherwise unremarkable. 1. No evidence of acute intrathoracic adenopathy. 2. No evidence of acute osseous injury of the thoracic spine. **This report has been created using voice recognition software. It may contain minor errors which are inherent in voice recognition technology. ** Final report electronically signed by Dr. Alma Duarte MD on 4/1/2021 12:29 PM    Cta Chest W Wo Contrast    Result Date: 4/28/2021  CTA OF THE CHEST WITH CONTRAST COMPARISON: Chest x-ray 4/27/2021. CTA chest 4/8/2021. TECHNIQUE: A CTA of the chest was performed following the administration of nonionic IV contrast. Sagittal and coronal MIP reconstruction images were performed on the same workstation. A dose reduction technique was utilized. FINDINGS: There is no evidence of a pulmonary embolism. Thoracic aorta is partially obscured by motion/streak artifact, but is normal in caliber without evidence of an aneurysm. Again noted is a calcified nodule in the right thyroid lobe measuring 3 mm on axial image 61. A moderate sized hiatal hernia is again noted. Small to moderate sized bilateral pleural effusions are noted, this is increased since the prior study. Some of the left-sided pleural fluid is noted to reside within the oblique fissure and might be loculated, as seen on axial image 162. Compressive atelectasis is noted within the bilateral lower lobes. There are also scattered atelectatic changes bilaterally. There is no adenopathy. Scans of the upper abdomen demonstrate a subcentimeter calcification in the right hepatic lobe. Calcified granulomas are also partially visualized within the spleen. Renal cysts are also partially visualized bilaterally. Bone windows demonstrate no acute abnormalities. Dextrocurvature of the thoracic spine is noted. Multiple healing left-sided rib fractures are again noted. 1.  No evidence of a pulmonary embolism. 2.  Small to moderate-sized bilateral pleural effusions. The pleural effusions appear increased since the prior study.  3.  Multifocal atelectasis is noted bilaterally. 4.  Additional findings are detailed above. This document has been electronically signed by: Mehdi Robbins M.D. on 04/28/2021 01:59 AM All CTs at this facility use dose modulation techniques and iterative reconstructions, and/or weight-based dosing when appropriate to reduce radiation to a low as reasonably achievable. Cta Chest W Wo Contrast    Result Date: 4/8/2021  CT Angiography Chest W Contrast 3D Postprocessing COMPARISON:  CT,SR  - CTA CHEST W WO CONTRAST  - 03/18/2021 06:33 PM EDT FINDINGS: No pulmonary embolism. No thoracic aortic aneurysm or dissection. Small bilateral pleural effusions with adjacent atelectasis and/or infiltrates. Calcified pulmonary granulomas. No pneumothorax. No cardiomegaly. No pericardial effusion. No pathologically enlarged lymph nodes. No acute fracture. Chronic rib fractures. Small to moderate hiatal hernia. Diffusely hypodense liver consistent with hepatic steatosis. Nodular liver contours consistent with hepatic cirrhosis. Calcified granulomas in the liver and spleen. Partially visualized bilateral renal cysts. Partially visualized liquid stool and air-fluid levels in the colon. No pulmonary embolism. Small bilateral pleural effusions with adjacent atelectasis and/or infiltrates. Findings consistent with diarrheal illness/colitis. Nonemergent/incidental findings in the report. This document has been electronically signed by: Fabrice Mccormick MD on 04/08/2021 05:19 AM All CTs at this facility use dose modulation techniques and iterative reconstructions, and/or weight-based dosing when appropriate to reduce radiation to a low as reasonably achievable. Ct Cervical Spine Wo Contrast    Result Date: 4/1/2021  PROCEDURE: CT CERVICAL SPINE WO CONTRAST CLINICAL INFORMATION: Trauma. Fall. COMPARISON: CT cervical spine dated 3/10/2020. TECHNIQUE: 3 mm noncontrast axial images were obtained through the cervical spine with sagittal and coronal reconstructions. All CT scans at this facility use dose modulation, iterative reconstruction, and/or weight-based dosing when appropriate to reduce radiation dose to as low as reasonably achievable. FINDINGS: There is grade 2 anterolisthesis of C5 relative to C6 on the basis of degenerative change, stable compared to prior exam. There is a mild levocurvature of the cervical spine which is likely positional. There is otherwise anatomic vertebral body height and alignment. No fracture of the cervical vertebral column is identified. The craniocervical junction appears intact. No paraspinal or epidural fluid collection is identified. There are stable degenerative changes of the cervical spine. Paraspinal soft tissues are grossly unremarkable. No evidence of acute osseous injury of the cervical spine. **This report has been created using voice recognition software. It may contain minor errors which are inherent in voice recognition technology. ** Final report electronically signed by Dr. Elva Crocker MD on 4/1/2021 12:17 PM    Us Renal Complete    Result Date: 4/15/2021  PROCEDURE: US RENAL COMPLETE CLINICAL INFORMATION: 81-year-old female with acute kidney injury. COMPARISON: No prior study. TECHNIQUE: Grayscale and color images were obtained of both kidneys. FINDINGS: RIGHT KIDNEY - 10.9 x 6.2 x 5.3 cm Resistive Index - 0.81 Cortical Thickness - 1.4 cm LEFT KIDNEY - 8.1 x 6.4 x 4.9 cm Resistive Index - 0.81 Cortical Thickness - 0.69 cm URINARY BLADDER Pre-Void - Garcia catheter in place There is normal echogenicity of the renal parenchyma bilaterally. The right kidney is asymmetrically larger than the left. There is thinning of the left renal cortex. There is prominence of the right renal pelvis without zaheer hydronephrosis. No stones are noted. There are several anechoic simple appearing nonvascular cyst arising from both kidneys.  The largest on the right measures 2.3 x 2.2 x 2.5 cm and the largest on the left measures 2.5 x 2.7 x 2.2 cm. Urinary bladder is nondistended and a Garcia catheter is within its lumen. 1. The right kidney is asymmetrically larger than the left. 2. There is thinning of the left renal cortex. 3. There are several bilateral Bosniak 1 cyst. 4. The bilateral renal artery resistive indices are elevated which is likely related to chronic medical renal disease. **This report has been created using voice recognition software. It may contain minor errors which are inherent in voice recognition technology. ** Final report electronically signed by Dr Caren Bazan on 4/15/2021 6:46 PM    Ct Abdomen Pelvis W Iv Contrast Additional Contrast? None    Result Date: 4/1/2021  PROCEDURE: CT ABDOMEN PELVIS W IV CONTRAST, CT RECONSTRUCTION WO POST PROCESS CLINICAL INFORMATION: Trauma hypotensive . Fall. COMPARISON: CT abdomen pelvis dated 3/18/2021. TECHNIQUE: Helical CT of the abdomen and pelvis following intravenous administration of 80 mL Isovue-370 injected in the left arm with sagittal and coronal reconstructions. Reformatted images of the right hip with axial, sagittal and coronal reconstructions were also created. All CT scans at this facility use dose modulation, iterative reconstruction, and/or weight-based dosing when appropriate to reduce radiation dose to as low as reasonably achievable. FINDINGS:  Abdomen/pelvis: The liver and gallbladder are unremarkable. There is a moderate hiatal hernia, similar to prior exam. Adrenal glands are unremarkable. There are numerous bilateral renal cysts. However, there is a 2.1 x 1.8 cm hypodense lesion at the inferior pole of the  left kidney with possible internal enhancement. There are calcified granulomas in the spleen which is otherwise unremarkable. The pancreas is partially fatty replaced. No retroperitoneal or mesenteric lymphadenopathy is identified. There are numerous diverticula within the large bowel without adjacent inflammation to suggest diverticulitis.  There is moderately prominent stool throughout the large bowel. Small bowel appears within normal limits. The unopacified bladder is unremarkable. The uterus appears to be surgically absent. No free fluid is identified. Right hip: There is a comminuted intertrochanteric fracture of the right femur with displacement of the fracture fragments and approximately 90 degree angulation of the femoral neck relative to the femoral diaphysis. There is a prominent hematoma at the fracture site extending into the right gluteus muscle. The humeral head is normally located opposite the glenoid. Abdomen/pelvis: 1. No evidence of acute intra-abdominal or intrapelvic abnormality. 2. 2.1 cm apparently complex cystic lesion at the inferior pole of the left kidney which is not fully characterized. Recommend follow-up multiphase renal CT or MRI for further evaluation. 3. Colonic diverticulosis. 4. Moderate retained stool. Right hip: Comminuted and angulated intertrochanteric fracture of the right femur with prominent associated hematoma extending into the right gluteus muscle. **This report has been created using voice recognition software. It may contain minor errors which are inherent in voice recognition technology. ** Final report electronically signed by Dr. Alexandra Flynn MD on 4/1/2021 2:55 PM    Xr Chest Portable    Result Date: 4/27/2021  CHEST ONE VIEW COMPARISON: 4/18/2021. FINDINGS: Single frontal view of the chest was performed. Cardiac silhouette is accentuated by the portable technique. There are calcifications in the thoracic aortic arch. Again noted is left basilar atelectasis. A tiny left pleural effusion is again suspected. A tiny right pleural effusion is suspected, this appears stable compared to the prior study. There are no consolidations. 1.  No significant interval change since the prior study. Again noted is left basilar atelectasis. Tiny bilateral pleural effusions are suspected.  This document has Multiple calcified nodes on the right paratracheal-upper right hilar area are still present. Lungs: There are no pulmonary infiltrates or effusions. Pleura: No effusion or pneumothorax. Vessels: Normal sized aorta with moderate calcification at the arch. Stable. Bones: No suspicious osseous lesions are present. Right mediastinal calcified nodes from granulomatous disease. No acute pulmonary findings. **This report has been created using voice recognition software. It may contain minor errors which are inherent in voice recognition technology. ** Final report electronically signed by Dr. Cleo Bey on 4/1/2021 12:13 PM    Vl Dup Lower Extremity Venous Bilateral    Result Date: 4/19/2021  PROCEDURE: VL DUP LOWER EXTREMITY VENOUS BILATERAL CLINICAL INFORMATION: rule out DVT, patient with c/o CP. Recent hip fracture and surgery. COMPARISON: No prior study. TECHNIQUE: Venous doppler ultrasound was performed of the bilateral lower extremities using gray scale, color flow and spectral doppler imaging. FINDINGS:  There is echogenic clot within the proximal femoral vein and profunda femoral vein extending into the left common femoral vein. There is also echogenic clot in a portion of the distal left femoral vein. These areas are noncompressible. There is intervening flow and compressibility in the left mid femoral vein and left popliteal vein. There is hypoechoic thrombus in the left peroneal vein and posterior tibial vein which are noncompressible. The left anterior tibial vein and greater saphenous vein are compressible with normal flow. There is normal compressibility, respiratory variation and augmentation of flow in the deep veins of the right lower extremity from the common femoral vein through the popliteal vein. Right calf veins are patent and compressible.      1. Deep venous thrombosis involving the left proximal femoral vein and common femoral vein and additional thrombus in the distal left femoral vein. 2. Thrombus in left-sided calf veins. 3. No evidence of deep venous thrombosis in the right lower extremity. Findings were discussed with Miguel Wheat RN via telephone at the time of interpretation. **This report has been created using voice recognition software. It may contain minor errors which are inherent in voice recognition technology. ** Final report electronically signed by Dr. Evelyn Wiley MD on 4/19/2021 3:53 PM    Fluoro For Surgical Procedures    Result Date: 4/2/2021  Radiology exam is complete. No Radiologist dictation. Please follow up with ordering provider. Ct Reconstruction Wo Post Process    Result Date: 4/1/2021  PROCEDURE: CT ABDOMEN PELVIS W IV CONTRAST, CT RECONSTRUCTION WO POST PROCESS CLINICAL INFORMATION: Trauma hypotensive . Fall. COMPARISON: CT abdomen pelvis dated 3/18/2021. TECHNIQUE: Helical CT of the abdomen and pelvis following intravenous administration of 80 mL Isovue-370 injected in the left arm with sagittal and coronal reconstructions. Reformatted images of the right hip with axial, sagittal and coronal reconstructions were also created. All CT scans at this facility use dose modulation, iterative reconstruction, and/or weight-based dosing when appropriate to reduce radiation dose to as low as reasonably achievable. FINDINGS:  Abdomen/pelvis: The liver and gallbladder are unremarkable. There is a moderate hiatal hernia, similar to prior exam. Adrenal glands are unremarkable. There are numerous bilateral renal cysts. However, there is a 2.1 x 1.8 cm hypodense lesion at the inferior pole of the  left kidney with possible internal enhancement. There are calcified granulomas in the spleen which is otherwise unremarkable. The pancreas is partially fatty replaced. No retroperitoneal or mesenteric lymphadenopathy is identified. There are numerous diverticula within the large bowel without adjacent inflammation to suggest diverticulitis.  There is moderately prominent stool throughout the large bowel. Small bowel appears within normal limits. The unopacified bladder is unremarkable. The uterus appears to be surgically absent. No free fluid is identified. Right hip: There is a comminuted intertrochanteric fracture of the right femur with displacement of the fracture fragments and approximately 90 degree angulation of the femoral neck relative to the femoral diaphysis. There is a prominent hematoma at the fracture site extending into the right gluteus muscle. The humeral head is normally located opposite the glenoid. Abdomen/pelvis: 1. No evidence of acute intra-abdominal or intrapelvic abnormality. 2. 2.1 cm apparently complex cystic lesion at the inferior pole of the left kidney which is not fully characterized. Recommend follow-up multiphase renal CT or MRI for further evaluation. 3. Colonic diverticulosis. 4. Moderate retained stool. Right hip: Comminuted and angulated intertrochanteric fracture of the right femur with prominent associated hematoma extending into the right gluteus muscle. **This report has been created using voice recognition software. It may contain minor errors which are inherent in voice recognition technology. ** Final report electronically signed by Dr. Yanet Davis MD on 4/1/2021 2:55 PM    Ct Lumbar Reconstruction Wo Post Process    Result Date: 4/1/2021  PROCEDURE: CT LUMBAR RECONSTRUCTION WO POST PROCESS CLINICAL INFORMATION: Trauma. Fall. COMPARISON: CT abdomen pelvis dated 3/18/2021. TECHNIQUE: Reformatted images of the lumbar spine from CT abdomen and pelvis source images with axial, sagittal and coronal reconstructions. All CT scans at this facility use dose modulation, iterative reconstruction, and/or weight-based dosing when appropriate to reduce radiation dose to as low as reasonably achievable. FINDINGS: There is a mild levocurvature of the lumbar spine, stable compared to prior CT.  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 Redemonstration of laminectomies at L4-5 and L5-S1, unchanged compared to prior exam. There is scalloping of the superior endplate of L2 with approximately 20% central height loss, unchanged compared to prior exam. There is otherwise anatomic vertebral body height and alignment. No acute fracture of the lumbar vertebral column is identified. There are stable degenerative changes with facet hypertrophy at the lower lumbar levels. No evidence of acute osseous injury of the lumbar spine. **This report has been created using voice recognition software. It may contain minor errors which are inherent in voice recognition technology. ** Final report electronically signed by Dr. Ronald Machuca MD on 4/1/2021 12:33 PM    Ct Thoracic Reconstruction Wo Post Process    Result Date: 4/1/2021  PROCEDURE: CT CHEST W CONTRAST, CT THORACIC RECONSTRUCTION WO POST PROCESS CLINICAL INFORMATION: Trauma hypotensive . Fall. COMPARISON: CT chest dated 3/18/2021. TECHNIQUE: Helical CT of the chest following intravenous administration of 80 mL Isovue-370 injected in the left arm with sagittal and coronal reconstructions. Reformatted images of the thoracic spine with axial, sagittal and coronal reconstructions were  also created. All CT scans at this facility use dose modulation, iterative reconstruction, and/or weight-based dosing when appropriate to reduce radiation dose to as low as reasonably achievable. FINDINGS: There is a stable linear pleural-based nodule at the lateral aspect of the right upper lobe near the apex. There is a stable 5 mm nodule in the right upper lobe more inferiorly. There is a stable 2 mm nodule at the lateral aspect of the major fissure. These are all stable dating back to 2019 as evidence for benignity. There is stable calcified nodule at the right lung base as evidence for old granulomatous disease.  Lungs otherwise appear clear. The heart and great vessels are intact. No axillary, mediastinal or hilar lymphadenopathy is identified. There is a dextrocurvature of the thoracic spine, stable compared to prior exam. There is scalloping of the superior endplate of T8, unchanged compared to prior exam. No acute fracture of the thoracic vertebral column or bony thorax is identified. There are stable mild degenerative changes of the thoracic spine. There are stable hypodense cystic lesions in both kidneys. There are calcified granulomas in the liver and spleen. Visualized upper abdominal solid organs are otherwise unremarkable. 1. No evidence of acute intrathoracic adenopathy. 2. No evidence of acute osseous injury of the thoracic spine. **This report has been created using voice recognition software. It may contain minor errors which are inherent in voice recognition technology. ** Final report electronically signed by Dr. Santa Castle MD on 4/1/2021 12:29 PM        Assessment/Plan:    1. Sinus tachycardia with chest pain   a. Noted pt not in A fib and NSR on monitor today: D/C cardizem drip (discussed this with cardiology also and Dr Debi Lima agreed)  b. Increase PO Lopressor dose to 25 BID  c. Telemetry monitoring   d. Cont nitro drip per cardiology  e. Cont heparin drip per cardiology  f. Cardiology has no plans to cath or stress test the pt today  2. Acute respiratory failure  a. Now on 2L NC  b. Noted pt not on O2 at nursing home  c. Duoneb PRN  d. Wean as tolerated  3. Elevated LFT's  a. Noted CT abdomen shows granulomas and attenuation in gallbladder/liver  b. Follow up with liver/gallbladder ultrasound  c. Consider GI consult depending on ultrasound results   4. Hypotension  a. Cont midodrine   b. Monitor   5. Urinary retention  a. Has alvarez catheter   b. Follows with urology outpatient  6. UTI  a. Pt has 2 more days of Augmentin which was prescribed on discharge on 4/21  7. Pleural effusions  a.  Lasix X1 dose b. F/u with chest x ray if no improvement in oxygen/SOB  8. Microcytic anemia  a. Hgb stable  9. Diastolic HF  a. Cont home meds   10. HLD  a. Cont home med   11. DVT's  a. On Heparin drip per cardiology        Assessment and plan of care discussed with supervising physician, Dr Alcides Warren. Electronically signed by YASMINE Palacios CNP on 4/28/2021 at 11:04 AM         Copy: Primary Care Physician: Bob Maurer MD      I have discussed the case, including pertinent history and exam findings with the NP. I have seen and examined the patient and the key elements of the encounter have been performed by me. I agree with the assessment, plan and orders as documented by the NP.     Electronically signed by Saravanan Block MD on 4/28/2021 at 1:34 PM

## 2021-04-28 NOTE — ED NOTES
ED nurse-to-nurse bedside report    Chief Complaint   Patient presents with    Chest Pain     due to pneumonia      LOC: alert and orientated to name, place, date  Vital signs   Vitals:    04/28/21 0217 04/28/21 0225 04/28/21 0432 04/28/21 0548   BP: 119/69 113/66 107/67 119/65   Pulse: 151 145 143 156   Resp:   18 17   Temp:       TempSrc:       SpO2:   97% 98%   Weight:       Height:          Pain:    Pain Interventions: none  Pain Goal: 4  Oxygen: Yes    Current needs required 2L NC   Telemetry: Yes  LDAs:   Peripheral IV 04/28/21 Left Forearm (Active)   Site Assessment Clean;Dry; Intact 04/28/21 0549   Line Status Infusing 04/28/21 0549   Dressing Status Clean;Dry; Intact 04/28/21 0549   Dressing Intervention New 04/28/21 0009     Continuous Infusions:    dilTIAZem 10 mg/hr (04/28/21 0432)     Mobility: Requires assistance * 2  Chowdhury Fall Risk Score:    Fall Risk 2/11/2021 8/31/2020 3/12/2020 2/26/2019 9/26/2018 4/4/2018 11/7/2017   2 or more falls in past year? no no no no no no no   Fall with injury in past year? no no no no no no no     Fall Interventions: call light, bed low, wheels locked, side rails  Report given to: Jodee Mcmahon RN  04/28/21 [id number]

## 2021-04-28 NOTE — ED NOTES
In for hourly rounding. Pt resting on cot in position of comfort. Pt remains A&Ox4, resps easy and unlabored. IV shows no s/s of infection or infiltration. Pt pain slightly improved at this time, however still rates 7/10. Monitor remains in place. Updated pt on POC. Will monitor.      Leda Mcardle, RN  04/28/21 [id number]

## 2021-04-28 NOTE — ED NOTES
In for hourly rounding. Pt resting on cot in position of comfort. Pt remains A&Ox4, resps easy and unlabored. Pt pain remains unchanged at this time. Monitor remains in place. Updated pt on POC. Will monitor.      Natalia Funez RN  04/28/21 [id number]

## 2021-04-28 NOTE — PROGRESS NOTES
Pharmacy Medication History Note      List of current medications patient is taking is complete. Source of information: [facility]    Changes made to medication list:  Medications removed (include reason, ex. therapy complete or physician discontinued):  Sertraline - wrong strength      Medications added/doses adjusted:  Sertraline   Mirtazipine  culturelle    Other notes (ex. Recent course of antibiotics, Coumadin dosing):  Patient started on augmentin for PNA 4/21 - 4/29  Denies use of other OTC or herbal medications.       Allergies reviewed      Electronically signed by Genetta Jeans, [facility] on 4/28/2021 at 10:12 AM

## 2021-04-28 NOTE — ED TRIAGE NOTES
Pt to ED via EMS with c/o chest \"achiness\" that has been going on about a week. Son, bedside, states that the pt was seen here last week and diagnosed with pneumonia and that's why she's been having the achiness. Pt states she has not taken anything to help the pain at this time. Pt additionally states she vomited once today and thinks she choked on some of the emesis because she is currently having a wet cough and is able to expel some secretions. Pt states she is not having any SOB or pain elsewhere. VSS, pt is denying any additional needs or concerns at this time.  Call light remains in place

## 2021-04-28 NOTE — ED NOTES
ED nurse-to-nurse bedside report    Chief Complaint   Patient presents with    Chest Pain     due to pneumonia      LOC: alert and orientated to name, place, date  Vital signs   Vitals:    04/28/21 0120 04/28/21 0217 04/28/21 0225 04/28/21 0432   BP: 121/80 119/69 113/66 107/67   Pulse: 156 151 145 143   Resp: 20   18   Temp:       TempSrc:       SpO2: 96%   97%   Weight:       Height:          Pain:    Pain Interventions:   Pain Goal:   Oxygen: Yes    Current needs required 2 LNC   Telemetry: Yes  LDAs:   Peripheral IV 04/28/21 Left Forearm (Active)   Site Assessment Clean;Dry; Intact 04/28/21 0433   Line Status Infusing 04/28/21 0433   Dressing Status Clean;Dry; Intact 04/28/21 0433   Dressing Intervention New 04/28/21 0009     Continuous Infusions:    dilTIAZem 10 mg/hr (04/28/21 0432)     Mobility: Requires assistance * 2  Chowdhury Fall Risk Score: Fall Risk 2/11/2021 8/31/2020 3/12/2020 2/26/2019 9/26/2018 4/4/2018 11/7/2017   2 or more falls in past year? no no no no no no no   Fall with injury in past year? no no no no no no no     Fall Interventions: SR up x 2.  Bed low position  Report given to: [id number] Bing Márquez RN  04/28/21 1176

## 2021-04-28 NOTE — CARE COORDINATION
4/28/21, 3:26 PM EDT  Discharge Planning Evaluation  Social work consult received, patient from [facility]. Patient preference is to return to [facility] The patient's current payor source at the facility is Medicare and Maddie Pruffil[employer]. Medicare skilled days available: yes  Insurance precert:  no  Spoke with Jean Paul at the facility. Patient bed hold: yes  Anticipated transport plan: unsure  Do they require COVID 19 test to return to [facility]  Is there a required time frame which which COVID test needs done:with in 48 hours of discharge.

## 2021-04-29 ENCOUNTER — APPOINTMENT (OUTPATIENT)
Dept: NUCLEAR MEDICINE | Age: 86
DRG: 444 | End: 2021-04-29
Payer: MEDICARE

## 2021-04-29 ENCOUNTER — APPOINTMENT (OUTPATIENT)
Dept: GENERAL RADIOLOGY | Age: 86
DRG: 444 | End: 2021-04-29
Payer: MEDICARE

## 2021-04-29 PROBLEM — R07.89 OTHER CHEST PAIN: Status: ACTIVE | Noted: 2021-04-29

## 2021-04-29 PROBLEM — R93.2 ABNORMAL ULTRASOUND OF GALLBLADDER: Status: ACTIVE | Noted: 2021-04-29

## 2021-04-29 LAB
ABO: NORMAL
ALBUMIN SERPL-MCNC: 1.5 G/DL (ref 3.5–5.1)
ALP BLD-CCNC: 103 U/L (ref 38–126)
ALT SERPL-CCNC: 48 U/L (ref 11–66)
ANION GAP SERPL CALCULATED.3IONS-SCNC: 13 MEQ/L (ref 8–16)
ANION GAP SERPL CALCULATED.3IONS-SCNC: 9 MEQ/L (ref 8–16)
ANTIBODY IDENTIFICATION: NORMAL
ANTIBODY SCREEN: NORMAL
APTT: 31.7 SECONDS (ref 22–38)
AST SERPL-CCNC: 62 U/L (ref 5–40)
BASE EXCESS MIXED: -4.1 MMOL/L (ref -2–3)
BASOPHILIA: ABNORMAL
BASOPHILS # BLD: 0.3 %
BASOPHILS ABSOLUTE: 0 THOU/MM3 (ref 0–0.1)
BILIRUB SERPL-MCNC: 0.4 MG/DL (ref 0.3–1.2)
BUN BLDV-MCNC: 11 MG/DL (ref 7–22)
BUN BLDV-MCNC: 13 MG/DL (ref 7–22)
CALCIUM SERPL-MCNC: 7.5 MG/DL (ref 8.5–10.5)
CALCIUM SERPL-MCNC: 8.4 MG/DL (ref 8.5–10.5)
CHLORIDE BLD-SCNC: 103 MEQ/L (ref 98–111)
CHLORIDE BLD-SCNC: 107 MEQ/L (ref 98–111)
CO2: 21 MEQ/L (ref 23–33)
CO2: 22 MEQ/L (ref 23–33)
COLLECTED BY:: ABNORMAL
CREAT SERPL-MCNC: 0.8 MG/DL (ref 0.4–1.2)
CREAT SERPL-MCNC: 0.9 MG/DL (ref 0.4–1.2)
DEVICE: ABNORMAL
ELLIPTOCYTES: ABNORMAL
EOSINOPHIL # BLD: 0.2 %
EOSINOPHILS ABSOLUTE: 0 THOU/MM3 (ref 0–0.4)
ERYTHROCYTE [DISTWIDTH] IN BLOOD BY AUTOMATED COUNT: 17 % (ref 11.5–14.5)
ERYTHROCYTE [DISTWIDTH] IN BLOOD BY AUTOMATED COUNT: 54.5 FL (ref 35–45)
FIO2, MIXED VENOUS: 2
GFR SERPL CREATININE-BSD FRML MDRD: 60 ML/MIN/1.73M2
GFR SERPL CREATININE-BSD FRML MDRD: 68 ML/MIN/1.73M2
GLUCOSE BLD-MCNC: 108 MG/DL (ref 70–108)
GLUCOSE BLD-MCNC: 115 MG/DL (ref 70–108)
GLUCOSE BLD-MCNC: 128 MG/DL (ref 70–108)
HCO3, MIXED: 20 MMOL/L (ref 23–28)
HCT VFR BLD CALC: 23.3 % (ref 37–47)
HCT VFR BLD CALC: 27.7 % (ref 37–47)
HEMOGLOBIN: 6.9 GM/DL (ref 12–16)
HEMOGLOBIN: 8.6 GM/DL (ref 12–16)
IMMATURE GRANS (ABS): 0.38 THOU/MM3 (ref 0–0.07)
IMMATURE GRANULOCYTES: 3.8 %
LACTIC ACID: 1.9 MMOL/L (ref 0.5–2)
LYMPHOCYTES # BLD: 5.7 %
LYMPHOCYTES ABSOLUTE: 0.6 THOU/MM3 (ref 1–4.8)
MAGNESIUM: 1.7 MG/DL (ref 1.6–2.4)
MCH RBC QN AUTO: 27.5 PG (ref 26–33)
MCHC RBC AUTO-ENTMCNC: 31 GM/DL (ref 32.2–35.5)
MCV RBC AUTO: 88.5 FL (ref 81–99)
MONOCYTES # BLD: 3.5 %
MONOCYTES ABSOLUTE: 0.4 THOU/MM3 (ref 0.4–1.3)
NUCLEATED RED BLOOD CELLS: 0 /100 WBC
O2 SAT, MIXED: 61 %
PCO2, MIXED VENOUS: 32 MMHG (ref 41–51)
PH, MIXED: 7.41 (ref 7.31–7.41)
PLATELET # BLD: 312 THOU/MM3 (ref 130–400)
PLATELET ESTIMATE: ADEQUATE
PMV BLD AUTO: 10.2 FL (ref 9.4–12.4)
PO2 MIXED: 31 MMHG (ref 25–40)
POTASSIUM REFLEX MAGNESIUM: 3.3 MEQ/L (ref 3.5–5.2)
POTASSIUM SERPL-SCNC: 4.1 MEQ/L (ref 3.5–5.2)
RBC # BLD: 3.13 MILL/MM3 (ref 4.2–5.4)
RH FACTOR: NORMAL
SCAN OF BLOOD SMEAR: NORMAL
SEG NEUTROPHILS: 86.5 %
SEGMENTED NEUTROPHILS ABSOLUTE COUNT: 8.7 THOU/MM3 (ref 1.8–7.7)
SODIUM BLD-SCNC: 137 MEQ/L (ref 135–145)
SODIUM BLD-SCNC: 138 MEQ/L (ref 135–145)
TOTAL PROTEIN: 4.4 G/DL (ref 6.1–8)
TROPONIN T: < 0.01 NG/ML
WBC # BLD: 10 THOU/MM3 (ref 4.8–10.8)

## 2021-04-29 PROCEDURE — 2000000000 HC ICU R&B

## 2021-04-29 PROCEDURE — 99291 CRITICAL CARE FIRST HOUR: CPT | Performed by: INTERNAL MEDICINE

## 2021-04-29 PROCEDURE — 99221 1ST HOSP IP/OBS SF/LOW 40: CPT | Performed by: NURSE PRACTITIONER

## 2021-04-29 PROCEDURE — 86900 BLOOD TYPING SEROLOGIC ABO: CPT

## 2021-04-29 PROCEDURE — 36415 COLL VENOUS BLD VENIPUNCTURE: CPT

## 2021-04-29 PROCEDURE — 85730 THROMBOPLASTIN TIME PARTIAL: CPT

## 2021-04-29 PROCEDURE — 2580000003 HC RX 258: Performed by: REGISTERED NURSE

## 2021-04-29 PROCEDURE — 85014 HEMATOCRIT: CPT

## 2021-04-29 PROCEDURE — 6370000000 HC RX 637 (ALT 250 FOR IP): Performed by: REGISTERED NURSE

## 2021-04-29 PROCEDURE — 6360000002 HC RX W HCPCS: Performed by: SURGERY

## 2021-04-29 PROCEDURE — 99223 1ST HOSP IP/OBS HIGH 75: CPT | Performed by: SURGERY

## 2021-04-29 PROCEDURE — 87081 CULTURE SCREEN ONLY: CPT

## 2021-04-29 PROCEDURE — 80053 COMPREHEN METABOLIC PANEL: CPT

## 2021-04-29 PROCEDURE — 86860 RBC ANTIBODY ELUTION: CPT

## 2021-04-29 PROCEDURE — 85025 COMPLETE CBC W/AUTO DIFF WBC: CPT

## 2021-04-29 PROCEDURE — 6360000002 HC RX W HCPCS: Performed by: REGISTERED NURSE

## 2021-04-29 PROCEDURE — 82948 REAGENT STRIP/BLOOD GLUCOSE: CPT

## 2021-04-29 PROCEDURE — 87641 MR-STAPH DNA AMP PROBE: CPT

## 2021-04-29 PROCEDURE — 86901 BLOOD TYPING SEROLOGIC RH(D): CPT

## 2021-04-29 PROCEDURE — P9016 RBC LEUKOCYTES REDUCED: HCPCS

## 2021-04-29 PROCEDURE — 2580000003 HC RX 258: Performed by: SURGERY

## 2021-04-29 PROCEDURE — 87500 VANOMYCIN DNA AMP PROBE: CPT

## 2021-04-29 PROCEDURE — 6370000000 HC RX 637 (ALT 250 FOR IP): Performed by: INTERNAL MEDICINE

## 2021-04-29 PROCEDURE — 86870 RBC ANTIBODY IDENTIFICATION: CPT

## 2021-04-29 PROCEDURE — 6360000002 HC RX W HCPCS: Performed by: RADIOLOGY

## 2021-04-29 PROCEDURE — 3430000000 HC RX DIAGNOSTIC RADIOPHARMACEUTICAL: Performed by: SURGERY

## 2021-04-29 PROCEDURE — 83735 ASSAY OF MAGNESIUM: CPT

## 2021-04-29 PROCEDURE — 84484 ASSAY OF TROPONIN QUANT: CPT

## 2021-04-29 PROCEDURE — 71045 X-RAY EXAM CHEST 1 VIEW: CPT

## 2021-04-29 PROCEDURE — 36556 INSERT NON-TUNNEL CV CATH: CPT

## 2021-04-29 PROCEDURE — 86972 RBC PRETX INCUBATJ W/DENSITY: CPT

## 2021-04-29 PROCEDURE — 2500000003 HC RX 250 WO HCPCS: Performed by: INTERNAL MEDICINE

## 2021-04-29 PROCEDURE — 86906 BLD TYPING SEROLOGIC RH PHNT: CPT

## 2021-04-29 PROCEDURE — 86880 COOMBS TEST DIRECT: CPT

## 2021-04-29 PROCEDURE — 86922 COMPATIBILITY TEST ANTIGLOB: CPT

## 2021-04-29 PROCEDURE — 78227 HEPATOBIL SYST IMAGE W/DRUG: CPT

## 2021-04-29 PROCEDURE — 82803 BLOOD GASES ANY COMBINATION: CPT

## 2021-04-29 PROCEDURE — 85018 HEMOGLOBIN: CPT

## 2021-04-29 PROCEDURE — 6360000002 HC RX W HCPCS

## 2021-04-29 PROCEDURE — 93005 ELECTROCARDIOGRAM TRACING: CPT | Performed by: INTERNAL MEDICINE

## 2021-04-29 PROCEDURE — 2580000003 HC RX 258: Performed by: INTERNAL MEDICINE

## 2021-04-29 PROCEDURE — 86850 RBC ANTIBODY SCREEN: CPT

## 2021-04-29 PROCEDURE — A9537 TC99M MEBROFENIN: HCPCS | Performed by: SURGERY

## 2021-04-29 PROCEDURE — 83605 ASSAY OF LACTIC ACID: CPT

## 2021-04-29 PROCEDURE — 86905 BLOOD TYPING RBC ANTIGENS: CPT

## 2021-04-29 PROCEDURE — 86885 COOMBS TEST INDIRECT QUAL: CPT

## 2021-04-29 RX ORDER — SODIUM CHLORIDE 9 MG/ML
INJECTION, SOLUTION INTRAVENOUS CONTINUOUS
Status: CANCELLED | OUTPATIENT
Start: 2021-04-29

## 2021-04-29 RX ORDER — 0.9 % SODIUM CHLORIDE 0.9 %
1000 INTRAVENOUS SOLUTION INTRAVENOUS ONCE
Status: COMPLETED | OUTPATIENT
Start: 2021-04-29 | End: 2021-04-29

## 2021-04-29 RX ORDER — NOREPINEPHRINE BIT/0.9 % NACL 16MG/250ML
2-100 INFUSION BOTTLE (ML) INTRAVENOUS CONTINUOUS
Status: DISCONTINUED | OUTPATIENT
Start: 2021-04-29 | End: 2021-04-30

## 2021-04-29 RX ORDER — HEPARIN SODIUM 1000 [USP'U]/ML
80 INJECTION, SOLUTION INTRAVENOUS; SUBCUTANEOUS PRN
Status: DISCONTINUED | OUTPATIENT
Start: 2021-04-29 | End: 2021-05-01

## 2021-04-29 RX ORDER — SODIUM CHLORIDE 9 MG/ML
INJECTION, SOLUTION INTRAVENOUS CONTINUOUS
Status: DISCONTINUED | OUTPATIENT
Start: 2021-04-29 | End: 2021-05-02

## 2021-04-29 RX ORDER — SODIUM CHLORIDE 9 MG/ML
INJECTION, SOLUTION INTRAVENOUS PRN
Status: DISCONTINUED | OUTPATIENT
Start: 2021-04-29 | End: 2021-05-06 | Stop reason: ALTCHOICE

## 2021-04-29 RX ORDER — DOPAMINE HYDROCHLORIDE 160 MG/100ML
INJECTION, SOLUTION INTRAVENOUS
Status: DISCONTINUED
Start: 2021-04-29 | End: 2021-04-30

## 2021-04-29 RX ORDER — POTASSIUM CHLORIDE 7.45 MG/ML
10 INJECTION INTRAVENOUS PRN
Status: DISCONTINUED | OUTPATIENT
Start: 2021-04-29 | End: 2021-05-13 | Stop reason: HOSPADM

## 2021-04-29 RX ORDER — HEPARIN SODIUM 10000 [USP'U]/100ML
5-30 INJECTION, SOLUTION INTRAVENOUS CONTINUOUS
Status: DISCONTINUED | OUTPATIENT
Start: 2021-04-29 | End: 2021-05-02

## 2021-04-29 RX ORDER — HEPARIN SODIUM 1000 [USP'U]/ML
40 INJECTION, SOLUTION INTRAVENOUS; SUBCUTANEOUS PRN
Status: DISCONTINUED | OUTPATIENT
Start: 2021-04-29 | End: 2021-05-01

## 2021-04-29 RX ORDER — SODIUM CHLORIDE 0.9 % (FLUSH) 0.9 %
5-40 SYRINGE (ML) INJECTION EVERY 12 HOURS SCHEDULED
Status: CANCELLED | OUTPATIENT
Start: 2021-04-29

## 2021-04-29 RX ORDER — HEPARIN SODIUM 10000 [USP'U]/100ML
5-30 INJECTION, SOLUTION INTRAVENOUS CONTINUOUS
Status: DISCONTINUED | OUTPATIENT
Start: 2021-04-29 | End: 2021-04-29

## 2021-04-29 RX ORDER — 0.9 % SODIUM CHLORIDE 0.9 %
500 INTRAVENOUS SOLUTION INTRAVENOUS ONCE
Status: DISCONTINUED | OUTPATIENT
Start: 2021-04-29 | End: 2021-04-29

## 2021-04-29 RX ORDER — POTASSIUM CHLORIDE 20 MEQ/1
40 TABLET, EXTENDED RELEASE ORAL PRN
Status: DISCONTINUED | OUTPATIENT
Start: 2021-04-29 | End: 2021-05-13 | Stop reason: HOSPADM

## 2021-04-29 RX ORDER — HEPARIN SODIUM 1000 [USP'U]/ML
80 INJECTION, SOLUTION INTRAVENOUS; SUBCUTANEOUS ONCE
Status: COMPLETED | OUTPATIENT
Start: 2021-04-29 | End: 2021-04-29

## 2021-04-29 RX ORDER — SODIUM CHLORIDE 9 MG/ML
25 INJECTION, SOLUTION INTRAVENOUS PRN
Status: CANCELLED | OUTPATIENT
Start: 2021-04-29

## 2021-04-29 RX ORDER — SODIUM CHLORIDE 0.9 % (FLUSH) 0.9 %
5-40 SYRINGE (ML) INJECTION PRN
Status: CANCELLED | OUTPATIENT
Start: 2021-04-29

## 2021-04-29 RX ORDER — MORPHINE SULFATE 4 MG/ML
2.8 INJECTION, SOLUTION INTRAMUSCULAR; INTRAVENOUS ONCE
Status: COMPLETED | OUTPATIENT
Start: 2021-04-29 | End: 2021-04-29

## 2021-04-29 RX ADMIN — HEPARIN SODIUM 5540 UNITS: 1000 INJECTION, SOLUTION INTRAVENOUS; SUBCUTANEOUS at 16:47

## 2021-04-29 RX ADMIN — PIPERACILLIN AND TAZOBACTAM 3375 MG: 3; .375 INJECTION, POWDER, LYOPHILIZED, FOR SOLUTION INTRAVENOUS at 11:41

## 2021-04-29 RX ADMIN — DIGOXIN 125 MCG: 250 TABLET ORAL at 11:44

## 2021-04-29 RX ADMIN — SODIUM BICARBONATE 1300 MG: 650 TABLET ORAL at 11:43

## 2021-04-29 RX ADMIN — MIDODRINE HYDROCHLORIDE 5 MG: 5 TABLET ORAL at 16:42

## 2021-04-29 RX ADMIN — MORPHINE SULFATE 2.8 MG: 4 INJECTION, SOLUTION INTRAMUSCULAR; INTRAVENOUS at 10:04

## 2021-04-29 RX ADMIN — ISOSORBIDE MONONITRATE 30 MG: 30 TABLET ORAL at 11:43

## 2021-04-29 RX ADMIN — SERTRALINE HYDROCHLORIDE 50 MG: 50 TABLET, FILM COATED ORAL at 11:44

## 2021-04-29 RX ADMIN — SODIUM CHLORIDE 1000 ML: 9 INJECTION, SOLUTION INTRAVENOUS at 19:52

## 2021-04-29 RX ADMIN — Medication 2 MCG/MIN: at 20:09

## 2021-04-29 RX ADMIN — PANTOPRAZOLE SODIUM 40 MG: 40 TABLET, DELAYED RELEASE ORAL at 16:42

## 2021-04-29 RX ADMIN — POTASSIUM CHLORIDE 20 MEQ: 750 TABLET, FILM COATED, EXTENDED RELEASE ORAL at 16:42

## 2021-04-29 RX ADMIN — SUCRALFATE 1 G: 1 TABLET ORAL at 16:42

## 2021-04-29 RX ADMIN — PIPERACILLIN AND TAZOBACTAM 3375 MG: 3; .375 INJECTION, POWDER, LYOPHILIZED, FOR SOLUTION INTRAVENOUS at 22:49

## 2021-04-29 RX ADMIN — SODIUM CHLORIDE, PRESERVATIVE FREE 10 ML: 5 INJECTION INTRAVENOUS at 11:46

## 2021-04-29 RX ADMIN — POTASSIUM CHLORIDE 40 MEQ: 1500 TABLET, EXTENDED RELEASE ORAL at 11:44

## 2021-04-29 RX ADMIN — SODIUM CHLORIDE: 9 INJECTION, SOLUTION INTRAVENOUS at 16:42

## 2021-04-29 RX ADMIN — Medication 8 MILLICURIE: at 08:45

## 2021-04-29 RX ADMIN — LATANOPROST 1 DROP: 50 SOLUTION OPHTHALMIC at 22:55

## 2021-04-29 RX ADMIN — SODIUM CHLORIDE: 9 INJECTION, SOLUTION INTRAVENOUS at 22:44

## 2021-04-29 RX ADMIN — MIDODRINE HYDROCHLORIDE 5 MG: 5 TABLET ORAL at 11:46

## 2021-04-29 RX ADMIN — SODIUM CHLORIDE 1.39 MCG: 9 INJECTION, SOLUTION INTRAVENOUS at 07:47

## 2021-04-29 RX ADMIN — TIZANIDINE 4 MG: 4 TABLET ORAL at 17:46

## 2021-04-29 RX ADMIN — SODIUM CHLORIDE 1000 ML: 9 INJECTION, SOLUTION INTRAVENOUS at 20:29

## 2021-04-29 RX ADMIN — HEPARIN SODIUM 18 UNITS/KG/HR: 10000 INJECTION, SOLUTION INTRAVENOUS at 16:45

## 2021-04-29 RX ADMIN — SODIUM CHLORIDE, PRESERVATIVE FREE 10 ML: 5 INJECTION INTRAVENOUS at 22:49

## 2021-04-29 RX ADMIN — PIPERACILLIN AND TAZOBACTAM 3375 MG: 3; .375 INJECTION, POWDER, LYOPHILIZED, FOR SOLUTION INTRAVENOUS at 01:37

## 2021-04-29 RX ADMIN — ACETAMINOPHEN 650 MG: 325 TABLET ORAL at 17:46

## 2021-04-29 ASSESSMENT — PAIN DESCRIPTION - PROGRESSION
CLINICAL_PROGRESSION: NOT CHANGED

## 2021-04-29 ASSESSMENT — PAIN DESCRIPTION - PAIN TYPE: TYPE: ACUTE PAIN;CHRONIC PAIN

## 2021-04-29 ASSESSMENT — PAIN SCALES - GENERAL
PAINLEVEL_OUTOF10: 0
PAINLEVEL_OUTOF10: 3

## 2021-04-29 ASSESSMENT — PAIN DESCRIPTION - LOCATION: LOCATION: CHEST;HIP

## 2021-04-29 NOTE — PROGRESS NOTES
Vika Caraballo 61  PHYSICAL THERAPY MISSED TREATMENT NOTE  [facility]U-STEPDOWN 3B    Date: 2021  Patient Name: Shayy Malone        MRN: [id number]   : 1935  (81 y.o.)  Gender: female                REASON FOR MISSED TREATMENT:  Patient refused treatment. Pt stating she doesn't feel well and is in 9/10 pain. Pt refusing PT at this time. Will check back tomorrow as able.  RN states pt is scheduled for cholecystectomy at [address] [place] [address], [facility] [id number]

## 2021-04-29 NOTE — CONSULTS
[address] [address]  General Surgery Consult Note  Dr. Song Bernal  Pt Name: Bel Mcwilliams  MRN: [id number]  [place]: 1935  Date of evaluation: 4/29/2021  Primary Care Physician: Katie Vickers MD  Patient evaluated at the request of  Dr. Live Necessary  Reason for evaluation: chest pain, sludge in GB  IMPRESSIONS:     C/Moises Garcia 1106 Problems    Diagnosis Date Noted    Abnormal ultrasound of gallbladder [R93.2] 04/29/2021    Other chest pain [R07.89] 04/29/2021    Atrial fibrillation with RVR ([address] [I48.91] 04/28/2021     PLANS:   1. US images reviewed, I am not sure the wall measurement is accurate  2. She has no abd pain and has had none  3. No N,V in her replies to me  4. Has sludge only in GB  5. Prior GBUS and HIDA in 2013 showed sludge and normal EF  6. Surprisingly her HIDA scan showed nonvisualization of the gallbladder. I discussed the case with Dr. Salma Carroll states the patient has had normal caths in the past so he really believes this is 9 cardiac chest pain so we are going to proceed with laparoscopic cholecystectomy. SUBJECTIVE:   History of Chief Complaint:    Bel Mcwilliams is a 81 y.o. female who presented with chest pain. Patient she just did not feel well she could not really say what it was she had no abdominal pain no nausea no vomiting. She said she had no appetite just was not eating because she just did not feel like it. Found in atrial fibrillation with rapid ventricular response and was admitted for work-up of this. Dr. Salma Carroll did not feel that this was cardiac related chest pain I did ultrasound of her gallbladder which showed sludge which has been present since 2013. They read thickened gallbladder wall but when I reviewed the films the area seems poorly visualized with a measured in. Clinically the patient has no abdominal pain on exam and no guarding no rebound and has been tolerating a diet although she has no appetite.   She was worked up in 2013 for similar symptoms HIDA scan was normal at that time as well. Clinically she says her chest pain is better HIDA scan which I ordered is pending at this time  Past Medical History  Past Medical History:   Diagnosis Date    Acute blood loss as cause of postoperative anemia 03/2018    CAD (coronary artery disease)      cath  50    Cardiac valve prolapse     Chest pain 5/15/2020    Chronic back pain     Compression fracture of L2 ([facility] 5/26/2020    COPD (chronic obstructive pulmonary disease) ([facility]     Ex-smoker     GERD (gastroesophageal reflux disease) 2/07    EGD    Glaucoma     H/O cardiac catheterization 2002    40-50% LAD   katharine    Hearing loss secondary to cerumen impaction 5/28/2019    Hyperlipidemia     Hypertension     Inadvertent durotomy 3/12/2018    Liver disease     history of hepatitis at age 22    Osteoarthritis 1999    right shoulder and back    Pericarditis 1996     Past Surgical History  Past Surgical History:   Procedure Laterality Date    APPENDECTOMY      at age 13years   [address]  2002    BUNIONECTOMY  2004    right foot    CARDIAC CATHETERIZATION  2007??  &   2012? ?    normal results    COLONOSCOPY      last one 2000???    ENDOSCOPY, COLON, DIAGNOSTIC      EYE SURGERY  2009??    right eye    HIP SURGERY Right 4/2/2021    RIGHT HIP INTERTROCHANTERIC NAILING performed by Cristo Pulido MD at [address]    still has ovaries    NH LAMINECTOMY,>2 SGMT,LUMBAR N/A 3/7/2018    LUMBAR LAMINECTOMY L3-S1 performed by Med Boles MD at [facility] OFFICE/OUTPT [address] N/A 3/12/2018    REPAIR DUROTOMY PLACEMENT OF SUBARACHNOID DRAIN performed by Med Boles MD at [address]  left    SPINE SURGERY  7/02    L5 cyst    UPPER GASTROINTESTINAL ENDOSCOPY  2007    [facility]    UPPER GASTROINTESTINAL ENDOSCOPY Left 5/19/2020    EGD BIOPSY performed by Johnna Almaraz MD at [facility]     Medications  Prior to simvastatin (ZOCOR) 40 MG tablet Take 1 tablet by mouth nightly 7/30/20  Yes Dean Gibson MD   Multiple Vitamins-Minerals (THERAPEUTIC MULTIVITAMIN-MINERALS) tablet Take 1 tablet by mouth daily   Yes Historical Provider, MD   latanoprost (XALATAN) 0.005 % ophthalmic solution Place 1 drop into both eyes nightly   Yes Historical Provider, MD   tiZANidine (ZANAFLEX) 4 MG tablet Take 1 tablet by mouth every 8 hours as needed (muscle spasm) 4/9/21   YASMINE Davidson CNP   acetaminophen (TYLENOL) 325 MG tablet Take 2 tablets by mouth every 4 hours as needed for Pain Maximum dose of acetaminophen is 4000 mg from all sources in 24 hours. 4/3/18   YASMINE Bains CNP    Scheduled Meds:   digoxin  125 mcg Intravenous Once    digoxin  125 mcg Oral Daily    aspirin  325 mg Oral Daily    docusate sodium  100 mg Oral BID    isosorbide mononitrate  30 mg Oral Daily    latanoprost  1 drop Both Eyes Nightly    midodrine  5 mg Oral TID WC    mirtazapine  7.5 mg Oral Nightly    pantoprazole  40 mg Oral BID AC    potassium chloride  20 mEq Oral BID WC    sertraline  50 mg Oral Daily    sodium bicarbonate  1,300 mg Oral BID    sucralfate  1 g Oral 4x Daily AC & HS    sodium chloride flush  5-40 mL Intravenous 2 times per day    metoprolol tartrate  25 mg Oral BID    atorvastatin  20 mg Oral Nightly    piperacillin-tazobactam  3,375 mg Intravenous Q8H     Continuous Infusions:   sodium chloride       PRN Meds:.potassium chloride **OR** potassium alternative oral replacement **OR** potassium chloride, heparin (porcine), heparin (porcine), tiZANidine, sodium chloride flush, sodium chloride, promethazine **OR** ondansetron, polyethylene glycol, acetaminophen **OR** acetaminophen, ipratropium-albuterol  Allergies  Patient has no known allergies.   Family History  Family History   Problem Relation Age of Onset    Tuberculosis Mother     Tuberculosis Father      Social History  Social History Socioeconomic History    Marital status:      Spouse name: None    Number of children: 2    Years of education: None    Highest education level: None   Occupational History    None   Social Needs    Financial resource strain: Not hard at all   Scott-Darya insecurity     Worry: Never true     Inability: Never true   Meteor needs     Medical: No     Non-medical: No   Tobacco Use    Smoking status: Former Smoker     Years: 30.00     Types: Cigarettes     Quit date: 1989     Years since quittin.9    Smokeless tobacco: Never Used   Substance and Sexual Activity    Alcohol use: No    Drug use: No    Sexual activity: Not Currently   Lifestyle    Physical activity     Days per week: 0 days     Minutes per session: 0 min    Stress: None   Relationships    Social connections     Talks on phone: None     Gets together: None     Attends Alevism service: None     Active member of club or organization: None     Attends meetings of clubs or organizations: None     Relationship status: None    Intimate partner violence     Fear of current or ex partner: None     Emotionally abused: None     Physically abused: None     Forced sexual activity: None   Other Topics Concern    None   Social History Narrative    No barriers with transportation    No barriers with medication affordability     Review of Systems:  General Denies any fever or chills  HEENT Denies any diplopia, tinnitus or vertigo  Resp Denies any shortness of breath, cough or wheezing  Cardiac see [place]  GI Negative for abdominal pain nausea or vomiting  Denies any melena, hematochezia, hematemesis or pyrosis   Denies any frequency, urgency, hesitancy or incontinence  Heme Denies bruising or bleeding easily  Endocrine Denies any history of diabetes or thyroid disease  Neuro Denies any focal motor or sensory deficits  OBJECTIVE:   CURRENT VITALS:  height is 5' 1\" (1.549 m) and weight is 152 lb 12.5 oz (69.3 kg).  Her oral temperature is 98.2 °F (36.8 °C). Her blood pressure is 112/61 and her pulse is 76. Her respiration is 16 and oxygen saturation is 93%. Body mass index is 28.87 kg/m². Temperature Range (24h):Temp: 98.2 °F (36.8 °C) Temp  Av.9 °F (36.6 °C)  Min: 97.7 °F (36.5 °C)  Max: 98.2 °F (36.8 °C)  BP Range (48F): Systolic (40YMX), OWN:036 , Min:112 , BSS:961     Diastolic (56GTY), HML:47, Min:61, Max:71    Pulse Range (24h): Pulse  Av.5  Min: 76  Max: 95  Respiration Range (24h): Resp  Av  Min: 16  Max: 16  Current Pulse Ox (24h):  SpO2: 93 %  Pulse Ox Range (24h):  SpO2  Av.3 %  Min: 93 %  Max: 97 %  Oxygen Amount and Delivery: O2 Flow Rate (L/min): 2 L/min  CONSTITUTIONAL: Alert and oriented times 3, no acute distress and cooperative to examination with proper mood and affect. SKIN: Skin color, texture, turgor normal. No rashes or lesions. LYMPH: no cervical nodes, no inguinal nodes  HEENT: Head is normocephalic, atraumatic. EOMI, PERRLA. NECK: Supple, symmetrical, trachea midline, no adenopathy, thyroid symmetric, not enlarged and no tenderness, skin normal.  CHEST/LUNGS: chest symmetric with normal A/P diameter, normal respiratory rate and rhythm, lungs clear to auscultation without wheezes, rales or rhonchi. No accessory muscle use. Scars None   CARDIOVASCULAR: Heart sounds are normal.  Regular rate and rhythm without murmur, gallop or rub. Normal S1 and S2. . Carotid and femoral pulses 2+/4 and equal bilaterally. ABDOMEN: Normal shape. right lower quadrant scar(s) present. Normal bowel sounds. No bruits. Soft, nondistended, no masses or organomegaly. no evidence of hernia. Percussion: Normal without hepatosplenomegally. Tenderness: absent. RECTAL: deferred, not clinically indicated  NEUROLOGIC: There are no focalizing motor or sensory deficits. CN II-XII are grossly intact. Kaci Brill EXTREMITIES: no cyanosis, no clubbing and no edema.   LABS:     Recent Labs     21  2128 21  0504   WBC 7. 4 10.0   HGB 9.5* 8.6*   HCT 32.6* 27.7*    312    138   K 4.4 3.3*    103   CO2 20* 22*   BUN 10 11   CREATININE 0.7 0.8   MG  --  1.7   CALCIUM 8.2* 8.4*   *  --    *  --    BILITOT 0.3  --    LIPASE 27.6  --      Hepatic Function Panel:    Lab Results   Component Value Date    ALKPHOS 158 04/27/2021     04/27/2021     04/27/2021    PROT 5.2 04/27/2021    BILITOT 0.3 04/27/2021    BILIDIR <0.2 04/17/2021    LABALBU 2.3 04/27/2021       RADIOLOGY:   I have personally reviewed the following films:  Ct Abdomen Pelvis Wo Contrast Additional Contrast? None    Result Date: 4/28/2021  1. Low-attenuation areas are noted within the bilateral femoral veins. It is uncertain whether this is due to mixing artifact from the prior CTA chest, or perhaps deep venous thrombosis. Bilateral lower extremity venous duplex ultrasound study in the ER is recommended. 2.  No evidence of a bowel obstruction or free air. 3.  Postoperative changes are noted involving the right hip. 4.  Faint increased attenuation in the gallbladder which could represent vicarious excretion of contrast, gallstones, and/or sludge. There is no pericholecystic inflammation. 5.  Mild to moderate prominence of the right renal pelvis is noted, this appears slightly improved compared to the prior study. 6.  Additional findings are detailed above. This document has been electronically signed by: Marciano Carranza M.D. on 04/28/2021 02:49 AM All CTs at this facility use dose modulation techniques and iterative reconstructions, and/or weight-based dosing when appropriate to reduce radiation to a low as reasonably achievable. Cta Chest W Wo Contrast    Result Date: 4/28/2021  1. No evidence of a pulmonary embolism. 2.  Small to moderate-sized bilateral pleural effusions. The pleural effusions appear increased since the prior study. 3.  Multifocal atelectasis is noted bilaterally.  4.  Additional findings are detailed above. This document has been electronically signed by: Marychuy Guerrero M.D. on 04/28/2021 01:59 AM All CTs at this facility use dose modulation techniques and iterative reconstructions, and/or weight-based dosing when appropriate to reduce radiation to a low as reasonably achievable. Us Gallbladder Ruq    Result Date: 4/28/2021  Sludge or stones within the gallbladder with gallbladder wall thickening and dilated common bile duct. These findings may represent acute cholecystitis. **This report has been created using voice recognition software. It may contain minor errors which are inherent in voice recognition technology. ** Final report electronically signed by Dr. Korin King on 4/28/2021 2:42 PM    Xr Chest Portable    Result Date: 4/27/2021  1. No significant interval change since the prior study. Again noted is left basilar atelectasis. Tiny bilateral pleural effusions are suspected. This document has been electronically signed by: Marychuy Guerrero M.D. on 04/27/2021 08:58 PM           Thank you for the evaluation.  Further recommendations above        Electronically signed by Ara Subramanian MD on 4/29/2021 at 8:39 AM

## 2021-04-29 NOTE — PROGRESS NOTES
1947 Call from Lab critical low HGB 6.8.  1952 Rn to room BP low, patient appears pale. Responds to name. Alert to name, place. BP 38P systolic. Patient placed in [address], Ricky Hodgkin RN to room. Bolus started  1955 Rapid called, systolic remains 52L  7557 Chem 128  2000 Dr. Brissa Martinez in room. Order for levophed, will need to transfer pt  2002 ST elevation noted. EKG started  2004 ST elevation leads. Stat labs ordered  2008 Levophed started. 20 gauge left thumb  2014 2 units RBC ordered stat. 2015 heparin stopped. Manual BP 81V systolic  2485 Levophed increased to 4 mcg/min. Second bolus started  2020 LUCY Rene called per request of Dr. Michelle Elizabeth will call back after discussing with Dr. Evonne Malik. Labs obtained  2021 blood bank called BP 93/71  2022 Levophed increased to 6 mcg/min  2025 phone consent from Velasquez Hays, son for blood  2027 90/48 ABG obtained. 2050 transfer to ICU per RN and Maeve Felix.    2059 Report given to Deisy Echevarria RN at bedside

## 2021-04-29 NOTE — PROGRESS NOTES
Progress note      [facility]             Patient:  Kota Zurita  YOB: 1935    MRN: [id number]   Acct:  [de-identified]   3B-30/030-A  Primary Care Physician: David Rios MD    Admit Date: 4/27/2021           Subjective: Pt returning from HIDA scan. She states that she still feels \"ill\". Reports nausea but no emesis through the night. She reports chest pain is still present; she had informed that yesterday she was no longer having chest pain. Pt reports that it is a constant aching pain in her chest and it did not ever go away. Objective:      Physical Exam:    Vitals:  Patient Vitals for the past 24 hrs:   BP Temp Temp src Pulse Resp SpO2   04/29/21 1130 (!) 117/58 -- -- 93 14 98 %   04/29/21 1115 (!) 108/56 -- -- 97 14 97 %   04/29/21 1100 (!) 110/57 97.7 °F (36.5 °C) Oral 103 14 93 %   04/28/21 2319 112/61 98.2 °F (36.8 °C) Oral 76 16 93 %   04/28/21 2115 119/67 97.7 °F (36.5 °C) Oral 87 16 96 %     Weight: Weight: 152 lb 12.5 oz (69.3 kg)     24 hour intake/output:    Intake/Output Summary (Last 24 hours) at 4/29/2021 1556  Last data filed at 4/29/2021 1100  Gross per 24 hour   Intake 1158.66 ml   Output 1150 ml   Net 8.66 ml       General appearance - alert, well appearing, and in no distress  Eyes - pupils equal and reactive, extraocular eye movements intact  Mouth - mucous membranes moist, pharynx normal without lesions  Neck - supple, no significant adenopathy  Chest - clear to auscultation, no wheezes, rales or rhonchi, symmetric air entry  Heart - normal rate, regular rhythm, normal S1, S2, no murmurs, rubs, clicks or gallops  Abdomen - soft, nontender, nondistended, no masses or organomegaly, pos bs.   Neurological - alert, oriented, normal speech, no focal findings or movement disorder noted  Musculoskeletal - no joint tenderness, deformity or swelling  Extremities - peripheral pulses normal, left leg edematous  Skin - normal coloration and turgor, no rashes, no suspicious skin lesions noted    Review of Labs and Diagnostic Testing:    CBC:   Recent Labs     04/29/21  0504   WBC 10.0   HGB 8.6*   HCT 27.7*   MCV 88.5        BMP:   Recent Labs     04/29/21  0504      K 3.3*      CO2 22*   BUN 11   CREATININE 0.8   CALCIUM 8.4*   GLUCOSE 108     PT/INR: No results for input(s): PROTIME, INR in the last 72 hours. APTT: No results for input(s): APTT in the last 72 hours.    Lipids:   Recent Labs     04/27/21 2128   ALKPHOS 158*   *   *   BILITOT 0.3   LABALBU 2.3*   LIPASE 27.6     Troponin:   Recent Labs     04/27/21 2128   TROPONINT < 0.010        Imaging:  [unfilled]    EKG:      Diet: Diet NPO Effective Now        Data:   Scheduled Meds: Scheduled Meds:   digoxin  125 mcg Intravenous Once    digoxin  125 mcg Oral Daily    [Held by provider] aspirin  325 mg Oral Daily    docusate sodium  100 mg Oral BID    isosorbide mononitrate  30 mg Oral Daily    latanoprost  1 drop Both Eyes Nightly    midodrine  5 mg Oral TID WC    mirtazapine  7.5 mg Oral Nightly    pantoprazole  40 mg Oral BID AC    potassium chloride  20 mEq Oral BID WC    sertraline  50 mg Oral Daily    sodium bicarbonate  1,300 mg Oral BID    sucralfate  1 g Oral 4x Daily AC & HS    sodium chloride flush  5-40 mL Intravenous 2 times per day    metoprolol tartrate  25 mg Oral BID    atorvastatin  20 mg Oral Nightly    piperacillin-tazobactam  3,375 mg Intravenous Q8H     Continuous Infusions:   heparin (PORCINE) Infusion      sodium chloride      sodium chloride       PRN Meds:.potassium chloride **OR** potassium alternative oral replacement **OR** potassium chloride, heparin (porcine), heparin (porcine), tiZANidine, sodium chloride flush, sodium chloride, promethazine **OR** ondansetron, polyethylene glycol, acetaminophen **OR** acetaminophen, ipratropium-albuterol  Continuous

## 2021-04-29 NOTE — PROGRESS NOTES
Admit Date: 2021  Hospital day {0-10:65023}    Subjective:     Patient {HX; GENERAL COMPLAINTS:15513}. Medication side effects: {med side fx:09652}    Scheduled Meds:   digoxin  125 mcg Intravenous Once    digoxin  125 mcg Oral Daily    aspirin  325 mg Oral Daily    docusate sodium  100 mg Oral BID    isosorbide mononitrate  30 mg Oral Daily    latanoprost  1 drop Both Eyes Nightly    midodrine  5 mg Oral TID WC    mirtazapine  7.5 mg Oral Nightly    pantoprazole  40 mg Oral BID AC    potassium chloride  20 mEq Oral BID WC    sertraline  50 mg Oral Daily    sodium bicarbonate  1,300 mg Oral BID    sucralfate  1 g Oral 4x Daily AC & HS    sodium chloride flush  5-40 mL Intravenous 2 times per day    metoprolol tartrate  25 mg Oral BID    atorvastatin  20 mg Oral Nightly    piperacillin-tazobactam  3,375 mg Intravenous Q8H     Continuous Infusions:   sodium chloride       PRN Meds:potassium chloride **OR** potassium alternative oral replacement **OR** potassium chloride, heparin (porcine), heparin (porcine), tiZANidine, sodium chloride flush, sodium chloride, promethazine **OR** ondansetron, polyethylene glycol, acetaminophen **OR** acetaminophen, ipratropium-albuterol    Review of Systems  {Review Of Systems:31598}    Objective:     No data found. I/O last 3 completed shifts: In: 1271.6 [P.O.:650; I.V.:621.6]  Out: 1150 [Urine:1150]    {Exam; Complete Normal And System Select:14292}    ECG: {Findings; diagnostics ecg readin}.    Data Review:   CBC:  Lab Results   Component Value Date    WBC 10.0 2021    RBC 3.13 2021    HGB 8.6 2021    HCT 27.7 2021    MCV 88.5 2021    MCH 27.5 2021    MCHC 31.0 2021    RDW 14.5 2018     2021    MPV 10.2 2021     BMP  Lab Results   Component Value Date     2021    K 3.3 2021     2021    CO2 22 2021    BUN 11 2021    CREATININE 0.8 2021

## 2021-04-29 NOTE — PROGRESS NOTES
Physician Progress Note      PATIENTDalia Hamman  CSN #:                  [id number]  :                       1935  ADMIT DATE:       2021 7:55 PM  [address] [place] DATE:  RESPONDING  PROVIDER #:        Barry Patel MD          QUERY TEXT:    Dr Janet Collier,    Patient admitted with CP. Noted documentation of acute respiratory failure in   HP. Pt on 2LNC with no Low SPO2 readings. In order to support the diagnosis of   acute respiratory failure, please include additional clinical indicators in   your documentation. Or please document if the diagnosis of acute respiratory   failure has been ruled out after further study. The medical record reflects the following:  Risk Factors: AFIB, CHF, HTN  Clinical Indicators: Pulse 127-151 on arrival, 02 Applied. SPO2 96% RA. Lowest   SPO2 93% documented. Treatment: 2LNC continuous 02 for non 02 dependent patient.     Acute Respiratory Failure Clinical Indicators per  MS-DRG Training Guide and   Quick Reference Guide:  pO2 < 60 mmHg or SpO2 (pulse oximetry) < 91% breathing room air  pCO2 > 50 and pH < 7.35  P/F ratio (pO2 / FIO2) < 300  pO2 decrease or pCO2 increase by 10 mmHg from baseline (if known)  Supplemental oxygen of 40% or more  Presence of respiratory distress, tachypnea, dyspnea, shortness of breath,   wheezing  Unable to speak in complete sentences  Use of accessory muscles to breathe  Extreme anxiety and feeling of impending doom  Tripod position  Confusion/altered mental status/obtunded  Options provided:  -- Acute Respiratory Failure as evidenced by SOB requiring 2LNC continuous 02   to maintain SPO2 > 93%  -- Acute Respiratory Failure ruled out after study  -- Other - I will add my own diagnosis  -- Disagree - Not applicable / Not valid  -- Disagree - Clinically unable to determine / Unknown  -- Refer to Clinical Documentation Reviewer    PROVIDER RESPONSE TEXT:    This patient is in acute respiratory failure as evidenced by SOB requiring SOB. BNP 9643  Treatment: IV Lasix x 1. Nitro, Diltiazem, & Heparin  Options provided:  -- Acute on chronic Diastolic congestive heart failure confirmed present on   admission  -- Chronic Diastolic congestive heart failure confirmed present on admission  -- Other - I will add my own diagnosis  -- Disagree - Not applicable / Not valid  -- Disagree - Clinically unable to determine / Unknown  -- Refer to Clinical Documentation Reviewer    PROVIDER RESPONSE TEXT:    The diagnosis of Acute on chronic Diastolic congestive heart failure was   confirmed as present on admission.     Query created by: Dhaval Landers on 4/29/2021 7:43 AM      Electronically signed by:  Misa Salgado MD 4/29/2021 9:47 AM

## 2021-04-29 NOTE — CARE COORDINATION
4/29/21, 1:13 PM EDT    DISCHARGE  [address]ospital [address] day: 1  Location: -30/030-A Reason for admit: Atrial fibrillation with RVR ([address] [I48.91]   Procedure:   4/27 CXR - left basilar atelectasis. Tiny bilateral pleural effusions suspected. 4/28 CTA chest - No PE. Small-moderate sized bilateral pleural effusions, increased in size. Bilateral multifocal atelectasis. 4/28 US GB - Sludge or stones within the gallbladder with gallbladder wall thickening and dilated common bile duct. These findings may represent acute cholecystitis. 4/29 HIDA scan - Patent common bile duct. Nonvisualization of the gallbladder highly suspicious for acute cholecystitis. Barriers to Discharge: Afebrile. O2 on at 2L/nc. Sats 93-98%. HIDA scan done and suspicious for cholecystitis. Planning surgery per Dr. Delia Aden. Zosyn iv q8hr. PCP: Vicki Galaviz MD  Readmission Risk Score: 32%  Patient Goals/Plan/Treatment Preferences: From The [address] [place] [address]. SW following.

## 2021-04-29 NOTE — PLAN OF CARE
Problem: Falls - Risk of:  Goal: Will remain free from falls  Description: Will remain free from falls  4/29/2021 0019 by Rosalva Ross RN  Outcome: Ongoing  Note: Have keep the patient on bedrest, turning the patient every 2 hours added pillow support to all extremities. Patient will continue to work with PT/OT to become stronger to get out of bed safely. Educated the patient on the use of the call light. Bed alarm is on. Will assess the 5 P's every hour. Problem: Pain:  Goal: Pain level will decrease  Description: Pain level will decrease  4/29/2021 0019 by Rosalva Ross RN  Outcome: Ongoing  Note: Patient has chronic back pain, will give pain medications per [facility]. Problem: DISCHARGE BARRIERS  Goal: Patient's continuum of care needs are met  4/29/2021 0019 by Rosalva Ross RN  Outcome: Ongoing  Note: Plan to go back to the [facility]. Problem: Skin Integrity:  Goal: Will show no infection signs and symptoms  Description: Will show no infection signs and symptoms  Outcome: Ongoing  Note: Sores on buttocks, hip and ankles. Will continue to monitor. Care plan reviewed with patient . Patient verbalize understanding of the plan of care and contribute to goal setting.

## 2021-04-30 ENCOUNTER — APPOINTMENT (OUTPATIENT)
Dept: CT IMAGING | Age: 86
DRG: 444 | End: 2021-04-30
Payer: MEDICARE

## 2021-04-30 ENCOUNTER — APPOINTMENT (OUTPATIENT)
Dept: INTERVENTIONAL RADIOLOGY/VASCULAR | Age: 86
DRG: 444 | End: 2021-04-30
Payer: MEDICARE

## 2021-04-30 LAB
ACT TEG: 121 SECONDS (ref 86–118)
AMORPHOUS: ABNORMAL
ANGLE, RAPID TEG: 80.7 DEG (ref 64–80)
ANION GAP SERPL CALCULATED.3IONS-SCNC: 13 MEQ/L (ref 8–16)
ANTIBODY IDENTIFICATION: NORMAL
BACTERIA: ABNORMAL
BASOPHILIA: ABNORMAL
BASOPHILS # BLD: 0.2 %
BASOPHILS ABSOLUTE: 0 THOU/MM3 (ref 0–0.1)
BILIRUBIN URINE: NEGATIVE
BLOOD, URINE: ABNORMAL
BUN BLDV-MCNC: 13 MG/DL (ref 7–22)
CALCIUM SERPL-MCNC: 8.3 MG/DL (ref 8.5–10.5)
CASTS: ABNORMAL /LPF
CHARACTER, URINE: ABNORMAL
CHLORIDE BLD-SCNC: 110 MEQ/L (ref 98–111)
CO2: 19 MEQ/L (ref 23–33)
COLOR: YELLOW
CORTISOL COLLECTION INFO: NORMAL
CORTISOL: 21.07 UG/DL
CREAT SERPL-MCNC: 0.7 MG/DL (ref 0.4–1.2)
CRYSTALS: ABNORMAL
DAT IGG: NORMAL
DIRECT COOMBS: NORMAL
EKG ATRIAL RATE: 69 BPM
EKG P AXIS: 66 DEGREES
EKG P-R INTERVAL: 152 MS
EKG Q-T INTERVAL: 414 MS
EKG Q-T INTERVAL: 448 MS
EKG QRS DURATION: 82 MS
EKG QRS DURATION: 84 MS
EKG QTC CALCULATION (BAZETT): 480 MS
EKG QTC CALCULATION (BAZETT): 511 MS
EKG R AXIS: 38 DEGREES
EKG R AXIS: 50 DEGREES
EKG T AXIS: 42 DEGREES
EKG T AXIS: 60 DEGREES
EKG VENTRICULAR RATE: 69 BPM
EKG VENTRICULAR RATE: 92 BPM
EOSINOPHIL # BLD: 0.4 %
EOSINOPHILS ABSOLUTE: 0 THOU/MM3 (ref 0–0.4)
EPITHELIAL CELLS, UA: ABNORMAL /HPF
EPL-TEG: 0 % (ref 0–15)
ERYTHROCYTE [DISTWIDTH] IN BLOOD BY AUTOMATED COUNT: 17.2 % (ref 11.5–14.5)
ERYTHROCYTE [DISTWIDTH] IN BLOOD BY AUTOMATED COUNT: 56.4 FL (ref 35–45)
FIBRINOGEN: 546 MG/100ML (ref 155–475)
GEL INDIRECT COOMBS: NORMAL
GFR SERPL CREATININE-BSD FRML MDRD: 80 ML/MIN/1.73M2
GLUCOSE BLD-MCNC: 113 MG/DL (ref 70–108)
GLUCOSE, URINE: NEGATIVE MG/DL
HCT VFR BLD CALC: 22.5 % (ref 37–47)
HCT VFR BLD CALC: 23.6 % (ref 37–47)
HCT VFR BLD CALC: 25.7 % (ref 37–47)
HCT VFR BLD CALC: 26.6 % (ref 37–47)
HEMOGLOBIN: 6.8 GM/DL (ref 12–16)
HEMOGLOBIN: 7.1 GM/DL (ref 12–16)
HEMOGLOBIN: 8.1 GM/DL (ref 12–16)
HEMOGLOBIN: 8.2 GM/DL (ref 12–16)
HEPARIN THERAPY: NO
HYPOCHROMIA: PRESENT
IMMATURE GRANS (ABS): 0.3 THOU/MM3 (ref 0–0.07)
IMMATURE GRANULOCYTES: 6.4 %
INR BLD: 1.76 (ref 0.85–1.13)
KETONES, URINE: 15
KINETICS RAPID TEG: 0.8 MINUTES (ref 0.5–2.3)
LACTIC ACID, SEPSIS: 0.7 MMOL/L (ref 0.5–1.9)
LACTIC ACID, SEPSIS: 1 MMOL/L (ref 0.5–1.9)
LEUKOCYTE EST, POC: ABNORMAL
LY30 (LYSIS) TEG: 0 % (ref 0–7.5)
LYMPHOCYTES # BLD: 8.3 %
LYMPHOCYTES ABSOLUTE: 0.4 THOU/MM3 (ref 1–4.8)
MA(MAX CLOT) RAPID TEG: 82.3 MM (ref 52–71)
MCH RBC QN AUTO: 27 PG (ref 26–33)
MCHC RBC AUTO-ENTMCNC: 30.2 GM/DL (ref 32.2–35.5)
MCV RBC AUTO: 89.3 FL (ref 81–99)
MONOCYTES # BLD: 5.7 %
MONOCYTES ABSOLUTE: 0.3 THOU/MM3 (ref 0.4–1.3)
MRSA SCREEN RT-PCR: NEGATIVE
MRSA SCREEN: NORMAL
MUCUS: ABNORMAL
NITRITE, URINE: NEGATIVE
NUCLEATED RED BLOOD CELLS: 0 /100 WBC
PATHOLOGIST REVIEW: ABNORMAL
PH UA: 7.5 (ref 5–9)
PLATELET # BLD: 364 THOU/MM3 (ref 130–400)
PLATELET ESTIMATE: ADEQUATE
PMV BLD AUTO: 10.2 FL (ref 9.4–12.4)
POTASSIUM SERPL-SCNC: 3.4 MEQ/L (ref 3.5–5.2)
PRO-BNP: 3710 PG/ML (ref 0–1800)
PROCALCITONIN: 0.6 NG/ML (ref 0.01–0.09)
PROTEIN UA: 100 MG/DL
RBC # BLD: 2.52 MILL/MM3 (ref 4.2–5.4)
RBC URINE: ABNORMAL /HPF
REACTION TIME RAPID TEG: 0.8 MINUTES (ref 0.4–1)
RENAL EPITHELIAL, UA: ABNORMAL
ROULEAUX: PRESENT
SCAN OF BLOOD SMEAR: NORMAL
SEG NEUTROPHILS: 79 %
SEGMENTED NEUTROPHILS ABSOLUTE COUNT: 3.7 THOU/MM3 (ref 1.8–7.7)
SODIUM BLD-SCNC: 142 MEQ/L (ref 135–145)
SPECIFIC GRAVITY UA: 1.03 (ref 1–1.03)
UROBILINOGEN, URINE: 0.2 EU/DL (ref 0–1)
VANCOMYCIN RESISTANT ENTEROCOCCUS: POSITIVE
WBC # BLD: 4.7 THOU/MM3 (ref 4.8–10.8)
WBC UA: ABNORMAL /HPF
YEAST: ABNORMAL

## 2021-04-30 PROCEDURE — 6360000002 HC RX W HCPCS: Performed by: NURSE PRACTITIONER

## 2021-04-30 PROCEDURE — 85025 COMPLETE CBC W/AUTO DIFF WBC: CPT

## 2021-04-30 PROCEDURE — 84145 PROCALCITONIN (PCT): CPT

## 2021-04-30 PROCEDURE — 6360000002 HC RX W HCPCS: Performed by: RADIOLOGY

## 2021-04-30 PROCEDURE — 10030 IMG GID FLU COLL DRG SFT TIS: CPT

## 2021-04-30 PROCEDURE — 0F9430Z DRAINAGE OF GALLBLADDER WITH DRAINAGE DEVICE, PERCUTANEOUS APPROACH: ICD-10-PCS | Performed by: RADIOLOGY

## 2021-04-30 PROCEDURE — 37799 UNLISTED PX VASCULAR SURGERY: CPT

## 2021-04-30 PROCEDURE — 6370000000 HC RX 637 (ALT 250 FOR IP): Performed by: REGISTERED NURSE

## 2021-04-30 PROCEDURE — 81001 URINALYSIS AUTO W/SCOPE: CPT

## 2021-04-30 PROCEDURE — 6360000002 HC RX W HCPCS: Performed by: REGISTERED NURSE

## 2021-04-30 PROCEDURE — 47490 INCISION OF GALLBLADDER: CPT | Performed by: RADIOLOGY

## 2021-04-30 PROCEDURE — 6360000004 HC RX CONTRAST MEDICATION: Performed by: INTERNAL MEDICINE

## 2021-04-30 PROCEDURE — 36415 COLL VENOUS BLD VENIPUNCTURE: CPT

## 2021-04-30 PROCEDURE — P9047 ALBUMIN (HUMAN), 25%, 50ML: HCPCS | Performed by: NURSE PRACTITIONER

## 2021-04-30 PROCEDURE — 80048 BASIC METABOLIC PNL TOTAL CA: CPT

## 2021-04-30 PROCEDURE — 87075 CULTR BACTERIA EXCEPT BLOOD: CPT

## 2021-04-30 PROCEDURE — 99232 SBSQ HOSP IP/OBS MODERATE 35: CPT | Performed by: SURGERY

## 2021-04-30 PROCEDURE — 6370000000 HC RX 637 (ALT 250 FOR IP): Performed by: INTERNAL MEDICINE

## 2021-04-30 PROCEDURE — 93307 TTE W/O DOPPLER COMPLETE: CPT

## 2021-04-30 PROCEDURE — 83605 ASSAY OF LACTIC ACID: CPT

## 2021-04-30 PROCEDURE — 6360000002 HC RX W HCPCS

## 2021-04-30 PROCEDURE — 85385 FIBRINOGEN ANTIGEN: CPT

## 2021-04-30 PROCEDURE — 87205 SMEAR GRAM STAIN: CPT

## 2021-04-30 PROCEDURE — 87186 SC STD MICRODIL/AGAR DIL: CPT

## 2021-04-30 PROCEDURE — 87077 CULTURE AEROBIC IDENTIFY: CPT

## 2021-04-30 PROCEDURE — 83880 ASSAY OF NATRIURETIC PEPTIDE: CPT

## 2021-04-30 PROCEDURE — 99233 SBSQ HOSP IP/OBS HIGH 50: CPT | Performed by: INTERNAL MEDICINE

## 2021-04-30 PROCEDURE — 93970 EXTREMITY STUDY: CPT

## 2021-04-30 PROCEDURE — 36620 INSERTION CATHETER ARTERY: CPT

## 2021-04-30 PROCEDURE — 85018 HEMOGLOBIN: CPT

## 2021-04-30 PROCEDURE — 2000000000 HC ICU R&B

## 2021-04-30 PROCEDURE — 82533 TOTAL CORTISOL: CPT

## 2021-04-30 PROCEDURE — 36430 TRANSFUSION BLD/BLD COMPNT: CPT

## 2021-04-30 PROCEDURE — P9016 RBC LEUKOCYTES REDUCED: HCPCS

## 2021-04-30 PROCEDURE — 93005 ELECTROCARDIOGRAM TRACING: CPT | Performed by: NURSE PRACTITIONER

## 2021-04-30 PROCEDURE — 85610 PROTHROMBIN TIME: CPT

## 2021-04-30 PROCEDURE — 2500000003 HC RX 250 WO HCPCS

## 2021-04-30 PROCEDURE — 77012 CT SCAN FOR NEEDLE BIOPSY: CPT

## 2021-04-30 PROCEDURE — 74177 CT ABD & PELVIS W/CONTRAST: CPT

## 2021-04-30 PROCEDURE — 2580000003 HC RX 258: Performed by: REGISTERED NURSE

## 2021-04-30 PROCEDURE — 87106 FUNGI IDENTIFICATION YEAST: CPT

## 2021-04-30 PROCEDURE — 87070 CULTURE OTHR SPECIMN AEROBIC: CPT

## 2021-04-30 PROCEDURE — 87086 URINE CULTURE/COLONY COUNT: CPT

## 2021-04-30 PROCEDURE — 85014 HEMATOCRIT: CPT

## 2021-04-30 RX ORDER — ALBUMIN (HUMAN) 12.5 G/50ML
25 SOLUTION INTRAVENOUS EVERY 6 HOURS
Status: DISCONTINUED | OUTPATIENT
Start: 2021-04-30 | End: 2021-04-30

## 2021-04-30 RX ORDER — MIDAZOLAM HYDROCHLORIDE 1 MG/ML
0.5 INJECTION INTRAMUSCULAR; INTRAVENOUS ONCE
Status: COMPLETED | OUTPATIENT
Start: 2021-04-30 | End: 2021-04-30

## 2021-04-30 RX ORDER — FENTANYL CITRATE 50 UG/ML
25 INJECTION, SOLUTION INTRAMUSCULAR; INTRAVENOUS ONCE
Status: COMPLETED | OUTPATIENT
Start: 2021-04-30 | End: 2021-04-30

## 2021-04-30 RX ORDER — AMIODARONE HYDROCHLORIDE 200 MG/1
200 TABLET ORAL DAILY
Status: DISCONTINUED | OUTPATIENT
Start: 2021-04-30 | End: 2021-05-13 | Stop reason: HOSPADM

## 2021-04-30 RX ADMIN — PIPERACILLIN AND TAZOBACTAM 3375 MG: 3; .375 INJECTION, POWDER, LYOPHILIZED, FOR SOLUTION INTRAVENOUS at 05:32

## 2021-04-30 RX ADMIN — DOCUSATE SODIUM 100 MG: 100 CAPSULE, LIQUID FILLED ORAL at 20:55

## 2021-04-30 RX ADMIN — MIDAZOLAM 0.5 MG: 1 INJECTION INTRAMUSCULAR; INTRAVENOUS at 10:09

## 2021-04-30 RX ADMIN — SODIUM BICARBONATE 1300 MG: 650 TABLET ORAL at 12:10

## 2021-04-30 RX ADMIN — ATORVASTATIN CALCIUM 20 MG: 20 TABLET, FILM COATED ORAL at 20:55

## 2021-04-30 RX ADMIN — DOCUSATE SODIUM 100 MG: 100 CAPSULE, LIQUID FILLED ORAL at 12:53

## 2021-04-30 RX ADMIN — SUCRALFATE 1 G: 1 TABLET ORAL at 12:10

## 2021-04-30 RX ADMIN — SUCRALFATE 1 G: 1 TABLET ORAL at 18:29

## 2021-04-30 RX ADMIN — SODIUM CHLORIDE, PRESERVATIVE FREE 10 ML: 5 INJECTION INTRAVENOUS at 12:12

## 2021-04-30 RX ADMIN — POTASSIUM CHLORIDE 20 MEQ: 750 TABLET, FILM COATED, EXTENDED RELEASE ORAL at 18:29

## 2021-04-30 RX ADMIN — IOPAMIDOL 80 ML: 755 INJECTION, SOLUTION INTRAVENOUS at 08:12

## 2021-04-30 RX ADMIN — MIDODRINE HYDROCHLORIDE 5 MG: 5 TABLET ORAL at 12:11

## 2021-04-30 RX ADMIN — PANTOPRAZOLE SODIUM 40 MG: 40 TABLET, DELAYED RELEASE ORAL at 12:43

## 2021-04-30 RX ADMIN — DIGOXIN 125 MCG: 250 TABLET ORAL at 12:53

## 2021-04-30 RX ADMIN — TIZANIDINE 4 MG: 4 TABLET ORAL at 12:16

## 2021-04-30 RX ADMIN — PIPERACILLIN AND TAZOBACTAM 3375 MG: 3; .375 INJECTION, POWDER, LYOPHILIZED, FOR SOLUTION INTRAVENOUS at 18:30

## 2021-04-30 RX ADMIN — ALBUMIN (HUMAN) 25 G: 0.25 INJECTION, SOLUTION INTRAVENOUS at 04:28

## 2021-04-30 RX ADMIN — FENTANYL CITRATE 25 MCG: 50 INJECTION, SOLUTION INTRAMUSCULAR; INTRAVENOUS at 10:09

## 2021-04-30 RX ADMIN — FENTANYL CITRATE 25 MCG: 50 INJECTION, SOLUTION INTRAMUSCULAR; INTRAVENOUS at 10:41

## 2021-04-30 RX ADMIN — PANTOPRAZOLE SODIUM 40 MG: 40 TABLET, DELAYED RELEASE ORAL at 17:33

## 2021-04-30 RX ADMIN — MIDAZOLAM 0.5 MG: 1 INJECTION INTRAMUSCULAR; INTRAVENOUS at 10:41

## 2021-04-30 RX ADMIN — ONDANSETRON 4 MG: 2 INJECTION INTRAMUSCULAR; INTRAVENOUS at 21:07

## 2021-04-30 RX ADMIN — MIRTAZAPINE 7.5 MG: 15 TABLET, FILM COATED ORAL at 20:56

## 2021-04-30 RX ADMIN — PIPERACILLIN AND TAZOBACTAM 3375 MG: 3; .375 INJECTION, POWDER, LYOPHILIZED, FOR SOLUTION INTRAVENOUS at 12:13

## 2021-04-30 RX ADMIN — SERTRALINE HYDROCHLORIDE 50 MG: 50 TABLET, FILM COATED ORAL at 12:11

## 2021-04-30 RX ADMIN — SUCRALFATE 1 G: 1 TABLET ORAL at 20:55

## 2021-04-30 RX ADMIN — SODIUM BICARBONATE 1300 MG: 650 TABLET ORAL at 20:55

## 2021-04-30 RX ADMIN — MIDODRINE HYDROCHLORIDE 5 MG: 5 TABLET ORAL at 17:33

## 2021-04-30 RX ADMIN — AMIODARONE HYDROCHLORIDE 200 MG: 200 TABLET ORAL at 17:33

## 2021-04-30 RX ADMIN — POTASSIUM CHLORIDE 20 MEQ: 750 TABLET, FILM COATED, EXTENDED RELEASE ORAL at 12:44

## 2021-04-30 RX ADMIN — LATANOPROST 1 DROP: 50 SOLUTION OPHTHALMIC at 20:56

## 2021-04-30 RX ADMIN — SODIUM CHLORIDE, PRESERVATIVE FREE 10 ML: 5 INJECTION INTRAVENOUS at 20:55

## 2021-04-30 ASSESSMENT — PAIN DESCRIPTION - PROGRESSION
CLINICAL_PROGRESSION: NOT CHANGED

## 2021-04-30 ASSESSMENT — ENCOUNTER SYMPTOMS
ABDOMINAL PAIN: 0
TROUBLE SWALLOWING: 0
NAUSEA: 0
SINUS PRESSURE: 0
VOMITING: 0
SHORTNESS OF BREATH: 0
BACK PAIN: 0
ABDOMINAL DISTENTION: 0
COUGH: 0
COLOR CHANGE: 1
CHEST TIGHTNESS: 0
EYE REDNESS: 0

## 2021-04-30 ASSESSMENT — PAIN SCALES - GENERAL
PAINLEVEL_OUTOF10: 0
PAINLEVEL_OUTOF10: 0

## 2021-04-30 NOTE — H&P
[facility]  Sedation/Analgesia History & Physical    Pt Name: Nichol Gallo  MRN: [id number]  YOB: 1935  Provider Performing Procedure: Balbina Arshad MD  Primary Care Physician: Tim Chavez MD    PRE-PROCEDURE   DNR-CCA/DNR-CC []Yes [x]No  Brief History/Pre-Procedure Diagnosis: sepsis          MEDICAL HISTORY  []CAD/Valve  []Liver Disease  []Lung Disease []Diabetes  []Hypertension []Renal Disease  []Additional information:       has a past medical history of Acute blood loss as cause of postoperative anemia, CAD (coronary artery disease), Cardiac valve prolapse, Chest pain, Chronic back pain, Compression fracture of L2 ([facility] [address] COPD (chronic obstructive pulmonary disease) ([facility] [address] Ex-smoker, GERD (gastroesophageal reflux disease), Glaucoma, H/O cardiac catheterization, Hearing loss secondary to cerumen impaction, Hyperlipidemia, Hypertension, Inadvertent durotomy, Liver disease, Osteoarthritis, and Pericarditis. SURGICAL HISTORY   has a past surgical history that includes Hysterectomy (1980); Spine surgery (7/02); Bunionectomy (2004); Rotator cuff repair (left); Upper gastrointestinal endoscopy (2007); Cardiac catheterization (2007??  &   2012? ? ); back surgery (2002); Appendectomy; Colonoscopy; eye surgery (2009??); pr laminectomy,>2 sgmt,lumbar (N/A, 3/7/2018); pr office/outpt visit,procedure only (N/A, 3/12/2018); Endoscopy, colon, diagnostic; Upper gastrointestinal endoscopy (Left, 5/19/2020); and hip surgery (Right, 4/2/2021).   Additional information:       ALLERGIES   Allergies as of 04/27/2021    (No Known Allergies)     Additional information:       MEDICATIONS   Coumadin Use Last 5 Days [x]No []Yes  Antiplatelet drug therapy use last 5 days  [x]No []Yes  Other anticoagulant use last 5 days  [x]No []Yes    Current Facility-Administered Medications:     albumin human 25 % IV solution 25 g, 25 g, Intravenous, Q6H, Angelina Kwon, YASMINE - PAMELLA, Stopped at 04/30/21 0532    amiodarone (NEXTERONE) 150 mg in dextrose 5% 100 ml, 150 mg, Intravenous, Once **FOLLOWED BY** amiodarone (CORDARONE) 450 mg in dextrose 5 % 250 mL infusion, 1 mg/min, Intravenous, Continuous **FOLLOWED BY** amiodarone (CORDARONE) 450 mg in dextrose 5 % 250 mL infusion, 0.5 mg/min, Intravenous, Continuous, Perez Pascual MD    potassium chloride (KLOR-CON M) extended release tablet 40 mEq, 40 mEq, Oral, PRN, 40 mEq at 04/29/21 1144 **OR** potassium bicarb-citric acid (EFFER-K) effervescent tablet 40 mEq, 40 mEq, Oral, PRN **OR** potassium chloride 10 mEq/100 mL IVPB (Peripheral Line), 10 mEq, Intravenous, PRN, Abbi Echevarria MD    0.9 % sodium chloride infusion, , Intravenous, Continuous, YASMINE Valdez CNP, Last Rate: 50 mL/hr at 04/29/21 2244, New Bag at 04/29/21 2244    heparin (porcine) injection 5,540 Units, 80 Units/kg, Intravenous, PRN, Joey Estrada APRN - CNP    heparin (porcine) injection 2,770 Units, 40 Units/kg, Intravenous, PRN, YASMINE Valdez - CNP    heparin 25,000 units in dextrose 5% 250 mL (premix) infusion, 5-30 Units/kg/hr, Intravenous, Continuous, YASMINE Valdez - CNP, Stopped at 04/29/21 2100    0.9 % sodium chloride infusion, , Intravenous, PRN, Debby Wright MD    norepinephrine (LEVOPHED) 16 mg in sodium chloride 0.9 % 250 mL infusion, 2-100 mcg/min, Intravenous, Continuous, Debby Wright MD, Last Rate: 3.8 mL/hr at 04/30/21 0447, 4 mcg/min at 04/30/21 0447    digoxin (LANOXIN) injection 125 mcg, 125 mcg, Intravenous, Once, Cindy Flood MD, Stopped at 04/28/21 0644    digoxin (LANOXIN) tablet 125 mcg, 125 mcg, Oral, Daily, Cindy Flood MD, 125 mcg at 04/29/21 1144    [Held by provider] aspirin EC tablet 325 mg, 325 mg, Oral, Daily, YASMINE Valdez CNP, 325 mg at 04/28/21 1359    docusate sodium (COLACE) capsule 100 mg, 100 mg, Oral, BID, YASMINE Valdez CNP, 100 mg at 04/28/21 1359    isosorbide mononitrate (IMDUR) extended release tablet 30 mg, 30 mg, Oral, Daily, Leveda Polina, APRN - CNP, 30 mg at 04/29/21 1143    latanoprost (XALATAN) 0.005 % ophthalmic solution 1 drop, 1 drop, Both Eyes, Nightly, Leveda Polina, APRN - CNP, 1 drop at 04/29/21 2255    midodrine (PROAMATINE) tablet 5 mg, 5 mg, Oral, TID WC, Leveda Irvingk, APRN - CNP, 5 mg at 04/29/21 1642    mirtazapine (REMERON) tablet 7.5 mg, 7.5 mg, Oral, Nightly, Leveda Irvingk, APRN - CNP, 7.5 mg at 04/28/21 2134    pantoprazole (PROTONIX) tablet 40 mg, 40 mg, Oral, BID AC, Leveda Irvingk, APRN - CNP, 40 mg at 04/29/21 1642    potassium chloride (KLOR-CON) extended release tablet 20 mEq, 20 mEq, Oral, BID WC, Leveda Irvingk, APRN - CNP, 20 mEq at 04/29/21 1642    sertraline (ZOLOFT) tablet 50 mg, 50 mg, Oral, Daily, Leveda Irvingk, APRN - CNP, 50 mg at 04/29/21 1144    sodium bicarbonate tablet 1,300 mg, 1,300 mg, Oral, BID, Leveda Husk, APRN - CNP, 1,300 mg at 04/29/21 1143    sucralfate (CARAFATE) tablet 1 g, 1 g, Oral, 4x Daily AC & HS, Leveda Irvingk, APRN - CNP, 1 g at 04/29/21 1642    tiZANidine (ZANAFLEX) tablet 4 mg, 4 mg, Oral, Q8H PRN, Leveda Irvingk, APRN - CNP, 4 mg at 04/29/21 1746    sodium chloride flush 0.9 % injection 5-40 mL, 5-40 mL, Intravenous, 2 times per day, Leveda Irvingk, APRN - CNP, 10 mL at 04/29/21 2249    sodium chloride flush 0.9 % injection 5-40 mL, 5-40 mL, Intravenous, PRN, Leveda Irvingk, APRN - CNP    0.9 % sodium chloride infusion, 25 mL, Intravenous, PRN, Leveda Husk, APRN - CNP    promethazine (PHENERGAN) tablet 12.5 mg, 12.5 mg, Oral, Q6H PRN **OR** ondansetron (ZOFRAN) injection 4 mg, 4 mg, Intravenous, Q6H PRN, Leveda Husk, APRN - CNP, 4 mg at 04/28/21 2134    polyethylene glycol (GLYCOLAX) packet 17 g, 17 g, Oral, Daily PRN, Leveda Husk, APRN - CNP    acetaminophen (TYLENOL) tablet 650 mg, 650 mg, Oral, Q6H PRN, 650 mg at 04/29/21 0956 **OR** acetaminophen (TYLENOL) suppository 650 mg, 650 YASMINE Almeida CNP   apixaban (ELIQUIS) 5 MG TABS tablet Take 1 tablet by mouth 2 times daily 4/21/21 5/21/21 Yes YASMINE Jimenez CNP   docusate sodium (COLACE, DULCOLAX) 100 MG CAPS Take 100 mg by mouth 2 times daily 4/5/21 5/5/21 Yes Donna Grant PA-C   aspirin 325 MG EC tablet Take 1 tablet by mouth daily 4/5/21 5/5/21 Yes Donna Grant PA-C   pantoprazole (PROTONIX) 40 MG tablet Take 1 tablet by mouth 2 times daily (before meals) 2/16/21  Yes Oliva Brito MD   sucralfate (CARAFATE) 1 GM tablet Take 1 tablet by mouth 4 times daily (before meals and nightly) 2/16/21  Yes Oliva Brito MD   simvastatin (ZOCOR) 40 MG tablet Take 1 tablet by mouth nightly 7/30/20  Yes Oliva Brito MD   Multiple Vitamins-Minerals (THERAPEUTIC MULTIVITAMIN-MINERALS) tablet Take 1 tablet by mouth daily   Yes Historical Provider, MD   latanoprost (XALATAN) 0.005 % ophthalmic solution Place 1 drop into both eyes nightly   Yes Historical Provider, MD   tiZANidine (ZANAFLEX) 4 MG tablet Take 1 tablet by mouth every 8 hours as needed (muscle spasm) 4/9/21   YASMINE Jimenez CNP   acetaminophen (TYLENOL) 325 MG tablet Take 2 tablets by mouth every 4 hours as needed for Pain Maximum dose of acetaminophen is 4000 mg from all sources in 24 hours.  4/3/18   YASMINE Bains CNP     Additional information:       VITAL SIGNS   Vitals:    04/30/21 0915   BP: 110/72   Pulse: 94   Resp: 24   Temp:    SpO2: 98%       PHYSICAL:   Heart:  [x]Regular rate and rhythm  []Other:    Lungs:  [x]Clear    []Other:    Abdomen: [x]Soft    []Other:    Mental Status: [x]Alert & Oriented  []Other:      PLANNED PROCEDURE   []Biospy []Arteriogram              [x]Drainage   []Mediport Insertion  []Fistulogram []IV access       []Vertebroplasty / Augmentation  []IVC filter []Dialysis catheter []Biliary drainage  []Other: []CAPD Catheter []Nephrostomy Tube / Stent  SEDATION  Planned agent: [x]Midazolam []Meperidine [x]Sublimaze []Dilaudid []Morphine     []Diazepam  []Other:     ASA Classification:  []1 [x]2 []3 []4 []5  Class 1: A normal healthy patient  Class 2: Pt with mild to moderate systemic disease  Class 3: Severe systemic disease or disturbance  Class 4: Severe systemic disorders that are already life threatening. Class 5: Moribund pt with little chances of survival, for more than 24 hours. Mallampati I Airway Classification:   []1 [x]2 []3 []4    [x]Pre-procedure diagnostic studies complete and results available. Comment:    [x]Previous sedation/anesthesia experiences assessed. Comment:    [x]The patient is an appropriate candidate to undergo the planned procedure sedation and anesthesia. (Refer to nursing sedation/analgesia documentation record)  [x]Formulation and discussion of sedation/procedure plan, risks, and expectations with patient and/or responsible adult completed. [x]Patient examined immediately prior to the procedure.  (Refer to nursing sedation/analgesia documentation record)    Yann Nguyễn MD  Electronically signed 4/30/2021 at 9:59 AM

## 2021-04-30 NOTE — OP NOTE
Department of Radiology  Post Procedure Progress Note      Pre-Procedure Diagnosis:  sepsis    Procedure Performed:  Percutaneous cholecystostomy tube insertion    Anesthesia: local / versed and fentanyl    Findings: successful    Immediate Complications:  None    Estimated Blood Loss: minimal    SEE DICTATED PROCEDURE NOTE FOR COMPLETE DETAILS.     Radha Salter MD   4/30/2021 11:11 AM

## 2021-04-30 NOTE — H&P
Formulation and discussion of sedation / procedure plans, risks, benefits, side effects and alternatives with patient and/or responsible adult completed.     Electronically signed by Rashi Montanez MD on 4/30/2021 at 9:59 AM

## 2021-04-30 NOTE — CARE COORDINATION
4/30/21, 1:05 PM EDT    DISCHARGE  [address] day: 2  Location: -04/004-A Reason for admit: Atrial fibrillation with RVR ([address] [I48.91]   Procedure:   4/27 CXR - left basilar atelectasis. Tiny bilateral pleural effusions suspected. 4/28 CTA chest - No PE. Small-moderate sized bilateral pleural effusions, increased in size. Bilateral multifocal atelectasis.   4/28 US GB - Sludge or stones within the gallbladder with gallbladder wall thickening and dilated common bile duct. These findings may represent acute cholecystitis. 4/29 HIDA scan - Patent common bile duct. Nonvisualization of the gallbladder highly suspicious for acute cholecystitis. 4/20 Perc cholecystectomy drain inserted  Barriers to Discharge: Transfer from  for hypotension and drop in hgb. Remains 6.8. IVF. Amio gtt. Levo @ 4mcg. Heparin gtt off. Cardizem gtt off. IV albumin. IV zosyn. PCP: Melonie Patel MD  Readmission Risk Score: 37%  Patient Goals/Plan/Treatment Preferences: Plans to return to the [facility]. No precert required.

## 2021-04-30 NOTE — PROGRESS NOTES
Patient:  Kota Zurita    Unit/Bed:4D-04/004-A  MRN: [id number]   PCP: David Rios MD  Date of Admission: 4/27/2021    Assessment and Plan(All pulmonary edema, renal failure, PE, and respiratory failure diagnoses are acute in nature unless otherwise specified):        1. Acute hypoxic respiratory failure: Requiring 2 L NC. CXR shows atelectasis. Promote incentive spirometry, PT/OT when able. Wean to maintain SpO2 >90%. 2. Undifferentiated shock: probably hypovolemic. Improved with 3L IVF over last 2 days, including 2L of that during hypotensive episode overnight. Now off pressors. Also noted hgb drop but should consider possible component of dilution. CT A/P today negative for findings consistent with acute bleed. Afebrile. Lactic acid only 1.0. Procal slightly elevated, 0.60. WBC WNL. Does not appear to be septic in origin and workup today revealed patient negative for acute cholecystitis. Recent UTI but was treated with a course of rocephin and repeat UA is unconvincing of active infection. Adrenal insufficiency excluded with cortisol WNL at 21.07.   3. Atrial fibrillation with RVR: now converted to NSR after blood administration, volume resuscitation. Received amiodarone. Cardiology was consulted and following. Heparin drip on hold for hgb drop. 4. Acute blood loss anemia: Hgb dropped from 8.6 to 6.9, but in setting of 3L volume resuscitation. Component of dilution can be considered. Received 1 pRBC today with appropriate response to hgb 8.1. Will repeat H/H at 220 and trend. CT A/P negative today for findings of acute bleed. Given patient at increased risk for bleeding while on anticoagulation, will obtain FOBT. If H/H remains stable and FOBT negative, then may be able to consider restart of heparin drip tomorrow. 5. Acute ST elevation: noted overnight on ECG. Upon review, possible early repolarization. Cardiology did evaluate and felt this was not an acute STEMI. Troponin was negative x2.  ECHO pending read. Cardiology following. 6. Recent UTI: received a course of rocephin for klebsiella growth on previous admission. Repeat UA is unremarkable for active infection. 7. Common bile duct filling defect: demonstrated on IR cholecystostomy tube placement report. No acute cholecystitis. Widely patent cystic duct and CBD noted. General surgery following. 8. Left femoral/popliteal vein thrombi:  Noted on prior hospitalization on VL dup LE on 4/19/21. Was discharged on eliquis. VL dup LE today 4/30 shows persistence of these thrombi, with new left popliteal vein thrombus with partial flow. There was also noted absence of flow and compressibility in the left peroneal and posterior tibial veins. Presently off heparin drip for hgb drop. Can consider restarting tomorrow if H/H is stable. 9. Hypoalbuminemia: albumin 1.7. Physical deconditioning with poor nutrition. . Albumin level   10. Hypokalemia: replacement protocol. Monitor BMP daily. Dietician   11. Chronic HFpEF: Recent ECHO (4/8/21) with EF 60% and grade 1 diastolic dysfunction. Stable. Not in exacerbation. Monitor strict I/O. Daily weights. 12. HTN, history of: Imdur and metoprolol are ordered with administration parameters. Required pressors earlier today as above. 13. HLD, history of: continue statin. 14. Deconditioning: PT/OT when able. CC:  Acute respiratory failure  HPI: Laly Golden is an 82 y/o female former-smoker with PMH of COPD,  ECHO LVEF 60% with grade 1 diastolic dysfunction, CAD, Essential HPTN, Dyslipidemia, Chronic Pain, Glaucoma. Of note, patient had a recent hospitalization and was discharged on 4/21/2021, during which time they noted elevated troponin and BAILEE. Cardiology evaluated and thought troponin elevation was secondary to BAILEE. She received treatment for pneumonia and UTI. Her stay was complicated by a DVT in the left leg, and she was transitioned to eliquis on discharge to SNF.      She re-presented to [facility] 4/28/2021 from her SNF, admitted under Dr. Freddy Patel, for dyspnea and chest pain. Per report, her chest pain was an \"achiness\" that was present since her last discharge. She was placed on 2L NC. She had noted bilious emesis. She was found to be in Afib with RVR. CTA chest was obtained negative for PE. A RUQ showed gallbladderr sludge vs stones with wall thickening and dilated CBD, representing possible cholecystitis. HIDA showed patent CBD, with non-visualization of the gallbladder. General surgery was consulted. On 4/29 a rapid response was called for hypotension. 12-lead ECG showed acute ST elevation of leads I, II, V1-6. STEMI was called to Dr. Luis Daniel Gupta, but this was felt not to be a STEMI and cath lab was canceled. Troponin was noted to be negative. Patient required levophed support. Hgb was noted to be 6.9, acutely dropped from 8.6. 1 pRBC was ordered. On 4/30 patient went to IR for percutaneous cholecystostomy tube placement. At that time, widely patent cystic and CBD were noted, with free flow of contrast into duodenum. There was a noted CBD filling defect. For further details, please see assessment and plan. ROS: Review of Systems   Constitutional: Positive for fatigue. Negative for chills, diaphoresis and fever. HENT: Negative for congestion, sinus pressure and trouble swallowing. Eyes: Negative for redness and visual disturbance. Respiratory: Negative for cough, chest tightness and shortness of breath. Cardiovascular: Positive for chest pain. Negative for leg swelling. Gastrointestinal: Negative for abdominal distention, abdominal pain, nausea and vomiting. Genitourinary: Negative for difficulty urinating and dysuria. Musculoskeletal: Negative for back pain and neck pain. Skin: Positive for color change and pallor. Neurological: Negative for dizziness, weakness, light-headedness and headaches. Psychiatric/Behavioral: Negative for agitation and confusion.  The patient is not nervous/anxious. PMH:  Per HPI  SHX:  Former-smoker. Denies alcohol or substance use. FHX: Positive for tuberculosis in mother and father. Allergies: NKDA  Medications:     amiodarone      sodium chloride 50 mL/hr at 04/29/21 2244    heparin (PORCINE) Infusion Stopped (04/29/21 2100)    sodium chloride      norepinephrine-sodium chloride 4 mcg/min (04/30/21 0447)    sodium chloride        amiodarone  150 mg Intravenous Once    digoxin  125 mcg Intravenous Once    digoxin  125 mcg Oral Daily    [Held by provider] aspirin  325 mg Oral Daily    docusate sodium  100 mg Oral BID    isosorbide mononitrate  30 mg Oral Daily    latanoprost  1 drop Both Eyes Nightly    midodrine  5 mg Oral TID WC    mirtazapine  7.5 mg Oral Nightly    pantoprazole  40 mg Oral BID AC    potassium chloride  20 mEq Oral BID WC    sertraline  50 mg Oral Daily    sodium bicarbonate  1,300 mg Oral BID    sucralfate  1 g Oral 4x Daily AC & HS    sodium chloride flush  5-40 mL Intravenous 2 times per day    metoprolol tartrate  25 mg Oral BID    atorvastatin  20 mg Oral Nightly    piperacillin-tazobactam  3,375 mg Intravenous Q8H       Vital Signs:   T: 98.4 (Tmax 99.4 last 24h): P: 67 RR: 20 B/P: 103/54: FiO2: 2L NC: O2 Sat:100%: I/O: 2643/1010 (+1633) GCS: 14  Body mass index is 28.87 kg/m². Neena Kahn General:   Acute on chronically ill adult female. Appears stated age. Appears deconditioned. HEENT:  normocephalic and atraumatic. No scleral icterus. PERR  Neck: supple. No Thyromegaly. Lungs: clear to auscultation. No retractions  Cardiac: RRR. No JVD. Abdomen: soft. Nontender. Extremities:  No clubbing, cyanosis,. 2+ pitting LLE edema, none on the RLE. Vasculature: capillary refill < 3 seconds. Palpable dorsalis pedis pulses bilaterally. Skin:  warm and dry. Psych:  Alert and oriented x3. Affect appropriate  Lymph:  No supraclavicular adenopathy. Neurologic:  No focal deficit. No seizures.     Data: (All radiographs, tracings, PFTs, and imaging are personally viewed and interpreted unless otherwise noted).  Telemetry: NSR. Rate 67. No ectopy.  12-lead ECG 4/30/2021: atrial fibrillation. Rate 92. QTc 511 ms.  Sodium 142 potassium 3.4 chloride 110 CO2 19 BUN 13 creatinine 0.7 glucose 113 calcium 8.3   Lactic acid 1.0   Procal 0.60   proBNP 3710   Troponin <0.010   Albumin 1.5 alk phos 103 ALT 48 AST 62 bilirubin 0.4 total protein 4.4   Cortisol 21.07   WBC 4.7 hemoglobin 8.1 hematocrit 26.6 platelets 175   INR 1.76   UA 4/29: yellow, small leukocytes, 10-15 WBC, no bacteria   CT abd/pelvis w IV contrast 4/30/2021 report: Stable CT scan of the abdomen, no significant interval change since previous study dated 28 April 2021. Moderate bilateral pleural effusions and atelectasis at both lung bases. Possible old granulomatous disease in the liver and spleen. Bilateral renal cysts. Possible extrarenal pelvis in the right side. Slight gallbladder wall thickening. Slightly atrophic pancreas. Atherosclerotic calcification of the abdominal aorta and iliac arteries. Garcia catheter in the bladder. Status post hysterectomy. Right femoral intertrochanteric fracture status post instrumentation. Slightly increased density in the left common femoral vein. Slightly increased density in the subcutaneous soft tissues in the pelvis extending into both thighs. Postoperative changes in the lower lumbar spine. Lumbar spondylosis. Mild compression deformity along the superior endplate of L2.   CT IR guided cholecystostomy tube placement 4/30/21 report: Status post successful percutaneous cholecystostomy drainage tube insertion with CT guidance. A cholecystogram reveals sludge and gallstones in the gallbladder but reveals a common duct and cystic duct are widely patent. There is free flow of contrast into the duodenum. . There is moderate dilatation of the common bile duct which also contains some filling defect consistent with sludge and/or gallstones. Case discussed with Dr. Vinnie Andres. Electronically signed by YASMINE Tilley-PAMELLA                                     Patient seen by me. Case discussed with nurse practitioner. Discussed with Dr. Dennise Oconnor. No evidence of acute cholecystitis. Cholecystostomy tube placed without any purulent drainage. Appreciate surgical assistance.     Electronically signed by Jamir Leon MD on 5/3/2021 at 6:32 AM

## 2021-04-30 NOTE — FLOWSHEET NOTE
0800 Mrs Fred Raymond rests in the bed. She denies pain but reports having right flank and chest pain on palpation. She is able to demonstrate orientation to person, place and situation. She has an art line right brachial which is very positional. She requires levophed to keep her BP at ordered parameters. This is infusing through a 3 lumen catheter left SC. She has a alvarez patent to gravity. Her heart rhythm is currently A- Fib. Dr Sis Enciso has come to see. Will repeat CT scan of abd.     0900 Dr Daphney Webber has come to see. Will start Amiodarone for A-Fib. She was taken for CT of abdomen. 1000 plan to take to IR for biliary drain per Dr Nithya Latham.    1100 She has returned from IR with a biliary drain. She tolerated the procedure well. Blood Bank has confirmed a matched unit of blood. This has been started. 1200 She has converted back to SR. The Amiodarone gtt was not started. Her BP has improved and the Levophed gtt weaned off. Her daughter has come to see.     1400 She continues to rest without

## 2021-04-30 NOTE — PLAN OF CARE
Problem: Falls - Risk of:  Goal: Will remain free from falls  Description: Will remain free from falls  Outcome: Ongoing  Note: Bedrest today. Problem: Pain:  Goal: Pain level will decrease  Description: Pain level will decrease  Outcome: Ongoing  Note: She has complaints of intermittent pain. She has had several procedures today requiring pain medication. She reports this has been effective. Problem: Skin Integrity:  Goal: Will show no infection signs and symptoms  Description: Will show no infection signs and symptoms  Outcome: Ongoing  Note: Frequent repositioning for pressure sores. She tolerates this well. Problem: Daily Care:  Goal: Daily care needs are met  Description: Daily care needs are met  Outcome: Ongoing     Problem: Discharge Planning:  Goal: Patients continuum of care needs are met  Description: Patients continuum of care needs are met  Outcome: Ongoing  Note: Plan discharge back to nursing home for rehab. Care plan reviewed with patient and family. Patient and family verbalize understanding of the plan of care and contribute to goal setting.

## 2021-04-30 NOTE — PROGRESS NOTES
Chirag Person M.D. [facility]  Daily Progress Note    Pt Name: Ezra Darby  Medical Record Number: [id number]  Date of Birth 1935   Today's Date: 4/30/2021    ASSESSMENT:     1. HD # 2  Active Hospital Problems    Diagnosis Date Noted    Abnormal ultrasound of gallbladder [R93.2] 04/29/2021    Other chest pain [R07.89] 04/29/2021    Atrial fibrillation with RVR ([address] [I48.91] 04/28/2021   2. Chief Complaint:  Chief Complaint   Patient presents with    Chest Pain     due to pneumonia           PLAN:     1. Had episode of A. fib with RVR again last night which is why she came in associated with hypotension and a noted decrease in her hemoglobin to 6.9  2. She had been restarted on heparin  3. Her hemoglobin has been dropping since before I saw her as a consult from 10-6.9.  4. Clinically she had chronic cholecystitis with nonvisualization but had no abdominal pain whatsoever normal white count and cholecystectomy was being performed because discussion with Dr. Serjio Murillo noted she complains of chest pain but is had normal cath in the past  5. Lactic acid normal, pro-Drew is only 0.6, I do not think this is septic shock and certainly would not be the etiology of her dropping hemoglobin  6. I recommended a stat CT scan with IV contrast to evaluate for either retroperitoneal or abdominal wall hematoma from anticoagulation  7.  I do not believe that urgent cholecystectomy is needed and in fact I am going to not proceed with her semielective cholecystectomy today and depending on findings on CT scan if she has no evidence of anything else requiring intra-abdominal surgery we would just plan percutaneous drainage as she is not stable but once again I do not believe this is acute cholecystitis at all      SUBJECTIVE:     Haresh Regalado had an episode of hypotension A. fib with RVR and her hemoglobin dropped to 6.9 admitting date what was 10 and has been dropping since that time.  She had been restarted on heparin yesterday evening. She still has no abdominal pain at all or palpable gallbladder or any findings in the right upper quadrant on exam.      OBJECTIVE:     Patient Vitals for the past 24 hrs:   BP Temp Temp src Pulse Resp SpO2   04/30/21 0000 (!) 116/56 97.7 °F (36.5 °C) Axillary 72 19 98 %   04/29/21 2345 124/60 -- -- 68 20 99 %   04/29/21 2330 (!) 121/58 -- -- 74 18 95 %   04/29/21 2315 (!) 114/57 -- -- 72 19 97 %   04/29/21 2300 (!) 151/68 -- -- 75 21 97 %   04/29/21 2230 (!) 111/57 -- -- 73 19 98 %   04/29/21 2200 (!) 109/52 -- -- 77 19 98 %   04/29/21 2157 131/60 -- -- 83 19 98 %   04/29/21 2152 (!) 152/62 -- -- 82 23 98 %   04/29/21 2100 (!) 126/104 99.4 °F (37.4 °C) Rectal 78 23 95 %   04/29/21 1949 (!) 48/22 -- -- 78 20 100 %   04/29/21 1738 110/65 -- -- 107 22 97 %   04/29/21 1630 (!) 99/47 98 °F (36.7 °C) Oral 100 16 96 %   04/29/21 1130 (!) 117/58 -- -- 93 14 98 %   04/29/21 1115 (!) 108/56 -- -- 97 14 97 %   04/29/21 1100 (!) 110/57 97.7 °F (36.5 °C) Oral 103 14 93 %         Intake/Output Summary (Last 24 hours) at 4/30/2021 4550  Last data filed at 4/30/2021 0425  Gross per 24 hour   Intake 2643 ml   Output 1010 ml   Net 1633 ml       In: 2643 [P.O.:450; I.V.:2193]  Out: 1010 [Urine:1010]    I/O last 3 completed shifts: In: 2497 [P.O.:450; I.V.:2047]  Out: 900 [Urine:900]     Date 04/30/21 0000 - 04/30/21 2359   Shift 2265-7086 9466-6048 4737-8847 24 Hour Total   INTAKE   I.V.(mL/kg) 146(2.1)   146(2.1)   Shift Total(mL/kg) 146(2.1)   146(2.1)   OUTPUT   Urine(mL/kg/hr) 110   110   Emesis/NG output(mL/kg) 0(0)   0(0)   Other(mL/kg) 0(0)   0(0)   Stool(mL/kg) 0(0)   0(0)   Blood(mL/kg) 0(0)   0(0)   Shift Total(mL/kg) 110(1.6)   110(1.6)   Weight (kg) 69.3 69.3 69.3 69.3       Wt Readings from Last 3 Encounters:   04/28/21 152 lb 12.5 oz (69.3 kg)   04/21/21 160 lb 8 oz (72.8 kg)   04/01/21 138 lb 9.6 oz (62.9 kg)        Body mass index is 28.87 kg/m².      Diet: HCT 32.6* 27.7* 23.3* 23.6*   MCV 93.4 88.5  --   --    MCH 27.2 27.5  --   --    MCHC 29.1* 31.0*  --   --     312  --   --    MPV 10.5 10.2  --   --       Last 3 CMP:   Recent Labs     04/27/21  2128 04/29/21  0504 04/29/21 2021    138 137   K 4.4 3.3* 4.1    103 107   CO2 20* 22* 21*   BUN 10 11 13   CREATININE 0.7 0.8 0.9   GLUCOSE 96 108 115*   CALCIUM 8.2* 8.4* 7.5*   PROT 5.2*  --  4.4*   LABALBU 2.3*  --  1.5*   BILITOT 0.3  --  0.4   ALKPHOS 158*  --  103   *  --  62*   *  --  48      Troponin: No results for input(s): TROPONINI in the last 72 hours. Calcium:   Lab Results   Component Value Date    CALCIUM 7.5 04/29/2021    CALCIUM 8.4 04/29/2021    CALCIUM 8.2 04/27/2021      Ionized Calcium: No results found for: IONCA   Lipids: No results for input(s): CHOL, HDL in the last 72 hours. Invalid input(s): LDLCALCU  INR:   Recent Labs     04/30/21  0259   INR 1.76*     Lactic Acid:   Lab Results   Component Value Date    LACTA 1.9 04/29/2021    LACTA 1.3 04/18/2021    LACTA 1.0 04/03/2021      . DVT prophylaxis: [] Lovenox                                 [x] SCDs                                 [] SQ Heparin                                 [] Encourage ambulation, low risk for DVT, no chemical or mechanical prophylaxis necessary              [] Already on Anticoagulation      RADIOLOGY:      Ct Abdomen Pelvis Wo Contrast Additional Contrast? None    Result Date: 4/28/2021  1. Low-attenuation areas are noted within the bilateral femoral veins. It is uncertain whether this is due to mixing artifact from the prior CTA chest, or perhaps deep venous thrombosis. Bilateral lower extremity venous duplex ultrasound study in the ER is recommended. 2.  No evidence of a bowel obstruction or free air. 3.  Postoperative changes are noted involving the right hip.  4.  Faint increased attenuation in the gallbladder which could represent vicarious excretion of contrast, gallstones, and/or sludge. There is no pericholecystic inflammation. 5.  Mild to moderate prominence of the right renal pelvis is noted, this appears slightly improved compared to the prior study. 6.  Additional findings are detailed above. This document has been electronically signed by: Akilah Anderson M.D. on 04/28/2021 02:49 AM All CTs at this facility use dose modulation techniques and iterative reconstructions, and/or weight-based dosing when appropriate to reduce radiation to a low as reasonably achievable. Cta Chest W Wo Contrast    Result Date: 4/28/2021  1. No evidence of a pulmonary embolism. 2.  Small to moderate-sized bilateral pleural effusions. The pleural effusions appear increased since the prior study. 3.  Multifocal atelectasis is noted bilaterally. 4.  Additional findings are detailed above. This document has been electronically signed by: Akilah Anderson M.D. on 04/28/2021 01:59 AM All CTs at this facility use dose modulation techniques and iterative reconstructions, and/or weight-based dosing when appropriate to reduce radiation to a low as reasonably achievable. Us Gallbladder Ruq    Result Date: 4/28/2021  Sludge or stones within the gallbladder with gallbladder wall thickening and dilated common bile duct. These findings may represent acute cholecystitis. **This report has been created using voice recognition software. It may contain minor errors which are inherent in voice recognition technology. ** Final report electronically signed by Dr. Cory Harper on 4/28/2021 2:42 PM    Xr Chest Portable    Result Date: 4/30/2021  1. Left internal jugular central venous line tip overlies the proximal SVC. 2.  Low lung volumes. This document has been electronically signed by: Jean Ramos MD on 04/30/2021 12:04 AM    Xr Chest Portable    Result Date: 4/27/2021  1. No significant interval change since the prior study. Again noted is left basilar atelectasis.   Tiny bilateral pleural effusions are suspected. This document has been electronically signed by: Richa Miramontes M.D. on 04/27/2021 08:58 PM    Nm Hepatobiliary Scan W Ejection Fraction    Result Date: 4/29/2021  1. Patent common bile duct. 2. Nonvisualization of the gallbladder highly suspicious for acute cholecystitis. Final report electronically signed by Dr. Farzaneh Yadav on 4/29/2021 11:03 AM        Irena Rock M.D.  FACS  Electronically signed 4/30/2021 at 6:32 AM

## 2021-04-30 NOTE — PROGRESS NOTES
1000 Patient received in IR for gallbladder drain insertion. This procedure has been fully reviewed with the patient and written informed consent has been obtained. Dr Maura Miranda at bedside to   1006 Procedure started with Dr. Maura Miranda. 1020 Abort procedure in IR. Pt being prepped for transport to CT for procedure. 1032 Patient assisted to CT table and pre scan started. 1040 Procedure started in CT with Dr. Maura Miranda. 1055 Procedure completed; patient tolerated well. Post scan obtained. Pt will be going back to IR for cholangiogram.   1110 Cholangiogram complete. 1113 Patient on bed; comfort ensured. 1115 Bedside report called to ICU and patient taken to ICU via bed.

## 2021-04-30 NOTE — PROGRESS NOTES
Spoke with Mariia Gauthier, patient's son regarding low blood pressure, low hemoglobin and transfer to ICU 4D04. Obtain verbal consent for blood obtained.

## 2021-04-30 NOTE — CONSULTS
CRITICAL CARE CONSULT NOTE      Patient:  Rodolfo Gallo    Unit/Bed:4D-04/004-A  YOB: 1935  MRN: [id number]   PCP: Anna Mccullough MD  Date of Admission: 4/27/2021  Chief Complaint:- acute respiratory failure    Assessment and Plan:    1. Undifferentiated shock likely hypovolemic possibly distributive : 4/29/2021 noted acutely drop in hemoglobin to 6.9/23.3 while on a heparin drip. Heparin drip was stopped. No signs of bleeding noted. No melena, hematochezia, no emesis. Heparin will continue to be off at this time. We will repeat H&H. Patient has antibodies will take a while until transfusion will be started. Lactic acid is normal, however GB US acute cholecystitis. Levophed drip has been started will titrate to maintain mean arterial pressure greater than 65. After reviewing documentation it appears the patient is hypotensive question if this is chronic. Was started on Midodrine on 4/28/2021. Cortisol level is pending at this time. 2. Acute respiratory failure, hypoxia:  Chest Xray positive for low lung volumes. Incentive spirometer. 3. Acute cholecystitis: Gallbladder ultrasound-acute cholecystitis. Patient fairly asymptomatic, denies any abdominal pain but does have complaint of nausea. Zosyn will be continued at this time. HIDA positive for acute cholecystitis surgery was planned for 4/30/2021 this is currently on hold at this time. 4. Acute ST elevation: Concerns for global ST elevation cardiology did review did not feel that this was a STEMI. Noted troponin was negative, this will be repeated. 4/8/2021 echocardiogram LVEF 60% with grade 1 diastolic dysfunction. Will repeat limited echo for further evaluation. 5. Complicated UTI: 4/21/1753 urine culture positive for colony count greater than 100,000 Klebsiella pneumonia resistant to ampicillin. 6. Hypoalbuminemia: Dietary to evaluate  7. Acute blood loss anemia: H/H will be repeated. No overt signs of bleeding. No abdominal pain on examination. 8. HFpEF:  ECHO-grade 1 diastolic dysfunction, stable, monitor. 9. Essential HPTN:  Currently hypotensive, monitor  10. Dyslipidemia:  History, Lipitor will be continued. 11. Physical deconditioning:   PT/OT when able. INITIAL H AND P AND ICU COURSE:  Liz Esqueda is an 81-year-old  female transferred to [facility] ICU on 4/29/2021 with hypotension. Patient has a significant history of former smoker, COPD,  ECHO LVEF 60% with grade 1 diastolic dysfunction, CAD, Essential HPTN, Dyslipidemia, Chronic Pain, Glaucoma. Jb Long was admitted under Dr. Tiffany Anand on 4/28/2021 with shortness of breath and chest pain. It appears that she was recently discharged on 4/21/2021 after a 7-day hospitalization stay for elevated troponin and BAILEE. Patient did develop pneumonia and UTI. DVT was found in the left leg and was placed on Eliquis. Day of admission patient was transferred from SNF with complaint of chest \"achiness \"that was present since discharge in the hospital.  She reports some shortness of breath was placed on 2 L per nasal cannula emesis for bile. Patient was found to be in A. fib with RVR.  4/28/2021 CTA chest evidence of pulmonary embolism. Small to moderate sized bilateral pleural effusions. Multifocal atelectasis is noted bilaterally. CT abdomen pelvis small to moderate hiatal hernia present. Fatty infiltration of the pancreas is noted. No evidence of a bowel obstruction or free air. Turns of sludge in gallbladder. US of Gallbladder-positive for sludge or stones within the gallbladder with gallbladder wall thickening and dilated common bile duct. CBD 0.9cm. Right hydronephrosis is incidentally noted. Consults were made to Cardiology Dr. Jose A Jain and Surgery Dr. Salima Steen.     4/29/2021 Rapid Response was called secondary to hypotension. EKG was obtained with acute ST changes 7-1-Q0-2-3-4-5-6.  STEMI was called Dr. Dennise Martines did review the EKG and called off the Cath Lab. Levophed gtt was started and noted H/H 6.8. Patient was transferred to the ICU for further management and care. Past Medical History:  See HPI. Family History: -tuberculosis, father -tuberculosis. Social History: Smoker, denies any alcohol or illicit drug use.     ROS   GENERAL: Positive for fatigue pale, warm and dry  SKN: No lesions or rashes.   HEAD: No headaches or recent injury  EYES: No acute changes in vision, no diplopia or blurred vision, no drainage  EARS: No hearing loss, no tinnitus, no drainage  NOSE/THROAT: No rhinorrhea or pharyngitis, no nasal drainage  NECK: No lumps or unusual neck stiffness  PULMONARY: Positive for dyspnea, orthopnea denies any stridor wheezing or cough  CARDIAC: Positive for hypotension, denies any chest pain pressure heaviness or tightness  GI: Denies any abdominal pain, positive for drop in hemoglobin, no history melena or hematochezia, no diarrhea or constipation  : No hematuria  PERIPHERAL VASCULAR: No intermittent claudication or unusual leg cramps  MUSCULOSKELETAL: Occasional arthralgias, myalgias  NEUROLOGICAL: No Seizures or Syncope  HEMATOLOGIC: Positive for anemia  PSYCH: No homicidal or suicidal ideations      Scheduled Meds:   digoxin  125 mcg Intravenous Once    digoxin  125 mcg Oral Daily    [Held by provider] aspirin  325 mg Oral Daily    docusate sodium  100 mg Oral BID    isosorbide mononitrate  30 mg Oral Daily    latanoprost  1 drop Both Eyes Nightly    midodrine  5 mg Oral TID WC    mirtazapine  7.5 mg Oral Nightly    pantoprazole  40 mg Oral BID AC    potassium chloride  20 mEq Oral BID WC    sertraline  50 mg Oral Daily    sodium bicarbonate  1,300 mg Oral BID    sucralfate  1 g Oral 4x Daily AC & HS    sodium chloride flush  5-40 mL Intravenous 2 times per day    metoprolol tartrate  25 mg Oral BID    atorvastatin  20 mg Oral Nightly    piperacillin-tazobactam  3,375 mg Intravenous Q8H Continuous Infusions:   sodium chloride 50 mL/hr at 04/29/21 2244    heparin (PORCINE) Infusion Stopped (04/29/21 2100)    DOPamine      sodium chloride      norepinephrine-sodium chloride 3 mcg/min (04/29/21 2230)    sodium chloride         PHYSICAL EXAMINATION:  T: 99.4. P: 78. RR: 43. B/P: 126/100. FiO2: 3 L. O2 Sat: 94.  I/O: Pending  Body mass index is 28.87 kg/m². GCS:   12  General:   Pale warm dry, acutely ill in appearance  HEENT:  normocephalic and atraumatic. No scleral icterus. PERR  Neck: supple. No Thyromegaly. Lungs: clear to auscultation. No retractions  Cardiac: RRR. No JVD. Abdomen: soft. Nontender. Extremities:  No clubbing, cyanosis, or edema x 4. Vasculature: capillary refill < 3 seconds. Palpable dorsalis pedis pulses. Skin:  warm and dry. Psych:  Alert and oriented x 2. Affect appropriate  Lymph:  No supraclavicular adenopathy. Neurologic:  No focal deficit. No seizures. Data: (All radiographs, tracings, PFTs, and imaging are personally viewed and interpreted unless otherwise noted).  4/19/2021 Deep venous thrombosis involving the left proximal femoral vein and common femoral vein and thrombus in the distal left femoral vein. No evidence of DVT in the RLE    4/29/2021 2021 sodium 137 potassium 3.1 chlorides 107 CO2 21 BUN 13 creatinine 0.8   Glucose 115   Calcium 7.5   Troponin is less than 0.010   Albumin is 1.5 alk phos is 103 ALT is 48 AST is 62 total bili 0.4   Hemoglobin 6.9 hematocrit 23.3   4/29/2021 chest x-ray-borderline cardiac cardiomegaly, exaggerated secondary to low lung volumes, left hemidiaphragm and retrocardiac region are obscured. Small left pleural effusion similar to prior imaging. No pneumothorax, no displaced fracture. Mild spondylosis.  4/28/2021 ultrasound gallbladder sludge or stones within the gallbladder with gallbladder wall thickening and dilated common bile duct.   These findings may represent acute

## 2021-04-30 NOTE — CARE COORDINATION
4/30/21, 7:16 AM EDT    DISCHARGE BARRIERS        Patient transferred to 4D-04. Report given to unit Roula Sandoval, regarding discharge plan for this patient.

## 2021-04-30 NOTE — PROGRESS NOTES
2115 Michael UNDERWOOD at bedside for CVC placement. Timeout done. 2125 Dr. Raul Daily called and updated. Orders to consult critical care. 2145 CVC placed in left IJ. Chest xray ordered. 0000 A-line placed in right brachial artery by APRN student with Michael UNDERWOOD.

## 2021-04-30 NOTE — PROGRESS NOTES
[facility] THERAPY MISSED TREATMENT NOTE  [facility] ICU 4D  4D-004-A      Date: 2021  Patient Name: Autumn Boykin        CSN: [id number]   : 1935  (81 y.o.)  Gender: female                REASON FOR MISSED TREATMENT: Hold Treatment per Nursing. Pt transferred to ICU overnight for hypotension, remains in afib. Not medically stable yet to participate in therapy.  Will check back as able

## 2021-04-30 NOTE — PROGRESS NOTES
Patient arrived to unit from  via bed. Patient transferred to ICU bed and placed on continuous ICU bedside monitor. Patient admitted for Atrial fibrillation with RVR ([address] [I48.91]. Vitals obtained. See flowsheets. Patient's IV access includes 20 g L wrist, 20 g R wrist. Current infusions and rates of infusion include 0.9 NaCl at 50 ml/hr and Levophed at 6 mcg/min. Assessment completed by Wendi Slater. Two nurse skin assessment completed by Pamella Varma and Sarah Oshea RN. See flowsheets for assessment details.

## 2021-04-30 NOTE — PROGRESS NOTES
[address] [address]  PHYSICAL THERAPY MISSED TREATMENT NOTE  [facility] ICU 4D    Date: 2021  Patient Name: Constantin Reid        MRN: [id number]   : 1935  (81 y.o.)  Gender: female                REASON FOR MISSED TREATMENT:  Hold treatment per nursing request.      Pt transferred to ICU overnight for hypotension, remains in afib. Not medically stable yet to participate in therapy. Will check back as able.      Aga Ahn PT, DPT [id number]

## 2021-05-01 ENCOUNTER — APPOINTMENT (OUTPATIENT)
Dept: CT IMAGING | Age: 86
DRG: 444 | End: 2021-05-01
Payer: MEDICARE

## 2021-05-01 ENCOUNTER — APPOINTMENT (OUTPATIENT)
Dept: GENERAL RADIOLOGY | Age: 86
DRG: 444 | End: 2021-05-01
Payer: MEDICARE

## 2021-05-01 PROBLEM — E44.0 MODERATE MALNUTRITION (HCC): Status: ACTIVE | Noted: 2021-05-01

## 2021-05-01 LAB
ALBUMIN SERPL-MCNC: 2.3 G/DL (ref 3.5–5.1)
ALP BLD-CCNC: 138 U/L (ref 38–126)
ALT SERPL-CCNC: 48 U/L (ref 11–66)
ANION GAP SERPL CALCULATED.3IONS-SCNC: 13 MEQ/L (ref 8–16)
APTT: 142.4 SECONDS (ref 22–38)
APTT: 31.8 SECONDS (ref 22–38)
APTT: 32.4 SECONDS (ref 22–38)
AST SERPL-CCNC: 66 U/L (ref 5–40)
BILIRUB SERPL-MCNC: 0.4 MG/DL (ref 0.3–1.2)
BUN BLDV-MCNC: 13 MG/DL (ref 7–22)
CALCIUM SERPL-MCNC: 8.4 MG/DL (ref 8.5–10.5)
CHLORIDE BLD-SCNC: 110 MEQ/L (ref 98–111)
CO2: 19 MEQ/L (ref 23–33)
CREAT SERPL-MCNC: 0.7 MG/DL (ref 0.4–1.2)
D-DIMER QUANTITATIVE: ABNORMAL NG/ML FEU (ref 0–500)
EKG ATRIAL RATE: 76 BPM
EKG ATRIAL RATE: 93 BPM
EKG P AXIS: 67 DEGREES
EKG P AXIS: 74 DEGREES
EKG P-R INTERVAL: 138 MS
EKG P-R INTERVAL: 150 MS
EKG Q-T INTERVAL: 364 MS
EKG Q-T INTERVAL: 388 MS
EKG QRS DURATION: 82 MS
EKG QRS DURATION: 84 MS
EKG QTC CALCULATION (BAZETT): 436 MS
EKG QTC CALCULATION (BAZETT): 452 MS
EKG R AXIS: 28 DEGREES
EKG R AXIS: 29 DEGREES
EKG T AXIS: 41 DEGREES
EKG T AXIS: 77 DEGREES
EKG VENTRICULAR RATE: 76 BPM
EKG VENTRICULAR RATE: 93 BPM
ERYTHROCYTE [DISTWIDTH] IN BLOOD BY AUTOMATED COUNT: 16.2 % (ref 11.5–14.5)
ERYTHROCYTE [DISTWIDTH] IN BLOOD BY AUTOMATED COUNT: 52.3 FL (ref 35–45)
GFR SERPL CREATININE-BSD FRML MDRD: 80 ML/MIN/1.73M2
GLUCOSE BLD-MCNC: 98 MG/DL (ref 70–108)
HCT VFR BLD CALC: 27.4 % (ref 37–47)
HCT VFR BLD CALC: 29.1 % (ref 37–47)
HEMOGLOBIN: 8.7 GM/DL (ref 12–16)
HEMOGLOBIN: 9.1 GM/DL (ref 12–16)
MCH RBC QN AUTO: 28.2 PG (ref 26–33)
MCHC RBC AUTO-ENTMCNC: 31.8 GM/DL (ref 32.2–35.5)
MCV RBC AUTO: 89 FL (ref 81–99)
MRSA SCREEN: NORMAL
PLATELET # BLD: 262 THOU/MM3 (ref 130–400)
PMV BLD AUTO: 10 FL (ref 9.4–12.4)
POTASSIUM SERPL-SCNC: 3.5 MEQ/L (ref 3.5–5.2)
RBC # BLD: 3.08 MILL/MM3 (ref 4.2–5.4)
SODIUM BLD-SCNC: 142 MEQ/L (ref 135–145)
TOTAL PROTEIN: 5.4 G/DL (ref 6.1–8)
TROPONIN T: 0.01 NG/ML
TROPONIN T: 0.02 NG/ML
TROPONIN T: 0.03 NG/ML
TROPONIN T: 0.03 NG/ML
TROPONIN T: 0.04 NG/ML
WBC # BLD: 5 THOU/MM3 (ref 4.8–10.8)

## 2021-05-01 PROCEDURE — 93005 ELECTROCARDIOGRAM TRACING: CPT | Performed by: PHYSICIAN ASSISTANT

## 2021-05-01 PROCEDURE — 6370000000 HC RX 637 (ALT 250 FOR IP): Performed by: INTERNAL MEDICINE

## 2021-05-01 PROCEDURE — 2580000003 HC RX 258: Performed by: REGISTERED NURSE

## 2021-05-01 PROCEDURE — 85014 HEMATOCRIT: CPT

## 2021-05-01 PROCEDURE — 85730 THROMBOPLASTIN TIME PARTIAL: CPT

## 2021-05-01 PROCEDURE — APPSS30 APP SPLIT SHARED TIME 16-30 MINUTES: Performed by: PHYSICIAN ASSISTANT

## 2021-05-01 PROCEDURE — 80053 COMPREHEN METABOLIC PANEL: CPT

## 2021-05-01 PROCEDURE — 6360000002 HC RX W HCPCS: Performed by: NURSE PRACTITIONER

## 2021-05-01 PROCEDURE — 85379 FIBRIN DEGRADATION QUANT: CPT

## 2021-05-01 PROCEDURE — 71275 CT ANGIOGRAPHY CHEST: CPT

## 2021-05-01 PROCEDURE — 6360000002 HC RX W HCPCS: Performed by: REGISTERED NURSE

## 2021-05-01 PROCEDURE — 84484 ASSAY OF TROPONIN QUANT: CPT

## 2021-05-01 PROCEDURE — 36592 COLLECT BLOOD FROM PICC: CPT

## 2021-05-01 PROCEDURE — 36415 COLL VENOUS BLD VENIPUNCTURE: CPT

## 2021-05-01 PROCEDURE — 6370000000 HC RX 637 (ALT 250 FOR IP): Performed by: REGISTERED NURSE

## 2021-05-01 PROCEDURE — 2500000003 HC RX 250 WO HCPCS: Performed by: NURSE PRACTITIONER

## 2021-05-01 PROCEDURE — 93005 ELECTROCARDIOGRAM TRACING: CPT | Performed by: NURSE PRACTITIONER

## 2021-05-01 PROCEDURE — 6370000000 HC RX 637 (ALT 250 FOR IP): Performed by: NURSE PRACTITIONER

## 2021-05-01 PROCEDURE — 6360000004 HC RX CONTRAST MEDICATION: Performed by: INTERNAL MEDICINE

## 2021-05-01 PROCEDURE — 6360000002 HC RX W HCPCS: Performed by: INTERNAL MEDICINE

## 2021-05-01 PROCEDURE — 85018 HEMOGLOBIN: CPT

## 2021-05-01 PROCEDURE — 99232 SBSQ HOSP IP/OBS MODERATE 35: CPT | Performed by: SURGERY

## 2021-05-01 PROCEDURE — 2000000000 HC ICU R&B

## 2021-05-01 PROCEDURE — 85027 COMPLETE CBC AUTOMATED: CPT

## 2021-05-01 PROCEDURE — 71045 X-RAY EXAM CHEST 1 VIEW: CPT

## 2021-05-01 PROCEDURE — 94761 N-INVAS EAR/PLS OXIMETRY MLT: CPT

## 2021-05-01 RX ORDER — ONDANSETRON 2 MG/ML
4 INJECTION INTRAMUSCULAR; INTRAVENOUS EVERY 6 HOURS PRN
Status: DISCONTINUED | OUTPATIENT
Start: 2021-05-01 | End: 2021-05-13 | Stop reason: HOSPADM

## 2021-05-01 RX ORDER — MORPHINE SULFATE 2 MG/ML
1 INJECTION, SOLUTION INTRAMUSCULAR; INTRAVENOUS
Status: DISCONTINUED | OUTPATIENT
Start: 2021-05-01 | End: 2021-05-02

## 2021-05-01 RX ORDER — NITROGLYCERIN 20 MG/100ML
5-200 INJECTION INTRAVENOUS CONTINUOUS
Status: DISCONTINUED | OUTPATIENT
Start: 2021-05-01 | End: 2021-05-02

## 2021-05-01 RX ORDER — NITROGLYCERIN 0.4 MG/1
0.4 TABLET SUBLINGUAL EVERY 5 MIN PRN
Status: DISCONTINUED | OUTPATIENT
Start: 2021-05-01 | End: 2021-05-01

## 2021-05-01 RX ORDER — LANSOPRAZOLE
15 KIT
Status: DISCONTINUED | OUTPATIENT
Start: 2021-05-01 | End: 2021-05-02

## 2021-05-01 RX ORDER — HEPARIN SODIUM 1000 [USP'U]/ML
4000 INJECTION, SOLUTION INTRAVENOUS; SUBCUTANEOUS PRN
Status: DISCONTINUED | OUTPATIENT
Start: 2021-05-01 | End: 2021-05-02

## 2021-05-01 RX ORDER — ISOSORBIDE MONONITRATE 30 MG/1
30 TABLET, EXTENDED RELEASE ORAL DAILY
Status: DISCONTINUED | OUTPATIENT
Start: 2021-05-01 | End: 2021-05-02

## 2021-05-01 RX ORDER — HEPARIN SODIUM 1000 [USP'U]/ML
2000 INJECTION, SOLUTION INTRAVENOUS; SUBCUTANEOUS PRN
Status: DISCONTINUED | OUTPATIENT
Start: 2021-05-01 | End: 2021-05-02

## 2021-05-01 RX ADMIN — IOPAMIDOL 80 ML: 755 INJECTION, SOLUTION INTRAVENOUS at 21:11

## 2021-05-01 RX ADMIN — POTASSIUM CHLORIDE 20 MEQ: 750 TABLET, FILM COATED, EXTENDED RELEASE ORAL at 08:25

## 2021-05-01 RX ADMIN — PIPERACILLIN AND TAZOBACTAM 3375 MG: 3; .375 INJECTION, POWDER, LYOPHILIZED, FOR SOLUTION INTRAVENOUS at 18:23

## 2021-05-01 RX ADMIN — AMIODARONE HYDROCHLORIDE 200 MG: 200 TABLET ORAL at 08:24

## 2021-05-01 RX ADMIN — SERTRALINE HYDROCHLORIDE 50 MG: 50 TABLET, FILM COATED ORAL at 08:21

## 2021-05-01 RX ADMIN — SODIUM BICARBONATE 1300 MG: 650 TABLET ORAL at 21:22

## 2021-05-01 RX ADMIN — NITROGLYCERIN 0.4 MG: 0.4 TABLET, ORALLY DISINTEGRATING SUBLINGUAL at 14:11

## 2021-05-01 RX ADMIN — SODIUM CHLORIDE: 9 INJECTION, SOLUTION INTRAVENOUS at 10:24

## 2021-05-01 RX ADMIN — NITROGLYCERIN 5 MCG/MIN: 20 INJECTION INTRAVENOUS at 19:51

## 2021-05-01 RX ADMIN — SUCRALFATE 1 G: 1 TABLET ORAL at 08:19

## 2021-05-01 RX ADMIN — METOPROLOL TARTRATE 25 MG: 25 TABLET, FILM COATED ORAL at 21:22

## 2021-05-01 RX ADMIN — NITROGLYCERIN 0.4 MG: 0.4 TABLET, ORALLY DISINTEGRATING SUBLINGUAL at 14:25

## 2021-05-01 RX ADMIN — LIDOCAINE HYDROCHLORIDE: 20 SOLUTION ORAL; TOPICAL at 15:10

## 2021-05-01 RX ADMIN — FLUCONAZOLE IN SODIUM CHLORIDE 400 MG: 2 INJECTION, SOLUTION INTRAVENOUS at 17:58

## 2021-05-01 RX ADMIN — NITROGLYCERIN 0.4 MG: 0.4 TABLET, ORALLY DISINTEGRATING SUBLINGUAL at 17:39

## 2021-05-01 RX ADMIN — Medication 15 MG: at 11:04

## 2021-05-01 RX ADMIN — MORPHINE SULFATE 1 MG: 2 INJECTION, SOLUTION INTRAMUSCULAR; INTRAVENOUS at 17:07

## 2021-05-01 RX ADMIN — PIPERACILLIN AND TAZOBACTAM 3375 MG: 3; .375 INJECTION, POWDER, LYOPHILIZED, FOR SOLUTION INTRAVENOUS at 11:04

## 2021-05-01 RX ADMIN — NITROGLYCERIN 0.4 MG: 0.4 TABLET, ORALLY DISINTEGRATING SUBLINGUAL at 17:16

## 2021-05-01 RX ADMIN — ATORVASTATIN CALCIUM 20 MG: 20 TABLET, FILM COATED ORAL at 19:59

## 2021-05-01 RX ADMIN — Medication 15 MG: at 17:09

## 2021-05-01 RX ADMIN — ASPIRIN 325 MG: 325 TABLET, COATED ORAL at 17:57

## 2021-05-01 RX ADMIN — ONDANSETRON 4 MG: 2 INJECTION INTRAMUSCULAR; INTRAVENOUS at 08:38

## 2021-05-01 RX ADMIN — SUCRALFATE 1 G: 1 TABLET ORAL at 21:22

## 2021-05-01 RX ADMIN — NITROGLYCERIN 0.4 MG: 0.4 TABLET, ORALLY DISINTEGRATING SUBLINGUAL at 14:17

## 2021-05-01 RX ADMIN — DIGOXIN 125 MCG: 250 TABLET ORAL at 08:21

## 2021-05-01 RX ADMIN — NITROGLYCERIN 0.4 MG: 0.4 TABLET, ORALLY DISINTEGRATING SUBLINGUAL at 17:21

## 2021-05-01 RX ADMIN — SUCRALFATE 1 G: 1 TABLET ORAL at 17:09

## 2021-05-01 RX ADMIN — LATANOPROST 1 DROP: 50 SOLUTION OPHTHALMIC at 19:59

## 2021-05-01 RX ADMIN — HEPARIN SODIUM 18 UNITS/KG/HR: 10000 INJECTION, SOLUTION INTRAVENOUS at 13:19

## 2021-05-01 RX ADMIN — METOPROLOL TARTRATE 25 MG: 25 TABLET, FILM COATED ORAL at 08:21

## 2021-05-01 RX ADMIN — SODIUM CHLORIDE, PRESERVATIVE FREE 10 ML: 5 INJECTION INTRAVENOUS at 08:23

## 2021-05-01 RX ADMIN — MIRTAZAPINE 7.5 MG: 15 TABLET, FILM COATED ORAL at 19:58

## 2021-05-01 RX ADMIN — SUCRALFATE 1 G: 1 TABLET ORAL at 10:18

## 2021-05-01 RX ADMIN — SODIUM BICARBONATE 1300 MG: 650 TABLET ORAL at 08:21

## 2021-05-01 RX ADMIN — ACETAMINOPHEN 650 MG: 325 TABLET ORAL at 10:19

## 2021-05-01 RX ADMIN — PIPERACILLIN AND TAZOBACTAM 3375 MG: 3; .375 INJECTION, POWDER, LYOPHILIZED, FOR SOLUTION INTRAVENOUS at 03:20

## 2021-05-01 RX ADMIN — ISOSORBIDE MONONITRATE 30 MG: 30 TABLET ORAL at 17:57

## 2021-05-01 RX ADMIN — POTASSIUM CHLORIDE 20 MEQ: 750 TABLET, FILM COATED, EXTENDED RELEASE ORAL at 17:07

## 2021-05-01 ASSESSMENT — PAIN DESCRIPTION - DESCRIPTORS
DESCRIPTORS: PRESSURE
DESCRIPTORS: DISCOMFORT
DESCRIPTORS: DISCOMFORT;PRESSURE

## 2021-05-01 ASSESSMENT — PAIN SCALES - GENERAL
PAINLEVEL_OUTOF10: 8
PAINLEVEL_OUTOF10: 5
PAINLEVEL_OUTOF10: 8

## 2021-05-01 ASSESSMENT — PAIN DESCRIPTION - LOCATION
LOCATION: CHEST;HIP
LOCATION: CHEST
LOCATION: CHEST

## 2021-05-01 ASSESSMENT — ENCOUNTER SYMPTOMS
ABDOMINAL PAIN: 0
COUGH: 1
NAUSEA: 0
SINUS PRESSURE: 0
WHEEZING: 0
ABDOMINAL DISTENTION: 0
BACK PAIN: 0
DIARRHEA: 1
COLOR CHANGE: 0
CHEST TIGHTNESS: 0
EYE REDNESS: 0
SHORTNESS OF BREATH: 0

## 2021-05-01 ASSESSMENT — PAIN DESCRIPTION - FREQUENCY: FREQUENCY: CONTINUOUS

## 2021-05-01 ASSESSMENT — PAIN DESCRIPTION - ORIENTATION: ORIENTATION: MID

## 2021-05-01 NOTE — PROGRESS NOTES
Patient:  Neli Han    Unit/Bed:4D-04/004-A  MRN: [id number]   PCP: Chip Mckeon MD  Date of Admission: 4/27/2021    Assessment and Plan(All pulmonary edema, renal failure, PE, and respiratory failure diagnoses are acute in nature unless otherwise specified):        1. Acute hypoxic respiratory failure: Requiring 2 L NC. CXR shows atelectasis. Promote incentive spirometry, PT/OT when able. Wean to maintain SpO2 >90%. 2. Atrial fibrillation with RVR: converted to NSR on 4/30 after blood administration, volume resuscitation. On amiodarone since 4/30 per cardio. Cardiology following. Heparin drip will resume today 5/1. H/H stable at this time. 3. ACS/Unstable angina: noted to have chronic chest pain history. Acute ST elevation on 4/29 noted, but Cardiology did evaluate and felt this was not an acute STEMI. Troponin was negative x2 at that time. Complains of chest pain at rest today 5/1, again that is not reproducible. Non-pleuritic. ECG with resolution of previously noted ST elevation, now with no elevation nor depression. Troponin is elevated and reached 0.035 acutely today 5/1, but now down-trending since this morning. Continue to trend. Repeat ECG is the same. ECHO is now read; reporting new LVEF drop to 40-45% from prior 60% on 4/8/21, with mid and apical anteroseptal wall motion abnormalities. PRN nitro 0.4 mg SL with improvement in chest pain; continue as needed. Morphine prn. Resuming heparin drip now as above. Resume aspirin. Continue BB. Imdur was resumed today 5/1 per cardio. Cardiology aware of events and are following. 4. Acute blood loss on chronic anemia: Hgb dropped from 8.6 to 6.9 on 4/29, but in setting of 3L volume resuscitation. Component of dilution can be considered. Received 1 pRBC 4/30 with appropriate response to hgb 8.1. CT A/P 4/30 unchanged. H/H remains stable now at baseline. Resume heparin drip today 5/1; ok with surgery.    5. Common bile duct filling defect: demonstrated on IR cholecystostomy tube placement report. No acute cholecystitis per surgery. Widely patent cystic duct and CBD noted. General surgery following. Patient denies abdominal pain. 6. Left femoral/popliteal vein thrombi:  Noted on prior hospitalization on VL dup LE on 4/19/21. Was discharged on eliquis. Repeat VL dup LE 4/30 shows persistence of these thrombi, with new left popliteal vein thrombus with partial flow. There was also noted absence of flow and compressibility in the left peroneal and posterior tibial veins. Resuming heparin drip today 5/1 as above. 7. UTI: received a course of rocephin for klebsiella growth on previous admission. Repeat urine culture 4./30 shows yeast >100,000 cfu growth. Start diflucan (5/1/21). 8. Hypoalbuminemia: albumin 2.3. Physical deconditioning with poor nutrition. Dietician consulted for recommendations. 9. Chronic HFpEF: Recent ECHO (4/8/21) with EF 60% and grade 1 diastolic dysfunction. Monitor strict I/O. Daily weights. Antihypertensive therapy. Repeat echo 5/1 pending read as above. 10. HTN, history of: Imdur and metoprolol are ordered with administration parameters. Off pressors. 11. HLD, history of: continue statin. Repeat lipid panel. 12. Deconditioning: PT/OT when able. 13. Hypokalemia (resolved): replacement protocol. Monitor BMP daily. Dietician   14. Undifferentiated shock (resolved): probably hypovolemic vs secondary to Afib w RVR. Improved with 3L IVF resuscitation and after conversion to NSR. Now off pressors since 4/30. Also noted hgb drop but should consider possible component of dilution. CT A/P 4/30 negative for findings consistent with acute bleed. Afebrile. Lactic acid only 1.0. Procal slightly elevated, 0.60. WBC WNL. Does not appear to be septic in origin.  Adrenal insufficiency excluded with cortisol WNL at 21.07.       CC:  Acute respiratory failure  HPI: Andres Powers is an 82 y/o female former-smoker with PMH of COPD,  ECHO LVEF 60% with grade 1 diastolic dysfunction, CAD, Essential HPTN, Dyslipidemia, Chronic Pain, Glaucoma.      Of note, patient had a recent hospitalization and was discharged on 4/21/2021, during which time they noted elevated troponin and BAILEE. Cardiology evaluated and thought troponin elevation was secondary to BAILEE. She received treatment for pneumonia and UTI. Her stay was complicated by a DVT in the left leg, and she was transitioned to [facility] on discharge to SNF.      She re-presented to [facility] 4/28/2021 from her SNF, admitted under Dr. Leoncio Taylor, for dyspnea and chest pain. Per report, her chest pain was an \"achiness\" that was present since her last discharge. She was placed on 2L NC. She had noted bilious emesis. She was found to be in Afib with RVR. CTA chest was obtained negative for PE. A RUQ showed gallbladderr sludge vs stones with wall thickening and dilated CBD, representing possible cholecystitis. HIDA showed patent CBD, with non-visualization of the gallbladder. General surgery was consulted.      On 4/29 a rapid response was called for hypotension. 12-lead ECG showed acute ST elevation of leads I, II, V1-6. STEMI was called to Dr. Ronit Andrews, but this was felt not to be a STEMI and cath lab was canceled. Troponin was noted to be negative. Patient required levophed support. Hgb was noted to be 6.9, acutely dropped from 8.6. 1 pRBC was ordered. She had also received 3L IVF resuscitation prior to this. On 4/30 patient went to IR for percutaneous cholecystostomy tube placement. At that time, widely patent cystic and CBD were noted, with free flow of contrast into duodenum. There was a noted CBD filling defect.      For further details, please see assessment and plan. ROS: Review of Systems   Constitutional: Positive for fatigue. Negative for chills, diaphoresis and fever. HENT: Negative for congestion and sinus pressure. Eyes: Negative for redness and visual disturbance.    Respiratory: Positive for cough. Negative for chest tightness, shortness of breath and wheezing. Cardiovascular: Positive for chest pain (6/10. \"It was a 7 or 8/10 earlier. \" Pressure). Negative for palpitations and leg swelling. Gastrointestinal: Positive for diarrhea. Negative for abdominal distention, abdominal pain and nausea. Genitourinary: Negative for difficulty urinating and dysuria. Musculoskeletal: Negative for back pain and neck pain. Skin: Negative for color change and pallor. Neurological: Negative for dizziness, weakness, light-headedness and headaches. Psychiatric/Behavioral: Negative for agitation and confusion. The patient is not nervous/anxious. PMH:  Per HPI  SHX:  Former-smoker. Denies alcohol or substance use. FHX: Positive for tuberculosis in mother and father. Allergies: NKDA. Medications:     sodium chloride 50 mL/hr at 05/01/21 1024    heparin (PORCINE) Infusion 18 Units/kg/hr (05/01/21 1319)    sodium chloride      sodium chloride        lansoprazole  15 mg Oral BID AC    amiodarone  200 mg Oral Daily    digoxin  125 mcg Oral Daily    [Held by provider] aspirin  325 mg Oral Daily    [Held by provider] docusate sodium  100 mg Oral BID    [Held by provider] isosorbide mononitrate  30 mg Oral Daily    latanoprost  1 drop Both Eyes Nightly    [Held by provider] midodrine  5 mg Oral TID WC    mirtazapine  7.5 mg Oral Nightly    potassium chloride  20 mEq Oral BID WC    sertraline  50 mg Oral Daily    sodium bicarbonate  1,300 mg Oral BID    sucralfate  1 g Oral 4x Daily AC & HS    sodium chloride flush  5-40 mL Intravenous 2 times per day    metoprolol tartrate  25 mg Oral BID    atorvastatin  20 mg Oral Nightly    piperacillin-tazobactam  3,375 mg Intravenous Q8H       Vital Signs:   T: 98.7 (Tmax 99.1 last 24h): P: 73 RR: 20 B/P: 143/67: FiO2: 2L NC: O2 Sat:96%: I/O: 1227/800 (+427) GCS: 15  Body mass index is 28.87 kg/m². [facility] General:   Acute on chronically ill adult female. Appears stated age. Appears deconditioned. HEENT:  normocephalic and atraumatic. No scleral icterus. PERR  Neck: supple. No Thyromegaly. Lungs: clear to auscultation. Normal rate, pattern. Unlabored. No retractions  Cardiac: RRR. S1/S2. No murmur. No JVD. Ribcage non-tender to palpation. Abdomen: soft. Nondistended. BS active. Nontender. Extremities:  No clubbing, cyanosis,. 2+ pitting LLE edema, none on the RLE. Vasculature: capillary refill < 3 seconds. Palpable dorsalis pedis pulses bilaterally. Skin:  warm and dry. Psych:  Alert and oriented x3. Affect appropriate  Lymph:  No supraclavicular adenopathy. Neurologic:  No focal deficit. No seizures. Data: (All radiographs, tracings, PFTs, and imaging are personally viewed and interpreted unless otherwise noted).  Telemetry: NSR. Rate 73. No ectopy.  12-lead ECG 5/1/2021: NSR. Rate 93. ST elevation has resolved. No ST elevation or depression.  Sodium 142 potassium 3.5 chloride 110 CO2 19 BUN 13 creatinine 0.7 glucose 98 calcium 8.4   Troponin 0.024   Albumin 2.3 alk phos 138 ALT 40 AST 66 bilirubin 0.4 total protein 5.4   WBC 5.0 hemoglobin 8.7 hematocrit 27.4 platelets 692   APTT 32.4   Aerobic/Anaerobic culture gallbladder 4/30: enteric gram negative bacilli.  Urine culture 4/30: yeast. CFU >100,000.  CXR 4/30/2021 report: No interval change since previous study dated 29 April 2021. · CT IR guided cholecystostomy tube placement 4/30/21 report: Status post successful percutaneous cholecystostomy drainage tube insertion with CT guidance. A cholecystogram reveals sludge and gallstones in the gallbladder but reveals a common duct and cystic duct are widely patent. There is free flow of contrast into the duodenum. . There is moderate dilatation of the common bile duct which also contains some filling defect consistent with sludge and/or gallstones. Case discussed with Dr. Miguel Kolb.     Electronically signed by Karl Chavis, YASMINE-PAMELLA

## 2021-05-01 NOTE — PROGRESS NOTES
Ankur Waite covering for Dr. Kait Pearson  Daily Progress Note    Pt Name: Liliana Miramontes Record Number: [id number]  Date of Birth 1935   Today's Date: 5/1/2021    ASSESSMENT:     1. HD # 3  Active Hospital Problems    Diagnosis Date Noted    Moderate malnutrition ([address] [E44.0] 05/01/2021     Class: Acute    Abnormal ultrasound of gallbladder [R93.2] 04/29/2021    Other chest pain [R07.89] 04/29/2021    Atrial fibrillation with RVR [facility]) [I48.91] 04/28/2021       Chief Complaint:  Chief Complaint   Patient presents with    Chest Pain     due to pneumonia           PLAN:     1. Had episode of A. fib with RVR 2 nights ago  which is why she came in associated with hypotension and a noted decrease in her hemoglobin to 6.9  2. Elise Stewart from general surgery perspective to restart heparin and resume diet, no surgical intervention planned for immediate future. 3. Her hemoglobin has been dropping since before I saw her as a consult from 10-6.9.  4. Clinically she had chronic cholecystitis with nonvisualization but had no abdominal pain whatsoever normal white count and cholecystectomy was being performed because discussion with Dr. Leah Gupta noted she complains of chest pain but is had normal cath in the past  5. Lactic acid normal, pro-Drew is only 0.6, I do not think this is septic shock and certainly would not be the etiology of her dropping hemoglobin  6. Abdominal CT scan on 4/30 shows no significant changes from prior imaging. 7. No plans for cholecystectomy or abdominal surgery at this time. Percutaneous cholecystostomy tube placed by IR yesterday. SUBJECTIVE:     Merle Loera doing well overnight. She complains of new left chest pain that started at midnight, not reproducible with palpation, but is very near to central veinous catheter site. She denies SOB, nausea, vomiting, dizziness, headache, abdominal pain, numbness or paraesthesia.  Patient ok to restart heparin and diet from general surgery perspective. Care in coordination with Dr. Phani Waite.       OBJECTIVE:     Patient Vitals for the past 24 hrs:   BP Temp Temp src Pulse Resp SpO2   05/01/21 1000 (!) 163/69 -- -- 73 23 97 %   05/01/21 0900 (!) 158/67 -- -- 78 23 95 %   05/01/21 0848 -- 98.8 °F (37.1 °C) Axillary -- -- --   05/01/21 0821 (!) 164/64 -- -- 87 -- --   05/01/21 0800 (!) 151/66 -- -- 85 24 94 %   05/01/21 0700 (!) 150/64 -- -- 84 25 93 %   05/01/21 0600 (!) 151/65 -- -- 84 25 93 %   05/01/21 0500 (!) 143/61 -- -- 82 25 93 %   05/01/21 0400 (!) 144/61 -- -- 80 25 93 %   05/01/21 0330 (!) 141/58 97.7 °F (36.5 °C) Oral 79 24 93 %   05/01/21 0300 (!) 133/56 -- -- 77 25 93 %   05/01/21 0200 (!) 133/52 -- -- 76 24 93 %   05/01/21 0100 (!) 129/56 -- -- 77 28 93 %   05/01/21 0042 -- -- -- -- 18 --   05/01/21 0000 (!) 126/53 97.6 °F (36.4 °C) -- 69 20 93 %   04/30/21 2300 (!) 120/54 -- -- 66 24 93 %   04/30/21 2200 (!) 114/51 -- -- 66 23 93 %   04/30/21 2100 (!) 104/52 99.1 °F (37.3 °C) Rectal 68 24 95 %   04/30/21 2030 (!) 97/53 -- -- 64 21 96 %   04/30/21 2000 (!) 94/52 -- -- 61 24 97 %   04/30/21 1900 (!) 112/52 -- -- 58 21 --   04/30/21 1830 (!) 114/50 -- -- 55 23 --   04/30/21 1800 (!) 96/55 -- -- 69 24 --   04/30/21 1730 (!) 105/56 -- -- 67 21 --   04/30/21 1700 (!) 90/51 -- -- 51 18 --   04/30/21 1640 -- -- -- 67 20 --   04/30/21 1635 -- -- -- 66 20 --   04/30/21 1630 (!) 103/54 -- -- 67 21 --   04/30/21 1600 (!) 107/55 98.4 °F (36.9 °C) Oral 67 21 --   04/30/21 1530 112/68 -- -- 58 23 --   04/30/21 1500 (!) 111/56 -- -- 57 21 --   04/30/21 1445 134/63 -- -- 60 22 --   04/30/21 1430 (!) 115/59 -- -- 59 20 --   04/30/21 1415 (!) 100/57 -- -- 66 22 --   04/30/21 1400 (!) 81/55 -- -- 56 21 --   04/30/21 1345 (!) 76/52 -- -- 54 22 --   04/30/21 1333 -- -- -- 66 21 --   04/30/21 1330 (!) 92/56 -- -- 66 20 --   04/30/21 1315 101/60 -- -- 67 22 --   04/30/21 1300 118/61 -- -- 67 21 -- 20 mg Oral Nightly    piperacillin-tazobactam  3,375 mg Intravenous Q8H     Continuous Infusions:   sodium chloride 50 mL/hr at 05/01/21 1024    [Held by provider] heparin (PORCINE) Infusion Stopped (04/29/21 2100)    sodium chloride      sodium chloride       PRN Meds:ondansetron, 4 mg, Q6H PRN  potassium chloride, 40 mEq, PRN    Or  potassium alternative oral replacement, 40 mEq, PRN    Or  potassium chloride, 10 mEq, PRN  [Held by provider] heparin (porcine), 80 Units/kg, PRN  [Held by provider] heparin (porcine), 40 Units/kg, PRN  sodium chloride, , PRN  tiZANidine, 4 mg, Q8H PRN  sodium chloride flush, 5-40 mL, PRN  sodium chloride, 25 mL, PRN  polyethylene glycol, 17 g, Daily PRN  acetaminophen, 650 mg, Q6H PRN    Or  acetaminophen, 650 mg, Q6H PRN          PHYSICAL EXAM:     GENERAL: Presents sitting upright in bed unassisted, Awake and alert; In no acute distress and well nourished. SKIN: Appropriate for ethnicity, warm and dry. EYES: No apparent trauma, discharge, or hematoma bilaterally. PERRL at 3mm  CARDIO: No visible chest wall deformity. No chest pain with palpation. No heaves or lifts. Strong/regular S1/S2 rate and rhythm. No rubs murmurs, or gallops. 2+ radial and dorsalis pedal pulses. Capillary refill <2 sec. Left 2+ pitting edema to lower extremity  PULMONARY:  Trachea midline, no audible wheezing, increased respiratory effort, accessory muscle use, or asymmetrical chest wall movement. Lung sounds are clear to ascultation, diminished lung sounds in bases. ABDOMEN: Abdomen is non distended. Bowel sounds present in all four quadrants. Soft and non tender to palpation in all quadrants. NEURO: Follows commands. PMS intact, moves limbs freely. No focal neurological deficits  MSK: Extremities intact and present. No new bruising, swelling, deformity, discoloration or bleeding. No new tenderness to muscular or bony structure palpation.  strength 5/5 and equal bilaterally. LABS:     CBC:   Recent Labs     04/29/21  0504 04/29/21  0504 04/30/21  1200 04/30/21  1730 04/30/21  2210 05/01/21  0325   WBC 10.0  --  4.7*  --   --  5.0   RBC 3.13*  --  2.52*  --   --  3.08*   HGB 8.6*   < > 6.8* 8.1* 8.2* 8.7*   HCT 27.7*   < > 22.5* 26.6* 25.7* 27.4*   MCV 88.5  --  89.3  --   --  89.0   MCH 27.5  --  27.0  --   --  28.2   MCHC 31.0*  --  30.2*  --   --  31.8*     --  364  --   --  262   MPV 10.2  --  10.2  --   --  10.0    < > = values in this interval not displayed. Last 3 CMP:   Recent Labs     04/29/21 2021 04/30/21  1200 05/01/21  0325    142 142   K 4.1 3.4* 3.5    110 110   CO2 21* 19* 19*   BUN 13 13 13   CREATININE 0.9 0.7 0.7   GLUCOSE 115* 113* 98   CALCIUM 7.5* 8.3* 8.4*   PROT 4.4*  --  5.4*   LABALBU 1.5*  --  2.3*   BILITOT 0.4  --  0.4   ALKPHOS 103  --  138*   AST 62*  --  66*   ALT 48  --  48      Troponin: No results for input(s): TROPONINI in the last 72 hours. Calcium:   Lab Results   Component Value Date    CALCIUM 8.4 05/01/2021    CALCIUM 8.3 04/30/2021    CALCIUM 7.5 04/29/2021      Ionized Calcium: No results found for: IONCA   Lipids: No results for input(s): CHOL, HDL in the last 72 hours. Invalid input(s): LDLCALCU  INR:   Recent Labs     04/30/21  0259   INR 1.76*     Lactic Acid:   Lab Results   Component Value Date    LACTA 1.9 04/29/2021    LACTA 1.3 04/18/2021    LACTA 1.0 04/03/2021      . DVT prophylaxis: [] Lovenox                                 [x] SCDs                                 [] SQ Heparin                                 [] Encourage ambulation, low risk for DVT, no chemical or mechanical prophylaxis necessary              [] Already on Anticoagulation      RADIOLOGY:      Ct Abdomen Pelvis Wo Contrast Additional Contrast? None    Result Date: 4/28/2021  1. Low-attenuation areas are noted within the bilateral femoral veins.   It is uncertain whether this is due to mixing artifact from the prior CTA chest, or perhaps deep venous thrombosis. Bilateral lower extremity venous duplex ultrasound study in the ER is recommended. 2.  No evidence of a bowel obstruction or free air. 3.  Postoperative changes are noted involving the right hip. 4.  Faint increased attenuation in the gallbladder which could represent vicarious excretion of contrast, gallstones, and/or sludge. There is no pericholecystic inflammation. 5.  Mild to moderate prominence of the right renal pelvis is noted, this appears slightly improved compared to the prior study. 6.  Additional findings are detailed above. This document has been electronically signed by: Mehdi Robbins M.D. on 04/28/2021 02:49 AM All CTs at this facility use dose modulation techniques and iterative reconstructions, and/or weight-based dosing when appropriate to reduce radiation to a low as reasonably achievable. Cta Chest W Wo Contrast    Result Date: 4/28/2021  1. No evidence of a pulmonary embolism. 2.  Small to moderate-sized bilateral pleural effusions. The pleural effusions appear increased since the prior study. 3.  Multifocal atelectasis is noted bilaterally. 4.  Additional findings are detailed above. This document has been electronically signed by: Mehdi Robbins M.D. on 04/28/2021 01:59 AM All CTs at this facility use dose modulation techniques and iterative reconstructions, and/or weight-based dosing when appropriate to reduce radiation to a low as reasonably achievable. Us Gallbladder Ruq    Result Date: 4/28/2021  Sludge or stones within the gallbladder with gallbladder wall thickening and dilated common bile duct. These findings may represent acute cholecystitis. **This report has been created using voice recognition software. It may contain minor errors which are inherent in voice recognition technology. ** Final report electronically signed by Dr. Ramya Godoy on 4/28/2021 2:42 PM    Xr Chest Portable    Result Date: 4/30/2021  1.   Left internal jugular central venous line tip overlies the proximal SVC. 2.  Low lung volumes. This document has been electronically signed by: Venita Flood MD on 04/30/2021 12:04 AM    Xr Chest Portable    Result Date: 4/27/2021  1. No significant interval change since the prior study. Again noted is left basilar atelectasis. Tiny bilateral pleural effusions are suspected. This document has been electronically signed by: Magui Valentine M.D. on 04/27/2021 08:58 PM    Nm Hepatobiliary Scan W Ejection Fraction    Result Date: 4/29/2021  1. Patent common bile duct. 2. Nonvisualization of the gallbladder highly suspicious for acute cholecystitis. Final report electronically signed by Dr. Rox Iverson on 4/29/2021 11:03 AM        Electronically signed by Driss Lehman PA-C on 5/1/2021 at 11:06 AM  Patient seen and examined independently by me. Above discussed and I agree with CNP. Labs, cultures, and radiographs where available were reviewed. See orders for the updated patient care plan.     Duane Gardner MD, patient seen for Dr. Pastor Bright chart reviewed patient had acute cholecystitis and had cholecystostomy tube placed patient has mostly serosanguineous in the cholecystostomy tube no real bile at this time patient's abdomen is soft discussed with intensivist will begin clear liquids  5/1/2021   7:52 PM

## 2021-05-01 NOTE — PROGRESS NOTES
Admit Date: 4/27/2021  Hospital day {0-10:34439}    Subjective:     Patient {HX; GENERAL COMPLAINTS:34589}.    Medication side effects: {med side fx:75059}    Scheduled Meds:   lansoprazole  15 mg Oral BID AC    amiodarone  200 mg Oral Daily    digoxin  125 mcg Oral Daily    [Held by provider] aspirin  325 mg Oral Daily    [Held by provider] docusate sodium  100 mg Oral BID    [Held by provider] isosorbide mononitrate  30 mg Oral Daily    latanoprost  1 drop Both Eyes Nightly    [Held by provider] midodrine  5 mg Oral TID WC    mirtazapine  7.5 mg Oral Nightly    potassium chloride  20 mEq Oral BID WC    sertraline  50 mg Oral Daily    sodium bicarbonate  1,300 mg Oral BID    sucralfate  1 g Oral 4x Daily AC & HS    sodium chloride flush  5-40 mL Intravenous 2 times per day    metoprolol tartrate  25 mg Oral BID    atorvastatin  20 mg Oral Nightly    piperacillin-tazobactam  3,375 mg Intravenous Q8H     Continuous Infusions:   sodium chloride 50 mL/hr at 05/01/21 1024    heparin (PORCINE) Infusion 18 Units/kg/hr (05/01/21 1319)    sodium chloride      sodium chloride       PRN Meds:ondansetron, heparin (porcine), heparin (porcine), nitroGLYCERIN, potassium chloride **OR** potassium alternative oral replacement **OR** potassium chloride, sodium chloride, tiZANidine, sodium chloride flush, sodium chloride, polyethylene glycol, acetaminophen **OR** acetaminophen    Review of Systems  {Review Of Systems:58700}    Objective:     Patient Vitals for the past 8 hrs:   BP Temp Temp src Pulse Resp SpO2   05/01/21 1435 (!) 143/67   73 22 95 %   05/01/21 1430 (!) 143/64   75 17 95 %   05/01/21 1425 (!) 153/66   81 17 91 %   05/01/21 1420 (!) 147/69   81 23 94 %   05/01/21 1415 (!) 160/74   77 26 95 %   05/01/21 1400 (!) 158/75   76 27 94 %   05/01/21 1300 (!) 162/67   81 23 93 %   05/01/21 1200 (!) 152/85   76 23 94 %   05/01/21 1136  98.7 °F (37.1 °C) Axillary      05/01/21 1100 (!) 155/71   76 23 95 %   21 1000 (!) 163/69   73 23 97 %   21 0900 (!) 158/67   78 23 95 %   21 0848  98.8 °F (37.1 °C) Axillary      21 0821 (!) 164/64   87     21 0800 (!) 151/66   85 24 94 %     I/O last 3 completed shifts: In: 2012.1 [P.O.:300; I.V.:1712.1]  Out: 1150 [Urine:1050; Drains:100]    {Exam; Complete Normal And System Select:19674}    ECG: {Findings; diagnostics ecg readin}. Data Review:   CBC:  Lab Results   Component Value Date    WBC 5.0 2021    RBC 3.08 2021    HGB 8.7 2021    HCT 27.4 2021    MCV 89.0 2021    MCH 28.2 2021    MCHC 31.8 2021    RDW 14.5 2018     2021    MPV 10.0 2021     BMP  Lab Results   Component Value Date     2021    K 3.5 2021    K 3.3 2021     2021    CO2 19 2021    BUN 13 2021    CREATININE 0.7 2021    CALCIUM 8.4 2021      COAG PROFILE:  Lab Results   Component Value Date    APTT 32.4 2021    INR 1.76 2021       Assessment:     Active Problems:    Atrial fibrillation with RVR (HCC)    Abnormal ultrasound of gallbladder    Other chest pain    Moderate malnutrition (HCC)  Resolved Problems:    * No resolved hospital problems.  *      Plan:     ***

## 2021-05-01 NOTE — PROGRESS NOTES
Comprehensive Nutrition Assessment    Type and Reason for Visit:  Initial, Consult(ONS start)    Nutrition Recommendations/Plan:   Consider MVI. ONS initiated: Ensure Clear TID. Diet advancement as able per MD.    Nutrition Assessment:    Pt. moderately malnourished AEB criteria as listed below. At risk for further nutrition compromise r/t increased nutrient needs to support wound healing, clear liquid status, admit with afib, hypoxia/respiratory failure, recent UTI, chronic cholecystitis, s/p GB drain placement 4/30/21, and underlying medical condition (hx: COPD, CAD, HLD, HTN, liver hepatitis, CHF). Nutrition recommendations/interventions as per above. Malnutrition Assessment:  Malnutrition Status: Moderate malnutrition    Context:  Acute Illness     Findings of the 6 clinical characteristics of malnutrition:  Energy Intake:  1 - 75% or less of estimated energy requirements for 7 or more days  Weight Loss:  No significant weight loss(however edema present)     Body Fat Loss:  1 - Mild body fat loss Orbital   Muscle Mass Loss:  1 - Mild muscle mass loss Temples (temporalis)  Fluid Accumulation:  1 - Mild Extremities hx: CHF   Strength:  Not Performed    Estimated Daily Nutrient Needs:  Energy (kcal):  9641-3526 kcals (20-25 kcals/kg/day); Weight Used for Energy Requirements:  Current(69 kg 4/28/21)     Protein (g):  62-96 grams (1.2-2 grams/kg IBW /day); Weight Used for Protein Requirements:  Ideal(48 kg)        Fluid (ml/day):  per MD;       Nutrition Related Findings:  Pt seen, trialing clear liquids. Pt on 1 liter nasal cannula oxygen. States she just \"overall feels sick\". Declines nausea. Poor appetite/intake for the last 3 weeks. Weight hard to assess d/t edema. She was agreeable to ONS. Note s/p GB drain placement 4/30/21. Recent UTI.  5/1/21: Alk Phos 138, AST 66, ALT 48. Rx: remeron, zosyn, sodium bicarbonate, carafate, IVF at 50 ml/hr, colace. BM 4/28/21.   She reports some diarrhea today. Wounds:  Multiple(coccyx stage II, right and left buttocks stage II, right hip incision)       Current Nutrition Therapies:    DIET CLEAR LIQUID;  Dietary Nutrition Supplements: Clear Liquid Oral Supplement    Anthropometric Measures:  · Height: 5' 1\" (154.9 cm)  · Current Body Weight: 152 lb 12.5 oz (69.3 kg)(4/28/21: +1 RLE, +1 LLE edema)   · Admission Body Weight: 152 lb 12.5 oz (69.3 kg)(4/28/21 +1 RLE, +2 LLE edema)    · Usual Body Weight: (~ 150# per pt. Per EMR: 5/15/21: 153#6. 4oz, 2/4/21: 153#2oz, 4/1/21: 138#9. 6oz)     · Ideal Body Weight: 105 lbs;  · BMI: 28.9  · Adjusted Body Weight:  ; No Adjustment   · BMI Categories: Overweight (BMI 25.0-29. 9)       Nutrition Diagnosis:   · Moderate malnutrition, In context of acute illness or injury related to inadequate protein-energy intake as evidenced by poor intake prior to admission, mild muscle loss, mild loss of subcutaneous fat      Nutrition Interventions:   Food and/or Nutrient Delivery:  Continue Current Diet, Start Oral Nutrition Supplement  Nutrition Education/Counseling:  Education initiated(Encouraged oral intake and ONS use.)   Coordination of Nutrition Care:  Continue to monitor while inpatient    Goals:  Pt will tolerate clear liquid diet so diet can be advanced in 1-4 days. Nutrition Monitoring and Evaluation:   Behavioral-Environmental Outcomes:  None Identified   Food/Nutrient Intake Outcomes:  Diet Advancement/Tolerance, Food and Nutrient Intake, Supplement Intake, IVF Intake  Physical Signs/Symptoms Outcomes:  Biochemical Data, GI Status, Fluid Status or Edema, Hemodynamic Status, Nutrition Focused Physical Findings, Skin, Weight     Discharge Planning:     Too soon to determine     Electronically signed by Asa Brock RD, LD on 5/1/21 at 10:47 AM EDT    Contact: [phone]

## 2021-05-02 LAB
ALBUMIN SERPL-MCNC: 2.1 G/DL (ref 3.5–5.1)
ALP BLD-CCNC: 121 U/L (ref 38–126)
ALT SERPL-CCNC: 36 U/L (ref 11–66)
ANION GAP SERPL CALCULATED.3IONS-SCNC: 9 MEQ/L (ref 8–16)
APTT: 137.4 SECONDS (ref 22–38)
APTT: 94 SECONDS (ref 22–38)
AST SERPL-CCNC: 42 U/L (ref 5–40)
BILIRUB SERPL-MCNC: 0.3 MG/DL (ref 0.3–1.2)
BUN BLDV-MCNC: 9 MG/DL (ref 7–22)
C (BIG) ANTIGEN: NORMAL
C (LITTLE) ANTIGEN: NORMAL
CALCIUM SERPL-MCNC: 8.2 MG/DL (ref 8.5–10.5)
CHLORIDE BLD-SCNC: 110 MEQ/L (ref 98–111)
CHOLESTEROL, TOTAL: 66 MG/DL (ref 100–199)
CO2: 22 MEQ/L (ref 23–33)
CREAT SERPL-MCNC: 0.6 MG/DL (ref 0.4–1.2)
E (BIG) ANTIGEN: NORMAL
E (LITTLE) ANTIGEN: NORMAL
ERYTHROCYTE [DISTWIDTH] IN BLOOD BY AUTOMATED COUNT: 16.8 % (ref 11.5–14.5)
ERYTHROCYTE [DISTWIDTH] IN BLOOD BY AUTOMATED COUNT: 54.4 FL (ref 35–45)
GFR SERPL CREATININE-BSD FRML MDRD: > 90 ML/MIN/1.73M2
GLUCOSE BLD-MCNC: 125 MG/DL (ref 70–108)
GLUCOSE BLD-MCNC: 87 MG/DL (ref 70–108)
HCT VFR BLD CALC: 27.7 % (ref 37–47)
HDLC SERPL-MCNC: 17 MG/DL
HEMOGLOBIN: 8.6 GM/DL (ref 12–16)
INDIRECT COOMBS: NORMAL
LDL CHOLESTEROL CALCULATED: 23 MG/DL
MAGNESIUM: 1.6 MG/DL (ref 1.6–2.4)
MCH RBC QN AUTO: 27.8 PG (ref 26–33)
MCHC RBC AUTO-ENTMCNC: 31 GM/DL (ref 32.2–35.5)
MCV RBC AUTO: 89.6 FL (ref 81–99)
PHOSPHORUS: 1.8 MG/DL (ref 2.4–4.7)
PLATELET # BLD: 251 THOU/MM3 (ref 130–400)
PMV BLD AUTO: 10.1 FL (ref 9.4–12.4)
POTASSIUM SERPL-SCNC: 2.9 MEQ/L (ref 3.5–5.2)
POTASSIUM SERPL-SCNC: 4.3 MEQ/L (ref 3.5–5.2)
RBC # BLD: 3.09 MILL/MM3 (ref 4.2–5.4)
SODIUM BLD-SCNC: 141 MEQ/L (ref 135–145)
TOTAL PROTEIN: 5.2 G/DL (ref 6.1–8)
TRIGL SERPL-MCNC: 132 MG/DL (ref 0–199)
WBC # BLD: 5 THOU/MM3 (ref 4.8–10.8)

## 2021-05-02 PROCEDURE — 36592 COLLECT BLOOD FROM PICC: CPT

## 2021-05-02 PROCEDURE — 6370000000 HC RX 637 (ALT 250 FOR IP): Performed by: REGISTERED NURSE

## 2021-05-02 PROCEDURE — 83735 ASSAY OF MAGNESIUM: CPT

## 2021-05-02 PROCEDURE — 6370000000 HC RX 637 (ALT 250 FOR IP): Performed by: INTERNAL MEDICINE

## 2021-05-02 PROCEDURE — 6370000000 HC RX 637 (ALT 250 FOR IP): Performed by: NURSE PRACTITIONER

## 2021-05-02 PROCEDURE — 6360000002 HC RX W HCPCS: Performed by: NURSE PRACTITIONER

## 2021-05-02 PROCEDURE — 85027 COMPLETE CBC AUTOMATED: CPT

## 2021-05-02 PROCEDURE — 84132 ASSAY OF SERUM POTASSIUM: CPT

## 2021-05-02 PROCEDURE — 36415 COLL VENOUS BLD VENIPUNCTURE: CPT

## 2021-05-02 PROCEDURE — 99233 SBSQ HOSP IP/OBS HIGH 50: CPT | Performed by: INTERNAL MEDICINE

## 2021-05-02 PROCEDURE — 85730 THROMBOPLASTIN TIME PARTIAL: CPT

## 2021-05-02 PROCEDURE — 84100 ASSAY OF PHOSPHORUS: CPT

## 2021-05-02 PROCEDURE — 6360000002 HC RX W HCPCS: Performed by: INTERNAL MEDICINE

## 2021-05-02 PROCEDURE — 93005 ELECTROCARDIOGRAM TRACING: CPT | Performed by: INTERNAL MEDICINE

## 2021-05-02 PROCEDURE — 82948 REAGENT STRIP/BLOOD GLUCOSE: CPT

## 2021-05-02 PROCEDURE — 6360000002 HC RX W HCPCS: Performed by: FAMILY MEDICINE

## 2021-05-02 PROCEDURE — 2580000003 HC RX 258: Performed by: REGISTERED NURSE

## 2021-05-02 PROCEDURE — 80061 LIPID PANEL: CPT

## 2021-05-02 PROCEDURE — 2140000000 HC CCU INTERMEDIATE R&B

## 2021-05-02 PROCEDURE — 2500000003 HC RX 250 WO HCPCS: Performed by: NURSE PRACTITIONER

## 2021-05-02 PROCEDURE — 2580000003 HC RX 258: Performed by: NURSE PRACTITIONER

## 2021-05-02 PROCEDURE — 80053 COMPREHEN METABOLIC PANEL: CPT

## 2021-05-02 PROCEDURE — 6360000002 HC RX W HCPCS: Performed by: REGISTERED NURSE

## 2021-05-02 PROCEDURE — 99232 SBSQ HOSP IP/OBS MODERATE 35: CPT | Performed by: SURGERY

## 2021-05-02 RX ORDER — ACETAMINOPHEN 650 MG/1
650 SUPPOSITORY RECTAL EVERY 4 HOURS PRN
Status: DISCONTINUED | OUTPATIENT
Start: 2021-05-02 | End: 2021-05-13 | Stop reason: HOSPADM

## 2021-05-02 RX ORDER — POTASSIUM CHLORIDE 29.8 MG/ML
20 INJECTION INTRAVENOUS PRN
Status: DISCONTINUED | OUTPATIENT
Start: 2021-05-02 | End: 2021-05-13 | Stop reason: HOSPADM

## 2021-05-02 RX ORDER — POTASSIUM CHLORIDE 20 MEQ/1
20 TABLET, EXTENDED RELEASE ORAL PRN
Status: DISCONTINUED | OUTPATIENT
Start: 2021-05-02 | End: 2021-05-13 | Stop reason: HOSPADM

## 2021-05-02 RX ORDER — ACETAMINOPHEN 325 MG/1
650 TABLET ORAL EVERY 4 HOURS PRN
Status: DISCONTINUED | OUTPATIENT
Start: 2021-05-02 | End: 2021-05-13 | Stop reason: HOSPADM

## 2021-05-02 RX ORDER — MAGNESIUM SULFATE IN WATER 40 MG/ML
2000 INJECTION, SOLUTION INTRAVENOUS PRN
Status: DISCONTINUED | OUTPATIENT
Start: 2021-05-02 | End: 2021-05-13 | Stop reason: HOSPADM

## 2021-05-02 RX ORDER — ISOSORBIDE MONONITRATE 30 MG/1
30 TABLET, EXTENDED RELEASE ORAL DAILY
Status: CANCELLED | OUTPATIENT
Start: 2021-05-03

## 2021-05-02 RX ORDER — FUROSEMIDE 10 MG/ML
40 INJECTION INTRAMUSCULAR; INTRAVENOUS ONCE
Status: COMPLETED | OUTPATIENT
Start: 2021-05-02 | End: 2021-05-02

## 2021-05-02 RX ORDER — ISOSORBIDE MONONITRATE 30 MG/1
30 TABLET, EXTENDED RELEASE ORAL DAILY
Status: DISCONTINUED | OUTPATIENT
Start: 2021-05-02 | End: 2021-05-04

## 2021-05-02 RX ORDER — MELOXICAM 7.5 MG/1
15 TABLET ORAL ONCE
Status: COMPLETED | OUTPATIENT
Start: 2021-05-02 | End: 2021-05-02

## 2021-05-02 RX ADMIN — LATANOPROST 1 DROP: 50 SOLUTION OPHTHALMIC at 19:44

## 2021-05-02 RX ADMIN — ASPIRIN 325 MG: 325 TABLET, COATED ORAL at 09:56

## 2021-05-02 RX ADMIN — METOPROLOL TARTRATE 25 MG: 25 TABLET, FILM COATED ORAL at 09:56

## 2021-05-02 RX ADMIN — ACETAMINOPHEN 650 MG: 325 TABLET ORAL at 17:11

## 2021-05-02 RX ADMIN — SERTRALINE HYDROCHLORIDE 50 MG: 50 TABLET, FILM COATED ORAL at 10:07

## 2021-05-02 RX ADMIN — POTASSIUM PHOSPHATE, MONOBASIC AND POTASSIUM PHOSPHATE, DIBASIC 10 MMOL: 224; 236 INJECTION, SOLUTION, CONCENTRATE INTRAVENOUS at 08:46

## 2021-05-02 RX ADMIN — APIXABAN 5 MG: 5 TABLET, FILM COATED ORAL at 19:43

## 2021-05-02 RX ADMIN — MELOXICAM 15 MG: 7.5 TABLET ORAL at 12:37

## 2021-05-02 RX ADMIN — POTASSIUM CHLORIDE 20 MEQ: 400 INJECTION, SOLUTION INTRAVENOUS at 08:09

## 2021-05-02 RX ADMIN — ISOSORBIDE MONONITRATE 30 MG: 30 TABLET ORAL at 11:39

## 2021-05-02 RX ADMIN — DIGOXIN 125 MCG: 250 TABLET ORAL at 09:56

## 2021-05-02 RX ADMIN — ATORVASTATIN CALCIUM 20 MG: 20 TABLET, FILM COATED ORAL at 19:44

## 2021-05-02 RX ADMIN — SODIUM CHLORIDE, PRESERVATIVE FREE 10 ML: 5 INJECTION INTRAVENOUS at 19:39

## 2021-05-02 RX ADMIN — PIPERACILLIN AND TAZOBACTAM 3375 MG: 3; .375 INJECTION, POWDER, LYOPHILIZED, FOR SOLUTION INTRAVENOUS at 02:57

## 2021-05-02 RX ADMIN — MORPHINE SULFATE 1 MG: 2 INJECTION, SOLUTION INTRAMUSCULAR; INTRAVENOUS at 06:22

## 2021-05-02 RX ADMIN — METOPROLOL TARTRATE 25 MG: 25 TABLET, FILM COATED ORAL at 19:44

## 2021-05-02 RX ADMIN — POTASSIUM CHLORIDE 20 MEQ: 400 INJECTION, SOLUTION INTRAVENOUS at 07:32

## 2021-05-02 RX ADMIN — ONDANSETRON 4 MG: 2 INJECTION INTRAMUSCULAR; INTRAVENOUS at 06:22

## 2021-05-02 RX ADMIN — POTASSIUM CHLORIDE 20 MEQ: 400 INJECTION, SOLUTION INTRAVENOUS at 09:16

## 2021-05-02 RX ADMIN — SODIUM BICARBONATE 1300 MG: 650 TABLET ORAL at 10:00

## 2021-05-02 RX ADMIN — FAMOTIDINE 20 MG: 10 INJECTION, SOLUTION INTRAVENOUS at 19:43

## 2021-05-02 RX ADMIN — APIXABAN 5 MG: 5 TABLET, FILM COATED ORAL at 11:39

## 2021-05-02 RX ADMIN — AMIODARONE HYDROCHLORIDE 200 MG: 200 TABLET ORAL at 10:07

## 2021-05-02 RX ADMIN — SUCRALFATE 1 G: 1 TABLET ORAL at 19:44

## 2021-05-02 RX ADMIN — SODIUM CHLORIDE, PRESERVATIVE FREE 10 ML: 5 INJECTION INTRAVENOUS at 10:00

## 2021-05-02 RX ADMIN — SODIUM BICARBONATE 1300 MG: 650 TABLET ORAL at 19:44

## 2021-05-02 RX ADMIN — SUCRALFATE 1 G: 1 TABLET ORAL at 10:10

## 2021-05-02 RX ADMIN — MAGNESIUM SULFATE HEPTAHYDRATE 2000 MG: 40 INJECTION, SOLUTION INTRAVENOUS at 08:06

## 2021-05-02 RX ADMIN — MIRTAZAPINE 7.5 MG: 15 TABLET, FILM COATED ORAL at 19:44

## 2021-05-02 RX ADMIN — FLUCONAZOLE IN SODIUM CHLORIDE 400 MG: 2 INJECTION, SOLUTION INTRAVENOUS at 19:36

## 2021-05-02 RX ADMIN — FUROSEMIDE 40 MG: 10 INJECTION, SOLUTION INTRAMUSCULAR; INTRAVENOUS at 11:28

## 2021-05-02 RX ADMIN — SUCRALFATE 1 G: 1 TABLET ORAL at 16:51

## 2021-05-02 ASSESSMENT — ENCOUNTER SYMPTOMS
BACK PAIN: 0
NAUSEA: 1
VOMITING: 0
EYE REDNESS: 0
COUGH: 1
ABDOMINAL DISTENTION: 0
SHORTNESS OF BREATH: 0
CHEST TIGHTNESS: 0
CONSTIPATION: 0
WHEEZING: 0
COLOR CHANGE: 0
ABDOMINAL PAIN: 0
SINUS PRESSURE: 0

## 2021-05-02 ASSESSMENT — PAIN SCALES - GENERAL
PAINLEVEL_OUTOF10: 7
PAINLEVEL_OUTOF10: 6
PAINLEVEL_OUTOF10: 8
PAINLEVEL_OUTOF10: 6

## 2021-05-02 ASSESSMENT — PAIN DESCRIPTION - PAIN TYPE: TYPE: ACUTE PAIN

## 2021-05-02 ASSESSMENT — PAIN DESCRIPTION - DESCRIPTORS: DESCRIPTORS: DISCOMFORT;PRESSURE

## 2021-05-02 ASSESSMENT — PAIN DESCRIPTION - FREQUENCY: FREQUENCY: CONTINUOUS

## 2021-05-02 ASSESSMENT — PAIN DESCRIPTION - LOCATION: LOCATION: CHEST

## 2021-05-02 NOTE — PLAN OF CARE
Problem: Falls - Risk of:  Goal: Will remain free from falls  Description: Will remain free from falls  5/2/2021 0914 by Karol Wu  Outcome: Ongoing    Goal: Absence of physical injury  Description: Absence of physical injury  5/2/2021 0914 by Karol Wu  Outcome: Ongoing    Problem: Pain:  Goal: Pain level will decrease  Description: Pain level will decrease  5/2/2021 0914 by Karol Wu  Outcome: Ongoing    Problem: DISCHARGE BARRIERS  Goal: Patient's continuum of care needs are met  5/2/2021 0914 by Karol Wu  Outcome: Ongoing     Problem: Skin Integrity:  Goal: Will show no infection signs and symptoms  Description: Will show no infection signs and symptoms  5/2/2021 0914 by Karol Wu  Outcome: Ongoing  Note: Patient does have yeast in her urine. On Diflucan,   Goal: Absence of new skin breakdown  Description: Absence of new skin breakdown  5/2/2021 0914 by Karol Wu  Outcome: Ongoing  Note: No new skin breakdown noted.       Problem: Daily Care:  Goal: Daily care needs are met  Description: Daily care needs are met  5/2/2021 0914 by Karol Wu  Outcome: Ongoing     Problem: Discharge Planning:  Goal: Patients continuum of care needs are met  Description: Patients continuum of care needs are met  Outcome: Ongoing

## 2021-05-02 NOTE — PROGRESS NOTES
[facility]  Progress Note For Dr Ken Oshea  Covering for Dr. Sissy Kirby       Patient:  Lukasz Burgos  YOB: 1935  Date of Service: 5/2/2021  MRN: [id number]   Acct:  [de-identified]   Primary Care Physician: Morris Cornell MD    SUBJECTIVE:feels better        Home Medications:   No current facility-administered medications on file prior to encounter. Current Outpatient Medications on File Prior to Encounter   Medication Sig Dispense Refill    sertraline (ZOLOFT) 50 MG tablet Take 50 mg by mouth daily      Lactobacillus-Inulin (CULTURELLE DIGESTIVE DAILY PO) Take 1 capsule by mouth daily      mirtazapine (REMERON) 15 MG tablet Take 7.5 mg by mouth nightly      Multiple Vitamins-Minerals (PRESERVISION AREDS 2+MULTI VIT PO) Take 1 capsule by mouth daily      sodium bicarbonate 650 MG tablet Take 2 tablets by mouth 2 times daily      isosorbide mononitrate (IMDUR) 30 MG extended release tablet Take 1 tablet by mouth daily 30 tablet 3    nitroGLYCERIN (NITROSTAT) 0.4 MG SL tablet up to max of 3 total doses.  If no relief after 1 dose, call 911. 25 tablet 3    metoprolol tartrate (LOPRESSOR) 25 MG tablet Take 0.5 tablets by mouth 2 times daily 60 tablet 3    potassium chloride (KLOR-CON M) 20 MEQ TBCR extended release tablet Take 1 tablet by mouth 2 times daily (with meals) 60 tablet 3    midodrine (PROAMATINE) 5 MG tablet Take 1 tablet by mouth 3 times daily (with meals) 90 tablet 3    apixaban (ELIQUIS) 5 MG TABS tablet Take 1 tablet by mouth 2 times daily 60 tablet 0    docusate sodium (COLACE, DULCOLAX) 100 MG CAPS Take 100 mg by mouth 2 times daily 60 capsule 0    aspirin 325 MG EC tablet Take 1 tablet by mouth daily 30 tablet 0    pantoprazole (PROTONIX) 40 MG tablet Take 1 tablet by mouth 2 times daily (before meals) 60 tablet 2    sucralfate (CARAFATE) 1 GM tablet Take 1 tablet by mouth 4 times daily (before meals and nightly) 120 tablet 0    simvastatin (ZOCOR) 40 MG tablet Take 1 tablet by mouth nightly 90 tablet 1    Multiple Vitamins-Minerals (THERAPEUTIC MULTIVITAMIN-MINERALS) tablet Take 1 tablet by mouth daily      latanoprost (XALATAN) 0.005 % ophthalmic solution Place 1 drop into both eyes nightly      tiZANidine (ZANAFLEX) 4 MG tablet Take 1 tablet by mouth every 8 hours as needed (muscle spasm) 9 tablet 0    acetaminophen (TYLENOL) 325 MG tablet Take 2 tablets by mouth every 4 hours as needed for Pain Maximum dose of acetaminophen is 4000 mg from all sources in 24 hours. Scheduled Meds:   phosphorus replacement protocol   Other RX Placeholder    isosorbide mononitrate  30 mg Oral Daily    famotidine (PEPCID) injection  20 mg Intravenous BID    apixaban  5 mg Oral BID    aspirin  325 mg Oral Daily    fluconazole  400 mg Intravenous Q24H    amiodarone  200 mg Oral Daily    digoxin  125 mcg Oral Daily    [Held by provider] docusate sodium  100 mg Oral BID    latanoprost  1 drop Both Eyes Nightly    mirtazapine  7.5 mg Oral Nightly    sertraline  50 mg Oral Daily    sodium bicarbonate  1,300 mg Oral BID    sucralfate  1 g Oral 4x Daily AC & HS    sodium chloride flush  5-40 mL Intravenous 2 times per day    metoprolol tartrate  25 mg Oral BID    atorvastatin  20 mg Oral Nightly     Continuous Infusions:   sodium chloride      sodium chloride       PRN Meds:potassium chloride, potassium chloride, potassium chloride, magnesium sulfate, acetaminophen **OR** acetaminophen, ondansetron, potassium chloride **OR** potassium alternative oral replacement **OR** potassium chloride, sodium chloride, tiZANidine, sodium chloride flush, sodium chloride, polyethylene glycol        Allergies:  Patient has no known allergies. OBJECTIVE:    Vitals:   Vitals:    05/02/21 1516   BP: (!) 174/76   Pulse: 75   Resp: 16   Temp: 97.4 °F (36.3 °C)   SpO2: 95%      BMI: Body mass index is 28.87 kg/m².     PHYSICAL EXAMINATION:            General appearance:  Ill looking female, no apparent distress, appears stated age and cooperative. HEENT:  Normal cephalic, atraumatic without obvious deformity. Pupils equal, round, and reactive to light. Extra ocular muscles intact. Conjunctivae/corneas clear. Neck: Supple  Respiratory:   Diminished air entry bibasilarly  Cardiovascular:  Regular rhythm with normal S1/S2 without murmurs, rubs or gallops. Abdomen:full, cholecystostomy tube in place RUQ, Soft, non-tender, non-distended with normal bowel sounds.   Musculoskeletal:  No clubbing, cyanosis  Has 2+ ankle edema left lower extremity    Skin:  Warm and dry  Neurologic:   Alert and oriented x3 with no motor deficits    Review of Labs and Diagnostic Testing:    Recent Results (from the past 24 hour(s))   Troponin    Collection Time: 05/01/21  4:30 PM   Result Value Ref Range    Troponin T 0.029 (A) ng/ml   Hemoglobin and hematocrit, blood    Collection Time: 05/01/21  5:00 PM   Result Value Ref Range    Hemoglobin 9.1 (L) 12.0 - 16.0 gm/dl    Hematocrit 29.1 (L) 37.0 - 47.0 %   APTT    Collection Time: 05/01/21  7:05 PM   Result Value Ref Range    aPTT 142.4 (HH) 22.0 - 38.0 seconds   Troponin    Collection Time: 05/01/21  7:50 PM   Result Value Ref Range    Troponin T 0.011 (A) ng/ml   D-dimer, quantitative    Collection Time: 05/01/21  7:50 PM   Result Value Ref Range    D-Dimer, Quant 09143.00 (H) 0.00 - 500.00 ng/ml FEU   APTT    Collection Time: 05/02/21  1:04 AM   Result Value Ref Range    aPTT 137.4 (HH) 22.0 - 38.0 seconds   Comprehensive Metabolic Panel    Collection Time: 05/02/21  5:57 AM   Result Value Ref Range    Glucose 87 70 - 108 mg/dL    CREATININE 0.6 0.4 - 1.2 mg/dL    BUN 9 7 - 22 mg/dL    Sodium 141 135 - 145 meq/L    Potassium 2.9 (L) 3.5 - 5.2 meq/L    Chloride 110 98 - 111 meq/L    CO2 22 (L) 23 - 33 meq/L    Calcium 8.2 (L) 8.5 - 10.5 mg/dL    AST 42 (H) 5 - 40 U/L    Alkaline Phosphatase 121 38 - 126 U/L    Total Protein 5.2 (L) 6.1 - 8.0 g/dL    Albumin 2.1 (L) 3.5 - 5.1 g/dL    Total Bilirubin 0.3 0.3 - 1.2 mg/dL    ALT 36 11 - 66 U/L   CBC    Collection Time: 05/02/21  5:57 AM   Result Value Ref Range    WBC 5.0 4.8 - 10.8 thou/mm3    RBC 3.09 (L) 4.20 - 5.40 mill/mm3    Hemoglobin 8.6 (L) 12.0 - 16.0 gm/dl    Hematocrit 27.7 (L) 37.0 - 47.0 %    MCV 89.6 81.0 - 99.0 fL    MCH 27.8 26.0 - 33.0 pg    MCHC 31.0 (L) 32.2 - 35.5 gm/dl    RDW-CV 16.8 (H) 11.5 - 14.5 %    RDW-SD 54.4 (H) 35.0 - 45.0 fL    Platelets 036 342 - 991 thou/mm3    MPV 10.1 9.4 - 12.4 fL   Lipid panel    Collection Time: 05/02/21  5:57 AM   Result Value Ref Range    Cholesterol, Total 66 (L) 100 - 199 mg/dL    Triglycerides 132 0 - 199 mg/dL    HDL 17 mg/dL    LDL Calculated 23 mg/dL   Anion Gap    Collection Time: 05/02/21  5:57 AM   Result Value Ref Range    Anion Gap 9.0 8.0 - 16.0 meq/L   Glomerular Filtration Rate, Estimated    Collection Time: 05/02/21  5:57 AM   Result Value Ref Range    Est, Glom Filt Rate >90 ml/min/1.73m2   Magnesium    Collection Time: 05/02/21  5:57 AM   Result Value Ref Range    Magnesium 1.6 1.6 - 2.4 mg/dL   Phosphorus    Collection Time: 05/02/21  5:57 AM   Result Value Ref Range    Phosphorus 1.8 (L) 2.4 - 4.7 mg/dL   APTT    Collection Time: 05/02/21  7:02 AM   Result Value Ref Range    aPTT 94.0 (H) 22.0 - 38.0 seconds   Potassium    Collection Time: 05/02/21 10:45 AM   Result Value Ref Range    Potassium 4.3 3.5 - 5.2 meq/L   POCT Glucose    Collection Time: 05/02/21 10:46 AM   Result Value Ref Range    POC Glucose 125 (H) 70 - 108 mg/dl       Radiology:     Ct Abdomen Pelvis Wo Contrast Additional Contrast? None    Result Date: 4/28/2021   ** ADDENDUM #1 ** This report was discussed with Justine Lopez RN on Apr 28, 2021 02:53:00 EDT. This document has been electronically signed by: Marielos Pretty on 04/28/2021 02:54 AM ** ORIGINAL REPORT ** CT SCAN OF THE ABDOMEN AND PELVIS WITHOUT CONTRAST COMPARISON: 3/18/2021.  TECHNIQUE: A It is uncertain whether this is due to mixing artifact from the prior CTA chest, or perhaps DVT. Bilateral lower extremity venous duplex ultrasound study is recommended. 1.  Low-attenuation areas are noted within the bilateral femoral veins. It is uncertain whether this is due to mixing artifact from the prior CTA chest, or perhaps deep venous thrombosis. Bilateral lower extremity venous duplex ultrasound study in the ER is recommended. 2.  No evidence of a bowel obstruction or free air. 3.  Postoperative changes are noted involving the right hip. 4.  Faint increased attenuation in the gallbladder which could represent vicarious excretion of contrast, gallstones, and/or sludge. There is no pericholecystic inflammation. 5.  Mild to moderate prominence of the right renal pelvis is noted, this appears slightly improved compared to the prior study. 6.  Additional findings are detailed above. This document has been electronically signed by: Pauline Castillo M.D. on 04/28/2021 02:49 AM All CTs at this facility use dose modulation techniques and iterative reconstructions, and/or weight-based dosing when appropriate to reduce radiation to a low as reasonably achievable. Cta Chest W Wo Contrast    Result Date: 4/28/2021  CTA OF THE CHEST WITH CONTRAST COMPARISON: Chest x-ray 4/27/2021. CTA chest 4/8/2021. TECHNIQUE: A CTA of the chest was performed following the administration of nonionic IV contrast. Sagittal and coronal MIP reconstruction images were performed on the same workstation. A dose reduction technique was utilized. FINDINGS: There is no evidence of a pulmonary embolism. Thoracic aorta is partially obscured by motion/streak artifact, but is normal in caliber without evidence of an aneurysm. Again noted is a calcified nodule in the right thyroid lobe measuring 3 mm on axial image 61. A moderate sized hiatal hernia is again noted.  Small to moderate sized bilateral pleural effusions are noted, this is increased since the prior study. Some of the left-sided pleural fluid is noted to reside within the oblique fissure and might be loculated, as seen on axial image 162. Compressive atelectasis is noted within the bilateral lower lobes. There are also scattered atelectatic changes bilaterally. There is no adenopathy. Scans of the upper abdomen demonstrate a subcentimeter calcification in the right hepatic lobe. Calcified granulomas are also partially visualized within the spleen. Renal cysts are also partially visualized bilaterally. Bone windows demonstrate no acute abnormalities. Dextrocurvature of the thoracic spine is noted. Multiple healing left-sided rib fractures are again noted. 1.  No evidence of a pulmonary embolism. 2.  Small to moderate-sized bilateral pleural effusions. The pleural effusions appear increased since the prior study. 3.  Multifocal atelectasis is noted bilaterally. 4.  Additional findings are detailed above. This document has been electronically signed by: Sundar Guzman M.D. on 04/28/2021 01:59 AM All CTs at this facility use dose modulation techniques and iterative reconstructions, and/or weight-based dosing when appropriate to reduce radiation to a low as reasonably achievable. Us Gallbladder Ruq    Result Date: 4/28/2021  PROCEDURE: US GALLBLADDER RUQ CLINICAL INFORMATION: Elevated LFT's; CT abdomen shows calcified granulomas and increased attentuation in liver/gallbladder . COMPARISON: No prior study. TECHNIQUE: Grayscale and color Doppler sound FINDINGS: Gallbladder is normal in position. There is sludge or stones within the lumen of the gallbladder. I would favor a cyst concreted sludge. There is wall thickening. There is no pericholecystic edema. The common bile duct is very enlarged measuring 0.9 cm. Adjacent liver shows no intrahepatic ductal dilatation. Several echogenic foci in the liver suggesting calcifications.  At least one of these can be seen on the CT scan Right hydronephrosis is incidentally noted. There is also a thick walled cyst right kidney. This measures 1.6 x 1.7 x 1.7 cm     Sludge or stones within the gallbladder with gallbladder wall thickening and dilated common bile duct. These findings may represent acute cholecystitis. **This report has been created using voice recognition software. It may contain minor errors which are inherent in voice recognition technology. ** Final report electronically signed by Dr. Viviana Pop on 4/28/2021 2:42 PM    Xr Chest Portable    Result Date: 4/27/2021  CHEST ONE VIEW COMPARISON: 4/18/2021. FINDINGS: Single frontal view of the chest was performed. Cardiac silhouette is accentuated by the portable technique. There are calcifications in the thoracic aortic arch. Again noted is left basilar atelectasis. A tiny left pleural effusion is again suspected. A tiny right pleural effusion is suspected, this appears stable compared to the prior study. There are no consolidations. 1.  No significant interval change since the prior study. Again noted is left basilar atelectasis. Tiny bilateral pleural effusions are suspected. This document has been electronically signed by: Nory Lester M.D. on 04/27/2021 08:58 PM        ASSESMENT:      Active Problems:    Atrial fibrillation with RVR ([address]    Abnormal ultrasound of gallbladder    Other chest pain    Moderate malnutrition (HCC)    Bilateral pleural effusion    Acute coronary syndrome ([address]  Resolved Problems:    * No resolved hospital problems. *  Other problems: 1. Acute hypoxic respiratory failure  2. Chronic cholecystitis, s/p   cholecystostomy tube placement, E coli in bile. 3. HFrEF ( 40-45%)  4. DVT left leg ( femoral/popliteal vein )  5. Hypokalemia  6. UTI secondary to yeast  7. Recent right femur intertrochanteric fracture, s/p nailing  8. History of HTN  9. HLD  10.  Status post shock  11.   Anemia status post transfusion of 1 unit PRBC  PLAN: continue to wean oxygen as able, encourage use of incentive spirometry. Was diuresed today, follow-up chest x-ray tomorrow. Will place pulmonary consultation for possible thoracentesis. Patient had converted to normal sinus rhythm on 04/30 currently on digoxin/ amiodarone. She had been on IV heparin which was transition to Eliquis today. Cardiology Service following. CTA chest was negative for PE. Has new drop in EF to 40-45% from 60% less than a month ago continue anti ischemic regimen - BB/nitrate/ASA. S/p cholecystostomy tube placement by IR, surgical Service feels this is more of chronic cholecystitis. Patient growing E coli in biliary drainage. Surgical service following     patient with left lower extremity DVT, plan as above     monitor electrolytes and renal function closely and replenish per protocol.     Patient on Diflucan for funguria     monitor BP trend, continue statin          DVT prophylaxis: [] Lovenox                                 [] SCDs                                 [] SQ Heparin                                 [] Encourage ambulation, low risk for DVT, no chemical or mechanical prophylaxis necessary              [] Already on Anticoagulation                Anticipated Disposition upon discharge: [] Home                                                                         [] Home with Home Health                                                                         [] [facility]                                                                         [] [facility] [address]          Electronically signed by Garett Mc MD on 5/2/2021 at 4:16 PM

## 2021-05-02 NOTE — PROGRESS NOTES
Wilber Palomares covering for Dr. Neftali Tellez  Daily Progress Note    Pt Name: Gerald Almendarez Record Number: [id number]  Date of Birth 1935   Today's Date: 5/2/2021    ASSESSMENT:     1. HD # [id number] [facility],[address] Problems    Diagnosis Date Noted    Moderate malnutrition ([address] [E44.0] 05/01/2021     Class: Acute    Abnormal ultrasound of gallbladder [R93.2] 04/29/2021    Other chest pain [R07.89] 04/29/2021    Atrial fibrillation with RVR [facility]) [I48.91] 04/28/2021       Chief Complaint:  Chief Complaint   Patient presents with    Chest Pain     due to pneumonia           PLAN:     1. Had episode of A. fib with RVR 2 nights ago  which is why she came in associated with hypotension and a noted decrease in her hemoglobin to 6.9. Hemoglobin now 8.6 no signs of bleeding. 2. Upper and resumed. Okay for diet from a surgical standpoint. However, nurses report she needs a swallow evaluation they feel she may be aspirating. 3. Clinically she had chronic cholecystitis with nonvisualization but had no abdominal pain whatsoever normal white count and cholecystectomy was being performed because discussion with Dr. Jj Reza noted she complains of chest pain but is had normal cath in the past.  She continues to complain of some chest pain. 4. Lactic acid normal.  No clinical evidence of sepsis. 5. Abdominal CT scan on 4/30 shows no significant changes from prior imaging. 6.  Percutaneous cholecystostomy performed. There is bilious drainage now in the bag. White count normal afebrile. Cultures noted urine and E. coli from bile. Defer antibiotic therapy to primary service. SUBJECTIVE:     Martha Ibarra is an ICU. Still complains of some chest pain. Was taking some liquids nurses thought she might be aspirating. Abdomen is benign and percutaneous drainage is thin bilious.       OBJECTIVE:     Patient Vitals for the past 24 hrs:   BP Temp Temp src Pulse Resp SpO2   05/02/21 0900 (!) 144/69 -- -- 82 24 94 %   05/02/21 0800 (!) 146/72 97 °F (36.1 °C) Axillary 79 24 92 %   05/02/21 0700 (!) 142/74 -- -- 72 23 93 %   05/02/21 0630 (!) 149/70 -- -- 73 21 95 %   05/02/21 0600 (!) 141/47 -- -- 74 21 96 %   05/02/21 0500 (!) 143/64 -- -- 69 22 96 %   05/02/21 0400 (!) 144/68 98.2 °F (36.8 °C) Oral 69 24 96 %   05/02/21 0300 (!) 150/64 -- -- 69 20 95 %   05/02/21 0230 136/66 -- -- 66 21 96 %   05/02/21 0200 (!) 144/66 -- -- 64 20 95 %   05/02/21 0130 (!) 148/60 -- -- 65 20 96 %   05/02/21 0100 (!) 151/68 -- -- 67 20 95 %   05/02/21 0030 (!) 147/69 -- -- 67 20 95 %   05/02/21 0000 (!) 150/68 98.3 °F (36.8 °C) Oral 65 21 96 %   05/01/21 2300 (!) 142/66 -- -- 64 19 95 %   05/01/21 2230 (!) 152/68 -- -- 64 19 96 %   05/01/21 2210 (!) 147/70 -- -- 67 18 94 %   05/01/21 2200 (!) 151/79 -- -- 67 19 95 %   05/01/21 2130 (!) 160/72 -- -- 81 21 95 %   05/01/21 2122 (!) 146/94 -- -- 105 -- --   05/01/21 2039 (!) 152/68 -- -- 75 20 96 %   05/01/21 2000 (!) 143/67 98.2 °F (36.8 °C) Oral 76 24 96 %   05/01/21 1900 (!) 152/65 -- -- 76 20 97 %   05/01/21 1800 (!) 145/70 -- -- 74 21 95 %   05/01/21 1700 (!) 162/70 -- -- 81 22 96 %   05/01/21 1600 (!) 151/75 99.1 °F (37.3 °C) Rectal 81 16 95 %   05/01/21 1500 (!) 155/70 -- -- 75 24 96 %   05/01/21 1435 (!) 143/67 -- -- 73 22 95 %   05/01/21 1430 (!) 143/64 -- -- 75 17 95 %   05/01/21 1425 (!) 153/66 -- -- 81 17 91 %   05/01/21 1420 (!) 147/69 -- -- 81 23 94 %   05/01/21 1415 (!) 160/74 -- -- 77 26 95 %   05/01/21 1400 (!) 158/75 -- -- 76 27 94 %   05/01/21 1300 (!) 162/67 -- -- 81 23 93 %   05/01/21 1200 (!) 152/85 -- -- 76 23 94 %   05/01/21 1136 -- 98.7 °F (37.1 °C) Axillary -- -- --   05/01/21 1100 (!) 155/71 -- -- 76 23 95 %   05/01/21 1000 (!) 163/69 -- -- 73 23 97 %         Intake/Output Summary (Last 24 hours) at 5/2/2021 0932  Last data filed at 5/2/2021 0451  Gross per 24 hour   Intake 1944.75 ml   Output 1750 ml   Net 194.75 ml In: 3434 [P.O.:200; I.V.:1060]  Out: 1400 [Urine:1000; Drains:400]    I/O last 3 completed shifts: In: 2044.8 [P.O.:500; I.V.:1544.8]  Out: 1750 [Urine:1350; Drains:400]     Date 05/02/21 0000 - 05/02/21 2359   Shift 6693-1013 1879-6525 3031-7660 24 Hour Total   INTAKE   P.O.(mL/kg/hr) 0(0)   0   I. V.(mL/kg) 535(7.7)   535(7.7)   Shift Total(mL/kg) 535(7.7)   535(7.7)   OUTPUT   Urine(mL/kg/hr) 650(1.2)   650   Drains(mL/kg) 200(2.9)   200(2.9)   Shift Total(mL/kg) 850(12.3)   850(12.3)   Weight (kg) 69.3 69.3 69.3 69.3       Wt Readings from Last 3 Encounters:   04/28/21 152 lb 12.5 oz (69.3 kg)   04/21/21 160 lb 8 oz (72.8 kg)   04/01/21 138 lb 9.6 oz (62.9 kg)        Body mass index is 28.87 kg/m².      Diet: DIET CLEAR LIQUID;  Dietary Nutrition Supplements: Clear Liquid Oral Supplement        MEDS:     Scheduled Meds:   phosphorus replacement protocol   Other RX Placeholder    potassium phosphate IVPB  10 mmol Intravenous Once    lansoprazole  15 mg Oral BID AC    [Held by provider] isosorbide mononitrate  30 mg Oral Daily    aspirin  325 mg Oral Daily    fluconazole  400 mg Intravenous Q24H    amiodarone  200 mg Oral Daily    digoxin  125 mcg Oral Daily    [Held by provider] docusate sodium  100 mg Oral BID    latanoprost  1 drop Both Eyes Nightly    mirtazapine  7.5 mg Oral Nightly    sertraline  50 mg Oral Daily    sodium bicarbonate  1,300 mg Oral BID    sucralfate  1 g Oral 4x Daily AC & HS    sodium chloride flush  5-40 mL Intravenous 2 times per day    metoprolol tartrate  25 mg Oral BID    atorvastatin  20 mg Oral Nightly     Continuous Infusions:   nitroGLYCERIN 70 mcg/min (05/02/21 0625)    heparin (PORCINE) Infusion 11 Units/kg/hr (05/02/21 3221)    sodium chloride      sodium chloride       PRN Meds:potassium chloride, 20 mEq, PRN  potassium chloride, 20 mEq, PRN  potassium chloride, 20 mEq, PRN  magnesium sulfate, 2,000 mg, PRN  ondansetron, 4 mg, Q6H PRN  heparin (porcine), 2,000 Units, PRN  heparin (porcine), 4,000 Units, PRN  morphine, 1 mg, Q2H PRN  potassium chloride, 40 mEq, PRN    Or  potassium alternative oral replacement, 40 mEq, PRN    Or  potassium chloride, 10 mEq, PRN  sodium chloride, , PRN  tiZANidine, 4 mg, Q8H PRN  sodium chloride flush, 5-40 mL, PRN  sodium chloride, 25 mL, PRN  polyethylene glycol, 17 g, Daily PRN  acetaminophen, 650 mg, Q6H PRN    Or  acetaminophen, 650 mg, Q6H PRN          PHYSICAL EXAM:     GENERAL: Presents sitting upright in bed unassisted, Awake and alert; In no acute distress and well nourished. SKIN: Appropriate for ethnicity, warm and dry. EYES: No apparent trauma, discharge, or hematoma bilaterally. PERRL at 3mm  CARDIO: No visible chest wall deformity. No chest pain with palpation. PULMONARY:  Trachea midline, no audible wheezing, increased respiratory effort, accessory muscle use, or asymmetrical chest wall movement. Lung sounds are clear to ascultation, diminished lung sounds in bases. ABDOMEN: Abdomen is non distended. Bowel sounds present in all four quadrants. Soft and non tender to palpation in all quadrants. NEURO: Follows commands. PMS intact, moves limbs freely. No focal neurological deficits  MSK: Extremities intact and present. No new bruising, swelling, deformity, discoloration or bleeding. No new tenderness to muscular or bony structure palpation.  strength 5/5 and equal bilaterally. LABS:     CBC:   Recent Labs     04/30/21  1200 04/30/21  1200 05/01/21  0325 05/01/21  1700 05/02/21  0557   WBC 4.7*  --  5.0  --  5.0   RBC 2.52*  --  3.08*  --  3.09*   HGB 6.8*   < > 8.7* 9.1* 8.6*   HCT 22.5*   < > 27.4* 29.1* 27.7*   MCV 89.3  --  89.0  --  89.6   MCH 27.0  --  28.2  --  27.8   MCHC 30.2*  --  31.8*  --  31.0*     --  262  --  251   MPV 10.2  --  10.0  --  10.1    < > = values in this interval not displayed.       Last 3 CMP:   Recent Labs     04/29/21 2021 04/30/21  1200 study. 6.  Additional findings are detailed above. This document has been electronically signed by: Severo Primer, M.D. on 04/28/2021 02:49 AM All CTs at this facility use dose modulation techniques and iterative reconstructions, and/or weight-based dosing when appropriate to reduce radiation to a low as reasonably achievable. Cta Chest W Wo Contrast    Result Date: 4/28/2021  1. No evidence of a pulmonary embolism. 2.  Small to moderate-sized bilateral pleural effusions. The pleural effusions appear increased since the prior study. 3.  Multifocal atelectasis is noted bilaterally. 4.  Additional findings are detailed above. This document has been electronically signed by: Severo Primer, M.D. on 04/28/2021 01:59 AM All CTs at this facility use dose modulation techniques and iterative reconstructions, and/or weight-based dosing when appropriate to reduce radiation to a low as reasonably achievable. Us Gallbladder Ruq    Result Date: 4/28/2021  Sludge or stones within the gallbladder with gallbladder wall thickening and dilated common bile duct. These findings may represent acute cholecystitis. **This report has been created using voice recognition software. It may contain minor errors which are inherent in voice recognition technology. ** Final report electronically signed by Dr. Garfield Miranda on 4/28/2021 2:42 PM    Xr Chest Portable    Result Date: 4/30/2021  1. Left internal jugular central venous line tip overlies the proximal SVC. 2.  Low lung volumes. This document has been electronically signed by: Felipe Miranda MD on 04/30/2021 12:04 AM    Xr Chest Portable    Result Date: 4/27/2021  1. No significant interval change since the prior study. Again noted is left basilar atelectasis. Tiny bilateral pleural effusions are suspected. This document has been electronically signed by: Severo Primer, M.D. on 04/27/2021 08:58 PM    Nm Hepatobiliary Scan W Ejection Fraction    Result Date: 4/29/2021  1.  Patent common bile duct. 2. Nonvisualization of the gallbladder highly suspicious for acute cholecystitis.  Final report electronically signed by Dr. Tori Iqbal on 4/29/2021 11:03 AM        Electronically signed by Bonnie Johnson MD on 5/2/2021 at 9:32 AM

## 2021-05-02 NOTE — PLAN OF CARE
Problem: Falls - Risk of:  Goal: Will remain free from falls  Description: Will remain free from falls  Outcome: Ongoing  Note: No falls this shift. Patient is a fall risk due to weakness and general deconditioning. Patient is alert and oriented and will use the call light appropriately. Bed alarm on. Hourly rounding performed. Goal: Absence of physical injury  Description: Absence of physical injury  Outcome: Ongoing  Note: No S/SX of injury this shift. Problem: Pain:  Goal: Pain level will decrease  Description: Pain level will decrease  Outcome: Ongoing  Note: Patient had been complaining of some chest pain on day shift yesterday. Started on a Nitro gtt. Patient states that her pain has reduced. Problem: DISCHARGE BARRIERS  Goal: Patient's continuum of care needs are met  Outcome: Ongoing  Note: Patient was admitted to ICU for hypotension which has since resolved. Patient remained in ICU over night to monitor some chest pain. Patient started on a Nitro gtt. CTA of the chest is negative for PE. Problem: Skin Integrity:  Goal: Absence of new skin breakdown  Description: Absence of new skin breakdown  Outcome: Ongoing  Note: No new skin breakdown noted. Problem: Daily Care:  Goal: Daily care needs are met  Description: Daily care needs are met  Outcome: Ongoing     Problem: Nutrition  Goal: Optimal nutrition therapy  Outcome: Ongoing  Note: Patient is on a clear liquid diet. Patient has complained of some difficulty swallowing this shift. Care plan reviewed with patient. Patient verbalizes understanding of the plan of care and contributes to goal setting.

## 2021-05-02 NOTE — SIGNIFICANT EVENT
Patient has transferred to 3B room 23. Handoff provided to Dr. Vonda Loredo. Please call with any further questions/concerns.

## 2021-05-02 NOTE — PROGRESS NOTES
Patient:  Kei Simmons    Unit/Bed:4D-04/004-A  MRN: [id number]   PCP: Heather Lamb MD  Date of Admission: 4/27/2021    Assessment and Plan(All pulmonary edema, renal failure, PE, and respiratory failure diagnoses are acute in nature unless otherwise specified):        1. Acute hypoxic respiratory failure: Requiring 2 L NC. CXR shows atelectasis, and CTA 5/1 also shows increased bilateral pleural effusions. Negative for PE. Promote incentive spirometry, PT/OT. Wean to maintain SpO2 >90%. Will give 40 mg IV lasix today and repeat CXR tomorrow. 2. Atrial fibrillation with RVR: converted to NSR on 4/30 after blood administration, volume resuscitation. Digoxin was started 4/29. On amiodarone since 4/30 per cardio. Heparin drip resumed 5/1. Transitioning to eliquis today 5/2. H/H stable at this time. Cardiology following. 3. Chest pain: noted to have chronic chest pain history. Complained of chest pain at rest on 5/1, which was not reproducible, non-pleuritic. D-dimer elevated, but CTA obtained 5/1 negative for PE. ECG with resolution of previously noted ST elevation, with no elevation nor depression. Troponin was elevated this time and reached 0.035 on 5/1, and has since down-trended. ECHO read 5/1 reporting new LVEF drop to 40-45% from prior 60% on 4/8/21, with mid and apical anteroseptal wall motion abnormalities. Nitro drip was started for recurrent chest pain despite siblingual. Chest pain is now reproducible 5/2. Pain control as needed; and start pepcid BID. Will wean nitro drip and resume imdur today 5/2. Resumed heparin drip 5/1 as above; transitioning to eliquis today 5/2. Resumed aspirin 5/1. Continue BB. Cardiology following. 4. HFpEF: last ECHO (4/8/21) with EF 60% and grade 1 diastolic dysfunction, now repeat ECHO with drop to EF 40-45%. Monitor strict I/O. Daily weights. Antihypertensive therapy as above.  Euvolemic on exam but b/l pleural effusions noted on CTA chest so diuresing today x1 as above. Repeat CXR in AM. Cardiology following. 5. Bilateral pleural effusions: Noted on CTA chest 5/1. Moderate in size. Diuresing as above with lasix  40 mg x1 today and repeat CXR in AM.  6. Acute blood loss on chronic anemia: Hgb dropped from 8.6 to 6.9 on 4/29, but in setting of 3L volume resuscitation. Component of dilution can be considered. Received 1 pRBC 4/30 with appropriate response to hgb 8.1. CT A/P 4/30 unchanged. H/H remains stable now at baseline. Resumed heparin drip 5/1; ok with surgery. H/H remains stable. Transition to eliquis 5/2.  7. Common bile duct filling defect: demonstrated on IR cholecystostomy tube placement report. No acute cholecystitis per surgery. Widely patent cystic duct and CBD noted. General surgery following. Patient denies abdominal pain. 8. Left femoral/popliteal vein thrombi:  Noted on prior hospitalization on  dup LE on 4/19/21. Was discharged on eliquis. Repeat  dup LE 4/30 shows persistence of these thrombi, with new left popliteal vein thrombus with partial flow. There was also noted absence of flow and compressibility in the left peroneal and posterior tibial veins. Resumed heparin drip 5/1 as above.    9. Hypokalemia: K 2.9. Replacement protocol ordered. Monitor BMP daily. Repeat K this afternoon. 10. UTI: received a course of rocephin for klebsiella growth on previous admission. Repeat urine culture 4/30 shows yeast >100,000 cfu growth. Start diflucan (5/1/21-present). 11. Hypoalbuminemia: albumin 2.3. Physical deconditioning with poor nutrition. Dietician consulted for recommendations. 12. Recent right femur intertrochanteric fracture: Occurred 4/1 after fall at nursing home. Underwent nailing on 4/2 per ortho. Was discharged to [facility] on 4/9/21. PT/OT. 13. HTN, history of: Imdur and metoprolol are ordered with administration parameters. Off pressors. On nitro drip, weaning in favor of imdur today as above. 14. HLD, history of: continue statin. 15. Deconditioning: PT/OT when able. 16. Undifferentiated shock (resolved): probably hypovolemic vs secondary to Afib w RVR. Improved with 3L IVF resuscitation and after conversion to NSR. Now off pressors since 4/30. Also noted hgb drop but should consider possible component of dilution. CT A/P 4/30 negative for findings consistent with acute bleed. Afebrile. Lactic acid only 1.0. Procal slightly elevated, 0.60. WBC WNL. Does not appear to be septic in origin. Adrenal insufficiency excluded with cortisol WNL at 21.07.      CC:  Acute respiratory failure  HPI: Joselito Painting is an 80 y/o female former-smoker with PMH of COPD,  ECHO LVEF 60% with grade 1 diastolic dysfunction, CAD, Essential HPTN, Dyslipidemia, Chronic Pain, Glaucoma.      Of note, patient had a recent hospitalization and was discharged on 4/21/2021, during which time they noted elevated troponin and BAILEE. Cardiology evaluated and thought troponin elevation was secondary to BAILEE. She received treatment for pneumonia and UTI. Her stay was complicated by a DVT in the left leg, and she was transitioned to [facility] on discharge to SNF.      She re-presented to [facility] 4/28/2021 from her SNF, admitted under Dr. Julio César Mahajan, for dyspnea and chest pain. Per report, her chest pain was an \"achiness\" that was present since her last discharge. She was placed on 2L NC. She had noted bilious emesis. She was found to be in Afib with RVR. CTA chest was obtained negative for PE. A RUQ showed gallbladderr sludge vs stones with wall thickening and dilated CBD, representing possible cholecystitis. HIDA showed patent CBD, with non-visualization of the gallbladder. General surgery was consulted.      On 4/29 a rapid response was called for hypotension. 12-lead ECG showed acute ST elevation of leads I, II, V1-6. STEMI was called to Dr. Saurabh Van, but this was felt not to be a STEMI and cath lab was canceled. Troponin was noted to be negative. Patient required levophed support.  Hgb lansoprazole  15 mg Oral BID AC    aspirin  325 mg Oral Daily    fluconazole  400 mg Intravenous Q24H    amiodarone  200 mg Oral Daily    digoxin  125 mcg Oral Daily    [Held by provider] docusate sodium  100 mg Oral BID    latanoprost  1 drop Both Eyes Nightly    mirtazapine  7.5 mg Oral Nightly    sertraline  50 mg Oral Daily    sodium bicarbonate  1,300 mg Oral BID    sucralfate  1 g Oral 4x Daily AC & HS    sodium chloride flush  5-40 mL Intravenous 2 times per day    metoprolol tartrate  25 mg Oral BID    atorvastatin  20 mg Oral Nightly       Vital Signs:   T: 98.2 (Tmax 99.1 last 24h): P: 72 RR: 16 B/P: 142/74: FiO2: 2L NC: O2 Sat:96%: I/O: 1721/2750 (+294) GCS: 15  Body mass index is 28.87 kg/m². [facility]:   Acute on chronically ill adult female. Appears stated age. Appears deconditioned. HEENT:  normocephalic and atraumatic.  No scleral icterus. PERR  Neck: supple.  No Thyromegaly. Lungs: clear to auscultation. Normal rate, pattern. Unlabored. No retractions  Cardiac: RRR. S1/S2. No murmur. No JVD. Chest tender to palpation. Abdomen: soft. Nondistended. BS active. Nontender. Extremities:  No clubbing, cyanosis,. 2+ pitting LLE edema, none on the RLE. Vasculature: capillary refill < 3 seconds. Palpable dorsalis pedis pulses bilaterally. Skin:  warm and dry. Psych:  Alert and oriented x3.  Affect appropriate  Lymph:  No supraclavicular adenopathy. Neurologic:  No focal deficit. No seizures. Data: (All radiographs, tracings, PFTs, and imaging are personally viewed and interpreted unless otherwise noted).  Telemetry: NSR. Rate 72. No ectopy. · 12-lead ECG 5/1/2021: NSR. Rate 76. No ST elevation or depression. · ECHO limited 4/30/21 report: decrease left ventricular function since last exam. Left ventricle size is normal.  Normal left ventricular wall thickness. Ejection fraction is visually estimated in the range of 40% to 45%.  Hypokinetic motion of the apical anteroseptal wall noted in the left ventricle. Hypokinetic motion of the mid anteroseptal wall noted in the left ventricle. · Sodium 141 potassium 2.9 chloride 110 CO2 22 BUN 9 creatinine 0.6 magnesium 1.6 glucose 87 calcium 8.2 phosphorus 1.8  · Troponin 0.011  · Total cholesterol 66 HDL 17 LDL 23 triglycerides 132  · Albumin 2.1 alk phos 121 ALT 36 AST 42 bilirubin 0.3 total protein 5.2  · WBC 5.0 hemoglobin 8.6 hematocrit 27.7 platelets 177  · APTT 94.0  · Aerobic/Anaerobic culture gallbladder 4/30: enteric gram negative bacilli. · Urine culture 4/30: yeast. CFU >100,000. · CXR 5/1/21 report: No interval change since previous study dated 29 April 2021. · CTA chest w wo contrast 5/1/21 report: No evidence of pulmonary embolism. Moderate bilateral pleural effusions, right greater than left, increased from prior imaging.  There is overlying atelectasis. Case discussed with Dr. Yoselyn Avilez.     Electronically signed by YASMINE Walker-PAMELLA

## 2021-05-02 NOTE — PROGRESS NOTES
[facility] THERAPY MISSED TREATMENT NOTE  [facility] ICU 4D  4D-004-A      Date: 2021  Patient Name: Elham Marcelo        CSN: [id number]   : 1935  (81 y.o.)  Gender: female                REASON FOR MISSED TREATMENT: RN ok'd session, reporting bedrest has been discontinued, and ok for activity as tolerated. Pt declined therapy due to fatigue and report of not feeling well \"because of this cold. \" Will check back as able

## 2021-05-03 ENCOUNTER — APPOINTMENT (OUTPATIENT)
Dept: GENERAL RADIOLOGY | Age: 86
DRG: 444 | End: 2021-05-03
Payer: MEDICARE

## 2021-05-03 ENCOUNTER — APPOINTMENT (OUTPATIENT)
Dept: ULTRASOUND IMAGING | Age: 86
DRG: 444 | End: 2021-05-03
Payer: MEDICARE

## 2021-05-03 LAB
ALBUMIN SERPL-MCNC: 2.2 G/DL (ref 3.5–5.1)
ALP BLD-CCNC: 121 U/L (ref 38–126)
ALT SERPL-CCNC: 30 U/L (ref 11–66)
ANAEROBIC CULTURE: ABNORMAL
ANION GAP SERPL CALCULATED.3IONS-SCNC: 11 MEQ/L (ref 8–16)
ANION GAP SERPL CALCULATED.3IONS-SCNC: 6 MEQ/L (ref 8–16)
AST SERPL-CCNC: 33 U/L (ref 5–40)
BILIRUB SERPL-MCNC: 0.3 MG/DL (ref 0.3–1.2)
BODY FLUID CULTURE, STERILE: ABNORMAL
BUN BLDV-MCNC: 7 MG/DL (ref 7–22)
BUN BLDV-MCNC: 8 MG/DL (ref 7–22)
CALCIUM SERPL-MCNC: 7.7 MG/DL (ref 8.5–10.5)
CALCIUM SERPL-MCNC: 8.3 MG/DL (ref 8.5–10.5)
CHLORIDE BLD-SCNC: 105 MEQ/L (ref 98–111)
CHLORIDE BLD-SCNC: 106 MEQ/L (ref 98–111)
CO2: 24 MEQ/L (ref 23–33)
CO2: 25 MEQ/L (ref 23–33)
CREAT SERPL-MCNC: 0.5 MG/DL (ref 0.4–1.2)
CREAT SERPL-MCNC: 0.7 MG/DL (ref 0.4–1.2)
EKG Q-T INTERVAL: 314 MS
EKG QRS DURATION: 82 MS
EKG QTC CALCULATION (BAZETT): 449 MS
EKG R AXIS: 38 DEGREES
EKG T AXIS: -114 DEGREES
EKG VENTRICULAR RATE: 123 BPM
ERYTHROCYTE [DISTWIDTH] IN BLOOD BY AUTOMATED COUNT: 16.8 % (ref 11.5–14.5)
ERYTHROCYTE [DISTWIDTH] IN BLOOD BY AUTOMATED COUNT: 17.1 % (ref 11.5–14.5)
ERYTHROCYTE [DISTWIDTH] IN BLOOD BY AUTOMATED COUNT: 53.8 FL (ref 35–45)
ERYTHROCYTE [DISTWIDTH] IN BLOOD BY AUTOMATED COUNT: 55.9 FL (ref 35–45)
GFR SERPL CREATININE-BSD FRML MDRD: 80 ML/MIN/1.73M2
GFR SERPL CREATININE-BSD FRML MDRD: > 90 ML/MIN/1.73M2
GLUCOSE BLD-MCNC: 100 MG/DL (ref 70–108)
GLUCOSE BLD-MCNC: 74 MG/DL (ref 70–108)
GRAM STAIN RESULT: ABNORMAL
HCT VFR BLD CALC: 24 % (ref 37–47)
HCT VFR BLD CALC: 27.7 % (ref 37–47)
HCT VFR BLD CALC: 29.5 % (ref 37–47)
HEMOGLOBIN: 7.4 GM/DL (ref 12–16)
HEMOGLOBIN: 8.8 GM/DL (ref 12–16)
HEMOGLOBIN: 9.3 GM/DL (ref 12–16)
MAGNESIUM: 1.7 MG/DL (ref 1.6–2.4)
MCH RBC QN AUTO: 27.9 PG (ref 26–33)
MCH RBC QN AUTO: 27.9 PG (ref 26–33)
MCHC RBC AUTO-ENTMCNC: 30.8 GM/DL (ref 32.2–35.5)
MCHC RBC AUTO-ENTMCNC: 31.5 GM/DL (ref 32.2–35.5)
MCV RBC AUTO: 88.6 FL (ref 81–99)
MCV RBC AUTO: 90.6 FL (ref 81–99)
ORGANISM: ABNORMAL
PHOSPHORUS: 1.9 MG/DL (ref 2.4–4.7)
PLATELET # BLD: 196 THOU/MM3 (ref 130–400)
PLATELET # BLD: 265 THOU/MM3 (ref 130–400)
PMV BLD AUTO: 10.8 FL (ref 9.4–12.4)
PMV BLD AUTO: 10.8 FL (ref 9.4–12.4)
POTASSIUM SERPL-SCNC: 2.8 MEQ/L (ref 3.5–5.2)
POTASSIUM SERPL-SCNC: 4 MEQ/L (ref 3.5–5.2)
RBC # BLD: 2.65 MILL/MM3 (ref 4.2–5.4)
RBC # BLD: 3.33 MILL/MM3 (ref 4.2–5.4)
SODIUM BLD-SCNC: 137 MEQ/L (ref 135–145)
SODIUM BLD-SCNC: 140 MEQ/L (ref 135–145)
TOTAL PROTEIN: 5.6 G/DL (ref 6.1–8)
WBC # BLD: 4.2 THOU/MM3 (ref 4.8–10.8)
WBC # BLD: 5.1 THOU/MM3 (ref 4.8–10.8)

## 2021-05-03 PROCEDURE — 97530 THERAPEUTIC ACTIVITIES: CPT

## 2021-05-03 PROCEDURE — 2500000003 HC RX 250 WO HCPCS: Performed by: NURSE PRACTITIONER

## 2021-05-03 PROCEDURE — 92610 EVALUATE SWALLOWING FUNCTION: CPT

## 2021-05-03 PROCEDURE — 97167 OT EVAL HIGH COMPLEX 60 MIN: CPT

## 2021-05-03 PROCEDURE — 2140000000 HC CCU INTERMEDIATE R&B

## 2021-05-03 PROCEDURE — 6360000002 HC RX W HCPCS: Performed by: FAMILY MEDICINE

## 2021-05-03 PROCEDURE — 85018 HEMOGLOBIN: CPT

## 2021-05-03 PROCEDURE — 97535 SELF CARE MNGMENT TRAINING: CPT

## 2021-05-03 PROCEDURE — 83735 ASSAY OF MAGNESIUM: CPT

## 2021-05-03 PROCEDURE — 6360000002 HC RX W HCPCS: Performed by: INTERNAL MEDICINE

## 2021-05-03 PROCEDURE — APPSS30 APP SPLIT SHARED TIME 16-30 MINUTES: Performed by: NURSE PRACTITIONER

## 2021-05-03 PROCEDURE — 6370000000 HC RX 637 (ALT 250 FOR IP): Performed by: INTERNAL MEDICINE

## 2021-05-03 PROCEDURE — 2580000003 HC RX 258: Performed by: INTERNAL MEDICINE

## 2021-05-03 PROCEDURE — 2580000003 HC RX 258: Performed by: REGISTERED NURSE

## 2021-05-03 PROCEDURE — 99232 SBSQ HOSP IP/OBS MODERATE 35: CPT | Performed by: SURGERY

## 2021-05-03 PROCEDURE — 36415 COLL VENOUS BLD VENIPUNCTURE: CPT

## 2021-05-03 PROCEDURE — 6370000000 HC RX 637 (ALT 250 FOR IP): Performed by: NURSE PRACTITIONER

## 2021-05-03 PROCEDURE — 2500000003 HC RX 250 WO HCPCS: Performed by: INTERNAL MEDICINE

## 2021-05-03 PROCEDURE — 6360000002 HC RX W HCPCS: Performed by: REGISTERED NURSE

## 2021-05-03 PROCEDURE — 99222 1ST HOSP IP/OBS MODERATE 55: CPT | Performed by: INTERNAL MEDICINE

## 2021-05-03 PROCEDURE — 6360000002 HC RX W HCPCS: Performed by: NURSE PRACTITIONER

## 2021-05-03 PROCEDURE — 6370000000 HC RX 637 (ALT 250 FOR IP): Performed by: REGISTERED NURSE

## 2021-05-03 PROCEDURE — 85027 COMPLETE CBC AUTOMATED: CPT

## 2021-05-03 PROCEDURE — 85014 HEMATOCRIT: CPT

## 2021-05-03 PROCEDURE — 71045 X-RAY EXAM CHEST 1 VIEW: CPT

## 2021-05-03 PROCEDURE — 80053 COMPREHEN METABOLIC PANEL: CPT

## 2021-05-03 PROCEDURE — 84100 ASSAY OF PHOSPHORUS: CPT

## 2021-05-03 PROCEDURE — 76604 US EXAM CHEST: CPT

## 2021-05-03 RX ORDER — 0.9 % SODIUM CHLORIDE 0.9 %
250 INTRAVENOUS SOLUTION INTRAVENOUS ONCE
Status: COMPLETED | OUTPATIENT
Start: 2021-05-03 | End: 2021-05-03

## 2021-05-03 RX ORDER — SODIUM CHLORIDE 9 MG/ML
INJECTION, SOLUTION INTRAVENOUS PRN
Status: DISCONTINUED | OUTPATIENT
Start: 2021-05-03 | End: 2021-05-06 | Stop reason: ALTCHOICE

## 2021-05-03 RX ORDER — DOPAMINE HYDROCHLORIDE 160 MG/100ML
2-20 INJECTION, SOLUTION INTRAVENOUS CONTINUOUS
Status: DISCONTINUED | OUTPATIENT
Start: 2021-05-03 | End: 2021-05-06 | Stop reason: ALTCHOICE

## 2021-05-03 RX ORDER — DOPAMINE HYDROCHLORIDE 160 MG/100ML
INJECTION, SOLUTION INTRAVENOUS
Status: DISPENSED
Start: 2021-05-03 | End: 2021-05-04

## 2021-05-03 RX ORDER — LORAZEPAM 0.5 MG/1
0.5 TABLET ORAL EVERY 6 HOURS PRN
Status: DISCONTINUED | OUTPATIENT
Start: 2021-05-03 | End: 2021-05-13 | Stop reason: HOSPADM

## 2021-05-03 RX ORDER — MIDODRINE HYDROCHLORIDE 10 MG/1
10 TABLET ORAL
Status: DISCONTINUED | OUTPATIENT
Start: 2021-05-03 | End: 2021-05-13 | Stop reason: HOSPADM

## 2021-05-03 RX ADMIN — POTASSIUM PHOSPHATE, MONOBASIC AND POTASSIUM PHOSPHATE, DIBASIC 9 MMOL: 224; 236 INJECTION, SOLUTION, CONCENTRATE INTRAVENOUS at 09:54

## 2021-05-03 RX ADMIN — CEFTRIAXONE SODIUM 1000 MG: 1 INJECTION, POWDER, FOR SOLUTION INTRAMUSCULAR; INTRAVENOUS at 13:30

## 2021-05-03 RX ADMIN — SUCRALFATE 1 G: 1 TABLET ORAL at 21:33

## 2021-05-03 RX ADMIN — ASPIRIN 325 MG: 325 TABLET, COATED ORAL at 09:54

## 2021-05-03 RX ADMIN — SERTRALINE HYDROCHLORIDE 50 MG: 50 TABLET, FILM COATED ORAL at 09:55

## 2021-05-03 RX ADMIN — POTASSIUM CHLORIDE 20 MEQ: 400 INJECTION, SOLUTION INTRAVENOUS at 07:04

## 2021-05-03 RX ADMIN — SUCRALFATE 1 G: 1 TABLET ORAL at 06:16

## 2021-05-03 RX ADMIN — POTASSIUM CHLORIDE 20 MEQ: 400 INJECTION, SOLUTION INTRAVENOUS at 09:55

## 2021-05-03 RX ADMIN — SODIUM BICARBONATE 1300 MG: 650 TABLET ORAL at 09:55

## 2021-05-03 RX ADMIN — ISOSORBIDE MONONITRATE 30 MG: 30 TABLET ORAL at 09:54

## 2021-05-03 RX ADMIN — DOPAMINE HYDROCHLORIDE IN DEXTROSE 5 MCG/KG/MIN: 1.6 INJECTION, SOLUTION INTRAVENOUS at 15:58

## 2021-05-03 RX ADMIN — SODIUM CHLORIDE, PRESERVATIVE FREE 10 ML: 5 INJECTION INTRAVENOUS at 09:25

## 2021-05-03 RX ADMIN — FAMOTIDINE 20 MG: 10 INJECTION, SOLUTION INTRAVENOUS at 09:54

## 2021-05-03 RX ADMIN — AMIODARONE HYDROCHLORIDE 200 MG: 200 TABLET ORAL at 09:54

## 2021-05-03 RX ADMIN — ONDANSETRON 4 MG: 2 INJECTION INTRAMUSCULAR; INTRAVENOUS at 10:09

## 2021-05-03 RX ADMIN — SUCRALFATE 1 G: 1 TABLET ORAL at 13:30

## 2021-05-03 RX ADMIN — MIDODRINE HYDROCHLORIDE 10 MG: 10 TABLET ORAL at 17:35

## 2021-05-03 RX ADMIN — METOPROLOL TARTRATE 25 MG: 25 TABLET, FILM COATED ORAL at 21:33

## 2021-05-03 RX ADMIN — APIXABAN 5 MG: 5 TABLET, FILM COATED ORAL at 17:36

## 2021-05-03 RX ADMIN — LATANOPROST 1 DROP: 50 SOLUTION OPHTHALMIC at 21:33

## 2021-05-03 RX ADMIN — DIGOXIN 125 MCG: 250 TABLET ORAL at 09:54

## 2021-05-03 RX ADMIN — TIZANIDINE 4 MG: 4 TABLET ORAL at 09:54

## 2021-05-03 RX ADMIN — SODIUM CHLORIDE, PRESERVATIVE FREE 10 ML: 5 INJECTION INTRAVENOUS at 21:33

## 2021-05-03 RX ADMIN — SODIUM CHLORIDE 250 ML: 9 INJECTION, SOLUTION INTRAVENOUS at 15:35

## 2021-05-03 RX ADMIN — ATORVASTATIN CALCIUM 20 MG: 20 TABLET, FILM COATED ORAL at 21:33

## 2021-05-03 RX ADMIN — METOPROLOL TARTRATE 25 MG: 25 TABLET, FILM COATED ORAL at 09:54

## 2021-05-03 RX ADMIN — SODIUM BICARBONATE 1300 MG: 650 TABLET ORAL at 21:33

## 2021-05-03 RX ADMIN — FLUCONAZOLE IN SODIUM CHLORIDE 400 MG: 2 INJECTION, SOLUTION INTRAVENOUS at 17:35

## 2021-05-03 RX ADMIN — POTASSIUM CHLORIDE 20 MEQ: 400 INJECTION, SOLUTION INTRAVENOUS at 06:16

## 2021-05-03 RX ADMIN — FAMOTIDINE 20 MG: 10 INJECTION, SOLUTION INTRAVENOUS at 21:33

## 2021-05-03 RX ADMIN — LORAZEPAM 0.5 MG: 0.5 TABLET ORAL at 00:11

## 2021-05-03 ASSESSMENT — PAIN DESCRIPTION - ONSET: ONSET: ON-GOING

## 2021-05-03 ASSESSMENT — PAIN SCALES - GENERAL
PAINLEVEL_OUTOF10: 0
PAINLEVEL_OUTOF10: 8
PAINLEVEL_OUTOF10: 8

## 2021-05-03 ASSESSMENT — PAIN DESCRIPTION - ORIENTATION
ORIENTATION: RIGHT
ORIENTATION: RIGHT

## 2021-05-03 ASSESSMENT — PAIN DESCRIPTION - LOCATION: LOCATION: CHEST

## 2021-05-03 ASSESSMENT — PAIN DESCRIPTION - PROGRESSION
CLINICAL_PROGRESSION: NOT CHANGED
CLINICAL_PROGRESSION: NOT CHANGED

## 2021-05-03 ASSESSMENT — PAIN DESCRIPTION - FREQUENCY: FREQUENCY: INTERMITTENT

## 2021-05-03 ASSESSMENT — PAIN DESCRIPTION - DESCRIPTORS: DESCRIPTORS: ACHING;SHARP

## 2021-05-03 ASSESSMENT — PAIN - FUNCTIONAL ASSESSMENT
PAIN_FUNCTIONAL_ASSESSMENT: PREVENTS OR INTERFERES WITH MANY ACTIVE NOT PASSIVE ACTIVITIES
PAIN_FUNCTIONAL_ASSESSMENT: ACTIVITIES ARE NOT PREVENTED

## 2021-05-03 NOTE — PROGRESS NOTES
Progress note      [facility]             Patient:  Brendan Mariano  YOB: 1935    MRN: [id number]   Acct:  [de-identified]   [id number]  Primary Care Physician: Zainab Levi MD    Admit Date: 4/27/2021           Subjective: Pt in bed. She feels tired after working with PT/OT and SLP today. Noted SLP put pt on dysphagia diet minced and moist. She still complains of intermittent chest pain that is reproducible. Noted pt had an episode of A fib RVR again last night.         Objective:      Physical Exam:    Vitals:  Patient Vitals for the past 24 hrs:   BP Temp Temp src Pulse Resp SpO2 Weight   05/03/21 0945 (!) 132/59 97.5 °F (36.4 °C) Oral 112 22 95 % --   05/03/21 0430 132/77 97.8 °F (36.6 °C) Oral 118 18 94 % 137 lb 11.2 oz (62.5 kg)   05/02/21 2330 128/78 97.8 °F (36.6 °C) Oral 110 16 93 % --   05/02/21 1943 (!) 148/70 97.5 °F (36.4 °C) Oral 77 16 97 % --   05/02/21 1516 (!) 174/76 97.4 °F (36.3 °C) -- 75 16 95 % --   05/02/21 1300 (!) 143/72 97.8 °F (36.6 °C) Oral 76 24 97 % --   05/02/21 1200 (!) 148/67 -- -- 71 19 97 % --   05/02/21 1154 -- 97.4 °F (36.3 °C) Axillary -- -- -- --   05/02/21 1119 -- -- -- 71 24 100 % --   05/02/21 1100 (!) 143/70 -- -- 70 25 100 % --     Weight: Weight: 137 lb 11.2 oz (62.5 kg)     24 hour intake/output:    Intake/Output Summary (Last 24 hours) at 5/3/2021 1047  Last data filed at 5/3/2021 0435  Gross per 24 hour   Intake 887.4 ml   Output 2850 ml   Net -1962.6 ml       General appearance - alert, ill appearing, and in no distress on 2L NC   Eyes - pupils equal and reactive, extraocular eye movements intact  Mouth - mucous membranes moist, pharynx normal without lesions  Neck - supple, no significant adenopathy  Chest - clear to auscultation, no wheezes, rales or rhonchi, symmetric air entry  Heart - normal rate, regular rhythm, normal S1, S2, no murmurs, rubs, clicks or Continuous Infusions:   sodium chloride      sodium chloride       PRN Meds:. LORazepam, potassium chloride, potassium chloride, potassium chloride, magnesium sulfate, acetaminophen **OR** acetaminophen, ondansetron, potassium chloride **OR** potassium alternative oral replacement **OR** potassium chloride, sodium chloride, tiZANidine, sodium chloride flush, sodium chloride, polyethylene glycol  Continuous Infusions:   sodium chloride      sodium chloride           Assessment/Plan   1. Chest pain  a. Pt in NSR now  b. Noted pt had an episode of A Fib with RVR last night  c. She still has chest pain intermittently but is reproducible; nitro drip has been stopped   d. Cardiology following   e. Telemetry monitoring   2. Atrial Fibrillation with RVR anticoagulated with Eliquis  a. Pt had another episode of A fib with RVR last night  b. Pt on lopressor, amiodarone and digoxin  c. Cardiology following   d. Pt in NSR now  3. Acute respiratory failure  a. On 2L NC; wean as tolerated   b. Duoneb PRN  c. CXR today is stable  d. Pulmonary following for possible thoracentesis; pulm will do ultrasound of lungs  4. Acute cholecystitis  a. Surgery to do no intervention as they believe this is chronic cholecystitis; IR palced cholecystostomy tube and is draining bilious drainage with E Coli; surgery does not recommend abx   b. Surgery following  c. Pt will D/C with drain  5. Hypotension Hx of   a. BP improved   b. Noted midodrine was d/c'd in ICU  c. Cont to monitor BP   6. Urinary retention  a. Has alvarez catheter   b. Follows with urology outpatient  7. UTI  a. Noted urine growing Morganella and Yeast  b. Cont Diflucan  c. Start Rocephin  8. Microcytic anemia  a. Hgb stable  b. Pt now on Eliquis; monitor hgb while on anticoagulation  9. Hypokalemia  a. K+ being replaced right now  b. Cont replacement protocol   c. Noted Mg WNL  10. Hypophosphatemia  a. Replacing PO4 now  11. CHF reduced EF   a.  Noted latest echocardiogram shows drop in EF to 40-45%  b. Monitor daily weights + strict I/O  c. Cont BB, Imdur, ASA  d. Cardiology following   12. HLD  a. Cont statin  13. DVT's left leg  a. Noted increased number of DVT's since prior admission  b. Pt now back on Eliquis         Assessment and plan of care discussed with supervising physician, Dr Kedar Ortiz. Electronically signed by YASMINE Bonilla CNP on 5/3/2021 at 10:47 AM     Addendum/attestation by Indu Marcelo MD:  I have seen and examined the patient independently. Face to face evaluation and examination was performed. The above evaluation and note has been reviewed. Laboratory and radiological data were reviewed. I Have discussed with Annika Osorio CNP about this patient in detail. The above assessment and plan has been reviewed. Please see my modifications mentioned below.       My modifications:  Electronically signed by Vianey Prieto MD on 5/3/2021 at 1:00 PM

## 2021-05-03 NOTE — PLAN OF CARE
Problem: Falls - Risk of:  Goal: Will remain free from falls  Description: Will remain free from falls  Outcome: Met This Shift  Note: Patient remained free from falls this shift. Used call light appropriately. Patient remained in bed this shift. Turned Q2H and PRN. Bed alarm on. Call light in reach. Problem: Falls - Risk of:  Goal: Absence of physical injury  Description: Absence of physical injury  Outcome: Met This Shift  Note: Patient remained free from physical injury this shift. Used call light appropriately. Patient remained in bed this shift. Turned Q2H and PRN. Bed alarm on. Call light in reach. Pathway clear. Problem: Pain:  Goal: Pain level will decrease  Description: Pain level will decrease  Outcome: Met This Shift  Note: Patient denies pain so far this shift. Pain goal: no pain. Problem: Daily Care:  Goal: Daily care needs are met  Description: Daily care needs are met  Outcome: Met This Shift     Problem: DISCHARGE BARRIERS  Goal: Patient's continuum of care needs are met  Outcome: Ongoing  Note: Patient from [facility], plans to return to [facility] at discharge. Unknown discharge date at this time. Urinary catheter in place. Receiving IV antibiotics. Gallbladder drain in place. CVC in place. DVT LLE - medication to treat per [facility]. Will continue to monitor for discharge needs. Problem: Skin Integrity:  Goal: Will show no infection signs and symptoms  Description: Will show no infection signs and symptoms  Outcome: Ongoing  Note: Afebrile this shift. Receiving IV antibiotics per MAR. UA positive. Lab work monitoring. WBC WNL     Problem: Skin Integrity:  Goal: Absence of new skin breakdown  Description: Absence of new skin breakdown  Outcome: Ongoing  Note: Patient remained free from further skin breakdown this shift. Patient turned Q2H and PRN in bed. Pillow support provided. Low-intermittent air loss mattress provided.  Will continue to monitor skin integrity and encourage

## 2021-05-03 NOTE — PROGRESS NOTES
[facility] 61  INPATIENT OCCUPATIONAL THERAPY  [facility] CCU-STEPDOWN 3B  EVALUATION    Time:   Time In: 815  Time Out: 9526  Timed Code Treatment Minutes: 25 Minutes  Minutes: 40          Date: 5/3/2021  Patient Name: Elke Beard,   Gender: female      MRN: [id number]  : 1935  (81 y.o.)  Referring Practitioner: RYAN Enrique  Diagnosis: Atrial Fibrillation witih RVR  Additional Pertinent Hx: Pt presented with chest pain. Patient she just did not feel well she could not really say what it was she had no abdominal pain no nausea no vomiting. She said she had no appetite just was not eating because she just did not feel like it. Found in atrial fibrillation with rapid ventricular response and was admitted for work-up of this. Pt is S/P percutaneous cholecystostomy on 21. Restrictions/Precautions:  Restrictions/Precautions: Isolation, Fall Risk    Subjective  Chart Reviewed: Yes, Orders, History and Physical    Subjective: Pleasant and cooperative. Pt reported not having slept well. Comments: RN approved session. Pt asked to use the bathroom. She was too weak to take steps to the toilet but did complete pivot transfers to/from the bedside commode. She reported severe hip pain and asked for a pain medication. Her nurse was informed. Pain:  Pain Assessment  Patient Currently in Pain: Yes  Pain Assessment: 0-10  Pain Level: 8  Pain Type: Acute pain  Pain Location: Hip  Pain Orientation: Right  Pain Descriptors: Aching; Sharp  Pain Frequency: Continuous  Clinical Progression: Not changed  Patient's Stated Pain Goal: No pain  Response to Pain Intervention: Patient Satisfied  Multiple Pain Sites: No    Vitals: Vitals not assessed per clinical judgement, see nursing flowsheet    Social/Functional History:  Lives With: Alone  Type of Home: Facility([facility])  Home Layout: One level  Home Access: Level entry  Home Equipment: Rolling walker   Bathroom Shower/Tub: Walk-in shower  Bathroom Toilet: Bedside commode  Bathroom Equipment: Grab bars in shower, Grab bars around toilet  Bathroom Accessibility: Accessible    Receives Help From: Other (comment)(staff as available)  ADL Assistance: Needs assistance  Homemaking Assistance: Needs assistance  Homemaking Responsibilities: No  Ambulation Assistance: Needs assistance  Transfer Assistance: Needs assistance    Active : No  Patient's  Info: Facility provides transportation  Occupation: Retired  Type of occupation:   Leisure & Hobbies: Reading  Additional Comments: Pt had help with doing self care and transfers. She ambulated with a rolling walker with help from therapy. VISION:Corrected    HEARING:  [facility]    COGNITION: Decreased Problem Solving and Decreased Safety Awareness    RANGE OF MOTION:  Bilateral Upper Extremity:  Impaired - R shoulder flexion 0-100 degrees secondary to OA; L shoulder flexion/abduction 0-70 degrees secondary to old injury    STRENGTH:  Right Upper Extremity: Impaired - 3+/5 grossly  Left Upper Extremity:  Impaired - 3-/5 deltoid; 3+/5 pectoral; 3+/5 biceps/triceps    SENSATION:   WFL    ADL:   Grooming: with set-up. Pt wrung out a washcloth and was able to wipe her face  Lower Extremity Dressing: Maximum Assistance. donning slipper socks  Toileting: Maximum Assistance. help needed to wipe her bottom after pt had been incontinent of bowel; she attempted to use the commode without success  Toilet Transfer: Maximum Assistance and with verbal cues . help needed for hand placement. BALANCE:  Sitting Balance:  Stand By Assistance, with cues for safety. pt needed BUE supporting her balance at the edge of bed  Standing Balance: Maximum Assistance.  Pt attempted to stant at the walker but needed max assist to tuck in her bottom- she was sat back on the edge of bed    BED MOBILITY:  Rolling to Left: Maximum Assistance, with verbal cues  assist needed for her to place her LLE and strength, Decreased safe awareness, Decreased endurance, Decreased balance  Prognosis: Good  REQUIRES OT FOLLOW UP: Yes  Decision Making: High Complexity    Treatment Initiated: Treatment and education initiated within context of evaluation. Evaluation time included review of current medical information, gathering information related to past medical, social and functional history, completion of standardized testing, formal and informal observation of tasks, assessment of data and development of plan of care and goals. Treatment time included skilled education and facilitation of tasks to increase safety and independence with ADL's for improved functional independence and quality of life. Pt needed rest breaks throughout session. Verbal cues provided for taking relaxed breaths. Discharge Recommendations:  ECF with OT, Patient would benefit from continued therapy after discharge    Patient Education:  OT Education: OT Role, Plan of Care, Transfer Training  Patient Education: Relaxed breathing to help with her activity tolerance. Equipment Recommendations:  Equipment Needed: No    Plan:  Times per week: 3-5x  Current Treatment Recommendations: Strengthening, ROM, Endurance Training, Self-Care / ADL, Safety Education & Training, Functional Mobility Training, Balance Training  Plan Comment: Pt would benefit from continued skilled OT services when medically stable and discharged from Acute. OT recommended [facility] at the [facility]. Specific instructions for Next Treatment: Functional mobility as able; ADLs and sitting balance; UE exercises ROM/light resistance. See long-term goal time frame for expected duration of plan of care. If no long-term goals established, a short length of stay is anticipated. Goals:  Patient goals : \"I want to get stronger and be able to return to being more active. \" pt states.   Short term goals  Time Frame for Short term goals: By discharge  Short term goal 1: Pt will demonstrate functional mobility with OTR while using a rolling walker as able to prepare for doing self care while out of bed. Short term goal 2: Pt will complete upper body ADLs while having setup A with cues for keeping her sitting balance to increase her independence with self care. Short term goal 3: Pt will complete BUE ROM and light resistance exercises while following demonstration or verbal cues to increase her endurance and strength for ease of doing ADLs and functional mobility. Short term goal 4: Pt will complete pivot transfers to/from the bedside commode with MIN A and cues for safety to increase her independence with toileting routine. Following session, patient left in safe position with all fall risk precautions in place.

## 2021-05-03 NOTE — PROGRESS NOTES
Rapid response called @ 1535 d/t manual BP 62/x. 250 mL fluid bolus given STAT and dopamine gtt started @ 1555. Patient remained conscious and responded to voice. BP stabilized and no transfer needed. Detail Level: Simple Additional Notes: Patient stated that he wanted to wait until next visit to have Bx done Additional Notes: Patient is waiting to have sites Bx at next visit

## 2021-05-03 NOTE — FLOWSHEET NOTE
05/03/21 3011   Provider Notification   Reason for Communication Evaluate   Provider Name debbie   Provider Notification Physician   Method of Communication Secure Message   Response No new orders   Notification Time 0248     Notified of consult for pleural effusions.

## 2021-05-03 NOTE — PROGRESS NOTES
This RN noted gurgling sound in patient airway and encouraged coughing and deep breathing. Patient has very weak cough and not able to fully clear airway. She responds to voice, but quickly falls asleep. Patient continues to decline meals and has only eaten a few bites of breakfast today. This RN called son Talib An to share observations and to open discussion on code status which he was receptive to. Palliative care consult placed.

## 2021-05-03 NOTE — CARE COORDINATION
5/3/21, 7:07 AM EDT    DISCHARGE BARRIERS        Patient transferred to . Report given to unit SW, Candy, regarding discharge plan for this patient.

## 2021-05-03 NOTE — PROGRESS NOTES
Patient reports losing her glasses at some point during this admission. This RN checked lost-and-found, but no glasses there.   Barron Araiza with patient relations was contacted to assist.

## 2021-05-03 NOTE — FLOWSHEET NOTE
05/03/21 1844   Provider Notification   Reason for Communication Critical Value (comment)   Provider Name Dr. Shama Salgado   Provider Notification Physician   Method of Communication Secure Message   Notification Time [phone]     Her CBC came back at 7.4, down from 9.3. BP is stable, gave PO midodrine and weaning of dopamine gtt.   New orders to recheck H/H.

## 2021-05-03 NOTE — PROGRESS NOTES
[facility]  SPEECH THERAPY  STRZ CCU-STEPDOWN 3B  Clinical Swallow Evaluation      SLP Individual Minutes  Time In: 2746  Time Out: 3251  Minutes: 18  Timed Code Treatment Minutes: 0 Minutes       Date: 5/3/2021  Patient Name: Omaira Gonzalez      CSN: [id number]   : 1935  (81 y.o.)  Gender: female   Referring Physician:  Irlanda Bangura MD  Diagnosis: Atrial fibrillation with RVR  Secondary Diagnosis: Dysphagia  History of Present Illness/Injury: Patient admitted to [facility] with above dx. See physician H&P for full report. ST consulted d/t concerns for aspiration/dyspahgia. ST to complete clinical swallow evaluation and determine safety of PO diet and further POC   Past Medical History:   Diagnosis Date    Acute blood loss as cause of postoperative anemia 2018    CAD (coronary artery disease)      cath  48    Cardiac valve prolapse     Chest pain 5/15/2020    Chronic back pain     Compression fracture of L2 ([facility] [address] 2020    COPD (chronic obstructive pulmonary disease) ([facility] [address]     Ex-smoker     GERD (gastroesophageal reflux disease)     EGD    Glaucoma     H/O cardiac catheterization     40-50% LAD   katharine    Hearing loss secondary to cerumen impaction 2019    Hyperlipidemia     Hypertension     Inadvertent durotomy 3/12/2018    Liver disease     history of hepatitis at age 22    Osteoarthritis     right shoulder and back    Pericarditis        SUBJECTIVE:  Patient seen at bedside following OT session; alert and pleasant. Patient with intermittent confusion; patient did report \"I tend to get disoriented when I am moved from place to place. \"     OBJECTIVE:    Pain:  No pain reported.     Current Diet: Clear liquid-entered in epic  *ERICK Lee Shown reporting \"patient NPO until ST evaluates\"     Respiratory Status:  Nasal Canula- 2 L (baseline)     Behavioral Observation:  Alert, Confused and Limited Direction Following    Oral Mechanism Evaluation:      Facial / Labial Impaired Generalized weakness   Lingual Impaired Generalized weakness/limited ROM    Dentition Impaired Poor dentition    Velum WFL    Vocal Quality Impaired Hoarse vocal quality    Sensation Not Tested    Cough Not Tested      Patient Evaluated Using:  Ice chips, thin liquids via cup and straw, puree, hard solids     Oral Phase:  Impaired:  Impaired Mastication and Reduced Bolus Formation    Pharyngeal Phase: WFL:  Pharyngeal phase appears WFL but cannot rule out pharyngeal phase deficits from a bedside swallowing evaluation alone. Signs and Symptoms of Laryngeal Penetration/Aspiration: No signs/symptoms of aspiration evident in this evaluation, but cannot rule out silent aspiration. Impresssions: Patient presents with moderate oral dysphagia as evidence by poor dentition at baseline and generalized weakness/limited ROM of the lingual/labial structures resulting in prolonged oral mastication with hard solid textures. Patient did eventually achieve oral clearance provided liquid wash. No s/s of aspiration noted following all PO trials. Patient presented with timely swallow following all PO trials. Unable to fully evaluate pharyngeal function at bedside alone without support of imaging. Patient certainly remains at high risk for aspiration d/t progressive weakness/deconditioning and poor nutritional intake (per patient report). ST recommending puree vs minced/mosit diet; patient immediately stating \"anything buy NOT puree. \" ST recommending minced and moist diet with thin liquids, upright position, small bites/sips, medications crushed in puree. RN Saravanan calderon. *Patient reporting hx of fall at [facility], denies hitting head but does report \"getting disoriented when moving place to place. \" Patient with intermittent confusion at time of clinical swallow evaluation; ST with plan to compete speech/langauge/cognitive evaluation within subsequent sessions. RECOMMENDATIONS/ASSESSMENT:  Instrumental Evaluation: Instrumental evaluation not indicated at this time. Diet Recommendations:  Minced and moist diet with thin liquids   Strategies:  Full Upright Position, Small Bite/Sip, Pulmonary Monitoring and Medications crushed in puree, monitor for fatigue   Rehabilitation Potential: good    EDUCATION:  Learner: Patient  Education:  Reviewed results and recommendations of this evaluation, Reviewed diet and strategies, Reviewed signs, symptoms and risks of aspiration, Reviewed ST goals and Plan of Care, Reviewed recommendations for follow-up and Education Related to Potential Risks and Complications Due to Impairment/Illness/Injury  Evaluation of Education: [employer] understanding, Demonstrates with assistance, Needs further instruction and Family not present    PLAN:  Speech Therapy evaluation to assess speech, language, cognition and/or voice and Skilled SLP intervention on acute care 3-5 x per week or until goals met and/or pt plateaus in function. Specific interventions for next session may include: dysphagia tx. PATIENT GOAL:    Did not state. Will further assess during treatment. SHORT TERM GOALS:  Short-term Goals  Timeframe for Short-term Goals: 2 weeks  Goal 1: Patient will consume minced and moist diet with thin liquids without s/s of aspiration in order to safely maintain adqeuate hydration and nutrition  Goal 2: Patient will complete advanced textures without s/s of aspiration with SLP only in order to safely advance patient diet  Goal 3: Complete instrumental evaluation (FEES vs MBS) as clinically appropriate.   Goal 4: Complete cognitive evaluation and determine goals and POC as clinically appropriate    LONG TERM GOALS:  No LTGs due to STEPHAN Watts 24

## 2021-05-03 NOTE — PROGRESS NOTES
Admit Date: 4/27/2021  Hospital day {0-10:43444}    Subjective:     Patient {HX; GENERAL COMPLAINTS:76114}.    Medication side effects: {med side fx:21629}    Scheduled Meds:   cefTRIAXone (ROCEPHIN) IV  1,000 mg Intravenous Q24H    midodrine  10 mg Oral TID WC    phosphorus replacement protocol   Other RX Placeholder    isosorbide mononitrate  30 mg Oral Daily    famotidine (PEPCID) injection  20 mg Intravenous BID    apixaban  5 mg Oral BID    aspirin  325 mg Oral Daily    fluconazole  400 mg Intravenous Q24H    amiodarone  200 mg Oral Daily    digoxin  125 mcg Oral Daily    [Held by provider] docusate sodium  100 mg Oral BID    latanoprost  1 drop Both Eyes Nightly    mirtazapine  7.5 mg Oral Nightly    sertraline  50 mg Oral Daily    sodium bicarbonate  1,300 mg Oral BID    sucralfate  1 g Oral 4x Daily AC & HS    sodium chloride flush  5-40 mL Intravenous 2 times per day    metoprolol tartrate  25 mg Oral BID    atorvastatin  20 mg Oral Nightly     Continuous Infusions:   DOPamine      DOPamine 3 mcg/kg/min (05/03/21 1919)    sodium chloride      sodium chloride      sodium chloride       PRN Meds:LORazepam, sodium chloride, potassium chloride, potassium chloride, potassium chloride, magnesium sulfate, acetaminophen **OR** acetaminophen, ondansetron, potassium chloride **OR** potassium alternative oral replacement **OR** potassium chloride, sodium chloride, tiZANidine, sodium chloride flush, sodium chloride, polyethylene glycol    Review of Systems  {Review Of Systems:62121}    Objective:     Patient Vitals for the past 8 hrs:   BP Temp Temp src Pulse Resp SpO2   05/03/21 1730 (!) 124/56     93 %   05/03/21 1725      97 %   05/03/21 1637 (!) 121/57        05/03/21 1630 131/62     97 %   05/03/21 1558 (!) 74/36   64  100 %   05/03/21 1551 (!) 74/35   65     05/03/21 1540 (!) 75/34  Rectal      05/03/21 1531  98 °F (36.7 °C) Axillary 65 18 98 %     I/O last 3 completed shifts: In: 804 [P.O.:50; I.V.:731]  Out: 1750 [Urine:1650; Drains:100]    {Exam; Complete Normal And System Select:86587}    ECG: {Findings; diagnostics ecg readin}. Data Review:   CBC:  Lab Results   Component Value Date    WBC 4.2 2021    RBC 2.65 2021    HGB 8.8 2021    HCT 27.7 2021    MCV 90.6 2021    MCH 27.9 2021    MCHC 30.8 2021    RDW 14.5 2018     2021    MPV 10.8 2021     BMP  Lab Results   Component Value Date     2021    K 4.0 2021    K 3.3 2021     2021    CO2 25 2021    BUN 8 2021    CREATININE 0.7 2021    CALCIUM 7.7 2021      COAG PROFILE:  Lab Results   Component Value Date    APTT 94.0 2021    INR 1.76 2021       Assessment:     Active Problems:    Atrial fibrillation with RVR (HCC)    Abnormal ultrasound of gallbladder    Other chest pain    Moderate malnutrition (HCC)    Bilateral pleural effusion    Acute coronary syndrome (HCC)  Resolved Problems:    * No resolved hospital problems.  *      Plan:     ***

## 2021-05-03 NOTE — CONSULTS
hypotension. 12-lead ECG showed acute ST elevation of leads I, II, V1-6. STEMI was called to Dr. Valentino Bustillos, but this was felt not to be a STEMI and cath lab was canceled. Troponin was noted to be negative. Patient required levophed support. Hgb was noted to be 6.9, acutely dropped from 8.6. 1 pRBC was ordered. On 4/30 patient went to IR for percutaneous cholecystostomy tube placement. At that time, widely patent cystic and CBD were noted, with free flow of contrast into duodenum. There was a noted CBD filling defect. 5/3- transferred to      Past 24 hours, ROS   -Stable on O2 2LPM  -SOB greatly improved per pt with intermittent CP  -slight confusion overnight with Ativan given- better this AM  -c/o being tired and wanting a nap  -US chest today  -SLE placed on dysphagia diet moist and minced  -Afib with RVR last night - NSR currently     All other systems reviewed  Objective    Vitals    height is 5' 1\" (1.549 m) and weight is 137 lb 11.2 oz (62.5 kg). Her oral temperature is 97.8 °F (36.6 °C). Her blood pressure is 132/77 and her pulse is 118. Her respiration is 18 and oxygen saturation is 94%. O2 Flow Rate (L/min): 2 L/min  I/O    Intake/Output Summary (Last 24 hours) at 5/3/2021 0929  Last data filed at 5/3/2021 0435  Gross per 24 hour   Intake 887.4 ml   Output 2850 ml   Net -1962.6 ml     Patient Vitals for the past 96 hrs (Last 3 readings):   Weight   05/03/21 0430 137 lb 11.2 oz (62.5 kg)     Exam    Physical Exam  Constitutional: Patient appears elderly. Stable on O2 2LPM  Mouth/Throat: Oropharynx is clear and moist.  No oral thrush. Eyes: Conjunctivae are normal. Pupils are equal, round, and reactive to light. No scleral icterus. Neck: Neck supple. No JVD or tracheal deviation present. Cardiovascular: Regular rate, regular rhythm, S1 and S2 with no murmur. No peripheral edema  Pulmonary/Chest: Normal effort with clear breath sounds. No stridor. No respiratory distress.   +2 LLE   Abdominal: Soft. Bowel sounds audible. No distension or tenderness to palp. Right side biliary drained noted   Musculoskeletal: Moves all extremities; no clubbing or cyanosis   Lymphadenopathy:  No cervical adenopathy. Neurological: Patient is alert and oriented to person, place, and time. Skin: Skin is warm and dry.     Meds       potassium phosphate IVPB  9 mmol Intravenous Once    phosphorus replacement protocol   Other RX Placeholder    isosorbide mononitrate  30 mg Oral Daily    famotidine (PEPCID) injection  20 mg Intravenous BID    apixaban  5 mg Oral BID    aspirin  325 mg Oral Daily    fluconazole  400 mg Intravenous Q24H    amiodarone  200 mg Oral Daily    digoxin  125 mcg Oral Daily    [Held by provider] docusate sodium  100 mg Oral BID    latanoprost  1 drop Both Eyes Nightly    mirtazapine  7.5 mg Oral Nightly    sertraline  50 mg Oral Daily    sodium bicarbonate  1,300 mg Oral BID    sucralfate  1 g Oral 4x Daily AC & HS    sodium chloride flush  5-40 mL Intravenous 2 times per day    metoprolol tartrate  25 mg Oral BID    atorvastatin  20 mg Oral Nightly      sodium chloride      sodium chloride       LORazepam, potassium chloride, potassium chloride, potassium chloride, magnesium sulfate, acetaminophen **OR** acetaminophen, ondansetron, potassium chloride **OR** potassium alternative oral replacement **OR** potassium chloride, sodium chloride, tiZANidine, sodium chloride flush, sodium chloride, polyethylene glycol  Labs   ABG  No results found for: PH, PO2, PCO2, HCO3, O2SAT  No results found for: IFIO2, MODE, SETTIDVOL, SETPEEP  CBC  Recent Labs     05/01/21  0325 05/01/21  1700 05/02/21  0557 05/03/21  0445   WBC 5.0  --  5.0 5.1   RBC 3.08*  --  3.09* 3.33*   HGB 8.7* 9.1* 8.6* 9.3*   HCT 27.4* 29.1* 27.7* 29.5*   MCV 89.0  --  89.6 88.6   MCH 28.2  --  27.8 27.9   MCHC 31.8*  --  31.0* 31.5*     --  251 265   MPV 10.0  --  10.1 10.8      BMP  Recent Labs     05/01/21 0325 Procalcitonin   Lab Results   Component Value Date    PROCAL 0.60 04/30/2021    PROCAL 0.22 04/18/2021     Culture, Urine [[id number]] (Abnormal)  Collected: 04/30/21 0419   Order Status: Completed Specimen: Urine, catheter Updated: 05/03/21 0734    Urine Culture, Routine Current antibiotic therapy ineffective in vitro for at least one of culture isolates. Abnormal     Organism Yeast    Urine Culture, Routine [place] count: >100,000 CFU/mL     Organism Morganella morganii ssp. morganii   VRE (+)  MRSA (-)     Culture, Body Fluid [[id number]] (Abnormal)  Collected: 04/30/21 1100   Order Status: Completed Specimen: Body Fluid from Gallbladder Updated: 05/02/21 1048    Gram Stain Result Few segmented neutrophils observed. No organisms observed. Organism Escherichia coli       Radiology    CXR  5/3/2021  1V chest    Impression: Stable small effusions. CT Scans  5/1/21  CTA  1. No evidence of pulmonary embolism. 2.  Moderate bilateral pleural effusions, right greater than left, increased from prior imaging. There is overlying atelectasis. 5/3/21  US chest- pending    Assessment    Acute hypoxic respiratory failure  Bilateral pleural effusions  Afib with RVR  Chest pain  HFpEF  Acute blood loss on chronic anemia- improved  Common bile duct filling defect- drain placed per IR  Left femoral/popliteal vein thrombi  UTI  Recent right femur fracture- nailing per OIO 4/2/21   HTN  HLD  Deconditioning  Former smoker quit 1989  Full Code   Plan   -Monitor spO2 to keep spO2 > 90%  -US chest today to evaluate any need for thoracentesis  -Eliquis on hold just this AM in case procedure can be done or is necessary- discussed with Saravanan RN  -ATB per primary   -SLE reviewed  -Patient to return to [facility] at DC   -DVT prophylaxis: Eliquis  -GI prophylaxis: Pepcid     Case discussed with nurse and patient  Questions and concerns addressed. Meds and Orders reviewed.     Electronically signed by     YASMINE Delarosa - CNP on 5/3/2021 at 9:29 AM     Addendum by Dr. Marc Nye MD:  I have seen and examined the patient independently. Face to face evaluation and examination was performed. The above evaluation and note has been reviewed. Labs and radiographs were reviewed. I Have discussed with Ms. Savannah LoveDARA CNP about this patient in detail. The above assessment and plan has been reviewed. Please see my modifications mentioned below. My modifications:  She is on 2 L of oxygen via nasal cannula  She is a chronic smoker for 25 to 30 years with 1 pack of cigarettes per day. Patient quit smoking 1 year back. Ultrasound of the chest performed today on 3 May 2021: Small bilateral pleural effusions. Impression: The bilateral pleural effusions noted on the CT of chest performed on 1 May 2021 responded well to diuresis. Consistent with pleural effusion due to CHF. We will follow patient with repeat chest x-ray PA and lateral views in future along with a full PFTs to evaluate for COPD.  - Ezra Miss educated about my impression and plan. She verbalizes understanding.     Neelima Payne MD 5/3/2021 7:05 PM

## 2021-05-03 NOTE — FLOWSHEET NOTE
05/03/21 1859   Provider Notification   Reason for Communication Evaluate   Provider Name Dr. Oswaldo Gutierrez   Provider Notification Physician   Method of Communication Secure Message   Notification Time 1900     New consult re: acute vs. chronic cholecystitis. Biliary drain placed per gen surgery. Hgb trending down. Dr. David Neely has seen patient in past. Dr. Babita Russ acknowledged.

## 2021-05-03 NOTE — FLOWSHEET NOTE
05/03/21 0000   Provider Notification   Reason for Communication Evaluate   Provider Name R [address]   Provider Notification Physician   Method of Communication Secure Message   Response Waiting for response   Notification Time 0001     Notified of patient's HR now 110-125. EKG states afib RVR. Physician placed own orders.

## 2021-05-03 NOTE — PROGRESS NOTES
Mychal Campbell  Daily Progress Note    Pt Name: Anthony Terrazas  Medical Record Number: [id number]  Date of Birth 1935   Today's Date: 5/3/2021    ASSESSMENT:     1. HD # [address] Problems    Diagnosis Date Noted    Bilateral pleural effusion [J90]     Acute coronary syndrome ([facility] [address] [I24.9]     Moderate malnutrition ([address] [E44.0] 05/01/2021     Class: Acute    Abnormal ultrasound of gallbladder [R93.2] 04/29/2021    Other chest pain [R07.89] 04/29/2021    Atrial fibrillation with RVR [facility]) [I48.91] 04/28/2021       Chief Complaint:  Chief Complaint   Patient presents with    Chest Pain     due to pneumonia           PLAN:     2. Perc drain bilious and 150  3. Rec diet as palmer  4. Will discharge with perc drain in  5. 25% normal people bile have e coli in it, presence doesn't confirm infection  6. SUBJECTIVE:     Jb Long is 3b.   Drain in place      OBJECTIVE:     Patient Vitals for the past 24 hrs:   BP Temp Temp src Pulse Resp SpO2 Weight   05/03/21 0430 132/77 97.8 °F (36.6 °C) Oral 118 18 94 % 137 lb 11.2 oz (62.5 kg)   05/02/21 2330 128/78 97.8 °F (36.6 °C) Oral 110 16 93 % --   05/02/21 1943 (!) 148/70 97.5 °F (36.4 °C) Oral 77 16 97 % --   05/02/21 1516 (!) 174/76 97.4 °F (36.3 °C) -- 75 16 95 % --   05/02/21 1300 (!) 143/72 97.8 °F (36.6 °C) Oral 76 24 97 % --   05/02/21 1200 (!) 148/67 -- -- 71 19 97 % --   05/02/21 1154 -- 97.4 °F (36.3 °C) Axillary -- -- -- --   05/02/21 1119 -- -- -- 71 24 100 % --   05/02/21 1100 (!) 143/70 -- -- 70 25 100 % --   05/02/21 1000 128/88 -- -- 80 25 95 % --   05/02/21 0956 (!) 148/71 -- -- 84 -- -- --   05/02/21 0900 (!) 144/69 -- -- 82 24 94 % --   05/02/21 0800 (!) 146/72 97 °F (36.1 °C) Axillary 79 24 92 % --   05/02/21 0700 (!) 142/74 -- -- 72 23 93 % --         Intake/Output Summary (Last 24 hours) at 5/3/2021 0635  Last data filed at 5/3/2021 0435  Gross per 24 hour   Intake 887.4 ml [] SQ Heparin                                 [] Encourage ambulation, low risk for DVT, no chemical or mechanical prophylaxis necessary              [] Already on Anticoagulation      RADIOLOGY:      Ct Abdomen Pelvis Wo Contrast Additional Contrast? None    Result Date: 4/28/2021  1. Low-attenuation areas are noted within the bilateral femoral veins. It is uncertain whether this is due to mixing artifact from the prior CTA chest, or perhaps deep venous thrombosis. Bilateral lower extremity venous duplex ultrasound study in the ER is recommended. 2.  No evidence of a bowel obstruction or free air. 3.  Postoperative changes are noted involving the right hip. 4.  Faint increased attenuation in the gallbladder which could represent vicarious excretion of contrast, gallstones, and/or sludge. There is no pericholecystic inflammation. 5.  Mild to moderate prominence of the right renal pelvis is noted, this appears slightly improved compared to the prior study. 6.  Additional findings are detailed above. This document has been electronically signed by: Paul Royal M.D. on 04/28/2021 02:49 AM All CTs at this facility use dose modulation techniques and iterative reconstructions, and/or weight-based dosing when appropriate to reduce radiation to a low as reasonably achievable. Cta Chest W Wo Contrast    Result Date: 4/28/2021  1. No evidence of a pulmonary embolism. 2.  Small to moderate-sized bilateral pleural effusions. The pleural effusions appear increased since the prior study. 3.  Multifocal atelectasis is noted bilaterally. 4.  Additional findings are detailed above. This document has been electronically signed by: Paul Royal M.D. on 04/28/2021 01:59 AM All CTs at this facility use dose modulation techniques and iterative reconstructions, and/or weight-based dosing when appropriate to reduce radiation to a low as reasonably achievable.     Us Gallbladder Ruq    Result Date: 4/28/2021  Sludge or stones within the gallbladder with gallbladder wall thickening and dilated common bile duct. These findings may represent acute cholecystitis. **This report has been created using voice recognition software. It may contain minor errors which are inherent in voice recognition technology. ** Final report electronically signed by Dr. Fahad Ibarra on 4/28/2021 2:42 PM    Xr Chest Portable    Result Date: 4/30/2021  1. Left internal jugular central venous line tip overlies the proximal SVC. 2.  Low lung volumes. This document has been electronically signed by: Zach Mccarthy MD on 04/30/2021 12:04 AM    Xr Chest Portable    Result Date: 4/27/2021  1. No significant interval change since the prior study. Again noted is left basilar atelectasis. Tiny bilateral pleural effusions are suspected. This document has been electronically signed by: Radha Owusu M.D. on 04/27/2021 08:58 PM    Nm Hepatobiliary Scan W Ejection Fraction    Result Date: 4/29/2021  1. Patent common bile duct. 2. Nonvisualization of the gallbladder highly suspicious for acute cholecystitis.  Final report electronically signed by Dr. Contreras January on 4/29/2021 11:03 AM        Electronically signed by Mena Lovett MD on 5/3/2021 at 6:35 AM

## 2021-05-04 ENCOUNTER — APPOINTMENT (OUTPATIENT)
Dept: GENERAL RADIOLOGY | Age: 86
DRG: 444 | End: 2021-05-04
Payer: MEDICARE

## 2021-05-04 ENCOUNTER — APPOINTMENT (OUTPATIENT)
Dept: INTERVENTIONAL RADIOLOGY/VASCULAR | Age: 86
DRG: 444 | End: 2021-05-04
Payer: MEDICARE

## 2021-05-04 LAB
ALBUMIN SERPL-MCNC: 2 G/DL (ref 3.5–5.1)
ALP BLD-CCNC: 107 U/L (ref 38–126)
ALT SERPL-CCNC: 24 U/L (ref 11–66)
ANION GAP SERPL CALCULATED.3IONS-SCNC: 9 MEQ/L (ref 8–16)
AST SERPL-CCNC: 30 U/L (ref 5–40)
BILIRUB SERPL-MCNC: 0.3 MG/DL (ref 0.3–1.2)
BUN BLDV-MCNC: 8 MG/DL (ref 7–22)
CALCIUM SERPL-MCNC: 8.2 MG/DL (ref 8.5–10.5)
CHLORIDE BLD-SCNC: 103 MEQ/L (ref 98–111)
CO2: 26 MEQ/L (ref 23–33)
CREAT SERPL-MCNC: 0.6 MG/DL (ref 0.4–1.2)
ERYTHROCYTE [DISTWIDTH] IN BLOOD BY AUTOMATED COUNT: 17.1 % (ref 11.5–14.5)
ERYTHROCYTE [DISTWIDTH] IN BLOOD BY AUTOMATED COUNT: 54.9 FL (ref 35–45)
GFR SERPL CREATININE-BSD FRML MDRD: > 90 ML/MIN/1.73M2
GLUCOSE BLD-MCNC: 123 MG/DL (ref 70–108)
GLUCOSE BLD-MCNC: 88 MG/DL (ref 70–108)
HCT VFR BLD CALC: 26.5 % (ref 37–47)
HCT VFR BLD CALC: 27.6 % (ref 37–47)
HEMOGLOBIN: 8.3 GM/DL (ref 12–16)
HEMOGLOBIN: 8.7 GM/DL (ref 12–16)
LACTIC ACID: 1.3 MMOL/L (ref 0.5–2)
LIPASE: 25.8 U/L (ref 5.6–51.3)
MCH RBC QN AUTO: 28.1 PG (ref 26–33)
MCHC RBC AUTO-ENTMCNC: 31.5 GM/DL (ref 32.2–35.5)
MCV RBC AUTO: 89 FL (ref 81–99)
ORGANISM: ABNORMAL
ORGANISM: ABNORMAL
PLATELET # BLD: 226 THOU/MM3 (ref 130–400)
PMV BLD AUTO: 11.1 FL (ref 9.4–12.4)
POTASSIUM SERPL-SCNC: 3.3 MEQ/L (ref 3.5–5.2)
PROCALCITONIN: 0.3 NG/ML (ref 0.01–0.09)
RBC # BLD: 3.1 MILL/MM3 (ref 4.2–5.4)
SODIUM BLD-SCNC: 138 MEQ/L (ref 135–145)
TOTAL PROTEIN: 5.3 G/DL (ref 6.1–8)
TSH SERPL DL<=0.05 MIU/L-ACNC: 1.19 UIU/ML (ref 0.4–4.2)
URINE CULTURE, ROUTINE: ABNORMAL
WBC # BLD: 5.8 THOU/MM3 (ref 4.8–10.8)

## 2021-05-04 PROCEDURE — 36415 COLL VENOUS BLD VENIPUNCTURE: CPT

## 2021-05-04 PROCEDURE — 6360000004 HC RX CONTRAST MEDICATION: Performed by: RADIOLOGY

## 2021-05-04 PROCEDURE — 99232 SBSQ HOSP IP/OBS MODERATE 35: CPT | Performed by: INTERNAL MEDICINE

## 2021-05-04 PROCEDURE — 6360000002 HC RX W HCPCS: Performed by: NURSE PRACTITIONER

## 2021-05-04 PROCEDURE — 6370000000 HC RX 637 (ALT 250 FOR IP): Performed by: REGISTERED NURSE

## 2021-05-04 PROCEDURE — 6370000000 HC RX 637 (ALT 250 FOR IP): Performed by: NURSE PRACTITIONER

## 2021-05-04 PROCEDURE — 93005 ELECTROCARDIOGRAM TRACING: CPT | Performed by: REGISTERED NURSE

## 2021-05-04 PROCEDURE — 85027 COMPLETE CBC AUTOMATED: CPT

## 2021-05-04 PROCEDURE — 2580000003 HC RX 258: Performed by: REGISTERED NURSE

## 2021-05-04 PROCEDURE — 6370000000 HC RX 637 (ALT 250 FOR IP): Performed by: INTERNAL MEDICINE

## 2021-05-04 PROCEDURE — 97129 THER IVNTJ 1ST 15 MIN: CPT

## 2021-05-04 PROCEDURE — APPSS30 APP SPLIT SHARED TIME 16-30 MINUTES: Performed by: NURSE PRACTITIONER

## 2021-05-04 PROCEDURE — 71045 X-RAY EXAM CHEST 1 VIEW: CPT

## 2021-05-04 PROCEDURE — 94669 MECHANICAL CHEST WALL OSCILL: CPT

## 2021-05-04 PROCEDURE — 83690 ASSAY OF LIPASE: CPT

## 2021-05-04 PROCEDURE — 6360000002 HC RX W HCPCS: Performed by: FAMILY MEDICINE

## 2021-05-04 PROCEDURE — 97530 THERAPEUTIC ACTIVITIES: CPT

## 2021-05-04 PROCEDURE — 83605 ASSAY OF LACTIC ACID: CPT

## 2021-05-04 PROCEDURE — 99232 SBSQ HOSP IP/OBS MODERATE 35: CPT | Performed by: SURGERY

## 2021-05-04 PROCEDURE — 84145 PROCALCITONIN (PCT): CPT

## 2021-05-04 PROCEDURE — 2140000000 HC CCU INTERMEDIATE R&B

## 2021-05-04 PROCEDURE — 92523 SPEECH SOUND LANG COMPREHEN: CPT

## 2021-05-04 PROCEDURE — 97163 PT EVAL HIGH COMPLEX 45 MIN: CPT

## 2021-05-04 PROCEDURE — 47531 INJECTION FOR CHOLANGIOGRAM: CPT

## 2021-05-04 PROCEDURE — 84443 ASSAY THYROID STIM HORMONE: CPT

## 2021-05-04 PROCEDURE — 2700000000 HC OXYGEN THERAPY PER DAY

## 2021-05-04 PROCEDURE — 6360000002 HC RX W HCPCS: Performed by: REGISTERED NURSE

## 2021-05-04 PROCEDURE — 2500000003 HC RX 250 WO HCPCS: Performed by: NURSE PRACTITIONER

## 2021-05-04 PROCEDURE — 80053 COMPREHEN METABOLIC PANEL: CPT

## 2021-05-04 PROCEDURE — 82948 REAGENT STRIP/BLOOD GLUCOSE: CPT

## 2021-05-04 RX ORDER — SODIUM CHLORIDE 9 MG/ML
INJECTION, SOLUTION INTRAVENOUS CONTINUOUS
Status: DISCONTINUED | OUTPATIENT
Start: 2021-05-04 | End: 2021-05-07 | Stop reason: ALTCHOICE

## 2021-05-04 RX ORDER — IPRATROPIUM BROMIDE AND ALBUTEROL SULFATE 2.5; .5 MG/3ML; MG/3ML
1 SOLUTION RESPIRATORY (INHALATION)
Status: DISCONTINUED | OUTPATIENT
Start: 2021-05-04 | End: 2021-05-13

## 2021-05-04 RX ADMIN — SERTRALINE HYDROCHLORIDE 50 MG: 50 TABLET, FILM COATED ORAL at 09:43

## 2021-05-04 RX ADMIN — ISOSORBIDE MONONITRATE 30 MG: 30 TABLET ORAL at 09:43

## 2021-05-04 RX ADMIN — SODIUM CHLORIDE, PRESERVATIVE FREE 10 ML: 5 INJECTION INTRAVENOUS at 09:45

## 2021-05-04 RX ADMIN — TIZANIDINE 4 MG: 4 TABLET ORAL at 09:43

## 2021-05-04 RX ADMIN — FLUCONAZOLE IN SODIUM CHLORIDE 400 MG: 2 INJECTION, SOLUTION INTRAVENOUS at 17:07

## 2021-05-04 RX ADMIN — DOPAMINE HYDROCHLORIDE IN DEXTROSE 2 MCG/KG/MIN: 1.6 INJECTION, SOLUTION INTRAVENOUS at 20:13

## 2021-05-04 RX ADMIN — MIDODRINE HYDROCHLORIDE 10 MG: 10 TABLET ORAL at 17:03

## 2021-05-04 RX ADMIN — LATANOPROST 1 DROP: 50 SOLUTION OPHTHALMIC at 20:13

## 2021-05-04 RX ADMIN — PIPERACILLIN AND TAZOBACTAM 3375 MG: 3; .375 INJECTION, POWDER, LYOPHILIZED, FOR SOLUTION INTRAVENOUS at 13:15

## 2021-05-04 RX ADMIN — DIGOXIN 125 MCG: 250 TABLET ORAL at 09:43

## 2021-05-04 RX ADMIN — METOPROLOL TARTRATE 25 MG: 25 TABLET, FILM COATED ORAL at 09:43

## 2021-05-04 RX ADMIN — SODIUM BICARBONATE 1300 MG: 650 TABLET ORAL at 09:43

## 2021-05-04 RX ADMIN — MIRTAZAPINE 7.5 MG: 15 TABLET, FILM COATED ORAL at 20:08

## 2021-05-04 RX ADMIN — IPRATROPIUM BROMIDE AND ALBUTEROL SULFATE 1 AMPULE: .5; 3 SOLUTION RESPIRATORY (INHALATION) at 21:20

## 2021-05-04 RX ADMIN — SODIUM CHLORIDE, PRESERVATIVE FREE 10 ML: 5 INJECTION INTRAVENOUS at 20:08

## 2021-05-04 RX ADMIN — IOTHALAMATE MEGLUMINE 10 ML: 600 INJECTION INTRAVASCULAR at 16:00

## 2021-05-04 RX ADMIN — IPRATROPIUM BROMIDE AND ALBUTEROL SULFATE 1 AMPULE: .5; 3 SOLUTION RESPIRATORY (INHALATION) at 13:49

## 2021-05-04 RX ADMIN — SUCRALFATE 1 G: 1 TABLET ORAL at 20:07

## 2021-05-04 RX ADMIN — SUCRALFATE 1 G: 1 TABLET ORAL at 12:03

## 2021-05-04 RX ADMIN — APIXABAN 5 MG: 5 TABLET, FILM COATED ORAL at 09:45

## 2021-05-04 RX ADMIN — POTASSIUM CHLORIDE 20 MEQ: 400 INJECTION, SOLUTION INTRAVENOUS at 08:56

## 2021-05-04 RX ADMIN — POTASSIUM CHLORIDE 20 MEQ: 400 INJECTION, SOLUTION INTRAVENOUS at 08:15

## 2021-05-04 RX ADMIN — PIPERACILLIN AND TAZOBACTAM 3375 MG: 3; .375 INJECTION, POWDER, LYOPHILIZED, FOR SOLUTION INTRAVENOUS at 20:09

## 2021-05-04 RX ADMIN — ATORVASTATIN CALCIUM 20 MG: 20 TABLET, FILM COATED ORAL at 20:08

## 2021-05-04 RX ADMIN — MIDODRINE HYDROCHLORIDE 10 MG: 10 TABLET ORAL at 09:43

## 2021-05-04 RX ADMIN — APIXABAN 5 MG: 5 TABLET, FILM COATED ORAL at 20:08

## 2021-05-04 RX ADMIN — SUCRALFATE 1 G: 1 TABLET ORAL at 17:03

## 2021-05-04 RX ADMIN — SUCRALFATE 1 G: 1 TABLET ORAL at 06:50

## 2021-05-04 RX ADMIN — ASPIRIN 325 MG: 325 TABLET, COATED ORAL at 09:45

## 2021-05-04 RX ADMIN — APIXABAN 5 MG: 5 TABLET, FILM COATED ORAL at 00:11

## 2021-05-04 RX ADMIN — SODIUM CHLORIDE, PRESERVATIVE FREE 10 ML: 5 INJECTION INTRAVENOUS at 17:12

## 2021-05-04 RX ADMIN — ACETAMINOPHEN 650 MG: 325 TABLET ORAL at 09:45

## 2021-05-04 RX ADMIN — FAMOTIDINE 20 MG: 10 INJECTION, SOLUTION INTRAVENOUS at 09:45

## 2021-05-04 RX ADMIN — AMIODARONE HYDROCHLORIDE 200 MG: 200 TABLET ORAL at 09:45

## 2021-05-04 RX ADMIN — FAMOTIDINE 20 MG: 10 INJECTION, SOLUTION INTRAVENOUS at 20:09

## 2021-05-04 RX ADMIN — IPRATROPIUM BROMIDE AND ALBUTEROL SULFATE 1 AMPULE: .5; 3 SOLUTION RESPIRATORY (INHALATION) at 17:55

## 2021-05-04 RX ADMIN — SODIUM BICARBONATE 1300 MG: 650 TABLET ORAL at 20:08

## 2021-05-04 RX ADMIN — MIDODRINE HYDROCHLORIDE 10 MG: 10 TABLET ORAL at 12:07

## 2021-05-04 ASSESSMENT — PAIN SCALES - WONG BAKER: WONGBAKER_NUMERICALRESPONSE: 0

## 2021-05-04 ASSESSMENT — PAIN DESCRIPTION - FREQUENCY: FREQUENCY: CONTINUOUS

## 2021-05-04 ASSESSMENT — PAIN DESCRIPTION - PAIN TYPE: TYPE: CHRONIC PAIN

## 2021-05-04 ASSESSMENT — PAIN DESCRIPTION - PROGRESSION
CLINICAL_PROGRESSION: NOT CHANGED

## 2021-05-04 ASSESSMENT — PAIN SCALES - GENERAL
PAINLEVEL_OUTOF10: 0
PAINLEVEL_OUTOF10: 8

## 2021-05-04 ASSESSMENT — PAIN DESCRIPTION - DESCRIPTORS
DESCRIPTORS: ACHING
DESCRIPTORS: ACHING

## 2021-05-04 ASSESSMENT — PAIN DESCRIPTION - LOCATION: LOCATION: CHEST

## 2021-05-04 ASSESSMENT — PAIN DESCRIPTION - ORIENTATION: ORIENTATION: ANTERIOR;MID

## 2021-05-04 NOTE — CARE COORDINATION
5/4/21, 8:40 AM EDT    DISCHARGE  [address] day: 6  Location: -23/023-A Reason for admit: Atrial fibrillation with RVR ([address] [I48.91]   Procedure:   4/27 CXR - left basilar atelectasis. Tiny bilateral pleural effusions suspected. 4/28 CTA chest - No PE. Small-moderate sized bilateral pleural effusions, increased in size. Bilateral multifocal atelectasis.   4/28 US GB - Sludge or stones within the gallbladder with gallbladder wall thickening and dilated common bile duct. These findings may represent acute cholecystitis.   4/29 HIDA scan - Patent common bile duct. Nonvisualization of the gallbladder highly suspicious for acute cholecystitis. 4/29 CVC placed - Left IJ.   4/30 Perc cholecystectomy drain inserted. Barriers to Discharge: Rapid response called yesterday for BP of 62/x. Given fluid bolus and started Dopamine gtt. Dopamine is now off and BP is 142/61. O2 down to 2L/nc. Sats 98%. Potassium 3.3, albumin 2.0. Hgb 8.7. Amiodarone daily, Eliquis bid, baby ASA daily, Lipitor, Rocephin iv daily, Digoxin po daily, Pepcid iv bid, Diflucan iv daily, Imdur daily, Lopressor, Midodrine, Remeron, Zoloft, Sodium Bicarb tabs, Carafate. Electrolyte replacements. PT/OT. Surgery plans to check biliary drain to see if can clamp tube and remove drainage bag prior to discharge. Pt is to discharge with drain in place. PCP: Bob Maurer MD  Readmission Risk Score: 38%  Patient Goals/Plan/Treatment Preferences: Plans to return to [facility]. SW following. UPDATE: 11:44 AM EDT    Pt had another drop in BP 40/20, given fluid bolus and restarted Dopamine gtt. Pt wishes to remain a full code at this time per attending.
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
## 2021-05-04 NOTE — FLOWSHEET NOTE
Pt was with nurse but was asleep. She was blessed     05/04/21 4749   Encounter Summary   Services provided to: Patient   Referral/Consult From: Rounding   Continue Visiting Yes  (5/4)   Complexity of Encounter Low   Length of Encounter 15 minutes   Routine   Type Initial   Assessment Sleeping   Intervention Prayer   .

## 2021-05-04 NOTE — PROGRESS NOTES
Patient's son, Marmarie Peers and patient's daughter are present. Palliative care introduced. Discussed the code status discussion that this RN had with the patient. Encouraged conversation with the patient regarding her wishes as she was unsure as to what her wishes would be. Did discuss the complications of rib fractures, brain and organ damage associated with resuscitative measures. Family verbalizes understanding that the patient is a full code and if a change in code status is desired, staff must be informed.

## 2021-05-04 NOTE — PROGRESS NOTES
[facility]           Progress Note    Patient Name:  Jazlyn Curtis  Medical Record Number:  [id number]  YOB: 1935    Date:  5/4/2021  Time:  9:38 AM  Completed By:  Ashok Jackson RN    Reason for Palliative Care Evaluation:  Code status    Current Issues:  [x]  Pain:chest  []  Fatigue  []  Nausea  []  Anxiety  []  Depression  []  Shortness of Breath  []  Constipation  [x]  Appetite  []  Other:    Advance Directives  [] [facility] DNR Form  [x] Living Will  [x] Medical POA    Current Code Status  [x] Full Resuscitation  [] DNR-Comfort Care-Arrest  [] DNR-Comfort Care  [] Limited   [] No CPR   [] No shock   [] No ET intubation/reintubation   [] No resuscitative medications   [] Other limitation:    Performance Status: 60    Family/Patient Discussion:  Patient resting in bed. Patient is alert and oriented to name, age, place, year but not to month. Patient's conversation is appropriate with this RN. Palliative care introduced. Discussion about code status levels and what each level entails. Discussed complications of rib fractures, brain and organ damage associated with resuscitative measures. Initially patient shakes her head no when talking about resuscitative measures and she states that her sister was a paramedic and she has this taken care of. When patient asked if she would want resuscitative measures or a natural death, patient indicates that she is unsure of what she would want. Patient is agreeable to this RN calling her son and discussing with him. Encouraged conversation between her and her son and if a change from full code status is desired, instructed patient that she must inform the staff. Much emotional support provided. Plan/Follow-Up:  Updated Maricel SUAREZ.  8599 Phone call made to patient's son, Becca Acosta and voicemail left for him to call this RN. Will await a return phone call.     Ashok Jackson RN  5/4/2021,  9:38 AM

## 2021-05-04 NOTE — PROGRESS NOTES
Progress note      [facility]             Patient:  Denise Lindsey  YOB: 1935    MRN: [id number]   Acct:  [de-identified]   3B-23/023-A  Primary Care Physician: Joseph Mejia MD    Admit Date: 4/27/2021           Subjective: upon arriving to pt's room the RN informed me that her BP was found to be 40/20 on routine vital sign check. Pt was lethargic but awake and alert. Palliative care saw pt yesterday and discussed code status with the pt. She would like to speak with her son first. I spoke with her about code status and she was very adamant that she wants to remain a full code. She agrees to CPR and intubation if necessary.         Objective:      Physical Exam:    Vitals:  Patient Vitals for the past 24 hrs:   BP Temp Temp src Pulse Resp SpO2 Weight   05/04/21 1050 (!) 77/33 -- -- -- -- -- --   05/04/21 0945 136/64 -- -- -- 20 -- --   05/04/21 0930 126/85 99.6 °F (37.6 °C) Axillary 111 20 95 % --   05/04/21 0630 (!) 142/61 -- -- -- -- -- --   05/04/21 0430 (!) 104/97 97.6 °F (36.4 °C) Oral 82 18 98 % 145 lb 9.6 oz (66 kg)   05/04/21 0215 (!) 137/51 -- -- -- -- -- --   05/04/21 0127 (!) 128/54 -- -- -- -- -- --   05/03/21 2358 (!) 134/54 98.2 °F (36.8 °C) Oral 65 16 97 % --   05/03/21 2330 (!) 118/51 -- -- -- -- -- --   05/03/21 2215 (!) 146/53 -- -- -- -- -- --   05/03/21 2100 (!) 133/57 98.6 °F (37 °C) Oral 74 14 96 % --   05/03/21 2015 (!) 146/60 -- -- -- -- -- --   05/03/21 1945 (!) 76/45 -- -- -- -- -- --   05/03/21 1930 (!) 115/99 -- -- -- -- -- --   05/03/21 1730 (!) 124/56 -- -- -- -- 93 % --   05/03/21 1725 -- -- -- -- -- 97 % --   05/03/21 1637 (!) 121/57 -- -- -- -- -- --   05/03/21 1630 131/62 -- -- -- -- 97 % --   05/03/21 1558 (!) 74/36 -- -- 64 -- 100 % --   05/03/21 1551 (!) 74/35 -- -- 65 -- -- --   05/03/21 1540 (!) 75/34 -- Rectal -- -- -- --   05/03/21 1531 -- 98 °F (36.7 °C) Axillary 65 18 98 % --   05/03/21 1118 (!) 148/70 96.8 °F (36 °C) Oral 92 20 96 % --     Weight: Weight: 145 lb 9.6 oz (66 kg)     24 hour intake/output:    Intake/Output Summary (Last 24 hours) at 5/4/2021 1055  Last data filed at 5/4/2021 0430  Gross per 24 hour   Intake 1356.26 ml   Output 1300 ml   Net 56.26 ml       General appearance - alert, ill appearing on 2L NC   Eyes - pupils equal and reactive, extraocular eye movements intact  Mouth - mucous membranes moist, pharynx normal without lesions  Neck - supple, no significant adenopathy  Chest - Moderate rhonchi throughout  Heart - normal rate, regular rhythm, normal S1, S2, no murmurs, rubs, clicks or gallops  Abdomen - soft, nontender, nondistended, no masses or organomegaly, pos bs. Neurological - alert, oriented, sluggish speech, lethargic but responds to name  Musculoskeletal - no joint tenderness, deformity or swelling  Extremities - peripheral pulses normal, no pedal edema, left leg edema   Skin - normal coloration and turgor, no rashes, no suspicious skin lesions noted    Review of Labs and Diagnostic Testing:    CBC:   Recent Labs     05/04/21 0455   WBC 5.8   HGB 8.7*   HCT 27.6*   MCV 89.0        BMP:   Recent Labs     05/03/21 0445 05/03/21  0445 05/04/21 0455      < > 138   K 2.8*   < > 3.3*      < > 103   CO2 24   < > 26   PHOS 1.9*  --   --    BUN 7   < > 8   CREATININE 0.5   < > 0.6   CALCIUM 8.3*   < > 8.2*   GLUCOSE 74   < > 88    < > = values in this interval not displayed. PT/INR: No results for input(s): PROTIME, INR in the last 72 hours.   APTT:   Recent Labs     05/02/21  0702   APTT 94.0*      Lipids:   Recent Labs     05/04/21 0455   ALKPHOS 107   ALT 24   AST 30   BILITOT 0.3   LABALBU 2.0*     Troponin:   Recent Labs     05/01/21  1950   TROPONINT 0.011*        Imaging:  [unfilled]    EKG:      Diet: Dietary Nutrition Supplements: Clear Liquid Oral Supplement  DIET DYSPHAGIA MINCED AND MOIST; Low Caffeine        Data: Scheduled Meds: Scheduled Meds:   cefTRIAXone (ROCEPHIN) IV  1,000 mg Intravenous Q24H    midodrine  10 mg Oral TID WC    phosphorus replacement protocol   Other RX Placeholder    isosorbide mononitrate  30 mg Oral Daily    famotidine (PEPCID) injection  20 mg Intravenous BID    apixaban  5 mg Oral BID    aspirin  325 mg Oral Daily    fluconazole  400 mg Intravenous Q24H    amiodarone  200 mg Oral Daily    digoxin  125 mcg Oral Daily    [Held by provider] docusate sodium  100 mg Oral BID    latanoprost  1 drop Both Eyes Nightly    mirtazapine  7.5 mg Oral Nightly    sertraline  50 mg Oral Daily    sodium bicarbonate  1,300 mg Oral BID    sucralfate  1 g Oral 4x Daily AC & HS    sodium chloride flush  5-40 mL Intravenous 2 times per day    metoprolol tartrate  25 mg Oral BID    atorvastatin  20 mg Oral Nightly     Continuous Infusions:   DOPamine Stopped (05/04/21 0220)    sodium chloride      sodium chloride      sodium chloride       PRN Meds:. LORazepam, sodium chloride, potassium chloride, potassium chloride, potassium chloride, magnesium sulfate, acetaminophen **OR** acetaminophen, ondansetron, potassium chloride **OR** potassium alternative oral replacement **OR** potassium chloride, sodium chloride, tiZANidine, sodium chloride flush, sodium chloride, polyethylene glycol  Continuous Infusions:   DOPamine Stopped (05/04/21 0220)    sodium chloride      sodium chloride      sodium chloride           Assessment/Plan   1. Chest pain  a. Cardiology following  b. Pt not having chest pain right now but reports chest pain this AM  c. Spoke with Dr Jj Reza and he has plans to cath the pt once her cholecystostomy tube is removed and she is more stable  d. BP 40/30 on arrival to pt's room. 250cc bolus ordered and dopamine drip restarted. 2. Atrial Fibrillation with RVR anticoagulated with Eliquis  a. Pt on lopressor and amio  b.  Spoke with Dr Jj Reza and he ordered digoxin to be stopped  c. Cardiology following   d. Pt in NSR now  3. Acute respiratory failure  a. On 2L NC; wean as tolerated   b. Duoneb Q4h; It pt starts to get tachycardic consider switching to Xopenex   c. Metaneb Q4h per pulmonary  d. Ordered STAT Chest X ray due to ronchi and chest pain this AM: showed stable small pleural effusions + mild atelactasis/pneumonia--ordered procal and lactic acid; Pt on Rocephin for UTI; Will switch abx to Zosyn  If procal elevated to cover for hospital acquired pneumonia   e. Pulmonary following for possible thoracentesis; Ultrasound showed small pleural effusions yesterday so no plans for thoracentesus right now  4. Acute cholecystitis  a. Surgery to do no intervention as they believe this is chronic cholecystitis; IR palced cholecystostomy tube and is draining bilious drainage with E Coli; surgery does not recommend abx   b. Surgery following  c. Pt will D/C with drain  5. Hypotension Hx of   a. Pt back on dopamine drip due to episode of hypotension right now; BP improved to 119/52 on 4mcg dopamine  b. Possible consult to intensivist if pt's BP continues to drop  c. Cont Midodrine  d. Spoke with cardiology and he ordered Imdur to be stopped   6. Urinary retention  a. Has alvarez catheter   b. Follows with urology outpatient  7. UTI  a. Noted urine growing Morganella and Yeast  b. Cont Diflucan  c. Cont Rocephin  8. Microcytic anemia  a. Hgb stable  b. Pt now on Eliquis; monitor hgb while on anticoagulation  c. Pt had one drop in Hgb yesterday but on repeat HH Hgb increased to 8.7 spontaneously. Possible hemodilution  d. GI consulted but pt had scope 1 year ago with Dr Sunita Gauthier which was negative; pt having no signs of bleeding via GI  e. Hematology consult for chronic anemia and hypercoagulable state with multiple DVT's; new onset DVT's on eliquis  9. Hypokalemia  a. K+ being replaced right now  b. Cont replacement protocol   c. Noted Mg WNL  10. CHF reduced EF   a.  Noted latest echocardiogram shows drop in EF to 40-45%  b. Monitor daily weights + strict I/O  c. Cont BB and ASA  d. Spoke with cardiology and he plans to cath the pt once she is more stable and the cholecystostomy tube has been removed   11. HLD  a. Cont statin  12. DVT's left leg  a. Noted increased number of DVT's since prior admission  b. Pt now back on Eliquis   c. Hematology consult         Assessment and plan of care discussed with supervising physician, Dr Leoncio Taylor. Electronically signed by YASMINE Bonilla CNP on 5/4/2021 at 10:55 AM     I have discussed the case, including pertinent history and exam findings with the NP. I have seen and examined the patient and the key elements of the encounter have been performed by me. I agree with the assessment, plan and orders as documented by the NP.     Patient on dopamine at 8 mics  Latest bp 138/65  Will wean, cont ivf      Prognosis is gueded

## 2021-05-04 NOTE — H&P
Formulation and discussion of sedation / procedure plans, risks, benefits, side effects and alternatives with patient and/or responsible adult completed.     Electronically signed by Marilyn Arellano MD on 5/4/2021 at 4:08 PM

## 2021-05-04 NOTE — PROGRESS NOTES
[address] 61  OCCUPATIONAL THERAPY MISSED TREATMENT NOTE  STRZ CCU-STEPDOWN 3B  3B-23/023-A      Date: 2021  Patient Name: Kathryn Shah        CSN: [id number]   : 1935  (81 y.o.)  Gender: female   Referring Practitioner: RYAN oLpez  Diagnosis: Atrial Fibrillation witih RVR         REASON FOR MISSED TREATMENT: Patient Refused Pt requesting to rest at this time. OT will see as time allows.

## 2021-05-04 NOTE — PROGRESS NOTES
1035- 44/23 manual.  1040- 55/36- monitor Right leg  Pt. Placed in trendelenburg. She is pale and clammy. Fairly unresponsive. Chem taken 123. Dopamine restarted at 5 mcg. 1041- 0.9 NS bolus started   HR 77. 95%2L NC  CXR and labs ordered. Derek Jones NP pulmonary arrived to room. 1059- CXR taken. 1100- Dopamine decreased to 5 mcg.  1101- pt. Asleep. More alert - dopamine decreased to 4 mcg.

## 2021-05-04 NOTE — PROGRESS NOTES
[facility]  INPATIENT PHYSICAL THERAPY  EVALUATION  [facility] CCU-STEPDOWN 3B - 3B-23/023-A    Time In: 6683  Time Out: 7608  Timed Code Treatment Minutes: 8 Minutes  Minutes: 14          Date: 2021  Patient Name: Veronica Diallo,  Gender:  female        MRN: [id number]  : 1935  (81 y.o.)      Referring Practitioner: Lakisha Castellanos CNP  Diagnosis: a fib with RVR  Additional Pertinent Hx: Pt presented with chest pain. Patient she just did not feel well she could not really say what it was she had no abdominal pain no nausea no vomiting. She said she had no appetite just was not eating because she just did not feel like it. Found in atrial fibrillation with rapid ventricular response and was admitted for work-up of this. Pt is S/P percutaneous cholecystostomy on 21.      Restrictions/Precautions:  Restrictions/Precautions: Isolation, Fall Risk    Subjective:  Chart Reviewed: Yes  Patient assessed for rehabilitation services?: Yes  Subjective: pt agreeable to try to sit edge of bed with min encouragement    General:                      Pain: 9/10: chest pain at end of session-RN aware and going to check on pt    Vitals: RN going to check on pt after session with pt c/o chest pain    Social/Functional History:    Lives With: Alone  Type of Home: Facility(The [address] [place] [address])  Home Layout: One level  Home Access: Level entry  Home Equipment: Rolling walker     Bathroom Shower/Tub: Walk-in shower  Bathroom Toilet: Bedside commode  Bathroom Equipment: Grab bars in shower, Grab bars around toilet  Bathroom Accessibility: [id number] Help From: Other (comment)(staff as available)  ADL Assistance: Needs assistance  Homemaking Assistance: Needs assistance  Homemaking Responsibilities: No  Ambulation Assistance: Needs assistance  Transfer Assistance: Needs assistance    Active : No  Occupation: Retired  Type of occupation:   Leisure & Hobbies: Reading  Additional Comments: Pt had help with doing self care and transfers. She ambulated with a rolling walker with help from therapy. OBJECTIVE:  Range of Motion:  Bilateral Lower Extremity: A/AAROM WFL    Strength:  Bilateral Lower Extremity: grossly 3- to 3/5, generalized weakness    Balance:  Static Sitting Balance:  Minimal Assistance, to primarily SBA, tolerated around 8 min  Static Standing Balance: Moderate Assistance, X 1, HHA, x3 trials    Bed Mobility:  Rolling to Right: Maximum Assistance, X 1   Supine to Sit: Maximum Assistance, X 1  Sit to Supine: Maximum Assistance, X 1   Scooting: Maximum Assistance, X 1, scooting fwd to edge of bed    Transfers:  Sit to Stand: Maximum Assistance, X 1  Stand to [address] [place] [address], X 1  From EOB x3 trials, HHA  Stand Pivot:Maximum Assistance, X 1,  2 std pivots to [facility], HHA, fatigued quickly    Ambulation:  Not Tested      Exercise:  Patient was guided in 1 set(s) 5 reps of exercise to both lower extremities. Ankle pumps. Exercises were completed for increased independence with functional mobility. Functional Outcome Measures: Completed  AM-PAC Inpatient Mobility Raw Score : 10  AM-PAC Inpatient T-Scale Score : 32.29    ASSESSMENT:  Activity Tolerance:  Patient tolerance of  treatment: poor. Pt fatigued quickly and c/o chest pain      Treatment Initiated: Treatment and education initiated within context of evaluation. Evaluation time included review of current medical information, gathering information related to past medical, social and functional history, completion of standardized testing, formal and informal observation of tasks, assessment of data and development of plan of care and goals. Treatment time included skilled education and facilitation of tasks to increase safety and independence with functional mobility for improved independence and quality of life. Assessment:   Body structures, Functions, Activity limitations: Decreased functional mobility , Decreased endurance, Decreased strength, Decreased balance, Increased pain  Assessment: pt c/o chest pain-RN aware, generalized weakness, deconditioning, dec balance, inc assist for safe mobility, pt lives alone, recommend cont PT to inc pt functional mobility  Prognosis: Fair    REQUIRES PT FOLLOW UP: Yes    Discharge Recommendations:  Discharge Recommendations: Continue to assess pending progress, Subacute/Skilled Nursing Facility, Patient would benefit from continued therapy after discharge    Patient Education:  PT Education: Goals, PT Role, Plan of Care, Functional Mobility Training    Equipment Recommendations: Other: cont to assess needs    Plan:  Times per week: 3-5X GM  Times per day: Daily  Specific instructions for Next Treatment: therex, bed mobility, transfers, pregait activity, PT to assess gait    Goals:  Patient goals : feel better, less pain  Short term goals  Time Frame for Short term goals: by discharge  Short term goal 1: bed mobility with modA to get in/out of bed  Short term goal 2: sit to std with Sarah to get in/out of chairs  Short term goal 3: std pivot transfer with Sarah to get bed to/from chair safely  Short term goal 4: PT to assess gait  Long term goals  Time Frame for Long term goals : no LTGs set secondary to short ELOS    Following session, patient left in safe position with all fall risk precautions in place.

## 2021-05-04 NOTE — PROGRESS NOTES
[facility]    Patient - Anthony Terrazas,  Age - 81 y.o.    - 1935      Room Number - 3B-23/023-A   Consulting - Arley Cain MD Primary Care Physician - Cindy Gonzalez MD   MRN -  [id number]   Damien # - [de-identified]  Date of Admission -  2021  7:55 PM  Hospital Day - [address] Problems    Diagnosis Date Noted    Bilateral pleural effusion [J90]     Acute coronary syndrome ([address] [I24.9]     Moderate malnutrition ([address] [E44.0] 2021     Class: Acute    Abnormal ultrasound of gallbladder [R93.2] 2021    Other chest pain [R07.89] 2021    Atrial fibrillation with RVR ([address] [I48.91] 2021     Chief Complaint   Bilateral pleural effusions  HPI    Liz Esqueda is an 82 y/o female former-smoker with PMH of COPD,  ECHO LVEF 60% with grade 1 diastolic dysfunction, CAD, Essential HPTN, Dyslipidemia, Chronic Pain, Glaucoma.      Of note, patient had a recent hospitalization and was discharged on 2021, during which time they noted elevated troponin and BAILEE. Cardiology evaluated and thought troponin elevation was secondary to BAILEE. She received treatment for pneumonia and UTI. Her stay was complicated by a DVT in the left leg, and she was transitioned to [facility] on discharge to SNF.      She re-presented to [facility] 2021 from her SNF, admitted under Dr. Tiffany Anand, for dyspnea and chest pain. Per report, her chest pain was an \"achiness\" that was present since her last discharge. She was placed on 2L NC. She had noted bilious emesis. She was found to be in Afib with RVR. CTA chest was obtained negative for PE. A RUQ showed gallbladderr sludge vs stones with wall thickening and dilated CBD, representing possible cholecystitis. HIDA showed patent CBD, with non-visualization of the gallbladder. General surgery was consulted.      On  a rapid response was called for hypotension.  12-lead ECG showed acute ST elevation of leads I, II, V1-6. STEMI was called to Dr. Karthikeyan Chery, but this was felt not to be a STEMI and cath lab was canceled. Troponin was noted to be negative. Patient required levophed support. Hgb was noted to be 6.9, acutely dropped from 8.6. 1 pRBC was ordered. On 4/30 patient went to IR for percutaneous cholecystostomy tube placement. At that time, widely patent cystic and CBD were noted, with free flow of contrast into duodenum. There was a noted CBD filling defect. 5/3- transferred to      Past 24 hours, ROS   -Hypotensive this AM and started on Dopamine gtt; no RR called  -stat CXR done not enough fluid to tap   -No pulmonary events overnight   -CODE status to be addressed     All other systems reviewed  Objective    Vitals    height is 5' 1\" (1.549 m) and weight is 145 lb 9.6 oz (66 kg). Her oral temperature is 97.6 °F (36.4 °C). Her blood pressure is 142/61 (abnormal) and her pulse is 82. Her respiration is 18 and oxygen saturation is 98%. O2 Flow Rate (L/min): 2 L/min  I/O    Intake/Output Summary (Last 24 hours) at 5/4/2021 0806  Last data filed at 5/4/2021 0430  Gross per 24 hour   Intake 1366.26 ml   Output 1300 ml   Net 66.26 ml     Patient Vitals for the past 96 hrs (Last 3 readings):   Weight   05/04/21 0430 145 lb 9.6 oz (66 kg)   05/03/21 0430 137 lb 11.2 oz (62.5 kg)     Exam    Physical Exam  Constitutional: Patient appears elderly. Stable on O2 2LPM  Mouth/Throat: Oropharynx is clear and moist.  No oral thrush. Eyes: Conjunctivae are normal. Pupils are equal, round, and reactive to light. No scleral icterus. Neck: Neck supple. No JVD or tracheal deviation present. Cardiovascular: Regular rate, regular rhythm, S1 and S2 with no murmur. No peripheral edema  Pulmonary/Chest: Normal effort with crackles heard bilaterally unable to assess posteriorly at this time . No stridor. No respiratory distress. +2 LLE   Abdominal: Soft. Bowel sounds audible.  No distension or tenderness to palp. Right side biliary drained noted   Musculoskeletal: Moves all extremities; no clubbing or cyanosis   Lymphadenopathy:  No cervical adenopathy. Neurological: Patient is alert and oriented to person, place, and time. Skin: Skin is warm and dry.  Pale    Meds       cefTRIAXone (ROCEPHIN) IV  1,000 mg Intravenous Q24H    midodrine  10 mg Oral TID WC    phosphorus replacement protocol   Other RX Placeholder    isosorbide mononitrate  30 mg Oral Daily    famotidine (PEPCID) injection  20 mg Intravenous BID    apixaban  5 mg Oral BID    aspirin  325 mg Oral Daily    fluconazole  400 mg Intravenous Q24H    amiodarone  200 mg Oral Daily    digoxin  125 mcg Oral Daily    [Held by provider] docusate sodium  100 mg Oral BID    latanoprost  1 drop Both Eyes Nightly    mirtazapine  7.5 mg Oral Nightly    sertraline  50 mg Oral Daily    sodium bicarbonate  1,300 mg Oral BID    sucralfate  1 g Oral 4x Daily AC & HS    sodium chloride flush  5-40 mL Intravenous 2 times per day    metoprolol tartrate  25 mg Oral BID    atorvastatin  20 mg Oral Nightly      DOPamine Stopped (05/04/21 0220)    sodium chloride      sodium chloride      sodium chloride       LORazepam, sodium chloride, potassium chloride, potassium chloride, potassium chloride, magnesium sulfate, acetaminophen **OR** acetaminophen, ondansetron, potassium chloride **OR** potassium alternative oral replacement **OR** potassium chloride, sodium chloride, tiZANidine, sodium chloride flush, sodium chloride, polyethylene glycol  Labs   ABG  No results found for: PH, PO2, PCO2, HCO3, O2SAT  No results found for: IFIO2, MODE, SETTIDVOL, SETPEEP  CBC  Recent Labs     05/03/21  0445 05/03/21  1600 05/03/21  1855 05/03/21  2353 05/04/21  0455   WBC 5.1 4.2*  --   --  5.8   RBC 3.33* 2.65*  --   --  3.10*   HGB 9.3* 7.4* 8.8* 8.3* 8.7*   HCT 29.5* 24.0* 27.7* 26.5* 27.6*   MCV 88.6 90.6  --   --  89.0   MCH 27.9 27.9  --   --  28.1 MCHC 31.5* 30.8*  --   --  31.5*    196  --   --  226   MPV 10.8 10.8  --   --  11.1      BMP  Recent Labs     05/02/21  0557 05/02/21  0557 05/03/21  0445 05/03/21  1600 05/04/21  0455     --  140 137 138   K 2.9*   < > 2.8* 4.0 3.3*     --  105 106 103   CO2 22*  --  24 25 26   BUN 9  --  7 8 8   CREATININE 0.6  --  0.5 0.7 0.6   GLUCOSE 87  --  74 100 88   MG 1.6  --  1.7  --   --    PHOS 1.8*  --  1.9*  --   --    CALCIUM 8.2*  --  8.3* 7.7* 8.2*    < > = values in this interval not displayed. LFT  Recent Labs     05/02/21  0557 05/03/21  0445 05/04/21  0455   AST 42* 33 30   ALT 36 30 24   BILITOT 0.3 0.3 0.3   ALKPHOS 121 121 107     TROP  Lab Results   Component Value Date    TROPONINT 0.011 05/01/2021    TROPONINT 0.029 05/01/2021    TROPONINT 0.028 05/01/2021     BNP  Lab Results   Component Value Date    PROBNP 3710.0 04/30/2021    PROBNP 9643.0 04/27/2021    PROBNP 1074.0 04/01/2021     D-Dimer  Lab Results   Component Value Date    DDIMER 10401.00 05/01/2021     Lactic Acid  No results for input(s): LACTA in the last 72 hours. INR  No results for input(s): INR, PROTIME in the last 72 hours. PTT  Recent Labs     05/01/21  1905 05/02/21  0104 05/02/21  0702   APTT 142.4* 137.4* 94.0*     Glucose  Recent Labs     05/02/21  1046   POCGLU 125*     UA No results for input(s): SPECGRAV, PHUR, COLORU, CLARITYU, MUCUS, PROTEINU, BLOODU, RBCUA, WBCUA, BACTERIA, NITRU, GLUCOSEU, BILIRUBINUR, UROBILINOGEN, KETUA, LABCAST, LABCASTTY, AMORPHOS in the last 72 hours. Invalid input(s): CRYSTALS. EKG     Echo   04/30/2021   ECHOCARDIOGRAM LIMITED. Conclusions      Summary   decrease left ventricular function since last exam   Left ventricle size is normal.   Normal left ventricular wall thickness. Ejection fraction is visually estimated in the range of 40% to 45%. Hypokinetic motion of the apical anteroseptal wall noted in the left   ventricle.    Hypokinetic motion of the mid anteroseptal wall noted in the left   ventricle. Signature      ----------------------------------------------------------------   Electronically signed by Tan Casillas MD (Interpreting   physician) on 05/01/2021 at 02:37 PM    Cultures    Procalcitonin   Lab Results   Component Value Date    PROCAL 0.60 04/30/2021    PROCAL 0.22 04/18/2021     Culture, Urine [[id number]] (Abnormal)  Collected: 04/30/21 0419   Order Status: Completed Specimen: Urine, catheter Updated: 05/03/21 0734    Urine Culture, Routine Current antibiotic therapy ineffective in vitro for at least one of culture isolates. Abnormal     Organism Yeast    Urine Culture, Routine [place] count: >100,000 CFU/mL     Organism Morganella morganii ssp. morganii   VRE (+)  MRSA (-)     Culture, Body Fluid [[id number]] (Abnormal)  Collected: 04/30/21 1100   Order Status: Completed Specimen: Body Fluid from Gallbladder Updated: 05/02/21 1048    Gram Stain Result Few segmented neutrophils observed. No organisms observed. Organism Escherichia coli       Radiology    CXR  5/4/2021  CXR portable - pending read      5/3/2021  1V chest    Impression: Stable small effusions. CT Scans  5/1/21  CTA  1. No evidence of pulmonary embolism. 2.  Moderate bilateral pleural effusions, right greater than left, increased from prior imaging. There is overlying atelectasis.          5/3/21  US chest- pending    Assessment    Acute hypoxic respiratory failure- improving   Bilateral pleural effusions- 5/3 US done insufficient amount of fluid to tap   Afib with RVR- NSR currently   Chest pain- resolved   HFpEF  Acute blood loss on chronic anemia- improved  Common bile duct filling defect- drain placed per IR  Left femoral/popliteal vein thrombi  UTI  Recent right femur fracture- nailing per OIO 4/2/21   HTN  HLD  Deconditioning  Former smoker quit 1989  Full Code   Plan   -Monitor spO2 to keep spO2 > 90%  -Hypotensive today almost RR- back on Dopamine gtt per

## 2021-05-04 NOTE — PROGRESS NOTES
1536 Patient received in IR for Cholangiogram.   9834 Procedure started with . 1556 Procedure completed; patient tolerated well. Biliary drain capped. Report called to [facility] [place]  1601 Patient on bed; comfort ensured. [id number] Patient taken to  via bed.

## 2021-05-04 NOTE — PROGRESS NOTES
Admit Date: 4/27/2021  Hospital day {0-10:83803}    Subjective:     Patient {HX; GENERAL COMPLAINTS:77859}.    Medication side effects: {med side fx:63197}    Scheduled Meds:   ipratropium-albuterol  1 ampule Inhalation Q4H WA    piperacillin-tazobactam  3,375 mg Intravenous Q8H    midodrine  10 mg Oral TID WC    phosphorus replacement protocol   Other RX Placeholder    famotidine (PEPCID) injection  20 mg Intravenous BID    apixaban  5 mg Oral BID    aspirin  325 mg Oral Daily    fluconazole  400 mg Intravenous Q24H    amiodarone  200 mg Oral Daily    [Held by provider] docusate sodium  100 mg Oral BID    latanoprost  1 drop Both Eyes Nightly    mirtazapine  7.5 mg Oral Nightly    sertraline  50 mg Oral Daily    sodium bicarbonate  1,300 mg Oral BID    sucralfate  1 g Oral 4x Daily AC & HS    sodium chloride flush  5-40 mL Intravenous 2 times per day    metoprolol tartrate  25 mg Oral BID    atorvastatin  20 mg Oral Nightly     Continuous Infusions:   sodium chloride 50 mL/hr at 05/04/21 1201    DOPamine Stopped (05/04/21 0220)    sodium chloride      sodium chloride      sodium chloride       PRN Meds:LORazepam, sodium chloride, potassium chloride, potassium chloride, potassium chloride, magnesium sulfate, acetaminophen **OR** acetaminophen, ondansetron, potassium chloride **OR** potassium alternative oral replacement **OR** potassium chloride, sodium chloride, tiZANidine, sodium chloride flush, sodium chloride, polyethylene glycol    Review of Systems  {Review Of Systems:41317}    Objective:     Patient Vitals for the past 8 hrs:   BP Temp Temp src Pulse Resp SpO2   05/04/21 1700 122/62        05/04/21 1645 (!) 107/55        05/04/21 1630 (!) 140/65        05/04/21 1618 139/66        05/04/21 1500 129/82        05/04/21 1459 137/69 97.4 °F (36.3 °C) Oral 94 17 97 %   05/04/21 1445 137/69        05/04/21 1430 133/81        05/04/21 1130 (!) 110/51     96 %   21 1126 (!) 111/51     96 %   21 1120 (!) 87/44     95 %   21 1115 (!) 86/43     96 %   21 1110 (!) 91/44     94 %   21 1105 (!) 104/44        21 1100 (!) 119/52        21 1054 (!) 95/35        21 1053 (!) 96/10        21 1050 (!) 77/33          I/O last 3 completed shifts: In: 1025.3 [P.O.:390; I.V.:635.3]  Out: 1000 [Urine:900; Drains:100]    {Exam; Complete Normal And System Select:74958}    ECG: {Findings; diagnostics ecg readin}. Data Review:   CBC:  Lab Results   Component Value Date    WBC 5.8 2021    RBC 3.10 2021    HGB 8.7 2021    HCT 27.6 2021    MCV 89.0 2021    MCH 28.1 2021    MCHC 31.5 2021    RDW 14.5 2018     2021    MPV 11.1 2021     BMP  Lab Results   Component Value Date     2021    K 3.3 2021    K 3.3 2021     2021    CO2 26 2021    BUN 8 2021    CREATININE 0.6 2021    CALCIUM 8.2 2021      COAG PROFILE:  Lab Results   Component Value Date    APTT 94.0 2021    INR 1.76 2021       Assessment:     Active Problems:    Atrial fibrillation with RVR (HCC)    Abnormal ultrasound of gallbladder    Other chest pain    Moderate malnutrition (HCC)    Bilateral pleural effusion    Acute coronary syndrome (HCC)  Resolved Problems:    * No resolved hospital problems.  *      Plan:     ***

## 2021-05-04 NOTE — PLAN OF CARE
Problem: Falls - Risk of:  Goal: Will remain free from falls  Description: Will remain free from falls  Outcome: Ongoing  Goal: Absence of physical injury  Description: Absence of physical injury  Outcome: Ongoing  Patient free from falls thus far this shift. Call light in reach, bed rails up x 2, non-skid socks on, bed alarm on. Problem: Pain:  Goal: Pain level will decrease  Description: Pain level will decrease  Outcome: Ongoing  Goal: Control of acute pain  Description: Control of acute pain  Outcome: Ongoing  Goal: Control of chronic pain  Description: Control of chronic pain  Outcome: Ongoing  No complaints of pain thus far this shift. Patient lethargic but arousable by voice. Problem: DISCHARGE BARRIERS  Goal: Patient's continuum of care needs are met  Outcome: Ongoing  Discharge planning in progress. Problem: Skin Integrity:  Goal: Will show no infection signs and symptoms  Description: Will show no infection signs and symptoms  Outcome: Ongoing  Goal: Absence of new skin breakdown  Description: Absence of new skin breakdown  Outcome: Ongoing  Skin wounds documented in flowsheet. Patient turned every 2 hours, EPC cream applied to buttocks. Problem: Daily Care:  Goal: Daily care needs are met  Description: Daily care needs are met  Outcome: Ongoing  Daily care needs provided by staff. Patient needing encouragement for most of care. Care plan reviewed with patient. Patient verbalize understanding of the plan of care and contribute to goal setting.

## 2021-05-05 LAB
ABO CHECK: NORMAL
ALBUMIN SERPL-MCNC: 1.7 G/DL (ref 3.5–5.1)
ALP BLD-CCNC: 96 U/L (ref 38–126)
ALT SERPL-CCNC: 18 U/L (ref 11–66)
AMYLASE: 28 U/L (ref 20–104)
ANION GAP SERPL CALCULATED.3IONS-SCNC: 8 MEQ/L (ref 8–16)
AST SERPL-CCNC: 22 U/L (ref 5–40)
BACTERIA: ABNORMAL /HPF
BASOPHILS # BLD: 0.1 %
BASOPHILS ABSOLUTE: 0 THOU/MM3 (ref 0–0.1)
BILIRUB SERPL-MCNC: 0.4 MG/DL (ref 0.3–1.2)
BILIRUBIN URINE: NEGATIVE
BLOOD, URINE: ABNORMAL
BUN BLDV-MCNC: 11 MG/DL (ref 7–22)
CALCIUM SERPL-MCNC: 8 MG/DL (ref 8.5–10.5)
CASTS 2: ABNORMAL /LPF
CASTS UA: ABNORMAL /LPF
CHARACTER, URINE: ABNORMAL
CHLORIDE BLD-SCNC: 106 MEQ/L (ref 98–111)
CO2: 25 MEQ/L (ref 23–33)
COLOR: ABNORMAL
CREAT SERPL-MCNC: 0.8 MG/DL (ref 0.4–1.2)
CRYSTALS, UA: ABNORMAL
DACROCYTES: ABNORMAL
EKG ATRIAL RATE: 96 BPM
EKG P AXIS: 64 DEGREES
EKG P-R INTERVAL: 182 MS
EKG Q-T INTERVAL: 360 MS
EKG QRS DURATION: 76 MS
EKG QTC CALCULATION (BAZETT): 454 MS
EKG R AXIS: 36 DEGREES
EKG T AXIS: 55 DEGREES
EKG VENTRICULAR RATE: 96 BPM
ELLIPTOCYTES: ABNORMAL
EOSINOPHIL # BLD: 0.3 %
EOSINOPHILS ABSOLUTE: 0 THOU/MM3 (ref 0–0.4)
EPITHELIAL CELLS, UA: ABNORMAL /HPF
ERYTHROCYTE [DISTWIDTH] IN BLOOD BY AUTOMATED COUNT: 17.3 % (ref 11.5–14.5)
ERYTHROCYTE [DISTWIDTH] IN BLOOD BY AUTOMATED COUNT: 56.1 FL (ref 35–45)
FERRITIN: 2409 NG/ML (ref 10–291)
GEL INDIRECT COOMBS: NORMAL
GFR SERPL CREATININE-BSD FRML MDRD: 68 ML/MIN/1.73M2
GLUCOSE BLD-MCNC: 107 MG/DL (ref 70–108)
GLUCOSE URINE: NEGATIVE MG/DL
HCT VFR BLD CALC: 24.9 % (ref 37–47)
HCT VFR BLD CALC: 32.9 % (ref 37–47)
HEMOGLOBIN: 10.2 GM/DL (ref 12–16)
HEMOGLOBIN: 7.8 GM/DL (ref 12–16)
IMMATURE GRANS (ABS): 0.36 THOU/MM3 (ref 0–0.07)
IMMATURE GRANULOCYTES: 4.9 %
KETONES, URINE: ABNORMAL
LEUKOCYTE ESTERASE, URINE: ABNORMAL
LYMPHOCYTES # BLD: 6.3 %
LYMPHOCYTES ABSOLUTE: 0.5 THOU/MM3 (ref 1–4.8)
MCH RBC QN AUTO: 28.1 PG (ref 26–33)
MCHC RBC AUTO-ENTMCNC: 31.3 GM/DL (ref 32.2–35.5)
MCV RBC AUTO: 89.6 FL (ref 81–99)
MISCELLANEOUS 2: ABNORMAL
MONOCYTES # BLD: 4 %
MONOCYTES ABSOLUTE: 0.3 THOU/MM3 (ref 0.4–1.3)
NITRITE, URINE: NEGATIVE
NUCLEATED RED BLOOD CELLS: 0 /100 WBC
PH UA: 5 (ref 5–9)
PLATELET # BLD: 182 THOU/MM3 (ref 130–400)
PLATELET ESTIMATE: ADEQUATE
PMV BLD AUTO: 10.7 FL (ref 9.4–12.4)
POIKILOCYTES: ABNORMAL
POTASSIUM SERPL-SCNC: 3.7 MEQ/L (ref 3.5–5.2)
POTASSIUM SERPL-SCNC: 3.9 MEQ/L (ref 3.5–5.2)
POTASSIUM SERPL-SCNC: 4.1 MEQ/L (ref 3.5–5.2)
PROTEIN UA: 100
RBC # BLD: 2.78 MILL/MM3 (ref 4.2–5.4)
RBC URINE: ABNORMAL /HPF
RENAL EPITHELIAL, UA: ABNORMAL
RH FACTOR: NORMAL
SCAN OF BLOOD SMEAR: NORMAL
SEG NEUTROPHILS: 84.4 %
SEGMENTED NEUTROPHILS ABSOLUTE COUNT: 6.2 THOU/MM3 (ref 1.8–7.7)
SODIUM BLD-SCNC: 139 MEQ/L (ref 135–145)
SPECIFIC GRAVITY, URINE: > 1.03 (ref 1–1.03)
TOTAL PROTEIN: 4.7 G/DL (ref 6.1–8)
TROPONIN T: 0.04 NG/ML
UROBILINOGEN, URINE: 0.2 EU/DL (ref 0–1)
WBC # BLD: 7.3 THOU/MM3 (ref 4.8–10.8)
WBC UA: ABNORMAL /HPF
YEAST: ABNORMAL

## 2021-05-05 PROCEDURE — 86901 BLOOD TYPING SEROLOGIC RH(D): CPT

## 2021-05-05 PROCEDURE — 86885 COOMBS TEST INDIRECT QUAL: CPT

## 2021-05-05 PROCEDURE — 87077 CULTURE AEROBIC IDENTIFY: CPT

## 2021-05-05 PROCEDURE — 2580000003 HC RX 258: Performed by: REGISTERED NURSE

## 2021-05-05 PROCEDURE — 81001 URINALYSIS AUTO W/SCOPE: CPT

## 2021-05-05 PROCEDURE — 6360000002 HC RX W HCPCS: Performed by: REGISTERED NURSE

## 2021-05-05 PROCEDURE — 6360000002 HC RX W HCPCS: Performed by: INTERNAL MEDICINE

## 2021-05-05 PROCEDURE — 84132 ASSAY OF SERUM POTASSIUM: CPT

## 2021-05-05 PROCEDURE — 2500000003 HC RX 250 WO HCPCS

## 2021-05-05 PROCEDURE — 2500000003 HC RX 250 WO HCPCS: Performed by: NURSE PRACTITIONER

## 2021-05-05 PROCEDURE — 82728 ASSAY OF FERRITIN: CPT

## 2021-05-05 PROCEDURE — 6370000000 HC RX 637 (ALT 250 FOR IP): Performed by: REGISTERED NURSE

## 2021-05-05 PROCEDURE — P9016 RBC LEUKOCYTES REDUCED: HCPCS

## 2021-05-05 PROCEDURE — 6360000002 HC RX W HCPCS: Performed by: FAMILY MEDICINE

## 2021-05-05 PROCEDURE — 97530 THERAPEUTIC ACTIVITIES: CPT

## 2021-05-05 PROCEDURE — 85014 HEMATOCRIT: CPT

## 2021-05-05 PROCEDURE — 6370000000 HC RX 637 (ALT 250 FOR IP): Performed by: NURSE PRACTITIONER

## 2021-05-05 PROCEDURE — 94669 MECHANICAL CHEST WALL OSCILL: CPT

## 2021-05-05 PROCEDURE — 80053 COMPREHEN METABOLIC PANEL: CPT

## 2021-05-05 PROCEDURE — 87186 SC STD MICRODIL/AGAR DIL: CPT

## 2021-05-05 PROCEDURE — 93010 ELECTROCARDIOGRAM REPORT: CPT | Performed by: INTERNAL MEDICINE

## 2021-05-05 PROCEDURE — 2140000000 HC CCU INTERMEDIATE R&B

## 2021-05-05 PROCEDURE — 93005 ELECTROCARDIOGRAM TRACING: CPT | Performed by: INTERNAL MEDICINE

## 2021-05-05 PROCEDURE — 82150 ASSAY OF AMYLASE: CPT

## 2021-05-05 PROCEDURE — 2580000003 HC RX 258: Performed by: SPECIALIST

## 2021-05-05 PROCEDURE — 36415 COLL VENOUS BLD VENIPUNCTURE: CPT

## 2021-05-05 PROCEDURE — 6370000000 HC RX 637 (ALT 250 FOR IP): Performed by: INTERNAL MEDICINE

## 2021-05-05 PROCEDURE — 86905 BLOOD TYPING RBC ANTIGENS: CPT

## 2021-05-05 PROCEDURE — 84484 ASSAY OF TROPONIN QUANT: CPT

## 2021-05-05 PROCEDURE — 85018 HEMOGLOBIN: CPT

## 2021-05-05 PROCEDURE — 36430 TRANSFUSION BLD/BLD COMPNT: CPT

## 2021-05-05 PROCEDURE — 99232 SBSQ HOSP IP/OBS MODERATE 35: CPT | Performed by: SURGERY

## 2021-05-05 PROCEDURE — 86900 BLOOD TYPING SEROLOGIC ABO: CPT

## 2021-05-05 PROCEDURE — 97535 SELF CARE MNGMENT TRAINING: CPT

## 2021-05-05 PROCEDURE — 85025 COMPLETE CBC W/AUTO DIFF WBC: CPT

## 2021-05-05 PROCEDURE — 86922 COMPATIBILITY TEST ANTIGLOB: CPT

## 2021-05-05 PROCEDURE — 87086 URINE CULTURE/COLONY COUNT: CPT

## 2021-05-05 RX ORDER — MORPHINE SULFATE 2 MG/ML
1 INJECTION, SOLUTION INTRAMUSCULAR; INTRAVENOUS ONCE
Status: COMPLETED | OUTPATIENT
Start: 2021-05-05 | End: 2021-05-05

## 2021-05-05 RX ORDER — METOPROLOL TARTRATE 5 MG/5ML
5 INJECTION INTRAVENOUS ONCE
Status: DISCONTINUED | OUTPATIENT
Start: 2021-05-05 | End: 2021-05-10

## 2021-05-05 RX ORDER — SODIUM CHLORIDE 9 MG/ML
INJECTION, SOLUTION INTRAVENOUS PRN
Status: DISCONTINUED | OUTPATIENT
Start: 2021-05-05 | End: 2021-05-06 | Stop reason: ALTCHOICE

## 2021-05-05 RX ORDER — SODIUM CHLORIDE 9 MG/ML
INJECTION, SOLUTION INTRAVENOUS PRN
Status: COMPLETED | OUTPATIENT
Start: 2021-05-05 | End: 2021-05-05

## 2021-05-05 RX ORDER — METOPROLOL TARTRATE 5 MG/5ML
INJECTION INTRAVENOUS
Status: COMPLETED
Start: 2021-05-05 | End: 2021-05-05

## 2021-05-05 RX ORDER — FUROSEMIDE 10 MG/ML
20 INJECTION INTRAMUSCULAR; INTRAVENOUS ONCE
Status: COMPLETED | OUTPATIENT
Start: 2021-05-05 | End: 2021-05-05

## 2021-05-05 RX ADMIN — SODIUM CHLORIDE, PRESERVATIVE FREE 10 ML: 5 INJECTION INTRAVENOUS at 09:16

## 2021-05-05 RX ADMIN — SUCRALFATE 1 G: 1 TABLET ORAL at 17:00

## 2021-05-05 RX ADMIN — IPRATROPIUM BROMIDE AND ALBUTEROL SULFATE 1 AMPULE: .5; 3 SOLUTION RESPIRATORY (INHALATION) at 16:32

## 2021-05-05 RX ADMIN — SODIUM BICARBONATE 1300 MG: 650 TABLET ORAL at 09:16

## 2021-05-05 RX ADMIN — METOPROLOL TARTRATE 25 MG: 25 TABLET, FILM COATED ORAL at 09:06

## 2021-05-05 RX ADMIN — MIRTAZAPINE 7.5 MG: 15 TABLET, FILM COATED ORAL at 20:01

## 2021-05-05 RX ADMIN — METOPROLOL TARTRATE 5 MG: 5 INJECTION INTRAVENOUS at 09:35

## 2021-05-05 RX ADMIN — MORPHINE SULFATE 1 MG: 2 INJECTION, SOLUTION INTRAMUSCULAR; INTRAVENOUS at 21:34

## 2021-05-05 RX ADMIN — AMIODARONE HYDROCHLORIDE 200 MG: 200 TABLET ORAL at 09:11

## 2021-05-05 RX ADMIN — POTASSIUM CHLORIDE 20 MEQ: 400 INJECTION, SOLUTION INTRAVENOUS at 03:39

## 2021-05-05 RX ADMIN — IPRATROPIUM BROMIDE AND ALBUTEROL SULFATE 1 AMPULE: .5; 3 SOLUTION RESPIRATORY (INHALATION) at 08:53

## 2021-05-05 RX ADMIN — PIPERACILLIN AND TAZOBACTAM 3375 MG: 3; .375 INJECTION, POWDER, LYOPHILIZED, FOR SOLUTION INTRAVENOUS at 19:58

## 2021-05-05 RX ADMIN — SUCRALFATE 1 G: 1 TABLET ORAL at 09:16

## 2021-05-05 RX ADMIN — POTASSIUM CHLORIDE 20 MEQ: 400 INJECTION, SOLUTION INTRAVENOUS at 07:40

## 2021-05-05 RX ADMIN — ONDANSETRON 4 MG: 2 INJECTION INTRAMUSCULAR; INTRAVENOUS at 20:08

## 2021-05-05 RX ADMIN — SUCRALFATE 1 G: 1 TABLET ORAL at 20:01

## 2021-05-05 RX ADMIN — APIXABAN 5 MG: 5 TABLET, FILM COATED ORAL at 09:16

## 2021-05-05 RX ADMIN — LATANOPROST 1 DROP: 50 SOLUTION OPHTHALMIC at 20:02

## 2021-05-05 RX ADMIN — SERTRALINE HYDROCHLORIDE 50 MG: 50 TABLET, FILM COATED ORAL at 09:16

## 2021-05-05 RX ADMIN — FAMOTIDINE 20 MG: 10 INJECTION, SOLUTION INTRAVENOUS at 19:58

## 2021-05-05 RX ADMIN — ASPIRIN 325 MG: 325 TABLET, COATED ORAL at 09:08

## 2021-05-05 RX ADMIN — MIDODRINE HYDROCHLORIDE 10 MG: 10 TABLET ORAL at 09:07

## 2021-05-05 RX ADMIN — SODIUM CHLORIDE 20 ML/HR: 9 INJECTION, SOLUTION INTRAVENOUS at 14:12

## 2021-05-05 RX ADMIN — METOPROLOL TARTRATE 25 MG: 25 TABLET, FILM COATED ORAL at 20:01

## 2021-05-05 RX ADMIN — PIPERACILLIN AND TAZOBACTAM 3375 MG: 3; .375 INJECTION, POWDER, LYOPHILIZED, FOR SOLUTION INTRAVENOUS at 11:55

## 2021-05-05 RX ADMIN — SODIUM BICARBONATE 1300 MG: 650 TABLET ORAL at 20:01

## 2021-05-05 RX ADMIN — APIXABAN 5 MG: 5 TABLET, FILM COATED ORAL at 20:01

## 2021-05-05 RX ADMIN — IPRATROPIUM BROMIDE AND ALBUTEROL SULFATE 1 AMPULE: .5; 3 SOLUTION RESPIRATORY (INHALATION) at 12:43

## 2021-05-05 RX ADMIN — MIDODRINE HYDROCHLORIDE 10 MG: 10 TABLET ORAL at 17:01

## 2021-05-05 RX ADMIN — SODIUM CHLORIDE, PRESERVATIVE FREE 10 ML: 5 INJECTION INTRAVENOUS at 20:33

## 2021-05-05 RX ADMIN — TIZANIDINE 4 MG: 4 TABLET ORAL at 09:16

## 2021-05-05 RX ADMIN — SODIUM CHLORIDE: 9 INJECTION, SOLUTION INTRAVENOUS at 07:40

## 2021-05-05 RX ADMIN — MIDODRINE HYDROCHLORIDE 10 MG: 10 TABLET ORAL at 12:03

## 2021-05-05 RX ADMIN — FUROSEMIDE 20 MG: 10 INJECTION, SOLUTION INTRAMUSCULAR; INTRAVENOUS at 23:01

## 2021-05-05 RX ADMIN — FAMOTIDINE 20 MG: 10 INJECTION, SOLUTION INTRAVENOUS at 09:13

## 2021-05-05 RX ADMIN — ATORVASTATIN CALCIUM 20 MG: 20 TABLET, FILM COATED ORAL at 20:01

## 2021-05-05 RX ADMIN — SUCRALFATE 1 G: 1 TABLET ORAL at 12:03

## 2021-05-05 RX ADMIN — PIPERACILLIN AND TAZOBACTAM 3375 MG: 3; .375 INJECTION, POWDER, LYOPHILIZED, FOR SOLUTION INTRAVENOUS at 03:39

## 2021-05-05 ASSESSMENT — PAIN SCALES - WONG BAKER
WONGBAKER_NUMERICALRESPONSE: 0

## 2021-05-05 ASSESSMENT — PAIN DESCRIPTION - PROGRESSION
CLINICAL_PROGRESSION: NOT CHANGED

## 2021-05-05 ASSESSMENT — PAIN SCALES - GENERAL: PAINLEVEL_OUTOF10: 0

## 2021-05-05 NOTE — FLOWSHEET NOTE
05/05/21 0518   Provider Notification   Reason for Communication Evaluate   Provider Name Dr. Meghan Bearden   Provider Notification Physician   Method of Communication Secure Message   Response No new orders   Notification Time 21    Shift Event Other (comment)       hbg dropped from 8.7 to 7.8 this AM, and her hbg on admission was 9.9. and Pt. has only had 250 mls of urine out this 12 hr shift.

## 2021-05-05 NOTE — PROGRESS NOTES
Aure bojorquez called for updates. HIPPA code was provided by daughter, all questions and updates answered at this time.

## 2021-05-05 NOTE — PROGRESS NOTES
21 1200 (!) 86/57   62     21 1130 (!) 97/59   70       I/O last 3 completed shifts: In: 1711.6 [P.O.:240; I.V.:1471.6]  Out: 350 [Urine:350]    {Exam; Complete Normal And System Select:32899}    ECG: {Findings; diagnostics ecg readin}. Data Review:   CBC:  Lab Results   Component Value Date    WBC 7.3 2021    RBC 2.78 2021    HGB 7.8 2021    HCT 24.9 2021    MCV 89.6 2021    MCH 28.1 2021    MCHC 31.3 2021    RDW 14.5 2018     2021    MPV 10.7 2021     BMP  Lab Results   Component Value Date     2021    K 4.1 2021    K 3.3 2021     2021    CO2 25 2021    BUN 11 2021    CREATININE 0.8 2021    CALCIUM 8.0 2021      COAG PROFILE:  Lab Results   Component Value Date    APTT 94.0 2021    INR 1.76 2021       Assessment:     Active Problems:    Atrial fibrillation with RVR (HCC)    Abnormal ultrasound of gallbladder    Other chest pain    Moderate malnutrition (HCC)    Bilateral pleural effusion    Acute coronary syndrome (HCC)  Resolved Problems:    * No resolved hospital problems.  *      Plan:     ***

## 2021-05-05 NOTE — CARE COORDINATION
5/5/21, 12:25 PM EDT    DISCHARGE  [address]ospital [address] day: 7  Location: -23/023-A Reason for admit: Atrial fibrillation with RVR ([address] [I48.91]   Procedure:   4/27 CXR - left basilar atelectasis. Tiny bilateral pleural effusions suspected. 4/28 CTA chest - No PE. Small-moderate sized bilateral pleural effusions, increased in size. Bilateral multifocal atelectasis.   4/28 US GB - Sludge or stones within the gallbladder with gallbladder wall thickening and dilated common bile duct. These findings may represent acute cholecystitis.   4/29 HIDA scan - Patent common bile duct. Nonvisualization of the gallbladder highly suspicious for acute cholecystitis. 4/29 CVC placed - Left IJ.   4/30 Perc cholecystectomy drain inserted. Barriers to Discharge: Tmax 99.6F. O2 at 1L/nc. Sats 93%. Garcia. Calcium 8.0, total protein 4.7, Albumin 1.7. Hgb 7.8. Potassium 3.7 and 4.1 today. IVF. Amiodarone po daily, Eliquis po bid, baby ASA daily, Lipitor nightly, Pepcid iv bid, Diflucan iv daily, DuoNebs q4hr WA, Lopressor po bid. Midodrine tid. Remeron nightly. Zosyn iv q8hr. Zoloft daily. Sodium bicarb tabs bid. Carafate quid. Electrolyte replacements. Surgery signed off today, clamped biliary drain and plans to remove in 2 weeks. Palliative Care following. PT/OT/SLP. PCP: Zainab Levi MD  Readmission Risk Score: 39%  Patient Goals/Plan/Treatment Preferences: Plans to return to [facility].  SW following.

## 2021-05-05 NOTE — PROGRESS NOTES
[address] [place] [address]  Occupational Therapy  Daily Note  Time:   Time In: 9031  Time Out: 1148  Timed Code Treatment Minutes: 25 Minutes  Minutes: 25          Date: 2021  Patient Name: Lukasz Burgos,   Gender: female      Room: -23/023-A  MRN: [id number]  : 1935  (81 y.o.)  Referring Practitioner: RYAN Rodriguez  Diagnosis: Atrial Fibrillation witih RVR  Additional Pertinent Hx: Pt presented with chest pain. Patient she just did not feel well she could not really say what it was she had no abdominal pain no nausea no vomiting. She said she had no appetite just was not eating because she just did not feel like it. Found in atrial fibrillation with rapid ventricular response and was admitted for work-up of this. Pt is S/P percutaneous cholecystostomy on 21. Restrictions/Precautions:  Restrictions/Precautions: Isolation, Fall Risk     SUBJECTIVE: Pt resting in bed upon arrival, agreeable to OT session motivated for up to chair this session. PAIN: Denies. Vitals: Orthostatic Blood Pressure: 88/50 after pt c/o dizziness after first standing trial. RN assessed at conclusion of session. COGNITION: Slow Processing, Decreased Recall, Decreased Insight, Decreased Problem Solving and Decreased Safety Awareness    ADL:   Toileting: Maximum Assistance. After incontinence of BM. Kailey Talavera BALANCE:  Sitting Balance:  Contact Guard Assistance. EOB  Standing Balance: Maximum Assistance, X 2. Pt unable to achieve full stand x2 attempts- retured to supine. BED MOBILITY:  Rolling to Right: Minimal Assistance, X 2    Supine to Sit: Moderate Assistance, X 1    Sit to Supine: Moderate Assistance, X 2    Scooting: Minimal Assistance, X 1 EOB    TRANSFERS:  Sit to Stand:  Maximum Assistance, X 2.    Stand to Sit: Maximum Assistance, X 2.      FUNCTIONAL MOBILITY:  OTR to further assess. ASSESSMENT:     Activity Tolerance:  Patient tolerance of  treatment: fair. Pt limited by secondary medical concerns (+ orthostatics)      Discharge Recommendations: ECF with OT, Patient would benefit from continued therapy after discharge   Equipment Recommendations: Equipment Needed: No  Plan: Times per week: 3-5x  Specific instructions for Next Treatment: Functional mobility as able; ADLs and sitting balance; UE exercises ROM/light resistance  Current Treatment Recommendations: Strengthening, ROM, Endurance Training, Self-Care / ADL, Safety Education & Training, Functional Mobility Training, Balance Training  Plan Comment: Pt would benefit from continued skilled OT services when medically stable and discharged from Acute. OT recommended while at the [facility]. Patient Education  Patient Education: ADL's, Importance of Increasing Activity and orthostatics, and safety with transfers. Goals  Short term goals  Time Frame for Short term goals: By discharge  Short term goal 1: Pt will demonstrate functional mobility with OTR while using a rolling walker as able to prepare for doing self care while out of bed. Short term goal 2: Pt will complete upper body ADLs while having setup A with cues for keeping her sitting balance to increase her independence with self care. Short term goal 3: Pt will complete BUE ROM and light resistance exercises while following demonstration or verbal cues to increase her endurance and strength for ease of doing ADLs and functional mobility. Short term goal 4: Pt will complete pivot transfers to/from the bedside commode with MIN A and cues for safety to increase her independence with toileting routine. Following session, patient left in safe position with all fall risk precautions in place.

## 2021-05-05 NOTE — PROGRESS NOTES
Progress note      [facility]             Patient:  Neli Han  YOB: 1935    MRN: [id number]   Acct:  [de-identified]   3B-23/023-A  Primary Care Physician: Chip Mckeon MD    Admit Date: 4/27/2021           Subjective: Pt resting in bed. No new complaints.  Noted pt had episode of A fib with RVR again this AM.         Objective:      Physical Exam:    Vitals:  Patient Vitals for the past 24 hrs:   BP Temp Temp src Pulse Resp SpO2   05/05/21 0703 (!) 141/93 -- -- -- -- 93 %   05/05/21 0603 (!) 140/62 -- -- -- -- 92 %   05/05/21 0502 (!) 123/56 -- -- -- -- 93 %   05/05/21 0404 124/61 -- -- -- -- 94 %   05/05/21 0340 (!) 129/58 -- -- -- -- 97 %   05/05/21 0300 130/60 -- -- 76 17 98 %   05/05/21 0200 (!) 115/54 -- -- -- -- --   05/05/21 0105 (!) 100/58 -- -- -- -- --   05/05/21 0002 (!) 114/59 -- -- -- -- --   05/04/21 2317 (!) 114/56 98.1 °F (36.7 °C) Oral 80 18 96 %   05/04/21 2210 115/82 -- -- -- -- --   05/04/21 2110 (!) 105/55 -- -- -- -- --   05/04/21 2002 (!) 98/52 97.7 °F (36.5 °C) Oral 74 19 97 %   05/04/21 1700 122/62 -- -- -- -- --   05/04/21 1645 (!) 107/55 -- -- -- -- --   05/04/21 1630 (!) 140/65 -- -- -- -- --   05/04/21 1618 139/66 -- -- -- -- --   05/04/21 1500 129/82 -- -- -- -- --   05/04/21 1459 137/69 97.4 °F (36.3 °C) Oral 94 17 97 %   05/04/21 1445 137/69 -- -- -- -- --   05/04/21 1430 133/81 -- -- -- -- --   05/04/21 1130 (!) 110/51 -- -- -- -- 96 %   05/04/21 1126 (!) 111/51 -- -- -- -- 96 %   05/04/21 1120 (!) 87/44 -- -- -- -- 95 %   05/04/21 1115 (!) 86/43 -- -- -- -- 96 %   05/04/21 1110 (!) 91/44 -- -- -- -- 94 %   05/04/21 1105 (!) 104/44 -- -- -- -- --   05/04/21 1100 (!) 119/52 -- -- -- -- --   05/04/21 1054 (!) 95/35 -- -- -- -- --   05/04/21 1053 (!) 96/10 -- -- -- -- --   05/04/21 1050 (!) 77/33 -- -- -- -- --     Weight: Weight: 145 lb 9.6 oz (66 kg)     24 hour Scheduled Meds: Scheduled Meds:   metoprolol  5 mg Intravenous Once    metoprolol        ipratropium-albuterol  1 ampule Inhalation Q4H WA    piperacillin-tazobactam  3,375 mg Intravenous Q8H    midodrine  10 mg Oral TID WC    phosphorus replacement protocol   Other RX Placeholder    famotidine (PEPCID) injection  20 mg Intravenous BID    apixaban  5 mg Oral BID    aspirin  325 mg Oral Daily    fluconazole  400 mg Intravenous Q24H    amiodarone  200 mg Oral Daily    [Held by provider] docusate sodium  100 mg Oral BID    latanoprost  1 drop Both Eyes Nightly    mirtazapine  7.5 mg Oral Nightly    sertraline  50 mg Oral Daily    sodium bicarbonate  1,300 mg Oral BID    sucralfate  1 g Oral 4x Daily AC & HS    sodium chloride flush  5-40 mL Intravenous 2 times per day    metoprolol tartrate  25 mg Oral BID    atorvastatin  20 mg Oral Nightly     Continuous Infusions:   sodium chloride 50 mL/hr at 05/05/21 0740    DOPamine Stopped (05/05/21 0202)    sodium chloride      sodium chloride      sodium chloride       PRN Meds:. LORazepam, sodium chloride, potassium chloride, potassium chloride, potassium chloride, magnesium sulfate, acetaminophen **OR** acetaminophen, ondansetron, potassium chloride **OR** potassium alternative oral replacement **OR** potassium chloride, sodium chloride, tiZANidine, sodium chloride flush, sodium chloride, polyethylene glycol  Continuous Infusions:   sodium chloride 50 mL/hr at 05/05/21 0740    DOPamine Stopped (05/05/21 0202)    sodium chloride      sodium chloride      sodium chloride           Assessment/Plan   1. Chest pain  a. Cardiology following  b. Pt not having chest pain right now  c. Spoke with Dr Lauren Altamirano and he has plans to cath the pt once her cholecystostomy tube is removed and she is more stable  2. Atrial Fibrillation with RVR anticoagulated with Eliquis  a. Pt on lopressor and amio  b.  Noted pt had an episode of A fib with RVR this AM; she received an additional 5mg Lopressor IV   c. Pt in NSR this AM  d. Cardiology following   3. Acute respiratory failure  a. On RA now  b. Duoneb Q4h; It pt starts to get tachycardic consider switching to Xopenex   c. Metaneb Q4h per pulmonary  d. Procal was elevated yesterday so sepsis can be assumed; IV zosyn was started yesterday  e. Pulmonary has no plans for thoracentesis at this time   4. Acute cholecystitis  a. Drain clamped  b. Surgery signed off with plans to follow up 2 weeks after discharge  c. Drain can remain in at discharge clamped   5. Hypotension Hx of   a. Pt was weaned off dopamine  b. BP on arrival to pt's room 88/52 with MAP of 66  c. If BP continues to drop or MAP <65, restart dopamine drip  d. Cont Midodrine  e. Cont IVF   6. Urinary retention  a. Has alvarez catheter   b. Follows with urology outpatient  7. UTI  a. Cont Diflucan  b. Cont Zosyn  8. Microcytic anemia  a. Hgb stable  b. Pt on Eliquis; monitor hgb while on anticoagulation  c. Hematology following  9. Hypokalemia  a. Cont replacement protocol   10. CHF reduced EF   a. Noted latest echocardiogram shows drop in EF to 40-45%  b. Monitor daily weights + strict I/O  c. Cont BB and ASA  d. Spoke with cardiology and he plans to cath the pt once she is more stable and the cholecystostomy tube has been removed   11. HLD  a. Cont statin  12. DVT's left leg  a. Noted increased number of DVT's since prior admission  b. Pt now back on Eliquis   c. Hematology consult   Assessment and plan of care discussed with supervising physician, Dr Shelba Osgood. Electronically signed by YASMINE Vazquez CNP on 5/5/2021 at 10:16 AM       I have discussed the case, including pertinent history and exam findings with the NP. I have seen and examined the patient and the key elements of the encounter have been performed by me. I agree with the assessment, plan and orders as documented by the NP.     Seen by hem/onc    Electronically signed by Leeanna Calvin MD on 5/5/2021 at 2:14 PM

## 2021-05-05 NOTE — PLAN OF CARE
Problem: Nutrition  Goal: Optimal nutrition therapy  Outcome: Ongoing  Nutrition Problem #1: Moderate malnutrition, In context of acute illness or injury  Intervention: Food and/or Nutrient Delivery: Continue Current Diet, Continue Oral Nutrition Supplement  Nutritional Goals: Pt. will tolerate and consume 75% or more of meals to promote wound healing during LOS.

## 2021-05-05 NOTE — PROGRESS NOTES
Comprehensive Nutrition Assessment    Type and Reason for Visit:  Reassess    Nutrition Recommendations/Plan:   Will continue Ensure Clear TID per pt. Request.  Diet as per SLP. Recommend MVI. Nutrition Assessment:    Pt improving from a nutritional standpoint AEB reported improving po intake, acceptance of ONS; improved tolerance of po diet. Remains at risk for further nutritional compromise r/t increased nutrient needs for wound healing, moderate malnutrition, chronic cholecystitis, GB drain placement 4/30/21 and underlying medical condition (hx CAD, COPD, HLD, hepatitis, CHF). Nutrition recommendations/interventions as per above. Malnutrition Assessment:  Malnutrition Status: Moderate malnutrition    Context:  Acute Illness     Findings of the 6 clinical characteristics of malnutrition:  Energy Intake:  1 - 75% or less of estimated energy requirements for 7 or more days  Weight Loss:  No significant weight loss(however edema present)     Body Fat Loss:  1 - Mild body fat loss Orbital   Muscle Mass Loss:  1 - Mild muscle mass loss Temples (temporalis)  Fluid Accumulation:  1 - Mild Extremities   Strength:  Not Performed    Estimated Daily Nutrient Needs:  Energy (kcal):  5969-0474 kcals (20-25 kcals/kg/day); Weight Used for Energy Requirements:  Current(69 kg 4/28/21)     Protein (g):  62-96 grams (1.2-2 grams/kg IBW /day);  Weight Used for Protein Requirements:  Ideal(48 kg)          Nutrition Related Findings:  Pt seen - reports appetite is fair; states tolerating diet \"better\" with dysphagia diet; reports acceptance of Ensure Clear; states BM 5/4; Rx includes Remeron, ATB, Glycolaxx, Carafate; 5/5: Glucose 107, BUN 11, Cr 0.8, alb 1.; per MD - GB drain clamped      Wounds:  Multiple(skin tear-buttocks, PI -coccyx, incision-hip)       Current Nutrition Therapies:    Dietary Nutrition Supplements: Clear Liquid Oral Supplement  DIET DYSPHAGIA MINCED AND MOIST; Low Caffeine    Anthropometric Measures:  · Height: 5' 1\" (154.9 cm)  · Current Body Weight: 145 lb 9.6 oz (66 kg)(5/4 +2 edema)   · Admission Body Weight: 152 lb 12.5 oz (69.3 kg)(4/28/21 +1 RLE, +2 LLE edema)    · Usual Body Weight: (~ 150# per pt. Per EMR: 5/15/21: 153#6. 4oz, 2/4/21: 153#2oz, 4/1/21: 138#9. 6oz)     · Ideal Body Weight: 105 lbs;     · BMI: 27.5  · BMI Categories: Overweight (BMI 25.0-29. 9)       Nutrition Diagnosis:   · Moderate malnutrition, In context of acute illness or injury related to inadequate protein-energy intake as evidenced by poor intake prior to admission, mild muscle loss, mild loss of subcutaneous fat      Nutrition Interventions:   Food and/or Nutrient Delivery:  Continue Current Diet, Continue Oral Nutrition Supplement  Nutrition Education/Counseling:  Education initiated(Encouraged oral intake and ONS use.)   Coordination of Nutrition Care:  Continue to monitor while inpatient    Goals:  Pt. will tolerate and consume 75% or more of meals to promote wound healing during LOS. Nutrition Monitoring and Evaluation:   Behavioral-Environmental Outcomes:  None Identified   Food/Nutrient Intake Outcomes:  Diet Advancement/Tolerance, Food and Nutrient Intake, Supplement Intake  Physical Signs/Symptoms Outcomes:  Biochemical Data, GI Status, Fluid Status or Edema, Hemodynamic Status, Nutrition Focused Physical Findings, Skin, Weight     Discharge Planning:     Too soon to determine     Electronically signed by Jennyfer Meredith, MEHREEN, LD on 5/5/21 at 2:31 PM EDT    Contact: [phone]

## 2021-05-05 NOTE — PROGRESS NOTES
[facility] -  Dr. Brie Worthington  Daily Progress Note    Pt Name: Tavon Campo  Medical Record Number: [id number]  Date of Birth 1935   Today's Date: 5/5/2021    ASSESSMENT:     1. HD # [id number] Problems    Diagnosis Date Noted    Bilateral pleural effusion [J90]     Acute coronary syndrome ([address] [I24.9]     Moderate malnutrition ([address] [E44.0] 05/01/2021     Class: Acute    Abnormal ultrasound of gallbladder [R93.2] 04/29/2021    Other chest pain [R07.89] 04/29/2021    Atrial fibrillation with RVR [facility]) [I48.91] 04/28/2021       Chief Complaint:  Chief Complaint   Patient presents with    Chest Pain     due to pneumonia           PLAN:     2. Tube injection, cystic and common wide open no filling defects  3. Tube is clamped and can remain so  4. Will need to leave in 2 weeks prior to removal in office  5. Have follow up in office 2 weeks post discharge  6. Will sign off, call prn       SUBJECTIVE:     Ian Sandoval is on 3b.   Drain clamped      OBJECTIVE:     Patient Vitals for the past 24 hrs:   BP Temp Temp src Pulse Resp SpO2   05/05/21 0603 (!) 140/62 -- -- -- -- 92 %   05/05/21 0502 (!) 123/56 -- -- -- -- 93 %   05/05/21 0404 124/61 -- -- -- -- 94 %   05/05/21 0340 (!) 129/58 -- -- -- -- 97 %   05/05/21 0300 130/60 -- -- 76 17 98 %   05/05/21 0200 (!) 115/54 -- -- -- -- --   05/05/21 0105 (!) 100/58 -- -- -- -- --   05/05/21 0002 (!) 114/59 -- -- -- -- --   05/04/21 2317 (!) 114/56 98.1 °F (36.7 °C) Oral 80 18 96 %   05/04/21 2210 115/82 -- -- -- -- --   05/04/21 2110 (!) 105/55 -- -- -- -- --   05/04/21 2002 (!) 98/52 97.7 °F (36.5 °C) Oral 74 19 97 %   05/04/21 1700 122/62 -- -- -- -- --   05/04/21 1645 (!) 107/55 -- -- -- -- --   05/04/21 1630 (!) 140/65 -- -- -- -- --   05/04/21 1618 139/66 -- -- -- -- --   05/04/21 1500 129/82 -- -- -- -- --   05/04/21 1459 137/69 97.4 °F (36.3 °C) Oral 94 17 97 %   05/04/21 1445 137/69 -- -- -- -- --   05/04/21 1430 sodium  100 mg Oral BID    latanoprost  1 drop Both Eyes Nightly    mirtazapine  7.5 mg Oral Nightly    sertraline  50 mg Oral Daily    sodium bicarbonate  1,300 mg Oral BID    sucralfate  1 g Oral 4x Daily AC & HS    sodium chloride flush  5-40 mL Intravenous 2 times per day    metoprolol tartrate  25 mg Oral BID    atorvastatin  20 mg Oral Nightly     Continuous Infusions:   sodium chloride 50 mL/hr at 05/04/21 1201    DOPamine Stopped (05/05/21 0202)    sodium chloride      sodium chloride      sodium chloride       PRN Meds:LORazepam, 0.5 mg, Q6H PRN  sodium chloride, , PRN  potassium chloride, 20 mEq, PRN  potassium chloride, 20 mEq, PRN  potassium chloride, 20 mEq, PRN  magnesium sulfate, 2,000 mg, PRN  acetaminophen, 650 mg, Q4H PRN    Or  acetaminophen, 650 mg, Q4H PRN  ondansetron, 4 mg, Q6H PRN  potassium chloride, 40 mEq, PRN    Or  potassium alternative oral replacement, 40 mEq, PRN    Or  potassium chloride, 10 mEq, PRN  sodium chloride, , PRN  tiZANidine, 4 mg, Q8H PRN  sodium chloride flush, 5-40 mL, PRN  sodium chloride, 25 mL, PRN  polyethylene glycol, 17 g, Daily PRN          PHYSICAL EXAM:     GENERAL: Presents sitting upright in bed unassisted, Awake and alert; In no acute distress and well nourished. ABDOMEN: Abdomen is non distended. Bowel sounds present in all four quadrants. Soft and non tender to palpation in all quadrants. Perc drain RUQ, capped off               LABS:     CBC:   Recent Labs     05/03/21  1600 05/03/21  1600 05/03/21  2353 05/04/21  0455 05/05/21  0225   WBC 4.2*  --   --  5.8 7.3   RBC 2.65*  --   --  3.10* 2.78*   HGB 7.4*   < > 8.3* 8.7* 7.8*   HCT 24.0*   < > 26.5* 27.6* 24.9*   MCV 90.6  --   --  89.0 89.6   MCH 27.9  --   --  28.1 28.1   MCHC 30.8*  --   --  31.5* 31.3*     --   --  226 182   MPV 10.8  --   --  11.1 10.7    < > = values in this interval not displayed.       Last 3 CMP:   Recent Labs     05/03/21  0445 05/03/21  1600 05/04/21  0455 05/05/21  0225 05/05/21  0630    137 138 139  --    K 2.8* 4.0 3.3* 3.7 3.9    106 103 106  --    CO2 24 25 26 25  --    BUN 7 8 8 11  --    CREATININE 0.5 0.7 0.6 0.8  --    GLUCOSE 74 100 88 107  --    CALCIUM 8.3* 7.7* 8.2* 8.0*  --    PROT 5.6*  --  5.3* 4.7*  --    LABALBU 2.2*  --  2.0* 1.7*  --    BILITOT 0.3  --  0.3 0.4  --    ALKPHOS 121  --  107 96  --    AST 33  --  30 22  --    ALT 30  --  24 18  --       Troponin: No results for input(s): TROPONINI in the last 72 hours. Calcium:   Lab Results   Component Value Date    CALCIUM 8.0 05/05/2021    CALCIUM 8.2 05/04/2021    CALCIUM 7.7 05/03/2021      Ionized Calcium: No results found for: IONCA   Lipids:   No results for input(s): CHOL, HDL in the last 72 hours. Invalid input(s): LDLCALCU  INR:   No results for input(s): INR in the last 72 hours. Lactic Acid:   Lab Results   Component Value Date    LACTA 1.3 05/04/2021    LACTA 1.9 04/29/2021    LACTA 1.3 04/18/2021      . DVT prophylaxis: [] Lovenox                                 [x] SCDs                                 [] SQ Heparin                                 [] Encourage ambulation, low risk for DVT, no chemical or mechanical prophylaxis necessary              [] Already on Anticoagulation      RADIOLOGY:      Ct Abdomen Pelvis Wo Contrast Additional Contrast? None    Result Date: 4/28/2021  1. Low-attenuation areas are noted within the bilateral femoral veins. It is uncertain whether this is due to mixing artifact from the prior CTA chest, or perhaps deep venous thrombosis. Bilateral lower extremity venous duplex ultrasound study in the ER is recommended. 2.  No evidence of a bowel obstruction or free air. 3.  Postoperative changes are noted involving the right hip. 4.  Faint increased attenuation in the gallbladder which could represent vicarious excretion of contrast, gallstones, and/or sludge. There is no pericholecystic inflammation.  5.  Mild to moderate prominence of the right renal pelvis is noted, this appears slightly improved compared to the prior study. 6.  Additional findings are detailed above. This document has been electronically signed by: Snehal Marroquin M.D. on 04/28/2021 02:49 AM All CTs at this facility use dose modulation techniques and iterative reconstructions, and/or weight-based dosing when appropriate to reduce radiation to a low as reasonably achievable. Cta Chest W Wo Contrast    Result Date: 4/28/2021  1. No evidence of a pulmonary embolism. 2.  Small to moderate-sized bilateral pleural effusions. The pleural effusions appear increased since the prior study. 3.  Multifocal atelectasis is noted bilaterally. 4.  Additional findings are detailed above. This document has been electronically signed by: Snehal Marroquin M.D. on 04/28/2021 01:59 AM All CTs at this facility use dose modulation techniques and iterative reconstructions, and/or weight-based dosing when appropriate to reduce radiation to a low as reasonably achievable. Us Gallbladder Ruq    Result Date: 4/28/2021  Sludge or stones within the gallbladder with gallbladder wall thickening and dilated common bile duct. These findings may represent acute cholecystitis. **This report has been created using voice recognition software. It may contain minor errors which are inherent in voice recognition technology. ** Final report electronically signed by Dr. Jeff Stein on 4/28/2021 2:42 PM    Xr Chest Portable    Result Date: 4/30/2021  1. Left internal jugular central venous line tip overlies the proximal SVC. 2.  Low lung volumes. This document has been electronically signed by: Kriss Christianson MD on 04/30/2021 12:04 AM    Xr Chest Portable    Result Date: 4/27/2021  1. No significant interval change since the prior study. Again noted is left basilar atelectasis. Tiny bilateral pleural effusions are suspected.  This document has been electronically signed by: Snehal Marroquin M.D. on 04/27/2021 08:58 PM    Nm Hepatobiliary Scan W Ejection Fraction    Result Date: 4/29/2021  1. Patent common bile duct. 2. Nonvisualization of the gallbladder highly suspicious for acute cholecystitis.  Final report electronically signed by Dr. Grant Ojeda on 4/29/2021 11:03 AM        Electronically signed by Talat Petersen MD on 5/5/2021 at 7:13 AM

## 2021-05-06 ENCOUNTER — APPOINTMENT (OUTPATIENT)
Dept: ULTRASOUND IMAGING | Age: 86
DRG: 444 | End: 2021-05-06
Payer: MEDICARE

## 2021-05-06 ENCOUNTER — APPOINTMENT (OUTPATIENT)
Dept: GENERAL RADIOLOGY | Age: 86
DRG: 444 | End: 2021-05-06
Payer: MEDICARE

## 2021-05-06 PROBLEM — K72.00 SHOCK LIVER: Status: ACTIVE | Noted: 2021-05-06

## 2021-05-06 LAB
ALBUMIN SERPL-MCNC: 1.8 G/DL (ref 3.5–5.1)
ALP BLD-CCNC: 145 U/L (ref 38–126)
ALT SERPL-CCNC: 215 U/L (ref 11–66)
ANION GAP SERPL CALCULATED.3IONS-SCNC: 15 MEQ/L (ref 8–16)
AST SERPL-CCNC: 655 U/L (ref 5–40)
BILIRUB SERPL-MCNC: 0.8 MG/DL (ref 0.3–1.2)
BUN BLDV-MCNC: 19 MG/DL (ref 7–22)
CALCIUM SERPL-MCNC: 8.5 MG/DL (ref 8.5–10.5)
CHLORIDE BLD-SCNC: 108 MEQ/L (ref 98–111)
CO2: 19 MEQ/L (ref 23–33)
CREAT SERPL-MCNC: 1.2 MG/DL (ref 0.4–1.2)
EKG ATRIAL RATE: 77 BPM
EKG P AXIS: 78 DEGREES
EKG P-R INTERVAL: 176 MS
EKG Q-T INTERVAL: 278 MS
EKG Q-T INTERVAL: 386 MS
EKG QRS DURATION: 72 MS
EKG QRS DURATION: 84 MS
EKG QTC CALCULATION (BAZETT): 399 MS
EKG QTC CALCULATION (BAZETT): 436 MS
EKG R AXIS: 21 DEGREES
EKG R AXIS: 28 DEGREES
EKG T AXIS: 60 DEGREES
EKG T AXIS: 85 DEGREES
EKG VENTRICULAR RATE: 124 BPM
EKG VENTRICULAR RATE: 77 BPM
ERYTHROCYTE [DISTWIDTH] IN BLOOD BY AUTOMATED COUNT: 17.3 % (ref 11.5–14.5)
ERYTHROCYTE [DISTWIDTH] IN BLOOD BY AUTOMATED COUNT: 57 FL (ref 35–45)
FOLATE: > 20 NG/ML (ref 4.8–24.2)
GFR SERPL CREATININE-BSD FRML MDRD: 43 ML/MIN/1.73M2
GLUCOSE BLD-MCNC: 84 MG/DL (ref 70–108)
HCT VFR BLD CALC: 31.8 % (ref 37–47)
HEMOGLOBIN: 9.9 GM/DL (ref 12–16)
MCH RBC QN AUTO: 28 PG (ref 26–33)
MCHC RBC AUTO-ENTMCNC: 31.1 GM/DL (ref 32.2–35.5)
MCV RBC AUTO: 90.1 FL (ref 81–99)
PLATELET # BLD: 211 THOU/MM3 (ref 130–400)
PMV BLD AUTO: 11.3 FL (ref 9.4–12.4)
POTASSIUM SERPL-SCNC: 4.2 MEQ/L (ref 3.5–5.2)
RBC # BLD: 3.53 MILL/MM3 (ref 4.2–5.4)
SODIUM BLD-SCNC: 142 MEQ/L (ref 135–145)
TOTAL PROTEIN: 5.5 G/DL (ref 6.1–8)
TROPONIN T: 0.16 NG/ML
TROPONIN T: 0.19 NG/ML
VITAMIN B-12: > 2000 PG/ML (ref 211–911)
WBC # BLD: 13.8 THOU/MM3 (ref 4.8–10.8)

## 2021-05-06 PROCEDURE — 93010 ELECTROCARDIOGRAM REPORT: CPT | Performed by: INTERNAL MEDICINE

## 2021-05-06 PROCEDURE — 36415 COLL VENOUS BLD VENIPUNCTURE: CPT

## 2021-05-06 PROCEDURE — 6360000002 HC RX W HCPCS: Performed by: REGISTERED NURSE

## 2021-05-06 PROCEDURE — 2500000003 HC RX 250 WO HCPCS: Performed by: NURSE PRACTITIONER

## 2021-05-06 PROCEDURE — 93975 VASCULAR STUDY: CPT

## 2021-05-06 PROCEDURE — 2580000003 HC RX 258: Performed by: REGISTERED NURSE

## 2021-05-06 PROCEDURE — 82607 VITAMIN B-12: CPT

## 2021-05-06 PROCEDURE — 94669 MECHANICAL CHEST WALL OSCILL: CPT

## 2021-05-06 PROCEDURE — 92526 ORAL FUNCTION THERAPY: CPT

## 2021-05-06 PROCEDURE — 80053 COMPREHEN METABOLIC PANEL: CPT

## 2021-05-06 PROCEDURE — 82746 ASSAY OF FOLIC ACID SERUM: CPT

## 2021-05-06 PROCEDURE — 2700000000 HC OXYGEN THERAPY PER DAY

## 2021-05-06 PROCEDURE — 2140000000 HC CCU INTERMEDIATE R&B

## 2021-05-06 PROCEDURE — 85027 COMPLETE CBC AUTOMATED: CPT

## 2021-05-06 PROCEDURE — 71045 X-RAY EXAM CHEST 1 VIEW: CPT

## 2021-05-06 PROCEDURE — 84484 ASSAY OF TROPONIN QUANT: CPT

## 2021-05-06 PROCEDURE — 02HV33Z INSERTION OF INFUSION DEVICE INTO SUPERIOR VENA CAVA, PERCUTANEOUS APPROACH: ICD-10-PCS | Performed by: INTERNAL MEDICINE

## 2021-05-06 PROCEDURE — 6370000000 HC RX 637 (ALT 250 FOR IP): Performed by: NURSE PRACTITIONER

## 2021-05-06 PROCEDURE — 76705 ECHO EXAM OF ABDOMEN: CPT

## 2021-05-06 PROCEDURE — 99232 SBSQ HOSP IP/OBS MODERATE 35: CPT | Performed by: SURGERY

## 2021-05-06 PROCEDURE — 85610 PROTHROMBIN TIME: CPT

## 2021-05-06 PROCEDURE — 94761 N-INVAS EAR/PLS OXIMETRY MLT: CPT

## 2021-05-06 PROCEDURE — 6360000002 HC RX W HCPCS: Performed by: INTERNAL MEDICINE

## 2021-05-06 RX ORDER — MORPHINE SULFATE 2 MG/ML
2 INJECTION, SOLUTION INTRAMUSCULAR; INTRAVENOUS EVERY 4 HOURS PRN
Status: DISCONTINUED | OUTPATIENT
Start: 2021-05-06 | End: 2021-05-10

## 2021-05-06 RX ADMIN — ONDANSETRON 4 MG: 2 INJECTION INTRAMUSCULAR; INTRAVENOUS at 15:18

## 2021-05-06 RX ADMIN — SODIUM CHLORIDE: 9 INJECTION, SOLUTION INTRAVENOUS at 12:52

## 2021-05-06 RX ADMIN — FAMOTIDINE 20 MG: 10 INJECTION, SOLUTION INTRAVENOUS at 12:45

## 2021-05-06 RX ADMIN — SODIUM CHLORIDE, PRESERVATIVE FREE 10 ML: 5 INJECTION INTRAVENOUS at 12:46

## 2021-05-06 RX ADMIN — MORPHINE SULFATE 2 MG: 2 INJECTION, SOLUTION INTRAMUSCULAR; INTRAVENOUS at 15:19

## 2021-05-06 RX ADMIN — IPRATROPIUM BROMIDE AND ALBUTEROL SULFATE 1 AMPULE: .5; 3 SOLUTION RESPIRATORY (INHALATION) at 16:39

## 2021-05-06 RX ADMIN — PIPERACILLIN AND TAZOBACTAM 3375 MG: 3; .375 INJECTION, POWDER, LYOPHILIZED, FOR SOLUTION INTRAVENOUS at 12:45

## 2021-05-06 RX ADMIN — PIPERACILLIN AND TAZOBACTAM 3375 MG: 3; .375 INJECTION, POWDER, LYOPHILIZED, FOR SOLUTION INTRAVENOUS at 20:08

## 2021-05-06 RX ADMIN — LATANOPROST 1 DROP: 50 SOLUTION OPHTHALMIC at 20:09

## 2021-05-06 RX ADMIN — IPRATROPIUM BROMIDE AND ALBUTEROL SULFATE 1 AMPULE: .5; 3 SOLUTION RESPIRATORY (INHALATION) at 09:14

## 2021-05-06 RX ADMIN — IPRATROPIUM BROMIDE AND ALBUTEROL SULFATE 1 AMPULE: .5; 3 SOLUTION RESPIRATORY (INHALATION) at 19:45

## 2021-05-06 RX ADMIN — FAMOTIDINE 20 MG: 10 INJECTION, SOLUTION INTRAVENOUS at 20:08

## 2021-05-06 RX ADMIN — SODIUM CHLORIDE, PRESERVATIVE FREE 10 ML: 5 INJECTION INTRAVENOUS at 20:10

## 2021-05-06 RX ADMIN — PIPERACILLIN AND TAZOBACTAM 3375 MG: 3; .375 INJECTION, POWDER, LYOPHILIZED, FOR SOLUTION INTRAVENOUS at 03:35

## 2021-05-06 RX ADMIN — IPRATROPIUM BROMIDE AND ALBUTEROL SULFATE 1 AMPULE: .5; 3 SOLUTION RESPIRATORY (INHALATION) at 12:54

## 2021-05-06 ASSESSMENT — PAIN SCALES - GENERAL
PAINLEVEL_OUTOF10: 0
PAINLEVEL_OUTOF10: 4
PAINLEVEL_OUTOF10: 7

## 2021-05-06 ASSESSMENT — PAIN DESCRIPTION - PAIN TYPE: TYPE: CHRONIC PAIN

## 2021-05-06 ASSESSMENT — PAIN DESCRIPTION - LOCATION: LOCATION: CHEST

## 2021-05-06 ASSESSMENT — PAIN DESCRIPTION - ONSET: ONSET: ON-GOING

## 2021-05-06 ASSESSMENT — PAIN DESCRIPTION - ORIENTATION: ORIENTATION: OTHER (COMMENT)

## 2021-05-06 ASSESSMENT — PAIN - FUNCTIONAL ASSESSMENT: PAIN_FUNCTIONAL_ASSESSMENT: PREVENTS OR INTERFERES WITH MANY ACTIVE NOT PASSIVE ACTIVITIES

## 2021-05-06 ASSESSMENT — PAIN DESCRIPTION - FREQUENCY: FREQUENCY: INTERMITTENT

## 2021-05-06 NOTE — FLOWSHEET NOTE
05/05/21 2109   Provider Notification   Reason for Communication Review case   Provider Name Dr. Mary Bautista   Provider Notification Physician   Method of Communication Secure Message   Response See orders   Notification Time 2109   Patient complaining of chest pain, EKG completed, Dr. Mary Bautista contacted through secure message, return phone call with new orders.

## 2021-05-06 NOTE — PROGRESS NOTES
CHEMISTRIES:  Recent Labs     05/04/21 0455 05/05/21 0225 05/05/21  0630 05/05/21  0955 05/06/21  0340    139  --   --  142   K 3.3* 3.7 3.9 4.1 4.2    106  --   --  108   CO2 26 25  --   --  19*   BUN 8 11  --   --  19   CREATININE 0.6 0.8  --   --  1.2   GLUCOSE 88 107  --   --  84     PT/INR:No results for input(s): PROTIME, INR in the last 72 hours. APTT:No results for input(s): APTT in the last 72 hours. LIVER PROFILE:  Recent Labs     05/04/21 0455 05/05/21 0225 05/06/21  0340   AST 30 22 655*   ALT 24 18 215*   BILITOT 0.3 0.4 0.8   ALKPHOS 107 96 145*       Imaging Last 24 Hours:  Xr Chest Portable    Result Date: 5/6/2021  EXAM:   CHEST X-RAY, SINGLE VIEW PORTABLE: COMPARISON:  CR,SR  - XR CHEST PORTABLE  - 05/04/2021 10:52 AM EDT FINDINGS: Heart size upper limits of normal, unchanged. Gzmfcw-o-Hptd catheter position is stable. Mild central vascular congestion appears relatively stable. Small pleural effusions are evident. The osseous structures are unremarkable. Stable appearance of chest with cardiomegaly, small pleural effusions and mild central vascular congestion persistent. This document has been electronically signed by: David Doe MD on 05/06/2021 05:46 AM    [facility] Exist Access W Imaging    Result Date: 5/4/2021  PROCEDURE: IR INJ PERC CHOLA EXIST ACCESS W IMAGING PERFORMED BY:  Dr. Nora Glover. Gerry Daugherty MD CLINICAL INFORMATION: . 67-year-old female with a history of cholecystitis. Cholecystectomy tube in place. Drainage catheter : 8 Frisian multipurpose cholecystostomy drainage tube. FLUOROSCOPY TIME: 0.5 minutes FLUOROSCOPIC IMAGES: 5 TECHNIQUE: The patient came to the Department with a cholecystostomy tube in place. Iodinated contrast material was injected and a cholecystogram was performed. FINDINGS: The cholecystostomy tube remains within the gallbladder. The cystic and common bile ducts are widely patent.  Contrast can be seen freely flowing into the duodenum. The cystic and common bile ducts are widely patent. **This report has been created using voice recognition software. It may contain minor errors which are inherent in voice recognition technology. ** Final report electronically signed by Dr Dee Vee on 5/4/2021 4:16 PM    Assessment//Plan           Hospital Problems           Last Modified POA    Atrial fibrillation with RVR ([address] 4/28/2021 Yes    Abnormal ultrasound of gallbladder 4/29/2021 Yes    Other chest pain 4/29/2021 Yes    Moderate malnutrition ([address] 5/1/2021 Yes    Bilateral pleural effusion 5/2/2021 Yes    Acute coronary syndrome ([address] 5/2/2021 Yes    Shock liver 5/6/2021 Yes        Assessment:  (44-year-old white female with multiple medical problems. The patient has anemia which is multifactorial.  The patient hemoglobin is improving after transfusion. The patient has no deficiency. I expect hemoglobin to improve with control of infection. I'll be following the patient. ).        Electronically signed by Christi Woodard MD on 5/6/21 at 12:33 PM EDT

## 2021-05-06 NOTE — PLAN OF CARE
Problem: Impaired respiratory status  Goal: Lung sounds clear or within normal limits for patient  Outcome: Ongoing   Metaneb with duoneb q4 w/a  nasal cannula at 2 lpm-wean as pt tolerates

## 2021-05-06 NOTE — CARE COORDINATION
5/6/21, 2:18 PM EDT    DISCHARGE  [address] day: 8  Location: -23/023-A Reason for admit: Atrial fibrillation with RVR ([address] [I48.91]   Procedure:   4/27 CXR - left basilar atelectasis. Tiny bilateral pleural effusions suspected. 4/28 CTA chest - No PE. Small-moderate sized bilateral pleural effusions, increased in size. Bilateral multifocal atelectasis.   4/28 US GB - Sludge or stones within the gallbladder with gallbladder wall thickening and dilated common bile duct. These findings may represent acute cholecystitis.   4/29 HIDA scan - Patent common bile duct. Nonvisualization of the gallbladder highly suspicious for acute cholecystitis. 4/29 CVC placed - Left IJ.   4/30 Perc cholecystectomy drain inserted. 5/4 Cholangiogram - The cystic and common bile ducts are widely patent. 5/6 CXR - Stable appearance of chest with cardiomegaly, small pleural effusions and mild central vascular congestion persistent. 5/6 US Liver to be done  Barriers to Discharge: Tmax 100.4F. O2 at 1L/nc, sats 93-97%. Troponins 0.162 and 0.189. Albumin 1.8, Alk Phos 145. , . Total protein 5.5. WBC 13.8. Hgb 9.9. IVF. Pepcid iv bid. DuoNeb q4hr [employer]. Lopressor. Midodrine. Zosyn iv q8hr. Electrolyte replacements. Palliative Care following. Limited code x4. GI and Surgery following with IM. Consulted Hematology/Oncology. PT/OT/SLP. SLP recommending FEES or MBS. PCP: Rory Geiger MD  Readmission Risk Score: 32%  Patient Goals/Plan/Treatment Preferences: Plans to return to [facility].  SW following.

## 2021-05-06 NOTE — FLOWSHEET NOTE
05/05/21 2146   Provider Notification   Reason for Communication Review case   Provider Name Dr. Danielle Sofia   Provider Notification Physician   Method of Communication Secure Message   Response See orders   Notification Time 2146   Dr. Danielle Sofia contacted by secure message regarding new onset cough, new orders received.

## 2021-05-06 NOTE — PROGRESS NOTES
[place] [address] PROGRESS NOTE      Patient: Christy Hanson  Room #: 3Z-66/496-A            YOB: 1935  Age: 81 y.o. Gender: female            Admit Date & Time: 4/27/2021  7:55 PM    Assessment:  Pt was in bed as her son and sister were visiting. Pt was dealing with heart issues and cannot eat. She was taken to a nursing home where she fell and broke her hip. The doctor asked her nurse to call a  for spiritual care as the pt was do down with ill health. Family was seriously concerned about her health and was ready to assist her. Interventions: Active listening, compassion, words of encouragement and prayerful assistance. Outcomes:  Family expressed thanks and felt a bit better. Plan:  Continued spiritual support.     Electronically signed by Tanika Freeman, on 5/6/2021 at 1:07 PM.  [address] [facility]  [phone]

## 2021-05-06 NOTE — PROGRESS NOTES
Dr. Laurie Welch note       Internal Medicine              Patient:  Brendan Mariano  YOB: 1935    MRN: [id number]   Acct:  [de-identified]   [id number]  Primary Care Physician: Zainab Levi MD    Admit Date: 4/27/2021           Subjective: seen this am. She has decided to be a dnrccA. Spoke with her son who plans to make her comfort care on his arrival to the hospital this am.      Objective:      Physical Exam:    Vitals:  Patient Vitals for the past 24 hrs:   BP Temp Temp src Pulse Resp SpO2 Weight   05/06/21 0914 -- -- -- -- -- 97 % --   05/06/21 0600 111/68 -- -- -- -- -- --   05/06/21 0400 (!) 99/53 97.7 °F (36.5 °C) Oral 88 16 94 % 140 lb 3.2 oz (63.6 kg)   05/06/21 0000 111/71 100.4 °F (38 °C) Oral 90 18 96 % --   05/05/21 2000 (!) 163/87 98.5 °F (36.9 °C) Oral 78 18 94 % --   05/05/21 1713 129/71 98.1 °F (36.7 °C) Axillary 66 18 94 % --   05/05/21 1635 -- -- -- -- -- 94 % --   05/05/21 1627 (!) 135/55 98.2 °F (36.8 °C) Axillary 60 -- 97 % --   05/05/21 1400 120/61 -- -- 57 -- -- --   05/05/21 1300 (!) 123/53 -- -- -- -- -- --   05/05/21 1243 -- -- -- -- -- 94 % --   05/05/21 1230 (!) 110/51 -- -- 58 -- -- --   05/05/21 1200 (!) 86/57 -- -- 62 -- -- --   05/05/21 1130 (!) 97/59 -- -- 70 -- -- --     Weight: Weight: 140 lb 3.2 oz (63.6 kg)     24 hour intake/output:    Intake/Output Summary (Last 24 hours) at 5/6/2021 1003  Last data filed at 5/6/2021 0957  Gross per 24 hour   Intake 1829.6 ml   Output 160 ml   Net 1669.6 ml       General appearance - alert, and in no distress  Eyes - pupils equal and reactive,and pale. Mouth - mucous membranes moist,   Neck - supple, no significant adenopathy  Chest - gail air movement  Heart -  S1, S2, heard  Abdomen - soft, nontender,pos bs. Neurological - alert, in no distress.       Review of Labs and Diagnostic Testing:    CBC:   Recent Labs     05/06/21  0340   WBC 13.8*   HGB 9.9*   HCT 31.8* Normal left ventricular wall thickness. Ejection fraction is visually estimated in the range of 40% to 45%. Hypokinetic motion of the apical anteroseptal wall noted in the left  ventricle. Hypokinetic motion of the mid anteroseptal wall noted in the left  ventricle. Signature   ----------------------------------------------------------------  Electronically signed by Lorena Foreman MD (Interpreting  physician) on 05/01/2021 at 02:37 PM  ----------------------------------------------------------------   Findings   Mitral Valve  The mitral valve structure was normal with normal leaflet separation. Aortic Valve  The aortic valve was trileaflet with normal thickness and cuspal  separation. DOPPLER:   Tricuspid Valve  The tricuspid valve structure was normal with normal leaflet separation. Pulmonic Valve  The pulmonic valve leaflets exhibited normal thickness, no calcification,  and normal cuspal separation. Left Atrium  Left atrial size was normal.   Left Ventricle  Left ventricle size is normal.  Normal left ventricular wall thickness. Ejection fraction is visually estimated in the range of 40% to 45%. Hypokinetic motion of the apical anteroseptal wall noted in the left  ventricle. Hypokinetic motion of the mid anteroseptal wall noted in the left  ventricle. Right Atrium  Right atrial size was normal.   Right Ventricle  The right ventricular size was normal with normal systolic function and  wall thickness. Pericardial Effusion  The pericardium was normal in appearance with evidence of a trace  pericardial effusion. Pleural Effusion  No evidence of pleural effusion. Aorta / Great Vessels  -Aortic root dimension within normal limits.  -The Pulmonary artery is within normal limits. -IVC size is within normal limits with normal respiratory phasic changes.   M-Mode/2D Measurements & Calculations   LV Diastolic    LV Systolic Dimension: 3.4   AV Cusp Separation: 1.5 cmLA  Dimension: 4.5  cm thickening. No bronchiectasis. Musculoskeletal: Multiple chronic healing left anterior rib fractures redemonstrated and similar to prior imaging. [place] right curvature of the mid thoracic spine. Mild spondylosis. 1.  No evidence of pulmonary embolism. 2.  Moderate bilateral pleural effusions, right greater than left, increased from prior imaging. There is overlying atelectasis. This document has been electronically signed by: Lorrine Brittle, MD on 05/01/2021 09:50 PM All CTs at this facility use dose modulation techniques and iterative reconstructions, and/or weight-based dosing when appropriate to reduce radiation to a low as reasonably achievable. Ct Guided Needle Placement    Result Date: 4/30/2021  CT-GUIDED CHOLECYSTOSTOMY TUBE INSERTION/and cholangiogram: PERFORMED BY: Conchita Morillo M.D. DRAINAGE SITE: Gallbladder APPROACH: Lateral approach CATHETER: 8 Slovenian multipurpose drainage catheter. FLUID OBTAINED: 10 mL bloody bile was aspirated and sent to laboratory for culture and sensitivity testing. ESTIMATED BLOOD LOSS: Minimal SEDATION: Versed 1 mg; fentanyl 50 mcg, IV; the patient was sedated during this procedure,  and monitored with EKG and pulse ox monitoring devices by a registered nurse. Face-to-face time with patient 45 minutes. PROCEDURE: Signed informed consent was obtained prior to performing this procedure. The patient was placed on the CT scanner and sedated, as indicated above. ALL CT SCANS AT THIS FACILITY use dose modulation, iterative reconstruction, and/or weight-based dosing when appropriate to reduce radiation dose to as low as reasonably achievable. CT images were initially obtained to determine appropriate puncture site. The skin was marked, prepped, and draped in a sterile fashion. Following local anesthesia and utilizing aseptic technique, a needle was successfully passed into the gallbladder. . A  small amount of bile was aspirated to confirm appropriate needle position CT images were obtained to confirm proper needle position. . A guidewire was then passed through the needle followed by insertion of progressively larger dilators up to an 8 Western Karyn  size. An 8 Western Karyn multi-side-hole drainage catheter was then passed over the wire and was coiled within the gallbladder. The retaining suture was then locked in the standard fashion. Catheter was then hooked to a Jeevan-Close drainage bag and the catheter was flushed several times with sterile saline. The catheter was stabilized to the skin with a 2-0 silk suture and Percu-Stay device. A sample of the bile was sent to laboratory for culture and sensitivity testing. The patient was then transported to the special suite and a cholangiogram was performed, confirming appropriate position of the catheter within the gallbladder. Filling defects are seen in the gallbladder, consistent with sludge and gallstones. There are  also filling defects in a moderately dilated, bile duct. No ductal obstruction is seen, however, and contrast is seen to pass into the duodenum. There is also also reflux of contrast into the intrahepatic radicles which are unremarkable. 1. Status post successful percutaneous cholecystostomy drainage tube insertion with CT guidance. 2. A cholecystogram reveals sludge and gallstones in the gallbladder but reveals a common duct and cystic duct are widely patent. There is free flow of contrast into the duodenum. . There is moderate dilatation of the common bile duct which also contains some filling defect consistent with sludge and/or gallstones. **This report has been created using voice recognition software. It may contain minor errors which are inherent in voice recognition technology. ** Final report electronically signed by Dr. Kimi Hooker on 4/30/2021 12:00 PM    Ct Abdomen Pelvis W Iv Contrast Additional Contrast? Radiologist Recommendation    Result Date: 4/30/2021  PROCEDURE: CT ABDOMEN PELVIS W IV CONTRAST CLINICAL INFORMATION: Hgb drop, on IV heparin, concern for bleeding, Trauma . COMPARISON: CT scan of the abdomen and pelvis dated 28 April 2021. . Gallbladder ultrasound dated 28 April 2021. Radionuclide hepatobiliary scan dated 29 April 2021. TECHNIQUE: Axial 5 mm CT images were obtained through the abdomen and pelvis after the administration of intravenous contrast. Coronal and sagittal reconstructions were obtained. All CT scans at this facility use dose modulation, iterative reconstruction, and/or weight-based dosing when appropriate to reduce radiation dose to as low as reasonably achievable. FINDINGS: Moderate bilateral pleural effusions and atelectasis at both lung bases. There is a small pericardial effusion. There is mild diffuse fatty replacement in the liver and possible calcified granuloma in the dome of right lobe of the liver. . Multiple punctate calcifications in the spleen. There is a slightly atrophic pancreas. The adrenal glands are unremarkable. . There is slight gallbladder wall thickening. There are bilateral renal cysts present. There is a possible extrarenal pelvis on the right side. There are no renal stones. There is a hiatal hernia present No abnormalities of the small bowel loops are noted. There is atherosclerotic calcification in the abdominal aorta and iliac arteries. There is no adenopathy. There is a Garcia catheter within the bladder. The patient is status post hysterectomy There is no pelvic free fluid. The colon is within normal limits. There is no adenopathy. There is an intramedullary lupis in the femur and compression screws in the right femoral head and neck. There is a fracture in the intertrochanteric region. There is lumbar spondylosis. There are postoperative changes in the lower lumbar spine There is a mild compression deformity along the superior endplate of L2.  There is slightly increased soft tissue density in the subcutaneous soft tissues of the pelvis extending into both thighs. There is abnormal density in the left common femoral vein. 1. Stable CT scan of the abdomen, no significant interval change since previous study dated 28 April 2021. 2. Moderate bilateral pleural effusions and atelectasis at both lung bases. 3. Small pericardial effusion. 4. Possible old granulomatous disease in the liver and spleen. 5. Bilateral renal cysts. Possible extrarenal pelvis in the right side. 6. Slight gallbladder wall thickening. 7. Slightly atrophic pancreas. 8. Atherosclerotic calcification of the abdominal aorta and iliac arteries. 9.. Garcia catheter in the bladder. 10. Status post hysterectomy. 11. Right femoral intertrochanteric fracture status post instrumentation. 12. Slightly increased density in the left common femoral vein. 13.. Slightly increased density in the subcutaneous soft tissues in the pelvis extending into both thighs. 14. Postoperative changes in the lower lumbar spine. Lumbar spondylosis. Mild compression deformity along the superior endplate of L2. **This report has been created using voice recognition software. It may contain minor errors which are inherent in voice recognition technology. ** Final report electronically signed by DR Jaida Warner on 4/30/2021 8:42 AM    Xr Chest Portable    Result Date: 5/6/2021  EXAM:   CHEST X-RAY, SINGLE VIEW PORTABLE: COMPARISON:  CR,SR  - XR CHEST PORTABLE  - 05/04/2021 10:52 AM EDT FINDINGS: Heart size upper limits of normal, unchanged. [id number] catheter position is stable. Mild central vascular congestion appears relatively stable. Small pleural effusions are evident. The osseous structures are unremarkable. Stable appearance of chest with cardiomegaly, small pleural effusions and mild central vascular congestion persistent.  This document has been electronically signed by: Coco Pretty MD on 05/06/2021 05:46 AM    Xr Chest Portable    Result Date: 5/4/2021  PROCEDURE: XR CHEST PORTABLE CLINICAL INFORMATION: Pleural effusion. Lung infiltrates. Follow-up. COMPARISON: 5/3/2021 TECHNIQUE: A single mobile view of the chest was obtained. 1. Borderline heart size. Left jugular PICC line with tip at upper end in the SVC. Pigtail catheter in the right upper quadrant. 2. Small bilateral pleural effusions. 3. Mild bibasilar atelectasis/pneumonia. Overall appearance of chest slightly worse on prior **This report has been created using voice recognition software. It may contain minor errors which are inherent in voice recognition technology. ** Final report electronically signed by Dr. Brandon Gallo on 5/4/2021 11:08 AM    Xr Chest Portable    Result Date: 5/3/2021  Exam: 1V chest Comparison:  IRWINSR  - XR CHEST PORTABLE  - 05/01/2021 08:34 AM EDT Findings: Left subclavian venous line tip again over the left innominate / SVC junction. Mediastinum: No cardiac silhouette enlargement. Lungs: No infiltrate or mass. Pleura: No pneumothorax. Stable small effusions. Bones: No acute pathology. Impression: Stable small effusions. This document has been electronically signed by: Madison Pena MD on 05/03/2021 07:00 AM    Xr Chest Portable    Result Date: 5/1/2021  PROCEDURE: XR CHEST PORTABLE CLINICAL INFORMATION: left sided chest pain. COMPARISON: Chest x-ray dated 29 April 2021. TECHNIQUE: AP upright view of the chest. FINDINGS: This is a left subclavian catheter with the tip in the distal brachiocephalic vein, unchanged There is mild cardiomegaly. . There is atherosclerotic calcification in the aortic arch. There is increased density in both lung fields. There are small bilateral pleural effusions. . The pulmonary vascularity is slightly increased. No suspicious osseous lesions are present. No interval change since previous study dated 29 April 2021. **This report has been created using voice recognition software. It may contain minor errors which are inherent in voice recognition technology. ** Final report electronically signed by DR Sharon Ma on 5/1/2021 10:05 AM    Xr Chest Portable    Result Date: 4/30/2021  1 view of the chest. COMPARISON: Single view of the chest dated 4/27/2021 FINDINGS: Interval placement of a left internal jugular central venous line tip overlying the proximal SVC. Low lung volumes. Borderline cardiomegaly, likely exaggerated secondary to low lung volumes. Atherosclerotic thoracic aorta. Left hemidiaphragm and retrocardiac region are obscured. Likely small left pleural effusion, similar to prior imaging. No pneumothorax. No displaced fracture. Mild spondylosis. 1.  Left internal jugular central venous line tip overlies the proximal SVC. 2.  Low lung volumes. This document has been electronically signed by: Felipe Miranda MD on 04/30/2021 12:04 AM    Vl Dup Lower Extremity Venous Bilateral    Result Date: 4/30/2021  PROCEDURE: VL DUP LOWER EXTREMITY VENOUS BILATERAL CLINICAL INFORMATION: Low-attenuation areas are noted within the bilateral femoral veins per CT recommendations for evaluation DVT. COMPARISON: 4/19/2021 TECHNIQUE: Venous doppler ultrasound was performed of the bilateral lower extremities using gray scale, color flow and spectral doppler imaging. FINDINGS: There is normal color flow, spectral analysis and compressibility of the common femoral vein,  femoral vein and popliteal vein on the right . There is normal color flow and compressibility in the right posterior tibial veins, anterior tibial veins and peroneal veins. There is echogenic clot in the left greater saphenous vein, the left common femoral vein and proximal femoral vein and profunda femoris veins show echogenic clot which are brighter than the prior exam. There is noncompressibility. There is now some flow restored in the mid femoral vein, and there is now propagation of echoes in the distal femoral vein with partial flow. . There has been interval development of internal echoes and partial compressibility in the popliteal vein with some minimal retained color Doppler flow. There is absence of flow and compressibility in the posterior tibial and left peroneal veins. Persistence of thrombus in the left common femoral vein, saphenofemoral junction, proximal femoral vein with increased echogenicity of the proximal femoral vein clot suggesting organization There is been partial restoration of flow in the mid left femoral vein There has been development of thrombus in the popliteal vein with partial flow. There remains absence of flow and compressibility in the left peroneal and posterior tibial veins. The right lower extremity shows no evidence of DVT. **This report has been created using voice recognition software. It may contain minor errors which are inherent in voice recognition technology. ** Final report electronically signed by Dr. Deep Doran on 4/30/2021 1:15 PM    Ir Cholecystostomy Percutaneous Complete    Result Date: 4/30/2021  CT-GUIDED CHOLECYSTOSTOMY TUBE INSERTION/and cholangiogram: PERFORMED BY: Zoila Reid M.D. DRAINAGE SITE: Gallbladder APPROACH: Lateral approach CATHETER: 8 [place] multipurpose drainage catheter. FLUID OBTAINED: 10 mL bloody bile was aspirated and sent to laboratory for culture and sensitivity testing. ESTIMATED BLOOD LOSS: Minimal SEDATION: Versed 1 mg; fentanyl 50 mcg, IV; the patient was sedated during this procedure,  and monitored with EKG and pulse ox monitoring devices by a registered nurse. Face-to-face time with patient 45 minutes. PROCEDURE: Signed informed consent was obtained prior to performing this procedure. The patient was placed on the CT scanner and sedated, as indicated above. ALL CT SCANS AT THIS FACILITY use dose modulation, iterative reconstruction, and/or weight-based dosing when appropriate to reduce radiation dose to as low as reasonably achievable. CT images were initially obtained to determine appropriate puncture site.  The skin was marked, prepped, and draped in a sterile fashion. Following local anesthesia and utilizing aseptic technique, a needle was successfully passed into the gallbladder. . A  small amount of bile was aspirated to confirm appropriate needle position CT images were obtained to confirm proper needle position. . A guidewire was then passed through the needle followed by insertion of progressively larger dilators up to an 8 Western Karyn  size. An 8 Western Karyn multi-side-hole drainage catheter was then passed over the wire and was coiled within the gallbladder. The retaining suture was then locked in the standard fashion. Catheter was then hooked to a Jeevan-Close drainage bag and the catheter was flushed several times with sterile saline. The catheter was stabilized to the skin with a 2-0 silk suture and Percu-Stay device. A sample of the bile was sent to laboratory for culture and sensitivity testing. The patient was then transported to the special suite and a cholangiogram was performed, confirming appropriate position of the catheter within the gallbladder. Filling defects are seen in the gallbladder, consistent with sludge and gallstones. There are  also filling defects in a moderately dilated, bile duct. No ductal obstruction is seen, however, and contrast is seen to pass into the duodenum. There is also also reflux of contrast into the intrahepatic radicles which are unremarkable. 1. Status post successful percutaneous cholecystostomy drainage tube insertion with CT guidance. 2. A cholecystogram reveals sludge and gallstones in the gallbladder but reveals a common duct and cystic duct are widely patent. There is free flow of contrast into the duodenum. . There is moderate dilatation of the common bile duct which also contains some filling defect consistent with sludge and/or gallstones. **This report has been created using voice recognition software. It may contain minor errors which are inherent in voice recognition technology. ** Final report electronically signed by Dr. Tiffani Farah on 4/30/2021 12:00 PM    Ct Abscess Drainage Soft Tissue    Result Date: 4/30/2021  CT-GUIDED CHOLECYSTOSTOMY TUBE INSERTION/and cholangiogram: PERFORMED BY: Jaida Diggs M.D. DRAINAGE SITE: Gallbladder APPROACH: Lateral approach CATHETER: 8 Japanese multipurpose drainage catheter. FLUID OBTAINED: 10 mL bloody bile was aspirated and sent to laboratory for culture and sensitivity testing. ESTIMATED BLOOD LOSS: Minimal SEDATION: Versed 1 mg; fentanyl 50 mcg, IV; the patient was sedated during this procedure,  and monitored with EKG and pulse ox monitoring devices by a registered nurse. Face-to-face time with patient 45 minutes. PROCEDURE: Signed informed consent was obtained prior to performing this procedure. The patient was placed on the CT scanner and sedated, as indicated above. ALL CT SCANS AT THIS FACILITY use dose modulation, iterative reconstruction, and/or weight-based dosing when appropriate to reduce radiation dose to as low as reasonably achievable. CT images were initially obtained to determine appropriate puncture site. The skin was marked, prepped, and draped in a sterile fashion. Following local anesthesia and utilizing aseptic technique, a needle was successfully passed into the gallbladder. . A  small amount of bile was aspirated to confirm appropriate needle position CT images were obtained to confirm proper needle position. . A guidewire was then passed through the needle followed by insertion of progressively larger dilators up to an 8 Western Karyn  size. An 8 [place] multi-side-hole drainage catheter was then passed over the wire and was coiled within the gallbladder. The retaining suture was then locked in the standard fashion. Catheter was then hooked to a Jeevan-Close drainage bag and the catheter was flushed several times with sterile saline. The catheter was stabilized to the skin with a 2-0 silk suture and Percu-Stay device.  A sample of the bile was sent to laboratory for culture and sensitivity testing. The patient was then transported to the special suite and a cholangiogram was performed, confirming appropriate position of the catheter within the gallbladder. Filling defects are seen in the gallbladder, consistent with sludge and gallstones. There are  also filling defects in a moderately dilated, bile duct. No ductal obstruction is seen, however, and contrast is seen to pass into the duodenum. There is also also reflux of contrast into the intrahepatic radicles which are unremarkable. 1. Status post successful percutaneous cholecystostomy drainage tube insertion with CT guidance. 2. A cholecystogram reveals sludge and gallstones in the gallbladder but reveals a common duct and cystic duct are widely patent. There is free flow of contrast into the duodenum. . There is moderate dilatation of the common bile duct which also contains some filling defect consistent with sludge and/or gallstones. **This report has been created using voice recognition software. It may contain minor errors which are inherent in voice recognition technology. ** Final report electronically signed by Dr. Deisy Randle on 4/30/2021 12:00 PM    Nm Hepatobiliary Scan W Ejection Fraction    Result Date: 4/29/2021  PROCEDURE: NM HEPATOBILIARY SCAN W PHARMACOLOGICAL INTERVENTION CLINICAL INFORMATION: Abnormal gallbladder ultrasound TECHNIQUE: The patient received 1.39 mcg of Kinevac prior to radiopharmaceutical administration. Anterior images of the abdomen were obtained for 60 minutes following radiopharmaceutical administration. 2.8 mg of morphine sulfate were administered and additional images were obtained in multiple projections. COMPARISON: Nuclear medicine hepatobiliary scan 6/25/2020 FINDINGS: There is normal hepatic uptake of radiotracer. Activity is present in the common bile duct and small bowel by 23 minutes. The gallbladder is not visualized either before or following morphine administration. 3,375 mg Intravenous Q8H    midodrine  10 mg Oral TID WC    phosphorus replacement protocol   Other RX Placeholder    famotidine (PEPCID) injection  20 mg Intravenous BID    apixaban  5 mg Oral BID    aspirin  325 mg Oral Daily    amiodarone  200 mg Oral Daily    [Held by provider] docusate sodium  100 mg Oral BID    latanoprost  1 drop Both Eyes Nightly    mirtazapine  7.5 mg Oral Nightly    sertraline  50 mg Oral Daily    sodium bicarbonate  1,300 mg Oral BID    sucralfate  1 g Oral 4x Daily AC & HS    sodium chloride flush  5-40 mL Intravenous 2 times per day    metoprolol tartrate  25 mg Oral BID    atorvastatin  20 mg Oral Nightly     Continuous Infusions:   sodium chloride 50 mL/hr at 05/05/21 0740    sodium chloride       PRN Meds:. LORazepam, potassium chloride, potassium chloride, potassium chloride, magnesium sulfate, acetaminophen **OR** acetaminophen, ondansetron, potassium chloride **OR** potassium alternative oral replacement **OR** potassium chloride, tiZANidine, sodium chloride flush, sodium chloride, polyethylene glycol  Continuous Infusions:   sodium chloride 50 mL/hr at 05/05/21 0740    sodium chloride           Assessment   Active Problems:    Atrial fibrillation with RVR (HCC)    Abnormal ultrasound of gallbladder    Other chest pain    Moderate malnutrition (HCC)    Bilateral pleural effusion    Acute coronary syndrome (HCC)    Shock liver  Resolved Problems:    * No resolved hospital problems.  *        Patient Active Problem List   Diagnosis    Hyperlipidemia    Osteoarthritis    GERD (gastroesophageal reflux disease)    COPD (chronic obstructive pulmonary disease) (HCC)    Chronic back pain    CAD (coronary artery disease)    Hypertension    Spinal stenosis of lumbar region with neurogenic claudication    Neurologic gait dysfunction    Inflammatory spondylopathy of lumbosacral region ([address]    Paroxysmal atrial fibrillation ([address]    Closed displaced

## 2021-05-06 NOTE — FLOWSHEET NOTE
05/06/21 0653   Provider Notification   Reason for Communication Evaluate; Review case   Provider Name Dr. Porsha Mcdaniel   Provider Notification Physician   Method of Communication Secure Message   Response Waiting for response   Notification Time 8668   Dr. Porsha Mcdaniel updated on general surgery's recommendation for GI consult regarding liver enzymes. Waiting for response. 8554 - Dr. Porsha Mcdaniel requests reconsult for GI, make sure Dr. Jeffrey Sinha is aware.

## 2021-05-06 NOTE — CONSULTS
Consult History & Physical      Patient:  Leydi Hinkle  YOB: 1935  MRN: [id number]     Acct: [de-identified]    Chief Complaint:    Chief Complaint   Patient presents with    Chest Pain     due to pneumonia       Date of Service: Pt seen/examined in consultation on 5/6/2021    History Of Present Illness:      81 y.o. female who we are asked to see/evaluate by Tom Canas MD for medical management of elevated LFTs. HPI from patient, RN, and chart review. She was admitted 04/27/21 for increased SOB and chest pain. She was noted to be in afib with RVR. CTA chest negative for PE, but demonstrated bilateral pleural effusions. CT A/P demonstrated calcified granulomas in right hepatic lobe & increased attenuation of the gallbladder. AST & ALT noted to be elevated upon admission. US RUQ demonstrated sludge or stones within the gallbladder with gallbladder wall thickening & dilated CBD at 0.9cm. HIDA scan demonstrated patent CBD, nonvisualization of the gallbladder highly suspicious for acute cholecystitis. She had a percutaneous cholecystostomy tube placed 04/30/21 by IR. She was transferred to ICU & treated for shock. Culture from urine positive for E. Coli from bile. She was transferred to  05/02/21. Pulmonology consulted for bilateral pleural effusions & acute respiratory failure 05/03/21. She has continued to have intermittent afib with RVR. She has been on Amiodarone since admission. She was noted to have hypotension & lethargy 05/04/21. Cholangiogram done 05/04/21, by IR, demonstrated cystic duct & CBD widely patent, contrast is freely flowing into the duodenum. Hematology was consulted 05/05/21 for chronic anemia & LAZARA. She was noted to have elevated alk phos, ALT, & AST today. She denies abdominal pain, nausea, vomiting, diarrhea, constipation, melena, and hematochezia. She says she had a Hepatitis infection in 1959. Per RN, there is concern that she aspirated yesterday.  Palliative care met with patient today and her code statu was changed to limited no x 4. Last EGD 05/19/20 demonstrated large 5 cm hiatal hernia. Past Medical History:    Past Medical History:   Diagnosis Date    Acute blood loss as cause of postoperative anemia 03/2018    CAD (coronary artery disease)      cath  50    Cardiac valve prolapse     Chest pain 5/15/2020    Chronic back pain     Compression fracture of L2 ([address] 5/26/2020    COPD (chronic obstructive pulmonary disease) (HCC)     Ex-smoker     GERD (gastroesophageal reflux disease) 2/07    EGD    Glaucoma     H/O cardiac catheterization 2002    40-50% LAD   katharine    Hearing loss secondary to cerumen impaction 5/28/2019    Hyperlipidemia     Hypertension     Inadvertent durotomy 3/12/2018    Liver disease     history of hepatitis at age 22    Osteoarthritis 1999    right shoulder and back    Pericarditis 1996       Home Medications:  Prior to Admission medications    Medication Sig Start Date End Date Taking? Authorizing Provider   sertraline (ZOLOFT) 50 MG tablet Take 50 mg by mouth daily   Yes Historical Provider, MD   Lactobacillus-Inulin (CULTURELLE DIGESTIVE DAILY PO) Take 1 capsule by mouth daily   Yes Historical Provider, MD   mirtazapine (REMERON) 15 MG tablet Take 7.5 mg by mouth nightly   Yes Historical Provider, MD   Multiple Vitamins-Minerals (PRESERVISION AREDS 2+MULTI VIT PO) Take 1 capsule by mouth daily   Yes Historical Provider, MD   sodium bicarbonate 650 MG tablet Take 2 tablets by mouth 2 times daily 4/21/21  Yes YASMINE Weir CNP   isosorbide mononitrate (IMDUR) 30 MG extended release tablet Take 1 tablet by mouth daily 4/22/21  Yes YASMINE Weir CNP   nitroGLYCERIN (NITROSTAT) 0.4 MG SL tablet up to max of 3 total doses.  If no relief after 1 dose, call 911. 4/21/21  Yes YASMINE Weir CNP   metoprolol tartrate (LOPRESSOR) 25 MG tablet Take 0.5 tablets by mouth 2 times daily 4/21/21  Yes Johanna Ramirez APRN - CNP   potassium chloride (KLOR-CON M) 20 MEQ TBCR extended release tablet Take 1 tablet by mouth 2 times daily (with meals) 4/21/21  Yes Fairy Flatter, APRN - CNP   midodrine (PROAMATINE) 5 MG tablet Take 1 tablet by mouth 3 times daily (with meals) 4/21/21  Yes Fairy Flatter, APRN - CNP   apixaban (ELIQUIS) 5 MG TABS tablet Take 1 tablet by mouth 2 times daily 4/21/21 5/21/21 Yes Fairy Flatter, APRN - CNP   aspirin 325 MG EC tablet Take 1 tablet by mouth daily 4/5/21 5/5/21 Yes Rubens Walsh PA-C   pantoprazole (PROTONIX) 40 MG tablet Take 1 tablet by mouth 2 times daily (before meals) 2/16/21  Yes Anna Castellanos MD   sucralfate (CARAFATE) 1 GM tablet Take 1 tablet by mouth 4 times daily (before meals and nightly) 2/16/21  Yes Anna Castellanos MD   simvastatin (ZOCOR) 40 MG tablet Take 1 tablet by mouth nightly 7/30/20  Yes Anna Castellanos MD   Multiple Vitamins-Minerals (THERAPEUTIC MULTIVITAMIN-MINERALS) tablet Take 1 tablet by mouth daily   Yes Historical Provider, MD   latanoprost (XALATAN) 0.005 % ophthalmic solution Place 1 drop into both eyes nightly   Yes Historical Provider, MD   tiZANidine (ZANAFLEX) 4 MG tablet Take 1 tablet by mouth every 8 hours as needed (muscle spasm) 4/9/21   Fairy Flatter, APRN - CNP   acetaminophen (TYLENOL) 325 MG tablet Take 2 tablets by mouth every 4 hours as needed for Pain Maximum dose of acetaminophen is 4000 mg from all sources in 24 hours. 4/3/18   Sarah Campos, YASMINE Mayes CNP       Surgical History:  Past Surgical History:   Procedure Laterality Date    APPENDECTOMY      at age 13years   [address]  2002    BUNIONECTOMY  2004    right foot    CARDIAC CATHETERIZATION  2007??  &   2012? ?    normal results    COLONOSCOPY      last one 2000???    CT DRAIN SOFT TISSUE ABSCESS  4/30/2021    CT DRAIN SOFT TISSUE ABSCESS 4/30/2021 [facility] CT SCAN    ENDOSCOPY, COLON, DIAGNOSTIC      EYE SURGERY  2009??    right eye    HIP SURGERY Right oz (63.6 kg)   SpO2 97%   BMI 26.49 kg/m²     General appearance: Acute on chronically ill appearing female  HEENT: Normal cephalic, atraumatic without obvious deformity. Pupils equal, round, and reactive to light. Neck: Supple, with full range of motion. No jugular venous distention. Trachea midline. Respiratory:  Tachypneic. Scattered rhonchi noted throughout  Cardiovascular: Irregularly irregular without murmurs, rubs or gallops. Abdomen: Soft, non-tender, non-distended with active bowel sounds. Musculoskeletal: No clubbing, cyanosis bilaterally. Pitting edema noted to LLE. Skin: Pale, warm, dry. No rashes or lesions. Psychiatric: Alert and oriented, thought content appropriate, normal insight    Labs:   Recent Labs     05/06/21  0340   WBC 13.8*   HGB 9.9*   HCT 31.8*        Recent Labs     05/06/21  0340      K 4.2      CO2 19*   BUN 19   CREATININE 1.2   CALCIUM 8.5     Recent Labs     05/06/21  0340   *   *   BILITOT 0.8   ALKPHOS 145*     Radiology:   CT abdomen/pelvis 04/28/21  Impression   1.  Low-attenuation areas are noted within the bilateral femoral veins.     It is uncertain whether this is due to mixing artifact from the prior CTA    chest, or perhaps deep venous thrombosis.  Bilateral lower extremity    venous duplex ultrasound study in the ER is recommended. 2.  No evidence of a bowel obstruction or free air. 3.  Postoperative changes are noted involving the right hip. 4.  Faint increased attenuation in the gallbladder which could represent    vicarious excretion of contrast, gallstones, and/or sludge. Munoz Maizes is no    pericholecystic inflammation. 5.  Mild to moderate prominence of the right renal pelvis is noted, this    appears slightly improved compared to the prior study. 6.  Additional findings are detailed above.      US RUQ 04/28/21      Impression   Sludge or stones within the gallbladder with gallbladder wall thickening and dilated common bile duct. These findings may represent acute cholecystitis. HIDA scan 04/29/21      Impression   1. Patent common bile duct. 2. Nonvisualization of the gallbladder highly suspicious for acute cholecystitis. CT abdomen/pelvis 04/30/21  Impression       1. Stable CT scan of the abdomen, no significant interval change since previous study dated 28 April 2021. 2. Moderate bilateral pleural effusions and atelectasis at both lung bases. 3. Small pericardial effusion. 4. Possible old granulomatous disease in the liver and spleen. 5. Bilateral renal cysts. Possible extrarenal pelvis in the right side. 6. Slight gallbladder wall thickening. 7. Slightly atrophic pancreas. 8. Atherosclerotic calcification of the abdominal aorta and iliac arteries. 9.. Garcia catheter in the bladder. 10. Status post hysterectomy. 11. Right femoral intertrochanteric fracture status post instrumentation. 12. Slightly increased density in the left common femoral vein. 13.. Slightly increased density in the subcutaneous soft tissues in the pelvis extending into both thighs. 14. Postoperative changes in the lower lumbar spine. Lumbar spondylosis. Mild compression deformity along the superior endplate of L2. Cholangiogram 05/04/21      Impression   The cystic and common bile ducts are widely patent. CXR 05/06/21      Impression   Stable appearance of chest with cardiomegaly, small pleural effusions and    mild central vascular congestion persistent. Code Status: Limited    ASSESSMENT:  1. Elevated transaminases- suspect shock liver secondary to hypotension & Amio use  2. Afib with RVR- on Metoprolol & Amio  3. Acute on chronic anemia- s/p 2 units  4. LAZARA  5. Acute respiratory failure- concern for aspiration 05/05/21  6. Acute vs chronic cholecystitis s/p cholecsystostomy drain, clamped 05/05/21  7. Acute on chronic hypotension- on Midodrine, weaned off Dopamine  8.  Urinary retention s/p Garcia catheter  9. CHF  10. GERD  11. HLD  12. DVT LLE- on Eliquis    PLAN:     Monitor H & H, transfuse prn   Nursing to monitor stool output & document   Continue Pepcid   Continue Midodrine   ST to evaluate for concern for aspiration   US liver with dopplers   Discontinue Amio, use alternative if okay with primary given side effect of hepatic toxicity   Monitor HFP   INR   Avoid hepatotoxic meds   ATBs per primary   Palliative care following   Hematology on board   Supportive care per primary team  Will follow       Case reviewed and impression/plan reviewed in collaboration with Dr. Teja Schneider  Electronically signed by YASMINE Shepard - CNP on 5/6/2021 at 11:16 AM    [facility]  Thank you for the consultation.

## 2021-05-06 NOTE — PROGRESS NOTES
Discussed case further with Maricel RN. During the night, patient was screaming out in pain and complaining of shortness of breath and was saying, \"I'm dying. \"  Patient has just received a breathing treatment. This RN reiterated the above to the patient and this RN asked the patient if she still felt this way and patient says, \"Oh no. \"  Asked the patient if she has thought about what she would want if her heart stops and patient states, \"I wouldn't want anything to be done. \"  When asked if patient would want placed on a ventilator if her breathing would worsen, patient states, \"no. \"  Assured the patient that we would respect her wishes. Did discuss comfort measures only if the patient was at that point and at this time, patient denies this. Asked the patient if this RN could call her son Kathryn Loza to inform him of her wishes and patient's eyes got somewhat big but she was in agreement. Emotional support provided. Discussed patient's spiritual beliefs and patient would like for spiritual care to assist with spiritual needs. Spiritual care consulted. 0042  Phone call made to patient's son, Lynne Caruso. Guero updated to concerns regarding his mother's declining condition. Informed Lynne Caruso of patient's wishes to not have any resuscitative measures or to be intubated with respiratory failure and Lynne Caruso is supportive of this. Lynne Caruso expresses some frustrations as the exact cause of the patient's illness has not been determined. Did discuss with Lynne Caruso that it would be good to spend good time with the patient and also discussed that support for the patient's wishes is vital.  Discussed that many times the patient is looking for \"permission\" from their family members to allow for comfort measures only. Much emotional support provided. 6116  Dr. Alcides Warren is on the unit and updated him and order received to change the code status. Dr. Alcides Warren informed that the son would like an update.     7525  Dr. Alcides Warren was able to update Agustin via phone.  Orlando Ng had indicated to Dr. Naima Coreas via phone that he would like for his mother to have comfort measures only. This will need further addressed when he arrives and speaks with patient to ensure it aligns with patient's wishes. Updated primary RN, Maricel. Palliative care will remain available if further needs arise. Please do not hesitate to call.

## 2021-05-06 NOTE — PLAN OF CARE
Problem: Falls - Risk of:  Goal: Absence of physical injury  Description: Absence of physical injury  Outcome: Met This Shift  Note: Patient has no signs of physical injury at this time     Problem: Skin Integrity:  Goal: Will show no infection signs and symptoms  Description: Will show no infection signs and symptoms  Outcome: Met This Shift  Note: Patient has wet cough, doctor has been notified.      Problem: Skin Integrity:  Goal: Absence of new skin breakdown  Description: Absence of new skin breakdown  Outcome: Met This Shift  Note: Patient is turned every 2 hours and as needed     Problem: Daily Care:  Goal: Daily care needs are met  Description: Daily care needs are met  Outcome: Met This Shift  Note: Patient is turned every 2 hours and bathed during the night     Problem: Pain:  Goal: Pain level will decrease  Description: Pain level will decrease  Outcome: Ongoing  Note: Patient has complaints of pain at this time, new orders for Morphine X 1 received     Problem: Pain:  Goal: Control of acute pain  Description: Control of acute pain  Outcome: Ongoing  Note: Patient has complaints of pain at this time, new orders for Morphine X 1 received     Problem: Pain:  Goal: Control of chronic pain  Description: Control of chronic pain  Outcome: Ongoing  Note: Patient has complaints of pain at this time, new orders for Morphine X 1 received     Problem: DISCHARGE BARRIERS  Goal: Patient's continuum of care needs are met  Outcome: Ongoing     Problem: Nutrition  Goal: Optimal nutrition therapy  5/6/2021 0043 by Keyshawn Spann RN  Note: Patient's diet is managed by nutritional therapy     Problem: Musculor/Skeletal Functional Status  Goal: Highest potential functional level  Outcome: Ongoing  Note: Patient meeting with PT/OT     Problem: Discharge Planning:  Goal: Patients continuum of care needs are met  Description: Patients continuum of care needs are met  Note: Patient is turned every 2 hours and bathed during the night

## 2021-05-06 NOTE — CONSULTS
[address] [id number]                                  CONSULTATION    PATIENT NAME: Hansel Baker                 :        1935  MED REC NO:   [id number]                           ROOM:       0023  ACCOUNT NO:   [de-identified]                           ADMIT DATE: 2021  PROVIDER:     LESLEY Wallace Ross:  2021    HEMATOLOGY CONSULTATION    She is in 3B, room 23. ATTENDING PHYSICIAN:  Ayesha Patel MD    HISTORY OF PRESENT ILLNESS:  I was asked to see the patient to manage  her anemia. The patient was admitted to the hospital with hemoglobin of  8.7 gm. She required blood transfusion while she was in the hospital.   The patient is currently on full anticoagulation because of history of  recent DVT following hip surgery and because of AFib with rapid  ventricular rate. The patient also does have a problem with the  gallbladder and she may need a cholecystectomy. The patient has been  seen by the Cardiology Service and it was felt that the patient would  need cardiac cath. The patient currently has drainage tube in the  gallbladder. The patient denies any pain at the moment. She is feeling  weak and tired. PAST MEDICAL HISTORY:  1. Coronary artery disease. 2.  Cardiac valve prolapse. 3.  Chronic back pain. 4.  COPD. 5.  Glaucoma. 6.  Hyperlipidemia. 7.  Hypertension. 8.  Osteoarthritis. PAST SURGICAL HISTORY:  1. Appendectomy. 2.  Back surgery. 3.  Eye surgery. 4.  Hip surgery. 5.  Rotator cuff tear. HOME MEDICATIONS:  Please see medication list.    ALLERGIES:  The patient has no known allergy. SOCIAL HISTORY:  She quit smoking 31 years back. She has never used  smokeless tobacco.  She reports that she does not drink alcohol. FAMILY HISTORY:  Mother and father had history of tuberculosis.     REVIEW OF SYSTEMS:  HEENT:  She denies any blurred vision, double vision

## 2021-05-06 NOTE — PROGRESS NOTES
Patient resting in bed. Patient states that everything is going \"OK\". When asked if she was able to discuss her code status with her family, patient shakes her head no. Patient asks to have bed reclined and she closes her eyes and states that she is tired.

## 2021-05-06 NOTE — PROGRESS NOTES
Patient's son Yoandy Rodriguez and her sister Santiago Butler are present at the patient's bedside. Did discuss the option of comfort care if/when patient would like this to become the goal.  Much emotional support provided.

## 2021-05-06 NOTE — PROGRESS NOTES
[address] [address]  INPATIENT SPEECH THERAPY  [facility] CCU-STEPDOWN 3B  DAILY NOTE    TIME   SLP Individual Minutes  Time In: 1569  Time Out: 8476  Minutes: 8  Timed Code Treatment Minutes: 0 Minutes       Date: 2021  Patient Name: Erasmo Dumont      CSN: [id number]   : 1935  (81 y.o.)  Gender: female   Referring Physician:   Sheela Hays MD  Diagnosis: Atrial fibrillation with RVR  Secondary Diagnosis: Dysphagia   Precautions: Aspiration   Current Diet: Minced and moist diet with thin liquids   Swallowing Strategies: Small bites/sips, slow rate  Date of Last MBS/FEES: Not Applicable    Pain:  No pain reported. Subjective:  New speech therapy evaluation order received per Dr. Porsha Mcdaniel. ST currently following patient POC. However, prior to attempt ERICK Connelly reported \"patient with overt coughing/choking on PO medications last evening with attempts of expectoration of crushed medications. \" Patient receiving breathing treatment this AM. ERICK Connelly holding medications. Patient with no initiation to consume breakfast tray. Palliative care RN Barbara Valentine with conversation with patient (in person) and son (via phone) prior to [address] attempt; patient now limited code status. However, plans for ongoing family meeting with physician (Dr. Jeffrey Sinha) this date to discuss ongoing POC and potential consideration for comfort care. Patient resting upon ST entering room; patient woke with call of name. ST introduced self and explained ST STG's and aspiration concerns. Patient stating, \"I am not doing well. \" Patient very congested reporting \"heavy chest.\"     Patient's son in midway through session; education reviewing ST recommendations including NPO status. Patient's son agreeable stating, \"That is fine she hasn't eaten in days anyway. \"     Short-Term Goals:  SHORT TERM GOAL #1:  Goal 1: Patient will consume minced and moist diet and advanced po trials with thin liquids without s/s of aspiration in order to safely maintain adqeuate hydration and nutrition  INTERVENTIONS: Patient requesting \"water\"; patient completed x2 trials of thin liquid H2O via straw. Patient demonstrated with appropriate oral initiation and control, timely swallow, immediate congested/wet cough 2/2 trials. No further PO trials completed d/t high risk for aspiration. ST recommending NPO + ongoing plans for family meeting this date. If patient/family wishes to remain Limited code status ST recommending completion of FEES or MBS to furthur evaluate function of the swallow prior to initiation of PO diet. Will continue to follow with POC and adjust as clinically appropriate pending family meeting/decisions. SHORT TERM GOAL #2:  Goal 2: Complete instrumental evaluation (FEES vs MBS) as clinically appropriate. INTERVENTIONS: See STG1 for details    SHORT TERM GOAL #3:  Goal 3: Patient will complete executive-functioning, problem solving, reasoning, and thought organization tasks with 75% accuracy given mod cues in order to contribute to ADL completion with least amount of supervision/assistance. INTERVENTIONS: DNT secondary to focus on dysphagia tx    SHORT TERM GOAL #4:  Goal 4: Patient will complete memory tasks (immediate, delayed, working) with focus on compensatory strategies with 65% accuracy given mod cues to improve recall of functional daily and medical information  INTERVENTIONS:DNT secondary to focus on dysphagia tx    SHORT TERM GOAL #5:  Goal 5: Patient will complete attention tasks (sustained, selective, divided) with no more than 3 errors within a 5 minute task to improve ability to participate in skilled tx interventions.   INTERVENTIONS:DNT secondary to focus on dysphagia tx     Long-Term Goals: No LTGs due to short ELOS               EDUCATION:  Learner: Patient  Education:  Reviewed results and recommendations of this evaluation, Reviewed diet and strategies, Reviewed signs, symptoms and risks of aspiration, Reviewed ST goals and Plan of Care, Reviewed recommendations for follow-up and Education Related to Potential Risks and Complications Due to Impairment/Illness/Injury  Evaluation of Education: Verbalizes understanding and Demonstrates with assistance    ASSESSMENT/PLAN:  Activity Tolerance:  Patient tolerance of  treatment: poor. Highly fatigued/deconditioned-patient self reporting \"I am not doing well\"      Assessment/Plan: Patient progressing toward established goals. Continues to require skilled care of licensed speech pathologist to progress toward achievement of established goals and plan of care. .     Plan for Next Session: PO trials, ? MBS pending family meeting/code status      STEPHAN Ross 23

## 2021-05-06 NOTE — PROGRESS NOTES
Nurse practitioner contacted early this morning because of liver enzymes. Total bilirubin was only 0.8 but other LFTs are elevated  Patient complaining of chest pain again last evening troponins mildly elevated EKG was performed and reviewed by medical service  Vitals:    05/05/21 2000 05/06/21 0000 05/06/21 0400 05/06/21 0600   BP: (!) 163/87 111/71 (!) 99/53 111/68   Pulse: 78 90 88    Resp: 18 18 16    Temp: 98.5 °F (36.9 °C) 100.4 °F (38 °C) 97.7 °F (36.5 °C)    TempSrc: Oral Oral Oral    SpO2: 94% 96% 94%    Weight:   140 lb 3.2 oz (63.6 kg)    Height:           Results for Juan Zhang" (MRN [id number]) as of 5/6/2021 07:57   Ref. Range 5/6/2021 03:40   Albumin Latest Ref Range: 3.5 - 5.1 g/dL 1.8 (L)   Alk Phos Latest Ref Range: 38 - 126 U/L 145 (H)   ALT Latest Ref Range: 11 - 66 U/L 215 (H)   AST Latest Ref Range: 5 - 40 U/L 655 (H)   Bilirubin Latest Ref Range: 0.3 - 1.2 mg/dL 0.8   Total Protein Latest Ref Range: 6.1 - 8.0 g/dL 5.5 (L)   Initially percutaneous cholecystostomy placement showed cystic duct filling and common duct filling but there was described filling defects in the common bile duct noted at that time.   Liver enzymes were essentially normal and I left it up to Dr. Balbina Leigh and the intensivist to decide whether to consult GI or not  Repeat cholangiography through the percutaneous cholecystostomy tube done 2 days ago showed no filling defects although the bile duct was somewhat on the large side and her LFTs on May 5 were normal so it is possible that she has choledocholithiasis and we recommend GI evaluation

## 2021-05-07 LAB
ALBUMIN SERPL-MCNC: 1.9 G/DL (ref 3.5–5.1)
ALP BLD-CCNC: 131 U/L (ref 38–126)
ALT SERPL-CCNC: 270 U/L (ref 11–66)
ANION GAP SERPL CALCULATED.3IONS-SCNC: 13 MEQ/L (ref 8–16)
AST SERPL-CCNC: 858 U/L (ref 5–40)
BILIRUB SERPL-MCNC: 0.4 MG/DL (ref 0.3–1.2)
BUN BLDV-MCNC: 25 MG/DL (ref 7–22)
CALCIUM SERPL-MCNC: 8.6 MG/DL (ref 8.5–10.5)
CHLORIDE BLD-SCNC: 111 MEQ/L (ref 98–111)
CO2: 20 MEQ/L (ref 23–33)
CREAT SERPL-MCNC: 1.7 MG/DL (ref 0.4–1.2)
D-DIMER QUANTITATIVE: ABNORMAL NG/ML FEU (ref 0–500)
ERYTHROCYTE [DISTWIDTH] IN BLOOD BY AUTOMATED COUNT: 17.6 % (ref 11.5–14.5)
ERYTHROCYTE [DISTWIDTH] IN BLOOD BY AUTOMATED COUNT: 57.9 FL (ref 35–45)
FIBRINOGEN: 409 MG/100ML (ref 155–475)
GFR SERPL CREATININE-BSD FRML MDRD: 29 ML/MIN/1.73M2
GLUCOSE BLD-MCNC: 150 MG/DL (ref 70–108)
GLUCOSE BLD-MCNC: 65 MG/DL (ref 70–108)
GLUCOSE BLD-MCNC: 67 MG/DL (ref 70–108)
HCT VFR BLD CALC: 32 % (ref 37–47)
HEMOGLOBIN: 10.1 GM/DL (ref 12–16)
INR BLD: 7.6 (ref 0.85–1.13)
MCH RBC QN AUTO: 28.4 PG (ref 26–33)
MCHC RBC AUTO-ENTMCNC: 31.6 GM/DL (ref 32.2–35.5)
MCV RBC AUTO: 89.9 FL (ref 81–99)
PLATELET # BLD: 221 THOU/MM3 (ref 130–400)
PMV BLD AUTO: 11.8 FL (ref 9.4–12.4)
POTASSIUM SERPL-SCNC: 3.4 MEQ/L (ref 3.5–5.2)
RBC # BLD: 3.56 MILL/MM3 (ref 4.2–5.4)
SODIUM BLD-SCNC: 144 MEQ/L (ref 135–145)
TOTAL PROTEIN: 5.2 G/DL (ref 6.1–8)
WBC # BLD: 6.9 THOU/MM3 (ref 4.8–10.8)

## 2021-05-07 PROCEDURE — 94760 N-INVAS EAR/PLS OXIMETRY 1: CPT

## 2021-05-07 PROCEDURE — 36415 COLL VENOUS BLD VENIPUNCTURE: CPT

## 2021-05-07 PROCEDURE — 85379 FIBRIN DEGRADATION QUANT: CPT

## 2021-05-07 PROCEDURE — 6370000000 HC RX 637 (ALT 250 FOR IP): Performed by: INTERNAL MEDICINE

## 2021-05-07 PROCEDURE — 6370000000 HC RX 637 (ALT 250 FOR IP): Performed by: REGISTERED NURSE

## 2021-05-07 PROCEDURE — 82948 REAGENT STRIP/BLOOD GLUCOSE: CPT

## 2021-05-07 PROCEDURE — 6360000002 HC RX W HCPCS: Performed by: INTERNAL MEDICINE

## 2021-05-07 PROCEDURE — 80053 COMPREHEN METABOLIC PANEL: CPT

## 2021-05-07 PROCEDURE — 6370000000 HC RX 637 (ALT 250 FOR IP): Performed by: NURSE PRACTITIONER

## 2021-05-07 PROCEDURE — 6360000002 HC RX W HCPCS: Performed by: REGISTERED NURSE

## 2021-05-07 PROCEDURE — 2500000003 HC RX 250 WO HCPCS: Performed by: NURSE PRACTITIONER

## 2021-05-07 PROCEDURE — 2580000003 HC RX 258

## 2021-05-07 PROCEDURE — 2700000000 HC OXYGEN THERAPY PER DAY

## 2021-05-07 PROCEDURE — 2580000003 HC RX 258: Performed by: REGISTERED NURSE

## 2021-05-07 PROCEDURE — 2580000003 HC RX 258: Performed by: INTERNAL MEDICINE

## 2021-05-07 PROCEDURE — 2140000000 HC CCU INTERMEDIATE R&B

## 2021-05-07 PROCEDURE — 85027 COMPLETE CBC AUTOMATED: CPT

## 2021-05-07 PROCEDURE — 94669 MECHANICAL CHEST WALL OSCILL: CPT

## 2021-05-07 PROCEDURE — 85385 FIBRINOGEN ANTIGEN: CPT

## 2021-05-07 PROCEDURE — 6360000002 HC RX W HCPCS: Performed by: FAMILY MEDICINE

## 2021-05-07 RX ORDER — DEXTROSE MONOHYDRATE 25 G/50ML
12.5 INJECTION, SOLUTION INTRAVENOUS PRN
Status: DISCONTINUED | OUTPATIENT
Start: 2021-05-07 | End: 2021-05-13 | Stop reason: HOSPADM

## 2021-05-07 RX ORDER — DEXTROSE AND SODIUM CHLORIDE 5; .9 G/100ML; G/100ML
INJECTION, SOLUTION INTRAVENOUS CONTINUOUS
Status: DISCONTINUED | OUTPATIENT
Start: 2021-05-07 | End: 2021-05-10

## 2021-05-07 RX ORDER — NICOTINE POLACRILEX 4 MG
15 LOZENGE BUCCAL PRN
Status: DISCONTINUED | OUTPATIENT
Start: 2021-05-07 | End: 2021-05-13 | Stop reason: HOSPADM

## 2021-05-07 RX ORDER — DEXTROSE MONOHYDRATE 50 MG/ML
INJECTION, SOLUTION INTRAVENOUS CONTINUOUS
Status: DISCONTINUED | OUTPATIENT
Start: 2021-05-07 | End: 2021-05-07 | Stop reason: ALTCHOICE

## 2021-05-07 RX ORDER — DEXTROSE MONOHYDRATE 50 MG/ML
100 INJECTION, SOLUTION INTRAVENOUS PRN
Status: DISCONTINUED | OUTPATIENT
Start: 2021-05-07 | End: 2021-05-13 | Stop reason: HOSPADM

## 2021-05-07 RX ORDER — PHYTONADIONE 10 MG/ML
10 INJECTION, EMULSION INTRAMUSCULAR; INTRAVENOUS; SUBCUTANEOUS ONCE
Status: COMPLETED | OUTPATIENT
Start: 2021-05-07 | End: 2021-05-07

## 2021-05-07 RX ORDER — DEXTROSE MONOHYDRATE 25 G/50ML
INJECTION, SOLUTION INTRAVENOUS
Status: COMPLETED
Start: 2021-05-07 | End: 2021-05-07

## 2021-05-07 RX ADMIN — FAMOTIDINE 20 MG: 10 INJECTION, SOLUTION INTRAVENOUS at 20:12

## 2021-05-07 RX ADMIN — DEXTROSE AND SODIUM CHLORIDE: 5; 900 INJECTION, SOLUTION INTRAVENOUS at 06:09

## 2021-05-07 RX ADMIN — MIRTAZAPINE 7.5 MG: 15 TABLET, FILM COATED ORAL at 20:12

## 2021-05-07 RX ADMIN — LORAZEPAM 0.5 MG: 0.5 TABLET ORAL at 13:25

## 2021-05-07 RX ADMIN — DEXTROSE 50 % IN WATER (D50W) INTRAVENOUS SYRINGE: at 06:22

## 2021-05-07 RX ADMIN — POTASSIUM CHLORIDE 20 MEQ: 400 INJECTION, SOLUTION INTRAVENOUS at 06:56

## 2021-05-07 RX ADMIN — PIPERACILLIN AND TAZOBACTAM 3375 MG: 3; .375 INJECTION, POWDER, LYOPHILIZED, FOR SOLUTION INTRAVENOUS at 04:28

## 2021-05-07 RX ADMIN — LORAZEPAM 0.5 MG: 0.5 TABLET ORAL at 20:13

## 2021-05-07 RX ADMIN — METOPROLOL TARTRATE 25 MG: 25 TABLET, FILM COATED ORAL at 18:17

## 2021-05-07 RX ADMIN — PHYTONADIONE 10 MG: 10 INJECTION, EMULSION INTRAMUSCULAR; INTRAVENOUS; SUBCUTANEOUS at 06:56

## 2021-05-07 RX ADMIN — POTASSIUM CHLORIDE 20 MEQ: 400 INJECTION, SOLUTION INTRAVENOUS at 06:09

## 2021-05-07 RX ADMIN — DEXTROSE MONOHYDRATE: 25 INJECTION, SOLUTION INTRAVENOUS at 06:22

## 2021-05-07 RX ADMIN — PIPERACILLIN AND TAZOBACTAM 3375 MG: 3; .375 INJECTION, POWDER, LYOPHILIZED, FOR SOLUTION INTRAVENOUS at 20:13

## 2021-05-07 RX ADMIN — MORPHINE SULFATE 2 MG: 2 INJECTION, SOLUTION INTRAMUSCULAR; INTRAVENOUS at 08:26

## 2021-05-07 RX ADMIN — SODIUM BICARBONATE 1300 MG: 650 TABLET ORAL at 20:12

## 2021-05-07 RX ADMIN — SODIUM CHLORIDE, PRESERVATIVE FREE 10 ML: 5 INJECTION INTRAVENOUS at 20:13

## 2021-05-07 RX ADMIN — ONDANSETRON 4 MG: 2 INJECTION INTRAMUSCULAR; INTRAVENOUS at 08:26

## 2021-05-07 RX ADMIN — AMIODARONE HYDROCHLORIDE 200 MG: 200 TABLET ORAL at 18:11

## 2021-05-07 RX ADMIN — ONDANSETRON 4 MG: 2 INJECTION INTRAMUSCULAR; INTRAVENOUS at 20:16

## 2021-05-07 RX ADMIN — MORPHINE SULFATE 2 MG: 2 INJECTION, SOLUTION INTRAMUSCULAR; INTRAVENOUS at 21:45

## 2021-05-07 RX ADMIN — DEXTROSE MONOHYDRATE 12.5 G: 25 INJECTION, SOLUTION INTRAVENOUS at 06:16

## 2021-05-07 RX ADMIN — IPRATROPIUM BROMIDE AND ALBUTEROL SULFATE 1 AMPULE: .5; 3 SOLUTION RESPIRATORY (INHALATION) at 16:30

## 2021-05-07 RX ADMIN — FAMOTIDINE 20 MG: 10 INJECTION, SOLUTION INTRAVENOUS at 08:41

## 2021-05-07 RX ADMIN — LATANOPROST 1 DROP: 50 SOLUTION OPHTHALMIC at 20:12

## 2021-05-07 RX ADMIN — MORPHINE SULFATE 2 MG: 2 INJECTION, SOLUTION INTRAMUSCULAR; INTRAVENOUS at 12:32

## 2021-05-07 RX ADMIN — IPRATROPIUM BROMIDE AND ALBUTEROL SULFATE 1 AMPULE: .5; 3 SOLUTION RESPIRATORY (INHALATION) at 08:05

## 2021-05-07 RX ADMIN — MORPHINE SULFATE 2 MG: 2 INJECTION, SOLUTION INTRAMUSCULAR; INTRAVENOUS at 16:50

## 2021-05-07 ASSESSMENT — PAIN DESCRIPTION - ORIENTATION: ORIENTATION: MID

## 2021-05-07 ASSESSMENT — PAIN SCALES - GENERAL
PAINLEVEL_OUTOF10: 6
PAINLEVEL_OUTOF10: 7

## 2021-05-07 ASSESSMENT — PAIN DESCRIPTION - PROGRESSION
CLINICAL_PROGRESSION: NOT CHANGED

## 2021-05-07 ASSESSMENT — PAIN DESCRIPTION - FREQUENCY: FREQUENCY: CONTINUOUS

## 2021-05-07 ASSESSMENT — PAIN DESCRIPTION - DESCRIPTORS: DESCRIPTORS: ACHING

## 2021-05-07 ASSESSMENT — PAIN DESCRIPTION - ONSET: ONSET: ON-GOING

## 2021-05-07 ASSESSMENT — PAIN - FUNCTIONAL ASSESSMENT: PAIN_FUNCTIONAL_ASSESSMENT: PREVENTS OR INTERFERES WITH MANY ACTIVE NOT PASSIVE ACTIVITIES

## 2021-05-07 NOTE — PROGRESS NOTES
Palliative care to unit to check on pt. Case discussed with pt's nurse,ERICK Strauss. Per her report, pt has transitioned to comfort only and hospice is coming to assess. No needs from palliative care. We will remain available, floor to notify PRN for needs.

## 2021-05-07 NOTE — PROGRESS NOTES
Updated by Ora Yo, patient and family have changed code status to [facility]. Hospice has been consulted. RN denies GI need. INR noted to be 7.60, question accuracy of this value. Labs & imaging do not support end stage liver disease.      GI signing off, if patient condition/status changes please feel free to call [facility] for GI needs

## 2021-05-07 NOTE — CARE COORDINATION
5/7/21, 3:54 PM EDT    DISCHARGE PLANNING EVALUATION  Left a message for Jean Paul at the [facility] to make her aware patient will be assessed for hospice.

## 2021-05-07 NOTE — PROGRESS NOTES
[address]  OCCUPATIONAL THERAPY MISSED TREATMENT NOTE  [facility]U-STEPDOWN 3B  3B-23/023-A      Date: 2021  Patient Name: Elke Beard        CSN: [id number]   : 1935  (81 y.o.)  Gender: female   Referring Practitioner: RYAN Enrique  Diagnosis: Atrial Fibrillation witih RVR         REASON FOR MISSED TREATMENT: Pt transitioning to Hospice care, discharge from therapy services at this time. Please consult as needs arise.

## 2021-05-07 NOTE — CARE COORDINATION
5/7/21, 12:27 PM EDT    DISCHARGE  [address] day: 9  Location: -23/023-A Reason for admit: Atrial fibrillation with RVR ([address] [I48.91]   Procedure:   4/27 CXR - left basilar atelectasis. Tiny bilateral pleural effusions suspected. 4/28 CTA chest - No PE. Small-moderate sized bilateral pleural effusions, increased in size. Bilateral multifocal atelectasis.   4/28 US GB - Sludge or stones within the gallbladder with gallbladder wall thickening and dilated common bile duct. These findings may represent acute cholecystitis.   4/29 HIDA scan - Patent common bile duct. Nonvisualization of the gallbladder highly suspicious for acute cholecystitis. 4/29 CVC placed - Left IJ.   4/30 Perc cholecystectomy drain inserted. 5/4 Cholangiogram - The cystic and common bile ducts are widely patent. 5/6 CXR - Stable appearance of chest with cardiomegaly, small pleural effusions and mild central vascular congestion persistent. 5/6 US Liver/Abd - Mildly dilated common bile duct, unchanged. Status post cholecystostomy tube placement. Patent and unremarkable hepatic vasculature. Exophytic solid appearing lesion of the right kidney, not convincingly seen on recent CT exam, indeterminate, soft tissue mass cannot be excluded. Barriers to Discharge: Code status being changed to [facility], Hospice being consulted for end stage liver disease. INR 7.60. PCP: Allie Burton MD  Readmission Risk Score: 42%  Patient Goals/Plan/Treatment Preferences: Awaiting Hospice consult. Family with pt.

## 2021-05-07 NOTE — PROGRESS NOTES
Dr. Isela Negron note       Internal Medicine              Patient:  Constantin Reid  YOB: 1935    MRN: [id number]   Acct:  [de-identified]   [id number]  Primary Care Physician: Michael Ruano MD    Admit Date: 4/27/2021           Subjective: seen this am and the family/ patient have decided on a dnrcc status. Objective:      Physical Exam:    Vitals:  Patient Vitals for the past 24 hrs:   BP Temp Temp src Pulse Resp SpO2 Weight   05/07/21 0815 138/75 98.9 °F (37.2 °C) Oral 80 22 98 % --   05/07/21 0804 -- -- -- -- -- 94 % --   05/07/21 0332 131/72 97.8 °F (36.6 °C) Oral 85 14 96 % 144 lb 3.2 oz (65.4 kg)   05/06/21 2322 139/70 98.3 °F (36.8 °C) Axillary 80 16 97 % --   05/06/21 2000 (!) 140/78 98.6 °F (37 °C) Oral 88 18 93 % --   05/06/21 1947 -- -- -- -- -- 94 % --   05/06/21 1642 -- -- -- -- -- 96 % --   05/06/21 1254 -- -- -- -- -- 93 % --   05/06/21 1219 136/76 98.1 °F (36.7 °C) Oral 75 17 95 % --     Weight: Weight: 144 lb 3.2 oz (65.4 kg)     24 hour intake/output:    Intake/Output Summary (Last 24 hours) at 5/7/2021 1159  Last data filed at 5/7/2021 0335  Gross per 24 hour   Intake 692.22 ml   Output 175 ml   Net 517.22 ml       General appearance - alert, and in no distress  Eyes - pupils equal and reactive,and pale. Mouth - mucous membranes moist,   Neck - supple, no significant adenopathy  Chest - gail air movement  Heart -  S1, S2, heard  Abdomen - soft, nontender,pos bs. Neurological - alert, in no distress. Review of Labs and Diagnostic Testing:    CBC:   Recent Labs     05/07/21  0430   WBC 6.9   HGB 10.1*   HCT 32.0*   MCV 89.9        BMP:   Recent Labs     05/07/21  0430      K 3.4*      CO2 20*   BUN 25*   CREATININE 1.7*   CALCIUM 8.6   GLUCOSE 67*     PT/INR:   Recent Labs     05/06/21  0430   INR 7.60*     APTT: No results for input(s): APTT in the last 72 hours.    Lipids:   Recent Labs Signature   ----------------------------------------------------------------  Electronically signed by Keyshawn Díaz MD (Interpreting  physician) on 05/01/2021 at 02:37 PM  ----------------------------------------------------------------   Findings   Mitral Valve  The mitral valve structure was normal with normal leaflet separation. Aortic Valve  The aortic valve was trileaflet with normal thickness and cuspal  separation. DOPPLER:   Tricuspid Valve  The tricuspid valve structure was normal with normal leaflet separation. Pulmonic Valve  The pulmonic valve leaflets exhibited normal thickness, no calcification,  and normal cuspal separation. Left Atrium  Left atrial size was normal.   Left Ventricle  Left ventricle size is normal.  Normal left ventricular wall thickness. Ejection fraction is visually estimated in the range of 40% to 45%. Hypokinetic motion of the apical anteroseptal wall noted in the left  ventricle. Hypokinetic motion of the mid anteroseptal wall noted in the left  ventricle. Right Atrium  Right atrial size was normal.   Right Ventricle  The right ventricular size was normal with normal systolic function and  wall thickness. Pericardial Effusion  The pericardium was normal in appearance with evidence of a trace  pericardial effusion. Pleural Effusion  No evidence of pleural effusion. Aorta / Great Vessels  -Aortic root dimension within normal limits.  -The Pulmonary artery is within normal limits. -IVC size is within normal limits with normal respiratory phasic changes.   M-Mode/2D Measurements & Calculations   LV Diastolic    LV Systolic Dimension: 3.4   AV Cusp Separation: 1.5 cmLA  Dimension: 4.5  cm                           Dimension: 2.8 cmAO Root  cm              LV Volume Diastolic: 36.3 ml Dimension: 3 cm  LV FS:24.4 %    LV Volume Systolic: 12.4 ml  LV PW           LV EDV/LV EDV Index: 07.4  Diastolic: 0.8  TA/05 O^8CJ ESV/LV ESV  cm              Index: 26.3 ml/16 m^2 RV Diastolic Dimension: 2 cm  Septum          EF Calculated: 47.8 %  Diastolic: 0.8                               LA/Aorta: 0.93  cm              LV Length: 6.1 cm   LV Area  Diastolic: 16  cm^2  LV Area  Systolic: 03.4  cm^2  [email or web address] DocKey=[id number]    Cta Chest W Wo Contrast    Result Date: 5/1/2021  CT angiography of the chest with Isovue-370 IV contrast. 3D/MIP post processing reconstructions were performed. COMPARISON: Single view of the chest dated 4/29/2021, CTA of the chest dated 4/28/2021 FINDINGS: There are no intraluminal filling defects to suggest pulmonary embolism. No evidence of right heart strain. No evidence of thoracic aortic dissection. Small calcification present within the right lobe of the thyroid. No supraclavicular or axillary lymphadenopathy. Left IJ central venous line present. Ascending aorta and main pulmonary artery are normal in caliber. No pericardial effusion. Small to moderate hiatal hernia, stable. The mid to proximal esophagus contains fluid/food debris is mildly distended. No mediastinal lymphadenopathy. Calcified right hilar lymph nodes. Upper Abdomen: Splenic calcifications present. Pigtail catheter present within the gallbladder fossa. Hepatic granuloma present within the right lobe. Lungs: Moderate bilateral pleural effusions, right greater than left, slightly increased from prior imaging. There is overlying atelectasis. Fluid present within the major fissure on the left, slightly increased from prior imaging. Trachea and central airways are clear. No significant bronchial wall thickening. No bronchiectasis. Musculoskeletal: Multiple chronic healing left anterior rib fractures redemonstrated and similar to prior imaging. [place] right curvature of the mid thoracic spine. Mild spondylosis. 1.  No evidence of pulmonary embolism.  2.  Moderate bilateral pleural effusions, right greater than left, increased from prior imaging. There is overlying atelectasis. This document has been electronically signed by: Sha Wong MD on 05/01/2021 09:50 PM All CTs at this facility use dose modulation techniques and iterative reconstructions, and/or weight-based dosing when appropriate to reduce radiation to a low as reasonably achievable. Ct Guided Needle Placement    Result Date: 4/30/2021  CT-GUIDED CHOLECYSTOSTOMY TUBE INSERTION/and cholangiogram: PERFORMED BY: Rashi Montanez M.D. DRAINAGE SITE: Gallbladder APPROACH: Lateral approach CATHETER: 8 Libyan multipurpose drainage catheter. FLUID OBTAINED: 10 mL bloody bile was aspirated and sent to laboratory for culture and sensitivity testing. ESTIMATED BLOOD LOSS: Minimal SEDATION: Versed 1 mg; fentanyl 50 mcg, IV; the patient was sedated during this procedure,  and monitored with EKG and pulse ox monitoring devices by a registered nurse. Face-to-face time with patient 45 minutes. PROCEDURE: Signed informed consent was obtained prior to performing this procedure. The patient was placed on the CT scanner and sedated, as indicated above. ALL CT SCANS AT THIS FACILITY use dose modulation, iterative reconstruction, and/or weight-based dosing when appropriate to reduce radiation dose to as low as reasonably achievable. CT images were initially obtained to determine appropriate puncture site. The skin was marked, prepped, and draped in a sterile fashion. Following local anesthesia and utilizing aseptic technique, a needle was successfully passed into the gallbladder. . A  small amount of bile was aspirated to confirm appropriate needle position CT images were obtained to confirm proper needle position. . A guidewire was then passed through the needle followed by insertion of progressively larger dilators up to an 8 Western Karyn  size.  An 8 Western Karyn multi-side-hole drainage catheter was then passed over the wire and was coiled within the gallbladder. The retaining suture was then locked in the standard fashion. Catheter was then hooked to a Jeevan-Close drainage bag and the catheter was flushed several times with sterile saline. The catheter was stabilized to the skin with a 2-0 silk suture and Percu-Stay device. A sample of the bile was sent to laboratory for culture and sensitivity testing. The patient was then transported to the special suite and a cholangiogram was performed, confirming appropriate position of the catheter within the gallbladder. Filling defects are seen in the gallbladder, consistent with sludge and gallstones. There are  also filling defects in a moderately dilated, bile duct. No ductal obstruction is seen, however, and contrast is seen to pass into the duodenum. There is also also reflux of contrast into the intrahepatic radicles which are unremarkable. 1. Status post successful percutaneous cholecystostomy drainage tube insertion with CT guidance. 2. A cholecystogram reveals sludge and gallstones in the gallbladder but reveals a common duct and cystic duct are widely patent. There is free flow of contrast into the duodenum. . There is moderate dilatation of the common bile duct which also contains some filling defect consistent with sludge and/or gallstones. **This report has been created using voice recognition software. It may contain minor errors which are inherent in voice recognition technology. ** Final report electronically signed by Dr. Chaya Washington on 4/30/2021 12:00 PM    Ct Abdomen Pelvis W Iv Contrast Additional Contrast? Radiologist Recommendation    Result Date: 4/30/2021  PROCEDURE: CT ABDOMEN PELVIS W IV CONTRAST CLINICAL INFORMATION: Hgb drop, on IV heparin, concern for bleeding, Trauma . COMPARISON: CT scan of the abdomen and pelvis dated 28 April 2021. . Gallbladder ultrasound dated 28 April 2021. Radionuclide hepatobiliary scan dated 29 April 2021.  TECHNIQUE: Axial 5 mm CT images were obtained through the abdomen and pelvis after the administration of intravenous contrast. Coronal and sagittal reconstructions were obtained. All CT scans at this facility use dose modulation, iterative reconstruction, and/or weight-based dosing when appropriate to reduce radiation dose to as low as reasonably achievable. FINDINGS: Moderate bilateral pleural effusions and atelectasis at both lung bases. There is a small pericardial effusion. There is mild diffuse fatty replacement in the liver and possible calcified granuloma in the dome of right lobe of the liver. . Multiple punctate calcifications in the spleen. There is a slightly atrophic pancreas. The adrenal glands are unremarkable. . There is slight gallbladder wall thickening. There are bilateral renal cysts present. There is a possible extrarenal pelvis on the right side. There are no renal stones. There is a hiatal hernia present No abnormalities of the small bowel loops are noted. There is atherosclerotic calcification in the abdominal aorta and iliac arteries. There is no adenopathy. There is a Garcia catheter within the bladder. The patient is status post hysterectomy There is no pelvic free fluid. The colon is within normal limits. There is no adenopathy. There is an intramedullary lupis in the femur and compression screws in the right femoral head and neck. There is a fracture in the intertrochanteric region. There is lumbar spondylosis. There are postoperative changes in the lower lumbar spine There is a mild compression deformity along the superior endplate of L2. There is slightly increased soft tissue density in the subcutaneous soft tissues of the pelvis extending into both thighs. There is abnormal density in the left common femoral vein. 1. Stable CT scan of the abdomen, no significant interval change since previous study dated 28 April 2021. 2. Moderate bilateral pleural effusions and atelectasis at both lung bases.  3. Small pericardial effusion. 4. Possible old granulomatous disease in the liver and spleen. 5. Bilateral renal cysts. Possible extrarenal pelvis in the right side. 6. Slight gallbladder wall thickening. 7. Slightly atrophic pancreas. 8. Atherosclerotic calcification of the abdominal aorta and iliac arteries. 9.. Garcia catheter in the bladder. 10. Status post hysterectomy. 11. Right femoral intertrochanteric fracture status post instrumentation. 12. Slightly increased density in the left common femoral vein. 13.. Slightly increased density in the subcutaneous soft tissues in the pelvis extending into both thighs. 14. Postoperative changes in the lower lumbar spine. Lumbar spondylosis. Mild compression deformity along the superior endplate of L2. **This report has been created using voice recognition software. It may contain minor errors which are inherent in voice recognition technology. ** Final report electronically signed by DR Justice Ganser on 4/30/2021 8:42 AM    Xr Chest Portable    Result Date: 5/6/2021  EXAM:   CHEST X-RAY, SINGLE VIEW PORTABLE: COMPARISON:  CR,SR  - XR CHEST PORTABLE  - 05/04/2021 10:52 AM EDT FINDINGS: Heart size upper limits of normal, unchanged. Zftlwv-d-Akey catheter position is stable. Mild central vascular congestion appears relatively stable. Small pleural effusions are evident. The osseous structures are unremarkable. Stable appearance of chest with cardiomegaly, small pleural effusions and mild central vascular congestion persistent. This document has been electronically signed by: Virgil Jordan MD on 05/06/2021 05:46 AM    Xr Chest Portable    Result Date: 5/4/2021  PROCEDURE: XR CHEST PORTABLE CLINICAL INFORMATION: Pleural effusion. Lung infiltrates. Follow-up. COMPARISON: 5/3/2021 TECHNIQUE: A single mobile view of the chest was obtained. 1. Borderline heart size. Left jugular PICC line with tip at upper end in the SVC. Pigtail catheter in the right upper quadrant.  2. Small bilateral pleural effusions. 3. Mild bibasilar atelectasis/pneumonia. Overall appearance of chest slightly worse on prior **This report has been created using voice recognition software. It may contain minor errors which are inherent in voice recognition technology. ** Final report electronically signed by Dr. Danielle Arteaga on 5/4/2021 11:08 AM    Xr Chest Portable    Result Date: 5/3/2021  Exam: 1V chest Comparison:  CR,SR  - XR CHEST PORTABLE  - 05/01/2021 08:34 AM EDT Findings: Left subclavian venous line tip again over the left innominate / SVC junction. Mediastinum: No cardiac silhouette enlargement. Lungs: No infiltrate or mass. Pleura: No pneumothorax. Stable small effusions. Bones: No acute pathology. Impression: Stable small effusions. This document has been electronically signed by: Lashawn Lynn MD on 05/03/2021 07:00 AM    Xr Chest Portable    Result Date: 5/1/2021  PROCEDURE: XR CHEST PORTABLE CLINICAL INFORMATION: left sided chest pain. COMPARISON: Chest x-ray dated 29 April 2021. TECHNIQUE: AP upright view of the chest. FINDINGS: This is a left subclavian catheter with the tip in the distal brachiocephalic vein, unchanged There is mild cardiomegaly. . There is atherosclerotic calcification in the aortic arch. There is increased density in both lung fields. There are small bilateral pleural effusions. . The pulmonary vascularity is slightly increased. No suspicious osseous lesions are present. No interval change since previous study dated 29 April 2021. **This report has been created using voice recognition software. It may contain minor errors which are inherent in voice recognition technology. ** Final report electronically signed by DR Lyn Blanca on 5/1/2021 10:05 AM    Xr Chest Portable    Result Date: 4/30/2021  1 view of the chest. COMPARISON: Single view of the chest dated 4/27/2021 FINDINGS: Interval placement of a left internal jugular central venous line tip overlying the proximal SVC.  Low lung volumes. Borderline cardiomegaly, likely exaggerated secondary to low lung volumes. Atherosclerotic thoracic aorta. Left hemidiaphragm and retrocardiac region are obscured. Likely small left pleural effusion, similar to prior imaging. No pneumothorax. No displaced fracture. Mild spondylosis. 1.  Left internal jugular central venous line tip overlies the proximal SVC. 2.  Low lung volumes. This document has been electronically signed by: Sha Wong MD on 04/30/2021 12:04 AM    Vl Dup Lower Extremity Venous Bilateral    Result Date: 4/30/2021  PROCEDURE: VL DUP LOWER EXTREMITY VENOUS BILATERAL CLINICAL INFORMATION: Low-attenuation areas are noted within the bilateral femoral veins per CT recommendations for evaluation DVT. COMPARISON: 4/19/2021 TECHNIQUE: Venous doppler ultrasound was performed of the bilateral lower extremities using gray scale, color flow and spectral doppler imaging. FINDINGS: There is normal color flow, spectral analysis and compressibility of the common femoral vein,  femoral vein and popliteal vein on the right . There is normal color flow and compressibility in the right posterior tibial veins, anterior tibial veins and peroneal veins. There is echogenic clot in the left greater saphenous vein, the left common femoral vein and proximal femoral vein and profunda femoris veins show echogenic clot which are brighter than the prior exam. There is noncompressibility. There is now some flow restored in the mid femoral vein, and there is now propagation of echoes in the distal femoral vein with partial flow. . There has been interval development of internal echoes and partial compressibility in the popliteal vein with some minimal retained color Doppler flow. There is absence of flow and compressibility in the posterior tibial and left peroneal veins.      Persistence of thrombus in the left common femoral vein, saphenofemoral junction, proximal femoral vein with increased echogenicity of the proximal femoral vein clot suggesting organization There is been partial restoration of flow in the mid left femoral vein There has been development of thrombus in the popliteal vein with partial flow. There remains absence of flow and compressibility in the left peroneal and posterior tibial veins. The right lower extremity shows no evidence of DVT. **This report has been created using voice recognition software. It may contain minor errors which are inherent in voice recognition technology. ** Final report electronically signed by Dr. Alice Clark on 4/30/2021 1:15 PM    Ir Cholecystostomy Percutaneous Complete    Result Date: 4/30/2021  CT-GUIDED CHOLECYSTOSTOMY TUBE INSERTION/and cholangiogram: PERFORMED BY: Conchita Morillo M.D. DRAINAGE SITE: Gallbladder APPROACH: Lateral approach CATHETER: 8 Bahamian multipurpose drainage catheter. FLUID OBTAINED: 10 mL bloody bile was aspirated and sent to laboratory for culture and sensitivity testing. ESTIMATED BLOOD LOSS: Minimal SEDATION: Versed 1 mg; fentanyl 50 mcg, IV; the patient was sedated during this procedure,  and monitored with EKG and pulse ox monitoring devices by a registered nurse. Face-to-face time with patient 45 minutes. PROCEDURE: Signed informed consent was obtained prior to performing this procedure. The patient was placed on the CT scanner and sedated, as indicated above. ALL CT SCANS AT THIS FACILITY use dose modulation, iterative reconstruction, and/or weight-based dosing when appropriate to reduce radiation dose to as low as reasonably achievable. CT images were initially obtained to determine appropriate puncture site. The skin was marked, prepped, and draped in a sterile fashion. Following local anesthesia and utilizing aseptic technique, a needle was successfully passed into the gallbladder. . A  small amount of bile was aspirated to confirm appropriate needle position CT images were obtained to confirm proper needle position. . A guidewire was There are  also filling defects in a moderately dilated, bile duct. No ductal obstruction is seen, however, and contrast is seen to pass into the duodenum. There is also also reflux of contrast into the intrahepatic radicles which are unremarkable. 1. Status post successful percutaneous cholecystostomy drainage tube insertion with CT guidance. 2. A cholecystogram reveals sludge and gallstones in the gallbladder but reveals a common duct and cystic duct are widely patent. There is free flow of contrast into the duodenum. . There is moderate dilatation of the common bile duct which also contains some filling defect consistent with sludge and/or gallstones. **This report has been created using voice recognition software. It may contain minor errors which are inherent in voice recognition technology. ** Final report electronically signed by Dr. Alf Birch on 4/30/2021 12:00 PM    Nm Hepatobiliary Scan W Ejection Fraction    Result Date: 4/29/2021  PROCEDURE: NM HEPATOBILIARY SCAN W PHARMACOLOGICAL INTERVENTION CLINICAL INFORMATION: Abnormal gallbladder ultrasound TECHNIQUE: The patient received 1.39 mcg of Kinevac prior to radiopharmaceutical administration. Anterior images of the abdomen were obtained for 60 minutes following radiopharmaceutical administration. 2.8 mg of morphine sulfate were administered and additional images were obtained in multiple projections. COMPARISON: Nuclear medicine hepatobiliary scan 6/25/2020 FINDINGS: There is normal hepatic uptake of radiotracer. Activity is present in the common bile duct and small bowel by 23 minutes. The gallbladder is not visualized either before or following morphine administration. 1. Patent common bile duct. 2. Nonvisualization of the gallbladder highly suspicious for acute cholecystitis.  Final report electronically signed by Dr. Laxmi Luna on 4/29/2021 11:03 AM    Us Chest Including Mediastinum    Result Date: 5/3/2021  PROCEDURE: US CHEST INCLUDING MEDIASTINUM CLINICAL INFORMATION: Bilateral effusions TECHNIQUE: Limited ultrasound of the chest was performed. Grayscale images were obtained. COMPARISON: None FINDINGS: Small pleural effusions are identified bilaterally in the thorax. Small bilateral pleural effusions. Final report electronically signed by Dr. Radha Velasquez on 5/3/2021 3:07 PM    Ir [facility] Exist Access W Imaging    Result Date: 5/4/2021  PROCEDURE: IR [facility] CHOLA EXIST ACCESS W IMAGING PERFORMED BY:  Dr. Celestino Pantoja. Hoang Barnes MD CLINICAL INFORMATION: . 50-year-old female with a history of cholecystitis. Cholecystectomy tube in place. Drainage catheter : 8 Turkish multipurpose cholecystostomy drainage tube. FLUOROSCOPY TIME: 0.5 minutes FLUOROSCOPIC IMAGES: 5 TECHNIQUE: The patient came to the Department with a cholecystostomy tube in place. Iodinated contrast material was injected and a cholecystogram was performed. FINDINGS: The cholecystostomy tube remains within the gallbladder. The cystic and common bile ducts are widely patent. Contrast can be seen freely flowing into the duodenum. The cystic and common bile ducts are widely patent. **This report has been created using voice recognition software. It may contain minor errors which are inherent in voice recognition technology. ** Final report electronically signed by Dr Dee Vee on 5/4/2021 4:16 PM      EKG:      Diet: Diet NPO Effective Now        Data:   Scheduled Meds: Scheduled Meds:   metoprolol  5 mg Intravenous Once    ipratropium-albuterol  1 ampule Inhalation Q4H WA    piperacillin-tazobactam  3,375 mg Intravenous Q8H    midodrine  10 mg Oral TID WC    phosphorus replacement protocol   Other RX Placeholder    famotidine (PEPCID) injection  20 mg Intravenous BID    [Held by provider] apixaban  5 mg Oral BID    aspirin  325 mg Oral Daily    amiodarone  200 mg Oral Daily    [Held by provider] docusate sodium  100 mg Oral BID    latanoprost  1 drop Both chest pain    Moderate malnutrition (HCC)    Bilateral pleural effusion    Acute coronary syndrome (HCC)    Shock liver        Plan   Now a dsnrcc. Will allow patient to eat as pleased.     Electronically signed by Timbo Garza MD on 5/7/2021 at 11:59 AM  Over 35 mins spent on this evaluation

## 2021-05-07 NOTE — PROGRESS NOTES
[facility], [place] THERAPY MISSED TREATMENT NOTE  [facility] CCU-STEPDOWN 3B      Date: 2021  Patient Name: Jackie Molina        MRN: [id number]    : 1935  (81 y.o.)    REASON FOR MISSED TREATMENT:  Family meeting this date; per chart review and ERICK Andrews patient currently [facility]. Will hold speech therapy interventions this date and maintain involved for family support/education as appropriate d/t recent change in code status.  Please contact [facility] at #[phone] should ST services be warranted prior to next attempt    Ly Kent M.S. [place] [address]

## 2021-05-07 NOTE — PROGRESS NOTES
This patient is not a hospice patient at this time. Hospice referral received from [facility], the hospice liaison will see the patient today.

## 2021-05-08 LAB
ALBUMIN SERPL-MCNC: 1.8 G/DL (ref 3.5–5.1)
ALP BLD-CCNC: 188 U/L (ref 38–126)
ALT SERPL-CCNC: 232 U/L (ref 11–66)
ANION GAP SERPL CALCULATED.3IONS-SCNC: 14 MEQ/L (ref 8–16)
AST SERPL-CCNC: 459 U/L (ref 5–40)
BILIRUB SERPL-MCNC: 0.5 MG/DL (ref 0.3–1.2)
BUN BLDV-MCNC: 27 MG/DL (ref 7–22)
CALCIUM SERPL-MCNC: 8.8 MG/DL (ref 8.5–10.5)
CHLORIDE BLD-SCNC: 114 MEQ/L (ref 98–111)
CO2: 20 MEQ/L (ref 23–33)
CREAT SERPL-MCNC: 1.8 MG/DL (ref 0.4–1.2)
ERYTHROCYTE [DISTWIDTH] IN BLOOD BY AUTOMATED COUNT: 18.1 % (ref 11.5–14.5)
ERYTHROCYTE [DISTWIDTH] IN BLOOD BY AUTOMATED COUNT: 61.4 FL (ref 35–45)
GFR SERPL CREATININE-BSD FRML MDRD: 27 ML/MIN/1.73M2
GLUCOSE BLD-MCNC: 95 MG/DL (ref 70–108)
HCT VFR BLD CALC: 37.8 % (ref 37–47)
HEMOGLOBIN: 11.3 GM/DL (ref 12–16)
MCH RBC QN AUTO: 27.6 PG (ref 26–33)
MCHC RBC AUTO-ENTMCNC: 29.9 GM/DL (ref 32.2–35.5)
MCV RBC AUTO: 92.4 FL (ref 81–99)
ORGANISM: ABNORMAL
PLATELET # BLD: 249 THOU/MM3 (ref 130–400)
PMV BLD AUTO: 11.6 FL (ref 9.4–12.4)
POTASSIUM SERPL-SCNC: 4 MEQ/L (ref 3.5–5.2)
RBC # BLD: 4.09 MILL/MM3 (ref 4.2–5.4)
SODIUM BLD-SCNC: 148 MEQ/L (ref 135–145)
TOTAL PROTEIN: 5.5 G/DL (ref 6.1–8)
URINE CULTURE REFLEX: ABNORMAL
URINE CULTURE REFLEX: ABNORMAL
WBC # BLD: 6.5 THOU/MM3 (ref 4.8–10.8)

## 2021-05-08 PROCEDURE — 85027 COMPLETE CBC AUTOMATED: CPT

## 2021-05-08 PROCEDURE — 6360000002 HC RX W HCPCS: Performed by: INTERNAL MEDICINE

## 2021-05-08 PROCEDURE — 80053 COMPREHEN METABOLIC PANEL: CPT

## 2021-05-08 PROCEDURE — 6370000000 HC RX 637 (ALT 250 FOR IP): Performed by: NURSE PRACTITIONER

## 2021-05-08 PROCEDURE — 2700000000 HC OXYGEN THERAPY PER DAY

## 2021-05-08 PROCEDURE — 2580000003 HC RX 258: Performed by: REGISTERED NURSE

## 2021-05-08 PROCEDURE — 6370000000 HC RX 637 (ALT 250 FOR IP): Performed by: REGISTERED NURSE

## 2021-05-08 PROCEDURE — 94760 N-INVAS EAR/PLS OXIMETRY 1: CPT

## 2021-05-08 PROCEDURE — 94669 MECHANICAL CHEST WALL OSCILL: CPT

## 2021-05-08 PROCEDURE — 6370000000 HC RX 637 (ALT 250 FOR IP): Performed by: INTERNAL MEDICINE

## 2021-05-08 PROCEDURE — 2140000000 HC CCU INTERMEDIATE R&B

## 2021-05-08 PROCEDURE — 2580000003 HC RX 258: Performed by: PHARMACIST

## 2021-05-08 PROCEDURE — 6360000002 HC RX W HCPCS: Performed by: PHARMACIST

## 2021-05-08 PROCEDURE — 2500000003 HC RX 250 WO HCPCS: Performed by: NURSE PRACTITIONER

## 2021-05-08 PROCEDURE — 36415 COLL VENOUS BLD VENIPUNCTURE: CPT

## 2021-05-08 RX ADMIN — MIDODRINE HYDROCHLORIDE 10 MG: 10 TABLET ORAL at 09:14

## 2021-05-08 RX ADMIN — SERTRALINE HYDROCHLORIDE 50 MG: 50 TABLET, FILM COATED ORAL at 09:14

## 2021-05-08 RX ADMIN — IPRATROPIUM BROMIDE AND ALBUTEROL SULFATE 1 AMPULE: .5; 3 SOLUTION RESPIRATORY (INHALATION) at 12:36

## 2021-05-08 RX ADMIN — SODIUM CHLORIDE, PRESERVATIVE FREE 10 ML: 5 INJECTION INTRAVENOUS at 20:07

## 2021-05-08 RX ADMIN — IPRATROPIUM BROMIDE AND ALBUTEROL SULFATE 1 AMPULE: .5; 3 SOLUTION RESPIRATORY (INHALATION) at 08:07

## 2021-05-08 RX ADMIN — FAMOTIDINE 20 MG: 10 INJECTION, SOLUTION INTRAVENOUS at 20:07

## 2021-05-08 RX ADMIN — MORPHINE SULFATE 2 MG: 2 INJECTION, SOLUTION INTRAMUSCULAR; INTRAVENOUS at 01:56

## 2021-05-08 RX ADMIN — MORPHINE SULFATE 2 MG: 2 INJECTION, SOLUTION INTRAMUSCULAR; INTRAVENOUS at 20:07

## 2021-05-08 RX ADMIN — IPRATROPIUM BROMIDE AND ALBUTEROL SULFATE 1 AMPULE: .5; 3 SOLUTION RESPIRATORY (INHALATION) at 16:33

## 2021-05-08 RX ADMIN — PIPERACILLIN AND TAZOBACTAM 3375 MG: 3; .375 INJECTION, POWDER, LYOPHILIZED, FOR SOLUTION INTRAVENOUS at 17:01

## 2021-05-08 RX ADMIN — SODIUM BICARBONATE 1300 MG: 650 TABLET ORAL at 09:14

## 2021-05-08 RX ADMIN — MORPHINE SULFATE 2 MG: 2 INJECTION, SOLUTION INTRAMUSCULAR; INTRAVENOUS at 11:00

## 2021-05-08 RX ADMIN — LATANOPROST 1 DROP: 50 SOLUTION OPHTHALMIC at 20:07

## 2021-05-08 RX ADMIN — AMIODARONE HYDROCHLORIDE 200 MG: 200 TABLET ORAL at 09:14

## 2021-05-08 RX ADMIN — PIPERACILLIN AND TAZOBACTAM 3375 MG: 3; .375 INJECTION, POWDER, LYOPHILIZED, FOR SOLUTION INTRAVENOUS at 23:58

## 2021-05-08 RX ADMIN — LORAZEPAM 0.5 MG: 0.5 TABLET ORAL at 16:51

## 2021-05-08 RX ADMIN — MORPHINE SULFATE 2 MG: 2 INJECTION, SOLUTION INTRAMUSCULAR; INTRAVENOUS at 15:56

## 2021-05-08 ASSESSMENT — PAIN DESCRIPTION - PAIN TYPE: TYPE: CHRONIC PAIN

## 2021-05-08 ASSESSMENT — PAIN SCALES - GENERAL
PAINLEVEL_OUTOF10: 0
PAINLEVEL_OUTOF10: 5
PAINLEVEL_OUTOF10: 6
PAINLEVEL_OUTOF10: 6

## 2021-05-08 ASSESSMENT — PAIN DESCRIPTION - ORIENTATION: ORIENTATION: MID

## 2021-05-08 ASSESSMENT — PAIN DESCRIPTION - PROGRESSION

## 2021-05-08 ASSESSMENT — PAIN DESCRIPTION - FREQUENCY
FREQUENCY: INTERMITTENT
FREQUENCY: INTERMITTENT

## 2021-05-08 ASSESSMENT — PAIN DESCRIPTION - ONSET: ONSET: GRADUAL

## 2021-05-08 ASSESSMENT — PAIN SCALES - WONG BAKER
WONGBAKER_NUMERICALRESPONSE: 0

## 2021-05-08 NOTE — PROGRESS NOTES
Pharmacy Renal Adjustment    Neli Han is a 81 y.o. female. Pharmacy renally adjust the following medications per [employer] approved policy: Zosyn    Recent Labs     05/07/21  0430 05/08/21  0601   BUN 25* 27*   CREATININE 1.7* 1.8*     Estimated Creatinine Clearance: 20 mL/min (A) (based on SCr of 1.8 mg/dL (H)).   Calculated CrCl:    Height:   Ht Readings from Last 1 Encounters:   04/28/21 5' 1\" (1.549 m)     Weight:  Wt Readings from Last 1 Encounters:   05/07/21 144 lb 3.2 oz (65.4 kg)       Plan: Adjustments based on renal function:          Decrease Zosyn 3.375 gm Q12H    Jos Sin PharmD 5/8/2021 11:33 AM

## 2021-05-08 NOTE — PROGRESS NOTES
Daughter Kaiden Guzman called for updates on her mom. HIPPA code was provided, updates given any questions answered at this time.

## 2021-05-08 NOTE — PROGRESS NOTES
Dr. Lindy Almaguer note       Internal Medicine              Patient:  Elham Marcelo  YOB: 1935    MRN: [id number]   Acct:  [de-identified]   3B-23/023-A  Primary Care Physician: Darek Borjas MD    Admit Date: 4/27/2021           Subjective: Overall condition remains the same she appears comfortable otherwise. Objective:      Physical Exam:    Vitals:  Patient Vitals for the past 24 hrs:   BP Temp Temp src Pulse Resp SpO2   05/08/21 1120 (!) 123/56 98.8 °F (37.1 °C) Oral 98 18 96 %   05/08/21 0900 121/78 97.9 °F (36.6 °C) Axillary 75 22 95 %   05/08/21 0807 -- -- -- -- -- 92 %   05/08/21 0423 120/66 97.5 °F (36.4 °C) Axillary 81 23 94 %   05/08/21 0145 111/61 -- -- -- 22 --   05/07/21 2346 125/69 98.9 °F (37.2 °C) Axillary 78 22 92 %   05/07/21 2142 (!) 100/47 -- -- 78 21 --   05/07/21 2004 (!) 111/58 98.9 °F (37.2 °C) Oral 120 22 98 %     Weight: Weight: 144 lb 3.2 oz (65.4 kg)     24 hour intake/output:    Intake/Output Summary (Last 24 hours) at 5/8/2021 1138  Last data filed at 5/8/2021 0428  Gross per 24 hour   Intake 25 ml   Output 650 ml   Net -625 ml       General appearance - alert, and in no distress  Eyes - pupils equal and reactive,and pale. Mouth - mucous membranes moist,   Neck - supple, no significant adenopathy  Chest - gail air movement  Heart -  S1, S2, heard  Abdomen - soft, nontender,pos bs. Neurological - alert, in no distress. Review of Labs and Diagnostic Testing:    CBC:   Recent Labs     05/08/21  0601   WBC 6.5   HGB 11.3*   HCT 37.8   MCV 92.4        BMP:   Recent Labs     05/08/21  0601   *   K 4.0   *   CO2 20*   BUN 27*   CREATININE 1.8*   CALCIUM 8.8   GLUCOSE 95     PT/INR:   Recent Labs     05/06/21  0430   INR 7.60*     APTT: No results for input(s): APTT in the last 72 hours.    Lipids:   Recent Labs     05/08/21  0601   ALKPHOS 188*   *   *   BILITOT 0.5   LABALBU 1.8*     Troponin: No results for input(s): TROPONINI in the last 72 hours. Imaging:  Echo Limited    Result Date: 5/1/2021  Transthoracic Echocardiography Report (TTE)  Demographics   Patient Name  Shruthi Gracia          Gender             Female                Becky Camilo   MR #          [id number]       Race                                                Ethnicity   Account #     [de-identified]       Room Number        0004   Accession     [id number]      Date of Study      04/30/2021  Number   Date of Birth 1935      Referring          David Rhoades                                Physician          NAKIA Waddell MD   Age           81 year(s)      Kaiser Lee MD                                Physician  Procedure Type of Study   TTE procedure:ECHOCARDIOGRAM LIMITED. Procedure Date Date: 04/30/2021 Start: 01:25 PM Study Location: Bedside Technical Quality: Limited visualization due to poor acoustical window. Indications:Atrial fibrillation. Additional Medical History: Former smoker, hypertension, hyperlipidemia, GERD, COPD, anemia Patient Status: Routine Height: 61.02 inches Weight: 152.12 pounds BSA: 1.68 m^2 BMI: 28.72 kg/m^2 BP: 105/62 mmHg  Conclusions   Summary  decrease left ventricular function since last exam  Left ventricle size is normal.  Normal left ventricular wall thickness. Ejection fraction is visually estimated in the range of 40% to 45%. Hypokinetic motion of the apical anteroseptal wall noted in the left  ventricle. Hypokinetic motion of the mid anteroseptal wall noted in the left  ventricle.    Signature   ----------------------------------------------------------------  Electronically signed by Ernestina Cristina MD (Interpreting  physician) on 05/01/2021 at 02:37 PM Area  Diastolic: 16  cm^2  LV Area  Systolic: 50.1  cm^2  [email or web address] DocKey=[id number]    Cta Chest W Wo Contrast    Result Date: 5/1/2021  CT angiography of the chest with Isovue-370 IV contrast. 3D/MIP post processing reconstructions were performed. COMPARISON: Single view of the chest dated 4/29/2021, CTA of the chest dated 4/28/2021 FINDINGS: There are no intraluminal filling defects to suggest pulmonary embolism. No evidence of right heart strain. No evidence of thoracic aortic dissection. Small calcification present within the right lobe of the thyroid. No supraclavicular or axillary lymphadenopathy. Left IJ central venous line present. Ascending aorta and main pulmonary artery are normal in caliber. No pericardial effusion. Small to moderate hiatal hernia, stable. The mid to proximal esophagus contains fluid/food debris is mildly distended. No mediastinal lymphadenopathy. Calcified right hilar lymph nodes. Upper Abdomen: Splenic calcifications present. Pigtail catheter present within the gallbladder fossa. Hepatic granuloma present within the right lobe. Lungs: Moderate bilateral pleural effusions, right greater than left, slightly increased from prior imaging. There is overlying atelectasis. Fluid present within the major fissure on the left, slightly increased from prior imaging. Trachea and central airways are clear. No significant bronchial wall thickening. No bronchiectasis. Musculoskeletal: Multiple chronic healing left anterior rib fractures redemonstrated and similar to prior imaging. [place] right curvature of the mid thoracic spine. Mild spondylosis. 1.  No evidence of pulmonary embolism. 2.  Moderate bilateral pleural effusions, right greater than left, increased from prior imaging. There is overlying atelectasis. This document has been electronically signed by:  Wilbert Malone times with sterile saline. The catheter was stabilized to the skin with a 2-0 silk suture and Percu-Stay device. A sample of the bile was sent to laboratory for culture and sensitivity testing. The patient was then transported to the special suite and a cholangiogram was performed, confirming appropriate position of the catheter within the gallbladder. Filling defects are seen in the gallbladder, consistent with sludge and gallstones. There are  also filling defects in a moderately dilated, bile duct. No ductal obstruction is seen, however, and contrast is seen to pass into the duodenum. There is also also reflux of contrast into the intrahepatic radicles which are unremarkable. 1. Status post successful percutaneous cholecystostomy drainage tube insertion with CT guidance. 2. A cholecystogram reveals sludge and gallstones in the gallbladder but reveals a common duct and cystic duct are widely patent. There is free flow of contrast into the duodenum. . There is moderate dilatation of the common bile duct which also contains some filling defect consistent with sludge and/or gallstones. **This report has been created using voice recognition software. It may contain minor errors which are inherent in voice recognition technology. ** Final report electronically signed by Dr. Deisy Randle on 4/30/2021 12:00 PM    Ct Abdomen Pelvis W Iv Contrast Additional Contrast? Radiologist Recommendation    Result Date: 4/30/2021  PROCEDURE: CT ABDOMEN PELVIS W IV CONTRAST CLINICAL INFORMATION: Hgb drop, on IV heparin, concern for bleeding, Trauma . COMPARISON: CT scan of the abdomen and pelvis dated 28 April 2021. . Gallbladder ultrasound dated 28 April 2021. Radionuclide hepatobiliary scan dated 29 April 2021. TECHNIQUE: Axial 5 mm CT images were obtained through the abdomen and pelvis after the administration of intravenous contrast. Coronal and sagittal reconstructions were obtained.  All CT scans at this facility use dose thickening. 7. Slightly atrophic pancreas. 8. Atherosclerotic calcification of the abdominal aorta and iliac arteries. 9.. Garcia catheter in the bladder. 10. Status post hysterectomy. 11. Right femoral intertrochanteric fracture status post instrumentation. 12. Slightly increased density in the left common femoral vein. 13.. Slightly increased density in the subcutaneous soft tissues in the pelvis extending into both thighs. 14. Postoperative changes in the lower lumbar spine. Lumbar spondylosis. Mild compression deformity along the superior endplate of L2. **This report has been created using voice recognition software. It may contain minor errors which are inherent in voice recognition technology. ** Final report electronically signed by DR Anuj Gomez on 4/30/2021 8:42 AM    Xr Chest Portable    Result Date: 5/6/2021  EXAM:   CHEST X-RAY, SINGLE VIEW PORTABLE: COMPARISON:  CR,SR  - XR CHEST PORTABLE  - 05/04/2021 10:52 AM EDT FINDINGS: Heart size upper limits of normal, unchanged. Ovsjcv-f-Fyix catheter position is stable. Mild central vascular congestion appears relatively stable. Small pleural effusions are evident. The osseous structures are unremarkable. Stable appearance of chest with cardiomegaly, small pleural effusions and mild central vascular congestion persistent. This document has been electronically signed by: Brennon Sexton MD on 05/06/2021 05:46 AM    Xr Chest Portable    Result Date: 5/4/2021  PROCEDURE: XR CHEST PORTABLE CLINICAL INFORMATION: Pleural effusion. Lung infiltrates. Follow-up. COMPARISON: 5/3/2021 TECHNIQUE: A single mobile view of the chest was obtained. 1. Borderline heart size. Left jugular PICC line with tip at upper end in the SVC. Pigtail catheter in the right upper quadrant. 2. Small bilateral pleural effusions. 3. Mild bibasilar atelectasis/pneumonia.  Overall appearance of chest slightly worse on prior **This report has been created using voice recognition software. It may contain minor errors which are inherent in voice recognition technology. ** Final report electronically signed by Dr. Alf Birch on 5/4/2021 11:08 AM    Xr Chest Portable    Result Date: 5/3/2021  Exam: 1V chest Comparison:  CR,SR  - XR CHEST PORTABLE  - 05/01/2021 08:34 AM EDT Findings: Left subclavian venous line tip again over the left innominate / SVC junction. Mediastinum: No cardiac silhouette enlargement. Lungs: No infiltrate or mass. Pleura: No pneumothorax. Stable small effusions. Bones: No acute pathology. Impression: Stable small effusions. This document has been electronically signed by: Chantale Jesus MD on 05/03/2021 07:00 AM    Xr Chest Portable    Result Date: 5/1/2021  PROCEDURE: XR CHEST PORTABLE CLINICAL INFORMATION: left sided chest pain. COMPARISON: Chest x-ray dated 29 April 2021. TECHNIQUE: AP upright view of the chest. FINDINGS: This is a left subclavian catheter with the tip in the distal brachiocephalic vein, unchanged There is mild cardiomegaly. . There is atherosclerotic calcification in the aortic arch. There is increased density in both lung fields. There are small bilateral pleural effusions. . The pulmonary vascularity is slightly increased. No suspicious osseous lesions are present. No interval change since previous study dated 29 April 2021. **This report has been created using voice recognition software. It may contain minor errors which are inherent in voice recognition technology. ** Final report electronically signed by  [facility] on 5/1/2021 10:05 AM    Xr Chest Portable    Result Date: 4/30/2021  1 view of the chest. COMPARISON: Single view of the chest dated 4/27/2021 FINDINGS: Interval placement of a left internal jugular central venous line tip overlying the proximal SVC. Low lung volumes. Borderline cardiomegaly, likely exaggerated secondary to low lung volumes. Atherosclerotic thoracic aorta.  Left hemidiaphragm and retrocardiac region are obscured. Likely small left pleural effusion, similar to prior imaging. No pneumothorax. No displaced fracture. Mild spondylosis. 1.  Left internal jugular central venous line tip overlies the proximal SVC. 2.  Low lung volumes. This document has been electronically signed by: Sha Wong MD on 04/30/2021 12:04 AM     Dup Lower Extremity Venous Bilateral    Result Date: 4/30/2021  PROCEDURE:  DUP LOWER EXTREMITY VENOUS BILATERAL CLINICAL INFORMATION: Low-attenuation areas are noted within the bilateral femoral veins per CT recommendations for evaluation DVT. COMPARISON: 4/19/2021 TECHNIQUE: Venous doppler ultrasound was performed of the bilateral lower extremities using gray scale, color flow and spectral doppler imaging. FINDINGS: There is normal color flow, spectral analysis and compressibility of the common femoral vein,  femoral vein and popliteal vein on the right . There is normal color flow and compressibility in the right posterior tibial veins, anterior tibial veins and peroneal veins. There is echogenic clot in the left greater saphenous vein, the left common femoral vein and proximal femoral vein and profunda femoris veins show echogenic clot which are brighter than the prior exam. There is noncompressibility. There is now some flow restored in the mid femoral vein, and there is now propagation of echoes in the distal femoral vein with partial flow. . There has been interval development of internal echoes and partial compressibility in the popliteal vein with some minimal retained color Doppler flow. There is absence of flow and compressibility in the posterior tibial and left peroneal veins.      Persistence of thrombus in the left common femoral vein, saphenofemoral junction, proximal femoral vein with increased echogenicity of the proximal femoral vein clot suggesting organization There is been partial restoration of flow in the mid left femoral vein There has been development of thrombus in the popliteal vein with partial flow. There remains absence of flow and compressibility in the left peroneal and posterior tibial veins. The right lower extremity shows no evidence of DVT. **This report has been created using voice recognition software. It may contain minor errors which are inherent in voice recognition technology. ** Final report electronically signed by Dr. Sis Causey on 4/30/2021 1:15 PM    Ir Cholecystostomy Percutaneous Complete    Result Date: 4/30/2021  CT-GUIDED CHOLECYSTOSTOMY TUBE INSERTION/and cholangiogram: PERFORMED BY: John Baker M.D. DRAINAGE SITE: Gallbladder APPROACH: Lateral approach CATHETER: 8 Khmer multipurpose drainage catheter. FLUID OBTAINED: 10 mL bloody bile was aspirated and sent to laboratory for culture and sensitivity testing. ESTIMATED BLOOD LOSS: Minimal SEDATION: Versed 1 mg; fentanyl 50 mcg, IV; the patient was sedated during this procedure,  and monitored with EKG and pulse ox monitoring devices by a registered nurse. Face-to-face time with patient 45 minutes. PROCEDURE: Signed informed consent was obtained prior to performing this procedure. The patient was placed on the CT scanner and sedated, as indicated above. ALL CT SCANS AT THIS FACILITY use dose modulation, iterative reconstruction, and/or weight-based dosing when appropriate to reduce radiation dose to as low as reasonably achievable. CT images were initially obtained to determine appropriate puncture site. The skin was marked, prepped, and draped in a sterile fashion. Following local anesthesia and utilizing aseptic technique, a needle was successfully passed into the gallbladder. . A  small amount of bile was aspirated to confirm appropriate needle position CT images were obtained to confirm proper needle position. . A guidewire was then passed through the needle followed by insertion of progressively larger dilators up to an 8 Western Karyn  size.  An 8 Western Karyn multi-side-hole drainage catheter was then passed over the wire and was coiled within the gallbladder. The retaining suture was then locked in the standard fashion. Catheter was then hooked to a Jeevan-Close drainage bag and the catheter was flushed several times with sterile saline. The catheter was stabilized to the skin with a 2-0 silk suture and Percu-Stay device. A sample of the bile was sent to laboratory for culture and sensitivity testing. The patient was then transported to the special suite and a cholangiogram was performed, confirming appropriate position of the catheter within the gallbladder. Filling defects are seen in the gallbladder, consistent with sludge and gallstones. There are  also filling defects in a moderately dilated, bile duct. No ductal obstruction is seen, however, and contrast is seen to pass into the duodenum. There is also also reflux of contrast into the intrahepatic radicles which are unremarkable. 1. Status post successful percutaneous cholecystostomy drainage tube insertion with CT guidance. 2. A cholecystogram reveals sludge and gallstones in the gallbladder but reveals a common duct and cystic duct are widely patent. There is free flow of contrast into the duodenum. . There is moderate dilatation of the common bile duct which also contains some filling defect consistent with sludge and/or gallstones. **This report has been created using voice recognition software. It may contain minor errors which are inherent in voice recognition technology. ** Final report electronically signed by Dr. Deisy Randle on 4/30/2021 12:00 PM    Ct Abscess Drainage Soft Tissue    Result Date: 4/30/2021  CT-GUIDED CHOLECYSTOSTOMY TUBE INSERTION/and cholangiogram: PERFORMED BY: Catina Fontaine M.D. DRAINAGE SITE: Gallbladder APPROACH: Lateral approach CATHETER: 8 Occitan multipurpose drainage catheter. FLUID OBTAINED: 10 mL bloody bile was aspirated and sent to laboratory for culture and sensitivity testing.  ESTIMATED BLOOD LOSS: Minimal SEDATION: Versed 1 mg; fentanyl 50 mcg, IV; the patient was sedated during this procedure,  and monitored with EKG and pulse ox monitoring devices by a registered nurse. Face-to-face time with patient 45 minutes. PROCEDURE: Signed informed consent was obtained prior to performing this procedure. The patient was placed on the CT scanner and sedated, as indicated above. ALL CT SCANS AT THIS FACILITY use dose modulation, iterative reconstruction, and/or weight-based dosing when appropriate to reduce radiation dose to as low as reasonably achievable. CT images were initially obtained to determine appropriate puncture site. The skin was marked, prepped, and draped in a sterile fashion. Following local anesthesia and utilizing aseptic technique, a needle was successfully passed into the gallbladder. . A  small amount of bile was aspirated to confirm appropriate needle position CT images were obtained to confirm proper needle position. . A guidewire was then passed through the needle followed by insertion of progressively larger dilators up to an 8 Western Karyn  size. An 8 Western Karyn multi-side-hole drainage catheter was then passed over the wire and was coiled within the gallbladder. The retaining suture was then locked in the standard fashion. Catheter was then hooked to a Jeevan-Close drainage bag and the catheter was flushed several times with sterile saline. The catheter was stabilized to the skin with a 2-0 silk suture and Percu-Stay device. A sample of the bile was sent to laboratory for culture and sensitivity testing. The patient was then transported to the special suite and a cholangiogram was performed, confirming appropriate position of the catheter within the gallbladder. Filling defects are seen in the gallbladder, consistent with sludge and gallstones. There are  also filling defects in a moderately dilated, bile duct. No ductal obstruction is seen, however, and contrast is seen to pass into the duodenum.  There is also also reflux of contrast into the intrahepatic radicles which are unremarkable. 1. Status post successful percutaneous cholecystostomy drainage tube insertion with CT guidance. 2. A cholecystogram reveals sludge and gallstones in the gallbladder but reveals a common duct and cystic duct are widely patent. There is free flow of contrast into the duodenum. . There is moderate dilatation of the common bile duct which also contains some filling defect consistent with sludge and/or gallstones. **This report has been created using voice recognition software. It may contain minor errors which are inherent in voice recognition technology. ** Final report electronically signed by Dr. Chaya Washington on 4/30/2021 12:00 PM    Nm Hepatobiliary Scan W Ejection Fraction    Result Date: 4/29/2021  PROCEDURE: NM HEPATOBILIARY SCAN W PHARMACOLOGICAL INTERVENTION CLINICAL INFORMATION: Abnormal gallbladder ultrasound TECHNIQUE: The patient received 1.39 mcg of Kinevac prior to radiopharmaceutical administration. Anterior images of the abdomen were obtained for 60 minutes following radiopharmaceutical administration. 2.8 mg of morphine sulfate were administered and additional images were obtained in multiple projections. COMPARISON: Nuclear medicine hepatobiliary scan 6/25/2020 FINDINGS: There is normal hepatic uptake of radiotracer. Activity is present in the common bile duct and small bowel by 23 minutes. The gallbladder is not visualized either before or following morphine administration. 1. Patent common bile duct. 2. Nonvisualization of the gallbladder highly suspicious for acute cholecystitis. Final report electronically signed by Dr. Maxcine Castleman on 4/29/2021 11:03 AM    Us Chest Including Mediastinum    Result Date: 5/3/2021  PROCEDURE: US CHEST INCLUDING MEDIASTINUM CLINICAL INFORMATION: Bilateral effusions TECHNIQUE: Limited ultrasound of the chest was performed. Grayscale images were obtained.  COMPARISON: None FINDINGS: Small pleural effusions are identified bilaterally in the thorax. Small bilateral pleural effusions. Final report electronically signed by Dr. Joanna Hdz on 5/3/2021 3:07 PM    Ir Rafa Bingham Perc Chola Exist Access W Imaging    Result Date: 5/4/2021  PROCEDURE: IR [facility] CHOLA EXIST ACCESS W IMAGING PERFORMED BY:  Dr. Keith Valderrama. Lucinda Monterroso MD CLINICAL INFORMATION: . 81-year-old female with a history of cholecystitis. Cholecystectomy tube in place. Drainage catheter : 8 Tamazight multipurpose cholecystostomy drainage tube. FLUOROSCOPY TIME: 0.5 minutes FLUOROSCOPIC IMAGES: 5 TECHNIQUE: The patient came to the Department with a cholecystostomy tube in place. Iodinated contrast material was injected and a cholecystogram was performed. FINDINGS: The cholecystostomy tube remains within the gallbladder. The cystic and common bile ducts are widely patent. Contrast can be seen freely flowing into the duodenum. The cystic and common bile ducts are widely patent. **This report has been created using voice recognition software. It may contain minor errors which are inherent in voice recognition technology. ** Final report electronically signed by Dr Darcy Lerner on 5/4/2021 4:16 PM      EKG:      Diet: DIET GENERAL;        Data:   Scheduled Meds: Scheduled Meds:   piperacillin-tazobactam  3,375 mg Intravenous Q12H    metoprolol  5 mg Intravenous Once    ipratropium-albuterol  1 ampule Inhalation Q4H WA    midodrine  10 mg Oral TID WC    phosphorus replacement protocol   Other RX Placeholder    famotidine (PEPCID) injection  20 mg Intravenous BID    [Held by provider] apixaban  5 mg Oral BID    aspirin  325 mg Oral Daily    amiodarone  200 mg Oral Daily    [Held by provider] docusate sodium  100 mg Oral BID    latanoprost  1 drop Both Eyes Nightly    mirtazapine  7.5 mg Oral Nightly    sertraline  50 mg Oral Daily    sodium bicarbonate  1,300 mg Oral BID    sucralfate  1 g Oral 4x Daily AC & HS  sodium chloride flush  5-40 mL Intravenous 2 times per day    metoprolol tartrate  25 mg Oral BID     Continuous Infusions:   dextrose      dextrose 5 % and 0.9 % NaCl 50 mL/hr at 05/07/21 0609    sodium chloride       PRN Meds:.glucose, dextrose, glucagon (rDNA), dextrose, morphine, LORazepam, potassium chloride, potassium chloride, potassium chloride, magnesium sulfate, acetaminophen **OR** acetaminophen, ondansetron, potassium chloride **OR** potassium alternative oral replacement **OR** potassium chloride, tiZANidine, sodium chloride flush, sodium chloride, polyethylene glycol  Continuous Infusions:   dextrose      dextrose 5 % and 0.9 % NaCl 50 mL/hr at 05/07/21 0609    sodium chloride           Assessment   Active Problems:    Atrial fibrillation with RVR (HCC)    Abnormal ultrasound of gallbladder    Other chest pain    Moderate malnutrition (HCC)    Bilateral pleural effusion    Acute coronary syndrome ([address]    Shock liver  Resolved Problems:    * No resolved hospital problems.  *        Patient Active Problem List   Diagnosis    Hyperlipidemia    Osteoarthritis    GERD (gastroesophageal reflux disease)    COPD (chronic obstructive pulmonary disease) (HCC)    Chronic back pain    CAD (coronary artery disease)    Hypertension    Spinal stenosis of lumbar region with neurogenic claudication    Neurologic gait dysfunction    Inflammatory spondylopathy of lumbosacral region ([address]    Paroxysmal atrial fibrillation ([address]    Closed displaced intertrochanteric fracture of right femur ([address]    Fall at nursing home    Severe malnutrition ([address]    Acute kidney injury ([address]    Anemia    Urinary retention    Chest pain    Elevated troponin    Atrial fibrillation with RVR (HCC)    Abnormal ultrasound of gallbladder    Other chest pain    Moderate malnutrition (HCC)    Bilateral pleural effusion    Acute coronary syndrome ([address]    Shock liver        Plan   Continue comfort care measures.       Electronically signed by Zee Bee MD on 5/8/2021 at 11:38 AM  Over 35 mins spent on this evaluation

## 2021-05-08 NOTE — PROGRESS NOTES
Hospice referral completed with son Rachele Terry over telephone. Explained hospice concepts, philosophies, and services and that focus would be on patient comfort rather than treatment. Agustin voiced understanding and stated this is what he wanted for his mother. Educated on hospice levels of care and that patient currently not meeting GIP status at this time. Patient has received 3 - 2 mg doses of IV morphine, 1 dose of SL ativan,  and 2 - 4 mg doses of IV Zofran in last 24 hrs. Currently, patient was resting in bed with eyes closed, respirations easy and unlabored, and appeared comfortable. Educated that hospice staff would continue to monitor patient's comfort level and medication needs and evaluate for GIP level of care or if patient remained comfortable would transition to SL morphine and prepare for transition back to [facility]. Agustin voiced understanding. Support offered. Primary nurse Jenna Jama updated.

## 2021-05-09 LAB
ALBUMIN SERPL-MCNC: 1.9 G/DL (ref 3.5–5.1)
ALP BLD-CCNC: 194 U/L (ref 38–126)
ALT SERPL-CCNC: 138 U/L (ref 11–66)
ANION GAP SERPL CALCULATED.3IONS-SCNC: 13 MEQ/L (ref 8–16)
AST SERPL-CCNC: 176 U/L (ref 5–40)
BILIRUB SERPL-MCNC: 0.6 MG/DL (ref 0.3–1.2)
BUN BLDV-MCNC: 28 MG/DL (ref 7–22)
CALCIUM SERPL-MCNC: 8.8 MG/DL (ref 8.5–10.5)
CHLORIDE BLD-SCNC: 115 MEQ/L (ref 98–111)
CO2: 21 MEQ/L (ref 23–33)
CREAT SERPL-MCNC: 1.8 MG/DL (ref 0.4–1.2)
ERYTHROCYTE [DISTWIDTH] IN BLOOD BY AUTOMATED COUNT: 18.1 % (ref 11.5–14.5)
ERYTHROCYTE [DISTWIDTH] IN BLOOD BY AUTOMATED COUNT: 61.4 FL (ref 35–45)
GFR SERPL CREATININE-BSD FRML MDRD: 27 ML/MIN/1.73M2
GLUCOSE BLD-MCNC: 91 MG/DL (ref 70–108)
HCT VFR BLD CALC: 32.6 % (ref 37–47)
HEMOGLOBIN: 9.9 GM/DL (ref 12–16)
MCH RBC QN AUTO: 28 PG (ref 26–33)
MCHC RBC AUTO-ENTMCNC: 30.4 GM/DL (ref 32.2–35.5)
MCV RBC AUTO: 92.1 FL (ref 81–99)
PLATELET # BLD: 217 THOU/MM3 (ref 130–400)
PMV BLD AUTO: 11.7 FL (ref 9.4–12.4)
POTASSIUM SERPL-SCNC: 3.7 MEQ/L (ref 3.5–5.2)
RBC # BLD: 3.54 MILL/MM3 (ref 4.2–5.4)
SODIUM BLD-SCNC: 149 MEQ/L (ref 135–145)
TOTAL PROTEIN: 5.3 G/DL (ref 6.1–8)
WBC # BLD: 8.4 THOU/MM3 (ref 4.8–10.8)

## 2021-05-09 PROCEDURE — 94761 N-INVAS EAR/PLS OXIMETRY MLT: CPT

## 2021-05-09 PROCEDURE — 2700000000 HC OXYGEN THERAPY PER DAY

## 2021-05-09 PROCEDURE — 2580000003 HC RX 258: Performed by: REGISTERED NURSE

## 2021-05-09 PROCEDURE — 94669 MECHANICAL CHEST WALL OSCILL: CPT

## 2021-05-09 PROCEDURE — 2580000003 HC RX 258: Performed by: INTERNAL MEDICINE

## 2021-05-09 PROCEDURE — 6370000000 HC RX 637 (ALT 250 FOR IP): Performed by: NURSE PRACTITIONER

## 2021-05-09 PROCEDURE — 85027 COMPLETE CBC AUTOMATED: CPT

## 2021-05-09 PROCEDURE — 80053 COMPREHEN METABOLIC PANEL: CPT

## 2021-05-09 PROCEDURE — 36415 COLL VENOUS BLD VENIPUNCTURE: CPT

## 2021-05-09 PROCEDURE — 2500000003 HC RX 250 WO HCPCS: Performed by: NURSE PRACTITIONER

## 2021-05-09 PROCEDURE — 6360000002 HC RX W HCPCS: Performed by: INTERNAL MEDICINE

## 2021-05-09 PROCEDURE — 2140000000 HC CCU INTERMEDIATE R&B

## 2021-05-09 RX ADMIN — LATANOPROST 1 DROP: 50 SOLUTION OPHTHALMIC at 20:14

## 2021-05-09 RX ADMIN — MORPHINE SULFATE 2 MG: 2 INJECTION, SOLUTION INTRAMUSCULAR; INTRAVENOUS at 18:06

## 2021-05-09 RX ADMIN — MORPHINE SULFATE 2 MG: 2 INJECTION, SOLUTION INTRAMUSCULAR; INTRAVENOUS at 23:51

## 2021-05-09 RX ADMIN — IPRATROPIUM BROMIDE AND ALBUTEROL SULFATE 1 AMPULE: .5; 3 SOLUTION RESPIRATORY (INHALATION) at 12:25

## 2021-05-09 RX ADMIN — FAMOTIDINE 20 MG: 10 INJECTION, SOLUTION INTRAVENOUS at 09:24

## 2021-05-09 RX ADMIN — FAMOTIDINE 20 MG: 10 INJECTION, SOLUTION INTRAVENOUS at 20:14

## 2021-05-09 RX ADMIN — SODIUM CHLORIDE, PRESERVATIVE FREE 10 ML: 5 INJECTION INTRAVENOUS at 09:27

## 2021-05-09 RX ADMIN — IPRATROPIUM BROMIDE AND ALBUTEROL SULFATE 1 AMPULE: .5; 3 SOLUTION RESPIRATORY (INHALATION) at 16:55

## 2021-05-09 RX ADMIN — IPRATROPIUM BROMIDE AND ALBUTEROL SULFATE 1 AMPULE: .5; 3 SOLUTION RESPIRATORY (INHALATION) at 21:24

## 2021-05-09 RX ADMIN — MORPHINE SULFATE 2 MG: 2 INJECTION, SOLUTION INTRAMUSCULAR; INTRAVENOUS at 00:47

## 2021-05-09 RX ADMIN — CEFOXITIN SODIUM 2000 MG: 2 POWDER, FOR SOLUTION INTRAVENOUS at 09:27

## 2021-05-09 RX ADMIN — SODIUM CHLORIDE, PRESERVATIVE FREE 10 ML: 5 INJECTION INTRAVENOUS at 20:14

## 2021-05-09 RX ADMIN — IPRATROPIUM BROMIDE AND ALBUTEROL SULFATE 1 AMPULE: .5; 3 SOLUTION RESPIRATORY (INHALATION) at 08:39

## 2021-05-09 ASSESSMENT — PAIN DESCRIPTION - PROGRESSION
CLINICAL_PROGRESSION: NOT CHANGED

## 2021-05-09 ASSESSMENT — PAIN DESCRIPTION - LOCATION: LOCATION: HIP

## 2021-05-09 ASSESSMENT — PAIN SCALES - GENERAL
PAINLEVEL_OUTOF10: 0
PAINLEVEL_OUTOF10: 6
PAINLEVEL_OUTOF10: 5
PAINLEVEL_OUTOF10: 4

## 2021-05-09 ASSESSMENT — PAIN SCALES - WONG BAKER
WONGBAKER_NUMERICALRESPONSE: 0

## 2021-05-09 ASSESSMENT — PAIN DESCRIPTION - PAIN TYPE
TYPE: CHRONIC PAIN
TYPE: CHRONIC PAIN

## 2021-05-09 ASSESSMENT — PAIN - FUNCTIONAL ASSESSMENT: PAIN_FUNCTIONAL_ASSESSMENT: PREVENTS OR INTERFERES WITH ALL ACTIVE AND SOME PASSIVE ACTIVITIES

## 2021-05-09 ASSESSMENT — PAIN DESCRIPTION - ONSET: ONSET: ON-GOING

## 2021-05-09 ASSESSMENT — PAIN DESCRIPTION - ORIENTATION: ORIENTATION: RIGHT

## 2021-05-09 ASSESSMENT — PAIN DESCRIPTION - DESCRIPTORS: DESCRIPTORS: ACHING

## 2021-05-09 NOTE — PROGRESS NOTES
1659- the pt. Stated that she was thirsty and wanted to drink some of the orange juice on her tray  She was unable to use a straw. She tried but didn't have the strength to draw the fluid up into it. I gave approx. 1/2 tsp. Juice to sip. She was able to swallow the first sip but choked on the second try. She did say I tasted good. Merrick Sheth stated that she was comfortable at this time and did not want to turned.   O2 demand is less at this time O2 nasal cannula has been reduced from 5 liters to 4L; 3L@ 93%

## 2021-05-09 NOTE — PLAN OF CARE
Problem: Impaired respiratory status  Goal: Lung sounds clear or within normal limits for patient  Outcome: Ongoing   Continue therapy as ordered to improve breath sounds/aeration and pulmonary toilet.

## 2021-05-09 NOTE — PROGRESS NOTES
Daughter Deja Forrester called and wanted updates on her mom. Updates and questions answered at this time.

## 2021-05-09 NOTE — PROGRESS NOTES
Dr. Jeet Catherine note       Internal Medicine              Patient:  Lukasz Burgos  YOB: 1935    MRN: [id number]   Acct:  [de-identified]   3B-23/023-A  Primary Care Physician: Morris Cornell MD    Admit Date: 4/27/2021           Subjective: Awake and responsive    Objective:      Physical Exam:    Vitals:  Patient Vitals for the past 24 hrs:   BP Temp Temp src Pulse Resp SpO2 Weight   05/09/21 1225 -- -- -- -- -- 99 % --   05/09/21 0930 (!) 139/59 -- Axillary 76 19 100 % --   05/09/21 0839 -- -- -- -- -- 95 % --   05/09/21 0352 -- -- -- -- -- -- 141 lb 11.2 oz (64.3 kg)   05/09/21 0315 123/66 98.3 °F (36.8 °C) Axillary 77 20 95 % --   05/08/21 2317 -- -- -- -- -- 90 % --   05/08/21 2310 -- -- -- -- -- (!) 86 % --   05/08/21 2003 (!) 119/58 97.8 °F (36.6 °C) Axillary 77 21 91 % --   05/08/21 1556 121/78 98.7 °F (37.1 °C) Oral 72 20 -- --     Weight: Weight: 141 lb 11.2 oz (64.3 kg)     24 hour intake/output:    Intake/Output Summary (Last 24 hours) at 5/9/2021 1313  Last data filed at 5/9/2021 1244  Gross per 24 hour   Intake 447.54 ml   Output 475 ml   Net -27.46 ml       General appearance - alert, and in no distress  Eyes - pupils equal and reactive,and pale. Mouth - mucous membranes moist,   Neck - supple, no significant adenopathy  Chest - gail air movement  Heart -  S1, S2, heard  Abdomen - soft, nontender,pos bs. Neurological - alert, in no distress. Review of Labs and Diagnostic Testing:    CBC:   Recent Labs     05/09/21 0330   WBC 8.4   HGB 9.9*   HCT 32.6*   MCV 92.1        BMP:   Recent Labs     05/09/21  0330   *   K 3.7   *   CO2 21*   BUN 28*   CREATININE 1.8*   CALCIUM 8.8   GLUCOSE 91     PT/INR:   No results for input(s): PROTIME, INR in the last 72 hours. APTT: No results for input(s): APTT in the last 72 hours.    Lipids:   Recent Labs     05/09/21  0330   ALKPHOS 194*   *   * BILITOT 0.6   LABALBU 1.9*     Troponin: No results for input(s): TROPONINI in the last 72 hours. Imaging:  Echo Limited    Result Date: 5/1/2021  Transthoracic Echocardiography Report (TTE)  Demographics   Patient Name  Divya Cordero          Gender             Female                Irlanda Brito   MR #          [id number]       Race                                                Ethnicity   Account #     [de-identified]       Room Number        0004   Accession     [id number]      Date of Study      04/30/2021  Number   Date of Birth 1935      Referring          Ruchi Manrique                                Physician          NAKIA Aldridge MD   Age           81 year(s)      Krista Hurt MD                                Physician  Procedure Type of Study   TTE procedure:ECHOCARDIOGRAM LIMITED. Procedure Date Date: 04/30/2021 Start: 01:25 PM Study Location: Bedside Technical Quality: Limited visualization due to poor acoustical window. Indications:Atrial fibrillation. Additional Medical History: Former smoker, hypertension, hyperlipidemia, GERD, COPD, anemia Patient Status: Routine Height: 61.02 inches Weight: 152.12 pounds BSA: 1.68 m^2 BMI: 28.72 kg/m^2 BP: 105/62 mmHg  Conclusions   Summary  decrease left ventricular function since last exam  Left ventricle size is normal.  Normal left ventricular wall thickness. Ejection fraction is visually estimated in the range of 40% to 45%. Hypokinetic motion of the apical anteroseptal wall noted in the left  ventricle. Hypokinetic motion of the mid anteroseptal wall noted in the left  ventricle.    Signature   ----------------------------------------------------------------  Electronically signed by Louise Boucher MD (Interpreting  physician) on 05/01/2021 at 02:37 PM Area  Diastolic: 16  cm^2  LV Area  Systolic: 64.2  cm^2  [email or web address] [id number]    Cta Chest W Wo Contrast    Result Date: 5/1/2021  CT angiography of the chest with Isovue-370 IV contrast. 3D/MIP post processing reconstructions were performed. COMPARISON: Single view of the chest dated 4/29/2021, CTA of the chest dated 4/28/2021 FINDINGS: There are no intraluminal filling defects to suggest pulmonary embolism. No evidence of right heart strain. No evidence of thoracic aortic dissection. Small calcification present within the right lobe of the thyroid. No supraclavicular or axillary lymphadenopathy. Left IJ central venous line present. Ascending aorta and main pulmonary artery are normal in caliber. No pericardial effusion. Small to moderate hiatal hernia, stable. The mid to proximal esophagus contains fluid/food debris is mildly distended. No mediastinal lymphadenopathy. Calcified right hilar lymph nodes. Upper Abdomen: Splenic calcifications present. Pigtail catheter present within the gallbladder fossa. Hepatic granuloma present within the right lobe. Lungs: Moderate bilateral pleural effusions, right greater than left, slightly increased from prior imaging. There is overlying atelectasis. Fluid present within the major fissure on the left, slightly increased from prior imaging. Trachea and central airways are clear. No significant bronchial wall thickening. No bronchiectasis. Musculoskeletal: Multiple chronic healing left anterior rib fractures redemonstrated and similar to prior imaging. [place] right curvature of the mid thoracic spine. Mild spondylosis. 1.  No evidence of pulmonary embolism. 2.  Moderate bilateral pleural effusions, right greater than left, increased from prior imaging. There is overlying atelectasis. This document has been electronically signed by:  Naima Harris times with sterile saline. The catheter was stabilized to the skin with a 2-0 silk suture and Percu-Stay device. A sample of the bile was sent to laboratory for culture and sensitivity testing. The patient was then transported to the special suite and a cholangiogram was performed, confirming appropriate position of the catheter within the gallbladder. Filling defects are seen in the gallbladder, consistent with sludge and gallstones. There are  also filling defects in a moderately dilated, bile duct. No ductal obstruction is seen, however, and contrast is seen to pass into the duodenum. There is also also reflux of contrast into the intrahepatic radicles which are unremarkable. 1. Status post successful percutaneous cholecystostomy drainage tube insertion with CT guidance. 2. A cholecystogram reveals sludge and gallstones in the gallbladder but reveals a common duct and cystic duct are widely patent. There is free flow of contrast into the duodenum. . There is moderate dilatation of the common bile duct which also contains some filling defect consistent with sludge and/or gallstones. **This report has been created using voice recognition software. It may contain minor errors which are inherent in voice recognition technology. ** Final report electronically signed by Dr. Charlie Todd on 4/30/2021 12:00 PM    Ct Abdomen Pelvis W Iv Contrast Additional Contrast? Radiologist Recommendation    Result Date: 4/30/2021  PROCEDURE: CT ABDOMEN PELVIS W IV CONTRAST CLINICAL INFORMATION: Hgb drop, on IV heparin, concern for bleeding, Trauma . COMPARISON: CT scan of the abdomen and pelvis dated 28 April 2021. . Gallbladder ultrasound dated 28 April 2021. Radionuclide hepatobiliary scan dated 29 April 2021. TECHNIQUE: Axial 5 mm CT images were obtained through the abdomen and pelvis after the administration of intravenous contrast. Coronal and sagittal reconstructions were obtained.  All CT scans at this facility use dose thickening. 7. Slightly atrophic pancreas. 8. Atherosclerotic calcification of the abdominal aorta and iliac arteries. 9.. Garcia catheter in the bladder. 10. Status post hysterectomy. 11. Right femoral intertrochanteric fracture status post instrumentation. 12. Slightly increased density in the left common femoral vein. 13.. Slightly increased density in the subcutaneous soft tissues in the pelvis extending into both thighs. 14. Postoperative changes in the lower lumbar spine. Lumbar spondylosis. Mild compression deformity along the superior endplate of L2. **This report has been created using voice recognition software. It may contain minor errors which are inherent in voice recognition technology. ** Final report electronically signed by DR Rosaura Rodriguez on 4/30/2021 8:42 AM    Xr Chest Portable    Result Date: 5/6/2021  EXAM:   CHEST X-RAY, SINGLE VIEW PORTABLE: COMPARISON:  CR,SR  - XR CHEST PORTABLE  - 05/04/2021 10:52 AM EDT FINDINGS: Heart size upper limits of normal, unchanged. Oofrks-v-Gztp catheter position is stable. Mild central vascular congestion appears relatively stable. Small pleural effusions are evident. The osseous structures are unremarkable. Stable appearance of chest with cardiomegaly, small pleural effusions and mild central vascular congestion persistent. This document has been electronically signed by: Satinder Boyle MD on 05/06/2021 05:46 AM    Xr Chest Portable    Result Date: 5/4/2021  PROCEDURE: XR CHEST PORTABLE CLINICAL INFORMATION: Pleural effusion. Lung infiltrates. Follow-up. COMPARISON: 5/3/2021 TECHNIQUE: A single mobile view of the chest was obtained. 1. Borderline heart size. Left jugular PICC line with tip at upper end in the SVC. Pigtail catheter in the right upper quadrant. 2. Small bilateral pleural effusions. 3. Mild bibasilar atelectasis/pneumonia.  Overall appearance of chest slightly worse on prior **This report has been created using voice recognition software. It may contain minor errors which are inherent in voice recognition technology. ** Final report electronically signed by Dr. Chaya Washington on 5/4/2021 11:08 AM    Xr Chest Portable    Result Date: 5/3/2021  Exam: 1V chest Comparison:  CR,SR  - XR CHEST PORTABLE  - 05/01/2021 08:34 AM EDT Findings: Left subclavian venous line tip again over the left innominate / SVC junction. Mediastinum: No cardiac silhouette enlargement. Lungs: No infiltrate or mass. Pleura: No pneumothorax. Stable small effusions. Bones: No acute pathology. Impression: Stable small effusions. This document has been electronically signed by: Kenney Powell MD on 05/03/2021 07:00 AM    Xr Chest Portable    Result Date: 5/1/2021  PROCEDURE: XR CHEST PORTABLE CLINICAL INFORMATION: left sided chest pain. COMPARISON: Chest x-ray dated 29 April 2021. TECHNIQUE: AP upright view of the chest. FINDINGS: This is a left subclavian catheter with the tip in the distal brachiocephalic vein, unchanged There is mild cardiomegaly. . There is atherosclerotic calcification in the aortic arch. There is increased density in both lung fields. There are small bilateral pleural effusions. . The pulmonary vascularity is slightly increased. No suspicious osseous lesions are present. No interval change since previous study dated 29 April 2021. **This report has been created using voice recognition software. It may contain minor errors which are inherent in voice recognition technology. ** Final report electronically signed by DR Alice uW on 5/1/2021 10:05 AM    Xr Chest Portable    Result Date: 4/30/2021  1 view of the chest. COMPARISON: Single view of the chest dated 4/27/2021 FINDINGS: Interval placement of a left internal jugular central venous line tip overlying the proximal SVC. Low lung volumes. Borderline cardiomegaly, likely exaggerated secondary to low lung volumes. Atherosclerotic thoracic aorta.  Left hemidiaphragm and retrocardiac region are obscured. Likely small left pleural effusion, similar to prior imaging. No pneumothorax. No displaced fracture. Mild spondylosis. 1.  Left internal jugular central venous line tip overlies the proximal SVC. 2.  Low lung volumes. This document has been electronically signed by: Jean Ramos MD on 04/30/2021 12:04 AM     Dup Lower Extremity Venous Bilateral    Result Date: 4/30/2021  PROCEDURE:  DUP LOWER EXTREMITY VENOUS BILATERAL CLINICAL INFORMATION: Low-attenuation areas are noted within the bilateral femoral veins per CT recommendations for evaluation DVT. COMPARISON: 4/19/2021 TECHNIQUE: Venous doppler ultrasound was performed of the bilateral lower extremities using gray scale, color flow and spectral doppler imaging. FINDINGS: There is normal color flow, spectral analysis and compressibility of the common femoral vein,  femoral vein and popliteal vein on the right . There is normal color flow and compressibility in the right posterior tibial veins, anterior tibial veins and peroneal veins. There is echogenic clot in the left greater saphenous vein, the left common femoral vein and proximal femoral vein and profunda femoris veins show echogenic clot which are brighter than the prior exam. There is noncompressibility. There is now some flow restored in the mid femoral vein, and there is now propagation of echoes in the distal femoral vein with partial flow. . There has been interval development of internal echoes and partial compressibility in the popliteal vein with some minimal retained color Doppler flow. There is absence of flow and compressibility in the posterior tibial and left peroneal veins.      Persistence of thrombus in the left common femoral vein, saphenofemoral junction, proximal femoral vein with increased echogenicity of the proximal femoral vein clot suggesting organization There is been partial restoration of flow in the mid left femoral vein There has been development of thrombus in the popliteal vein with partial flow. There remains absence of flow and compressibility in the left peroneal and posterior tibial veins. The right lower extremity shows no evidence of DVT. **This report has been created using voice recognition software. It may contain minor errors which are inherent in voice recognition technology. ** Final report electronically signed by Dr. Kyleigh Tillman on 4/30/2021 1:15 PM    Ir Cholecystostomy Percutaneous Complete    Result Date: 4/30/2021  CT-GUIDED CHOLECYSTOSTOMY TUBE INSERTION/and cholangiogram: PERFORMED BY: Catina Fontaine M.D. DRAINAGE SITE: Gallbladder APPROACH: Lateral approach CATHETER: 8 Japanese multipurpose drainage catheter. FLUID OBTAINED: 10 mL bloody bile was aspirated and sent to laboratory for culture and sensitivity testing. ESTIMATED BLOOD LOSS: Minimal SEDATION: Versed 1 mg; fentanyl 50 mcg, IV; the patient was sedated during this procedure,  and monitored with EKG and pulse ox monitoring devices by a registered nurse. Face-to-face time with patient 45 minutes. PROCEDURE: Signed informed consent was obtained prior to performing this procedure. The patient was placed on the CT scanner and sedated, as indicated above. ALL CT SCANS AT THIS FACILITY use dose modulation, iterative reconstruction, and/or weight-based dosing when appropriate to reduce radiation dose to as low as reasonably achievable. CT images were initially obtained to determine appropriate puncture site. The skin was marked, prepped, and draped in a sterile fashion. Following local anesthesia and utilizing aseptic technique, a needle was successfully passed into the gallbladder. . A  small amount of bile was aspirated to confirm appropriate needle position CT images were obtained to confirm proper needle position. . A guidewire was then passed through the needle followed by insertion of progressively larger dilators up to an 8 [place]  size.  An 8 [place] multi-side-hole drainage catheter was then passed over the wire and was coiled within the gallbladder. The retaining suture was then locked in the standard fashion. Catheter was then hooked to a Jeevan-Close drainage bag and the catheter was flushed several times with sterile saline. The catheter was stabilized to the skin with a 2-0 silk suture and Percu-Stay device. A sample of the bile was sent to laboratory for culture and sensitivity testing. The patient was then transported to the special suite and a cholangiogram was performed, confirming appropriate position of the catheter within the gallbladder. Filling defects are seen in the gallbladder, consistent with sludge and gallstones. There are  also filling defects in a moderately dilated, bile duct. No ductal obstruction is seen, however, and contrast is seen to pass into the duodenum. There is also also reflux of contrast into the intrahepatic radicles which are unremarkable. 1. Status post successful percutaneous cholecystostomy drainage tube insertion with CT guidance. 2. A cholecystogram reveals sludge and gallstones in the gallbladder but reveals a common duct and cystic duct are widely patent. There is free flow of contrast into the duodenum. . There is moderate dilatation of the common bile duct which also contains some filling defect consistent with sludge and/or gallstones. **This report has been created using voice recognition software. It may contain minor errors which are inherent in voice recognition technology. ** Final report electronically signed by Dr. Charlie Todd on 4/30/2021 12:00 PM    Ct Abscess Drainage Soft Tissue    Result Date: 4/30/2021  CT-GUIDED CHOLECYSTOSTOMY TUBE INSERTION/and cholangiogram: PERFORMED BY: Kris Dempsey M.D. DRAINAGE SITE: Gallbladder APPROACH: Lateral approach CATHETER: 8 Sami multipurpose drainage catheter. FLUID OBTAINED: 10 mL bloody bile was aspirated and sent to laboratory for culture and sensitivity testing.  ESTIMATED BLOOD LOSS: Minimal also reflux of contrast into the intrahepatic radicles which are unremarkable. 1. Status post successful percutaneous cholecystostomy drainage tube insertion with CT guidance. 2. A cholecystogram reveals sludge and gallstones in the gallbladder but reveals a common duct and cystic duct are widely patent. There is free flow of contrast into the duodenum. . There is moderate dilatation of the common bile duct which also contains some filling defect consistent with sludge and/or gallstones. **This report has been created using voice recognition software. It may contain minor errors which are inherent in voice recognition technology. ** Final report electronically signed by Dr. Charlie Todd on 4/30/2021 12:00 PM    Nm Hepatobiliary Scan W Ejection Fraction    Result Date: 4/29/2021  PROCEDURE: NM HEPATOBILIARY SCAN W PHARMACOLOGICAL INTERVENTION CLINICAL INFORMATION: Abnormal gallbladder ultrasound TECHNIQUE: The patient received 1.39 mcg of Kinevac prior to radiopharmaceutical administration. Anterior images of the abdomen were obtained for 60 minutes following radiopharmaceutical administration. 2.8 mg of morphine sulfate were administered and additional images were obtained in multiple projections. COMPARISON: Nuclear medicine hepatobiliary scan 6/25/2020 FINDINGS: There is normal hepatic uptake of radiotracer. Activity is present in the common bile duct and small bowel by 23 minutes. The gallbladder is not visualized either before or following morphine administration. 1. Patent common bile duct. 2. Nonvisualization of the gallbladder highly suspicious for acute cholecystitis. Final report electronically signed by Dr. Radha Velasquez on 4/29/2021 11:03 AM    Us Chest Including Mediastinum    Result Date: 5/3/2021  PROCEDURE: US CHEST INCLUDING MEDIASTINUM CLINICAL INFORMATION: Bilateral effusions TECHNIQUE: Limited ultrasound of the chest was performed. Grayscale images were obtained.  COMPARISON: None FINDINGS: Small pleural effusions are identified bilaterally in the thorax. Small bilateral pleural effusions. Final report electronically signed by Dr. Kyleigh Barron on 5/3/2021 3:07 PM    Ir Prasad Maria Chola Exist Access W Imaging    Result Date: 5/4/2021  PROCEDURE: IR [facility] CHOLA EXIST ACCESS W IMAGING PERFORMED BY:  Dr. Jeremy Oshea. Sugey Michael MD CLINICAL INFORMATION: . 42-year-old female with a history of cholecystitis. Cholecystectomy tube in place. Drainage catheter : 8 Albanian multipurpose cholecystostomy drainage tube. FLUOROSCOPY TIME: 0.5 minutes FLUOROSCOPIC IMAGES: 5 TECHNIQUE: The patient came to the Department with a cholecystostomy tube in place. Iodinated contrast material was injected and a cholecystogram was performed. FINDINGS: The cholecystostomy tube remains within the gallbladder. The cystic and common bile ducts are widely patent. Contrast can be seen freely flowing into the duodenum. The cystic and common bile ducts are widely patent. **This report has been created using voice recognition software. It may contain minor errors which are inherent in voice recognition technology. ** Final report electronically signed by Dr Mario Solorio on 5/4/2021 4:16 PM      EKG:      Diet: DIET GENERAL;        Data:   Scheduled Meds: Scheduled Meds:   cefOXitin  2,000 mg Intravenous Q24H    metoprolol  5 mg Intravenous Once    ipratropium-albuterol  1 ampule Inhalation Q4H WA    midodrine  10 mg Oral TID WC    phosphorus replacement protocol   Other RX Placeholder    famotidine (PEPCID) injection  20 mg Intravenous BID    [Held by provider] apixaban  5 mg Oral BID    aspirin  325 mg Oral Daily    amiodarone  200 mg Oral Daily    [Held by provider] docusate sodium  100 mg Oral BID    latanoprost  1 drop Both Eyes Nightly    mirtazapine  7.5 mg Oral Nightly    sertraline  50 mg Oral Daily    sodium bicarbonate  1,300 mg Oral BID    sucralfate  1 g Oral 4x Daily AC & HS    sodium

## 2021-05-10 ENCOUNTER — VIRTUAL VISIT (OUTPATIENT)
Dept: UROLOGY | Age: 86
End: 2021-05-10

## 2021-05-10 DIAGNOSIS — R33.9 URINARY RETENTION: ICD-10-CM

## 2021-05-10 LAB
ALBUMIN SERPL-MCNC: 2 G/DL (ref 3.5–5.1)
ALP BLD-CCNC: 211 U/L (ref 38–126)
ALT SERPL-CCNC: 99 U/L (ref 11–66)
ANION GAP SERPL CALCULATED.3IONS-SCNC: 10 MEQ/L (ref 8–16)
AST SERPL-CCNC: 96 U/L (ref 5–40)
BILIRUB SERPL-MCNC: 1 MG/DL (ref 0.3–1.2)
BUN BLDV-MCNC: 26 MG/DL (ref 7–22)
CALCIUM SERPL-MCNC: 8.7 MG/DL (ref 8.5–10.5)
CHLORIDE BLD-SCNC: 115 MEQ/L (ref 98–111)
CO2: 22 MEQ/L (ref 23–33)
CREAT SERPL-MCNC: 1.9 MG/DL (ref 0.4–1.2)
ERYTHROCYTE [DISTWIDTH] IN BLOOD BY AUTOMATED COUNT: 18 % (ref 11.5–14.5)
ERYTHROCYTE [DISTWIDTH] IN BLOOD BY AUTOMATED COUNT: 59.8 FL (ref 35–45)
GFR SERPL CREATININE-BSD FRML MDRD: 25 ML/MIN/1.73M2
GLUCOSE BLD-MCNC: 103 MG/DL (ref 70–108)
HCT VFR BLD CALC: 31.1 % (ref 37–47)
HEMOGLOBIN: 9.4 GM/DL (ref 12–16)
MCH RBC QN AUTO: 27.6 PG (ref 26–33)
MCHC RBC AUTO-ENTMCNC: 30.2 GM/DL (ref 32.2–35.5)
MCV RBC AUTO: 91.2 FL (ref 81–99)
PLATELET # BLD: 187 THOU/MM3 (ref 130–400)
PMV BLD AUTO: 11.8 FL (ref 9.4–12.4)
POTASSIUM SERPL-SCNC: 3 MEQ/L (ref 3.5–5.2)
RBC # BLD: 3.41 MILL/MM3 (ref 4.2–5.4)
SARS-COV-2, NAAT: NOT DETECTED
SODIUM BLD-SCNC: 147 MEQ/L (ref 135–145)
TOTAL PROTEIN: 5.3 G/DL (ref 6.1–8)
WBC # BLD: 8.4 THOU/MM3 (ref 4.8–10.8)

## 2021-05-10 PROCEDURE — 6360000002 HC RX W HCPCS: Performed by: INTERNAL MEDICINE

## 2021-05-10 PROCEDURE — 2700000000 HC OXYGEN THERAPY PER DAY

## 2021-05-10 PROCEDURE — 99999 PR OFFICE/OUTPT VISIT,PROCEDURE ONLY: CPT | Performed by: UROLOGY

## 2021-05-10 PROCEDURE — 94669 MECHANICAL CHEST WALL OSCILL: CPT

## 2021-05-10 PROCEDURE — 6370000000 HC RX 637 (ALT 250 FOR IP): Performed by: NURSE PRACTITIONER

## 2021-05-10 PROCEDURE — 2580000003 HC RX 258: Performed by: REGISTERED NURSE

## 2021-05-10 PROCEDURE — 1200000000 HC SEMI PRIVATE

## 2021-05-10 PROCEDURE — 94761 N-INVAS EAR/PLS OXIMETRY MLT: CPT

## 2021-05-10 PROCEDURE — 2580000003 HC RX 258: Performed by: INTERNAL MEDICINE

## 2021-05-10 PROCEDURE — 87635 SARS-COV-2 COVID-19 AMP PRB: CPT

## 2021-05-10 PROCEDURE — 80053 COMPREHEN METABOLIC PANEL: CPT

## 2021-05-10 PROCEDURE — 85027 COMPLETE CBC AUTOMATED: CPT

## 2021-05-10 PROCEDURE — 2500000003 HC RX 250 WO HCPCS: Performed by: NURSE PRACTITIONER

## 2021-05-10 PROCEDURE — 36415 COLL VENOUS BLD VENIPUNCTURE: CPT

## 2021-05-10 RX ORDER — POLYVINYL ALCOHOL 14 MG/ML
1 SOLUTION/ DROPS OPHTHALMIC PRN
Status: DISCONTINUED | OUTPATIENT
Start: 2021-05-10 | End: 2021-05-13 | Stop reason: HOSPADM

## 2021-05-10 RX ORDER — MORPHINE SULFATE 20 MG/ML
2 SOLUTION ORAL
Status: DISCONTINUED | OUTPATIENT
Start: 2021-05-10 | End: 2021-05-13 | Stop reason: HOSPADM

## 2021-05-10 RX ADMIN — CEFOXITIN SODIUM 2000 MG: 2 POWDER, FOR SOLUTION INTRAVENOUS at 09:27

## 2021-05-10 RX ADMIN — FAMOTIDINE 20 MG: 10 INJECTION, SOLUTION INTRAVENOUS at 08:45

## 2021-05-10 RX ADMIN — SODIUM CHLORIDE, PRESERVATIVE FREE 10 ML: 5 INJECTION INTRAVENOUS at 08:45

## 2021-05-10 RX ADMIN — MORPHINE SULFATE 2 MG: 2 INJECTION, SOLUTION INTRAMUSCULAR; INTRAVENOUS at 03:41

## 2021-05-10 RX ADMIN — IPRATROPIUM BROMIDE AND ALBUTEROL SULFATE 1 AMPULE: .5; 3 SOLUTION RESPIRATORY (INHALATION) at 16:55

## 2021-05-10 RX ADMIN — IPRATROPIUM BROMIDE AND ALBUTEROL SULFATE 1 AMPULE: .5; 3 SOLUTION RESPIRATORY (INHALATION) at 13:15

## 2021-05-10 RX ADMIN — LATANOPROST 1 DROP: 50 SOLUTION OPHTHALMIC at 21:50

## 2021-05-10 RX ADMIN — IPRATROPIUM BROMIDE AND ALBUTEROL SULFATE 1 AMPULE: .5; 3 SOLUTION RESPIRATORY (INHALATION) at 09:35

## 2021-05-10 RX ADMIN — IPRATROPIUM BROMIDE AND ALBUTEROL SULFATE 1 AMPULE: .5; 3 SOLUTION RESPIRATORY (INHALATION) at 22:05

## 2021-05-10 ASSESSMENT — PAIN DESCRIPTION - PROGRESSION
CLINICAL_PROGRESSION: NOT CHANGED

## 2021-05-10 ASSESSMENT — PAIN SCALES - WONG BAKER

## 2021-05-10 ASSESSMENT — PAIN SCALES - GENERAL: PAINLEVEL_OUTOF10: 0

## 2021-05-10 NOTE — PROGRESS NOTES
Notified of son Agustin's arrival. Pt resting with eyes closed on arrival and met with son in family room to review hospice discharge plans, 24 hr availability and admission dx-COPD. Therapeutic talk with review of life story and emotional support provided. Rachele Terry explained mother was fully independent in Feb with older sister and has rapidly declined despite all efforts to help her recover. Rachele Terry explained mother is woman of deborah and okay with end of life. Oxygen order signed by Dr. Steve Malone and awaiting completion of discharge transport arrangements. Hospice will remain available for any further needs.

## 2021-05-10 NOTE — CARE COORDINATION
5/10/21, 1:40 PM EDT    DISCHARGE  [address] day: 12  Location: -23/023-A Reason for admit: Atrial fibrillation with [facility]) [I48.91]   Procedure:   4/27 CXR - left basilar atelectasis. Tiny bilateral pleural effusions suspected. 4/28 CTA chest - No PE. Small-moderate sized bilateral pleural effusions, increased in size. Bilateral multifocal atelectasis.   4/28 US GB - Sludge or stones within the gallbladder with gallbladder wall thickening and dilated common bile duct. These findings may represent acute cholecystitis.   4/29 HIDA scan - Patent common bile duct. Nonvisualization of the gallbladder highly suspicious for acute cholecystitis. 4/29 CVC placed - Left IJ.   4/30 Perc cholecystectomy drain inserted. 5/4 Cholangiogram - The cystic and common bile ducts are widely patent. 5/6 CXR - Stable appearance of chest with cardiomegaly, small pleural effusions and mild central vascular congestion persistent. 5/6 US Liver/Abd - Mildly dilated common bile duct, unchanged. Status post cholecystostomy tube placement. Patent and unremarkable hepatic vasculature. Exophytic solid appearing lesion of the right kidney, not convincingly seen on recent CT exam, indeterminate, soft tissue mass cannot be excluded. Barriers to Discharge: Hospice consulted. Planning discharge to [facility] with hospice, hopefully today. PCP: Heather Lamb MD  Readmission Risk Score: 39%  Patient Goals/Plan/Treatment Preferences: Planning discharge to [facility], [address], with hospice. SW following.

## 2021-05-10 NOTE — PROGRESS NOTES
Progress note      [facility]             Patient:  Brendan Mariano  YOB: 1935    MRN: [id number]   Acct:  [de-identified]   3B-23/023-A  Primary Care Physician: Zainab Levi MD    Admit Date: 4/27/2021           Subjective: Pt resting in bed      Objective:      Physical Exam:    Vitals:  Patient Vitals for the past 24 hrs:   BP Temp Temp src Pulse Resp SpO2 Weight   05/10/21 0933 -- -- -- -- -- 95 % --   05/10/21 0830 135/69 97 °F (36.1 °C) Oral 83 16 96 % --   05/10/21 0341 -- -- -- -- -- 92 % --   05/10/21 0338 -- -- -- -- -- (!) 88 % --   05/10/21 0331 127/60 98.3 °F (36.8 °C) Axillary 91 14 (!) 89 % 143 lb 9.6 oz (65.1 kg)   05/09/21 2348 (!) 142/79 98.1 °F (36.7 °C) Axillary 93 14 91 % --   05/09/21 2124 -- -- -- -- 20 94 % --   05/09/21 1951 133/63 98.7 °F (37.1 °C) Axillary 100 21 92 % --   05/09/21 1655 -- -- -- -- -- 93 % --   05/09/21 1600 135/64 98.7 °F (37.1 °C) Axillary 76 20 98 % --   05/09/21 1225 -- -- -- -- -- 99 % --   05/09/21 1200 132/65 97.7 °F (36.5 °C) Tympanic 84 20 -- --     Weight: Weight: 143 lb 9.6 oz (65.1 kg)     24 hour intake/output:    Intake/Output Summary (Last 24 hours) at 5/10/2021 1001  Last data filed at 5/10/2021 0830  Gross per 24 hour   Intake 386.25 ml   Output 575 ml   Net -188.75 ml       General appearance - alert, ill appearing, and in no distress  Eyes - pupils equal and reactive, extraocular eye movements intact  Mouth - mucous membranes moist, pharynx normal without lesions  Neck - supple, no significant adenopathy  Chest - Rhonchi and rales throughout  Heart - normal rate, regular rhythm, normal S1, S2, no murmurs, rubs, clicks or gallops  Abdomen - soft, nontender, nondistended, no masses or organomegaly, pos bs.   Neurological - alert, oriented, normal speech, no focal findings or movement disorder noted  Musculoskeletal - no joint tenderness, deformity, left lower extremity edema  Extremities - peripheral pulses normal, left lower extremity edema  Skin - normal coloration and turgor, no rashes, no suspicious skin lesions noted    Review of Labs and Diagnostic Testing:    CBC:   Recent Labs     05/10/21  0325   WBC 8.4   HGB 9.4*   HCT 31.1*   MCV 91.2        BMP:   Recent Labs     05/10/21  0325   *   K 3.0*   *   CO2 22*   BUN 26*   CREATININE 1.9*   CALCIUM 8.7   GLUCOSE 103     PT/INR: No results for input(s): PROTIME, INR in the last 72 hours. APTT: No results for input(s): APTT in the last 72 hours. Lipids:   Recent Labs     05/10/21  0325   ALKPHOS 211*   ALT 99*   AST 96*   BILITOT 1.0   LABALBU 2.0*     Troponin: No results for input(s): TROPONINT in the last 72 hours.      Imaging:  [unfilled]    EKG:      Diet: DIET GENERAL;        Data:   Scheduled Meds: Scheduled Meds:   cefOXitin  2,000 mg Intravenous Q24H    metoprolol  5 mg Intravenous Once    ipratropium-albuterol  1 ampule Inhalation Q4H WA    midodrine  10 mg Oral TID WC    phosphorus replacement protocol   Other RX Placeholder    famotidine (PEPCID) injection  20 mg Intravenous BID    [Held by provider] apixaban  5 mg Oral BID    aspirin  325 mg Oral Daily    amiodarone  200 mg Oral Daily    [Held by provider] docusate sodium  100 mg Oral BID    latanoprost  1 drop Both Eyes Nightly    mirtazapine  7.5 mg Oral Nightly    sertraline  50 mg Oral Daily    sodium bicarbonate  1,300 mg Oral BID    sucralfate  1 g Oral 4x Daily AC & HS    sodium chloride flush  5-40 mL Intravenous 2 times per day    metoprolol tartrate  25 mg Oral BID     Continuous Infusions:   dextrose      dextrose 5 % and 0.9 % NaCl 50 mL/hr at 05/07/21 0609    sodium chloride       PRN Meds:.morphine sulfate, glucose, dextrose, glucagon (rDNA), dextrose, LORazepam, potassium chloride, potassium chloride, potassium chloride, magnesium sulfate, acetaminophen **OR** acetaminophen, ondansetron, potassium chloride **OR** potassium alternative oral replacement **OR** potassium chloride, tiZANidine, sodium chloride flush, sodium chloride, polyethylene glycol  Continuous Infusions:   dextrose      dextrose 5 % and 0.9 % NaCl 50 mL/hr at 05/07/21 0609    sodium chloride           Assessment      Hyperlipidemia    Osteoarthritis    GERD (gastroesophageal reflux disease)    COPD (chronic obstructive pulmonary disease) (HCC)    Chronic back pain    CAD (coronary artery disease)    Hypertension    Spinal stenosis of lumbar region with neurogenic claudication    Neurologic gait dysfunction    Inflammatory spondylopathy of lumbosacral region ([address]    Paroxysmal atrial fibrillation ([address]    Closed displaced intertrochanteric fracture of right femur ([address]    Fall at nursing home    Severe malnutrition ([address]    Acute kidney injury ([address]    Anemia    Urinary retention    Chest pain    Elevated troponin    Atrial fibrillation with RVR (HCC)    Abnormal ultrasound of gallbladder    Other chest pain    Moderate malnutrition (HCC)    Bilateral pleural effusion    Acute coronary syndrome ([address]    Shock liver             Plan   Comfort care measures  Hospice following  Possible d/c to [facility] today with Hospice if transport available  D/C central line today  D/C abx today  IV morphine switched to PO solution    Assessment and plan of care discussed with supervising physician, Dr Benito Flood. Electronically signed by YASMINE Enrique CNP on 5/10/2021 at 10:01 AM    I have discussed the case, including pertinent history and exam findings with the NP. I have seen and examined the patient and the key elements of the encounter have been performed by me. I agree with the assessment, plan and orders as documented by the NP.     Electronically signed by Prerna Flower MD on 5/10/2021 at 12:31 PM

## 2021-05-10 NOTE — FLOWSHEET NOTE
Orders given to remove , old dressing removed, using sterile technique site cleansed using chloraprep, let dry, sutures removed, line covered with vaseline gauze and gauze, line removed , found to be intact with blue tip noted, pressure applied, covered with opsite.

## 2021-05-10 NOTE — PROGRESS NOTES
24 hr chart review including MAR with last morphine dose 0341, vital signs, and notes completed. Updated by primary Dawna Chew. Pt resting with eyes closed, non-labored breathing on room entry and aroused to voice. Pt oriented X2 and reports no pain with appearance of comfort, no grimacing or moaning with movement. Oxygen per NC at 2L, alvarez draining clear yellow urine. Hospice qualification completed and forwarded to Dr. Hilton Lemus for review. Call to son who states will be in to visit late morning. Reviewed assessment with discussion of returning to [facility] with hospice to sign on Tuesday. Agustin verbalized understanding and in agreement. Updated PAULIE Hernández CNP with discharge plan and Morphine, Lorazepam Rx's signed to send to  [facility] on discharge with order to change morphine to po and central line removal entered. Shelly Galeana RN Case Mgr updated with discharge plan. Linwood medina RN updated to notify hospice of transport time and fax AVS to hospice requested.

## 2021-05-10 NOTE — PROGRESS NOTES
Visit made to check on patient. She was lying in bed, moving around. Per nurse Maricel, she has trouble swallowing. Asked if she could be switched to Morphine concentrate. Son was present and asked if he had any questions. No concerns voiced. Will have liaison follow up on Mon.

## 2021-05-10 NOTE — PROGRESS NOTES
Daughter Jb Pressley called for updates on her Mom. HIPPA code was provided by Jb Huan. All questions answered right at this.

## 2021-05-10 NOTE — PROGRESS NOTES
Patient was scheduled for a virtual visit but after speaking with  Magda Crocker the Nurse at [facility] she stated that the patient is going to be placed on Hospice Care and didn't feel that she needed the video visit. Per Magda Crocker the patient was very groggy and unable to speak on the phone. Family is still discussing which facility she will be going to,may be [facility]. I have personally verified, reviewed, released, signed, authenticated, authorized, confirmed,finalized, and approved the actions of the [facility].

## 2021-05-10 NOTE — CARE COORDINATION
5/10/21, 2:33 PM EDT    DISCHARGE PLANNING EVALUATION  Spoke with Luis Mock at [facility]. Made her aware patient's family would like her to return to the facility under hospice. She asked SW if he is aware it is private pay. SW made her aware hospice normally makes sure they know. Luis Nish told SW that she will call Julianne's son Deandra Wheeler. Received a call from Deandra Wheeler. He is very upset. He was not aware he would have to pay privately at the nursing home. He told SW that Shama Warner does not have enough money to pay the $9,000 deposit they are asking for at [facility]. SW offered some options. Discussed taking her home and caring for her. Also discussed applying for Medicaid. He would like to speak with his siblings. Asked that he talk to his family and see if they can meet with SW tomorrow morning to discuss options. He is agreeable. Called Jewel Daigle RN to make her aware patient can't go to [facility] today. She voiced understanding. Update 3:24pm: Patients son Deandra Wheeler called and would like to meet tomorrow at 10:00am.  He would like to meet with Jaylin Villegas in [employer]. Called Jaylin Villegas and she will contact him tonight to discuss a time to meet.

## 2021-05-10 NOTE — FLOWSHEET NOTE
[facility]  PHYSICAL THERAPY MISSED TREATMENT NOTE  ACUTE CARE  [facility] CCU-STEPDOWN 3B              Missed Treatment  Spoke with RN this am, reporting that pts POC is yet to be determined, possibly going hospice. RN recommend this PTA come back later if able to allow time to figure out plan/assess needs.  Will try back later if able

## 2021-05-10 NOTE — PLAN OF CARE
Problem: Falls - Risk of:  Goal: Will remain free from falls  Description: Will remain free from falls  Outcome: Ongoing  Note: Patient free of falls. Call light within reach. Bed in lowest position. Nonskid socks on. Bed alarm on. Patient being turned every two hours. Hourly rounding continues. Problem: Pain:  Goal: Pain level will decrease  Description: Pain level will decrease  Outcome: Ongoing  Note: Patient denies any pain during this shift. Will continue to monitor and address pain as needed. Problem: DISCHARGE BARRIERS  Goal: Patient's continuum of care needs are met  Outcome: Ongoing  Note: Patient planned to go to the [facility] with hospice, however it would be private pay. Social work to talk to the family tomorrow to discuss options. Problem: Skin Integrity:  Goal: Absence of new skin breakdown  Description: Absence of new skin breakdown  Outcome: Ongoing  Note: No new skin breakdown noted. Being turned every two hours. Will continue to monitor. Problem: Nutrition  Goal: Optimal nutrition therapy  Outcome: Ongoing  Note: Patient not eating and is very lethargic. Will continue to monitor    Care plan reviewed with patient. Patient verbalized understanding of the plan of care and contribute to goal setting.

## 2021-05-11 PROCEDURE — 94669 MECHANICAL CHEST WALL OSCILL: CPT

## 2021-05-11 PROCEDURE — 94760 N-INVAS EAR/PLS OXIMETRY 1: CPT

## 2021-05-11 PROCEDURE — 2700000000 HC OXYGEN THERAPY PER DAY

## 2021-05-11 PROCEDURE — 6370000000 HC RX 637 (ALT 250 FOR IP): Performed by: NURSE PRACTITIONER

## 2021-05-11 PROCEDURE — 1200000000 HC SEMI PRIVATE

## 2021-05-11 RX ADMIN — IPRATROPIUM BROMIDE AND ALBUTEROL SULFATE 1 AMPULE: .5; 3 SOLUTION RESPIRATORY (INHALATION) at 12:42

## 2021-05-11 RX ADMIN — IPRATROPIUM BROMIDE AND ALBUTEROL SULFATE 1 AMPULE: .5; 3 SOLUTION RESPIRATORY (INHALATION) at 08:53

## 2021-05-11 ASSESSMENT — PAIN SCALES - WONG BAKER

## 2021-05-11 ASSESSMENT — PAIN DESCRIPTION - PROGRESSION
CLINICAL_PROGRESSION: NOT CHANGED

## 2021-05-11 ASSESSMENT — PAIN SCALES - GENERAL
PAINLEVEL_OUTOF10: 0

## 2021-05-11 NOTE — PROGRESS NOTES
Progress note      [facility]             Patient:  Flaquita Casper  YOB: 1935    MRN: [id number]   Acct:  [de-identified]   3B-23/023-A  Primary Care Physician: Anna Castellanos MD    Admit Date: 4/27/2021           Subjective: Pt resting in bed. No new complaints,      Objective:      Physical Exam:    Vitals:  Patient Vitals for the past 24 hrs:   BP Temp Temp src Pulse Resp SpO2   05/11/21 0853 -- -- -- -- 18 97 %   05/10/21 2205 -- -- -- -- 18 96 %   05/10/21 2148 (!) 140/69 97.6 °F (36.4 °C) Oral 87 14 97 %   05/10/21 1529 131/70 97.4 °F (36.3 °C) Axillary 81 12 96 %   05/10/21 1316 -- -- -- -- 16 99 %   05/10/21 1134 136/60 97.8 °F (36.6 °C) Axillary 90 16 98 %     Weight: Weight: 143 lb 9.6 oz (65.1 kg)     24 hour intake/output:    Intake/Output Summary (Last 24 hours) at 5/11/2021 1037  Last data filed at 5/10/2021 2120  Gross per 24 hour   Intake 115.22 ml   Output 500 ml   Net -384.78 ml       General appearance - alert, ill appearing, and in no distress  Eyes - pupils equal and reactive, extraocular eye movements intact  Mouth - mucous membranes moist, pharynx normal without lesions  Neck - supple, no significant adenopathy  Chest - Rhonchi throughout  Heart - normal rate, regular rhythm, normal S1, S2, no murmurs, rubs, clicks or gallops  Abdomen - soft, nontender, nondistended, no masses or organomegaly, pos bs.   Neurological - alert, oriented, normal speech, no focal findings or movement disorder noted  Musculoskeletal - no joint tenderness, deformity, left lower extremity edema  Extremities - peripheral pulses normal, left lower extremity edema  Skin - normal coloration and turgor, no rashes, no suspicious skin lesions noted    Review of Labs and Diagnostic Testing:    CBC:   Recent Labs     05/10/21  0325   WBC 8.4   HGB 9.4*   HCT 31.1*   MCV 91.2        BMP:   Recent Labs     05/10/21  0325 *   K 3.0*   *   CO2 22*   BUN 26*   CREATININE 1.9*   CALCIUM 8.7   GLUCOSE 103     PT/INR: No results for input(s): PROTIME, INR in the last 72 hours. APTT: No results for input(s): APTT in the last 72 hours. Lipids:   Recent Labs     05/10/21  0325   ALKPHOS 211*   ALT 99*   AST 96*   BILITOT 1.0   LABALBU 2.0*     Troponin: No results for input(s): TROPONINT in the last 72 hours.      Imaging:  [unfilled]    EKG:      Diet: DIET GENERAL;        Data:   Scheduled Meds: Scheduled Meds:   ipratropium-albuterol  1 ampule Inhalation Q4H WA    midodrine  10 mg Oral TID WC    phosphorus replacement protocol   Other RX Placeholder    famotidine (PEPCID) injection  20 mg Intravenous BID    [Held by provider] apixaban  5 mg Oral BID    aspirin  325 mg Oral Daily    amiodarone  200 mg Oral Daily    [Held by provider] docusate sodium  100 mg Oral BID    latanoprost  1 drop Both Eyes Nightly    mirtazapine  7.5 mg Oral Nightly    sertraline  50 mg Oral Daily    sodium bicarbonate  1,300 mg Oral BID    sucralfate  1 g Oral 4x Daily AC & HS    sodium chloride flush  5-40 mL Intravenous 2 times per day    metoprolol tartrate  25 mg Oral BID     Continuous Infusions:   dextrose      sodium chloride       PRN Meds:.morphine 20MG/ML, polyvinyl alcohol, glucose, dextrose, glucagon (rDNA), dextrose, LORazepam, potassium chloride, potassium chloride, potassium chloride, magnesium sulfate, acetaminophen **OR** acetaminophen, ondansetron, potassium chloride **OR** potassium alternative oral replacement **OR** potassium chloride, tiZANidine, sodium chloride flush, sodium chloride, polyethylene glycol  Continuous Infusions:   dextrose      sodium chloride           Assessment      Hyperlipidemia    Osteoarthritis    GERD (gastroesophageal reflux disease)    COPD (chronic obstructive pulmonary disease) (HCC)    Chronic back pain    CAD (coronary artery disease)    Hypertension    Spinal stenosis of lumbar region with neurogenic claudication    Neurologic gait dysfunction    Inflammatory spondylopathy of lumbosacral region ([address]    Paroxysmal atrial fibrillation (HCC)    Closed displaced intertrochanteric fracture of right femur ([address]    Fall at nursing home    Severe malnutrition ([address]    Acute kidney injury ([address]    Anemia    Urinary retention    Chest pain    Elevated troponin    Atrial fibrillation with RVR (HCC)    Abnormal ultrasound of gallbladder    Other chest pain    Moderate malnutrition (HCC)    Bilateral pleural effusion    Acute coronary syndrome ([address]    Shock liver             Plan   Comfort care measures  Hospice following  Possible d/c to [facility] today with Hospice once certified  D/C abx today  PO morphine    Assessment and plan of care discussed with supervising physician, Dr Rand Souza. Electronically signed by YASMINE Cabrales CNP on 5/11/2021 at 10:37 AM    I have discussed the case, including pertinent history and exam findings with the NP. I have seen and examined the patient and the key elements of the encounter have been performed by me. I agree with the assessment, plan and orders as documented by the NP.     Electronically signed by Ella Shannon MD on 5/11/2021 at 4:33 PM

## 2021-05-11 NOTE — PROGRESS NOTES
Call received from Noreen Conde and did not meet with son this morning. Going to floor to discuss plan of care and hospice nurse to accompany to see if questions about service. Planned to meet on floor at 1200.

## 2021-05-11 NOTE — CARE COORDINATION
5/11/21, 11:39 AM EDT    DISCHARGE  [address] day: 13  Location: -23/023-A Reason for admit: Atrial fibrillation with RVR ([address] [I48.91]   Procedure:   4/27 CXR - left basilar atelectasis. Tiny bilateral pleural effusions suspected. 4/28 CTA chest - No PE. Small-moderate sized bilateral pleural effusions, increased in size. Bilateral multifocal atelectasis.   4/28 US GB - Sludge or stones within the gallbladder with gallbladder wall thickening and dilated common bile duct. These findings may represent acute cholecystitis.   4/29 HIDA scan - Patent common bile duct. Nonvisualization of the gallbladder highly suspicious for acute cholecystitis. 4/29 CVC placed - Left IJ.   4/30 Perc cholecystectomy drain inserted. 5/4 Cholangiogram - The cystic and common bile ducts are widely patent. 5/6 CXR - Stable appearance of chest with cardiomegaly, small pleural effusions and mild central vascular congestion persistent. 5/6 US Liver/Abd - Mildly dilated common bile duct, unchanged. Status post cholecystostomy tube placement. Patent and unremarkable hepatic vasculature. Exophytic solid appearing lesion of the right kidney, not convincingly seen on recent CT exam, indeterminate, soft tissue mass cannot be excluded.   Barriers to Discharge: Hospice and SW meeting with family/pt today to figure out plan at discharge. Antibiotics stopped. Morphine changed to po. DuoNebs q4hr [employer]. Pepcid iv bid. PCP: Tim Chavez MD  Readmission Risk Score: 40%  Patient Goals/Plan/Treatment Preferences: Unsure of discharge plan. SW meeting with hospice and family/pt today.

## 2021-05-11 NOTE — FLOWSHEET NOTE
[address] [address]  PHYSICAL THERAPY MISSED TREATMENT NOTE  ACUTE CARE  [facility] CCU-STEPDOWN 3B              Missed Treatment  Pt planning on hospice care unsure of discharge plan -home vs placement, will hold session at this time and check back as able for needs.

## 2021-05-11 NOTE — PROGRESS NOTES
Met with son Yoandy Rodriguez and Cosme Sher in conference area Yoandy Rodriguez voiced that home not an option- family members unable to provide around the clock care. Yoandy Rodriguez did voice that waiting on Lethea Rout to call and assist with information needed to apply for Medicaid- family does not have financial capacity to pay for [facility] room and board. SW discussed with son that some facilities may take with medicaid pending and asked if he had preference. Yoandy Rodriguez voiced that he would be okay with \"what ever one will accept\". SW told him that will call 4 to see if they will accept referral and if would have more than 1 then he will have to choose. Voiced understanding and in agreement. Denied questions for hospice at this time. Assured him that hospice will admit at any facilities when discharged/ discussed ensuring comfort measures and 2 care teams. Hospice will continue to follow in hospital and should condition change will continue to evaluate for appropriate level of care. Updated son that patient more than likely will transfer to [facility] or another less acute floor, until discharge. Voiced understanding. Updated primary nurse Maricel of son aware of plan of possible transfer and waiting on placement at facility that will not be this date with possibility of being few days. Encouraged to call this nurse if questions for hospice.

## 2021-05-11 NOTE — CARE COORDINATION
5/11/21, 1:41 PM EDT    DISCHARGE PLANNING EVALUATION  Met with son Kathryn Loza and [facility] hospice nurse. Discussed discharge options. He told SW that none of his family would be capable of taking care of Shar Ayala at discharge. He is agreeable to [facility] placement with [facility]. Discussed some options in the area. SW agreed to call the facilities and see which ones would take Medicaid pending and hospice. He would also like to speak with Angela Cruz in Public [employer]. After meeting ENOCH called and left a message for Angela Cruz in [employer] Called Agustin and left a message after speaking with several nursing facilities. Need him to decide who he would like referrals made to. Called [facility] and [facility], [place] [address]. All are willing to look at the case. Update 3:35pm: Spoke with patients son and he is agreeable to  making referrals to all 4 facilities. Called Pat at [facility] and [facility] and made referral.  Made referral to Rover[facility]ano at [address]. Faxed chart information. Left a message for [facility].

## 2021-05-11 NOTE — FLOWSHEET NOTE
Pt was in bed at the time of the visit. She said that she was a bit better. Prayer was appreciated. 05/11/21 4810   Encounter Summary   Services provided to: Patient   Referral/Consult From: Rounding   Continue Visiting Yes  (5/11)   Complexity of Encounter Low   Length of Encounter 15 minutes   Routine   Type Follow up   Assessment Approachable;Calm   Intervention [place];Prayer;Nurtured hope; Active listening   Outcome Acceptance;Expressed gratitude;Encouraged; Hopeful;Receptive

## 2021-05-11 NOTE — PROGRESS NOTES
Visit made to floor to see if son present after 1000 am meeting with ENOCH and to work on plan of care. Updated by primary nurse Maricel that she has not see family at this time. Attempted to call ENOCH Gupta, message left to see where at on plan of care. Updated by staff that patient more alert today than has been for many days. Questionable surge prior to changing in status. Went to room, patient alert to name and place, denied pain. Patient appears comfortable, able to use call light. No family present in room. Jillian Dubois to floor to assist family with medicaid application, awaiting family and going to call [facility] to see if still meeting with family. Hospice nurse will await to hear from ENOCH to see if has spoke with family about plan of care. Updated primary nurse Maricel RN of plan. Did discuss with Alejandro Templeton CNP and transfer order entered if patient not discharging this date. Scripts and [facility] signed on chart for discharge, when plan completed with family. Await call back from ENOCH.

## 2021-05-11 NOTE — PROGRESS NOTES
Nutrition Note:  Due to change in medical/code status to comfort care, dietitian will sign off. Note hospice has been consulted. If nutrition intervention is required, please submit a dietitian consult.

## 2021-05-11 NOTE — PROGRESS NOTES
[address] [address]  SPEECH THERAPY MISSED TREATMENT NOTE  STRZ CCU-STEPDOWN 3B      Date: 2021  Patient Name: Omaira Gonzalez        MRN: [id number]    : 1935  (81 y.o.)    REASON FOR MISSED TREATMENT:  ST spoke with ERICK Connelly prior to attempt; RN Maricel reporting \"planning hospice with placement, no further speech therapy services warranted. \" ST to sign off.  Please re-consult if change in [address]

## 2021-05-12 PROCEDURE — 6370000000 HC RX 637 (ALT 250 FOR IP): Performed by: REGISTERED NURSE

## 2021-05-12 PROCEDURE — 1200000000 HC SEMI PRIVATE

## 2021-05-12 PROCEDURE — 6370000000 HC RX 637 (ALT 250 FOR IP): Performed by: NURSE PRACTITIONER

## 2021-05-12 PROCEDURE — 6370000000 HC RX 637 (ALT 250 FOR IP): Performed by: INTERNAL MEDICINE

## 2021-05-12 PROCEDURE — 94669 MECHANICAL CHEST WALL OSCILL: CPT

## 2021-05-12 RX ADMIN — Medication 2 MG: at 22:21

## 2021-05-12 RX ADMIN — Medication 2 MG: at 10:53

## 2021-05-12 RX ADMIN — LATANOPROST 1 DROP: 50 SOLUTION OPHTHALMIC at 20:07

## 2021-05-12 RX ADMIN — LORAZEPAM 0.5 MG: 0.5 TABLET ORAL at 09:05

## 2021-05-12 RX ADMIN — IPRATROPIUM BROMIDE AND ALBUTEROL SULFATE 1 AMPULE: .5; 3 SOLUTION RESPIRATORY (INHALATION) at 18:01

## 2021-05-12 ASSESSMENT — PAIN SCALES - WONG BAKER
WONGBAKER_NUMERICALRESPONSE: 0

## 2021-05-12 ASSESSMENT — PAIN DESCRIPTION - PROGRESSION
CLINICAL_PROGRESSION: NOT CHANGED

## 2021-05-12 NOTE — PLAN OF CARE
Problem: Falls - Risk of:  Goal: Will remain free from falls  Description: Will remain free from falls  Outcome: Ongoing   No falls noted this shift. Fall risk assessment completed. Hourly checks performed, bed locked in lowest position, bed alarm on, call light and personal items within easy reach, and fall sign posted. Problem: Pain:  Goal: Pain level will decrease  Description: Pain level will decrease  Outcome: Ongoing   Denies pain at this time. Problem: DISCHARGE BARRIERS  Goal: Patient's continuum of care needs are met  Outcome: Ongoing   Patient able to make needs known at times. Patient alert and oriented to self. Call light and belongings with in easy reach. Fluids offered, and patient turned every two hours. Problem: Skin Integrity:  Goal: Absence of new skin breakdown  Description: Absence of new skin breakdown  Outcome: Ongoing   Patient with stage II to buttocks with mepilex intact. EPC cream to buttocks for wounds. Problem: Discharge Planning:  Goal: Patients continuum of care needs are met  Description: Patients continuum of care needs are met  Outcome: Ongoing   Patient plans to discharge to [facility] with [address]. Problem: Nutrition  Goal: Optimal nutrition therapy  Outcome: Ongoing   Patient on general diet, but not eating well. Patient did take a few drinks of water. Care plan reviewed with patient. Patient verbalize understanding of the plan of care and contribute to goal setting.     Electronically signed by Indiana Shaffer RN on 5/11/2021 at 10:51 PM

## 2021-05-12 NOTE — PROGRESS NOTES
Progress note      [facility]             Patient:  Denise Lindsey  YOB: 1935    MRN: [id number]   Acct:  [de-identified]   3B-23/023-A  Primary Care Physician: Joseph Mejia MD    Admit Date: 4/27/2021           Subjective: Pt resting in bed. No new complaints,      Objective:      Physical Exam:    Vitals:  Patient Vitals for the past 24 hrs:   BP Temp Temp src Pulse Resp SpO2   05/12/21 0900 104/68 98.8 °F (37.1 °C) Axillary 114 22 94 %   05/11/21 1953 112/72 98 °F (36.7 °C) Oral 94 18 94 %   05/11/21 1547 107/70 98.3 °F (36.8 °C) Oral 141 22 96 %   05/11/21 1245 -- -- -- -- 18 92 %   05/11/21 1142 (!) 144/66 98.3 °F (36.8 °C) Axillary 93 18 95 %     Weight: Weight: 143 lb 9.6 oz (65.1 kg)     24 hour intake/output:    Intake/Output Summary (Last 24 hours) at 5/12/2021 1020  Last data filed at 5/12/2021 0445  Gross per 24 hour   Intake 0 ml   Output 775 ml   Net -775 ml       General appearance - alert, ill appearing, and in no distress  Eyes - pupils equal and reactive, extraocular eye movements intact  Mouth - mucous membranes moist, pharynx normal without lesions  Neck - supple, no significant adenopathy  Chest - Rhonchi throughout  Heart - normal rate, regular rhythm, normal S1, S2, no murmurs, rubs, clicks or gallops  Abdomen - soft, nontender, nondistended, no masses or organomegaly, pos bs.   Neurological - alert, oriented, normal speech, no focal findings or movement disorder noted  Musculoskeletal - no joint tenderness, deformity, left lower extremity edema  Extremities - peripheral pulses normal, left lower extremity edema  Skin - moderate sized ecchymoses left side chest s/p CVC removal    Review of Labs and Diagnostic Testing:    CBC:   Recent Labs     05/10/21  0325   WBC 8.4   HGB 9.4*   HCT 31.1*   MCV 91.2        BMP:   Recent Labs     05/10/21  0325   *   K 3.0*   *   CO2 22* spondylopathy of lumbosacral region ([address]    Paroxysmal atrial fibrillation ([address]    Closed displaced intertrochanteric fracture of right femur ([address]    Fall at nursing home    Severe malnutrition ([address]    Acute kidney injury ([address]    Anemia    Urinary retention    Chest pain    Elevated troponin    Atrial fibrillation with RVR (HCC)    Abnormal ultrasound of gallbladder    Other chest pain    Moderate malnutrition (HCC)    Bilateral pleural effusion    Acute coronary syndrome ([address]    Shock liver             Plan   Comfort care measures  Hospice following  Waiting on insurance for discharge to [facility] for hospice  PO morphine + Atvian for comfort     Assessment and plan of care discussed with supervising physician, Dr Julio César Mahajan. Electronically signed by YASMINE Luu CNP on 5/12/2021 at 10:20 AM    I have discussed the case, including pertinent history and exam findings with the NP. I have seen and examined the patient and the key elements of the encounter have been performed by me. I agree with the assessment, plan and orders as documented by the NP.     Electronically signed by Timbo Garza MD on 5/12/2021 at 3:09 PM

## 2021-05-12 NOTE — PROGRESS NOTES
[address] [address]  PHYSICAL THERAPY MISSED TREATMENT NOTE  [facility] CCU-STEPDOWN 3B    Date: 2021  Patient Name: Kathryn Shah        MRN: [id number]   : 1935  (81 y.o.)  Gender: female   Referring Practitioner: Claire Ramirez CNP  Diagnosis: a fib with RVR         REASON FOR MISSED TREATMENT:    RN states patient on hold. Patient planning on hospice care unsure of discharge plan. Will check back as able.

## 2021-05-12 NOTE — CARE COORDINATION
5/12/21, 11:21 AM EDT    DISCHARGE PLANNING EVALUATION  Made referral to Dalia at [facility] in [address] [facility]. Update 1:28pm: Spoke with Pat at [facility] and [facility]. At this time they will not be able to accept. Called Neema Valderrama at [facility] and they are still looking the case over. They can accept clinically, but are still considering the financials. Update 2:53pm: Spoke with Dalia at [facility]. They are able to accept. Still waiting to hear from [facility]. Dalia is aware SW can't commit until hearing from the other facility. Will plan transport for tomorrow at 11:00am.       Update 4:42pm: Have been communicating back and fourth with the patients son Willy Rodriguez and Neema Valderrama from [facility]. He has questions regarding the Medicaid application. Neema Valderrama is waiting to hear from his cooperate office if they are able to accept. He will let  know as soon as he has an answer. Willy Rodriguez is aware [facility] has accepted. He would prefer [facility] if they are able to accept. Updated Dalia at [facility].

## 2021-05-12 NOTE — PROGRESS NOTES
NOT AN INPATIENT HOSPICE PATIENT. Visit made to assist with plan of discharge to [facility]. Maura Mcgill made referral to 3 facilities and left message with another. Will await facility decisions on whether will accept then son will have to choose. Patient sitting up in bed with eyes closed with no s/s of distress or discomfort noted. Discussed with primary nurse Thomas Sena and she will contact nurse with any updates. Voiced to her that Maura Mcgill more than likely will contact this nurse as well. Denied needs for patient at this time.

## 2021-05-12 NOTE — PROGRESS NOTES
Pharmacy Renal Adjustment    Veronica Diallo is a 81 y.o. female. Pharmacy renally adjust the following medications per P&T approved policy: Famotidine    Recent Labs     05/10/21  0325   BUN 26*   CREATININE 1.9*     Estimated Creatinine Clearance: 19 mL/min (A) (based on SCr of 1.9 mg/dL (H)).   Calculated CrCl: 19    Height:   Ht Readings from Last 1 Encounters:   04/28/21 5' 1\" (1.549 m)     Weight:  Wt Readings from Last 1 Encounters:   05/10/21 143 lb 9.6 oz (65.1 kg)     Plan: Adjustments based on renal function:          Decrease Famotidine to 20 mg IV daily    Tina Sauceda PharmD 5/12/2021 11:50 AM

## 2021-05-12 NOTE — CARE COORDINATION
5/12/21, 1:42 PM EDT    DISCHARGE  [address] day: 14  Location: -23/023-A Reason for admit: Atrial fibrillation with RVR ([address] [I48.91]   Procedure:   4/27 CXR - left basilar atelectasis. Tiny bilateral pleural effusions suspected. 4/28 CTA chest - No PE. Small-moderate sized bilateral pleural effusions, increased in size. Bilateral multifocal atelectasis.   4/28 US GB - Sludge or stones within the gallbladder with gallbladder wall thickening and dilated common bile duct. These findings may represent acute cholecystitis.   4/29 HIDA scan - Patent common bile duct. Nonvisualization of the gallbladder highly suspicious for acute cholecystitis. 4/29 CVC placed - Left IJ.   4/30 Perc cholecystectomy drain inserted. 5/4 Cholangiogram - The cystic and common bile ducts are widely patent. 5/6 CXR - Stable appearance of chest with cardiomegaly, small pleural effusions and mild central vascular congestion persistent. 5/6 US Liver/Abd - Mildly dilated common bile duct, unchanged. Status post cholecystostomy tube placement. Patent and unremarkable hepatic vasculature. Exophytic solid appearing lesion of the right kidney, not convincingly seen on recent CT exam, indeterminate, soft tissue mass cannot be excluded.   Barriers to Discharge: Planning hospice at discharge, SW working with family to find placement. [address] PCP: Teresa Tirado MD  Readmission Risk Score: 40%  Patient Goals/Plan/Treatment Preferences: Family unable to stay with pt at discharge 24/7. Unable to pay for ECF. SW working with family, ECF's, hospice and financial services to assist with discharge plans.

## 2021-05-13 VITALS
OXYGEN SATURATION: 96 % | HEART RATE: 76 BPM | WEIGHT: 143.6 LBS | RESPIRATION RATE: 18 BRPM | TEMPERATURE: 97.9 F | SYSTOLIC BLOOD PRESSURE: 137 MMHG | HEIGHT: 61 IN | DIASTOLIC BLOOD PRESSURE: 94 MMHG | BODY MASS INDEX: 27.11 KG/M2

## 2021-05-13 PROCEDURE — 6370000000 HC RX 637 (ALT 250 FOR IP): Performed by: REGISTERED NURSE

## 2021-05-13 RX ORDER — MIDODRINE HYDROCHLORIDE 10 MG/1
10 TABLET ORAL
DISCHARGE
Start: 2021-05-13

## 2021-05-13 RX ORDER — SUCRALFATE 1 G/1
1 TABLET ORAL
Qty: 120 TABLET | Refills: 3 | DISCHARGE
Start: 2021-05-13

## 2021-05-13 RX ADMIN — SODIUM BICARBONATE 1300 MG: 650 TABLET ORAL at 12:42

## 2021-05-13 RX ADMIN — SUCRALFATE 1 G: 1 TABLET ORAL at 12:42

## 2021-05-13 RX ADMIN — SERTRALINE HYDROCHLORIDE 50 MG: 50 TABLET, FILM COATED ORAL at 12:42

## 2021-05-13 ASSESSMENT — PAIN SCALES - WONG BAKER
WONGBAKER_NUMERICALRESPONSE: 0

## 2021-05-13 ASSESSMENT — PAIN DESCRIPTION - PROGRESSION: CLINICAL_PROGRESSION: NOT CHANGED

## 2021-05-13 NOTE — PROGRESS NOTES
To floor to check on plan of discharge to ECF. Updated by primary nurse that plan discharge at 1430 to [facility]. Asked that primary nurse send scripts(this nurse placed in blue packet), fax AVS to [phone], and call hospice office at [phone] when patient actually leaves hospital. Patient sitting up in bed with no s/s of distress or discomfort. Alert to self and place. Son Jia Brink at bedside denied questions in regards to hospice and plans on being present when patient going to [facility]. Updated him of hospice plan to admit at [facility] this date and paperwork to be signed. Voiced understanding. Copies scripts and DNR-CC faxed to hospice office and ordered oxygen from DME.

## 2021-05-13 NOTE — PROGRESS NOTES
Report called to nurse [facility], at [address] [facility]. Aniticipated ambulette ride at 2:30. Last dose MAR, scripts and AVS faxed to [facility].

## 2021-05-13 NOTE — DISCHARGE SUMMARY
Discharge Summary  5/13/2021  2:13 PM    Patient:  Rodolfo Gallo  YOB: 1935    MRN: [id number]   Acct: [de-identified]   3B-23/023-A   Primary Care Physician: Anna Mccullough MD    Admit date:  4/27/2021    Discharge date:  5/13/2021    Discharge Diagnoses:    Patient Active Problem List   Diagnosis    Hyperlipidemia    Osteoarthritis    GERD (gastroesophageal reflux disease)    COPD (chronic obstructive pulmonary disease) ([address]    Chronic back pain    CAD (coronary artery disease)    Hypertension    Spinal stenosis of lumbar region with neurogenic claudication    Neurologic gait dysfunction    Inflammatory spondylopathy of lumbosacral region ([address]    Paroxysmal atrial fibrillation ([address]    Closed displaced intertrochanteric fracture of right femur ([address]    Fall at nursing home    Severe malnutrition ([address]    Acute kidney injury ([address]    Anemia    Urinary retention    Chest pain    Elevated troponin    Atrial fibrillation with RVR (HCC)    Abnormal ultrasound of gallbladder    Other chest pain    Moderate malnutrition (HCC)    Bilateral pleural effusion    Acute coronary syndrome ([address]    Shock liver       Discharge Medications:       Mitchell Manner \"Julianne\"   Home Medication Instructions ZBM:[id number]    Printed on:05/13/21 1413   Medication Information                      acetaminophen (TYLENOL) 325 MG tablet  Take 2 tablets by mouth every 4 hours as needed for Pain Maximum dose of acetaminophen is 4000 mg from all sources in 24 hours.              aspirin 325 MG EC tablet  Take 1 tablet by mouth daily             latanoprost (XALATAN) 0.005 % ophthalmic solution  Place 1 drop into both eyes nightly             metoprolol tartrate (LOPRESSOR) 25 MG tablet  Take 1 tablet by mouth 2 times daily             midodrine (PROAMATINE) 10 MG tablet  Take 1 tablet by mouth 3 times daily (with meals)             mirtazapine (REMERON) 15 MG tablet  Take 7.5 mg by mouth nightly             sertraline (ZOLOFT) 50 MG tablet  Take 50 mg by mouth daily             sodium bicarbonate 650 MG tablet  Take 2 tablets by mouth 2 times daily             sucralfate (CARAFATE) 1 GM tablet  Take 1 tablet by mouth 4 times daily (before meals and nightly)               Scheduled Meds: Scheduled Meds:   famotidine (PEPCID) injection  20 mg Intravenous Daily    midodrine  10 mg Oral TID WC    [Held by provider] apixaban  5 mg Oral BID    aspirin  325 mg Oral Daily    amiodarone  200 mg Oral Daily    [Held by provider] docusate sodium  100 mg Oral BID    latanoprost  1 drop Both Eyes Nightly    mirtazapine  7.5 mg Oral Nightly    sertraline  50 mg Oral Daily    sodium bicarbonate  1,300 mg Oral BID    sucralfate  1 g Oral 4x Daily AC & HS    sodium chloride flush  5-40 mL Intravenous 2 times per day    metoprolol tartrate  25 mg Oral BID     Continuous Infusions:   dextrose      sodium chloride       PRN Meds:.morphine 20MG/ML, polyvinyl alcohol, glucose, dextrose, glucagon (rDNA), dextrose, LORazepam, potassium chloride, potassium chloride, potassium chloride, magnesium sulfate, acetaminophen **OR** acetaminophen, ondansetron, potassium chloride **OR** potassium alternative oral replacement **OR** potassium chloride, tiZANidine, sodium chloride flush, sodium chloride, polyethylene glycol  Continuous Infusions:   dextrose      sodium chloride         Intake/Output Summary (Last 24 hours) at 5/13/2021 1413  Last data filed at 5/13/2021 1054  Gross per 24 hour   Intake 0 ml   Output 415 ml   Net -415 ml       Diet:  DIET GENERAL;     Activity:  Activity as tolerated (Patient may move about with assist as indicated or with supervision.)    Follow-up:  in the next few weeks with Anna Castellanos MD,      Consultants:  Cardiology, Hematology    Procedures:  None    Diagnostic Test:  None  Objective:  Lab Results   Component Value Date    WBC 8.4 05/10/2021    RBC 3.41 05/10/2021    HGB 9.4 05/10/2021    HCT 31.1 05/10/2021    MCV 91.2 05/10/2021    MCH 27.6 05/10/2021    MCHC 30.2 05/10/2021    RDW 14.5 09/21/2018     05/10/2021    MPV 11.8 05/10/2021     Lab Results   Component Value Date     05/10/2021    K 3.0 05/10/2021    K 3.3 04/29/2021     05/10/2021    CO2 22 05/10/2021    BUN 26 05/10/2021    CREATININE 1.9 05/10/2021    GLUCOSE 103 05/10/2021    GLUCOSE 112 09/12/2017    CALCIUM 8.7 05/10/2021     Lab Results   Component Value Date    CALCIUM 8.7 05/10/2021     No results found for: IONCA  Lab Results   Component Value Date    MG 1.7 05/03/2021     Lab Results   Component Value Date    PHOS 1.9 05/03/2021     No results found for: BNP  Lab Results   Component Value Date    ALKPHOS 211 05/10/2021    ALT 99 05/10/2021    AST 96 05/10/2021    PROT 5.3 05/10/2021    BILITOT 1.0 05/10/2021    BILIDIR <0.2 04/17/2021    LABALBU 2.0 05/10/2021     Lab Results   Component Value Date    LACTA 1.3 05/04/2021     Lab Results   Component Value Date    AMYLASE 28 05/05/2021     Lab Results   Component Value Date    LIPASE 25.8 05/04/2021     Lab Results   Component Value Date    CHOL 66 05/02/2021    TRIG 132 05/02/2021    HDL 17 05/02/2021    LDLCALC 23 05/02/2021     No results for input(s): POCGLU in the last 72 hours. No results for input(s): CKTOTAL, CKMB, TROPONINI in the last 72 hours. No results found for: LABA1C  Lab Results   Component Value Date    INR 7.60 05/06/2021     No results found for: PHART, PO2ART, SPA1BDA, JPA2OFJ, [facility] Course: clinical course has not improved, Pt admitted to the hospital on 4/27/21 due to increasing chest pain and shortness of breath. Upon arrival to the hospital she was found to be in A fib RVR and hence cardiology was consulted and Cardizem drip started. Pt converted to NSR and cardizem drip was d/c.  She was found to have acute cholecystitis for which general surgery was consulted and pt was started on broad spectrum

## 2021-05-14 ENCOUNTER — TELEPHONE (OUTPATIENT)
Dept: SURGERY | Age: 86
End: 2021-05-14

## 2021-05-14 PROBLEM — Z43.4 CHOLECYSTOSTOMY CARE (HCC): Status: ACTIVE | Noted: 2021-05-14

## 2021-05-14 NOTE — TELEPHONE ENCOUNTER
Spoke with son-patient is on hospice and they will not be coming to appt. appt cancelled instructed son to let us know if anything changes.

## 2021-05-17 ENCOUNTER — CARE COORDINATION (OUTPATIENT)
Dept: CARE COORDINATION | Age: 86
End: 2021-05-17

## 2021-05-17 NOTE — CARE COORDINATION
ACM will discharge Lynette Tarango at this time as she has been placed in SNF with Hospice services.

## 2022-12-19 NOTE — PROGRESS NOTES
Pharmacy Intravenous to Oral Protocol  Medication changed per P&T protocol: famotidine    Patient meets criteria based on the followin. IV therapy > 24 hours: Yes  2. Nausea/vomiting: No  3. Regular diet : Yes  4. Tolerating other oral medications: Yes  5. Afebrile for 24 hours: n/a  6.  WBC trending downward: n/a [Age ___] : Age: [unfilled] [Healthy] : healthy [FreeTextEntry2] : Maternal grandfather witth MI and diabetes
